# Patient Record
Sex: FEMALE | Race: WHITE | NOT HISPANIC OR LATINO | Employment: OTHER | URBAN - METROPOLITAN AREA
[De-identification: names, ages, dates, MRNs, and addresses within clinical notes are randomized per-mention and may not be internally consistent; named-entity substitution may affect disease eponyms.]

---

## 2021-05-26 ENCOUNTER — RECORDS - HEALTHEAST (OUTPATIENT)
Dept: ADMINISTRATIVE | Facility: CLINIC | Age: 86
End: 2021-05-26

## 2021-05-27 ENCOUNTER — RECORDS - HEALTHEAST (OUTPATIENT)
Dept: ADMINISTRATIVE | Facility: CLINIC | Age: 86
End: 2021-05-27

## 2021-05-28 ENCOUNTER — RECORDS - HEALTHEAST (OUTPATIENT)
Dept: ADMINISTRATIVE | Facility: CLINIC | Age: 86
End: 2021-05-28

## 2021-05-30 ENCOUNTER — RECORDS - HEALTHEAST (OUTPATIENT)
Dept: ADMINISTRATIVE | Facility: CLINIC | Age: 86
End: 2021-05-30

## 2021-05-31 ENCOUNTER — RECORDS - HEALTHEAST (OUTPATIENT)
Dept: ADMINISTRATIVE | Facility: CLINIC | Age: 86
End: 2021-05-31

## 2021-06-09 ENCOUNTER — RECORDS - HEALTHEAST (OUTPATIENT)
Dept: ADMINISTRATIVE | Facility: CLINIC | Age: 86
End: 2021-06-09

## 2021-06-15 ENCOUNTER — OFFICE VISIT - HEALTHEAST (OUTPATIENT)
Dept: FAMILY MEDICINE | Facility: CLINIC | Age: 86
End: 2021-06-15

## 2021-06-15 ENCOUNTER — COMMUNICATION - HEALTHEAST (OUTPATIENT)
Dept: FAMILY MEDICINE | Facility: CLINIC | Age: 86
End: 2021-06-15

## 2021-06-15 DIAGNOSIS — G20.A1 PARKINSON'S DISEASE (H): ICD-10-CM

## 2021-06-15 DIAGNOSIS — E11.9 TYPE 2 DIABETES MELLITUS WITHOUT COMPLICATION, WITHOUT LONG-TERM CURRENT USE OF INSULIN (H): ICD-10-CM

## 2021-06-15 DIAGNOSIS — M54.9 CHRONIC BILATERAL BACK PAIN, UNSPECIFIED BACK LOCATION: ICD-10-CM

## 2021-06-15 DIAGNOSIS — I25.810 CORONARY ARTERY DISEASE INVOLVING CORONARY BYPASS GRAFT OF NATIVE HEART WITHOUT ANGINA PECTORIS: ICD-10-CM

## 2021-06-15 DIAGNOSIS — G50.0 TRIGEMINAL NEURALGIA: ICD-10-CM

## 2021-06-15 DIAGNOSIS — I35.1 AORTIC VALVE INSUFFICIENCY, ETIOLOGY OF CARDIAC VALVE DISEASE UNSPECIFIED: ICD-10-CM

## 2021-06-15 DIAGNOSIS — F32.A DEPRESSION, UNSPECIFIED DEPRESSION TYPE: ICD-10-CM

## 2021-06-15 DIAGNOSIS — G89.29 CHRONIC BILATERAL BACK PAIN, UNSPECIFIED BACK LOCATION: ICD-10-CM

## 2021-06-15 DIAGNOSIS — I44.7 LEFT BUNDLE BRANCH BLOCK: ICD-10-CM

## 2021-06-15 DIAGNOSIS — I65.23 BILATERAL CAROTID ARTERY STENOSIS: ICD-10-CM

## 2021-06-15 DIAGNOSIS — N25.81 SECONDARY RENAL HYPERPARATHYROIDISM (H): ICD-10-CM

## 2021-06-15 DIAGNOSIS — I10 ESSENTIAL HYPERTENSION: ICD-10-CM

## 2021-06-15 DIAGNOSIS — E03.8 SUBCLINICAL HYPOTHYROIDISM: ICD-10-CM

## 2021-06-15 DIAGNOSIS — I34.0 MITRAL VALVE INSUFFICIENCY, UNSPECIFIED ETIOLOGY: ICD-10-CM

## 2021-06-15 RX ORDER — NYSTATIN 100000 U/G
CREAM TOPICAL
Status: SHIPPED | COMMUNITY
Start: 2020-01-14 | End: 2022-03-22

## 2021-06-15 RX ORDER — POLYETHYLENE GLYCOL 3350 17 G/17G
17 POWDER, FOR SOLUTION ORAL DAILY PRN
Status: SHIPPED | COMMUNITY
Start: 2021-06-15

## 2021-06-15 RX ORDER — HYDROCORTISONE 2.5 %
CREAM (GRAM) TOPICAL 2 TIMES DAILY
Status: SHIPPED | COMMUNITY
Start: 2021-06-15 | End: 2022-08-15

## 2021-06-15 RX ORDER — KETOCONAZOLE 20 MG/G
CREAM TOPICAL DAILY
Status: SHIPPED | COMMUNITY
Start: 2021-06-15 | End: 2022-08-15

## 2021-06-15 ASSESSMENT — MIFFLIN-ST. JEOR: SCORE: 1261.43

## 2021-06-15 NOTE — ASSESSMENT & PLAN NOTE
BP Readings from Last 3 Encounters:   06/15/21 134/78   06/13/18 159/66      controlled carvediolol 12.5mg po bid

## 2021-06-18 ENCOUNTER — RECORDS - HEALTHEAST (OUTPATIENT)
Dept: ADMINISTRATIVE | Facility: CLINIC | Age: 86
End: 2021-06-18

## 2021-06-26 NOTE — PROGRESS NOTES
ASSESSMENT AND PLAN:     Problem List Items Addressed This Visit        Unprioritized    Essential Hypertension     BP Readings from Last 3 Encounters:   06/15/21 134/78   06/13/18 159/66      controlled carvediolol 12.5mg po bid         Relevant Orders    Ambulatory referral to Cardiology    Coronary artery disease involving coronary bypass graft of native heart without angina pectoris     New to [place] from [place]. Needs cardiologist, so referral placed.          Relevant Orders    Ambulatory referral to Cardiology    Parkinson's disease (H)     Dx in 2015  Needs to establish care with neurologist.   She takes sinemet 25-100mg po three times a day.         Relevant Orders    Ambulatory referral to Neurology    Trigeminal neuralgia     Controlled with gabapentin 300mg po four times a day.         Relevant Orders    Ambulatory referral to Neurology    Chronic bilateral back pain     She says she has seen a pain specialist and had back surgery.  She needs to establish care with a pain specialist since she is new to [place] from [place].  Referral placed.          Relevant Orders    Ambulatory referral to Pain Clinic - [facility] pain center    Aortic valve insufficiency     Referred to cardiology to establish care as [place]         Mitral valve insufficiency     Referred to establish care with cardiologist.            Bilateral carotid artery stenosis     Referred to cardiology to establish care as [place]           Depression     Controlled on fluoxitine 20 mg po bid         Left bundle branch block     Referred to establish care with cardiologist.          Acquired hypothyroidism     Endocrine referral made.          Type 2 diabetes mellitus without complication (H)     Diet controlled diabetes. Outside records reveal a1c 6.7 4/12/2021  Creatinine normal 4/2021  microalbumin normal 4/12/2021    Follow up q6 months         Secondary renal hyperparathyroidism (H)     She has  hyperparathyroidism          Relevant Orders    Ambulatory referral to Endocrinology    DXA Bone Density Scan           Chief Complaint   Patient presents with     Establish Care        HPI  Janes Montero is a 87 y.o. female comes in new to me and new to [place] from [place] today.     Social History     Tobacco Use   Smoking Status Never Smoker   Smokeless Tobacco Never Used      Current Outpatient Medications on File Prior to Visit   Medication Sig Dispense Refill     ascorbic acid, vitamin C, (ASCORBIC ACID WITH ELLIOTT HIPS) 500 MG tablet Take 500 mg by mouth daily.       aspirin 81 mg chewable tablet Chew 81 mg at bedtime.       atorvastatin (LIPITOR) 20 MG tablet Take 20 mg by mouth at bedtime.       carbidopa-levodopa (SINEMET)  mg per tablet Take 1 tablet by mouth 3 (three) times a day.       carvedilol (COREG) 12.5 MG tablet Take 12.5 mg by mouth 2 (two) times a day with meals.       cholecalciferol, vitamin D3, 1,000 unit tablet Take 2,000 Units by mouth daily.       coenzyme Q10 (CO Q-10) 100 mg capsule Take 100 mg by mouth 2 (two) times a day.       FLUoxetine (PROZAC) 20 MG capsule Take 20 mg by mouth 2 (two) times a day.       gabapentin (NEURONTIN) 300 MG capsule Take 300 mg by mouth 4 (four) times a day.       hydrocortisone 2.5 % cream Apply topically 2 (two) times a day.       ketoconazole (NIZORAL) 2 % cream Apply topically daily.       levothyroxine (SYNTHROID, LEVOTHROID) 25 MCG tablet Take 25 mcg by mouth Daily at 6:00 am.       loratadine (CLARITIN) 10 mg tablet Take 10 mg by mouth daily.       magnesium oxide 250 mg Tab Take 250 mg by mouth daily.       melatonin 1 mg Tab tablet Take 3 mg by mouth at bedtime.        multivitamin therapeutic tablet Take 1 tablet by mouth daily.       nitroglycerin (NITROSTAT) 0.4 MG SL tablet Place 0.4 mg under the tongue every 5 (five) minutes as needed for chest pain.       nystatin (MYCOSTATIN) cream MIX EQUALLY WITH TRIAMCINOLONE AND APPLY TO VULVA 3 TO 4  "TIMES A DAY       omega 3-dha-epa-fish oil (FISH OIL) 60- mg cap capsule Take 2,000 mg by mouth 2 (two) times a day.       omeprazole (PRILOSEC) 20 MG capsule Take 20 mg by mouth daily before breakfast.       polyethylene glycol (MIRALAX) 17 gram packet Take 17 g by mouth daily.       [DISCONTINUED] furosemide (LASIX) 40 MG tablet Take 1 tablet (40 mg total) by mouth daily. 90 tablet 0     [DISCONTINUED] glipiZIDE (GLUCOTROL) 5 MG tablet Take 5 mg by mouth daily with breakfast.       [DISCONTINUED] hydrOXYzine HCl (ATARAX) 25 MG tablet Take 25 mg by mouth 2 (two) times a day.       [DISCONTINUED] hydrOXYzine HCl (ATARAX) 25 MG tablet Take 25 mg by mouth daily as needed for itching.       [DISCONTINUED] lisinopril (PRINIVIL,ZESTRIL) 5 MG tablet Take 5 mg by mouth at bedtime.       [DISCONTINUED] meloxicam (MOBIC) 15 MG tablet Take 15 mg by mouth daily.       No current facility-administered medications on file prior to visit.       Allergies   Allergen Reactions     Alendronate      pain     Codeine Hives     Hydrocodone-Acetaminophen Other (See Comments)     Highly sensitive, \"knocks her out and can't wake up\"     Risedronate      Bone pain     Rosuvastatin Myalgia     Simvastatin Myalgia       OBJECTIVE: /78 (Patient Site: Left Arm, Patient Position: Sitting, Cuff Size: Adult Large)   Pulse 66   Ht 5' 5\" (1.651 m)   Wt 182 lb (82.6 kg)   SpO2 98%   BMI 30.29 kg/m     Physical Exam  Constitutional:       General: She is not in acute distress.     Appearance: She is well-developed.   HENT:      Head: Normocephalic and atraumatic.   Eyes:      Conjunctiva/sclera: Conjunctivae normal.   Neck:      Musculoskeletal: Neck supple.   Cardiovascular:      Rate and Rhythm: Normal rate and regular rhythm.   Pulmonary:      Effort: Pulmonary effort is normal.   Musculoskeletal: Normal range of motion.   Skin:     General: Skin is warm and dry.   Neurological:      Mental Status: She is alert and oriented to " person, place, and time.          This note was created using Dragon dictation.  Please excuse any grammatical errors.

## 2021-07-01 ENCOUNTER — RECORDS - HEALTHEAST (OUTPATIENT)
Dept: BONE DENSITY | Facility: CLINIC | Age: 86
End: 2021-07-01

## 2021-07-01 DIAGNOSIS — N25.81 SECONDARY HYPERPARATHYROIDISM OF RENAL ORIGIN (H): ICD-10-CM

## 2021-07-01 DIAGNOSIS — Z78.0 ASYMPTOMATIC MENOPAUSAL STATE: ICD-10-CM

## 2021-07-05 ENCOUNTER — RECORDS - HEALTHEAST (OUTPATIENT)
Dept: ADMINISTRATIVE | Facility: OTHER | Age: 86
End: 2021-07-05

## 2021-07-05 PROBLEM — F32.A DEPRESSION: Status: ACTIVE | Noted: 2021-06-15

## 2021-07-05 PROBLEM — G50.0 TRIGEMINAL NEURALGIA: Status: ACTIVE | Noted: 2021-06-15

## 2021-07-05 PROBLEM — G20.A1 PARKINSON'S DISEASE (H): Status: ACTIVE | Noted: 2021-06-15

## 2021-07-05 PROBLEM — E11.9 TYPE 2 DIABETES MELLITUS WITHOUT COMPLICATION (H): Status: ACTIVE | Noted: 2017-03-20

## 2021-07-05 PROBLEM — M15.0 PRIMARY OSTEOARTHRITIS INVOLVING MULTIPLE JOINTS: Status: ACTIVE | Noted: 2018-04-06

## 2021-07-05 PROBLEM — I51.89 DIASTOLIC DYSFUNCTION: Status: RESOLVED | Noted: 2018-07-10 | Resolved: 2021-06-15

## 2021-07-06 VITALS
OXYGEN SATURATION: 98 % | SYSTOLIC BLOOD PRESSURE: 134 MMHG | DIASTOLIC BLOOD PRESSURE: 78 MMHG | HEIGHT: 65 IN | WEIGHT: 182 LBS | HEART RATE: 66 BPM | BODY MASS INDEX: 30.32 KG/M2

## 2021-07-13 ENCOUNTER — RECORDS - HEALTHEAST (OUTPATIENT)
Dept: ADMINISTRATIVE | Facility: CLINIC | Age: 86
End: 2021-07-13

## 2021-07-13 ENCOUNTER — OFFICE VISIT (OUTPATIENT)
Dept: FAMILY MEDICINE | Facility: CLINIC | Age: 86
End: 2021-07-13
Payer: MEDICARE

## 2021-07-13 VITALS
SYSTOLIC BLOOD PRESSURE: 138 MMHG | DIASTOLIC BLOOD PRESSURE: 74 MMHG | BODY MASS INDEX: 30.62 KG/M2 | OXYGEN SATURATION: 93 % | HEART RATE: 70 BPM | WEIGHT: 184 LBS

## 2021-07-13 DIAGNOSIS — G89.29 CHRONIC BILATERAL BACK PAIN, UNSPECIFIED BACK LOCATION: ICD-10-CM

## 2021-07-13 DIAGNOSIS — I35.1 NONRHEUMATIC AORTIC VALVE INSUFFICIENCY: ICD-10-CM

## 2021-07-13 DIAGNOSIS — I36.1 NONRHEUMATIC TRICUSPID VALVE REGURGITATION: ICD-10-CM

## 2021-07-13 DIAGNOSIS — I44.7 LEFT BUNDLE BRANCH BLOCK: ICD-10-CM

## 2021-07-13 DIAGNOSIS — M54.9 CHRONIC BILATERAL BACK PAIN, UNSPECIFIED BACK LOCATION: ICD-10-CM

## 2021-07-13 DIAGNOSIS — I65.23 BILATERAL CAROTID ARTERY STENOSIS: ICD-10-CM

## 2021-07-13 DIAGNOSIS — E03.9 ACQUIRED HYPOTHYROIDISM: Primary | ICD-10-CM

## 2021-07-13 DIAGNOSIS — G50.0 TRIGEMINAL NEURALGIA: ICD-10-CM

## 2021-07-13 DIAGNOSIS — I25.810 CORONARY ARTERY DISEASE INVOLVING CORONARY BYPASS GRAFT OF NATIVE HEART WITHOUT ANGINA PECTORIS: ICD-10-CM

## 2021-07-13 DIAGNOSIS — N25.81 SECONDARY RENAL HYPERPARATHYROIDISM (H): ICD-10-CM

## 2021-07-13 DIAGNOSIS — H61.23 CERUMINOSIS, BILATERAL: ICD-10-CM

## 2021-07-13 DIAGNOSIS — G20.A1 PARKINSON'S DISEASE (H): ICD-10-CM

## 2021-07-13 DIAGNOSIS — I34.0 NONRHEUMATIC MITRAL VALVE REGURGITATION: ICD-10-CM

## 2021-07-13 DIAGNOSIS — M81.0 OSTEOPOROSIS WITHOUT CURRENT PATHOLOGICAL FRACTURE, UNSPECIFIED OSTEOPOROSIS TYPE: ICD-10-CM

## 2021-07-13 PROCEDURE — 99214 OFFICE O/P EST MOD 30 MIN: CPT | Mod: 25 | Performed by: FAMILY MEDICINE

## 2021-07-13 PROCEDURE — 36415 COLL VENOUS BLD VENIPUNCTURE: CPT | Performed by: FAMILY MEDICINE

## 2021-07-13 PROCEDURE — 82306 VITAMIN D 25 HYDROXY: CPT | Performed by: FAMILY MEDICINE

## 2021-07-13 PROCEDURE — 69210 REMOVE IMPACTED EAR WAX UNI: CPT | Performed by: FAMILY MEDICINE

## 2021-07-13 NOTE — PROGRESS NOTES
Assessment & Plan   Problem List Items Addressed This Visit        Nervous and Auditory    Parkinson's disease (H)     Physical therpay recommended and ordered.   Will see neurologist 2 weeks from today.         Relevant Orders    Physical Therapy Referral    Trigeminal neuralgia     Will see neurologist 2 weeks from today.  Pain clinic referral has been given but she has declined it.         Chronic bilateral back pain     Needs to see pain specialist, but she declined that for now saying not needed.             Endocrine    Acquired hypothyroidism - Primary     Endocrine referral - setting hyperparathyroidism, and new osteoporosis.          Relevant Orders    Adult Endocrinology Referral    Otolaryngology Referral    Secondary renal hyperparathyroidism (H)     Endocrine referral and ent referral as she has newly diagnosed osteoporosis in the setting of hyperparathyroidism and hypothyroidism.             Circulatory    Coronary artery disease involving coronary bypass graft of native heart without angina pectoris     Has appt with cardiology         Aortic valve insufficiency     Has appt with cardiology         Tricuspid insufficiency     Has appt with cardiology         Mitral valve insufficiency     Has appt with cardiology         Bilateral carotid artery stenosis     Has appt with cardiology         Left bundle branch block     Has appt with cardiology            Musculoskeletal and Integumentary    Osteoporosis without current pathological fracture, unspecified osteoporosis type     Dietary Calcium discussed.   Vitamin D intake. She does take vitamin d supplement.and we will check the level today.   Avoid smoking  Avoid excess alcohol  Avoid caffeine   Regular weight bearing exercise  Repeat bone density scan 1 year    Hx hyperparathyroidism.   Needs referral to ent surgery due to worsening bone density.   Referral also to endocrine to discuss osteoporosis to consider bisphosphonate.  Physical therapy to  "learn balance and weight bearing exercise routine ordered.          Relevant Orders    Adult Endocrinology Referral    Otolaryngology Referral    Physical Therapy Referral    Vitamin D Deficiency      Other Visit Diagnoses     Ceruminosis, bilateral        Bilateral ear lavage.     Relevant Orders    REMOVE IMPACTED CERUMEN (Completed)              BMI:   Estimated body mass index is 30.62 kg/m  as calculated from the following:    Height as of 6/15/21: 1.651 m (5' 5\").    Weight as of this encounter: 83.5 kg (184 lb).         Return in about 9 weeks (around 9/14/2021) for already set to follow up 9/14/2021.    Maggie Arriaga MD  [facility]      Chief Complaint   Patient presents with     ear wax removal        Subjective   Janes is a 87 year old who presents for the following health issues  accompanied by her daughter:    HPI comes in at my request to discuss her osteoporosis.          Objective    /74 (BP Location: Left arm, Patient Position: Left side, Cuff Size: Adult Large)   Pulse 70   Wt 83.5 kg (184 lb)   SpO2 93%   BMI 30.62 kg/m    Body mass index is 30.62 kg/m .  Physical Exam  Constitutional:       Appearance: Normal appearance.   HENT:      Head: Normocephalic and atraumatic.   Cardiovascular:      Rate and Rhythm: Normal rate and regular rhythm.   Pulmonary:      Effort: Pulmonary effort is normal.   Musculoskeletal:         General: Normal range of motion.      Cervical back: Normal range of motion and neck supple.   Neurological:      General: No focal deficit present.      Mental Status: She is alert and oriented to person, place, and time.           "

## 2021-07-13 NOTE — ASSESSMENT & PLAN NOTE
Endocrine referral and ent referral as she has newly diagnosed osteoporosis in the setting of hyperparathyroidism and hypothyroidism.

## 2021-07-14 PROBLEM — I50.9 ACUTE ON CHRONIC CONGESTIVE HEART FAILURE (H): Status: RESOLVED | Noted: 2018-06-11 | Resolved: 2021-06-15

## 2021-07-14 LAB — DEPRECATED CALCIDIOL+CALCIFEROL SERPL-MC: 30 UG/L (ref 30–80)

## 2021-07-14 NOTE — ASSESSMENT & PLAN NOTE
>>ASSESSMENT AND PLAN FOR BILATERAL CAROTID ARTERY STENOSIS WRITTEN ON 7/13/2021  7:07 PM BY ASHLY MORRISON MD    Has appt with cardiology    >>ASSESSMENT AND PLAN FOR CORONARY ARTERY STENOSIS WRITTEN ON 7/13/2021  7:07 PM BY ASHLY MORRISON MD    Has appt with cardiology

## 2021-07-14 NOTE — ASSESSMENT & PLAN NOTE
Will see neurologist 2 weeks from today.  Pain clinic referral has been given but she has declined it.

## 2021-07-14 NOTE — ASSESSMENT & PLAN NOTE
Dietary Calcium discussed.   Vitamin D intake. She does take vitamin d supplement.and we will check the level today.   Avoid smoking  Avoid excess alcohol  Avoid caffeine   Regular weight bearing exercise  Repeat bone density scan 1 year    Hx hyperparathyroidism.   Needs referral to ent surgery due to worsening bone density.   Referral also to endocrine to discuss osteoporosis to consider bisphosphonate.  Physical therapy to learn balance and weight bearing exercise routine ordered.

## 2021-07-21 ENCOUNTER — RECORDS - HEALTHEAST (OUTPATIENT)
Dept: ADMINISTRATIVE | Facility: CLINIC | Age: 86
End: 2021-07-21

## 2021-07-21 ENCOUNTER — MYC MEDICAL ADVICE (OUTPATIENT)
Dept: FAMILY MEDICINE | Facility: CLINIC | Age: 86
End: 2021-07-21

## 2021-07-27 ENCOUNTER — LAB (OUTPATIENT)
Dept: LAB | Facility: CLINIC | Age: 86
End: 2021-07-27
Payer: MEDICARE

## 2021-07-27 ENCOUNTER — OFFICE VISIT (OUTPATIENT)
Dept: NEUROLOGY | Facility: CLINIC | Age: 86
End: 2021-07-27
Payer: MEDICARE

## 2021-07-27 VITALS
WEIGHT: 184.8 LBS | HEART RATE: 79 BPM | DIASTOLIC BLOOD PRESSURE: 68 MMHG | BODY MASS INDEX: 30.79 KG/M2 | SYSTOLIC BLOOD PRESSURE: 138 MMHG | HEIGHT: 65 IN

## 2021-07-27 DIAGNOSIS — G20.A1 PARKINSON'S DISEASE (H): ICD-10-CM

## 2021-07-27 DIAGNOSIS — E11.42 TYPE 2 DIABETES MELLITUS WITH DIABETIC POLYNEUROPATHY, WITHOUT LONG-TERM CURRENT USE OF INSULIN (H): ICD-10-CM

## 2021-07-27 DIAGNOSIS — G20.A1 PARKINSON'S DISEASE (H): Primary | ICD-10-CM

## 2021-07-27 DIAGNOSIS — G62.9 NEUROPATHY: ICD-10-CM

## 2021-07-27 DIAGNOSIS — G50.0 TRIGEMINAL NEURALGIA: ICD-10-CM

## 2021-07-27 LAB — VIT B12 SERPL-MCNC: 383 PG/ML (ref 213–816)

## 2021-07-27 PROCEDURE — 84165 PROTEIN E-PHORESIS SERUM: CPT | Mod: TC

## 2021-07-27 PROCEDURE — 86618 LYME DISEASE ANTIBODY: CPT

## 2021-07-27 PROCEDURE — 83921 ORGANIC ACID SINGLE QUANT: CPT

## 2021-07-27 PROCEDURE — 84155 ASSAY OF PROTEIN SERUM: CPT

## 2021-07-27 PROCEDURE — 86334 IMMUNOFIX E-PHORESIS SERUM: CPT | Mod: TC

## 2021-07-27 PROCEDURE — 36415 COLL VENOUS BLD VENIPUNCTURE: CPT

## 2021-07-27 PROCEDURE — 84425 ASSAY OF VITAMIN B-1: CPT

## 2021-07-27 PROCEDURE — 99205 OFFICE O/P NEW HI 60 MIN: CPT | Performed by: PSYCHIATRY & NEUROLOGY

## 2021-07-27 PROCEDURE — 82607 VITAMIN B-12: CPT

## 2021-07-27 PROCEDURE — 86038 ANTINUCLEAR ANTIBODIES: CPT

## 2021-07-27 RX ORDER — CARBIDOPA AND LEVODOPA 25; 100 MG/1; MG/1
1 TABLET ORAL 3 TIMES DAILY
Qty: 270 TABLET | Refills: 1 | Status: SHIPPED | OUTPATIENT
Start: 2021-07-27 | End: 2021-10-19

## 2021-07-27 RX ORDER — GABAPENTIN 300 MG/1
300 CAPSULE ORAL 4 TIMES DAILY
Qty: 360 CAPSULE | Refills: 1 | Status: SHIPPED | OUTPATIENT
Start: 2021-07-27 | End: 2021-10-19

## 2021-07-27 ASSESSMENT — MIFFLIN-ST. JEOR: SCORE: 1274.13

## 2021-07-27 NOTE — PROGRESS NOTES
NEUROLOGY OUTPATIENT CONSULT NOTE   Jul 27, 2021     CHIEF COMPLAINT/REASON FOR VISIT/REASON FOR CONSULT  Patient presents with:  New Patient: Establishing care for Parkinson's and Trigeminal neuralgia     REASON FOR CONSULTATION-Parkinson's/trigeminal neuralgia    REFERRAL SOURCE  Dr. Maggie Arriaga  CC Dr. Maggie Arriaga    HISTORY OF PRESENT ILLNESS  Janes Montero is a 87 year old female seen today for multiple issues  Patient is moving from [place] to [place].  She is a resident of [place].  And has moved to [place] is a 20 years and is coming back.    1.  Parkinson's disease:-Symptom onset was in 2015.  At that time she was having shaking of her arms and legs.  This was at rest and with activity.  She was losing her balance as well.  She was put on Sinemet 1 tablet 3 times a day.  Feels that the medication is helping.  Does have some wearing off in the evening time and is taking the medication earlier than bedtime which is working.  Does fine in the morning.  Takes the medication on empty stomach.  Denies any other progression or any new symptoms.  Occasionally will use a walker.  Is not very active but does do some walking.  2.  Trigeminal neuralgia:-This started in 2013.  Pain is on the left side of the face.  The whole V1 V2 V3 distribution is involved that the pain is more towards the year.  Pain is sharp and intermittent.  Heating pad helps.  Reports no real provoking factors for her.  She is on gabapentin which has been helpful.  Has not tried any other medications.  Denies any other associated symptoms.  No numbness.  MRI was done and no clear cause for the trigeminal neuralgia was identified.  3.  Patient complains of some numbness in her legs.  Does report some balance issues as well.  She does have a diagnosis of diabetes.  Last A1c was 6.7.    Previous history is reviewed and this is unchanged.    PAST MEDICAL/SURGICAL HISTORY  Past Medical History:   Diagnosis Date     Acute diastolic  congestive heart failure (H)      Acute on chronic congestive heart failure (H) 6/11/2018     Coronary artery disease      Diabetes mellitus, type II (H)      Diastolic dysfunction 7/10/2018     Frozen shoulder     bilateral     Hypertension      Hypertensive emergency      Parkinson disease (H)      Recurrent UTI     hx septic shock     Patient Active Problem List   Diagnosis     Essential Hypertension     Coronary artery disease involving coronary bypass graft of native heart without angina pectoris     S/P CABG (coronary artery bypass graft)     Parkinson's disease (H)     Trigeminal neuralgia     Chronic bilateral back pain     Aortic valve insufficiency     Tricuspid insufficiency     Mitral valve insufficiency     Bilateral carotid artery stenosis     CKD (chronic kidney disease) stage 3, GFR 30-59 ml/min     Depression     GERD (gastroesophageal reflux disease)     Left bundle branch block     Primary osteoarthritis involving multiple joints     Sensorineural hearing loss (SNHL) of both ears     Acquired hypothyroidism     Type 2 diabetes mellitus without complication (H)     Secondary renal hyperparathyroidism (H)     Osteoporosis without current pathological fracture, unspecified osteoporosis type   Significant for CABG/bypass/coronary artery disease, high cholesterol, high blood pressure, diabetes, migraines, Parkinson's disease, arthritis, depression, osteoporosis, hyperparathyroidism    FAMILY HISTORY  Family History   Problem Relation Age of Onset     Heart Disease Mother      Heart Disease Father    Family history reviewed and noncontributory no family history of neuropathy.    SOCIAL HISTORY  Social History     Tobacco Use     Smoking status: Never Smoker     Smokeless tobacco: Never Used   Substance Use Topics     Alcohol use: No     Drug use: No       SYSTEMS REVIEW  Twelve-system ROS was done and other than the HPI this was negative except for neck pain, back pain, arm and leg pain, joint pain,  numbness and tingling, weakness paralysis, difficulty walking, falling, balance coordination problems, hearing loss, sleepy during the day, sleeping problems, headaches, anxiety, depression, cardiac/heart problems.    MEDICATIONS  ascorbic acid, vitamin C, (ASCORBIC ACID WITH ELLIOTT HIPS) 500 MG tablet, [ASCORBIC ACID, VITAMIN C, (ASCORBIC ACID WITH ELLIOTT HIPS) 500 MG TABLET] Take 500 mg by mouth daily.  aspirin 81 mg chewable tablet, [ASPIRIN 81 MG CHEWABLE TABLET] Chew 81 mg at bedtime.  atorvastatin (LIPITOR) 20 MG tablet, [ATORVASTATIN (LIPITOR) 20 MG TABLET] Take 20 mg by mouth at bedtime.  carbidopa-levodopa (SINEMET)  mg per tablet, [CARBIDOPA-LEVODOPA (SINEMET)  MG PER TABLET] Take 1 tablet by mouth 3 (three) times a day.  carvedilol (COREG) 12.5 MG tablet, [CARVEDILOL (COREG) 12.5 MG TABLET] Take 12.5 mg by mouth 2 (two) times a day with meals.  cholecalciferol, vitamin D3, 1,000 unit tablet, [CHOLECALCIFEROL, VITAMIN D3, 1,000 UNIT TABLET] Take 2,000 Units by mouth daily.  coenzyme Q10 (CO Q-10) 100 mg capsule, [COENZYME Q10 (CO Q-10) 100 MG CAPSULE] Take 100 mg by mouth 2 (two) times a day.  FLUoxetine (PROZAC) 20 MG capsule, [FLUOXETINE (PROZAC) 20 MG CAPSULE] Take 20 mg by mouth 2 (two) times a day.  gabapentin (NEURONTIN) 300 MG capsule, [GABAPENTIN (NEURONTIN) 300 MG CAPSULE] Take 300 mg by mouth 4 (four) times a day.  hydrocortisone 2.5 % cream, [HYDROCORTISONE 2.5 % CREAM] Apply topically 2 (two) times a day.  ketoconazole (NIZORAL) 2 % cream, [KETOCONAZOLE (NIZORAL) 2 % CREAM] Apply topically daily.  levothyroxine (SYNTHROID, LEVOTHROID) 25 MCG tablet, [LEVOTHYROXINE (SYNTHROID, LEVOTHROID) 25 MCG TABLET] Take 25 mcg by mouth Daily at 6:00 am.  loratadine (CLARITIN) 10 mg tablet, [LORATADINE (CLARITIN) 10 MG TABLET] Take 10 mg by mouth daily.  magnesium oxide 250 mg Tab, [MAGNESIUM OXIDE 250 MG TAB] Take 250 mg by mouth daily.  melatonin 1 mg Tab tablet, [MELATONIN 1 MG TAB TABLET] Take 3  "mg by mouth at bedtime.   multivitamin therapeutic tablet, [MULTIVITAMIN THERAPEUTIC TABLET] Take 1 tablet by mouth daily.  nitroglycerin (NITROSTAT) 0.4 MG SL tablet, [NITROGLYCERIN (NITROSTAT) 0.4 MG SL TABLET] Place 0.4 mg under the tongue every 5 (five) minutes as needed for chest pain.  nystatin (MYCOSTATIN) cream, [NYSTATIN (MYCOSTATIN) CREAM] MIX EQUALLY WITH TRIAMCINOLONE AND APPLY TO VULVA 3 TO 4 TIMES A DAY  omega 3-dha-epa-fish oil (FISH OIL) 60- mg cap capsule, [OMEGA 3-DHA-EPA-FISH OIL (FISH OIL) 60- MG CAP CAPSULE] Take 2,000 mg by mouth 2 (two) times a day.  omeprazole (PRILOSEC) 20 MG capsule, [OMEPRAZOLE (PRILOSEC) 20 MG CAPSULE] Take 20 mg by mouth daily before breakfast.  polyethylene glycol (MIRALAX) 17 gram packet, [POLYETHYLENE GLYCOL (MIRALAX) 17 GRAM PACKET] Take 17 g by mouth daily.    No current facility-administered medications on file prior to visit.       PHYSICAL EXAMINATION  VITALS: /68 (BP Location: Right arm, Patient Position: Sitting)   Pulse 79   Ht 1.651 m (5' 5\")   Wt 83.8 kg (184 lb 12.8 oz)   BMI 30.75 kg/m    GENERAL: Healthy appearing, alert, no acute distress, normal habitus.  CARDIOVASCULAR: Extremities warm and well perfused. Pulses present.   EYES: Funduscopic exam limited.  NEUROLOGICAL:  Patient is awake and oriented to self, place and time.  Attention span is normal.  Memory is grossly intact.  Language is fluent and follows commands appropriately.  Appropriate fund of knowledge. Cranial nerves 2-12 are intact. There is no pronator drift.  Motor exam shows 5/5 strength in all extremities.  Tone is symmetric bilaterally in upper and lower extremities.  No cogwheeling or tremor noted.  Reflexes are symmetric and 2+ in upper extremities and absent in lower extremities. Sensory exam is grossly intact to light touch, pin prick and vibration with exception of decreased pinprick below the knees.  Finger to nose and heel to shin is without dysmetria.  " Romberg is negative/unsteady.  Gait is slightly wide-based and the patient is able to do tandem walk and walk on toes and heels with difficulty.  Trying concentric circles did not show any tremor.    DIAGNOSTICS  RELEVANT LABS  TSH 2.01   A1c 6.7    Carotid US 2020  1: Right: 1-39% carotid artery stenosis with mild velocities and antegrade   vertebral flow.   2: Left: 1-39% carotid artery stenosis with mild velocities and antegrade   vertebral flow.   3: Essentially unchanged.   4: Recommend a two year follow-up if patient remains asymptomatic.       CT head 2020  IMPRESSION   1. No acute intracranial findings.   2. Senescent changes.     MRI 2013  IMPRESSION:   Scattered small vessel ischemic disease including pontine involvement.      OUTSIDE RECORDS  Outside referral notes and chart notes were reviewed and pertinent information has been summarized (in addition to the HPI):-Patient has a diagnosis of Parkinson's disease.  Diagnosed in 2015.  Is looking to establish with a neurologist.  Is on Sinemet  times a day.  Also has trigeminal neuralgia controlled with gabapentin 300 mg p.o. 4 times a day.  Also has bilateral carotid artery stenosis.  Is moving here from [place].    IMPRESSION/REPORT/PLAN  Parkinson's disease (H)  Trigeminal neuralgia  Neuropathy  Type 2 diabetes mellitus with diabetic polyneuropathy, without long-term current use of insulin (H)    This is a 87 year old female with  1.  Trigeminal neuralgia:-MRI brain in 2013 did not show any structural lesions.  Currently pain is under good control with gabapentin and I will continue that.  2.  Parkinson's disease:-Examination does not show a lot of evidence of Parkinson's disease.  Possibly this is being masked by use of Sinemet.  Encouraged her to make an appointment during the next visit right before she takes her afternoon pills.  We will see if there is any evidence of Parkinson's disease that can be seen.  I do suspect she has Parkinson's  disease since she is noticing some benefit from the Sinemet.  3.  On examination she has a wide-based gait with decreased pinprick sensation in her legs.  Examination suggestive of neuropathy.  She does have diabetes which is most likely the cause for the neuropathy here.  We will check blood work to look for other causes.  We also check an EMG.    I can see her back in 1 month.    -     EMG; Future  -     Vitamin B12; Future  -     Vitamin B1 whole blood; Future  -     Methylmalonic Acid; Future  -     Protein Immunofixation Serum; Future  -     Protein electrophoresis; Future  -     Lyme Disease Seema with reflex to WB Serum; Future  -     Antinuclear antibody (VINICIO) cascade; Future  -     carbidopa-levodopa (SINEMET)  MG tablet; Take 1 tablet by mouth 3 times daily Take at least 30 min before meals or 2 hours after meals. Do not mix with food.  -     gabapentin (NEURONTIN) 300 MG capsule; Take 1 capsule (300 mg) by mouth 4 times daily    Return in about 1 month (around 8/27/2021) for In-Clinic Visit, After testing.    Over 70 minutes were spent coordinating the care for the patient on the day of the encounter.  This includes previsit, during visit and post visit activities as documented above.  (Activities include but not inclusive of reviewing chart, reviewing outside records, reviewing labs and imaging study results as well as the images, patient visit time including getting history and exam,  use if applicable, review of test results with the patient and coming up with a plan in a shared model, counseling patient and family, education and answering patient questions, EMR , EMR diagnosis entry and problem list management, medication reconciliation and prescription management if applicable, paperwork if applicable, printing documents and documentation of the visit activities.)  Billing:- 4 data points, 4 problem points, 4 risk points      Brennon Moya MD  Neurologist  [facility]  Neurology [facility]  Tel:- [phone]    This note was dictated using voice recognition software.  Any grammatical or context distortions are unintentional and inherent to the software.

## 2021-07-27 NOTE — LETTER
7/27/2021         RE: Janes Montero  [address]  [facility] [address]        Dear Colleague,    Thank you for referring your patient, Janes Montero, to the [facility] [place]. Please see a copy of my visit note below.    NEUROLOGY OUTPATIENT CONSULT NOTE   Jul 27, 2021     CHIEF COMPLAINT/REASON FOR VISIT/REASON FOR CONSULT  Patient presents with:  New Patient: Establishing care for Parkinson's and Trigeminal neuralgia     REASON FOR CONSULTATION-Parkinson's/trigeminal neuralgia    REFERRAL SOURCE  Dr. Maggie Arriaga  CC Dr. Maggie Arriaga    HISTORY OF PRESENT ILLNESS  Janes Montero is a 87 year old female seen today for multiple issues  Patient is moving from [place] to [place].  She is a resident of [place].  And has moved to [place] is a 20 years and is coming back.    1.  Parkinson's disease:-Symptom onset was in 2015.  At that time she was having shaking of her arms and legs.  This was at rest and with activity.  She was losing her balance as well.  She was put on Sinemet 1 tablet 3 times a day.  Feels that the medication is helping.  Does have some wearing off in the evening time and is taking the medication earlier than bedtime which is working.  Does fine in the morning.  Takes the medication on empty stomach.  Denies any other progression or any new symptoms.  Occasionally will use a walker.  Is not very active but does do some walking.  2.  Trigeminal neuralgia:-This started in 2013.  Pain is on the left side of the face.  The whole V1 V2 V3 distribution is involved that the pain is more towards the year.  Pain is sharp and intermittent.  Heating pad helps.  Reports no real provoking factors for her.  She is on gabapentin which has been helpful.  Has not tried any other medications.  Denies any other associated symptoms.  No numbness.  MRI was done and no clear cause for the trigeminal neuralgia was identified.  3.  Patient complains of some  numbness in her legs.  Does report some balance issues as well.  She does have a diagnosis of diabetes.  Last A1c was 6.7.    Previous history is reviewed and this is unchanged.    PAST MEDICAL/SURGICAL HISTORY  Past Medical History:   Diagnosis Date     Acute diastolic congestive heart failure (H)      Acute on chronic congestive heart failure (H) 6/11/2018     Coronary artery disease      Diabetes mellitus, type II (H)      Diastolic dysfunction 7/10/2018     Frozen shoulder     bilateral     Hypertension      Hypertensive emergency      Parkinson disease (H)      Recurrent UTI     hx septic shock     Patient Active Problem List   Diagnosis     Essential Hypertension     Coronary artery disease involving coronary bypass graft of native heart without angina pectoris     S/P CABG (coronary artery bypass graft)     Parkinson's disease (H)     Trigeminal neuralgia     Chronic bilateral back pain     Aortic valve insufficiency     Tricuspid insufficiency     Mitral valve insufficiency     Bilateral carotid artery stenosis     CKD (chronic kidney disease) stage 3, GFR 30-59 ml/min     Depression     GERD (gastroesophageal reflux disease)     Left bundle branch block     Primary osteoarthritis involving multiple joints     Sensorineural hearing loss (SNHL) of both ears     Acquired hypothyroidism     Type 2 diabetes mellitus without complication (H)     Secondary renal hyperparathyroidism (H)     Osteoporosis without current pathological fracture, unspecified osteoporosis type   Significant for CABG/bypass/coronary artery disease, high cholesterol, high blood pressure, diabetes, migraines, Parkinson's disease, arthritis, depression, osteoporosis, hyperparathyroidism    FAMILY HISTORY  Family History   Problem Relation Age of Onset     Heart Disease Mother      Heart Disease Father    Family history reviewed and noncontributory no family history of neuropathy.    SOCIAL HISTORY  Social History     Tobacco Use     Smoking  status: Never Smoker     Smokeless tobacco: Never Used   Substance Use Topics     Alcohol use: No     Drug use: No       SYSTEMS REVIEW  Twelve-system ROS was done and other than the HPI this was negative except for neck pain, back pain, arm and leg pain, joint pain, numbness and tingling, weakness paralysis, difficulty walking, falling, balance coordination problems, hearing loss, sleepy during the day, sleeping problems, headaches, anxiety, depression, cardiac/heart problems.    MEDICATIONS  ascorbic acid, vitamin C, (ASCORBIC ACID WITH ELLIOTT HIPS) 500 MG tablet, [ASCORBIC ACID, VITAMIN C, (ASCORBIC ACID WITH ELLIOTT HIPS) 500 MG TABLET] Take 500 mg by mouth daily.  aspirin 81 mg chewable tablet, [ASPIRIN 81 MG CHEWABLE TABLET] Chew 81 mg at bedtime.  atorvastatin (LIPITOR) 20 MG tablet, [ATORVASTATIN (LIPITOR) 20 MG TABLET] Take 20 mg by mouth at bedtime.  carbidopa-levodopa (SINEMET)  mg per tablet, [CARBIDOPA-LEVODOPA (SINEMET)  MG PER TABLET] Take 1 tablet by mouth 3 (three) times a day.  carvedilol (COREG) 12.5 MG tablet, [CARVEDILOL (COREG) 12.5 MG TABLET] Take 12.5 mg by mouth 2 (two) times a day with meals.  cholecalciferol, vitamin D3, 1,000 unit tablet, [CHOLECALCIFEROL, VITAMIN D3, 1,000 UNIT TABLET] Take 2,000 Units by mouth daily.  coenzyme Q10 (CO Q-10) 100 mg capsule, [COENZYME Q10 (CO Q-10) 100 MG CAPSULE] Take 100 mg by mouth 2 (two) times a day.  FLUoxetine (PROZAC) 20 MG capsule, [FLUOXETINE (PROZAC) 20 MG CAPSULE] Take 20 mg by mouth 2 (two) times a day.  gabapentin (NEURONTIN) 300 MG capsule, [GABAPENTIN (NEURONTIN) 300 MG CAPSULE] Take 300 mg by mouth 4 (four) times a day.  hydrocortisone 2.5 % cream, [HYDROCORTISONE 2.5 % CREAM] Apply topically 2 (two) times a day.  ketoconazole (NIZORAL) 2 % cream, [KETOCONAZOLE (NIZORAL) 2 % CREAM] Apply topically daily.  levothyroxine (SYNTHROID, LEVOTHROID) 25 MCG tablet, [LEVOTHYROXINE (SYNTHROID, LEVOTHROID) 25 MCG TABLET] Take 25 mcg by mouth  "Daily at 6:00 am.  loratadine (CLARITIN) 10 mg tablet, [LORATADINE (CLARITIN) 10 MG TABLET] Take 10 mg by mouth daily.  magnesium oxide 250 mg Tab, [MAGNESIUM OXIDE 250 MG TAB] Take 250 mg by mouth daily.  melatonin 1 mg Tab tablet, [MELATONIN 1 MG TAB TABLET] Take 3 mg by mouth at bedtime.   multivitamin therapeutic tablet, [MULTIVITAMIN THERAPEUTIC TABLET] Take 1 tablet by mouth daily.  nitroglycerin (NITROSTAT) 0.4 MG SL tablet, [NITROGLYCERIN (NITROSTAT) 0.4 MG SL TABLET] Place 0.4 mg under the tongue every 5 (five) minutes as needed for chest pain.  nystatin (MYCOSTATIN) cream, [NYSTATIN (MYCOSTATIN) CREAM] MIX EQUALLY WITH TRIAMCINOLONE AND APPLY TO VULVA 3 TO 4 TIMES A DAY  omega 3-dha-epa-fish oil (FISH OIL) 60- mg cap capsule, [OMEGA 3-DHA-EPA-FISH OIL (FISH OIL) 60- MG CAP CAPSULE] Take 2,000 mg by mouth 2 (two) times a day.  omeprazole (PRILOSEC) 20 MG capsule, [OMEPRAZOLE (PRILOSEC) 20 MG CAPSULE] Take 20 mg by mouth daily before breakfast.  polyethylene glycol (MIRALAX) 17 gram packet, [POLYETHYLENE GLYCOL (MIRALAX) 17 GRAM PACKET] Take 17 g by mouth daily.    No current facility-administered medications on file prior to visit.       PHYSICAL EXAMINATION  VITALS: /68 (BP Location: Right arm, Patient Position: Sitting)   Pulse 79   Ht 1.651 m (5' 5\")   Wt 83.8 kg (184 lb 12.8 oz)   BMI 30.75 kg/m    GENERAL: Healthy appearing, alert, no acute distress, normal habitus.  CARDIOVASCULAR: Extremities warm and well perfused. Pulses present.   EYES: Funduscopic exam limited.  NEUROLOGICAL:  Patient is awake and oriented to self, place and time.  Attention span is normal.  Memory is grossly intact.  Language is fluent and follows commands appropriately.  Appropriate fund of knowledge. Cranial nerves 2-12 are intact. There is no pronator drift.  Motor exam shows 5/5 strength in all extremities.  Tone is symmetric bilaterally in upper and lower extremities.  No cogwheeling or tremor noted.  " Reflexes are symmetric and 2+ in upper extremities and absent in lower extremities. Sensory exam is grossly intact to light touch, pin prick and vibration with exception of decreased pinprick below the knees.  Finger to nose and heel to shin is without dysmetria.  Romberg is negative/unsteady.  Gait is slightly wide-based and the patient is able to do tandem walk and walk on toes and heels with difficulty.  Trying concentric circles did not show any tremor.    DIAGNOSTICS  RELEVANT LABS  TSH 2.01   A1c 6.7    Carotid US 2020  1: Right: 1-39% carotid artery stenosis with mild velocities and antegrade   vertebral flow.   2: Left: 1-39% carotid artery stenosis with mild velocities and antegrade   vertebral flow.   3: Essentially unchanged.   4: Recommend a two year follow-up if patient remains asymptomatic.       CT head 2020  IMPRESSION   1. No acute intracranial findings.   2. Senescent changes.     MRI 2013  IMPRESSION:   Scattered small vessel ischemic disease including pontine involvement.      OUTSIDE RECORDS  Outside referral notes and chart notes were reviewed and pertinent information has been summarized (in addition to the HPI):-Patient has a diagnosis of Parkinson's disease.  Diagnosed in 2015.  Is looking to establish with a neurologist.  Is on Sinemet  times a day.  Also has trigeminal neuralgia controlled with gabapentin 300 mg p.o. 4 times a day.  Also has bilateral carotid artery stenosis.  Is moving here from [place].    IMPRESSION/REPORT/PLAN  Parkinson's disease (H)  Trigeminal neuralgia  Neuropathy  Type 2 diabetes mellitus with diabetic polyneuropathy, without long-term current use of insulin (H)    This is a 87 year old female with  1.  Trigeminal neuralgia:-MRI brain in 2013 did not show any structural lesions.  Currently pain is under good control with gabapentin and I will continue that.  2.  Parkinson's disease:-Examination does not show a lot of evidence of Parkinson's disease.   Possibly this is being masked by use of Sinemet.  Encouraged her to make an appointment during the next visit right before she takes her afternoon pills.  We will see if there is any evidence of Parkinson's disease that can be seen.  I do suspect she has Parkinson's disease since she is noticing some benefit from the Sinemet.  3.  On examination she has a wide-based gait with decreased pinprick sensation in her legs.  Examination suggestive of neuropathy.  She does have diabetes which is most likely the cause for the neuropathy here.  We will check blood work to look for other causes.  We also check an EMG.    I can see her back in 1 month.    -     EMG; Future  -     Vitamin B12; Future  -     Vitamin B1 whole blood; Future  -     Methylmalonic Acid; Future  -     Protein Immunofixation Serum; Future  -     Protein electrophoresis; Future  -     Lyme Disease Seema with reflex to WB Serum; Future  -     Antinuclear antibody (VINICIO) cascade; Future  -     carbidopa-levodopa (SINEMET)  MG tablet; Take 1 tablet by mouth 3 times daily Take at least 30 min before meals or 2 hours after meals. Do not mix with food.  -     gabapentin (NEURONTIN) 300 MG capsule; Take 1 capsule (300 mg) by mouth 4 times daily    Return in about 1 month (around 8/27/2021) for In-Clinic Visit, After testing.    Over 70 minutes were spent coordinating the care for the patient on the day of the encounter.  This includes previsit, during visit and post visit activities as documented above.  (Activities include but not inclusive of reviewing chart, reviewing outside records, reviewing labs and imaging study results as well as the images, patient visit time including getting history and exam,  use if applicable, review of test results with the patient and coming up with a plan in a shared model, counseling patient and family, education and answering patient questions, EMR , EMR diagnosis entry and problem list management,  medication reconciliation and prescription management if applicable, paperwork if applicable, printing documents and documentation of the visit activities.)  Billing:- 4 data points, 4 problem points, 4 risk points      Brennon Moya MD  Neurologist  [facility]  Tel:- [phone]    This note was dictated using voice recognition software.  Any grammatical or context distortions are unintentional and inherent to the software.        Again, thank you for allowing me to participate in the care of your patient.        Sincerely,        Brennon Moya MD

## 2021-07-27 NOTE — NURSING NOTE
Chief Complaint   Patient presents with     New Patient     Establishing care for Parkinson's and Trigeminal neuralgia      Megan Odell MA on 7/27/2021 at 8:28 AM

## 2021-07-28 LAB
ALBUMIN PERCENT: 66.6 % (ref 51–67)
ALBUMIN SERPL ELPH-MCNC: 4.5 G/DL (ref 3.2–4.7)
ALPHA 1 PERCENT: 1.9 % (ref 2–4)
ALPHA 2 PERCENT: 9.8 % (ref 5–13)
ALPHA1 GLOB SERPL ELPH-MCNC: 0.1 G/DL (ref 0.1–0.3)
ALPHA2 GLOB SERPL ELPH-MCNC: 0.7 G/DL (ref 0.4–0.9)
B BURGDOR IGG+IGM SER QL: 0.04
B-GLOBULIN SERPL ELPH-MCNC: 0.7 G/DL (ref 0.7–1.2)
BETA PERCENT: 10.5 % (ref 10–17)
GAMMA GLOB SERPL ELPH-MCNC: 0.8 G/DL (ref 0.6–1.4)
GAMMA GLOBULIN PERCENT: 11.2 % (ref 9–20)
PATH ICD:: ABNORMAL
PROT PATTERN SERPL ELPH-IMP: ABNORMAL
REVIEWING PATHOLOGIST: ABNORMAL
TOTAL PROTEIN SERUM FOR ELP: 6.8 G/DL (ref 6–8)
TOTAL PROTEIN SERUM FOR ELP: 6.9 G/DL (ref 6.8–8.8)

## 2021-07-28 PROCEDURE — 84165 PROTEIN E-PHORESIS SERUM: CPT | Mod: 26 | Performed by: PATHOLOGY

## 2021-07-29 LAB
ANA PAT SER IF-IMP: ABNORMAL
ANA SER QL IF: ABNORMAL
ANA TITR SER IF: ABNORMAL {TITER}

## 2021-07-30 LAB
PATH ICD:: NORMAL
PROT PATTERN SERPL IFE-IMP: NORMAL
REVIEWING PATHOLOGIST: NORMAL

## 2021-07-30 PROCEDURE — 86334 IMMUNOFIX E-PHORESIS SERUM: CPT | Mod: 26 | Performed by: PATHOLOGY

## 2021-08-02 LAB — VIT B1 PYROPHOSHATE BLD-SCNC: 184 NMOL/L

## 2021-08-05 LAB — METHYLMALONATE SERPL-SCNC: 1.02 UMOL/L (ref 0–0.4)

## 2021-08-06 ENCOUNTER — OFFICE VISIT (OUTPATIENT)
Dept: OTOLARYNGOLOGY | Facility: CLINIC | Age: 86
End: 2021-08-06
Attending: FAMILY MEDICINE
Payer: MEDICARE

## 2021-08-06 DIAGNOSIS — E21.3 HYPERPARATHYROIDISM (H): Primary | ICD-10-CM

## 2021-08-06 DIAGNOSIS — E03.9 ACQUIRED HYPOTHYROIDISM: ICD-10-CM

## 2021-08-06 DIAGNOSIS — M81.0 OSTEOPOROSIS WITHOUT CURRENT PATHOLOGICAL FRACTURE, UNSPECIFIED OSTEOPOROSIS TYPE: ICD-10-CM

## 2021-08-06 PROCEDURE — 99204 OFFICE O/P NEW MOD 45 MIN: CPT | Performed by: OTOLARYNGOLOGY

## 2021-08-06 NOTE — LETTER
8/6/2021         RE: Janes Montero  [address]  [facility] [address]        Dear Colleague,    Thank you for referring your patient, Janes Montero, to the [facility]. Please see a copy of my visit note below.    HPI: This patient is an 86yo F who presents to clinic for evaluation of hyperparathyroidism at the request of Dr. Arriaga. She has many chronic health problems, including renal disease, and was noted to have osteoporosis and a high PTH. She underwent some workup while living in [place], but does not sound like she had a Sestimibi. Denies fevers, unintentional weight loss, odynophagia, dysphagia, hemoptysis, voice changes, and shortness of breath.     Past medical history, surgical history, social history, family history, medications, and allergies have been reviewed with the patient and are documented above.    Review of Systems: a 10-system review was performed. Pertinent positives are noted in the HPI and on a separate scanned document in the chart.    PHYSICAL EXAMINATION:  GEN: no acute distress, normocephalic  EYES: extraocular movements are intact, pupils are equal and round. Sclera clear.   EARS: auricles are normally formed. The external auditory canals are clear with minimal to no cerumen. Tympanic membranes are intact bilaterally with no signs of infection, effusion, retractions, or perforations.  NOSE: anterior nares are patent. There are no masses or lesions. The septum is non-obstructing.  OC/OP: clear, dentition is in good repair. The tongue and palate are fully mobile and symmetric. The floor of mouth, base of tongue, and tonsils are soft and symmetric.  HP/L (scope): nasopharynx, base of tongue, vallecula, epiglottis, and pyriform sinuses are clear. The bilateral vocal folds are mobile and without lesion  NECK: soft and supple. No lymphadenopathy or masses. Airway is midline.  NEURO: CN VII and XII symmetric. alert and oriented. No spontaneous  nystagmus. Gait is normal.  PULM: breathing comfortably on room air, normal chest expansion with respiration  CARDS: no cyanosis or clubbing, normal carotid pulses    PTH 1/21: 99.7    SESTIMIBI: none    U/S NECK: none    MEDICAL DECISION-MAKING: This patient is an 86yo F with a multiple medical problems making her a poor candidate for surgical management of hyperparathyroidism, and that is assuming that she has an adenoma. It is possible that this is secondary hyperparathyroidism, which is not typically an ENT surgical disease. She meets with Endocrinology next week and needs a Sestimibi. Scan ordered and scheduled. Will contact her with the results.        Again, thank you for allowing me to participate in the care of your patient.        Sincerely,        Itzel Bashir MD

## 2021-08-06 NOTE — PROGRESS NOTES
HPI: This patient is an 88yo F who presents to clinic for evaluation of hyperparathyroidism at the request of Dr. Arriaga. She has many chronic health problems, including renal disease, and was noted to have osteoporosis and a high PTH. She underwent some workup while living in [place], but does not sound like she had a Sestimibi. Denies fevers, unintentional weight loss, odynophagia, dysphagia, hemoptysis, voice changes, and shortness of breath.     Past medical history, surgical history, social history, family history, medications, and allergies have been reviewed with the patient and are documented above.    Review of Systems: a 10-system review was performed. Pertinent positives are noted in the HPI and on a separate scanned document in the chart.    PHYSICAL EXAMINATION:  GEN: no acute distress, normocephalic  EYES: extraocular movements are intact, pupils are equal and round. Sclera clear.   EARS: auricles are normally formed. The external auditory canals are clear with minimal to no cerumen. Tympanic membranes are intact bilaterally with no signs of infection, effusion, retractions, or perforations.  NOSE: anterior nares are patent. There are no masses or lesions. The septum is non-obstructing.  OC/OP: clear, dentition is in good repair. The tongue and palate are fully mobile and symmetric. The floor of mouth, base of tongue, and tonsils are soft and symmetric.  HP/L (scope): nasopharynx, base of tongue, vallecula, epiglottis, and pyriform sinuses are clear. The bilateral vocal folds are mobile and without lesion  NECK: soft and supple. No lymphadenopathy or masses. Airway is midline.  NEURO: CN VII and XII symmetric. alert and oriented. No spontaneous nystagmus. Gait is normal.  PULM: breathing comfortably on room air, normal chest expansion with respiration  CARDS: no cyanosis or clubbing, normal carotid pulses    PTH 1/21: 99.7    SESTIMIBI: none    U/S NECK: none    MEDICAL DECISION-MAKING: This patient  is an 88yo F with a multiple medical problems making her a poor candidate for surgical management of hyperparathyroidism, and that is assuming that she has an adenoma. It is possible that this is secondary hyperparathyroidism, which is not typically an ENT surgical disease. She meets with Endocrinology next week and needs a Sestimibi. Scan ordered and scheduled. Will contact her with the results.

## 2021-08-10 NOTE — PROGRESS NOTES
Janes is a 87 year old who is being evaluated via a billable video visit.      How would you like to obtain your AVS? [employer]  If the video visit is dropped, the invitation should be resent by: Send to e-mail at: [email or web address]  Will anyone else be joining your video visit? No       Video Start Time: 4:02 PM  CC: [facility]     HPI:  Patient presents for management of hyperparathyroidism.   Originally found to have hypercalcemia in 2018 when living in [place].    Vaguely recalls being on fosamax in the past.   Not sure why it was stopped.   Fractured right humerus in 2017 after trip and fall.   Right wrist fractured in 2015, trip and fall.   Right ankle fracture in 2019, trip and fall.     She does not take any supplemental calcium.     She has never had a kidney stone.     Always has water by her for dry mouth.   Only up to urinate once at night.     Regular bowel movements with the aid of miralax.     ROS: 10 point ROS neg other than the symptoms noted above in the HPI.    PMH:   Patient Active Problem List   Diagnosis     Essential Hypertension     Coronary artery disease involving coronary bypass graft of native heart without angina pectoris     S/P CABG (coronary artery bypass graft)     Parkinson's disease (H)     Trigeminal neuralgia     Chronic bilateral back pain     Aortic valve insufficiency     Tricuspid insufficiency     Mitral valve insufficiency     Bilateral carotid artery stenosis     CKD (chronic kidney disease) stage 3, GFR 30-59 ml/min     Depression     GERD (gastroesophageal reflux disease)     Left bundle branch block     Primary osteoarthritis involving multiple joints     Sensorineural hearing loss (SNHL) of both ears     Acquired hypothyroidism     Type 2 diabetes mellitus without complication (H)     Secondary renal hyperparathyroidism (H)     Osteoporosis without current pathological fracture, unspecified osteoporosis type      Meds:  Current Outpatient Medications   Medication      ascorbic acid, vitamin C, (ASCORBIC ACID WITH ELLIOTT HIPS) 500 MG tablet     aspirin 81 mg chewable tablet     atorvastatin (LIPITOR) 20 MG tablet     carbidopa-levodopa (SINEMET)  mg per tablet     carbidopa-levodopa (SINEMET)  MG tablet     carvedilol (COREG) 12.5 MG tablet     cholecalciferol, vitamin D3, 1,000 unit tablet     coenzyme Q10 (CO Q-10) 100 mg capsule     FLUoxetine (PROZAC) 20 MG capsule     gabapentin (NEURONTIN) 300 MG capsule     gabapentin (NEURONTIN) 300 MG capsule     hydrocortisone 2.5 % cream     ketoconazole (NIZORAL) 2 % cream     levothyroxine (SYNTHROID, LEVOTHROID) 25 MCG tablet     loratadine (CLARITIN) 10 mg tablet     magnesium oxide 250 mg Tab     melatonin 1 mg Tab tablet     multivitamin therapeutic tablet     nitroglycerin (NITROSTAT) 0.4 MG SL tablet     nystatin (MYCOSTATIN) cream     omega 3-dha-epa-fish oil (FISH OIL) 60- mg cap capsule     omeprazole (PRILOSEC) 20 MG capsule     polyethylene glycol (MIRALAX) 17 gram packet     No current facility-administered medications for this visit.      FHX:   No known osteoporosis or kidney stones.     SHX:  Moved to [place] in 5/2021.     Exam:   GENERAL: Healthy, alert and no distress  EYES: Eyes grossly normal to inspection.  No discharge or erythema, or obvious scleral/conjunctival abnormalities.  HENT: Normal cephalic/atraumatic.  External ears, nose and mouth without ulcers or lesions.  No nasal drainage visible.  RESP: No audible wheeze, cough, or visible cyanosis.  No visible retractions or increased work of breathing.    MS: No gross musculoskeletal defects noted.  Normal range of motion.  No visible edema.  SKIN: Visible skin clear. No significant rash, abnormal pigmentation or lesions.  NEURO: Cranial nerves grossly intact.  Mentation and speech appropriate for age.  PSYCH: Mentation appears normal, affect normal/bright, judgement and insight intact, normal speech and appearance well-groomed.     A/P:   HPTH - In  the setting of low normal vitamin D (30). Chronic mild hypercalcemia.  Given her osteoporosis, she has an indication for surgery. However, given her age and co-morbidities we discussed bisphosphonates and sensipar if needed. She an her daughter are not ready to decide on a course of treatment today. Will collect data as below and then conference with ENT.   -Complete parathyroid scan as scheduled.   -Schedule labs and  jug for 24 hour urine.   -I will speak ENT after above complete.     Abner Curtis M.D    Video-Visit Details    Type of service:  Video Visit    Video End Time:4:26 PM    Originating Location (pt. Location): Home    Distant Location (provider location):  [facility]     Platform used for Video Visit: Kerry      CC:  Itzel Bashir MD

## 2021-08-11 ENCOUNTER — VIRTUAL VISIT (OUTPATIENT)
Dept: ENDOCRINOLOGY | Facility: CLINIC | Age: 86
End: 2021-08-11
Attending: FAMILY MEDICINE
Payer: MEDICARE

## 2021-08-11 DIAGNOSIS — E03.9 ACQUIRED HYPOTHYROIDISM: ICD-10-CM

## 2021-08-11 DIAGNOSIS — M81.0 OSTEOPOROSIS WITHOUT CURRENT PATHOLOGICAL FRACTURE, UNSPECIFIED OSTEOPOROSIS TYPE: ICD-10-CM

## 2021-08-11 PROCEDURE — 99204 OFFICE O/P NEW MOD 45 MIN: CPT | Mod: 95 | Performed by: INTERNAL MEDICINE

## 2021-08-11 NOTE — LETTER
8/11/2021         RE: Janes Montero  [address]  [facility] [address]        Dear Colleague,    Thank you for referring your patient, Janes Montero, to the [facility]. Please see a copy of my visit note below.    Janes is a 87 year old who is being evaluated via a billable video visit.      How would you like to obtain your AVS? MyChart  If the video visit is dropped, the invitation should be resent by: Send to e-mail at: [email or web address]  Will anyone else be joining your video visit? No       Video Start Time: 4:02 PM  CC: [facility]     HPI:  Patient presents for management of hyperparathyroidism.   Originally found to have hypercalcemia in 2018 when living in [place].    Vaguely recalls being on fosamax in the past.   Not sure why it was stopped.   Fractured right humerus in 2017 after trip and fall.   Right wrist fractured in 2015, trip and fall.   Right ankle fracture in 2019, trip and fall.     She does not take any supplemental calcium.     She has never had a kidney stone.     Always has water by her for dry mouth.   Only up to urinate once at night.     Regular bowel movements with the aid of miralax.     ROS: 10 point ROS neg other than the symptoms noted above in the HPI.    PMH:   Patient Active Problem List   Diagnosis     Essential Hypertension     Coronary artery disease involving coronary bypass graft of native heart without angina pectoris     S/P CABG (coronary artery bypass graft)     Parkinson's disease (H)     Trigeminal neuralgia     Chronic bilateral back pain     Aortic valve insufficiency     Tricuspid insufficiency     Mitral valve insufficiency     Bilateral carotid artery stenosis     CKD (chronic kidney disease) stage 3, GFR 30-59 ml/min     Depression     GERD (gastroesophageal reflux disease)     Left bundle branch block     Primary osteoarthritis involving multiple joints     Sensorineural hearing loss (SNHL) of both ears     Acquired  hypothyroidism     Type 2 diabetes mellitus without complication (H)     Secondary renal hyperparathyroidism (H)     Osteoporosis without current pathological fracture, unspecified osteoporosis type      Meds:  Current Outpatient Medications   Medication     ascorbic acid, vitamin C, (ASCORBIC ACID WITH ELLIOTT HIPS) 500 MG tablet     aspirin 81 mg chewable tablet     atorvastatin (LIPITOR) 20 MG tablet     carbidopa-levodopa (SINEMET)  mg per tablet     carbidopa-levodopa (SINEMET)  MG tablet     carvedilol (COREG) 12.5 MG tablet     cholecalciferol, vitamin D3, 1,000 unit tablet     coenzyme Q10 (CO Q-10) 100 mg capsule     FLUoxetine (PROZAC) 20 MG capsule     gabapentin (NEURONTIN) 300 MG capsule     gabapentin (NEURONTIN) 300 MG capsule     hydrocortisone 2.5 % cream     ketoconazole (NIZORAL) 2 % cream     levothyroxine (SYNTHROID, LEVOTHROID) 25 MCG tablet     loratadine (CLARITIN) 10 mg tablet     magnesium oxide 250 mg Tab     melatonin 1 mg Tab tablet     multivitamin therapeutic tablet     nitroglycerin (NITROSTAT) 0.4 MG SL tablet     nystatin (MYCOSTATIN) cream     omega 3-dha-epa-fish oil (FISH OIL) 60- mg cap capsule     omeprazole (PRILOSEC) 20 MG capsule     polyethylene glycol (MIRALAX) 17 gram packet     No current facility-administered medications for this visit.      FHX:   No known osteoporosis or kidney stones.     SHX:  Moved to [place] in 5/2021.     Exam:   GENERAL: Healthy, alert and no distress  EYES: Eyes grossly normal to inspection.  No discharge or erythema, or obvious scleral/conjunctival abnormalities.  HENT: Normal cephalic/atraumatic.  External ears, nose and mouth without ulcers or lesions.  No nasal drainage visible.  RESP: No audible wheeze, cough, or visible cyanosis.  No visible retractions or increased work of breathing.    MS: No gross musculoskeletal defects noted.  Normal range of motion.  No visible edema.  SKIN: Visible skin clear. No significant rash,  abnormal pigmentation or lesions.  NEURO: Cranial nerves grossly intact.  Mentation and speech appropriate for age.  PSYCH: Mentation appears normal, affect normal/bright, judgement and insight intact, normal speech and appearance well-groomed.     A/P:   HPTH - In the setting of low normal vitamin D (30). Chronic mild hypercalcemia.  Given her osteoporosis, she has an indication for surgery. However, given her age and co-morbidities we discussed bisphosphonates and sensipar if needed. She an her daughter are not ready to decide on a course of treatment today. Will collect data as below and then conference with ENT.   -Complete parathyroid scan as scheduled.   -Schedule labs and  jug for 24 hour urine.   -I will speak ENT after above complete.     Abner Curtis M.D    Video-Visit Details    Type of service:  Video Visit    Video End Time:4:26 PM    Originating Location (pt. Location): Home    Distant Location (provider location):  [facility]     Platform used for Video Visit: Kerry      CC:  Itzel Bashir MD      Again, thank you for allowing me to participate in the care of your patient.        Sincerely,        Abner Curtis MD

## 2021-08-13 ENCOUNTER — LAB (OUTPATIENT)
Dept: LAB | Facility: CLINIC | Age: 86
End: 2021-08-13
Payer: MEDICARE

## 2021-08-13 DIAGNOSIS — M81.0 OSTEOPOROSIS WITHOUT CURRENT PATHOLOGICAL FRACTURE, UNSPECIFIED OSTEOPOROSIS TYPE: ICD-10-CM

## 2021-08-13 LAB
ALBUMIN SERPL-MCNC: 3.8 G/DL (ref 3.5–5)
CALCIUM SERPL-MCNC: 10.5 MG/DL (ref 8.5–10.5)
MAGNESIUM SERPL-MCNC: 1.9 MG/DL (ref 1.8–2.6)
PHOSPHATE SERPL-MCNC: 3.4 MG/DL (ref 2.5–4.5)
PTH-INTACT SERPL-MCNC: 99 PG/ML (ref 10–86)

## 2021-08-13 PROCEDURE — 82040 ASSAY OF SERUM ALBUMIN: CPT

## 2021-08-13 PROCEDURE — 84100 ASSAY OF PHOSPHORUS: CPT

## 2021-08-13 PROCEDURE — 83735 ASSAY OF MAGNESIUM: CPT

## 2021-08-13 PROCEDURE — 36415 COLL VENOUS BLD VENIPUNCTURE: CPT

## 2021-08-13 PROCEDURE — 83970 ASSAY OF PARATHORMONE: CPT

## 2021-08-13 PROCEDURE — 82310 ASSAY OF CALCIUM: CPT

## 2021-08-13 NOTE — LETTER
August 24, 2021      Janes GABE Winston  [address]   [facility] [address]        Dear ,    We are writing to inform you of your test results.    PTH elevated.   Small elevation in calcium once account for albumin.   Complete parathyroid scan as discussed.       Resulted Orders   Calcium   Result Value Ref Range    Calcium 10.5 8.5 - 10.5 mg/dL   Albumin level   Result Value Ref Range    Albumin 3.8 3.5 - 5.0 g/dL   Phosphorus   Result Value Ref Range    Phosphorus 3.4 2.5 - 4.5 mg/dL   Parathyroid Hormone Intact   Result Value Ref Range    Parathyroid Hormone Intact 99 (H) 10 - 86 pg/mL   Magnesium   Result Value Ref Range    Magnesium 1.9 1.8 - 2.6 mg/dL       If you have any questions or concerns, please call the clinic at the number listed above.       Sincerely,      Abner Curtis MD

## 2021-08-18 ENCOUNTER — LAB (OUTPATIENT)
Dept: LAB | Facility: CLINIC | Age: 86
End: 2021-08-18
Payer: MEDICARE

## 2021-08-18 DIAGNOSIS — M81.0 OSTEOPOROSIS WITHOUT CURRENT PATHOLOGICAL FRACTURE, UNSPECIFIED OSTEOPOROSIS TYPE: ICD-10-CM

## 2021-08-18 LAB
CALCIUM 24H UR-MRATE: 0.16 G/SPEC
CALCIUM UR-MCNC: 8 MG/DL
CALCIUM/CREAT UR: 0.22 G/G CR
COLLECT DURATION TIME UR: 24 H
COLLECT DURATION TIME UR: 24 H
CREAT 24H UR-MRATE: 0.64 G/SPEC (ref 0.8–1.8)
CREAT 24H UR-MRATE: 0.7 G/SPEC (ref 0.8–1.8)
CREAT UR-MCNC: 33 MG/DL
CREAT UR-MCNC: 36 MG/DL
SPECIMEN VOL UR: 1950 ML
SPECIMEN VOL UR: 1950 ML

## 2021-08-18 PROCEDURE — 82340 ASSAY OF CALCIUM IN URINE: CPT

## 2021-08-18 PROCEDURE — 81050 URINALYSIS VOLUME MEASURE: CPT

## 2021-08-18 PROCEDURE — 82570 ASSAY OF URINE CREATININE: CPT

## 2021-08-18 PROCEDURE — 81050 URINALYSIS VOLUME MEASURE: CPT | Mod: 59

## 2021-08-21 ENCOUNTER — HEALTH MAINTENANCE LETTER (OUTPATIENT)
Age: 86
End: 2021-08-21

## 2021-08-24 ENCOUNTER — HOSPITAL ENCOUNTER (OUTPATIENT)
Dept: NUCLEAR MEDICINE | Facility: HOSPITAL | Age: 86
End: 2021-08-24
Attending: OTOLARYNGOLOGY
Payer: MEDICARE

## 2021-08-24 DIAGNOSIS — E21.3 HYPERPARATHYROIDISM (H): ICD-10-CM

## 2021-08-24 PROCEDURE — 343N000001 HC RX 343: Performed by: OTOLARYNGOLOGY

## 2021-08-24 PROCEDURE — 78071 PARATHYRD PLANAR W/WO SUBTRJ: CPT | Mod: MG

## 2021-08-24 PROCEDURE — A9500 TC99M SESTAMIBI: HCPCS | Performed by: OTOLARYNGOLOGY

## 2021-08-24 RX ADMIN — Medication 27.5 MCI.: at 11:06

## 2021-08-25 ENCOUNTER — TELEPHONE (OUTPATIENT)
Dept: OTOLARYNGOLOGY | Facility: CLINIC | Age: 86
End: 2021-08-25

## 2021-08-25 NOTE — TELEPHONE ENCOUNTER
Spoke with Janes's daughter today regarding the Sestimibi scan results.    PARATHYROID SCAN: No abnormal sestamibi accumulation in the neck or chest to localize the suspected parathyroid adenoma.    There is no adenoma to go after surgically, so it is my opinion that medical management of her secondary hyperparathyroidism would be the better option. Will flag the scan results to the Endocrinology team.       Itzel Bashir MD  [facility]  [phone] (office)

## 2021-08-25 NOTE — TELEPHONE ENCOUNTER
Thank you.     To my team, please have patient schedule visit to discuss treatment options for her osteoporosis and monitoring of her hyperparathyroidism.     Abner Curtis MD on 8/25/2021 at 11:31 AM

## 2021-08-26 ENCOUNTER — OFFICE VISIT (OUTPATIENT)
Dept: CARDIOLOGY | Facility: CLINIC | Age: 86
End: 2021-08-26
Payer: MEDICARE

## 2021-08-26 VITALS — RESPIRATION RATE: 12 BRPM | HEART RATE: 68 BPM | SYSTOLIC BLOOD PRESSURE: 118 MMHG | DIASTOLIC BLOOD PRESSURE: 66 MMHG

## 2021-08-26 DIAGNOSIS — I10 HYPERTENSION, UNSPECIFIED TYPE: Primary | ICD-10-CM

## 2021-08-26 DIAGNOSIS — E78.5 DYSLIPIDEMIA: ICD-10-CM

## 2021-08-26 DIAGNOSIS — I34.0 MITRAL VALVE INSUFFICIENCY, UNSPECIFIED ETIOLOGY: ICD-10-CM

## 2021-08-26 DIAGNOSIS — I35.1 NONRHEUMATIC AORTIC VALVE INSUFFICIENCY: ICD-10-CM

## 2021-08-26 DIAGNOSIS — I25.10 CORONARY ARTERY DISEASE INVOLVING NATIVE CORONARY ARTERY WITHOUT ANGINA PECTORIS, UNSPECIFIED WHETHER NATIVE OR TRANSPLANTED HEART: ICD-10-CM

## 2021-08-26 PROCEDURE — 99204 OFFICE O/P NEW MOD 45 MIN: CPT | Performed by: INTERNAL MEDICINE

## 2021-08-26 RX ORDER — ATORVASTATIN CALCIUM 20 MG/1
20 TABLET, FILM COATED ORAL AT BEDTIME
Qty: 90 TABLET | Refills: 3 | Status: SHIPPED | OUTPATIENT
Start: 2021-08-26 | End: 2022-09-21

## 2021-08-26 RX ORDER — CARVEDILOL 12.5 MG/1
12.5 TABLET ORAL 2 TIMES DAILY WITH MEALS
Qty: 180 TABLET | Refills: 3 | Status: SHIPPED | OUTPATIENT
Start: 2021-08-26 | End: 2022-09-21

## 2021-08-26 RX ORDER — FLUTICASONE PROPIONATE 50 MCG
1 SPRAY, SUSPENSION (ML) NASAL DAILY
COMMUNITY
End: 2022-03-22

## 2021-08-26 NOTE — LETTER
8/26/2021    Maggie Arriaga MD  [address]  [facility] [address]    RE: Janes BERNAL Winston       Dear Colleague,    I had the pleasure of seeing Janes Montero in the [facility].           [facility]   [address], [place] [address]   [email or web address]   OFFICE: [phone]          Thank you Maggie De La Cruz for asking the [facility] team to participate in the care of your patient, Janes Montero.     Impression and Plan     1.  Coronary artery disease. Janes has known coronary artery disease having had prior coronary artery bypass graft surgery.    This is stable.  Patient denies any chest pain or other concerning symptoms in this regard.    2.  Mitral insufficiency.  This was felt moderate in degree on echocardiogram 5 March 2020.    3.  Aortic insufficiency.  This was felt mild to moderate in degree on echocardiogram 5 March 2020.    4.  Hypertension.  Blood pressure is very reasonable in the office today at 118/66 mmHg.    5.  Dyslipidemia.  Lipid profile 25 June 2020 revealed LDL 64 mg/dL and HDL 49 mg/dL.    Continue atorvastatin.    Plan on follow-up in approximately 1 year and will consider echocardiogram to reassess mitral insufficiency and aortic insufficiency at that time.    35 minutes spent reviewing prior records (including documentation, laboratory studies, cardiac testing/imaging), interview with patient along with physical exam, planning, and subsequent documentation/crafting of note.           History of Present Illness    Once again I would like to thank you again for asking me to participate in the care of your patient, Janes Montero.  As you know, but to reiterate for my own records, Janes Montero is a 87 year old female with known coronary artery disease having had prior coronary artery bypass graft surgery.    On interview, patient states she presents today  primarily to establish care.  She is joined by her daughter.  She denies chest pain or shortness of breath.  No palpitations or lightheadedness.  Does denies any significant cardiac symptoms at this time.  Reports no fevers, chills, or other constitutional symptoms.    Further review of systems is otherwise negative/noncontributory (medical record and 13 point review of systems reviewed as well and pertinent positives noted).         Cardiac Diagnostics      Echocardiogram 5 March 2020:  1. Left ventricle: The cavity size is normal. Wall thickness is mildly increased. Systolic function is normal. The estimated ejection fraction is 55-60%.   2. Left atrium: The atrium is mildly to moderately dilated.   3. Mitral valve: Moderately calcified annulus. Leaflet separation is normal. There is no evidence for stenosis. Moderate regurgitation.   4. Aortic valve: Probably trileaflet; mildly calcified leaflets. Thickening, consistent with sclerosis. Cusp separation is normal. There is no stenosis. Mild to moderate regurgitation.   5. Tricuspid valve: Structurally normal valve. Leaflet separation is normal. There is no evidence for stenosis. Moderate regurgitation.     Coronary angiogram 23 May 2011:  1. Normal left main.   2. Severe eccentric 95% proximal left anterior descending stenosis just after the first diagonal and just after the first septal  branches originate. Second eccentric stenosis of 60-70% is present proximally involving the origin of the second diagonal branch. The second diagonal branch ostium has a 50% stenosis.   3. Mild irregularity of the circumflex, circumflex marginal branches.   4. Mild irregularity of the right coronary artery. No significant stenosis. PDA arises distally, appears normal.   5. Overall left ventricular ejection fraction normal.     Twelve-lead ECG (personally reviewed) 11 June 2018: Sinus rhythm with occasional PVCs.  Left bundle branch block.           Physical Examination        /66   Pulse 68   Resp 12         Wt Readings from Last 3 Encounters:   07/27/21 83.8 kg (184 lb 12.8 oz)   07/13/21 83.5 kg (184 lb)   06/15/21 82.6 kg (182 lb)       The patient is alert and oriented times three. Sclerae are anicteric. Mucosal membranes are moist. Jugular venous pressure is normal. No significant adenopathy/thyromegally appreciated. Lungs are clear with good expansion. On cardiovascular exam, the patient has a regular S1 and S2. Abdomen is soft and non-tender. Extremities reveal no clubbing, cyanosis, or edema.         Imaging       Carotid ultrasound 28 September 2020:  1. Right: 1-39% carotid artery stenosis with mild velocities and antegrade vertebral flow.   2. Left: 1-39% carotid artery stenosis with mild velocities and antegrade vertebral flow.   3. Essentially unchanged.   4. Recommend a two year follow-up if patient remains asymptomatic.             Medications  Allergies   Current Outpatient Medications   Medication Sig Dispense Refill     ascorbic acid, vitamin C, (ASCORBIC ACID WITH ELLIOTT HIPS) 500 MG tablet [ASCORBIC ACID, VITAMIN C, (ASCORBIC ACID WITH ELLIOTT HIPS) 500 MG TABLET] Take 500 mg by mouth daily.       aspirin 81 mg chewable tablet [ASPIRIN 81 MG CHEWABLE TABLET] Chew 81 mg at bedtime.       atorvastatin (LIPITOR) 20 MG tablet Take 1 tablet (20 mg) by mouth At Bedtime 90 tablet 3     carbidopa-levodopa (SINEMET)  MG tablet Take 1 tablet by mouth 3 times daily Take at least 30 min before meals or 2 hours after meals. Do not mix with food. 270 tablet 1     carvedilol (COREG) 12.5 MG tablet Take 1 tablet (12.5 mg) by mouth 2 times daily (with meals) 180 tablet 3     cholecalciferol, vitamin D3, 1,000 unit tablet [CHOLECALCIFEROL, VITAMIN D3, 1,000 UNIT TABLET] Take 2,000 Units by mouth daily.       coenzyme Q10 (CO Q-10) 100 mg capsule [COENZYME Q10 (CO Q-10) 100 MG CAPSULE] Take 100 mg by mouth 2 (two) times a day.       FLUoxetine (PROZAC) 20 MG capsule  "[FLUOXETINE (PROZAC) 20 MG CAPSULE] Take 20 mg by mouth 2 (two) times a day.       fluticasone (FLONASE) 50 MCG/ACT nasal spray Spray 1 spray into both nostrils daily       gabapentin (NEURONTIN) 300 MG capsule Take 1 capsule (300 mg) by mouth 4 times daily 360 capsule 1     hydrocortisone 2.5 % cream [HYDROCORTISONE 2.5 % CREAM] Apply topically 2 (two) times a day.       ketoconazole (NIZORAL) 2 % cream [KETOCONAZOLE (NIZORAL) 2 % CREAM] Apply topically daily.       levothyroxine (SYNTHROID, LEVOTHROID) 25 MCG tablet [LEVOTHYROXINE (SYNTHROID, LEVOTHROID) 25 MCG TABLET] Take 25 mcg by mouth Daily at 6:00 am.       loratadine (CLARITIN) 10 mg tablet [LORATADINE (CLARITIN) 10 MG TABLET] Take 10 mg by mouth daily.       magnesium oxide 250 mg Tab [MAGNESIUM OXIDE 250 MG TAB] Take 250 mg by mouth daily.       melatonin 1 mg Tab tablet [MELATONIN 1 MG TAB TABLET] Take 3 mg by mouth at bedtime.        multivitamin therapeutic tablet [MULTIVITAMIN THERAPEUTIC TABLET] Take 1 tablet by mouth daily.       nitroglycerin (NITROSTAT) 0.4 MG SL tablet [NITROGLYCERIN (NITROSTAT) 0.4 MG SL TABLET] Place 0.4 mg under the tongue every 5 (five) minutes as needed for chest pain.       nystatin (MYCOSTATIN) cream [NYSTATIN (MYCOSTATIN) CREAM] MIX EQUALLY WITH TRIAMCINOLONE AND APPLY TO VULVA 3 TO 4 TIMES A DAY       omega 3-dha-epa-fish oil (FISH OIL) 60- mg cap capsule [OMEGA 3-DHA-EPA-FISH OIL (FISH OIL) 60- MG CAP CAPSULE] Take 2,000 mg by mouth 2 (two) times a day.       omeprazole (PRILOSEC) 20 MG capsule [OMEPRAZOLE (PRILOSEC) 20 MG CAPSULE] Take 20 mg by mouth daily before breakfast.       polyethylene glycol (MIRALAX) 17 gram packet [POLYETHYLENE GLYCOL (MIRALAX) 17 GRAM PACKET] Take 17 g by mouth daily.         Allergies   Allergen Reactions     Alendronate [Alendronic Acid] Unknown     pain     Codeine Hives     Hydrocodone-Acetaminophen Other (See Comments)     Highly sensitive, \"knocks her out and can't wake up\" "     Risedronate Unknown     Bone pain     Rosuvastatin Muscle Pain (Myalgia)     Simvastatin Muscle Pain (Myalgia)          Lab Results    Chemistry/lipid CBC Cardiac Enzymes/BNP/TSH/INR   No results for input(s): CHOL, HDL, LDL, TRIG, CHOLHDLRATIO in the last 01253 hours.  No results for input(s): LDL in the last 54303 hours.  Recent Labs   Lab Test 21  0922 18  1209 18  0454   NA  --   --  140   POTASSIUM  --   --  4.1   CHLORIDE  --   --  100   CO2  --   --  27   GLC  --  180 162*   BUN  --   --  37*   CR  --   --  1.25*   GFRESTIMATED  --   --  41*   LIZZY 10.5  --  10.1     Recent Labs   Lab Test 18  0454 18  0502 18  1259   CR 1.25* 1.25* 1.16*     Recent Labs   Lab Test 18  0502   A1C 7.1*          Recent Labs   Lab Test 18  0454 18  1259   WBC  --  7.3   HGB  --  11.6*   HCT  --  35.1   MCV  --  88    189     Recent Labs   Lab Test 18  1259   HGB 11.6*    Recent Labs   Lab Test 18  1259   TROPONINI <0.01     Recent Labs   Lab Test 18  1259   *     No results for input(s): TSH in the last 19357 hours.  No results for input(s): INR in the last 51746 hours.     Medical History  Surgical History Family History Social History   Past Medical History:   Diagnosis Date     Acute diastolic congestive heart failure (H)      Acute on chronic congestive heart failure (H) 2018     Coronary artery disease      Diabetes mellitus, type II (H)      Diastolic dysfunction 7/10/2018     Frozen shoulder     bilateral     Hypertension      Hypertensive emergency      Parkinson disease (H)      Recurrent UTI     hx septic shock     Past Surgical History:   Procedure Laterality Date     APPENDECTOMY       APPENDECTOMY       BACK SURGERY      L4-S1 laminectomy     BYPASS GRAFT ARTERY CORONARY      3 vessel      SECTION        SECTION       HERNIORRHAPHY VENTRAL Left      HYSTERECTOMY       HYSTERECTOMY       JOINT REPLACEMENT  Left     Total left knee     OTHER SURGICAL HISTORY      right ankle surgery     OTHER SURGICAL HISTORY      right forearm fracture     REPLACEMENT TOTAL KNEE Right      SHOULDER SURGERY Right     reversed shoulder surgery.     Family History   Problem Relation Age of Onset     Heart Disease Mother      Heart Disease Father         Social History     Socioeconomic History     Marital status:      Spouse name: Not on file     Number of children: Not on file     Years of education: Not on file     Highest education level: Not on file   Occupational History     Not on file   Tobacco Use     Smoking status: Never Smoker     Smokeless tobacco: Never Used   Substance and Sexual Activity     Alcohol use: No     Drug use: No     Sexual activity: Not on file   Other Topics Concern     Not on file   Social History Narrative    6/15/2021 new to [place] from [place]. She has 6 kids.     Social Determinants of Health     Financial Resource Strain:      Difficulty of Paying Living Expenses:    Food Insecurity:      Worried About Running Out of Food in the Last Year:      Ran Out of Food in the Last Year:    Transportation Needs:      Lack of Transportation (Medical):      Lack of Transportation (Non-Medical):    Physical Activity:      Days of Exercise per Week:      Minutes of Exercise per Session:    Stress:      Feeling of Stress :    Social Connections:      Frequency of Communication with Friends and Family:      Frequency of Social Gatherings with Friends and Family:      Attends [employer] Services:      Active Member of Clubs or Organizations:      Attends Club or Organization Meetings:      Marital Status:    Intimate Partner Violence:      Fear of Current or Ex-Partner:      Emotionally Abused:      Physically Abused:      Sexually Abused:                       Thank you for allowing me to participate in the care of your patient.      Sincerely,     Mathew Ricardo MD     [facility]  [facility]  cc:   No referring provider defined for this encounter.

## 2021-08-26 NOTE — PROGRESS NOTES
[facility]   [address], [place] [address]   [email or web address]   OFFICE: [phone]          Thank you Maggie De La Cruz for asking the [facility] team to participate in the care of your patient, Janes Montero.     Impression and Plan     1.  Coronary artery disease. Janes has known coronary artery disease having had prior coronary artery bypass graft surgery.    This is stable.  Patient denies any chest pain or other concerning symptoms in this regard.    2.  Mitral insufficiency.  This was felt moderate in degree on echocardiogram 5 March 2020.    3.  Aortic insufficiency.  This was felt mild to moderate in degree on echocardiogram 5 March 2020.    4.  Hypertension.  Blood pressure is very reasonable in the office today at 118/66 mmHg.    5.  Dyslipidemia.  Lipid profile 25 June 2020 revealed LDL 64 mg/dL and HDL 49 mg/dL.    Continue atorvastatin.    Plan on follow-up in approximately 1 year and will consider echocardiogram to reassess mitral insufficiency and aortic insufficiency at that time.    35 minutes spent reviewing prior records (including documentation, laboratory studies, cardiac testing/imaging), interview with patient along with physical exam, planning, and subsequent documentation/crafting of note.           History of Present Illness    Once again I would like to thank you again for asking me to participate in the care of your patient, Janes Montero.  As you know, but to reiterate for my own records, Janes Montero is a 87 year old female with known coronary artery disease having had prior coronary artery bypass graft surgery.    On interview, patient states she presents today primarily to establish care.  She is joined by her daughter.  She denies chest pain or shortness of breath.  No palpitations or lightheadedness.  Does denies any significant cardiac symptoms at this time.  Reports no fevers, chills, or other  constitutional symptoms.    Further review of systems is otherwise negative/noncontributory (medical record and 13 point review of systems reviewed as well and pertinent positives noted).         Cardiac Diagnostics      Echocardiogram 5 March 2020:  1. Left ventricle: The cavity size is normal. Wall thickness is mildly increased. Systolic function is normal. The estimated ejection fraction is 55-60%.   2. Left atrium: The atrium is mildly to moderately dilated.   3. Mitral valve: Moderately calcified annulus. Leaflet separation is normal. There is no evidence for stenosis. Moderate regurgitation.   4. Aortic valve: Probably trileaflet; mildly calcified leaflets. Thickening, consistent with sclerosis. Cusp separation is normal. There is no stenosis. Mild to moderate regurgitation.   5. Tricuspid valve: Structurally normal valve. Leaflet separation is normal. There is no evidence for stenosis. Moderate regurgitation.     Coronary angiogram 23 May 2011:  1. Normal left main.   2. Severe eccentric 95% proximal left anterior descending stenosis just after the first diagonal and just after the first septal  branches originate. Second eccentric stenosis of 60-70% is present proximally involving the origin of the second diagonal branch. The second diagonal branch ostium has a 50% stenosis.   3. Mild irregularity of the circumflex, circumflex marginal branches.   4. Mild irregularity of the right coronary artery. No significant stenosis. PDA arises distally, appears normal.   5. Overall left ventricular ejection fraction normal.     Twelve-lead ECG (personally reviewed) 11 June 2018: Sinus rhythm with occasional PVCs.  Left bundle branch block.           Physical Examination       /66   Pulse 68   Resp 12         Wt Readings from Last 3 Encounters:   07/27/21 83.8 kg (184 lb 12.8 oz)   07/13/21 83.5 kg (184 lb)   06/15/21 82.6 kg (182 lb)       The patient is alert and oriented times three. Sclerae are  anicteric. Mucosal membranes are moist. Jugular venous pressure is normal. No significant adenopathy/thyromegally appreciated. Lungs are clear with good expansion. On cardiovascular exam, the patient has a regular S1 and S2. Abdomen is soft and non-tender. Extremities reveal no clubbing, cyanosis, or edema.         Imaging       Carotid ultrasound 28 September 2020:  1. Right: 1-39% carotid artery stenosis with mild velocities and antegrade vertebral flow.   2. Left: 1-39% carotid artery stenosis with mild velocities and antegrade vertebral flow.   3. Essentially unchanged.   4. Recommend a two year follow-up if patient remains asymptomatic.             Medications  Allergies   Current Outpatient Medications   Medication Sig Dispense Refill     ascorbic acid, vitamin C, (ASCORBIC ACID WITH ELLIOTT HIPS) 500 MG tablet [ASCORBIC ACID, VITAMIN C, (ASCORBIC ACID WITH ELLIOTT HIPS) 500 MG TABLET] Take 500 mg by mouth daily.       aspirin 81 mg chewable tablet [ASPIRIN 81 MG CHEWABLE TABLET] Chew 81 mg at bedtime.       atorvastatin (LIPITOR) 20 MG tablet Take 1 tablet (20 mg) by mouth At Bedtime 90 tablet 3     carbidopa-levodopa (SINEMET)  MG tablet Take 1 tablet by mouth 3 times daily Take at least 30 min before meals or 2 hours after meals. Do not mix with food. 270 tablet 1     carvedilol (COREG) 12.5 MG tablet Take 1 tablet (12.5 mg) by mouth 2 times daily (with meals) 180 tablet 3     cholecalciferol, vitamin D3, 1,000 unit tablet [CHOLECALCIFEROL, VITAMIN D3, 1,000 UNIT TABLET] Take 2,000 Units by mouth daily.       coenzyme Q10 (CO Q-10) 100 mg capsule [COENZYME Q10 (CO Q-10) 100 MG CAPSULE] Take 100 mg by mouth 2 (two) times a day.       FLUoxetine (PROZAC) 20 MG capsule [FLUOXETINE (PROZAC) 20 MG CAPSULE] Take 20 mg by mouth 2 (two) times a day.       fluticasone (FLONASE) 50 MCG/ACT nasal spray Spray 1 spray into both nostrils daily       gabapentin (NEURONTIN) 300 MG capsule Take 1 capsule (300 mg) by mouth 4  "times daily 360 capsule 1     hydrocortisone 2.5 % cream [HYDROCORTISONE 2.5 % CREAM] Apply topically 2 (two) times a day.       ketoconazole (NIZORAL) 2 % cream [KETOCONAZOLE (NIZORAL) 2 % CREAM] Apply topically daily.       levothyroxine (SYNTHROID, LEVOTHROID) 25 MCG tablet [LEVOTHYROXINE (SYNTHROID, LEVOTHROID) 25 MCG TABLET] Take 25 mcg by mouth Daily at 6:00 am.       loratadine (CLARITIN) 10 mg tablet [LORATADINE (CLARITIN) 10 MG TABLET] Take 10 mg by mouth daily.       magnesium oxide 250 mg Tab [MAGNESIUM OXIDE 250 MG TAB] Take 250 mg by mouth daily.       melatonin 1 mg Tab tablet [MELATONIN 1 MG TAB TABLET] Take 3 mg by mouth at bedtime.        multivitamin therapeutic tablet [MULTIVITAMIN THERAPEUTIC TABLET] Take 1 tablet by mouth daily.       nitroglycerin (NITROSTAT) 0.4 MG SL tablet [NITROGLYCERIN (NITROSTAT) 0.4 MG SL TABLET] Place 0.4 mg under the tongue every 5 (five) minutes as needed for chest pain.       nystatin (MYCOSTATIN) cream [NYSTATIN (MYCOSTATIN) CREAM] MIX EQUALLY WITH TRIAMCINOLONE AND APPLY TO VULVA 3 TO 4 TIMES A DAY       omega 3-dha-epa-fish oil (FISH OIL) 60- mg cap capsule [OMEGA 3-DHA-EPA-FISH OIL (FISH OIL) 60- MG CAP CAPSULE] Take 2,000 mg by mouth 2 (two) times a day.       omeprazole (PRILOSEC) 20 MG capsule [OMEPRAZOLE (PRILOSEC) 20 MG CAPSULE] Take 20 mg by mouth daily before breakfast.       polyethylene glycol (MIRALAX) 17 gram packet [POLYETHYLENE GLYCOL (MIRALAX) 17 GRAM PACKET] Take 17 g by mouth daily.         Allergies   Allergen Reactions     Alendronate [Alendronic Acid] Unknown     pain     Codeine Hives     Hydrocodone-Acetaminophen Other (See Comments)     Highly sensitive, \"knocks her out and can't wake up\"     Risedronate Unknown     Bone pain     Rosuvastatin Muscle Pain (Myalgia)     Simvastatin Muscle Pain (Myalgia)          Lab Results    Chemistry/lipid CBC Cardiac Enzymes/BNP/TSH/INR   No results for input(s): CHOL, HDL, LDL, TRIG, " CHOLHDLRATIO in the last 59413 hours.  No results for input(s): LDL in the last 80654 hours.  Recent Labs   Lab Test 21  0922 18  1209 18  0454   NA  --   --  140   POTASSIUM  --   --  4.1   CHLORIDE  --   --  100   CO2  --   --  27   GLC  --  180 162*   BUN  --   --  37*   CR  --   --  1.25*   GFRESTIMATED  --   --  41*   LIZZY 10.5  --  10.1     Recent Labs   Lab Test 18  0454 18  0502 18  1259   CR 1.25* 1.25* 1.16*     Recent Labs   Lab Test 18  0502   A1C 7.1*          Recent Labs   Lab Test 18  0454 18  1259   WBC  --  7.3   HGB  --  11.6*   HCT  --  35.1   MCV  --  88    189     Recent Labs   Lab Test 18  1259   HGB 11.6*    Recent Labs   Lab Test 18  1259   TROPONINI <0.01     Recent Labs   Lab Test 18  1259   *     No results for input(s): TSH in the last 84529 hours.  No results for input(s): INR in the last 88166 hours.     Medical History  Surgical History Family History Social History   Past Medical History:   Diagnosis Date     Acute diastolic congestive heart failure (H)      Acute on chronic congestive heart failure (H) 2018     Coronary artery disease      Diabetes mellitus, type II (H)      Diastolic dysfunction 7/10/2018     Frozen shoulder     bilateral     Hypertension      Hypertensive emergency      Parkinson disease (H)      Recurrent UTI     hx septic shock     Past Surgical History:   Procedure Laterality Date     APPENDECTOMY       APPENDECTOMY       BACK SURGERY      L4-S1 laminectomy     BYPASS GRAFT ARTERY CORONARY      3 vessel      SECTION        SECTION       HERNIORRHAPHY VENTRAL Left      HYSTERECTOMY       HYSTERECTOMY       JOINT REPLACEMENT Left     Total left knee     OTHER SURGICAL HISTORY      right ankle surgery     OTHER SURGICAL HISTORY      right forearm fracture     REPLACEMENT TOTAL KNEE Right      SHOULDER SURGERY Right     reversed shoulder surgery.     Family  History   Problem Relation Age of Onset     Heart Disease Mother      Heart Disease Father         Social History     Socioeconomic History     Marital status:      Spouse name: Not on file     Number of children: Not on file     Years of education: Not on file     Highest education level: Not on file   Occupational History     Not on file   Tobacco Use     Smoking status: Never Smoker     Smokeless tobacco: Never Used   Substance and Sexual Activity     Alcohol use: No     Drug use: No     Sexual activity: Not on file   Other Topics Concern     Not on file   Social History Narrative    6/15/2021 new to [place] from [place]. She has 6 kids.     Social Determinants of Health     Financial Resource Strain:      Difficulty of Paying Living Expenses:    Food Insecurity:      Worried About Running Out of Food in the Last Year:      Ran Out of Food in the Last Year:    Transportation Needs:      Lack of Transportation (Medical):      Lack of Transportation (Non-Medical):    Physical Activity:      Days of Exercise per Week:      Minutes of Exercise per Session:    Stress:      Feeling of Stress :    Social Connections:      Frequency of Communication with Friends and Family:      Frequency of Social Gatherings with Friends and Family:      Attends [employer] Services:      Active Member of Clubs or Organizations:      Attends Club or Organization Meetings:      Marital Status:    Intimate Partner Violence:      Fear of Current or Ex-Partner:      Emotionally Abused:      Physically Abused:      Sexually Abused:

## 2021-09-14 ENCOUNTER — OFFICE VISIT (OUTPATIENT)
Dept: FAMILY MEDICINE | Facility: CLINIC | Age: 86
End: 2021-09-14
Payer: MEDICARE

## 2021-09-14 VITALS
HEART RATE: 72 BPM | BODY MASS INDEX: 31.53 KG/M2 | SYSTOLIC BLOOD PRESSURE: 138 MMHG | WEIGHT: 189.5 LBS | OXYGEN SATURATION: 94 % | DIASTOLIC BLOOD PRESSURE: 78 MMHG

## 2021-09-14 DIAGNOSIS — E11.9 TYPE 2 DIABETES MELLITUS WITHOUT COMPLICATION, WITHOUT LONG-TERM CURRENT USE OF INSULIN (H): Primary | ICD-10-CM

## 2021-09-14 DIAGNOSIS — E53.8 VITAMIN B12 DEFICIENCY (NON ANEMIC): ICD-10-CM

## 2021-09-14 DIAGNOSIS — N18.32 STAGE 3B CHRONIC KIDNEY DISEASE (H): ICD-10-CM

## 2021-09-14 PROBLEM — M17.12 PRIMARY OSTEOARTHRITIS OF LEFT KNEE: Status: ACTIVE | Noted: 2018-08-02

## 2021-09-14 PROBLEM — N39.46 MIXED INCONTINENCE URGE AND STRESS (MALE)(FEMALE): Status: ACTIVE | Noted: 2019-03-12

## 2021-09-14 LAB — HBA1C MFR BLD: 7.1 % (ref 0–5.6)

## 2021-09-14 PROCEDURE — 83036 HEMOGLOBIN GLYCOSYLATED A1C: CPT | Performed by: FAMILY MEDICINE

## 2021-09-14 PROCEDURE — 99214 OFFICE O/P EST MOD 30 MIN: CPT | Mod: 25 | Performed by: FAMILY MEDICINE

## 2021-09-14 PROCEDURE — 36415 COLL VENOUS BLD VENIPUNCTURE: CPT | Performed by: FAMILY MEDICINE

## 2021-09-14 PROCEDURE — 96372 THER/PROPH/DIAG INJ SC/IM: CPT | Performed by: FAMILY MEDICINE

## 2021-09-14 RX ORDER — CYANOCOBALAMIN 1000 UG/ML
1000 INJECTION, SOLUTION INTRAMUSCULAR; SUBCUTANEOUS
Status: DISCONTINUED | OUTPATIENT
Start: 2021-09-14 | End: 2022-05-27

## 2021-09-14 RX ADMIN — CYANOCOBALAMIN 1000 MCG: 1000 INJECTION, SOLUTION INTRAMUSCULAR; SUBCUTANEOUS at 17:03

## 2021-09-14 ASSESSMENT — ANXIETY QUESTIONNAIRES
5. BEING SO RESTLESS THAT IT IS HARD TO SIT STILL: NOT AT ALL
7. FEELING AFRAID AS IF SOMETHING AWFUL MIGHT HAPPEN: NOT AT ALL
2. NOT BEING ABLE TO STOP OR CONTROL WORRYING: SEVERAL DAYS
4. TROUBLE RELAXING: NOT AT ALL
GAD7 TOTAL SCORE: 3
GAD7 TOTAL SCORE: 3
1. FEELING NERVOUS, ANXIOUS, OR ON EDGE: SEVERAL DAYS
GAD7 TOTAL SCORE: 3
6. BECOMING EASILY ANNOYED OR IRRITABLE: NOT AT ALL
3. WORRYING TOO MUCH ABOUT DIFFERENT THINGS: SEVERAL DAYS
8. IF YOU CHECKED OFF ANY PROBLEMS, HOW DIFFICULT HAVE THESE MADE IT FOR YOU TO DO YOUR WORK, TAKE CARE OF THINGS AT HOME, OR GET ALONG WITH OTHER PEOPLE?: NOT DIFFICULT AT ALL
7. FEELING AFRAID AS IF SOMETHING AWFUL MIGHT HAPPEN: NOT AT ALL

## 2021-09-14 ASSESSMENT — PATIENT HEALTH QUESTIONNAIRE - PHQ9
10. IF YOU CHECKED OFF ANY PROBLEMS, HOW DIFFICULT HAVE THESE PROBLEMS MADE IT FOR YOU TO DO YOUR WORK, TAKE CARE OF THINGS AT HOME, OR GET ALONG WITH OTHER PEOPLE: NOT DIFFICULT AT ALL
SUM OF ALL RESPONSES TO PHQ QUESTIONS 1-9: 2
SUM OF ALL RESPONSES TO PHQ QUESTIONS 1-9: 2

## 2021-09-14 NOTE — ASSESSMENT & PLAN NOTE
Diminished pinprick sensation was noted by neurologist and so B12 deficiency work-up was started.  Now, new B12 deficiency noted as evidenced by low normal B12 level and elevated MMA level.  B12 in injections monthly initiated today.  She has a follow-up with neurology 10/19.

## 2021-09-14 NOTE — PROGRESS NOTES
"    Assessment & Plan   Problem List Items Addressed This Visit        Digestive    Vitamin B12 deficiency (non anemic)     Diminished pinprick sensation was noted by neurologist and so B12 deficiency work-up was started.  Now, new B12 deficiency noted as evidenced by low normal B12 level and elevated MMA level.  B12 in injections monthly initiated today.  She has a follow-up with neurology 10/19.         Relevant Medications    cyanocobalamin injection 1,000 mcg       Endocrine    Type 2 diabetes mellitus without complication (H) - Primary     Diabetes is stable to worsening.  She says she has been eating more treats lately and so we discussed cutting back on that.  Her A1c today was 7.1.  This is up from 6.73 months ago.         Relevant Orders    Hemoglobin A1c (Completed)       Urinary    CKD (chronic kidney disease) stage 3, GFR 30-59 ml/min                BMI:   Estimated body mass index is 31.53 kg/m  as calculated from the following:    Height as of 7/27/21: 1.651 m (5' 5\").    Weight as of this encounter: 86 kg (189 lb 8 oz).           No follow-ups on file.    Maggie Arriaga MD  [facility]    Ally Marrero is a 87 year old who presents with her daughter for the following health issues is here to follow-up after seeing neurology.  She has another appointment with the neurologist 10/19/2021 to do an EMG and also to see the neurologist right after that.  Several studies were done and they are not sure what exactly all of these mean.  They would like me to take a look and see if anything needs to be done right now while they are waiting to see the neurologist.          Objective    /78 (BP Location: Left arm, Patient Position: Left side, Cuff Size: Adult Large)   Pulse 72   Wt 86 kg (189 lb 8 oz)   SpO2 94%   BMI 31.53 kg/m    Body mass index is 31.53 kg/m .  Physical Exam  Constitutional:       Appearance: Normal appearance.   HENT:      Head: Normocephalic and " atraumatic.   Cardiovascular:      Rate and Rhythm: Normal rate and regular rhythm.   Pulmonary:      Effort: Pulmonary effort is normal.   Musculoskeletal:         General: Normal range of motion.      Cervical back: Normal range of motion and neck supple.   Neurological:      General: No focal deficit present.      Mental Status: She is alert and oriented to person, place, and time.                        Answers for HPI/ROS submitted by the patient on 9/14/2021  If you checked off any problems, how difficult have these problems made it for you to do your work, take care of things at home, or get along with other people?: Not difficult at all  PHQ9 TOTAL SCORE: 2  ALLI 7 TOTAL SCORE: 3

## 2021-09-15 ASSESSMENT — PATIENT HEALTH QUESTIONNAIRE - PHQ9: SUM OF ALL RESPONSES TO PHQ QUESTIONS 1-9: 2

## 2021-09-15 ASSESSMENT — ANXIETY QUESTIONNAIRES: GAD7 TOTAL SCORE: 3

## 2021-09-30 ENCOUNTER — HOSPITAL ENCOUNTER (OUTPATIENT)
Dept: PHYSICAL THERAPY | Facility: REHABILITATION | Age: 86
End: 2021-09-30
Attending: FAMILY MEDICINE
Payer: MEDICARE

## 2021-09-30 DIAGNOSIS — G89.29 CHRONIC BILATERAL LOW BACK PAIN WITHOUT SCIATICA: ICD-10-CM

## 2021-09-30 DIAGNOSIS — G20.A1 PARKINSON'S DISEASE (H): ICD-10-CM

## 2021-09-30 DIAGNOSIS — R53.81 PHYSICAL DECONDITIONING: ICD-10-CM

## 2021-09-30 DIAGNOSIS — M81.0 OSTEOPOROSIS WITHOUT CURRENT PATHOLOGICAL FRACTURE, UNSPECIFIED OSTEOPOROSIS TYPE: Primary | ICD-10-CM

## 2021-09-30 DIAGNOSIS — M54.50 CHRONIC BILATERAL LOW BACK PAIN WITHOUT SCIATICA: ICD-10-CM

## 2021-09-30 DIAGNOSIS — M62.81 MUSCLE WEAKNESS (GENERALIZED): ICD-10-CM

## 2021-09-30 PROCEDURE — 97112 NEUROMUSCULAR REEDUCATION: CPT | Mod: GP | Performed by: PHYSICAL THERAPIST

## 2021-09-30 PROCEDURE — 97161 PT EVAL LOW COMPLEX 20 MIN: CPT | Mod: GP | Performed by: PHYSICAL THERAPIST

## 2021-09-30 PROCEDURE — 97110 THERAPEUTIC EXERCISES: CPT | Mod: GP | Performed by: PHYSICAL THERAPIST

## 2021-09-30 NOTE — PROGRESS NOTES
TWO- MINUTE WALK TEST (2mwt) MEANS     MEAN DISTANCE (FEET) METERS       + cm   18-54 600.4 183 m 0.192  cm   55-59 578.7 176 m 38.776 cm   60-64 545.9 166 m 32.936 cm   65-69 509.2 155 m 20.416 cm   70-74 478.7 145 m 95.9576 cm   75-79 462.3 140 m 90.904 cm   80-85 440.6 136 m 29.488 cm     R.W Cosme et al (2014)  Conversion Nina Lott PT, [place]

## 2021-09-30 NOTE — DISCHARGE INSTRUCTIONS
Use walker or maybe sewing chair and try to do 5-10 reps 1-2x/day      Seated marching x5-10 reps each side 1-2x/day      Standing marching x5-10 reps 1-2x/day

## 2021-10-04 NOTE — PROGRESS NOTES
TWO- MINUTE WALK TEST (2mwt) MEANS     MEAN DISTANCE (FEET) METERS       + cm   18-54 600.4 183 m 0.192  cm   55-59 578.7 176 m 38.776 cm   60-64 545.9 166 m 32.936 cm   65-69 509.2 155 m 20.416 cm   70-74 478.7 145 m 95.9576 cm   75-79 462.3 140 m 90.904 cm   80-85 440.6 136 m 29.488 cm         MEN   MEAN DISTANCE (FEET) METERS     + cm   18-54 659.1 200 m 89.368 cm   55-59 626.6 190 m 98.768 cm   60-64 587.6 179 m 10.048 cm   65-69 604.3 184 m 19.064 cm   70-74 565.6 172 m 39.488 cm   75-79 517.1 157 m 61.208 cm   80-85 472.7 144 m 7.896 cm             R.W Cosme et al (2014)  [facility] Nina Lott PT, CLT-GERM[place]N

## 2021-10-04 NOTE — ADDENDUM NOTE
Encounter addended by: France Almeida, PT on: 10/4/2021 2:13 PM   Actions taken: Clinical Note Signed, Flowsheet accepted, Document created, Document edited

## 2021-10-11 ENCOUNTER — HEALTH MAINTENANCE LETTER (OUTPATIENT)
Age: 86
End: 2021-10-11

## 2021-10-19 ENCOUNTER — OFFICE VISIT (OUTPATIENT)
Dept: NEUROLOGY | Facility: CLINIC | Age: 86
End: 2021-10-19
Attending: PSYCHIATRY & NEUROLOGY
Payer: MEDICARE

## 2021-10-19 ENCOUNTER — ALLIED HEALTH/NURSE VISIT (OUTPATIENT)
Dept: FAMILY MEDICINE | Facility: CLINIC | Age: 86
End: 2021-10-19
Payer: MEDICARE

## 2021-10-19 VITALS
WEIGHT: 189 LBS | HEART RATE: 74 BPM | HEIGHT: 65 IN | BODY MASS INDEX: 31.49 KG/M2 | DIASTOLIC BLOOD PRESSURE: 74 MMHG | SYSTOLIC BLOOD PRESSURE: 146 MMHG

## 2021-10-19 DIAGNOSIS — E11.42 TYPE 2 DIABETES MELLITUS WITH DIABETIC POLYNEUROPATHY, WITHOUT LONG-TERM CURRENT USE OF INSULIN (H): ICD-10-CM

## 2021-10-19 DIAGNOSIS — G50.0 TRIGEMINAL NEURALGIA: ICD-10-CM

## 2021-10-19 DIAGNOSIS — G20.A1 PARKINSON'S DISEASE (H): Primary | ICD-10-CM

## 2021-10-19 DIAGNOSIS — E53.8 LOW SERUM VITAMIN B12: ICD-10-CM

## 2021-10-19 DIAGNOSIS — G20.A1 PARKINSON'S DISEASE (H): ICD-10-CM

## 2021-10-19 DIAGNOSIS — G62.9 NEUROPATHY: ICD-10-CM

## 2021-10-19 DIAGNOSIS — R76.8 POSITIVE ANA (ANTINUCLEAR ANTIBODY): ICD-10-CM

## 2021-10-19 DIAGNOSIS — E53.8 VITAMIN B12 DEFICIENCY (NON ANEMIC): Primary | ICD-10-CM

## 2021-10-19 PROCEDURE — 95910 NRV CNDJ TEST 7-8 STUDIES: CPT | Performed by: PSYCHIATRY & NEUROLOGY

## 2021-10-19 PROCEDURE — 99207 PR NO CHARGE NURSE ONLY: CPT

## 2021-10-19 PROCEDURE — 95886 MUSC TEST DONE W/N TEST COMP: CPT | Mod: RT | Performed by: PSYCHIATRY & NEUROLOGY

## 2021-10-19 PROCEDURE — 96372 THER/PROPH/DIAG INJ SC/IM: CPT | Performed by: FAMILY MEDICINE

## 2021-10-19 PROCEDURE — 99215 OFFICE O/P EST HI 40 MIN: CPT | Performed by: PSYCHIATRY & NEUROLOGY

## 2021-10-19 RX ORDER — GABAPENTIN 300 MG/1
300 CAPSULE ORAL 4 TIMES DAILY
Qty: 360 CAPSULE | Refills: 1 | Status: SHIPPED | OUTPATIENT
Start: 2021-10-19 | End: 2021-11-12

## 2021-10-19 RX ORDER — LANOLIN ALCOHOL/MO/W.PET/CERES
1000 CREAM (GRAM) TOPICAL DAILY
Qty: 90 TABLET | Refills: 3 | Status: SHIPPED | OUTPATIENT
Start: 2021-10-19 | End: 2022-11-28

## 2021-10-19 RX ORDER — CARBIDOPA AND LEVODOPA 25; 100 MG/1; MG/1
1 TABLET ORAL 3 TIMES DAILY
Qty: 270 TABLET | Refills: 1 | Status: SHIPPED | OUTPATIENT
Start: 2021-10-19 | End: 2022-02-03

## 2021-10-19 RX ADMIN — CYANOCOBALAMIN 1000 MCG: 1000 INJECTION, SOLUTION INTRAMUSCULAR; SUBCUTANEOUS at 10:41

## 2021-10-19 ASSESSMENT — MIFFLIN-ST. JEOR: SCORE: 1293.18

## 2021-10-19 NOTE — LETTER
10/19/2021         RE: Janes Montero  [address]  [facility] [address]        Dear Colleague,    Thank you for referring your patient, Janes Montero, to the [facility] [place]. Please see a copy of my visit note below.    NEUROLOGY OUTPATIENT PROGRESS NOTE   Oct 19, 2021     CHIEF COMPLAINT/REASON FOR VISIT/REASON FOR CONSULT  Patient presents with:  Results: Discuss labs and EEG results     REASON FOR CONSULTATION-Parkinson's/trigeminal neuralgia    REFERRAL SOURCE  Dr. Maggie Arriaga  CC Dr. Maggie Arriaga    HISTORY OF PRESENT ILLNESS  Janes Montero is a 87 year old female seen today for multiple issues  Patient is moving from [place] to [place].  She is a resident of [place].  And has moved to [place] is a 20 years and is coming back.    1.  Parkinson's disease:-Symptom onset was in 2015.  At that time she was having shaking of her arms and legs.  This was at rest and with activity.  She was losing her balance as well.  She was put on Sinemet 1 tablet 3 times a day.  Feels that the medication is helping.  Does have some wearing off in the evening time and is taking the medication earlier than bedtime which is working.  Does fine in the morning.  Takes the medication on empty stomach.  Denies any other progression or any new symptoms.  Occasionally will use a walker.  Is not very active but does do some walking.  2.  Trigeminal neuralgia:-This started in 2013.  Pain is on the left side of the face.  The whole V1 V2 V3 distribution is involved that the pain is more towards the year.  Pain is sharp and intermittent.  Heating pad helps.  Reports no real provoking factors for her.  She is on gabapentin which has been helpful.  Has not tried any other medications.  Denies any other associated symptoms.  No numbness.  MRI was done and no clear cause for the trigeminal neuralgia was identified.  3.  Patient complains of some numbness in her legs.  Does report  some balance issues as well.  She does have a diagnosis of diabetes.  Last A1c was 6.7.    10/19/21  Patient returns today  1.  Parkinson's disease is stable and under good control.  Reports no wearing off of the medication.  Is taking the Sinemet regularly.  Has seen physical therapy and trying to do some exercise.  She did take her medication this morning.  2.  Trigeminal neuralgia pain is unchanged.  Gabapentin is helping her.  3.  Previously she was complaining of some numbness in her legs and was found to have a wide-based gait.  This does seem to have improved.  She was identified to have B12 deficiency and was put on injections but not oral replacement.  4.  Diabetes-A1c was 7.1.  Encouraged her to exercise and.  Manage her diet.    Previous history is reviewed and this is unchanged.    PAST MEDICAL/SURGICAL HISTORY  Past Medical History:   Diagnosis Date     Acute diastolic congestive heart failure (H)      Acute on chronic congestive heart failure (H) 6/11/2018     Coronary artery disease      Diabetes mellitus, type II (H)      Diastolic dysfunction 7/10/2018     Frozen shoulder     bilateral     Hypertension      Hypertensive emergency      Parkinson disease (H)      Recurrent UTI     hx septic shock     Patient Active Problem List   Diagnosis     Essential hypertension, benign     CAD (coronary artery disease), native coronary artery     S/P CABG (coronary artery bypass graft)     Parkinson's disease (H)     Trigeminal neuralgia     Chronic bilateral back pain     Aortic valve insufficiency     Tricuspid insufficiency     Mitral valve insufficiency     Bilateral carotid artery stenosis     CKD (chronic kidney disease) stage 3, GFR 30-59 ml/min (H)     Depression     Diastolic dysfunction     GERD (gastroesophageal reflux disease)     Left bundle branch block     Primary osteoarthritis involving multiple joints     Sensorineural hearing loss (SNHL) of both ears     Subclinical hypothyroidism     Type 2  diabetes mellitus without complication (H)     Secondary renal hyperparathyroidism (H)     Osteoporosis without current pathological fracture, unspecified osteoporosis type     Mixed hypercholesterolemia and hypertriglyceridemia     Mixed incontinence urge and stress (male)(female)     Primary osteoarthritis of left knee     Vitamin B12 deficiency (non anemic)   Significant for CABG/bypass/coronary artery disease, high cholesterol, high blood pressure, diabetes, migraines, Parkinson's disease, arthritis, depression, osteoporosis, hyperparathyroidism    FAMILY HISTORY  Family History   Problem Relation Age of Onset     Heart Disease Mother      Heart Disease Father    Family history reviewed and noncontributory no family history of neuropathy.    SOCIAL HISTORY  Social History     Tobacco Use     Smoking status: Never Smoker     Smokeless tobacco: Never Used   Substance Use Topics     Alcohol use: No     Drug use: No       SYSTEMS REVIEW  Twelve-system ROS was done and other than the HPI this was negative except for neck pain, back pain, arm and leg pain, joint pain, numbness and tingling, weakness paralysis, difficulty walking, falling, balance coordination problems, hearing loss, sleepy during the day, sleeping problems, headaches, anxiety, depression, cardiac/heart problems.  No other new issues today.    MEDICATIONS  ascorbic acid, vitamin C, (ASCORBIC ACID WITH ELLIOTT HIPS) 500 MG tablet, [ASCORBIC ACID, VITAMIN C, (ASCORBIC ACID WITH ELLIOTT HIPS) 500 MG TABLET] Take 500 mg by mouth daily.  aspirin 81 mg chewable tablet, [ASPIRIN 81 MG CHEWABLE TABLET] Chew 81 mg at bedtime.  atorvastatin (LIPITOR) 20 MG tablet, Take 1 tablet (20 mg) by mouth At Bedtime  carvedilol (COREG) 12.5 MG tablet, Take 1 tablet (12.5 mg) by mouth 2 times daily (with meals)  cholecalciferol, vitamin D3, 1,000 unit tablet, [CHOLECALCIFEROL, VITAMIN D3, 1,000 UNIT TABLET] Take 2,000 Units by mouth daily.  coenzyme Q10 (CO Q-10) 100 mg capsule,  "[COENZYME Q10 (CO Q-10) 100 MG CAPSULE] Take 100 mg by mouth 2 (two) times a day.  FLUoxetine (PROZAC) 20 MG capsule, [FLUOXETINE (PROZAC) 20 MG CAPSULE] Take 20 mg by mouth 2 (two) times a day.  fluticasone (FLONASE) 50 MCG/ACT nasal spray, Spray 1 spray into both nostrils daily  hydrocortisone 2.5 % cream, [HYDROCORTISONE 2.5 % CREAM] Apply topically 2 (two) times a day.  ketoconazole (NIZORAL) 2 % cream, [KETOCONAZOLE (NIZORAL) 2 % CREAM] Apply topically daily.  levothyroxine (SYNTHROID, LEVOTHROID) 25 MCG tablet, [LEVOTHYROXINE (SYNTHROID, LEVOTHROID) 25 MCG TABLET] Take 25 mcg by mouth Daily at 6:00 am.  loratadine (CLARITIN) 10 mg tablet, [LORATADINE (CLARITIN) 10 MG TABLET] Take 10 mg by mouth daily.  magnesium oxide 250 mg Tab, [MAGNESIUM OXIDE 250 MG TAB] Take 250 mg by mouth daily.  melatonin 1 mg Tab tablet, [MELATONIN 1 MG TAB TABLET] Take 3 mg by mouth at bedtime.   multivitamin therapeutic tablet, [MULTIVITAMIN THERAPEUTIC TABLET] Take 1 tablet by mouth daily.  nitroglycerin (NITROSTAT) 0.4 MG SL tablet, [NITROGLYCERIN (NITROSTAT) 0.4 MG SL TABLET] Place 0.4 mg under the tongue every 5 (five) minutes as needed for chest pain.  nystatin (MYCOSTATIN) cream, [NYSTATIN (MYCOSTATIN) CREAM] MIX EQUALLY WITH TRIAMCINOLONE AND APPLY TO VULVA 3 TO 4 TIMES A DAY  omega 3-dha-epa-fish oil (FISH OIL) 60- mg cap capsule, [OMEGA 3-DHA-EPA-FISH OIL (FISH OIL) 60- MG CAP CAPSULE] Take 2,000 mg by mouth 2 (two) times a day.  omeprazole (PRILOSEC) 20 MG capsule, [OMEPRAZOLE (PRILOSEC) 20 MG CAPSULE] Take 20 mg by mouth daily before breakfast.  polyethylene glycol (MIRALAX) 17 gram packet, [POLYETHYLENE GLYCOL (MIRALAX) 17 GRAM PACKET] Take 17 g by mouth daily.    cyanocobalamin injection 1,000 mcg         PHYSICAL EXAMINATION  VITALS: BP (!) 146/74 (BP Location: Left arm, Patient Position: Sitting)   Pulse 74   Ht 1.651 m (5' 5\")   Wt 85.7 kg (189 lb)   BMI 31.45 kg/m    GENERAL: Healthy appearing, " alert, no acute distress, normal habitus.  CARDIOVASCULAR: Extremities warm and well perfused. Pulses present.   NEUROLOGICAL:  Patient is awake and oriented to self, place and time.  Attention span is normal.  Memory is grossly intact.  Language is fluent and follows commands appropriately.  Appropriate fund of knowledge. Cranial nerves 2-12 are intact. There is no pronator drift.  Motor exam shows 5/5 strength in all extremities.  Tone is symmetric bilaterally in upper and lower extremities.  No cogwheeling or tremor noted.  (Previously reflexes are symmetric and 2+ in upper extremities and absent in lower extremities. Sensory exam is grossly intact to light touch, pin prick and vibration with exception of decreased pinprick below the knees.)  Finger to nose and heel to shin is without dysmetria.  Romberg is negative.  Gait is almost normal with decreased arm swing.  Mild difficulty with tandem.  Previously drawing concentric circles did not show any tremor.    DIAGNOSTICS  RELEVANT LABS  TSH 2.01   A1c 6.7    Carotid US 2020  1: Right: 1-39% carotid artery stenosis with mild velocities and antegrade   vertebral flow.   2: Left: 1-39% carotid artery stenosis with mild velocities and antegrade   vertebral flow.   3: Essentially unchanged.   4: Recommend a two year follow-up if patient remains asymptomatic.       CT head 2020  IMPRESSION   1. No acute intracranial findings.   2. Senescent changes.     MRI 2013  IMPRESSION:   Scattered small vessel ischemic disease including pontine involvement.      OUTSIDE RECORDS  Outside referral notes and chart notes were reviewed and pertinent information has been summarized (in addition to the HPI):-Patient has a diagnosis of Parkinson's disease.  Diagnosed in 2015.  Is looking to establish with a neurologist.  Is on Sinemet  times a day.  Also has trigeminal neuralgia controlled with gabapentin 300 mg p.o. 4 times a day.  Also has bilateral carotid artery stenosis.  Is  moving here from [place].    LABS    Component      Latest Ref Rng & Units 7/27/2021           3:50 PM   Total Protein Serum for ELP      6.0 - 8.0 g/dL 6.8   Albumin %      51.0 - 67.0 % 66.6   Albumin Fraction      3.2 - 4.7 g/dL 4.5   Alpha 1 %      2.0 - 4.0 % 1.9 (L)   Alpha 1 Fraction      0.1 - 0.3 g/dL 0.1   Alpha 2 %      5.0 - 13.0 % 9.8   Alpha 2 Fraction      0.4 - 0.9 g/dL 0.7   Beta %      10.0 - 17.0 % 10.5   Beta Fraction      0.7 - 1.2 g/dL 0.7   Gamma Globulin %      9.0 - 20.0 % 11.2   Gamma Fraction      0.6 - 1.4 g/dL 0.8   ELP Interpretation:       Unremarkable protein electrophoresis.   Path ICD       G62.9   Interpreted By       Lisa Cantu MD   Immunofixation ELP          VINICIO interpretation      Negative Borderline Positive (A)   VINICIO pattern 1       Homogeneous   VINICIO titer 1       1:80   Vitamin B12      213 - 816 pg/mL 383   Vitamin B1 Whole Blood Level      70 - 180 nmol/L 184 (H)   Methylmalonic Acid      0.00 - 0.40 umol/L 1.02 (H)   Lyme Disease Antibodies Serum      <0.90 0.04     Component      Latest Ref Rng & Units 7/27/2021           3:50 PM   Total Protein Serum for ELP      6.0 - 8.0 g/dL 6.9   Albumin %      51.0 - 67.0 %    Albumin Fraction      3.2 - 4.7 g/dL    Alpha 1 %      2.0 - 4.0 %    Alpha 1 Fraction      0.1 - 0.3 g/dL    Alpha 2 %      5.0 - 13.0 %    Alpha 2 Fraction      0.4 - 0.9 g/dL    Beta %      10.0 - 17.0 %    Beta Fraction      0.7 - 1.2 g/dL    Gamma Globulin %      9.0 - 20.0 %    Gamma Fraction      0.6 - 1.4 g/dL    ELP Interpretation:          Path ICD       G62.9   Interpreted By       Lisa Cantu MD   Immunofixation ELP       Unremarkable immunofixation electrophoresis.   VINICIO interpretation      Negative    VINICIO pattern 1          VINICIO titer 1          Vitamin B12      213 - 816 pg/mL    Vitamin B1 Whole Blood Level      70 - 180 nmol/L    Methylmalonic Acid      0.00 - 0.40 umol/L    Lyme Disease Antibodies Serum      <0.90       EMG  CLINICAL INTERPRETATION:  This is a mildly abnormal nerve conduction and EMG study.  The abnormalities seen above could suggest early/mild sensorimotor polyneuropathy though could be artifactual also.  Further clinical correlation is needed.     IMPRESSION/REPORT/PLAN  Parkinson's disease (H)  Trigeminal neuralgia  Neuropathy  Type 2 diabetes mellitus with diabetic polyneuropathy, without long-term current use of insulin (H)   Low B12  Positive VINICIO    This is a 87 year old female with  1.  Trigeminal neuralgia:-MRI brain in 2013 did not show any structural lesions.  Currently pain is under good control with gabapentin and I will continue that.  2.  Parkinson's disease:-Examination does not show a lot of evidence of Parkinson's disease.  Possibly this is being masked by use of Sinemet.  She does have decreased arm swing today.  Encouraged her to make an appointment during the next visit right before she takes her afternoon pills.  We will see if there is any evidence of Parkinson's disease that can be seen.  I do suspect she has Parkinson's disease since she is noticing some benefit from the Sinemet.  Encourage exercise.  Continue physical therapy.  3.  On examination she has a wide-based gait with decreased pinprick sensation in her legs.  Examination suggestive of neuropathy.  This has improved today.  EMG shows questionable neuropathy versus artifact.   She does have diabetes which could be a cause of her neuropathy.  Blood work further shows low B12.  She has had 1 shot of B12 and that might have significantly helped her improvement on exam.  Her A1c is 7 and have encouraged her to continue to exercise and improve her diet to bring it down even further.  4.  She does have a positive VINICIO.  I think this is a false positive.  We will recheck it in 6 months.  Could recheck B12 at that point as well.    Can see her back in 3 to 4 months.    -     carbidopa-levodopa (SINEMET)  MG tablet; Take 1 tablet by  mouth 3 times daily Take at least 30 min before meals or 2 hours after meals. Do not mix with food.  -     gabapentin (NEURONTIN) 300 MG capsule; Take 1 capsule (300 mg) by mouth 4 times daily  -     cyanocobalamin (VITAMIN B-12) 1000 MCG tablet; Take 1 tablet (1,000 mcg) by mouth daily    Return in about 3 months (around 1/19/2022) for In-Clinic Visit.    Over 42 minutes were spent coordinating the care for the patient on the day of the encounter.  This includes previsit, during visit and post visit activities as documented above.  Multiple tests reviewed with multiple problems reviewed with prescription management.  (Activities include but not inclusive of reviewing chart, reviewing outside records, reviewing labs and imaging study results as well as the images, patient visit time including getting history and exam,  use if applicable, review of test results with the patient and coming up with a plan in a shared model, counseling patient and family, education and answering patient questions, EMR , EMR diagnosis entry and problem list management, medication reconciliation and prescription management if applicable, paperwork if applicable, printing documents and documentation of the visit activities.)    Brennon Moya MD  Neurologist  [facility]  Tel:- [phone]    This note was dictated using voice recognition software.  Any grammatical or context distortions are unintentional and inherent to the software.        Again, thank you for allowing me to participate in the care of your patient.        Sincerely,        Brennon Moya MD

## 2021-10-19 NOTE — PROCEDURES
[facility] [place] [facility]     Formerly [facility]  [address], [address]  [place] [address]  Tel: [phone]  Fax: [phone]          Full Name: Janes Montero Gender: Female  Patient ID: [id number] YOB: 1933      Visit Date: 10/19/2021 07:55  Age: 87 Years 11 Months Old  Interpreted By: Brennon Moya MD   Ref Dr.: Maggie Arriaga MD  Tech: ST   Height: 5 feet 5 inch  Reason for referral: Evaluate bilateral lowers. c/o tingling, numbness in both feet > 3 years. Diabetic > 5 years. h/o lumbar surgery. right knee replacement.       Motor NCS      Nerve / Sites Lat Amp Dist Edward    ms mV cm m/s   R Peroneal - EDB      Ankle 6.41 2.8 8       Fib head 13.80 2.9 27 37      Pop fossa 16.56 2.8 10 36   L Peroneal - EDB      Ankle 4.27 3.7 8       Fib head 11.88 3.4 30 39      Pop fossa 14.58 3.5 10 37   R Tibial - AH      Ankle 5.16 4.8 8       Pop fossa 14.27 4.5 36 39   L Tibial - AH      Ankle 5.31 5.8 8       Pop fossa 14.95 5.7 38 39       F  Wave      Nerve Fmin    ms   R Tibial - AH 58.39   L Tibial - AH 56.35       Sensory NCS      Nerve / Sites Onset Lat Pk Lat Amp.2-3 Dist Edward    ms ms  V cm m/s   R Sural - Ankle (Calf)      Calf 3.75 4.74 8.9 14 37   L Sural - Ankle (Calf)      Calf 3.59 3.96 6.7 14 39   R Superficial peroneal - Ankle      Lat leg 3.18 3.85 6.3 12 38   L Superficial peroneal - Ankle      Lat leg 3.13 4.01 7.2 12 38       EMG Summary Table     Spontaneous MUAP Rcmt Note   Muscle Fib PSW Fasc IA # Amp Dur PPP Rate Type   R. Gluteus medius None None None N N N N N N N   R. Gluteus adrian None None None N N N N N N N   R. Biceps femoris (short head) None None None N N N N N N N   R. Adductor nkechi None None None N N N N N N N   R. Quadriceps None None None N N N N N N N   R. Gastrocnemius (Medial head) None None None N N N N N N N   R. Tibialis anterior None None None N N N N N N N   R. Tibialis posterior None None None N N  N N N N N   L. Adductor nkechi None None None N N N N N N N   L. Quadriceps None None None N N N N N N N   L. Gastrocnemius (Medial head) None None None N N N N N N N   L. Tibialis anterior None None None N N N N N N N   L. Tibialis posterior None None None N N N N N N N     SUMMARY  Nerve conduction and EMG study of bilateral lower extremities shows:    Normal right peroneal distal motor latency, low normal amplitude and borderline decreased conduction velocity.  Normal left peroneal distal motor latency, low normal amplitude and borderline decreased conduction velocity.  Normal right tibial distal motor latency, low normal amplitude and borderline decreased conduction velocity.  Normal left tibial distal motor latency, low normal amplitude and borderline decreased conduction velocity.  Normal bilateral sural and superficial peroneal sensory SNAP that is slightly on the lower end.  Monopolar needle exam is normal.      CLINICAL INTERPRETATION:  This is a mildly abnormal nerve conduction and EMG study.  The abnormalities seen above could suggest early/mild sensorimotor polyneuropathy though could be artifactual also.  Further clinical correlation is needed.     Brennon Moya MD  Neurologist  [facility]  Tel:- [phone]

## 2021-10-19 NOTE — PROGRESS NOTES
NEUROLOGY OUTPATIENT PROGRESS NOTE   Oct 19, 2021     CHIEF COMPLAINT/REASON FOR VISIT/REASON FOR CONSULT  Patient presents with:  Results: Discuss labs and EEG results     REASON FOR CONSULTATION-Parkinson's/trigeminal neuralgia    REFERRAL SOURCE  Dr. Maggie Arriaga  CC Dr. Maggie Arriaga    HISTORY OF PRESENT ILLNESS  Janes Montero is a 87 year old female seen today for multiple issues  Patient is moving from [place] to [place].  She is a resident of [place].  And has moved to [place] is a 20 years and is coming back.    1.  Parkinson's disease:-Symptom onset was in 2015.  At that time she was having shaking of her arms and legs.  This was at rest and with activity.  She was losing her balance as well.  She was put on Sinemet 1 tablet 3 times a day.  Feels that the medication is helping.  Does have some wearing off in the evening time and is taking the medication earlier than bedtime which is working.  Does fine in the morning.  Takes the medication on empty stomach.  Denies any other progression or any new symptoms.  Occasionally will use a walker.  Is not very active but does do some walking.  2.  Trigeminal neuralgia:-This started in 2013.  Pain is on the left side of the face.  The whole V1 V2 V3 distribution is involved that the pain is more towards the year.  Pain is sharp and intermittent.  Heating pad helps.  Reports no real provoking factors for her.  She is on gabapentin which has been helpful.  Has not tried any other medications.  Denies any other associated symptoms.  No numbness.  MRI was done and no clear cause for the trigeminal neuralgia was identified.  3.  Patient complains of some numbness in her legs.  Does report some balance issues as well.  She does have a diagnosis of diabetes.  Last A1c was 6.7.    10/19/21  Patient returns today  1.  Parkinson's disease is stable and under good control.  Reports no wearing off of the medication.  Is taking the Sinemet regularly.  Has seen  physical therapy and trying to do some exercise.  She did take her medication this morning.  2.  Trigeminal neuralgia pain is unchanged.  Gabapentin is helping her.  3.  Previously she was complaining of some numbness in her legs and was found to have a wide-based gait.  This does seem to have improved.  She was identified to have B12 deficiency and was put on injections but not oral replacement.  4.  Diabetes-A1c was 7.1.  Encouraged her to exercise and.  Manage her diet.    Previous history is reviewed and this is unchanged.    PAST MEDICAL/SURGICAL HISTORY  Past Medical History:   Diagnosis Date     Acute diastolic congestive heart failure (H)      Acute on chronic congestive heart failure (H) 6/11/2018     Coronary artery disease      Diabetes mellitus, type II (H)      Diastolic dysfunction 7/10/2018     Frozen shoulder     bilateral     Hypertension      Hypertensive emergency      Parkinson disease (H)      Recurrent UTI     hx septic shock     Patient Active Problem List   Diagnosis     Essential hypertension, benign     CAD (coronary artery disease), native coronary artery     S/P CABG (coronary artery bypass graft)     Parkinson's disease (H)     Trigeminal neuralgia     Chronic bilateral back pain     Aortic valve insufficiency     Tricuspid insufficiency     Mitral valve insufficiency     Bilateral carotid artery stenosis     CKD (chronic kidney disease) stage 3, GFR 30-59 ml/min (H)     Depression     Diastolic dysfunction     GERD (gastroesophageal reflux disease)     Left bundle branch block     Primary osteoarthritis involving multiple joints     Sensorineural hearing loss (SNHL) of both ears     Subclinical hypothyroidism     Type 2 diabetes mellitus without complication (H)     Secondary renal hyperparathyroidism (H)     Osteoporosis without current pathological fracture, unspecified osteoporosis type     Mixed hypercholesterolemia and hypertriglyceridemia     Mixed incontinence urge and stress  (male)(female)     Primary osteoarthritis of left knee     Vitamin B12 deficiency (non anemic)   Significant for CABG/bypass/coronary artery disease, high cholesterol, high blood pressure, diabetes, migraines, Parkinson's disease, arthritis, depression, osteoporosis, hyperparathyroidism    FAMILY HISTORY  Family History   Problem Relation Age of Onset     Heart Disease Mother      Heart Disease Father    Family history reviewed and noncontributory no family history of neuropathy.    SOCIAL HISTORY  Social History     Tobacco Use     Smoking status: Never Smoker     Smokeless tobacco: Never Used   Substance Use Topics     Alcohol use: No     Drug use: No       SYSTEMS REVIEW  Twelve-system ROS was done and other than the HPI this was negative except for neck pain, back pain, arm and leg pain, joint pain, numbness and tingling, weakness paralysis, difficulty walking, falling, balance coordination problems, hearing loss, sleepy during the day, sleeping problems, headaches, anxiety, depression, cardiac/heart problems.  No other new issues today.    MEDICATIONS  ascorbic acid, vitamin C, (ASCORBIC ACID WITH ELLIOTT HIPS) 500 MG tablet, [ASCORBIC ACID, VITAMIN C, (ASCORBIC ACID WITH ELLIOTT HIPS) 500 MG TABLET] Take 500 mg by mouth daily.  aspirin 81 mg chewable tablet, [ASPIRIN 81 MG CHEWABLE TABLET] Chew 81 mg at bedtime.  atorvastatin (LIPITOR) 20 MG tablet, Take 1 tablet (20 mg) by mouth At Bedtime  carvedilol (COREG) 12.5 MG tablet, Take 1 tablet (12.5 mg) by mouth 2 times daily (with meals)  cholecalciferol, vitamin D3, 1,000 unit tablet, [CHOLECALCIFEROL, VITAMIN D3, 1,000 UNIT TABLET] Take 2,000 Units by mouth daily.  coenzyme Q10 (CO Q-10) 100 mg capsule, [COENZYME Q10 (CO Q-10) 100 MG CAPSULE] Take 100 mg by mouth 2 (two) times a day.  FLUoxetine (PROZAC) 20 MG capsule, [FLUOXETINE (PROZAC) 20 MG CAPSULE] Take 20 mg by mouth 2 (two) times a day.  fluticasone (FLONASE) 50 MCG/ACT nasal spray, Spray 1 spray into both  "nostrils daily  hydrocortisone 2.5 % cream, [HYDROCORTISONE 2.5 % CREAM] Apply topically 2 (two) times a day.  ketoconazole (NIZORAL) 2 % cream, [KETOCONAZOLE (NIZORAL) 2 % CREAM] Apply topically daily.  levothyroxine (SYNTHROID, LEVOTHROID) 25 MCG tablet, [LEVOTHYROXINE (SYNTHROID, LEVOTHROID) 25 MCG TABLET] Take 25 mcg by mouth Daily at 6:00 am.  loratadine (CLARITIN) 10 mg tablet, [LORATADINE (CLARITIN) 10 MG TABLET] Take 10 mg by mouth daily.  magnesium oxide 250 mg Tab, [MAGNESIUM OXIDE 250 MG TAB] Take 250 mg by mouth daily.  melatonin 1 mg Tab tablet, [MELATONIN 1 MG TAB TABLET] Take 3 mg by mouth at bedtime.   multivitamin therapeutic tablet, [MULTIVITAMIN THERAPEUTIC TABLET] Take 1 tablet by mouth daily.  nitroglycerin (NITROSTAT) 0.4 MG SL tablet, [NITROGLYCERIN (NITROSTAT) 0.4 MG SL TABLET] Place 0.4 mg under the tongue every 5 (five) minutes as needed for chest pain.  nystatin (MYCOSTATIN) cream, [NYSTATIN (MYCOSTATIN) CREAM] MIX EQUALLY WITH TRIAMCINOLONE AND APPLY TO VULVA 3 TO 4 TIMES A DAY  omega 3-dha-epa-fish oil (FISH OIL) 60- mg cap capsule, [OMEGA 3-DHA-EPA-FISH OIL (FISH OIL) 60- MG CAP CAPSULE] Take 2,000 mg by mouth 2 (two) times a day.  omeprazole (PRILOSEC) 20 MG capsule, [OMEPRAZOLE (PRILOSEC) 20 MG CAPSULE] Take 20 mg by mouth daily before breakfast.  polyethylene glycol (MIRALAX) 17 gram packet, [POLYETHYLENE GLYCOL (MIRALAX) 17 GRAM PACKET] Take 17 g by mouth daily.    cyanocobalamin injection 1,000 mcg         PHYSICAL EXAMINATION  VITALS: BP (!) 146/74 (BP Location: Left arm, Patient Position: Sitting)   Pulse 74   Ht 1.651 m (5' 5\")   Wt 85.7 kg (189 lb)   BMI 31.45 kg/m    GENERAL: Healthy appearing, alert, no acute distress, normal habitus.  CARDIOVASCULAR: Extremities warm and well perfused. Pulses present.   NEUROLOGICAL:  Patient is awake and oriented to self, place and time.  Attention span is normal.  Memory is grossly intact.  Language is fluent and follows " commands appropriately.  Appropriate fund of knowledge. Cranial nerves 2-12 are intact. There is no pronator drift.  Motor exam shows 5/5 strength in all extremities.  Tone is symmetric bilaterally in upper and lower extremities.  No cogwheeling or tremor noted.  (Previously reflexes are symmetric and 2+ in upper extremities and absent in lower extremities. Sensory exam is grossly intact to light touch, pin prick and vibration with exception of decreased pinprick below the knees.)  Finger to nose and heel to shin is without dysmetria.  Romberg is negative.  Gait is almost normal with decreased arm swing.  Mild difficulty with tandem.  Previously drawing concentric circles did not show any tremor.    DIAGNOSTICS  RELEVANT LABS  TSH 2.01   A1c 6.7    Carotid US 2020  1: Right: 1-39% carotid artery stenosis with mild velocities and antegrade   vertebral flow.   2: Left: 1-39% carotid artery stenosis with mild velocities and antegrade   vertebral flow.   3: Essentially unchanged.   4: Recommend a two year follow-up if patient remains asymptomatic.       CT head 2020  IMPRESSION   1. No acute intracranial findings.   2. Senescent changes.     MRI 2013  IMPRESSION:   Scattered small vessel ischemic disease including pontine involvement.      OUTSIDE RECORDS  Outside referral notes and chart notes were reviewed and pertinent information has been summarized (in addition to the HPI):-Patient has a diagnosis of Parkinson's disease.  Diagnosed in 2015.  Is looking to establish with a neurologist.  Is on Sinemet  times a day.  Also has trigeminal neuralgia controlled with gabapentin 300 mg p.o. 4 times a day.  Also has bilateral carotid artery stenosis.  Is moving here from [place].    LABS    Component      Latest Ref Rng & Units 7/27/2021           3:50 PM   Total Protein Serum for ELP      6.0 - 8.0 g/dL 6.8   Albumin %      51.0 - 67.0 % 66.6   Albumin Fraction      3.2 - 4.7 g/dL 4.5   Alpha 1 %      2.0 - 4.0 % 1.9  (L)   Alpha 1 Fraction      0.1 - 0.3 g/dL 0.1   Alpha 2 %      5.0 - 13.0 % 9.8   Alpha 2 Fraction      0.4 - 0.9 g/dL 0.7   Beta %      10.0 - 17.0 % 10.5   Beta Fraction      0.7 - 1.2 g/dL 0.7   Gamma Globulin %      9.0 - 20.0 % 11.2   Gamma Fraction      0.6 - 1.4 g/dL 0.8   ELP Interpretation:       Unremarkable protein electrophoresis.   Path ICD       G62.9   Interpreted By       Lisa Cantu MD   Immunofixation ELP          VINICIO interpretation      Negative Borderline Positive (A)   VINICIO pattern 1       Homogeneous   VINICIO titer 1       1:80   Vitamin B12      213 - 816 pg/mL 383   Vitamin B1 Whole Blood Level      70 - 180 nmol/L 184 (H)   Methylmalonic Acid      0.00 - 0.40 umol/L 1.02 (H)   Lyme Disease Antibodies Serum      <0.90 0.04     Component      Latest Ref Rng & Units 7/27/2021           3:50 PM   Total Protein Serum for ELP      6.0 - 8.0 g/dL 6.9   Albumin %      51.0 - 67.0 %    Albumin Fraction      3.2 - 4.7 g/dL    Alpha 1 %      2.0 - 4.0 %    Alpha 1 Fraction      0.1 - 0.3 g/dL    Alpha 2 %      5.0 - 13.0 %    Alpha 2 Fraction      0.4 - 0.9 g/dL    Beta %      10.0 - 17.0 %    Beta Fraction      0.7 - 1.2 g/dL    Gamma Globulin %      9.0 - 20.0 %    Gamma Fraction      0.6 - 1.4 g/dL    ELP Interpretation:          Path ICD       G62.9   Interpreted By       Lisa Cantu MD   Immunofixation ELP       Unremarkable immunofixation electrophoresis.   VINICIO interpretation      Negative    VINICIO pattern 1          VINICIO titer 1          Vitamin B12      213 - 816 pg/mL    Vitamin B1 Whole Blood Level      70 - 180 nmol/L    Methylmalonic Acid      0.00 - 0.40 umol/L    Lyme Disease Antibodies Serum      <0.90      EMG  CLINICAL INTERPRETATION:  This is a mildly abnormal nerve conduction and EMG study.  The abnormalities seen above could suggest early/mild sensorimotor polyneuropathy though could be artifactual also.  Further clinical correlation is needed.     PCP notes regarding  B12 reviewed.     IMPRESSION/REPORT/PLAN  Parkinson's disease (H)  Trigeminal neuralgia  Neuropathy  Type 2 diabetes mellitus with diabetic polyneuropathy, without long-term current use of insulin (H)   Low B12  Positive VINICIO    This is a 87 year old female with  1.  Trigeminal neuralgia:-MRI brain in 2013 did not show any structural lesions.  Currently pain is under good control with gabapentin and I will continue that.  2.  Parkinson's disease:-Examination does not show a lot of evidence of Parkinson's disease.  Possibly this is being masked by use of Sinemet.  She does have decreased arm swing today.  Encouraged her to make an appointment during the next visit right before she takes her afternoon pills.  We will see if there is any evidence of Parkinson's disease that can be seen.  I do suspect she has Parkinson's disease since she is noticing some benefit from the Sinemet.  Encourage exercise.  Continue physical therapy.  3.  On examination she has a wide-based gait with decreased pinprick sensation in her legs.  Examination suggestive of neuropathy.  This has improved today.  EMG shows questionable neuropathy versus artifact.   She does have diabetes which could be a cause of her neuropathy.  Blood work further shows low B12.  She has had 1 shot of B12 and that might have significantly helped her improvement on exam.  Her A1c is 7 and have encouraged her to continue to exercise and improve her diet to bring it down even further.  4.  She does have a positive VINICIO.  I think this is a false positive.  We will recheck it in 6 months.  Could recheck B12 at that point as well.    Can see her back in 3 to 4 months.    -     carbidopa-levodopa (SINEMET)  MG tablet; Take 1 tablet by mouth 3 times daily Take at least 30 min before meals or 2 hours after meals. Do not mix with food.  -     gabapentin (NEURONTIN) 300 MG capsule; Take 1 capsule (300 mg) by mouth 4 times daily  -     cyanocobalamin (VITAMIN B-12) 1000  MCG tablet; Take 1 tablet (1,000 mcg) by mouth daily    Return in about 3 months (around 1/19/2022) for In-Clinic Visit.    Over 42 minutes were spent coordinating the care for the patient on the day of the encounter.  This includes previsit, during visit and post visit activities as documented above.  Multiple tests reviewed with multiple problems reviewed with prescription management.  (Activities include but not inclusive of reviewing chart, reviewing outside records, reviewing labs and imaging study results as well as the images, patient visit time including getting history and exam,  use if applicable, review of test results with the patient and coming up with a plan in a shared model, counseling patient and family, education and answering patient questions, EMR , EMR diagnosis entry and problem list management, medication reconciliation and prescription management if applicable, paperwork if applicable, printing documents and documentation of the visit activities.)    Brennon Moya MD  Neurologist  [facility]  Tel:- [phone]    This note was dictated using voice recognition software.  Any grammatical or context distortions are unintentional and inherent to the software.

## 2021-10-19 NOTE — NURSING NOTE
Chief Complaint   Patient presents with     Results     Discuss labs and EEG results      Megan Odell MA on 10/19/2021 at 8:23 AM

## 2021-10-19 NOTE — LETTER
10/19/2021         RE: Janes Montero  [address]  [place] [facility] [address]        Dear Colleague,    Thank you for referring your patient, Janes Montero, to the [facility] [place]. Please see a copy of my visit note below.    See procedure note.       Again, thank you for allowing me to participate in the care of your patient.        Sincerely,        Brennon Moya MD

## 2021-11-04 DIAGNOSIS — G50.0 TRIGEMINAL NEURALGIA: ICD-10-CM

## 2021-11-05 NOTE — TELEPHONE ENCOUNTER
Please deny for refill on file sent 10/19/21:  Outpatient Medication Detail     Disp Refills Start End VL   gabapentin (NEURONTIN) 300 MG capsule 360 capsule 1 10/19/2021  No   Sig - Route: Take 1 capsule (300 mg) by mouth 4 times daily - Oral   Sent to pharmacy as: Gabapentin 300 MG Oral Capsule (NEURONTIN)   Class: E-Prescribe   Order: [id number]   E-Prescribing Status: Receipt confirmed by pharmacy (10/19/2021  8:58 AM CDT)       Printout Tracking    External Result Report     Pharmacy    [id number] - [place], [place] - [address]

## 2021-11-12 RX ORDER — GABAPENTIN 300 MG/1
CAPSULE ORAL
Qty: 360 CAPSULE | Refills: 3 | Status: SHIPPED | OUTPATIENT
Start: 2021-11-12 | End: 2022-05-19

## 2021-11-18 ENCOUNTER — OFFICE VISIT (OUTPATIENT)
Dept: ENDOCRINOLOGY | Facility: CLINIC | Age: 86
End: 2021-11-18
Payer: MEDICARE

## 2021-11-18 ENCOUNTER — ALLIED HEALTH/NURSE VISIT (OUTPATIENT)
Dept: FAMILY MEDICINE | Facility: CLINIC | Age: 86
End: 2021-11-18
Payer: MEDICARE

## 2021-11-18 VITALS
DIASTOLIC BLOOD PRESSURE: 86 MMHG | OXYGEN SATURATION: 95 % | SYSTOLIC BLOOD PRESSURE: 187 MMHG | HEIGHT: 65 IN | BODY MASS INDEX: 31.49 KG/M2 | WEIGHT: 189 LBS | HEART RATE: 74 BPM

## 2021-11-18 DIAGNOSIS — Z92.29 HISTORY OF BISPHOSPHONATE THERAPY: ICD-10-CM

## 2021-11-18 DIAGNOSIS — M81.0 OSTEOPOROSIS WITHOUT CURRENT PATHOLOGICAL FRACTURE, UNSPECIFIED OSTEOPOROSIS TYPE: Primary | ICD-10-CM

## 2021-11-18 DIAGNOSIS — E53.8 VITAMIN B12 DEFICIENCY (NON ANEMIC): Primary | ICD-10-CM

## 2021-11-18 DIAGNOSIS — T45.8X5A ADVERSE EFFECT OF ORAL BISPHOSPHONATES: ICD-10-CM

## 2021-11-18 DIAGNOSIS — M81.0 AGE-RELATED OSTEOPOROSIS WITHOUT CURRENT PATHOLOGICAL FRACTURE: ICD-10-CM

## 2021-11-18 DIAGNOSIS — Z92.29 PERSONAL HISTORY OF OTHER DRUG THERAPY: ICD-10-CM

## 2021-11-18 PROCEDURE — 96372 THER/PROPH/DIAG INJ SC/IM: CPT | Performed by: FAMILY MEDICINE

## 2021-11-18 PROCEDURE — 99214 OFFICE O/P EST MOD 30 MIN: CPT | Performed by: INTERNAL MEDICINE

## 2021-11-18 PROCEDURE — 99207 PR NO CHARGE NURSE ONLY: CPT

## 2021-11-18 RX ADMIN — CYANOCOBALAMIN 1000 MCG: 1000 INJECTION, SOLUTION INTRAMUSCULAR; SUBCUTANEOUS at 14:53

## 2021-11-18 ASSESSMENT — MIFFLIN-ST. JEOR: SCORE: 1288.18

## 2021-11-18 NOTE — PROGRESS NOTES
Clinic Administered Medication Documentation    Administrations This Visit     cyanocobalamin injection 1,000 mcg     Admin Date  11/18/2021 Action  Given Dose  1,000 mcg Route  Intramuscular Site  Left Deltoid Administered By  Luiza Bermeo    Ordering Provider: Maggie Arriaga MD    Patient Supplied?: No                  Injectable Medication Documentation    Patient was given Cyanocobalamin (B-12). Prior to medication administration, verified patients identity using patient s name and date of birth. Please see MAR and medication order for additional information. Patient instructed to remain in clinic for 15 minutes.      Was entire vial of medication used? Yes  Vial/Syringe: Single dose vial  Expiration Date: 04/2023  Was this medication supplied by the patient? No

## 2021-11-18 NOTE — LETTER
"    11/18/2021         RE: Janes Montero  [address]  [place] [address]        Dear Colleague,    Thank you for referring your patient, Janes Montero, to the [facility] ENDOCR[facility]. Please see a copy of my visit note below.    S:  Patient presents for management of hyperparathyroidism.   Originally found to have hypercalcemia in 2018 when living in [place].    Vaguely recalls being on fosamax in the past.   Not sure why it was stopped.   Fractured right humerus in 2017 after trip and fall.   Right wrist fractured in 2015, trip and fall.   Right ankle fracture in 2019, trip and fall.     She does not take any supplemental calcium.     She has never had a kidney stone.     Always has water by her for dry mouth.   Only up to urinate once at night.     Regular bowel movements with the aid of miralax.     In 11/2021, here to discuss treatment options.     ROS: 10 point ROS neg other than the symptoms noted above in the HPI.     Exam:   Vital signs:      BP: (!) 187/86 Pulse: 74     SpO2: 95 %     Height: 165.1 cm (5' 5\") Weight: 85.7 kg (189 lb)  Estimated body mass index is 31.45 kg/m  as calculated from the following:    Height as of this encounter: 1.651 m (5' 5\").    Weight as of this encounter: 85.7 kg (189 lb).    Gen: In NAD.   HEENT: no proptosis or lid lag, EOMI, MMM.     A/P:   HPTH - In the setting of low normal vitamin D (30). Chronic mild hypercalcemia.  Given her osteoporosis, she has an indication for surgery. However, given her age and co-morbidities we discussed bisphosphonates and sensipar if needed. She an her daughter are not ready to decide on a course of treatment today. Will collect data as below and then conference with ENT.   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  In 11/2021, discussed non-surgical options of fosamax and sensipar again. 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 She " was told she might have HPTH due to renal disease. If that were the case, she would not have hypercalcemia. I would also not expect this with a GFR in the 50's. She has actonel and fosamax listed as allergies. She recalls they caused aching. Therefore, recommend prolia.   -Prolia ordered. My RN will call once ready to schedule.   -Labs in 6 and 12 months.   -DEXA in 1 year.   -Clinic visit in 1 year.     HTN - elevated today. Checked on arm she has chronic pain in. Prior readings normal as are her home readings. Will follow.     Abner Curtis M.D    Answers for HPI/ROS submitted by the patient on 11/15/2021  General Symptoms: No  Skin Symptoms: No  HENT Symptoms: No  EYE SYMPTOMS: No  HEART SYMPTOMS: No  LUNG SYMPTOMS: No  INTESTINAL SYMPTOMS: No  URINARY SYMPTOMS: No  GYNECOLOGIC SYMPTOMS: No  BREAST SYMPTOMS: No  SKELETAL SYMPTOMS: No  BLOOD SYMPTOMS: No  NERVOUS SYSTEM SYMPTOMS: No  MENTAL HEALTH SYMPTOMS: No          Again, thank you for allowing me to participate in the care of your patient.        Sincerely,        Abner Curtis MD

## 2021-11-18 NOTE — PROGRESS NOTES
"S:  Patient presents for management of hyperparathyroidism.   Originally found to have hypercalcemia in 2018 when living in [place].    Vaguely recalls being on fosamax in the past.   Not sure why it was stopped.   Fractured right humerus in 2017 after trip and fall.   Right wrist fractured in 2015, trip and fall.   Right ankle fracture in 2019, trip and fall.     She does not take any supplemental calcium.     She has never had a kidney stone.     Always has water by her for dry mouth.   Only up to urinate once at night.     Regular bowel movements with the aid of miralax.     In 11/2021, here to discuss treatment options.     ROS: 10 point ROS neg other than the symptoms noted above in the HPI.     Exam:   Vital signs:      BP: (!) 187/86 Pulse: 74     SpO2: 95 %     Height: 165.1 cm (5' 5\") Weight: 85.7 kg (189 lb)  Estimated body mass index is 31.45 kg/m  as calculated from the following:    Height as of this encounter: 1.651 m (5' 5\").    Weight as of this encounter: 85.7 kg (189 lb).    Gen: In NAD.   HEENT: no proptosis or lid lag, EOMI, MMM.     A/P:   HPTH - In the setting of low normal vitamin D (30). Chronic mild hypercalcemia.  Given her osteoporosis, she has an indication for surgery. However, given her age and co-morbidities we discussed bisphosphonates and sensipar if needed. She an her daughter are not ready to decide on a course of treatment today. Will collect data as below and then conference with ENT.   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  In 11/2021, discussed non-surgical options of fosamax and sensipar again. 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 She was told she might have HPTH due to renal disease. If that were the case, she would not have hypercalcemia. I would also not expect this with a GFR in the 50's. She has actonel and fosamax listed as allergies. She recalls they caused aching. Therefore, recommend " prolia.   -Prolia ordered. My RN will call once ready to schedule.   -Labs in 6 and 12 months.   -DEXA in 1 year.   -Clinic visit in 1 year.     HTN - elevated today. Checked on arm she has chronic pain in. Prior readings normal as are her home readings. Will follow.     Abner Curtis M.D    Answers for HPI/ROS submitted by the patient on 11/15/2021  General Symptoms: No  Skin Symptoms: No  HENT Symptoms: No  EYE SYMPTOMS: No  HEART SYMPTOMS: No  LUNG SYMPTOMS: No  INTESTINAL SYMPTOMS: No  URINARY SYMPTOMS: No  GYNECOLOGIC SYMPTOMS: No  BREAST SYMPTOMS: No  SKELETAL SYMPTOMS: No  BLOOD SYMPTOMS: No  NERVOUS SYSTEM SYMPTOMS: No  MENTAL HEALTH SYMPTOMS: No

## 2021-11-18 NOTE — PATIENT INSTRUCTIONS
-Prolia ordered. My RN will call once ready to schedule.   -Labs in 6 and 12 months.   -DEXA in 1 year.   -Clinic visit in 1 year.

## 2021-12-03 ENCOUNTER — MYC MEDICAL ADVICE (OUTPATIENT)
Dept: ENDOCRINOLOGY | Facility: CLINIC | Age: 86
End: 2021-12-03
Payer: MEDICARE

## 2021-12-07 ENCOUNTER — TRANSFERRED RECORDS (OUTPATIENT)
Dept: HEALTH INFORMATION MANAGEMENT | Facility: CLINIC | Age: 86
End: 2021-12-07
Payer: MEDICARE

## 2021-12-09 NOTE — TELEPHONE ENCOUNTER
Date: 12/15/2021 Status: Scheduled     Time: 3:45 PM Length: 15     Visit Type: NURSE ONLY [145] Copay: $0.00     Provider: [facility] \[facility]\"" Department: [facility]     Encounter #: [id number] Notes: juliet pt - prolia injection - PA good until 12/31/21

## 2021-12-09 NOTE — TELEPHONE ENCOUNTER
Please call and schedule prolia injection on Endo Nurse schedule.   First available okay  Patient auth good till 12/31/2021.     Thank you.

## 2021-12-09 NOTE — TELEPHONE ENCOUNTER
Called patient's daughter Pat to schedule the Prolia shot. Pat states [place] is too far away. Daughter is wondering if patient can get the shot at the [facility]. Forwarded message to the [facility] nursing staff to assist with scheduling.    Gallo MANCUSO RN....12/9/2021 10:45 AM

## 2021-12-14 ENCOUNTER — OFFICE VISIT (OUTPATIENT)
Dept: FAMILY MEDICINE | Facility: CLINIC | Age: 86
End: 2021-12-14
Payer: MEDICARE

## 2021-12-14 VITALS
SYSTOLIC BLOOD PRESSURE: 132 MMHG | OXYGEN SATURATION: 94 % | DIASTOLIC BLOOD PRESSURE: 74 MMHG | HEART RATE: 73 BPM | BODY MASS INDEX: 31.45 KG/M2 | WEIGHT: 189 LBS

## 2021-12-14 DIAGNOSIS — I65.23 BILATERAL CAROTID ARTERY STENOSIS: ICD-10-CM

## 2021-12-14 DIAGNOSIS — Z95.1 S/P CABG (CORONARY ARTERY BYPASS GRAFT): ICD-10-CM

## 2021-12-14 DIAGNOSIS — E53.8 VITAMIN B12 DEFICIENCY (NON ANEMIC): ICD-10-CM

## 2021-12-14 DIAGNOSIS — I25.110 CORONARY ARTERY DISEASE INVOLVING NATIVE CORONARY ARTERY OF NATIVE HEART WITH UNSTABLE ANGINA PECTORIS (H): ICD-10-CM

## 2021-12-14 DIAGNOSIS — I10 ESSENTIAL HYPERTENSION, BENIGN: ICD-10-CM

## 2021-12-14 DIAGNOSIS — I34.0 NONRHEUMATIC MITRAL VALVE REGURGITATION: ICD-10-CM

## 2021-12-14 DIAGNOSIS — Z13.0 SCREENING FOR DEFICIENCY ANEMIA: ICD-10-CM

## 2021-12-14 DIAGNOSIS — G20.A1 PARKINSON'S DISEASE (H): ICD-10-CM

## 2021-12-14 DIAGNOSIS — I35.1 NONRHEUMATIC AORTIC VALVE INSUFFICIENCY: ICD-10-CM

## 2021-12-14 DIAGNOSIS — E78.2 MIXED HYPERCHOLESTEROLEMIA AND HYPERTRIGLYCERIDEMIA: ICD-10-CM

## 2021-12-14 DIAGNOSIS — E11.9 TYPE 2 DIABETES MELLITUS WITHOUT COMPLICATION, WITHOUT LONG-TERM CURRENT USE OF INSULIN (H): Primary | ICD-10-CM

## 2021-12-14 DIAGNOSIS — N25.81 SECONDARY RENAL HYPERPARATHYROIDISM (H): ICD-10-CM

## 2021-12-14 DIAGNOSIS — I36.1 NONRHEUMATIC TRICUSPID VALVE REGURGITATION: ICD-10-CM

## 2021-12-14 LAB
ALBUMIN SERPL-MCNC: 3.9 G/DL (ref 3.5–5)
ALBUMIN UR-MCNC: ABNORMAL MG/DL
ALBUMIN UR-MCNC: NEGATIVE MG/DL
ALP SERPL-CCNC: 95 U/L (ref 45–120)
ALT SERPL W P-5'-P-CCNC: 32 U/L (ref 0–45)
ANION GAP SERPL CALCULATED.3IONS-SCNC: 11 MMOL/L (ref 5–18)
APPEARANCE UR: CLEAR
APPEARANCE UR: CLEAR
AST SERPL W P-5'-P-CCNC: 32 U/L (ref 0–40)
BILIRUB SERPL-MCNC: 0.5 MG/DL (ref 0–1)
BILIRUB UR QL STRIP: NEGATIVE
BILIRUB UR QL STRIP: NEGATIVE
BUN SERPL-MCNC: 29 MG/DL (ref 8–28)
CALCIUM SERPL-MCNC: 10.3 MG/DL (ref 8.5–10.5)
CHLORIDE BLD-SCNC: 101 MMOL/L (ref 98–107)
CHOLEST SERPL-MCNC: 138 MG/DL
CO2 SERPL-SCNC: 26 MMOL/L (ref 22–31)
COLOR UR AUTO: ABNORMAL
COLOR UR AUTO: YELLOW
CREAT SERPL-MCNC: 0.92 MG/DL (ref 0.6–1.1)
ERYTHROCYTE [DISTWIDTH] IN BLOOD BY AUTOMATED COUNT: 13.5 % (ref 10–15)
FASTING STATUS PATIENT QL REPORTED: ABNORMAL
GFR SERPL CREATININE-BSD FRML MDRD: 56 ML/MIN/1.73M2
GLUCOSE BLD-MCNC: 206 MG/DL (ref 70–125)
GLUCOSE UR STRIP-MCNC: NEGATIVE MG/DL
GLUCOSE UR STRIP-MCNC: NEGATIVE MG/DL
HBA1C MFR BLD: 7.6 % (ref 0–5.6)
HCT VFR BLD AUTO: 40.7 % (ref 35–47)
HDLC SERPL-MCNC: 44 MG/DL
HGB BLD-MCNC: 13.9 G/DL (ref 11.7–15.7)
HGB UR QL STRIP: NEGATIVE
HGB UR QL STRIP: NEGATIVE
KETONES UR STRIP-MCNC: NEGATIVE MG/DL
KETONES UR STRIP-MCNC: NEGATIVE MG/DL
LDLC SERPL CALC-MCNC: 32 MG/DL
LEUKOCYTE ESTERASE UR QL STRIP: NEGATIVE
LEUKOCYTE ESTERASE UR QL STRIP: NEGATIVE
MCH RBC QN AUTO: 30.5 PG (ref 26.5–33)
MCHC RBC AUTO-ENTMCNC: 34.2 G/DL (ref 31.5–36.5)
MCV RBC AUTO: 90 FL (ref 78–100)
MUCOUS THREADS #/AREA URNS LPF: PRESENT /LPF
NITRATE UR QL: NEGATIVE
NITRATE UR QL: NEGATIVE
PH UR STRIP: 6 [PH] (ref 5–7)
PH UR STRIP: 6 [PH] (ref 5–8)
PLATELET # BLD AUTO: 178 10E3/UL (ref 150–450)
POTASSIUM BLD-SCNC: 4.5 MMOL/L (ref 3.5–5)
PROT SERPL-MCNC: 7 G/DL (ref 6–8)
RBC # BLD AUTO: 4.55 10E6/UL (ref 3.8–5.2)
RBC URINE: <1 /HPF
SODIUM SERPL-SCNC: 138 MMOL/L (ref 136–145)
SP GR UR STRIP: 1.02 (ref 1–1.03)
SP GR UR STRIP: >=1.03 (ref 1–1.03)
SQUAMOUS EPITHELIAL: 1 /HPF
TRIGL SERPL-MCNC: 309 MG/DL
UROBILINOGEN UR STRIP-ACNC: 0.2 E.U./DL
UROBILINOGEN UR STRIP-MCNC: <2 MG/DL
WBC # BLD AUTO: 6.3 10E3/UL (ref 4–11)
WBC URINE: 1 /HPF

## 2021-12-14 PROCEDURE — 81003 URINALYSIS AUTO W/O SCOPE: CPT | Performed by: FAMILY MEDICINE

## 2021-12-14 PROCEDURE — 80053 COMPREHEN METABOLIC PANEL: CPT | Performed by: FAMILY MEDICINE

## 2021-12-14 PROCEDURE — 81001 URINALYSIS AUTO W/SCOPE: CPT | Mod: 59 | Performed by: FAMILY MEDICINE

## 2021-12-14 PROCEDURE — 36415 COLL VENOUS BLD VENIPUNCTURE: CPT | Performed by: FAMILY MEDICINE

## 2021-12-14 PROCEDURE — 96372 THER/PROPH/DIAG INJ SC/IM: CPT | Performed by: FAMILY MEDICINE

## 2021-12-14 PROCEDURE — 83036 HEMOGLOBIN GLYCOSYLATED A1C: CPT | Performed by: FAMILY MEDICINE

## 2021-12-14 PROCEDURE — 85027 COMPLETE CBC AUTOMATED: CPT | Performed by: FAMILY MEDICINE

## 2021-12-14 PROCEDURE — 80061 LIPID PANEL: CPT | Performed by: FAMILY MEDICINE

## 2021-12-14 PROCEDURE — 99214 OFFICE O/P EST MOD 30 MIN: CPT | Mod: 25 | Performed by: FAMILY MEDICINE

## 2021-12-14 RX ADMIN — CYANOCOBALAMIN 1000 MCG: 1000 INJECTION, SOLUTION INTRAMUSCULAR; SUBCUTANEOUS at 16:13

## 2021-12-14 ASSESSMENT — PATIENT HEALTH QUESTIONNAIRE - PHQ9
10. IF YOU CHECKED OFF ANY PROBLEMS, HOW DIFFICULT HAVE THESE PROBLEMS MADE IT FOR YOU TO DO YOUR WORK, TAKE CARE OF THINGS AT HOME, OR GET ALONG WITH OTHER PEOPLE: NOT DIFFICULT AT ALL
SUM OF ALL RESPONSES TO PHQ QUESTIONS 1-9: 0
SUM OF ALL RESPONSES TO PHQ QUESTIONS 1-9: 0

## 2021-12-14 ASSESSMENT — ANXIETY QUESTIONNAIRES
6. BECOMING EASILY ANNOYED OR IRRITABLE: NOT AT ALL
GAD7 TOTAL SCORE: 0
4. TROUBLE RELAXING: NOT AT ALL
GAD7 TOTAL SCORE: 0
GAD7 TOTAL SCORE: 0
5. BEING SO RESTLESS THAT IT IS HARD TO SIT STILL: NOT AT ALL
2. NOT BEING ABLE TO STOP OR CONTROL WORRYING: NOT AT ALL
7. FEELING AFRAID AS IF SOMETHING AWFUL MIGHT HAPPEN: NOT AT ALL
1. FEELING NERVOUS, ANXIOUS, OR ON EDGE: NOT AT ALL
3. WORRYING TOO MUCH ABOUT DIFFERENT THINGS: NOT AT ALL
7. FEELING AFRAID AS IF SOMETHING AWFUL MIGHT HAPPEN: NOT AT ALL

## 2021-12-14 NOTE — ASSESSMENT & PLAN NOTE
>>ASSESSMENT AND PLAN FOR BILATERAL CAROTID ARTERY STENOSIS WRITTEN ON 12/14/2021  4:33 PM BY ASHLY MORRISON MD    Managed by Dr. Ricardo and he saw her last in August 2021 and will see her again in 1 year    >>ASSESSMENT AND PLAN FOR CORONARY ARTERY STENOSIS WRITTEN ON 12/14/2021  4:32 PM BY ASHLY MORRISON MD     Managed by Dr. Ricardo and he saw her last in August 2021 and will see her again in 1 year

## 2021-12-14 NOTE — ASSESSMENT & PLAN NOTE
11/18/21 endocrine dr. Curtis - saw for hypercalcemia and hyperparathyorid. surgery should be done, but pt not sure about that. at this time will do prolia and sensipar.  She says she is getting Prolia tomorrow.

## 2021-12-14 NOTE — ASSESSMENT & PLAN NOTE
BP Readings from Last 3 Encounters:   12/14/21 132/74   11/18/21 (!) 187/86   10/19/21 (!) 146/74      controlled carvediolol 12.5mg po bid

## 2021-12-14 NOTE — ASSESSMENT & PLAN NOTE
Diabetes is improving/stable/worsening: worsening.  Dietary recommendations for ADA diet. and Regular aerobic exercise.  Diabetes will be reassessed in 3 months.    A1c today is 7.6 so she is still under good control.

## 2021-12-14 NOTE — ASSESSMENT & PLAN NOTE
>>ASSESSMENT AND PLAN FOR S/P CABG (CORONARY ARTERY BYPASS GRAFT) WRITTEN ON 12/14/2021  4:36 PM BY ASHLY MORRISON MD    Managed by Dr. Ricardo and he saw her last in August 2021 and will see her again in 1 year

## 2021-12-14 NOTE — PROGRESS NOTES
Assessment & Plan   Problem List Items Addressed This Visit        Nervous and Auditory    Parkinson's disease (H)     Managed by Dr. Moya.   Chart reviewed, I see a visit 10/19/2021            Digestive    Vitamin B12 deficiency (non anemic)     B12 injection given today            Endocrine    Type 2 diabetes mellitus without complication (H) - Primary     Diabetes is improving/stable/worsening: worsening.  Dietary recommendations for ADA diet. and Regular aerobic exercise.  Diabetes will be reassessed in 3 months.    A1c today is 7.6 so she is still under good control.         Relevant Orders    Hemoglobin A1c (Completed)    Secondary renal hyperparathyroidism (H)     11/18/21 endocrine dr. Curtis - saw for hypercalcemia and hyperparathyorid. surgery should be done, but pt not sure about that. at this time will do prolia and sensipar.  She says she is getting Prolia tomorrow.         Relevant Orders    Comprehensive metabolic panel (BMP + Alb, Alk Phos, ALT, AST, Total. Bili, TP)    Mixed hypercholesterolemia and hypertriglyceridemia    Relevant Orders    Lipid Profile       Circulatory    Essential hypertension, benign     BP Readings from Last 3 Encounters:   12/14/21 132/74   11/18/21 (!) 187/86   10/19/21 (!) 146/74      controlled carvediolol 12.5mg po bid         Relevant Orders    Comprehensive metabolic panel (BMP + Alb, Alk Phos, ALT, AST, Total. Bili, TP)    UA Macro with Reflex to Micro and Culture - lab collect    CAD (coronary artery disease), native coronary artery      Managed by Dr. Ricardo and he saw her last in August 2021 and will see her again in 1 year         Aortic valve insufficiency     Managed by Dr. Ricardo and he saw her last in August 2021 and will see her again in 1 year         Tricuspid insufficiency     Managed by Dr. Ricardo and he saw her last in August 2021 and will see her again in 1 year         Mitral valve insufficiency     Managed by Dr. Ricardo and he saw her  "last in August 2021 and will see her again in 1 year         Bilateral carotid artery stenosis     Managed by Dr. Ricardo and he saw her last in August 2021 and will see her again in 1 year            Other    S/P CABG (coronary artery bypass graft)     Managed by Dr. Ricardo and he saw her last in August 2021 and will see her again in 1 year           Other Visit Diagnoses     Screening for deficiency anemia        Relevant Orders    CBC with platelets (Completed)              BMI:   Estimated body mass index is 31.45 kg/m  as calculated from the following:    Height as of 11/18/21: 1.651 m (5' 5\").    Weight as of this encounter: 85.7 kg (189 lb).     Patient Instructions   Return in about 3 months (around 3/14/2022) for Diabetes follow-up.       Return in about 3 months (around 3/14/2022) for Diabetes follow-up.    Maggie Arriaga MD  [facility] JULIO CESAR Marrero is a 88 year old who presents for the following health issues follow up diabetes.       Objective    /74 (BP Location: Left arm, Patient Position: Left side, Cuff Size: Adult Large)   Pulse 73   Wt 85.7 kg (189 lb)   SpO2 94%   BMI 31.45 kg/m    Body mass index is 31.45 kg/m .  Physical Exam  Constitutional:       Appearance: Normal appearance.   HENT:      Head: Normocephalic and atraumatic.   Cardiovascular:      Rate and Rhythm: Normal rate and regular rhythm.   Pulmonary:      Effort: Pulmonary effort is normal.   Musculoskeletal:         General: Normal range of motion.      Cervical back: Normal range of motion and neck supple.   Neurological:      General: No focal deficit present.      Mental Status: She is alert and oriented to person, place, and time.            Answers for HPI/ROS submitted by the patient on 12/14/2021  If you checked off any problems, how difficult have these problems made it for you to do your work, take care of things at home, or get along with other people?: Not difficult at " all  PHQ9 TOTAL SCORE: 0  ALLI 7 TOTAL SCORE: 0  How many servings of fruits and vegetables do you eat daily?: 0-1  On average, how many sweetened beverages do you drink each day (Examples: soda, juice, sweet tea, etc.  Do NOT count diet or artificially sweetened beverages)?: 1  How many minutes a day do you exercise enough to make your heart beat faster?: 9 or less  How many days a week do you exercise enough to make your heart beat faster?: 3 or less  How many days per week do you miss taking your medication?: 0

## 2021-12-15 ENCOUNTER — ALLIED HEALTH/NURSE VISIT (OUTPATIENT)
Dept: ENDOCRINOLOGY | Facility: CLINIC | Age: 86
End: 2021-12-15
Payer: MEDICARE

## 2021-12-15 DIAGNOSIS — M81.0 POST-MENOPAUSAL OSTEOPOROSIS: Primary | ICD-10-CM

## 2021-12-15 PROCEDURE — 99207 PR NO CHARGE NURSE ONLY: CPT

## 2021-12-15 PROCEDURE — 96372 THER/PROPH/DIAG INJ SC/IM: CPT | Performed by: INTERNAL MEDICINE

## 2021-12-15 ASSESSMENT — PATIENT HEALTH QUESTIONNAIRE - PHQ9: SUM OF ALL RESPONSES TO PHQ QUESTIONS 1-9: 0

## 2021-12-15 ASSESSMENT — ANXIETY QUESTIONNAIRES: GAD7 TOTAL SCORE: 0

## 2021-12-15 NOTE — PROGRESS NOTES
"Prolia Injection Phone Screen      Screening questions have been asked 2-3 days prior to administration visit for Prolia. If any questions are answered with \"Yes,\" this phone encounter were will routed to ordering provider for further evaluation.     1.  When was the last injection?  First injection    2.  Has insurance for this injection been verified?  Yes    3.  Did you experience any new onset achiness or rashes that lasted for over a month with your previous Prolia injection?   No    4.  Do you have a fever over 101?F or a new deep cough that is unusual for you today? No    5.  Have you started any new medications in the last 6 months that you were told could affect your immune system? These may have been prescribed by oncologist, transplant, rheumatology, or dermatology.   No    6.  In the last 6 months have you have gastric bypass or parathyroid surgery?   No    7.  Do you plan dental work requiring drilling into the bone such as implants/extractions or oral surgery in the next 2-3 months?   No    8. Do you have new insurance since the last injection?    Patient informed if symptoms discussed above present prior to their administration appointment, they are to notify clinic immediately.     Marianela Nix RN          The following steps were completed to comply with the REMS program for Prolia:  1. Ordering provider has previously reviewed information in the Medication Guide and Patient Counseling Chart, including the serious risks of Prolia  and the symptoms of each risk and have been advised to seek prompt medical attention if they have signs or symptoms of any of the serious risks.  2. Provided each patient a copy of the Medication Guide and Patient Brochure.  See MAR for administration details.   Indication: Prolia  (denosumab) is a prescription medicine used to treat osteoporosis in patients who:   Are at high risk for fracture, meaning patients who have had a fracture related to osteoporosis, or who " have multiple risk factors for fracture; Cannot use another osteoporosis medicine or other osteoporosis medicines did not work well.   The timeline for early/late injections would be 4 weeks early and any time after the 6 month leonard. If a patient receives their injection late, then the subsequent injection would be 6 months from the date that they actually received the injection    Have the screening questions been asked prior to this administration? Yes      The following medication was given:     MEDICATION: Denosumab (Prolia) 60 mg/ml SOLN  ROUTE: SQ  SITE: Arm - Left  DOSE: 60 mg  LOT #: [id number]  :  [employer]  EXPIRATION DATE:  04/24  NDC#: see mar

## 2022-01-10 NOTE — ADDENDUM NOTE
Encounter addended by: France Almeida, PT on: 1/10/2022 2:07 PM   Actions taken: Clinical Note Signed, Episode resolved

## 2022-01-10 NOTE — PROGRESS NOTES
Discharge Addendum:  Pt canceled follow up visit due to not getting into her endocrinologist yet and then did not reschedule for follow up.   Please see initial evaluation for last known status.  France Almeida PT, DPT, OCS, CLT

## 2022-01-13 ENCOUNTER — ALLIED HEALTH/NURSE VISIT (OUTPATIENT)
Dept: FAMILY MEDICINE | Facility: CLINIC | Age: 87
End: 2022-01-13
Payer: MEDICARE

## 2022-01-13 DIAGNOSIS — E53.8 VITAMIN B12 DEFICIENCY (NON ANEMIC): Primary | ICD-10-CM

## 2022-01-13 PROCEDURE — 96372 THER/PROPH/DIAG INJ SC/IM: CPT | Performed by: FAMILY MEDICINE

## 2022-01-13 PROCEDURE — 99207 PR NO CHARGE NURSE ONLY: CPT

## 2022-01-13 RX ADMIN — CYANOCOBALAMIN 1000 MCG: 1000 INJECTION, SOLUTION INTRAMUSCULAR; SUBCUTANEOUS at 15:01

## 2022-01-13 NOTE — PROGRESS NOTES
Clinic Administered Medication Documentation    Administrations This Visit     cyanocobalamin injection 1,000 mcg     Admin Date  01/13/2022 Action  Given Dose  1,000 mcg Route  Intramuscular Site   Administered By  Luiza Bermeo    Ordering Provider: Maggie Arriaga MD    Patient Supplied?: No                  Injectable Medication Documentation    Patient was given Cyanocobalamin (B-12). Prior to medication administration, verified patients identity using patient s name and date of birth. Please see MAR and medication order for additional information. Patient instructed to remain in clinic for 15 minutes.      Was entire vial of medication used? Yes  Vial/Syringe: Single dose vial  Expiration Date:  06302023  Was this medication supplied by the patient? No

## 2022-01-20 ENCOUNTER — OFFICE VISIT (OUTPATIENT)
Dept: NEUROLOGY | Facility: CLINIC | Age: 87
End: 2022-01-20
Payer: MEDICARE

## 2022-01-20 ENCOUNTER — LAB (OUTPATIENT)
Dept: LAB | Facility: HOSPITAL | Age: 87
End: 2022-01-20
Payer: MEDICARE

## 2022-01-20 VITALS
DIASTOLIC BLOOD PRESSURE: 77 MMHG | HEART RATE: 70 BPM | SYSTOLIC BLOOD PRESSURE: 176 MMHG | HEIGHT: 65 IN | WEIGHT: 189 LBS | BODY MASS INDEX: 31.49 KG/M2

## 2022-01-20 DIAGNOSIS — E53.8 LOW SERUM VITAMIN B12: ICD-10-CM

## 2022-01-20 DIAGNOSIS — G62.9 NEUROPATHY: ICD-10-CM

## 2022-01-20 DIAGNOSIS — R76.8 POSITIVE ANA (ANTINUCLEAR ANTIBODY): ICD-10-CM

## 2022-01-20 DIAGNOSIS — E11.42 TYPE 2 DIABETES MELLITUS WITH DIABETIC POLYNEUROPATHY, WITHOUT LONG-TERM CURRENT USE OF INSULIN (H): ICD-10-CM

## 2022-01-20 DIAGNOSIS — G50.0 TRIGEMINAL NEURALGIA: Primary | ICD-10-CM

## 2022-01-20 DIAGNOSIS — G20.A1 PARKINSON'S DISEASE (H): ICD-10-CM

## 2022-01-20 LAB — VIT B12 SERPL-MCNC: 1908 PG/ML (ref 213–816)

## 2022-01-20 PROCEDURE — 82607 VITAMIN B-12: CPT

## 2022-01-20 PROCEDURE — 36415 COLL VENOUS BLD VENIPUNCTURE: CPT

## 2022-01-20 PROCEDURE — 99214 OFFICE O/P EST MOD 30 MIN: CPT | Performed by: PSYCHIATRY & NEUROLOGY

## 2022-01-20 PROCEDURE — 83921 ORGANIC ACID SINGLE QUANT: CPT

## 2022-01-20 PROCEDURE — 86038 ANTINUCLEAR ANTIBODIES: CPT

## 2022-01-20 ASSESSMENT — MIFFLIN-ST. JEOR: SCORE: 1288.18

## 2022-01-20 NOTE — PROGRESS NOTES
NEUROLOGY OUTPATIENT PROGRESS NOTE   Jan 20, 2022     CHIEF COMPLAINT/REASON FOR VISIT/REASON FOR CONSULT  Patient presents with:  New Patient: f/u parkinsons    REASON FOR CONSULTATION-Parkinson's/trigeminal neuralgia    REFERRAL SOURCE  Dr. Maggie Arriaga  CC Dr. Maggie Arriaga    HISTORY OF PRESENT ILLNESS  Janes Montero is a 88 year old female seen today for multiple issues  Patient is moving from [place] to [place].  She is a resident of [place].  And has moved to [place] is a 20 years and is coming back.    1.  Parkinson's disease:-Symptom onset was in 2015.  At that time she was having shaking of her arms and legs.  This was at rest and with activity.  She was losing her balance as well.  She was put on Sinemet 1 tablet 3 times a day.  Feels that the medication is helping.  Does have some wearing off in the evening time and is taking the medication earlier than bedtime which is working.  Does fine in the morning.  Takes the medication on empty stomach.  Denies any other progression or any new symptoms.  Occasionally will use a walker.  Is not very active but does do some walking.  2.  Trigeminal neuralgia:-This started in 2013.  Pain is on the left side of the face.  The whole V1 V2 V3 distribution is involved that the pain is more towards the year.  Pain is sharp and intermittent.  Heating pad helps.  Reports no real provoking factors for her.  She is on gabapentin which has been helpful.  Has not tried any other medications.  Denies any other associated symptoms.  No numbness.  MRI was done and no clear cause for the trigeminal neuralgia was identified.  3.  Patient complains of some numbness in her legs.  Does report some balance issues as well.  She does have a diagnosis of diabetes.  Last A1c was 6.7.    10/19/21  Patient returns today  1.  Parkinson's disease is stable and under good control.  Reports no wearing off of the medication.  Is taking the Sinemet regularly.  Has seen physical therapy  and trying to do some exercise.  She did take her medication this morning.  2.  Trigeminal neuralgia pain is unchanged.  Gabapentin is helping her.  3.  Previously she was complaining of some numbness in her legs and was found to have a wide-based gait.  This does seem to have improved.  She was identified to have B12 deficiency and was put on injections but not oral replacement.  4.  Diabetes-A1c was 7.1.  Encouraged her to exercise and.  Manage her diet.    1/20/21  Patient returns today  1.  Parkinson's stable under good control.  Reports no wearing off with medication.  Is unclear if the medication is really helping or not though she feels that initially she had a lot of tremors and those got better when she started the medication.  2.  Trigeminal pain is under good control.  Gabapentin is helping.  3.  Was having some numbness in her feet.  Feels that this is getting better.  Her gait has also improved.  She feels more steady.  Is only taking the oral replacement at this point  4.  Diabetes has been under appropriate control.  A1c scheduled for March.  Previously 1 was 7.1 is working on improving her diet.  Stays active and is not sitting in the chair all day long    Previous history is reviewed and this is unchanged.    PAST MEDICAL/SURGICAL HISTORY  Past Medical History:   Diagnosis Date     Acute diastolic congestive heart failure (H)      Acute on chronic congestive heart failure (H) 6/11/2018     Coronary artery disease      Diabetes mellitus, type II (H)      Diastolic dysfunction 7/10/2018     Frozen shoulder     bilateral     Hypertension      Hypertensive emergency      Parkinson disease (H)      Recurrent UTI     hx septic shock     Thyroid disease 2021     Patient Active Problem List   Diagnosis     Essential hypertension, benign     CAD (coronary artery disease), native coronary artery     S/P CABG (coronary artery bypass graft)     Parkinson's disease (H)     Trigeminal neuralgia     Chronic  bilateral back pain     Aortic valve insufficiency     Tricuspid insufficiency     Mitral valve insufficiency     Bilateral carotid artery stenosis     CKD (chronic kidney disease) stage 3, GFR 30-59 ml/min (H)     Depression     Diastolic dysfunction     GERD (gastroesophageal reflux disease)     Left bundle branch block     Primary osteoarthritis involving multiple joints     Sensorineural hearing loss (SNHL) of both ears     Subclinical hypothyroidism     Type 2 diabetes mellitus without complication (H)     Secondary renal hyperparathyroidism (H)     Osteoporosis without current pathological fracture, unspecified osteoporosis type     Mixed hypercholesterolemia and hypertriglyceridemia     Mixed incontinence urge and stress (male)(female)     Primary osteoarthritis of left knee     Vitamin B12 deficiency (non anemic)   Significant for CABG/bypass/coronary artery disease, high cholesterol, high blood pressure, diabetes, migraines, Parkinson's disease, arthritis, depression, osteoporosis, hyperparathyroidism    FAMILY HISTORY  Family History   Problem Relation Age of Onset     Heart Disease Mother      Heart Disease Father    Family history reviewed and noncontributory no family history of neuropathy.    SOCIAL HISTORY  Social History     Tobacco Use     Smoking status: Never Smoker     Smokeless tobacco: Never Used   Substance Use Topics     Alcohol use: No     Drug use: No       SYSTEMS REVIEW  Twelve-system ROS was done and other than the HPI this was negative except for neck pain, back pain, arm and leg pain, joint pain, numbness and tingling, weakness paralysis, difficulty walking, falling, balance coordination problems, hearing loss, sleepy during the day, sleeping problems, headaches, anxiety, depression, cardiac/heart problems.  No new issues reported today.    MEDICATIONS  ascorbic acid, vitamin C, (ASCORBIC ACID WITH ELLIOTT HIPS) 500 MG tablet, [ASCORBIC ACID, VITAMIN C, (ASCORBIC ACID WITH ELLIOTT HIPS) 500  MG TABLET] Take 500 mg by mouth daily.  aspirin 81 mg chewable tablet, [ASPIRIN 81 MG CHEWABLE TABLET] Chew 81 mg at bedtime.  atorvastatin (LIPITOR) 20 MG tablet, Take 1 tablet (20 mg) by mouth At Bedtime  carbidopa-levodopa (SINEMET)  MG tablet, Take 1 tablet by mouth 3 times daily Take at least 30 min before meals or 2 hours after meals. Do not mix with food.  carvedilol (COREG) 12.5 MG tablet, Take 1 tablet (12.5 mg) by mouth 2 times daily (with meals)  cholecalciferol, vitamin D3, 1,000 unit tablet, [CHOLECALCIFEROL, VITAMIN D3, 1,000 UNIT TABLET] Take 2,000 Units by mouth daily.  coenzyme Q10 (CO Q-10) 100 mg capsule, [COENZYME Q10 (CO Q-10) 100 MG CAPSULE] Take 100 mg by mouth 2 (two) times a day.  FLUoxetine (PROZAC) 20 MG capsule, [FLUOXETINE (PROZAC) 20 MG CAPSULE] Take 20 mg by mouth 2 (two) times a day.  fluticasone (FLONASE) 50 MCG/ACT nasal spray, Spray 1 spray into both nostrils daily  gabapentin (NEURONTIN) 300 MG capsule, TAKE 1 CAPSULE BY MOUTH 4  TIMES DAILY  hydrocortisone 2.5 % cream, [HYDROCORTISONE 2.5 % CREAM] Apply topically 2 (two) times a day.  ketoconazole (NIZORAL) 2 % cream, [KETOCONAZOLE (NIZORAL) 2 % CREAM] Apply topically daily.  levothyroxine (SYNTHROID, LEVOTHROID) 25 MCG tablet, [LEVOTHYROXINE (SYNTHROID, LEVOTHROID) 25 MCG TABLET] Take 25 mcg by mouth Daily at 6:00 am.  loratadine (CLARITIN) 10 mg tablet, [LORATADINE (CLARITIN) 10 MG TABLET] Take 10 mg by mouth daily.  magnesium oxide 250 mg Tab, [MAGNESIUM OXIDE 250 MG TAB] Take 250 mg by mouth daily.  melatonin 1 mg Tab tablet, [MELATONIN 1 MG TAB TABLET] Take 3 mg by mouth at bedtime.   multivitamin therapeutic tablet, [MULTIVITAMIN THERAPEUTIC TABLET] Take 1 tablet by mouth daily.  nitroglycerin (NITROSTAT) 0.4 MG SL tablet, [NITROGLYCERIN (NITROSTAT) 0.4 MG SL TABLET] Place 0.4 mg under the tongue every 5 (five) minutes as needed for chest pain.  nystatin (MYCOSTATIN) cream, [NYSTATIN (MYCOSTATIN) CREAM] MIX EQUALLY  "WITH TRIAMCINOLONE AND APPLY TO VULVA 3 TO 4 TIMES A DAY  omega 3-dha-epa-fish oil (FISH OIL) 60- mg cap capsule, [OMEGA 3-DHA-EPA-FISH OIL (FISH OIL) 60- MG CAP CAPSULE] Take 2,000 mg by mouth 2 (two) times a day.  omeprazole (PRILOSEC) 20 MG capsule, [OMEPRAZOLE (PRILOSEC) 20 MG CAPSULE] Take 20 mg by mouth daily before breakfast.  polyethylene glycol (MIRALAX) 17 gram packet, [POLYETHYLENE GLYCOL (MIRALAX) 17 GRAM PACKET] Take 17 g by mouth daily.  cyanocobalamin (VITAMIN B-12) 1000 MCG tablet, Take 1 tablet (1,000 mcg) by mouth daily    cyanocobalamin injection 1,000 mcg  denosumab (PROLIA) injection 60 mg  denosumab (PROLIA) injection 60 mg         PHYSICAL EXAMINATION  VITALS: BP (!) 176/77 (BP Location: Left arm, Patient Position: Sitting)   Pulse 70   Ht 1.651 m (5' 5\")   Wt 85.7 kg (189 lb)   BMI 31.45 kg/m    GENERAL: Healthy appearing, alert, no acute distress, normal habitus.  CARDIOVASCULAR: Extremities warm and well perfused. Pulses present.   NEUROLOGICAL:  Patient is awake and oriented to self, place and time.  Attention span is normal.  Memory is grossly intact.  Language is fluent and follows commands appropriately.  Appropriate fund of knowledge. Cranial nerves 2-12 are intact. There is no pronator drift.  Motor exam shows 5/5 strength in all extremities.  Tone is symmetric bilaterally in upper and lower extremities.  No cogwheeling or tremor noted.  (Previously reflexes are symmetric and 2+ in upper extremities and absent in lower extremities.) Sensory exam is grossly intact to light touch, pin prick and vibration intact today.  Previously this was decreased up to the knees. Finger to nose and heel to shin is without dysmetria.  Romberg is negative.  Gait is almost normal with bilateral decreased arm swing.  Mild difficulty with tandem.  Previously drawing concentric circles did not show any tremor.  Exam does look improved today.    DIAGNOSTICS  RELEVANT LABS  TSH 2.01   A1c " 6.7    Carotid US 2020  1: Right: 1-39% carotid artery stenosis with mild velocities and antegrade   vertebral flow.   2: Left: 1-39% carotid artery stenosis with mild velocities and antegrade   vertebral flow.   3: Essentially unchanged.   4: Recommend a two year follow-up if patient remains asymptomatic.       CT head 2020  IMPRESSION   1. No acute intracranial findings.   2. Senescent changes.     MRI 2013  IMPRESSION:   Scattered small vessel ischemic disease including pontine involvement.      OUTSIDE RECORDS  Outside referral notes and chart notes were reviewed and pertinent information has been summarized (in addition to the HPI):-Patient has a diagnosis of Parkinson's disease.  Diagnosed in 2015.  Is looking to establish with a neurologist.  Is on Sinemet  times a day.  Also has trigeminal neuralgia controlled with gabapentin 300 mg p.o. 4 times a day.  Also has bilateral carotid artery stenosis.  Is moving here from [place].    LABS    Component      Latest Ref Rng & Units 7/27/2021           3:50 PM   Total Protein Serum for ELP      6.0 - 8.0 g/dL 6.8   Albumin %      51.0 - 67.0 % 66.6   Albumin Fraction      3.2 - 4.7 g/dL 4.5   Alpha 1 %      2.0 - 4.0 % 1.9 (L)   Alpha 1 Fraction      0.1 - 0.3 g/dL 0.1   Alpha 2 %      5.0 - 13.0 % 9.8   Alpha 2 Fraction      0.4 - 0.9 g/dL 0.7   Beta %      10.0 - 17.0 % 10.5   Beta Fraction      0.7 - 1.2 g/dL 0.7   Gamma Globulin %      9.0 - 20.0 % 11.2   Gamma Fraction      0.6 - 1.4 g/dL 0.8   ELP Interpretation:       Unremarkable protein electrophoresis.   Path ICD       G62.9   Interpreted By       Lisa Cantu MD   Immunofixation ELP          VINICIO interpretation      Negative Borderline Positive (A)   VINICIO pattern 1       Homogeneous   VINICIO titer 1       1:80   Vitamin B12      213 - 816 pg/mL 383   Vitamin B1 Whole Blood Level      70 - 180 nmol/L 184 (H)   Methylmalonic Acid      0.00 - 0.40 umol/L 1.02 (H)   Lyme Disease Antibodies Serum       <0.90 0.04     Component      Latest Ref Rng & Units 7/27/2021           3:50 PM   Total Protein Serum for ELP      6.0 - 8.0 g/dL 6.9   Albumin %      51.0 - 67.0 %    Albumin Fraction      3.2 - 4.7 g/dL    Alpha 1 %      2.0 - 4.0 %    Alpha 1 Fraction      0.1 - 0.3 g/dL    Alpha 2 %      5.0 - 13.0 %    Alpha 2 Fraction      0.4 - 0.9 g/dL    Beta %      10.0 - 17.0 %    Beta Fraction      0.7 - 1.2 g/dL    Gamma Globulin %      9.0 - 20.0 %    Gamma Fraction      0.6 - 1.4 g/dL    ELP Interpretation:          Path ICD       G62.9   Interpreted By       Lisa Cantu MD   Immunofixation ELP       Unremarkable immunofixation electrophoresis.   VINICIO interpretation      Negative    VINICIO pattern 1          VINICIO titer 1          Vitamin B12      213 - 816 pg/mL    Vitamin B1 Whole Blood Level      70 - 180 nmol/L    Methylmalonic Acid      0.00 - 0.40 umol/L    Lyme Disease Antibodies Serum      <0.90      EMG  CLINICAL INTERPRETATION:  This is a mildly abnormal nerve conduction and EMG study.  The abnormalities seen above could suggest early/mild sensorimotor polyneuropathy though could be artifactual also.  Further clinical correlation is needed.     PCP notes regarding B12 reviewed.     IMPRESSION/REPORT/PLAN  Parkinson's disease (H)  Trigeminal neuralgia  Neuropathy  Type 2 diabetes mellitus with diabetic polyneuropathy, without long-term current use of insulin (H)   Low B12  Positive VINICIO    This is a 88 year old female with  1.  Trigeminal neuralgia:-MRI brain in 2013 did not show any structural lesions.  Currently pain is under good control with gabapentin and I will continue that..  Stable we will continue to monitor.  2.  Parkinson's disease:-Examination does not show a lot of evidence of Parkinson's disease.  Possibly this is being masked by use of Sinemet.  She does have decreased arm swing today.  Encouraged her to make an appointment during the next visit right before she takes her afternoon pills.   We will see if there is any evidence of Parkinson's disease that can be seen.  I do suspect she has Parkinson's disease since she is noticing some benefit from the Sinemet.  Encourage exercise.  Continue physical therapy.  Stable.  We will continue to monitor.  Discussed about stopping the Sinemet to see if she needs to be on it or not but for right now she would like to continue this.  3.  On examination she has a wide-based gait with decreased pinprick sensation in her legs.  Examination suggestive of neuropathy.  Examination does look better today..  EMG shows questionable neuropathy versus artifact.   She does have diabetes which could be a cause of her neuropathy.  Blood work further shows low B12.  She has had some injections of B12 and oral B12.  We will repeat the level.  Her A1c is 7 and have encouraged her to continue to exercise and improve her diet to bring it down even further.  A1c level is scheduled to be rechecked in March through primary care.  4.  She does have a positive VINICIO.  I think this is a false positive.  We will recheck again in 2 months.  We will also check B12 at the same time.    Can see her back in 4 months.    -     carbidopa-levodopa (SINEMET)  MG tablet; Take 1 tablet by mouth 3 times daily Take at least 30 min before meals or 2 hours after meals. Do not mix with food.  -     gabapentin (NEURONTIN) 300 MG capsule; Take 1 capsule (300 mg) by mouth 4 times daily  -     cyanocobalamin (VITAMIN B-12) 1000 MCG tablet; Take 1 tablet (1,000 mcg) by mouth daily  -     Anti Nuclear Seema IgG by IFA with Reflex; Future  - B12 level  -     Methylmalonic Acid; Future    Return in about 4 months (around 5/20/2022) for In-Clinic Visit, After testing.    Over 32 minutes were spent coordinating the care for the patient on the day of the encounter.  This includes previsit, during visit and post visit activities as documented above.  Multiple problems addressed with prescription management.   Counseling patient.  Blood work ordered.  (Activities include but not inclusive of reviewing chart, reviewing outside records, reviewing labs and imaging study results as well as the images, patient visit time including getting history and exam,  use if applicable, review of test results with the patient and coming up with a plan in a shared model, counseling patient and family, education and answering patient questions, EMR , EMR diagnosis entry and problem list management, medication reconciliation and prescription management if applicable, paperwork if applicable, printing documents and documentation of the visit activities.)    Brennon Moya MD  Neurologist  [facility]  Tel:- [phone]    This note was dictated using voice recognition software.  Any grammatical or context distortions are unintentional and inherent to the software.

## 2022-01-20 NOTE — LETTER
1/20/2022         RE: Janes Montero  [address]  [facility] [address]        Dear Colleague,    Thank you for referring your patient, Janes Montero, to the [facility] [place]. Please see a copy of my visit note below.    NEUROLOGY OUTPATIENT PROGRESS NOTE   Jan 20, 2022     CHIEF COMPLAINT/REASON FOR VISIT/REASON FOR CONSULT  Patient presents with:  New Patient: f/u parkinsons    REASON FOR CONSULTATION-Parkinson's/trigeminal neuralgia    REFERRAL SOURCE  Dr. Maggie Arriaga  CC Dr. Maggie Arriaga    HISTORY OF PRESENT ILLNESS  Janes Montero is a 88 year old female seen today for multiple issues  Patient is moving from [place] to [place].  She is a resident of [place].  And has moved to [place] is a 20 years and is coming back.    1.  Parkinson's disease:-Symptom onset was in 2015.  At that time she was having shaking of her arms and legs.  This was at rest and with activity.  She was losing her balance as well.  She was put on Sinemet 1 tablet 3 times a day.  Feels that the medication is helping.  Does have some wearing off in the evening time and is taking the medication earlier than bedtime which is working.  Does fine in the morning.  Takes the medication on empty stomach.  Denies any other progression or any new symptoms.  Occasionally will use a walker.  Is not very active but does do some walking.  2.  Trigeminal neuralgia:-This started in 2013.  Pain is on the left side of the face.  The whole V1 V2 V3 distribution is involved that the pain is more towards the year.  Pain is sharp and intermittent.  Heating pad helps.  Reports no real provoking factors for her.  She is on gabapentin which has been helpful.  Has not tried any other medications.  Denies any other associated symptoms.  No numbness.  MRI was done and no clear cause for the trigeminal neuralgia was identified.  3.  Patient complains of some numbness in her legs.  Does report some balance  issues as well.  She does have a diagnosis of diabetes.  Last A1c was 6.7.    10/19/21  Patient returns today  1.  Parkinson's disease is stable and under good control.  Reports no wearing off of the medication.  Is taking the Sinemet regularly.  Has seen physical therapy and trying to do some exercise.  She did take her medication this morning.  2.  Trigeminal neuralgia pain is unchanged.  Gabapentin is helping her.  3.  Previously she was complaining of some numbness in her legs and was found to have a wide-based gait.  This does seem to have improved.  She was identified to have B12 deficiency and was put on injections but not oral replacement.  4.  Diabetes-A1c was 7.1.  Encouraged her to exercise and.  Manage her diet.    1/20/21  Patient returns today  1.  Parkinson's stable under good control.  Reports no wearing off with medication.  Is unclear if the medication is really helping or not though she feels that initially she had a lot of tremors and those got better when she started the medication.  2.  Trigeminal pain is under good control.  Gabapentin is helping.  3.  Was having some numbness in her feet.  Feels that this is getting better.  Her gait has also improved.  She feels more steady.  Is only taking the oral replacement at this point  4.  Diabetes has been under appropriate control.  A1c scheduled for March.  Previously 1 was 7.1 is working on improving her diet.  Stays active and is not sitting in the chair all day long    Previous history is reviewed and this is unchanged.    PAST MEDICAL/SURGICAL HISTORY  Past Medical History:   Diagnosis Date     Acute diastolic congestive heart failure (H)      Acute on chronic congestive heart failure (H) 6/11/2018     Coronary artery disease      Diabetes mellitus, type II (H)      Diastolic dysfunction 7/10/2018     Frozen shoulder     bilateral     Hypertension      Hypertensive emergency      Parkinson disease (H)      Recurrent UTI     hx septic shock      Thyroid disease 2021     Patient Active Problem List   Diagnosis     Essential hypertension, benign     CAD (coronary artery disease), native coronary artery     S/P CABG (coronary artery bypass graft)     Parkinson's disease (H)     Trigeminal neuralgia     Chronic bilateral back pain     Aortic valve insufficiency     Tricuspid insufficiency     Mitral valve insufficiency     Bilateral carotid artery stenosis     CKD (chronic kidney disease) stage 3, GFR 30-59 ml/min (H)     Depression     Diastolic dysfunction     GERD (gastroesophageal reflux disease)     Left bundle branch block     Primary osteoarthritis involving multiple joints     Sensorineural hearing loss (SNHL) of both ears     Subclinical hypothyroidism     Type 2 diabetes mellitus without complication (H)     Secondary renal hyperparathyroidism (H)     Osteoporosis without current pathological fracture, unspecified osteoporosis type     Mixed hypercholesterolemia and hypertriglyceridemia     Mixed incontinence urge and stress (male)(female)     Primary osteoarthritis of left knee     Vitamin B12 deficiency (non anemic)   Significant for CABG/bypass/coronary artery disease, high cholesterol, high blood pressure, diabetes, migraines, Parkinson's disease, arthritis, depression, osteoporosis, hyperparathyroidism    FAMILY HISTORY  Family History   Problem Relation Age of Onset     Heart Disease Mother      Heart Disease Father    Family history reviewed and noncontributory no family history of neuropathy.    SOCIAL HISTORY  Social History     Tobacco Use     Smoking status: Never Smoker     Smokeless tobacco: Never Used   Substance Use Topics     Alcohol use: No     Drug use: No       SYSTEMS REVIEW  Twelve-system ROS was done and other than the HPI this was negative except for neck pain, back pain, arm and leg pain, joint pain, numbness and tingling, weakness paralysis, difficulty walking, falling, balance coordination problems, hearing loss, sleepy  during the day, sleeping problems, headaches, anxiety, depression, cardiac/heart problems.  No new issues reported today.    MEDICATIONS  ascorbic acid, vitamin C, (ASCORBIC ACID WITH ELLIOTT HIPS) 500 MG tablet, [ASCORBIC ACID, VITAMIN C, (ASCORBIC ACID WITH ELLIOTT HIPS) 500 MG TABLET] Take 500 mg by mouth daily.  aspirin 81 mg chewable tablet, [ASPIRIN 81 MG CHEWABLE TABLET] Chew 81 mg at bedtime.  atorvastatin (LIPITOR) 20 MG tablet, Take 1 tablet (20 mg) by mouth At Bedtime  carbidopa-levodopa (SINEMET)  MG tablet, Take 1 tablet by mouth 3 times daily Take at least 30 min before meals or 2 hours after meals. Do not mix with food.  carvedilol (COREG) 12.5 MG tablet, Take 1 tablet (12.5 mg) by mouth 2 times daily (with meals)  cholecalciferol, vitamin D3, 1,000 unit tablet, [CHOLECALCIFEROL, VITAMIN D3, 1,000 UNIT TABLET] Take 2,000 Units by mouth daily.  coenzyme Q10 (CO Q-10) 100 mg capsule, [COENZYME Q10 (CO Q-10) 100 MG CAPSULE] Take 100 mg by mouth 2 (two) times a day.  FLUoxetine (PROZAC) 20 MG capsule, [FLUOXETINE (PROZAC) 20 MG CAPSULE] Take 20 mg by mouth 2 (two) times a day.  fluticasone (FLONASE) 50 MCG/ACT nasal spray, Spray 1 spray into both nostrils daily  gabapentin (NEURONTIN) 300 MG capsule, TAKE 1 CAPSULE BY MOUTH 4  TIMES DAILY  hydrocortisone 2.5 % cream, [HYDROCORTISONE 2.5 % CREAM] Apply topically 2 (two) times a day.  ketoconazole (NIZORAL) 2 % cream, [KETOCONAZOLE (NIZORAL) 2 % CREAM] Apply topically daily.  levothyroxine (SYNTHROID, LEVOTHROID) 25 MCG tablet, [LEVOTHYROXINE (SYNTHROID, LEVOTHROID) 25 MCG TABLET] Take 25 mcg by mouth Daily at 6:00 am.  loratadine (CLARITIN) 10 mg tablet, [LORATADINE (CLARITIN) 10 MG TABLET] Take 10 mg by mouth daily.  magnesium oxide 250 mg Tab, [MAGNESIUM OXIDE 250 MG TAB] Take 250 mg by mouth daily.  melatonin 1 mg Tab tablet, [MELATONIN 1 MG TAB TABLET] Take 3 mg by mouth at bedtime.   multivitamin therapeutic tablet, [MULTIVITAMIN THERAPEUTIC TABLET]  "Take 1 tablet by mouth daily.  nitroglycerin (NITROSTAT) 0.4 MG SL tablet, [NITROGLYCERIN (NITROSTAT) 0.4 MG SL TABLET] Place 0.4 mg under the tongue every 5 (five) minutes as needed for chest pain.  nystatin (MYCOSTATIN) cream, [NYSTATIN (MYCOSTATIN) CREAM] MIX EQUALLY WITH TRIAMCINOLONE AND APPLY TO VULVA 3 TO 4 TIMES A DAY  omega 3-dha-epa-fish oil (FISH OIL) 60- mg cap capsule, [OMEGA 3-DHA-EPA-FISH OIL (FISH OIL) 60- MG CAP CAPSULE] Take 2,000 mg by mouth 2 (two) times a day.  omeprazole (PRILOSEC) 20 MG capsule, [OMEPRAZOLE (PRILOSEC) 20 MG CAPSULE] Take 20 mg by mouth daily before breakfast.  polyethylene glycol (MIRALAX) 17 gram packet, [POLYETHYLENE GLYCOL (MIRALAX) 17 GRAM PACKET] Take 17 g by mouth daily.  cyanocobalamin (VITAMIN B-12) 1000 MCG tablet, Take 1 tablet (1,000 mcg) by mouth daily    cyanocobalamin injection 1,000 mcg  denosumab (PROLIA) injection 60 mg  denosumab (PROLIA) injection 60 mg         PHYSICAL EXAMINATION  VITALS: BP (!) 176/77 (BP Location: Left arm, Patient Position: Sitting)   Pulse 70   Ht 1.651 m (5' 5\")   Wt 85.7 kg (189 lb)   BMI 31.45 kg/m    GENERAL: Healthy appearing, alert, no acute distress, normal habitus.  CARDIOVASCULAR: Extremities warm and well perfused. Pulses present.   NEUROLOGICAL:  Patient is awake and oriented to self, place and time.  Attention span is normal.  Memory is grossly intact.  Language is fluent and follows commands appropriately.  Appropriate fund of knowledge. Cranial nerves 2-12 are intact. There is no pronator drift.  Motor exam shows 5/5 strength in all extremities.  Tone is symmetric bilaterally in upper and lower extremities.  No cogwheeling or tremor noted.  (Previously reflexes are symmetric and 2+ in upper extremities and absent in lower extremities.) Sensory exam is grossly intact to light touch, pin prick and vibration intact today.  Previously this was decreased up to the knees. Finger to nose and heel to shin is " without dysmetria.  Romberg is negative.  Gait is almost normal with bilateral decreased arm swing.  Mild difficulty with tandem.  Previously drawing concentric circles did not show any tremor.  Exam does look improved today.    DIAGNOSTICS  RELEVANT LABS  TSH 2.01   A1c 6.7    Carotid US 2020  1: Right: 1-39% carotid artery stenosis with mild velocities and antegrade   vertebral flow.   2: Left: 1-39% carotid artery stenosis with mild velocities and antegrade   vertebral flow.   3: Essentially unchanged.   4: Recommend a two year follow-up if patient remains asymptomatic.       CT head 2020  IMPRESSION   1. No acute intracranial findings.   2. Senescent changes.     MRI 2013  IMPRESSION:   Scattered small vessel ischemic disease including pontine involvement.      OUTSIDE RECORDS  Outside referral notes and chart notes were reviewed and pertinent information has been summarized (in addition to the HPI):-Patient has a diagnosis of Parkinson's disease.  Diagnosed in 2015.  Is looking to establish with a neurologist.  Is on Sinemet  times a day.  Also has trigeminal neuralgia controlled with gabapentin 300 mg p.o. 4 times a day.  Also has bilateral carotid artery stenosis.  Is moving here from [place].    LABS    Component      Latest Ref Rng & Units 7/27/2021           3:50 PM   Total Protein Serum for ELP      6.0 - 8.0 g/dL 6.8   Albumin %      51.0 - 67.0 % 66.6   Albumin Fraction      3.2 - 4.7 g/dL 4.5   Alpha 1 %      2.0 - 4.0 % 1.9 (L)   Alpha 1 Fraction      0.1 - 0.3 g/dL 0.1   Alpha 2 %      5.0 - 13.0 % 9.8   Alpha 2 Fraction      0.4 - 0.9 g/dL 0.7   Beta %      10.0 - 17.0 % 10.5   Beta Fraction      0.7 - 1.2 g/dL 0.7   Gamma Globulin %      9.0 - 20.0 % 11.2   Gamma Fraction      0.6 - 1.4 g/dL 0.8   ELP Interpretation:       Unremarkable protein electrophoresis.   Path ICD       G62.9   Interpreted By       Lisa Cantu MD   Immunofixation ELP          VINICIO interpretation      Negative  Borderline Positive (A)   VINICIO pattern 1       Homogeneous   VINICIO titer 1       1:80   Vitamin B12      213 - 816 pg/mL 383   Vitamin B1 Whole Blood Level      70 - 180 nmol/L 184 (H)   Methylmalonic Acid      0.00 - 0.40 umol/L 1.02 (H)   Lyme Disease Antibodies Serum      <0.90 0.04     Component      Latest Ref Rng & Units 7/27/2021           3:50 PM   Total Protein Serum for ELP      6.0 - 8.0 g/dL 6.9   Albumin %      51.0 - 67.0 %    Albumin Fraction      3.2 - 4.7 g/dL    Alpha 1 %      2.0 - 4.0 %    Alpha 1 Fraction      0.1 - 0.3 g/dL    Alpha 2 %      5.0 - 13.0 %    Alpha 2 Fraction      0.4 - 0.9 g/dL    Beta %      10.0 - 17.0 %    Beta Fraction      0.7 - 1.2 g/dL    Gamma Globulin %      9.0 - 20.0 %    Gamma Fraction      0.6 - 1.4 g/dL    ELP Interpretation:          Path ICD       G62.9   Interpreted By       Lisa Cantu MD   Immunofixation ELP       Unremarkable immunofixation electrophoresis.   VINICIO interpretation      Negative    VINICIO pattern 1          VINICIO titer 1          Vitamin B12      213 - 816 pg/mL    Vitamin B1 Whole Blood Level      70 - 180 nmol/L    Methylmalonic Acid      0.00 - 0.40 umol/L    Lyme Disease Antibodies Serum      <0.90      EMG  CLINICAL INTERPRETATION:  This is a mildly abnormal nerve conduction and EMG study.  The abnormalities seen above could suggest early/mild sensorimotor polyneuropathy though could be artifactual also.  Further clinical correlation is needed.     PCP notes regarding B12 reviewed.     IMPRESSION/REPORT/PLAN  Parkinson's disease (H)  Trigeminal neuralgia  Neuropathy  Type 2 diabetes mellitus with diabetic polyneuropathy, without long-term current use of insulin (H)   Low B12  Positive VINICIO    This is a 88 year old female with  1.  Trigeminal neuralgia:-MRI brain in 2013 did not show any structural lesions.  Currently pain is under good control with gabapentin and I will continue that..  Stable we will continue to monitor.  2.  Parkinson's  disease:-Examination does not show a lot of evidence of Parkinson's disease.  Possibly this is being masked by use of Sinemet.  She does have decreased arm swing today.  Encouraged her to make an appointment during the next visit right before she takes her afternoon pills.  We will see if there is any evidence of Parkinson's disease that can be seen.  I do suspect she has Parkinson's disease since she is noticing some benefit from the Sinemet.  Encourage exercise.  Continue physical therapy.  Stable.  We will continue to monitor.  Discussed about stopping the Sinemet to see if she needs to be on it or not but for right now she would like to continue this.  3.  On examination she has a wide-based gait with decreased pinprick sensation in her legs.  Examination suggestive of neuropathy.  Examination does look better today..  EMG shows questionable neuropathy versus artifact.   She does have diabetes which could be a cause of her neuropathy.  Blood work further shows low B12.  She has had some injections of B12 and oral B12.  We will repeat the level.  Her A1c is 7 and have encouraged her to continue to exercise and improve her diet to bring it down even further.  A1c level is scheduled to be rechecked in March through primary care.  4.  She does have a positive VINICIO.  I think this is a false positive.  We will recheck again in 2 months.  We will also check B12 at the same time.    Can see her back in 4 months.    -     carbidopa-levodopa (SINEMET)  MG tablet; Take 1 tablet by mouth 3 times daily Take at least 30 min before meals or 2 hours after meals. Do not mix with food.  -     gabapentin (NEURONTIN) 300 MG capsule; Take 1 capsule (300 mg) by mouth 4 times daily  -     cyanocobalamin (VITAMIN B-12) 1000 MCG tablet; Take 1 tablet (1,000 mcg) by mouth daily  -     Anti Nuclear Seema IgG by IFA with Reflex; Future  - B12 level  -     Methylmalonic Acid; Future    Return in about 4 months (around 5/20/2022) for  In-Clinic Visit, After testing.    Over 32 minutes were spent coordinating the care for the patient on the day of the encounter.  This includes previsit, during visit and post visit activities as documented above.  Multiple problems addressed with prescription management.  Counseling patient.  Blood work ordered.  (Activities include but not inclusive of reviewing chart, reviewing outside records, reviewing labs and imaging study results as well as the images, patient visit time including getting history and exam,  use if applicable, review of test results with the patient and coming up with a plan in a shared model, counseling patient and family, education and answering patient questions, EMR , EMR diagnosis entry and problem list management, medication reconciliation and prescription management if applicable, paperwork if applicable, printing documents and documentation of the visit activities.)    Brennon Moya MD  Neurologist  [facility]  Tel:- [phone]    This note was dictated using voice recognition software.  Any grammatical or context distortions are unintentional and inherent to the software.        Again, thank you for allowing me to participate in the care of your patient.        Sincerely,        Brennon Moya MD

## 2022-01-20 NOTE — NURSING NOTE
Chief Complaint   Patient presents with     New Patient     f/u parkinsons     Isabel Moyer MA on 1/20/2022 at 7:55 AM

## 2022-01-21 LAB
ANA PAT SER IF-IMP: ABNORMAL
ANA SER QL IF: ABNORMAL
ANA TITR SER IF: ABNORMAL {TITER}

## 2022-01-25 LAB — METHYLMALONATE SERPL-SCNC: 0.35 UMOL/L (ref 0–0.4)

## 2022-02-03 DIAGNOSIS — G20.A1 PARKINSON'S DISEASE (H): ICD-10-CM

## 2022-02-03 RX ORDER — CARBIDOPA AND LEVODOPA 25; 100 MG/1; MG/1
TABLET ORAL
Qty: 270 TABLET | Refills: 3 | Status: SHIPPED | OUTPATIENT
Start: 2022-02-03 | End: 2022-05-19

## 2022-02-03 NOTE — TELEPHONE ENCOUNTER
Refill request for Sinemet. Pt last seen 1/20/22 and has follow up scheduled for 5/19/22. Will send in refills to get pt through to this appt.     Tessie Suarez RN on 2/3/2022 at 8:13 AM

## 2022-02-10 ENCOUNTER — ALLIED HEALTH/NURSE VISIT (OUTPATIENT)
Dept: FAMILY MEDICINE | Facility: CLINIC | Age: 87
End: 2022-02-10
Payer: MEDICARE

## 2022-02-10 DIAGNOSIS — E53.8 VITAMIN B12 DEFICIENCY (NON ANEMIC): Primary | ICD-10-CM

## 2022-02-10 PROCEDURE — 96372 THER/PROPH/DIAG INJ SC/IM: CPT | Performed by: FAMILY MEDICINE

## 2022-02-10 PROCEDURE — 99207 PR NO CHARGE NURSE ONLY: CPT

## 2022-02-10 RX ADMIN — CYANOCOBALAMIN 1000 MCG: 1000 INJECTION, SOLUTION INTRAMUSCULAR; SUBCUTANEOUS at 15:02

## 2022-02-20 ENCOUNTER — OFFICE VISIT (OUTPATIENT)
Dept: FAMILY MEDICINE | Facility: CLINIC | Age: 87
End: 2022-02-20
Payer: MEDICARE

## 2022-02-20 VITALS
SYSTOLIC BLOOD PRESSURE: 180 MMHG | DIASTOLIC BLOOD PRESSURE: 80 MMHG | BODY MASS INDEX: 31.52 KG/M2 | WEIGHT: 189.4 LBS | RESPIRATION RATE: 16 BRPM | OXYGEN SATURATION: 94 % | HEART RATE: 72 BPM

## 2022-02-20 DIAGNOSIS — L03.032 CELLULITIS OF GREAT TOE OF LEFT FOOT: Primary | ICD-10-CM

## 2022-02-20 DIAGNOSIS — M79.672 LEFT FOOT PAIN: ICD-10-CM

## 2022-02-20 DIAGNOSIS — L84 CALLUS OF FOOT: ICD-10-CM

## 2022-02-20 PROCEDURE — 99215 OFFICE O/P EST HI 40 MIN: CPT | Performed by: FAMILY MEDICINE

## 2022-02-20 NOTE — PROGRESS NOTES
Patient presents with:  Wound Check: Open sore on L big toe, worried about infection        Subjective     Janes Montero is a 88 year old female who presents to clinic today for the following health issues:    HPI       Musculoskeletal problem/pain-sore on left great toe      Duration: 3 days ago, redness more yesterday    Description  Location: left great toe    Intensity:  moderate    Accompanying signs and symptoms: warmth, swelling, redness and discoloration of area pf callous over plantar surface of the great mtp     Patient has tried to debrided old callous tissue    History  Previous similar problem: no   Previous evaluation:  None, no history of gout     Precipitating or alleviating factors:  Trauma or overuse: no   Aggravating factors include: standing, walking and climbing stairs/at rest minimal pain /best if elevated     Therapies tried and outcome: up on foot walking a lot yesterday,     Soaking in epsom salts, no  Medications     Shaved off callous 2 weeks ago   Podiatrist in [place] a year ago-told her she had an extra bone causing the callous and would smooth/grind callous       Past Medical History:   Diagnosis Date     Acute diastolic congestive heart failure (H)      Acute on chronic congestive heart failure (H) 6/11/2018     Coronary artery disease      Diabetes mellitus, type II (H)      Diastolic dysfunction 7/10/2018     Frozen shoulder     bilateral     Hypertension      Hypertensive emergency      Parkinson disease (H)      Recurrent UTI     hx septic shock     Thyroid disease 2021     Social History     Tobacco Use     Smoking status: Never Smoker     Smokeless tobacco: Never Used   Substance Use Topics     Alcohol use: No       Current Outpatient Medications   Medication Sig Dispense Refill     amoxicillin-clavulanate (AUGMENTIN) 875-125 MG tablet Take 1 tablet by mouth 2 times daily for 10 days 20 tablet 0     ascorbic acid, vitamin C, (ASCORBIC ACID WITH ELLIOTT HIPS) 500 MG tablet  [ASCORBIC ACID, VITAMIN C, (ASCORBIC ACID WITH ELLIOTT HIPS) 500 MG TABLET] Take 500 mg by mouth daily.       aspirin 81 mg chewable tablet [ASPIRIN 81 MG CHEWABLE TABLET] Chew 81 mg at bedtime.       atorvastatin (LIPITOR) 20 MG tablet Take 1 tablet (20 mg) by mouth At Bedtime 90 tablet 3     carbidopa-levodopa (SINEMET)  MG tablet TAKE 1 TABLET BY MOUTH 3  TIMES DAILY TAKE AT LEAST  1/2 HOUR BEFORE MEALS OR 2  HOURS AFTER MEALS DO NOT  MIX WITH FOOD 270 tablet 3     carvedilol (COREG) 12.5 MG tablet Take 1 tablet (12.5 mg) by mouth 2 times daily (with meals) 180 tablet 3     cholecalciferol, vitamin D3, 1,000 unit tablet [CHOLECALCIFEROL, VITAMIN D3, 1,000 UNIT TABLET] Take 2,000 Units by mouth daily.       coenzyme Q10 (CO Q-10) 100 mg capsule [COENZYME Q10 (CO Q-10) 100 MG CAPSULE] Take 100 mg by mouth 2 (two) times a day.       cyanocobalamin (VITAMIN B-12) 1000 MCG tablet Take 1 tablet (1,000 mcg) by mouth daily 90 tablet 3     FLUoxetine (PROZAC) 20 MG capsule [FLUOXETINE (PROZAC) 20 MG CAPSULE] Take 20 mg by mouth 2 (two) times a day.       fluticasone (FLONASE) 50 MCG/ACT nasal spray Spray 1 spray into both nostrils daily       gabapentin (NEURONTIN) 300 MG capsule TAKE 1 CAPSULE BY MOUTH 4  TIMES DAILY 360 capsule 3     hydrocortisone 2.5 % cream [HYDROCORTISONE 2.5 % CREAM] Apply topically 2 (two) times a day.       ketoconazole (NIZORAL) 2 % cream [KETOCONAZOLE (NIZORAL) 2 % CREAM] Apply topically daily.       levothyroxine (SYNTHROID, LEVOTHROID) 25 MCG tablet [LEVOTHYROXINE (SYNTHROID, LEVOTHROID) 25 MCG TABLET] Take 25 mcg by mouth Daily at 6:00 am.       loratadine (CLARITIN) 10 mg tablet [LORATADINE (CLARITIN) 10 MG TABLET] Take 10 mg by mouth daily.       magnesium oxide 250 mg Tab [MAGNESIUM OXIDE 250 MG TAB] Take 250 mg by mouth daily.       melatonin 1 mg Tab tablet [MELATONIN 1 MG TAB TABLET] Take 3 mg by mouth at bedtime.        multivitamin therapeutic tablet [MULTIVITAMIN THERAPEUTIC  "TABLET] Take 1 tablet by mouth daily.       nitroglycerin (NITROSTAT) 0.4 MG SL tablet [NITROGLYCERIN (NITROSTAT) 0.4 MG SL TABLET] Place 0.4 mg under the tongue every 5 (five) minutes as needed for chest pain.       nystatin (MYCOSTATIN) cream [NYSTATIN (MYCOSTATIN) CREAM] MIX EQUALLY WITH TRIAMCINOLONE AND APPLY TO VULVA 3 TO 4 TIMES A DAY       omega 3-dha-epa-fish oil (FISH OIL) 60- mg cap capsule [OMEGA 3-DHA-EPA-FISH OIL (FISH OIL) 60- MG CAP CAPSULE] Take 2,000 mg by mouth 2 (two) times a day.       omeprazole (PRILOSEC) 20 MG capsule [OMEPRAZOLE (PRILOSEC) 20 MG CAPSULE] Take 20 mg by mouth daily before breakfast.       polyethylene glycol (MIRALAX) 17 gram packet [POLYETHYLENE GLYCOL (MIRALAX) 17 GRAM PACKET] Take 17 g by mouth daily.       Allergies   Allergen Reactions     Alendronate [Alendronic Acid] Unknown     pain     Codeine Hives     Hydrocodone-Acetaminophen Other (See Comments)     Highly sensitive, \"knocks her out and can't wake up\"     Risedronate Unknown     Bone pain     Rosuvastatin Muscle Pain (Myalgia)     Simvastatin Muscle Pain (Myalgia)             ROS are negative, except as otherwise noted HPI      Objective    BP (!) 180/80 (BP Location: Left arm, Patient Position: Sitting, Cuff Size: Adult Large)   Pulse 72   Resp 16   Wt 85.9 kg (189 lb 6.4 oz)   SpO2 94%   BMI 31.52 kg/m    Body mass index is 31.52 kg/m .  Physical Exam   GENERAL: alert and mild  distress  MS: no gross musculoskeletal defects noted,   Left foot-erythema and warmth over great toe/MTP pain with ROM, mild erythema, swelling of medial dorsum of foot, plantar surface of the great MTP, dark purple blue subcutaneous ? bruising vs old blood, site of callous that was debrided by patient    no other toe swelling or pain, no pain with ROM of toes 2-5, DP +2   NEURO: Normal strength and tone, mentation intact and speech normal, gait mild ataxic due to foot pain          Diagnostic Test Results:  Labs reviewed " in Epic  Results for orders placed or performed in visit on 02/20/22   XR Foot Left G/E 3 Views     Status: None    Narrative    EXAM DATE:         02/20/2022    EXAM: X-RAY FOOT LEFT, MINIMUM 3 VIEWS  LOCATION: [facility]  DATE/TIME: 2/20/2022 4:15 PM    INDICATION: Pain /swelling great toe, over first MTP.  COMPARISON: None.    IMPRESSION: No acute left foot fracture or dislocation. Chronic healed deformity at the base of the fifth toe proximal phalanx. Mild left first MTP joint osteoarthritis. Mild soft tissue swelling medial to the first MTP joint.                     ASSESSMENT/PLAN:      ICD-10-CM    1. Cellulitis of great toe of left foot  L03.032 Orthopedic  Referral   2. Callus of foot  L84 Orthopedic  Referral    left great toe/mtp     3. Left foot pain  M79.672 XR Foot Left G/E 3 Views     XR Foot Left G/E 3 Views     Ankle/Foot Bracing Supplies Order for DME - ONLY FOR DME     amoxicillin-clavulanate (AUGMENTIN) 875-125 MG tablet     Orthopedic  Referral             Reviewed medication instructions and side effects. Follow up if experiences side effects.     I reviewed supportive care, otc meds to use if needed, expected course, and signs of concern.  Follow up as needed or if she does not improve within  1-2 days or if worsens in any way.  Reviewed red flag symptoms and is to go to the ER if experiences any of these.     The use of Dragon/[employer] dictation services may have been used to construct the content in this note; any grammatical or spelling errors are non-intentional. Please contact the author of this note directly if you are in need of any clarification.      On the day of the encounter, time spend on chart review, patient visit, review of testing, documentation was 40 minutes          Patient Instructions     Start Augmentin 1 tablet twice a day for 10 days    Keep foot in Velcro shoe until swelling has improved and pain has been  decreased    For pain    Acetaminophen (Tylenol ) 1000 mg 3 times a day for the next 5 to 7 days until pain resolves-maximum dose of acetaminophen (tylenol)  is 3000 mg in 24-hours    Increase redness streaking up the foot fever to ER    Ortho  service should be calling you to get an appointment scheduled with podiatry  Call them at the number on the referral if you do not hear from them by tomorrow afternoon      Patient Education     Cellulitis  Cellulitis is an infection of the deep layers of skin. A break in the skin, such as a cut or scratch, can let bacteria under the skin. If the bacteria get to deep layers of the skin, it can be serious. If not treated, cellulitis can get into the bloodstream and lymph nodes. The infection can then spread throughout the body. This causes serious illness.   Cellulitis causes the affected skin to become red, swollen, warm, and sore. The reddened areas have a visible border. An open sore may leak fluid (pus). You may have a fever, chills, and pain.   Cellulitis is treated with antibiotics taken for 7 to 10 days. An open sore may be cleaned and covered with cool wet gauze. Symptoms should get better 1 to 2 days after treatment is started. Make sure to take all the antibiotics for the full number of days until they are gone. Keep taking the medicine even if your symptoms go away.   Home care  Follow these tips:    Limit the use of the part of your body with cellulitis.     If the infection is on your leg, keep your leg raised while sitting. This helps reduce swelling.    Take all of the antibiotic medicine exactly as directed until it is gone. Don't miss any doses, especially during the first 7 days. Don t stop taking the medicine when your symptoms get better.    Keep the affected area clean and dry.    Wash your hands with soap and clean, running water before and after touching your skin. Anyone else who touches your skin should also wash his or her hands. Don't share  towels.  Follow-up care  Follow up with your healthcare provider, or as advised. If your infection doesn't go away on the first antibiotic, your healthcare provider will prescribe a different one.   When to seek medical advice  Call your healthcare provider right away if any of these occur:    Red areas that spread    Swelling or pain that gets worse    Fluid leaking from the skin (pus)    Fever higher of 100.4  F (38.0  C) or higher after 2 days on antibiotics  [employer] last reviewed this educational content on 8/1/2019 2000-2021 [employer]. All rights reserved. This information is not intended as a substitute for professional medical care. Always follow your healthcare professional's instructions.

## 2022-02-20 NOTE — PATIENT INSTRUCTIONS
Start Augmentin 1 tablet twice a day for 10 days    Keep foot in Velcro shoe until swelling has improved and pain has been decreased    For pain    Acetaminophen (Tylenol ) 1000 mg 3 times a day for the next 5 to 7 days until pain resolves-maximum dose of acetaminophen (tylenol)  is 3000 mg in 24-hours    Increase redness streaking up the foot fever to ER    Ortho  service should be calling you to get an appointment scheduled with podiatry  Call them at the number on the referral if you do not hear from them by tomorrow afternoon      Patient Education     Cellulitis  Cellulitis is an infection of the deep layers of skin. A break in the skin, such as a cut or scratch, can let bacteria under the skin. If the bacteria get to deep layers of the skin, it can be serious. If not treated, cellulitis can get into the bloodstream and lymph nodes. The infection can then spread throughout the body. This causes serious illness.   Cellulitis causes the affected skin to become red, swollen, warm, and sore. The reddened areas have a visible border. An open sore may leak fluid (pus). You may have a fever, chills, and pain.   Cellulitis is treated with antibiotics taken for 7 to 10 days. An open sore may be cleaned and covered with cool wet gauze. Symptoms should get better 1 to 2 days after treatment is started. Make sure to take all the antibiotics for the full number of days until they are gone. Keep taking the medicine even if your symptoms go away.   Home care  Follow these tips:    Limit the use of the part of your body with cellulitis.     If the infection is on your leg, keep your leg raised while sitting. This helps reduce swelling.    Take all of the antibiotic medicine exactly as directed until it is gone. Don't miss any doses, especially during the first 7 days. Don t stop taking the medicine when your symptoms get better.    Keep the affected area clean and dry.    Wash your hands with soap and clean, running  water before and after touching your skin. Anyone else who touches your skin should also wash his or her hands. Don't share towels.  Follow-up care  Follow up with your healthcare provider, or as advised. If your infection doesn't go away on the first antibiotic, your healthcare provider will prescribe a different one.   When to seek medical advice  Call your healthcare provider right away if any of these occur:    Red areas that spread    Swelling or pain that gets worse    Fluid leaking from the skin (pus)    Fever higher of 100.4  F (38.0  C) or higher after 2 days on antibiotics  [employer] last reviewed this educational content on 8/1/2019 2000-2021 [employer]. All rights reserved. This information is not intended as a substitute for professional medical care. Always follow your healthcare professional's instructions.

## 2022-02-28 ENCOUNTER — OFFICE VISIT (OUTPATIENT)
Dept: PODIATRY | Facility: CLINIC | Age: 87
End: 2022-02-28
Attending: FAMILY MEDICINE
Payer: MEDICARE

## 2022-02-28 VITALS — BODY MASS INDEX: 30.82 KG/M2 | OXYGEN SATURATION: 96 % | WEIGHT: 185 LBS | HEIGHT: 65 IN | HEART RATE: 83 BPM

## 2022-02-28 DIAGNOSIS — L84 CALLUS OF FOOT: ICD-10-CM

## 2022-02-28 DIAGNOSIS — I73.9 PAD (PERIPHERAL ARTERY DISEASE) (H): ICD-10-CM

## 2022-02-28 DIAGNOSIS — I70.90 UNSPECIFIED ATHEROSCLEROSIS: ICD-10-CM

## 2022-02-28 DIAGNOSIS — B35.1 ONYCHOMYCOSIS: Primary | ICD-10-CM

## 2022-02-28 DIAGNOSIS — L03.032 CELLULITIS OF GREAT TOE OF LEFT FOOT: ICD-10-CM

## 2022-02-28 DIAGNOSIS — M20.42 HAMMERTOE, BILATERAL: ICD-10-CM

## 2022-02-28 DIAGNOSIS — M20.41 HAMMERTOE, BILATERAL: ICD-10-CM

## 2022-02-28 DIAGNOSIS — M79.672 LEFT FOOT PAIN: ICD-10-CM

## 2022-02-28 PROCEDURE — 11056 PARNG/CUTG B9 HYPRKR LES 2-4: CPT | Mod: Q8 | Performed by: PODIATRIST

## 2022-02-28 PROCEDURE — 11721 DEBRIDE NAIL 6 OR MORE: CPT | Mod: 51 | Performed by: PODIATRIST

## 2022-02-28 PROCEDURE — 99203 OFFICE O/P NEW LOW 30 MIN: CPT | Mod: 25 | Performed by: PODIATRIST

## 2022-02-28 ASSESSMENT — PAIN SCALES - GENERAL: PAINLEVEL: NO PAIN (0)

## 2022-02-28 NOTE — LETTER
2/28/2022         RE: Janes Montero  [address]  [facility] [address]        Dear Colleague,    Thank you for referring your patient, Janes Montero, to the [facility]. Please see a copy of my visit note below.          FOOT AND ANKLE SURGERY/PODIATRY CONSULT NOTE         ASSESSMENT:   Resolved Ulceration left hallux  Onychomycosis   Keratoma   DM2      TREATMENT:  -No open lesions or signs of infection noted on exam today along the left hallux.     -I discussed the importance of regular trimming of the callus tissue at this pressure point to prevent future skin breakdown. She will finish course of Augmentin.     -Debrided nails 1-10 in length and thickness. Trimmed callus tissue x4 with a #15 blade plantar left hallux.     -Patient's questions invited and answered. Patient to return to clinic in 9 week(s) for re-evaluation. She was encouraged to call my office with any further questions or concerns.     Lauri Mckeon DPM  [facility] Podiatry/Foot & Ankle Surgery      HPI: I was asked to see Janes Montero today for an infected ulceration on her left hallux. The patient states she has noticed some discomfort over the past two weeks, and redness about three days ago. She was seen at urgent care yesterday and started on Augmentin.     Past Medical History:   Diagnosis Date     Acute diastolic congestive heart failure (H)      Acute on chronic congestive heart failure (H) 6/11/2018     Coronary artery disease      Diabetes mellitus, type II (H)      Diastolic dysfunction 7/10/2018     Frozen shoulder     bilateral     Hypertension      Hypertensive emergency      Parkinson disease (H)      Primary osteoarthritis involving multiple joints      Primary osteoarthritis of left knee      Recurrent UTI     hx septic shock     Thyroid disease 2021         Social History     Socioeconomic History     Marital status:      Spouse name: Not on file     Number of  "children: Not on file     Years of education: Not on file     Highest education level: Not on file   Occupational History     Not on file   Tobacco Use     Smoking status: Never Smoker     Smokeless tobacco: Never Used   Substance and Sexual Activity     Alcohol use: No     Drug use: No     Sexual activity: Not Currently     Birth control/protection: None   Other Topics Concern     Parent/sibling w/ CABG, MI or angioplasty before 65F 55M? No   Social History Narrative    6/15/2021 new to [place] from [place]. She has 6 kids.     Social Determinants of Health     Financial Resource Strain: Not on file   Food Insecurity: Not on file   Transportation Needs: Not on file   Physical Activity: Not on file   Stress: Not on file   Social Connections: Not on file   Intimate Partner Violence: Not on file   Housing Stability: Not on file            Allergies   Allergen Reactions     Alendronate [Alendronic Acid] Unknown     pain     Codeine Hives     Hydrocodone-Acetaminophen Other (See Comments)     Highly sensitive, \"knocks her out and can't wake up\"     Risedronate Unknown     Bone pain     Rosuvastatin Muscle Pain (Myalgia)     Simvastatin Muscle Pain (Myalgia)         MEDICATIONS:   Current Outpatient Medications   Medication     amoxicillin-clavulanate (AUGMENTIN) 875-125 MG tablet     ascorbic acid, vitamin C, (ASCORBIC ACID WITH ELLIOTT HIPS) 500 MG tablet     aspirin 81 mg chewable tablet     atorvastatin (LIPITOR) 20 MG tablet     carbidopa-levodopa (SINEMET)  MG tablet     carvedilol (COREG) 12.5 MG tablet     cholecalciferol, vitamin D3, 1,000 unit tablet     coenzyme Q10 (CO Q-10) 100 mg capsule     cyanocobalamin (VITAMIN B-12) 1000 MCG tablet     FLUoxetine (PROZAC) 20 MG capsule     fluticasone (FLONASE) 50 MCG/ACT nasal spray     gabapentin (NEURONTIN) 300 MG capsule     hydrocortisone 2.5 % cream     ketoconazole (NIZORAL) 2 % cream     levothyroxine (SYNTHROID, LEVOTHROID) 25 MCG tablet     loratadine " "(CLARITIN) 10 mg tablet     magnesium oxide 250 mg Tab     melatonin 1 mg Tab tablet     multivitamin therapeutic tablet     nitroglycerin (NITROSTAT) 0.4 MG SL tablet     nystatin (MYCOSTATIN) cream     omega 3-dha-epa-fish oil (FISH OIL) 60- mg cap capsule     omeprazole (PRILOSEC) 20 MG capsule     polyethylene glycol (MIRALAX) 17 gram packet     Current Facility-Administered Medications   Medication     cyanocobalamin injection 1,000 mcg     denosumab (PROLIA) injection 60 mg     denosumab (PROLIA) injection 60 mg        Family History   Problem Relation Age of Onset     Heart Disease Mother      Heart Disease Father           Review of Systems - 10 point Review of Systems is negative except for left foot infection which is noted in HPI.    OBJECTIVE:  Appearance: alert, well appearing, and in no distress.    VITAL SIGNS: Pulse 83   Ht 1.651 m (5' 5\")   Wt 83.9 kg (185 lb)   SpO2 96%   BMI 30.79 kg/m        General appearance: Patient is alert and fully cooperative with history & exam.  No sign of distress is noted during the visit.     Psychiatric: Affect is pleasant & appropriate.  Patient appears motivated to improve health.     Respiratory: Breathing is regular & unlabored while sitting.     HEENT: Hearing is intact to spoken word.  Speech is clear.  No gross evidence of visual impairment that would impact ambulation.      Vascular: PT non-palpable.There is no appreciable edema noted.  Dermatologic: Hyperkeratotic tissue plantar left hallux x4, no open lesions or erythema noted. Nails 1-10 elongated, thick, yellow with subungal debris. Trophic changes noted including diminished hair growth and shiny skin.  Neurologic: Diminished to light touch bilateral.   Musculoskeletal: Contracted digits bilateral.          Again, thank you for allowing me to participate in the care of your patient.        Sincerely,        Lauri Mckeon DPM    "

## 2022-02-28 NOTE — PROGRESS NOTES
FOOT AND ANKLE SURGERY/PODIATRY CONSULT NOTE         ASSESSMENT:   Resolved Ulceration left hallux  Onychomycosis   Keratoma   DM2      TREATMENT:  -No open lesions or signs of infection noted on exam today along the left hallux.     -I discussed the importance of regular trimming of the callus tissue at this pressure point to prevent future skin breakdown. She will finish course of Augmentin.     -Debrided nails 1-10 in length and thickness. Trimmed callus tissue x4 with a #15 blade plantar left hallux.     -Patient's questions invited and answered. Patient to return to clinic in 9 week(s) for re-evaluation. She was encouraged to call my office with any further questions or concerns.     Lauri Mckeon DPM  [facility] Podiatry/Foot & Ankle Surgery      HPI: I was asked to see Janes Montero today for an infected ulceration on her left hallux. The patient states she has noticed some discomfort over the past two weeks, and redness about three days ago. She was seen at urgent care yesterday and started on Augmentin.     Past Medical History:   Diagnosis Date     Acute diastolic congestive heart failure (H)      Acute on chronic congestive heart failure (H) 6/11/2018     Coronary artery disease      Diabetes mellitus, type II (H)      Diastolic dysfunction 7/10/2018     Frozen shoulder     bilateral     Hypertension      Hypertensive emergency      Parkinson disease (H)      Primary osteoarthritis involving multiple joints      Primary osteoarthritis of left knee      Recurrent UTI     hx septic shock     Thyroid disease 2021         Social History     Socioeconomic History     Marital status:      Spouse name: Not on file     Number of children: Not on file     Years of education: Not on file     Highest education level: Not on file   Occupational History     Not on file   Tobacco Use     Smoking status: Never Smoker     Smokeless tobacco: Never Used   Substance and Sexual Activity     Alcohol  "use: No     Drug use: No     Sexual activity: Not Currently     Birth control/protection: None   Other Topics Concern     Parent/sibling w/ CABG, MI or angioplasty before 65F 55M? No   Social History Narrative    6/15/2021 new to [place] from [place]. She has 6 kids.     Social Determinants of Health     Financial Resource Strain: Not on file   Food Insecurity: Not on file   Transportation Needs: Not on file   Physical Activity: Not on file   Stress: Not on file   Social Connections: Not on file   Intimate Partner Violence: Not on file   Housing Stability: Not on file            Allergies   Allergen Reactions     Alendronate [Alendronic Acid] Unknown     pain     Codeine Hives     Hydrocodone-Acetaminophen Other (See Comments)     Highly sensitive, \"knocks her out and can't wake up\"     Risedronate Unknown     Bone pain     Rosuvastatin Muscle Pain (Myalgia)     Simvastatin Muscle Pain (Myalgia)         MEDICATIONS:   Current Outpatient Medications   Medication     amoxicillin-clavulanate (AUGMENTIN) 875-125 MG tablet     ascorbic acid, vitamin C, (ASCORBIC ACID WITH ELLIOTT HIPS) 500 MG tablet     aspirin 81 mg chewable tablet     atorvastatin (LIPITOR) 20 MG tablet     carbidopa-levodopa (SINEMET)  MG tablet     carvedilol (COREG) 12.5 MG tablet     cholecalciferol, vitamin D3, 1,000 unit tablet     coenzyme Q10 (CO Q-10) 100 mg capsule     cyanocobalamin (VITAMIN B-12) 1000 MCG tablet     FLUoxetine (PROZAC) 20 MG capsule     fluticasone (FLONASE) 50 MCG/ACT nasal spray     gabapentin (NEURONTIN) 300 MG capsule     hydrocortisone 2.5 % cream     ketoconazole (NIZORAL) 2 % cream     levothyroxine (SYNTHROID, LEVOTHROID) 25 MCG tablet     loratadine (CLARITIN) 10 mg tablet     magnesium oxide 250 mg Tab     melatonin 1 mg Tab tablet     multivitamin therapeutic tablet     nitroglycerin (NITROSTAT) 0.4 MG SL tablet     nystatin (MYCOSTATIN) cream     omega 3-dha-epa-fish oil (FISH OIL) 60- mg cap capsule     " "omeprazole (PRILOSEC) 20 MG capsule     polyethylene glycol (MIRALAX) 17 gram packet     Current Facility-Administered Medications   Medication     cyanocobalamin injection 1,000 mcg     denosumab (PROLIA) injection 60 mg     denosumab (PROLIA) injection 60 mg        Family History   Problem Relation Age of Onset     Heart Disease Mother      Heart Disease Father           Review of Systems - 10 point Review of Systems is negative except for left foot infection which is noted in HPI.    OBJECTIVE:  Appearance: alert, well appearing, and in no distress.    VITAL SIGNS: Pulse 83   Ht 1.651 m (5' 5\")   Wt 83.9 kg (185 lb)   SpO2 96%   BMI 30.79 kg/m        General appearance: Patient is alert and fully cooperative with history & exam.  No sign of distress is noted during the visit.     Psychiatric: Affect is pleasant & appropriate.  Patient appears motivated to improve health.     Respiratory: Breathing is regular & unlabored while sitting.     HEENT: Hearing is intact to spoken word.  Speech is clear.  No gross evidence of visual impairment that would impact ambulation.      Vascular: PT non-palpable.There is no appreciable edema noted.  Dermatologic: Hyperkeratotic tissue plantar left hallux x4, no open lesions or erythema noted. Nails 1-10 elongated, thick, yellow with subungal debris. Trophic changes noted including diminished hair growth and shiny skin.  Neurologic: Diminished to light touch bilateral.   Musculoskeletal: Contracted digits bilateral.      "

## 2022-03-22 ENCOUNTER — OFFICE VISIT (OUTPATIENT)
Dept: FAMILY MEDICINE | Facility: CLINIC | Age: 87
End: 2022-03-22
Payer: MEDICARE

## 2022-03-22 VITALS
HEART RATE: 70 BPM | DIASTOLIC BLOOD PRESSURE: 84 MMHG | OXYGEN SATURATION: 97 % | SYSTOLIC BLOOD PRESSURE: 162 MMHG | WEIGHT: 186 LBS | BODY MASS INDEX: 30.95 KG/M2

## 2022-03-22 DIAGNOSIS — G20.A1 PARKINSON'S DISEASE (H): ICD-10-CM

## 2022-03-22 DIAGNOSIS — M81.0 OSTEOPOROSIS WITHOUT CURRENT PATHOLOGICAL FRACTURE, UNSPECIFIED OSTEOPOROSIS TYPE: ICD-10-CM

## 2022-03-22 DIAGNOSIS — E53.8 VITAMIN B12 DEFICIENCY (NON ANEMIC): ICD-10-CM

## 2022-03-22 DIAGNOSIS — E11.9 TYPE 2 DIABETES MELLITUS WITHOUT COMPLICATION, WITHOUT LONG-TERM CURRENT USE OF INSULIN (H): Primary | ICD-10-CM

## 2022-03-22 DIAGNOSIS — E03.8 SUBCLINICAL HYPOTHYROIDISM: ICD-10-CM

## 2022-03-22 DIAGNOSIS — B37.2 CANDIDIASIS OF SKIN: ICD-10-CM

## 2022-03-22 DIAGNOSIS — I10 ESSENTIAL HYPERTENSION, BENIGN: ICD-10-CM

## 2022-03-22 DIAGNOSIS — J30.1 NON-SEASONAL ALLERGIC RHINITIS DUE TO POLLEN: ICD-10-CM

## 2022-03-22 DIAGNOSIS — F33.42 RECURRENT MAJOR DEPRESSIVE DISORDER, IN FULL REMISSION (H): ICD-10-CM

## 2022-03-22 LAB
CREAT UR-MCNC: 55 MG/DL
HBA1C MFR BLD: 7.6 % (ref 0–5.6)
HOLD SPECIMEN: NORMAL
MICROALBUMIN UR-MCNC: 7.21 MG/DL (ref 0–1.99)
MICROALBUMIN/CREAT UR: 131.1 MG/G CR
TSH SERPL DL<=0.005 MIU/L-ACNC: 2.22 UIU/ML (ref 0.3–5)

## 2022-03-22 PROCEDURE — 82043 UR ALBUMIN QUANTITATIVE: CPT | Performed by: FAMILY MEDICINE

## 2022-03-22 PROCEDURE — 96372 THER/PROPH/DIAG INJ SC/IM: CPT | Performed by: FAMILY MEDICINE

## 2022-03-22 PROCEDURE — 84443 ASSAY THYROID STIM HORMONE: CPT | Performed by: FAMILY MEDICINE

## 2022-03-22 PROCEDURE — 36415 COLL VENOUS BLD VENIPUNCTURE: CPT | Performed by: FAMILY MEDICINE

## 2022-03-22 PROCEDURE — 83036 HEMOGLOBIN GLYCOSYLATED A1C: CPT | Performed by: FAMILY MEDICINE

## 2022-03-22 PROCEDURE — 99215 OFFICE O/P EST HI 40 MIN: CPT | Mod: 25 | Performed by: FAMILY MEDICINE

## 2022-03-22 RX ORDER — LEVOTHYROXINE SODIUM 25 UG/1
25 TABLET ORAL DAILY
Qty: 90 TABLET | Refills: 3 | Status: SHIPPED | OUTPATIENT
Start: 2022-03-22 | End: 2023-03-10

## 2022-03-22 RX ORDER — LOSARTAN POTASSIUM 25 MG/1
12.5 TABLET ORAL DAILY
Qty: 30 TABLET | Refills: 0 | Status: SHIPPED | OUTPATIENT
Start: 2022-03-22 | End: 2022-04-11 | Stop reason: SINTOL

## 2022-03-22 RX ORDER — NYSTATIN 100000 U/G
CREAM TOPICAL
Qty: 30 G | Refills: 1 | Status: SHIPPED | OUTPATIENT
Start: 2022-03-22 | End: 2022-08-15

## 2022-03-22 RX ORDER — FLUTICASONE PROPIONATE 50 MCG
1 SPRAY, SUSPENSION (ML) NASAL DAILY
Qty: 16 G | Refills: 3 | Status: SHIPPED | OUTPATIENT
Start: 2022-03-22 | End: 2023-05-06

## 2022-03-22 RX ADMIN — CYANOCOBALAMIN 1000 MCG: 1000 INJECTION, SOLUTION INTRAMUSCULAR; SUBCUTANEOUS at 16:21

## 2022-03-22 NOTE — PROGRESS NOTES
52 minutes of total clinic time was spent with this patient, and review of the medical record and with documentation.  This time was spent on the day of service.     Problem List Items Addressed This Visit        Respiratory    Non-seasonal allergic rhinitis due to pollen     Continue fluticasone as it controls her runny nose.         Relevant Medications    fluticasone (FLONASE) 50 MCG/ACT nasal spray       Digestive    Vitamin B12 deficiency (non anemic)     b12 level quite high now. They tell me that Dr Moya (neurology) has her on both oral and injections of b12 and her tingling in her feet is gone with this.            Endocrine    Subclinical hypothyroidism     Recheck tsh, will titrate thyroid medication prn         Relevant Medications    levothyroxine (SYNTHROID/LEVOTHROID) 25 MCG tablet    Other Relevant Orders    TSH (Completed)    Type 2 diabetes mellitus without complication (H) - Primary    Relevant Medications    levothyroxine (SYNTHROID/LEVOTHROID) 25 MCG tablet    Other Relevant Orders    Hemoglobin A1c (Completed)    Albumin Random Urine Quantitative with Creat Ratio (Completed)    OPTOMETRY REFERRAL    AMB Adult Diabetes Educator Referral    Adult Endocrinology  Referral       Circulatory    Essential hypertension, benign     bp elevated.   Continue carvediolol 12.5mg po bid  Discussed adding cozaar 12.5mg po q day for renal protection.         Relevant Medications    losartan (COZAAR) 25 MG tablet       Musculoskeletal and Integumentary    Osteoporosis without current pathological fracture, unspecified osteoporosis type    Relevant Orders    Adult Endocrinology  Referral       Behavioral    Depression     Controlled on fluoxitine 20 mg po bid, refilled.          Relevant Medications    FLUoxetine (PROZAC) 20 MG capsule      Other Visit Diagnoses     Candidiasis of skin        patient reports intermittent rash under breasts and requests refill nystatin, refill given.     Relevant Medications    nystatin (MYCOSTATIN) 461956 UNIT/GM external cream    fluticasone (FLONASE) 50 MCG/ACT nasal spray           Chief Complaint   Patient presents with     Diabetes        Subjective   Janes is a 88 year old who presents for the following health issues - diabetes    Accompanied by her daughter.    History of Present Illness       Reason for visit:  3 month check    She eats 0-1 servings of fruits and vegetables daily.She consumes 0 sweetened beverage(s) daily.She exercises with enough effort to increase her heart rate 9 or less minutes per day.  She exercises with enough effort to increase her heart rate 3 or less days per week.   She is taking medications regularly.               Objective    BP (!) 162/84 (BP Location: Left arm, Patient Position: Left side, Cuff Size: Adult Large)   Pulse 70   Wt 84.4 kg (186 lb)   SpO2 97%   BMI 30.95 kg/m    Body mass index is 30.95 kg/m .  Physical Exam  Constitutional:       Appearance: Normal appearance.   HENT:      Head: Normocephalic and atraumatic.   Cardiovascular:      Rate and Rhythm: Normal rate and regular rhythm.      Heart sounds: Normal heart sounds.   Pulmonary:      Effort: Pulmonary effort is normal.      Breath sounds: Normal breath sounds.   Musculoskeletal:         General: Normal range of motion.      Cervical back: Normal range of motion and neck supple.   Neurological:      General: No focal deficit present.      Mental Status: She is alert and oriented to person, place, and time.          Diabetic Foot Screen:  Any complaints of increased pain or numbness ? No  Is there a foot ulcer now or a history of foot ulcer? No  Does the foot have an abnormal shape? No  Are the nails thick, too long or ingrown? No  Are there any redness or open areas? No         Sensation Testing done at all points on the diagram with monofilament     Right Foot: Sensation Normal at all points  Left Foot: Sensation Normal at all points     Risk Category: 0-  No loss of protective sensation  Performed by Maggie Arriaga MD

## 2022-03-22 NOTE — ASSESSMENT & PLAN NOTE
b12 level quite high now. They tell me that Dr Moya (neurology) has her on both oral and injections of b12 and her tingling in her feet is gone with this.

## 2022-03-22 NOTE — ASSESSMENT & PLAN NOTE
bp elevated.   Continue carvediolol 12.5mg po bid  Discussed adding cozaar 12.5mg po q day for renal protection.

## 2022-03-24 PROBLEM — M81.0 AGE-RELATED OSTEOPOROSIS WITHOUT CURRENT PATHOLOGICAL FRACTURE: Status: ACTIVE | Noted: 2021-07-13

## 2022-03-24 NOTE — ASSESSMENT & PLAN NOTE
Needs new endocrinologist who does prolia shots. Her endocrinologist left his practice (that was Dr. Curtis)

## 2022-03-24 NOTE — ASSESSMENT & PLAN NOTE
parkinsons - discussed importance of exercise suggested Physical therapy - 'big and loud' with Miriam Almeida PT. They want to wait and see Dr. Moya in May 2022 and follow up then.

## 2022-03-24 NOTE — PATIENT INSTRUCTIONS
Return in about 3 months (around 6/22/2022) for diabetes/ chart review of multiple medical problems.

## 2022-03-24 NOTE — ASSESSMENT & PLAN NOTE
Stable a1c at 7.6  Recommend 4 visits per year just focus on diabetes - prevent of Retinopathy, nephropathy, neuropathy and cad.   Goal a1c 7.0 but given her age 7.6 is very good.   bp today is elevated at 162/84 - states did not take bp meds today, recommend resume meds - they report measuring normal bp at home  ldl is at goal - 32, 12/14/2021  weight reviewed (diet/ exercise) discussed.   She is not a smoker   She limits alcohol  dm ed yearly, dietician yearly recommended.  And order placed.   foot exam done 3/2022,   eye exam overdue as of Feb 2022 so daughter will schedule asap   She takes asa, arb - cozaar started today, she takes statin,   microalbumin ordered 3/22/2022   Follow up in 3 months for chart review and diabetes follow up. Consider metformin if increasing.

## 2022-04-11 ENCOUNTER — MYC MEDICAL ADVICE (OUTPATIENT)
Dept: FAMILY MEDICINE | Facility: CLINIC | Age: 87
End: 2022-04-11
Payer: MEDICARE

## 2022-04-11 DIAGNOSIS — I10 ESSENTIAL HYPERTENSION, BENIGN: Primary | ICD-10-CM

## 2022-04-11 RX ORDER — VALSARTAN 40 MG/1
40 TABLET ORAL DAILY
Qty: 30 TABLET | Refills: 0 | Status: SHIPPED | OUTPATIENT
Start: 2022-04-11 | End: 2022-05-06

## 2022-04-22 ENCOUNTER — ALLIED HEALTH/NURSE VISIT (OUTPATIENT)
Dept: FAMILY MEDICINE | Facility: CLINIC | Age: 87
End: 2022-04-22
Payer: MEDICARE

## 2022-04-22 DIAGNOSIS — E53.8 VITAMIN B12 DEFICIENCY (NON ANEMIC): Primary | ICD-10-CM

## 2022-04-22 PROCEDURE — 99207 PR NO CHARGE NURSE ONLY: CPT

## 2022-04-22 PROCEDURE — 96372 THER/PROPH/DIAG INJ SC/IM: CPT | Performed by: FAMILY MEDICINE

## 2022-04-22 RX ADMIN — CYANOCOBALAMIN 1000 MCG: 1000 INJECTION, SOLUTION INTRAMUSCULAR; SUBCUTANEOUS at 10:09

## 2022-04-22 NOTE — PROGRESS NOTES
Clinic Administered Medication Documentation    Administrations This Visit     cyanocobalamin injection 1,000 mcg     Admin Date  04/22/2022 Action  Given Dose  1,000 mcg Route  Intramuscular Site  Left Deltoid Administered By  Luiza Bermeo    Ordering Provider: Maggie Arriaga MD    Patient Supplied?: No                  Injectable Medication Documentation    Patient was given Cyanocobalamin (B-12). Prior to medication administration, verified patients identity using patient s name and date of birth. Please see MAR and medication order for additional information. Patient instructed to remain in clinic for 15 minutes.      Was entire vial of medication used? Yes  Vial/Syringe: Single dose vial  Expiration Date:  12/2022  Was this medication supplied by the patient? No

## 2022-05-02 ENCOUNTER — OFFICE VISIT (OUTPATIENT)
Dept: PODIATRY | Facility: CLINIC | Age: 87
End: 2022-05-02
Payer: MEDICARE

## 2022-05-02 VITALS — HEIGHT: 65 IN | HEART RATE: 72 BPM | WEIGHT: 188 LBS | BODY MASS INDEX: 31.32 KG/M2 | OXYGEN SATURATION: 95 %

## 2022-05-02 DIAGNOSIS — B35.1 ONYCHOMYCOSIS: Primary | ICD-10-CM

## 2022-05-02 DIAGNOSIS — I73.9 PAD (PERIPHERAL ARTERY DISEASE) (H): ICD-10-CM

## 2022-05-02 PROCEDURE — 11721 DEBRIDE NAIL 6 OR MORE: CPT | Mod: Q8 | Performed by: PODIATRIST

## 2022-05-02 ASSESSMENT — PAIN SCALES - GENERAL: PAINLEVEL: NO PAIN (0)

## 2022-05-02 NOTE — PROGRESS NOTES
FOOT AND ANKLE SURGERY/PODIATRY CONSULT NOTE         ASSESSMENT:   Resolved Ulceration left hallux  Onychomycosis   DM2      TREATMENT:  -No open lesions or signs of infection noted on exam today along the left hallux. She will continue to monitor for callus build-up or skin breakdown.     -Debrided nails 1-10 in length and thickness.     -Patient's questions invited and answered. Patient to return to clinic in 10 week(s) for re-evaluation. She was encouraged to call my office with any further questions or concerns.     DAWSON Campo [facility] Podiatry/Foot & Ankle Surgery      HPI: I was asked to see Janes Montero today for a diabetic foot exam. She has not noticed any open lesions and requests a nail trim today.     Past Medical History:   Diagnosis Date     Acute diastolic congestive heart failure (H)      Acute on chronic congestive heart failure (H) 6/11/2018     Coronary artery disease      Diabetes mellitus, type II (H)      Diastolic dysfunction 7/10/2018     Frozen shoulder     bilateral     Hypertension      Hypertensive emergency      Parkinson disease (H)      Primary osteoarthritis involving multiple joints      Primary osteoarthritis of left knee      Recurrent UTI     hx septic shock     Thyroid disease 2021         Social History     Socioeconomic History     Marital status:      Spouse name: Not on file     Number of children: Not on file     Years of education: Not on file     Highest education level: Not on file   Occupational History     Not on file   Tobacco Use     Smoking status: Never Smoker     Smokeless tobacco: Never Used   Substance and Sexual Activity     Alcohol use: No     Drug use: No     Sexual activity: Not Currently     Birth control/protection: None   Other Topics Concern     Parent/sibling w/ CABG, MI or angioplasty before 65F 55M? No   Social History Narrative    6/15/2021 new to [place] from [place]. She has 6 kids.     Social Determinants of Health  "    Financial Resource Strain: Not on file   Food Insecurity: Not on file   Transportation Needs: Not on file   Physical Activity: Not on file   Stress: Not on file   Social Connections: Not on file   Intimate Partner Violence: Not on file   Housing Stability: Not on file            Allergies   Allergen Reactions     Alendronate [Alendronic Acid] Unknown     pain     Codeine Hives     Hydrocodone-Acetaminophen Other (See Comments)     Highly sensitive, \"knocks her out and can't wake up\"     Risedronate Unknown     Bone pain     Rosuvastatin Muscle Pain (Myalgia)     Simvastatin Muscle Pain (Myalgia)         MEDICATIONS:   Current Outpatient Medications   Medication     ascorbic acid, vitamin C, (ASCORBIC ACID WITH ELLIOTT HIPS) 500 MG tablet     aspirin 81 mg chewable tablet     atorvastatin (LIPITOR) 20 MG tablet     carbidopa-levodopa (SINEMET)  MG tablet     carvedilol (COREG) 12.5 MG tablet     cholecalciferol, vitamin D3, 1,000 unit tablet     coenzyme Q10 (CO Q-10) 100 mg capsule     cyanocobalamin (VITAMIN B-12) 1000 MCG tablet     FLUoxetine (PROZAC) 20 MG capsule     fluticasone (FLONASE) 50 MCG/ACT nasal spray     gabapentin (NEURONTIN) 300 MG capsule     hydrocortisone 2.5 % cream     ketoconazole (NIZORAL) 2 % cream     levothyroxine (SYNTHROID/LEVOTHROID) 25 MCG tablet     loratadine (CLARITIN) 10 mg tablet     magnesium oxide 250 mg Tab     melatonin 1 mg Tab tablet     multivitamin therapeutic tablet     nitroglycerin (NITROSTAT) 0.4 MG SL tablet     nystatin (MYCOSTATIN) 640815 UNIT/GM external cream     omega 3-dha-epa-fish oil (FISH OIL) 60- mg cap capsule     omeprazole (PRILOSEC) 20 MG capsule     polyethylene glycol (MIRALAX) 17 gram packet     valsartan (DIOVAN) 40 MG tablet     Current Facility-Administered Medications   Medication     cyanocobalamin injection 1,000 mcg     denosumab (PROLIA) injection 60 mg     denosumab (PROLIA) injection 60 mg        Family History   Problem " "Relation Age of Onset     Heart Disease Mother      Heart Disease Father           Review of Systems - 10 point Review of Systems is negative except for left foot infection which is noted in HPI.    OBJECTIVE:  Appearance: alert, well appearing, and in no distress.    VITAL SIGNS: Pulse 72   Ht 1.651 m (5' 5\")   Wt 85.3 kg (188 lb)   SpO2 95%   BMI 31.28 kg/m        General appearance: Patient is alert and fully cooperative with history & exam.  No sign of distress is noted during the visit.     Psychiatric: Affect is pleasant & appropriate.  Patient appears motivated to improve health.     Respiratory: Breathing is regular & unlabored while sitting.     HEENT: Hearing is intact to spoken word.  Speech is clear.  No gross evidence of visual impairment that would impact ambulation.      Vascular: PT non-palpable.There is no appreciable edema noted.  Dermatologic: No open lesions or erythema noted left hallux. Nails 1-10 elongated, thick, yellow with subungal debris. Trophic changes noted including diminished hair growth and shiny skin.  Neurologic: Diminished to light touch bilateral.   Musculoskeletal: Contracted digits bilateral.      "

## 2022-05-02 NOTE — LETTER
5/2/2022         RE: Janes Montero  [address]  [place] [address]        Dear Colleague,    Thank you for referring your patient, Janes Montero, to the [facility]. Please see a copy of my visit note below.          FOOT AND ANKLE SURGERY/PODIATRY CONSULT NOTE         ASSESSMENT:   Resolved Ulceration left hallux  Onychomycosis   DM2      TREATMENT:  -No open lesions or signs of infection noted on exam today along the left hallux. She will continue to monitor for callus build-up or skin breakdown.     -Debrided nails 1-10 in length and thickness.     -Patient's questions invited and answered. Patient to return to clinic in 10 week(s) for re-evaluation. She was encouraged to call my office with any further questions or concerns.     Lauri Mckeon DPM  [facility] Podiatry/Foot & Ankle Surgery      HPI: I was asked to see Janes Montero today for a diabetic foot exam. She has not noticed any open lesions and requests a nail trim today.     Past Medical History:   Diagnosis Date     Acute diastolic congestive heart failure (H)      Acute on chronic congestive heart failure (H) 6/11/2018     Coronary artery disease      Diabetes mellitus, type II (H)      Diastolic dysfunction 7/10/2018     Frozen shoulder     bilateral     Hypertension      Hypertensive emergency      Parkinson disease (H)      Primary osteoarthritis involving multiple joints      Primary osteoarthritis of left knee      Recurrent UTI     hx septic shock     Thyroid disease 2021         Social History     Socioeconomic History     Marital status:      Spouse name: Not on file     Number of children: Not on file     Years of education: Not on file     Highest education level: Not on file   Occupational History     Not on file   Tobacco Use     Smoking status: Never Smoker     Smokeless tobacco: Never Used   Substance and Sexual Activity     Alcohol use: No     Drug use: No     Sexual  "activity: Not Currently     Birth control/protection: None   Other Topics Concern     Parent/sibling w/ CABG, MI or angioplasty before 65F 55M? No   Social History Narrative    6/15/2021 new to [place] from [place]. She has 6 kids.     Social Determinants of Health     Financial Resource Strain: Not on file   Food Insecurity: Not on file   Transportation Needs: Not on file   Physical Activity: Not on file   Stress: Not on file   Social Connections: Not on file   Intimate Partner Violence: Not on file   Housing Stability: Not on file            Allergies   Allergen Reactions     Alendronate [Alendronic Acid] Unknown     pain     Codeine Hives     Hydrocodone-Acetaminophen Other (See Comments)     Highly sensitive, \"knocks her out and can't wake up\"     Risedronate Unknown     Bone pain     Rosuvastatin Muscle Pain (Myalgia)     Simvastatin Muscle Pain (Myalgia)         MEDICATIONS:   Current Outpatient Medications   Medication     ascorbic acid, vitamin C, (ASCORBIC ACID WITH ELLIOTT HIPS) 500 MG tablet     aspirin 81 mg chewable tablet     atorvastatin (LIPITOR) 20 MG tablet     carbidopa-levodopa (SINEMET)  MG tablet     carvedilol (COREG) 12.5 MG tablet     cholecalciferol, vitamin D3, 1,000 unit tablet     coenzyme Q10 (CO Q-10) 100 mg capsule     cyanocobalamin (VITAMIN B-12) 1000 MCG tablet     FLUoxetine (PROZAC) 20 MG capsule     fluticasone (FLONASE) 50 MCG/ACT nasal spray     gabapentin (NEURONTIN) 300 MG capsule     hydrocortisone 2.5 % cream     ketoconazole (NIZORAL) 2 % cream     levothyroxine (SYNTHROID/LEVOTHROID) 25 MCG tablet     loratadine (CLARITIN) 10 mg tablet     magnesium oxide 250 mg Tab     melatonin 1 mg Tab tablet     multivitamin therapeutic tablet     nitroglycerin (NITROSTAT) 0.4 MG SL tablet     nystatin (MYCOSTATIN) 737803 UNIT/GM external cream     omega 3-dha-epa-fish oil (FISH OIL) 60- mg cap capsule     omeprazole (PRILOSEC) 20 MG capsule     polyethylene glycol (MIRALAX) 17 " "gram packet     valsartan (DIOVAN) 40 MG tablet     Current Facility-Administered Medications   Medication     cyanocobalamin injection 1,000 mcg     denosumab (PROLIA) injection 60 mg     denosumab (PROLIA) injection 60 mg        Family History   Problem Relation Age of Onset     Heart Disease Mother      Heart Disease Father           Review of Systems - 10 point Review of Systems is negative except for left foot infection which is noted in HPI.    OBJECTIVE:  Appearance: alert, well appearing, and in no distress.    VITAL SIGNS: Pulse 72   Ht 1.651 m (5' 5\")   Wt 85.3 kg (188 lb)   SpO2 95%   BMI 31.28 kg/m        General appearance: Patient is alert and fully cooperative with history & exam.  No sign of distress is noted during the visit.     Psychiatric: Affect is pleasant & appropriate.  Patient appears motivated to improve health.     Respiratory: Breathing is regular & unlabored while sitting.     HEENT: Hearing is intact to spoken word.  Speech is clear.  No gross evidence of visual impairment that would impact ambulation.      Vascular: PT non-palpable.There is no appreciable edema noted.  Dermatologic: No open lesions or erythema noted left hallux. Nails 1-10 elongated, thick, yellow with subungal debris. Trophic changes noted including diminished hair growth and shiny skin.  Neurologic: Diminished to light touch bilateral.   Musculoskeletal: Contracted digits bilateral.          Again, thank you for allowing me to participate in the care of your patient.        Sincerely,        Lauri Mckeon DPM    "

## 2022-05-04 ENCOUNTER — MYC MEDICAL ADVICE (OUTPATIENT)
Dept: FAMILY MEDICINE | Facility: CLINIC | Age: 87
End: 2022-05-04
Payer: MEDICARE

## 2022-05-04 DIAGNOSIS — I10 ESSENTIAL HYPERTENSION, BENIGN: ICD-10-CM

## 2022-05-11 DIAGNOSIS — I10 ESSENTIAL HYPERTENSION, BENIGN: ICD-10-CM

## 2022-05-12 RX ORDER — VALSARTAN 40 MG/1
TABLET ORAL
Qty: 90 TABLET | Refills: 1 | Status: CANCELLED | OUTPATIENT
Start: 2022-05-12

## 2022-05-13 RX ORDER — VALSARTAN 40 MG/1
TABLET ORAL
Qty: 30 TABLET | Refills: 0 | OUTPATIENT
Start: 2022-05-13

## 2022-05-19 ENCOUNTER — OFFICE VISIT (OUTPATIENT)
Dept: NEUROLOGY | Facility: CLINIC | Age: 87
End: 2022-05-19
Payer: MEDICARE

## 2022-05-19 ENCOUNTER — TRANSFERRED RECORDS (OUTPATIENT)
Dept: HEALTH INFORMATION MANAGEMENT | Facility: CLINIC | Age: 87
End: 2022-05-19

## 2022-05-19 VITALS
BODY MASS INDEX: 31.32 KG/M2 | SYSTOLIC BLOOD PRESSURE: 136 MMHG | WEIGHT: 188 LBS | HEART RATE: 68 BPM | HEIGHT: 65 IN | DIASTOLIC BLOOD PRESSURE: 72 MMHG

## 2022-05-19 DIAGNOSIS — E11.42 TYPE 2 DIABETES MELLITUS WITH DIABETIC POLYNEUROPATHY, WITHOUT LONG-TERM CURRENT USE OF INSULIN (H): Primary | ICD-10-CM

## 2022-05-19 DIAGNOSIS — G20.A1 PARKINSON'S DISEASE (H): ICD-10-CM

## 2022-05-19 DIAGNOSIS — R76.8 POSITIVE ANA (ANTINUCLEAR ANTIBODY): ICD-10-CM

## 2022-05-19 DIAGNOSIS — G50.0 TRIGEMINAL NEURALGIA: ICD-10-CM

## 2022-05-19 DIAGNOSIS — E53.8 LOW SERUM VITAMIN B12: ICD-10-CM

## 2022-05-19 DIAGNOSIS — G62.9 NEUROPATHY: ICD-10-CM

## 2022-05-19 LAB — RETINOPATHY: NEGATIVE

## 2022-05-19 PROCEDURE — 99214 OFFICE O/P EST MOD 30 MIN: CPT | Performed by: PSYCHIATRY & NEUROLOGY

## 2022-05-19 RX ORDER — GABAPENTIN 300 MG/1
CAPSULE ORAL
Qty: 360 CAPSULE | Refills: 3 | Status: SHIPPED | OUTPATIENT
Start: 2022-05-19 | End: 2022-05-23

## 2022-05-19 RX ORDER — CARBIDOPA AND LEVODOPA 25; 100 MG/1; MG/1
TABLET ORAL
Qty: 270 TABLET | Refills: 3 | Status: SHIPPED | OUTPATIENT
Start: 2022-05-19 | End: 2022-05-23

## 2022-05-19 NOTE — NURSING NOTE
Chief Complaint   Patient presents with     RECHECK     Parkinsons        Isabel Moyer MA on 5/19/2022 at 8:09 AM

## 2022-05-19 NOTE — LETTER
5/19/2022         RE: Janes Montero  [address]  [facility] [address]        Dear Colleague,    Thank you for referring your patient, Janes Montero, to the [facility] [place]. Please see a copy of my visit note below.    NEUROLOGY OUTPATIENT PROGRESS NOTE   May 19, 2022     CHIEF COMPLAINT/REASON FOR VISIT/REASON FOR CONSULT  Patient presents with:  RECHECK: Parkinsons       REASON FOR CONSULTATION-Parkinson's/trigeminal neuralgia    REFERRAL SOURCE  Dr. Maggie Arriaga  CC Dr. Maggie Arriaga    HISTORY OF PRESENT ILLNESS  Janes Montero is a 88 year old female seen today for multiple issues  Patient is moving from [place] to [place].  She is a resident of [place].  And has moved to [place] is a 20 years and is coming back.    1.  Parkinson's disease:-Symptom onset was in 2015.  At that time she was having shaking of her arms and legs.  This was at rest and with activity.  She was losing her balance as well.  She was put on Sinemet 1 tablet 3 times a day.  Feels that the medication is helping.  Does have some wearing off in the evening time and is taking the medication earlier than bedtime which is working.  Does fine in the morning.  Takes the medication on empty stomach.  Denies any other progression or any new symptoms.  Occasionally will use a walker.  Is not very active but does do some walking.  2.  Trigeminal neuralgia:-This started in 2013.  Pain is on the left side of the face.  The whole V1 V2 V3 distribution is involved that the pain is more towards the year.  Pain is sharp and intermittent.  Heating pad helps.  Reports no real provoking factors for her.  She is on gabapentin which has been helpful.  Has not tried any other medications.  Denies any other associated symptoms.  No numbness.  MRI was done and no clear cause for the trigeminal neuralgia was identified.  3.  Patient complains of some numbness in her legs.  Does report some balance issues  as well.  She does have a diagnosis of diabetes.  Last A1c was 6.7.    10/19/21  Patient returns today  1.  Parkinson's disease is stable and under good control.  Reports no wearing off of the medication.  Is taking the Sinemet regularly.  Has seen physical therapy and trying to do some exercise.  She did take her medication this morning.  2.  Trigeminal neuralgia pain is unchanged.  Gabapentin is helping her.  3.  Previously she was complaining of some numbness in her legs and was found to have a wide-based gait.  This does seem to have improved.  She was identified to have B12 deficiency and was put on injections but not oral replacement.  4.  Diabetes-A1c was 7.1.  Encouraged her to exercise and.  Manage her diet.    1/20/21  Patient returns today  1.  Parkinson's stable under good control.  Reports no wearing off with medication.  Is unclear if the medication is really helping or not though she feels that initially she had a lot of tremors and those got better when she started the medication.  2.  Trigeminal pain is under good control.  Gabapentin is helping.  3.  Was having some numbness in her feet.  Feels that this is getting better.  Her gait has also improved.  She feels more steady.  Is only taking the oral replacement at this point  4.  Diabetes has been under appropriate control.  A1c scheduled for March.  Previously 1 was 7.1 is working on improving her diet.  Stays active and is not sitting in the chair all day long    5/19/22  Patient returns today  1.  Patient's Parkinson's is under good control.  There is no wearing off of the medication.  No side effects with the medication.  Exam today does show slightly decreased arm swing though she wants to stay on the medication at her age.  2.  Trigeminal neuralgia pain is under good control.  Gabapentin is helping  3.  B12 has come up and discussed that she could stop the injections at this point.  4.  Diabetes is not under good control.  A1c is worsened.   She is eating too many sweets.  Encourage exercise and cutting down the sweets  5.  She reports that the numbness in her feet is gone away after she got her Prolia injection.  Denies any balance issues.  6.  VINICIO still positive.  Denies any joint pain except secondary to use of the valsartan.    Previous history is reviewed and this is unchanged.    PAST MEDICAL/SURGICAL HISTORY  Past Medical History:   Diagnosis Date     Acute diastolic congestive heart failure (H)      Acute on chronic congestive heart failure (H) 6/11/2018     Coronary artery disease      Diabetes mellitus, type II (H)      Diastolic dysfunction 7/10/2018     Frozen shoulder     bilateral     Hypertension      Hypertensive emergency      Parkinson disease (H)      Primary osteoarthritis involving multiple joints      Primary osteoarthritis of left knee      Recurrent UTI     hx septic shock     Thyroid disease 2021     Patient Active Problem List   Diagnosis     Essential hypertension, benign     CAD (coronary artery disease), native coronary artery     S/P CABG (coronary artery bypass graft)     Parkinson's disease (H)     Trigeminal neuralgia     Chronic bilateral back pain     Aortic valve insufficiency     Tricuspid insufficiency     Mitral valve insufficiency     Bilateral carotid artery stenosis     CKD (chronic kidney disease) stage 3, GFR 30-59 ml/min (H)     Depression     Diastolic dysfunction     GERD (gastroesophageal reflux disease)     Left bundle branch block     Primary osteoarthritis involving multiple joints     Sensorineural hearing loss (SNHL) of both ears     Subclinical hypothyroidism     Type 2 diabetes mellitus without complication (H)     Secondary renal hyperparathyroidism (H)     Age-related osteoporosis without current pathological fracture     Mixed hypercholesterolemia and hypertriglyceridemia     Mixed incontinence urge and stress (male)(female)     Vitamin B12 deficiency (non anemic)     Non-seasonal allergic  rhinitis due to pollen   Significant for CABG/bypass/coronary artery disease, high cholesterol, high blood pressure, diabetes, migraines, Parkinson's disease, arthritis, depression, osteoporosis, hyperparathyroidism    FAMILY HISTORY  Family History   Problem Relation Age of Onset     Heart Disease Mother      Heart Disease Father    Family history reviewed and noncontributory no family history of neuropathy.    SOCIAL HISTORY  Social History     Tobacco Use     Smoking status: Never Smoker     Smokeless tobacco: Never Used   Substance Use Topics     Alcohol use: No     Drug use: No       SYSTEMS REVIEW  Twelve-system ROS was done and other than the HPI this was negative except for neck pain, back pain, arm and leg pain, joint pain, numbness and tingling, weakness paralysis, difficulty walking, falling, balance coordination problems, hearing loss, sleepy during the day, sleeping problems, headaches, anxiety, depression, cardiac/heart problems.  No new concerns/issues today.    MEDICATIONS  ascorbic acid, vitamin C, (ASCORBIC ACID WITH ELLIOTT HIPS) 500 MG tablet, [ASCORBIC ACID, VITAMIN C, (ASCORBIC ACID WITH ELLIOTT HIPS) 500 MG TABLET] Take 500 mg by mouth daily.  aspirin 81 mg chewable tablet, [ASPIRIN 81 MG CHEWABLE TABLET] Chew 81 mg at bedtime.  atorvastatin (LIPITOR) 20 MG tablet, Take 1 tablet (20 mg) by mouth At Bedtime  carvedilol (COREG) 12.5 MG tablet, Take 1 tablet (12.5 mg) by mouth 2 times daily (with meals)  cholecalciferol, vitamin D3, 1,000 unit tablet, [CHOLECALCIFEROL, VITAMIN D3, 1,000 UNIT TABLET] Take 2,000 Units by mouth daily.  coenzyme Q10 (CO Q-10) 100 mg capsule, [COENZYME Q10 (CO Q-10) 100 MG CAPSULE] Take 100 mg by mouth 2 (two) times a day.  cyanocobalamin (VITAMIN B-12) 1000 MCG tablet, Take 1 tablet (1,000 mcg) by mouth daily  FLUoxetine (PROZAC) 20 MG capsule, Take 1 capsule (20 mg) by mouth 2 times daily  fluticasone (FLONASE) 50 MCG/ACT nasal spray, Spray 1 spray into both nostrils  "daily  hydrocortisone 2.5 % cream, [HYDROCORTISONE 2.5 % CREAM] Apply topically 2 (two) times a day.  ketoconazole (NIZORAL) 2 % cream, [KETOCONAZOLE (NIZORAL) 2 % CREAM] Apply topically daily.  levothyroxine (SYNTHROID/LEVOTHROID) 25 MCG tablet, Take 1 tablet (25 mcg) by mouth daily  loratadine (CLARITIN) 10 mg tablet, [LORATADINE (CLARITIN) 10 MG TABLET] Take 10 mg by mouth daily.  magnesium oxide 250 mg Tab, [MAGNESIUM OXIDE 250 MG TAB] Take 250 mg by mouth daily.  melatonin 1 mg Tab tablet, [MELATONIN 1 MG TAB TABLET] Take 3 mg by mouth at bedtime.   multivitamin therapeutic tablet, [MULTIVITAMIN THERAPEUTIC TABLET] Take 1 tablet by mouth daily.  nitroglycerin (NITROSTAT) 0.4 MG SL tablet, [NITROGLYCERIN (NITROSTAT) 0.4 MG SL TABLET] Place 0.4 mg under the tongue every 5 (five) minutes as needed for chest pain.  nystatin (MYCOSTATIN) 940585 UNIT/GM external cream, [NYSTATIN (MYCOSTATIN) CREAM] apply under breasts to rash as needed.  omega 3-dha-epa-fish oil (FISH OIL) 60- mg cap capsule, [OMEGA 3-DHA-EPA-FISH OIL (FISH OIL) 60- MG CAP CAPSULE] Take 2,000 mg by mouth 2 (two) times a day.  omeprazole (PRILOSEC) 20 MG capsule, [OMEPRAZOLE (PRILOSEC) 20 MG CAPSULE] Take 20 mg by mouth daily before breakfast.  polyethylene glycol (MIRALAX) 17 gram packet, [POLYETHYLENE GLYCOL (MIRALAX) 17 GRAM PACKET] Take 17 g by mouth daily.  valsartan (DIOVAN) 40 MG tablet, TAKE 1 TABLET BY MOUTH IN THE MORNING    cyanocobalamin injection 1,000 mcg  denosumab (PROLIA) injection 60 mg  denosumab (PROLIA) injection 60 mg         PHYSICAL EXAMINATION  VITALS: /72 (BP Location: Right arm, Patient Position: Sitting)   Pulse 68   Ht 1.651 m (5' 5\")   Wt 85.3 kg (188 lb)   BMI 31.28 kg/m    GENERAL: Healthy appearing, alert, no acute distress, normal habitus.  CARDIOVASCULAR: Extremities warm and well perfused. Pulses present.   NEUROLOGICAL:  Patient is awake and oriented to self, place and time.  Attention span is " normal.  Memory is grossly intact.  Language is fluent and follows commands appropriately.  Appropriate fund of knowledge. Cranial nerves 2-12 are intact. There is no pronator drift.  Motor exam shows 5/5 strength in all extremities.  Tone is symmetric bilaterally in upper and lower extremities.  No cogwheeling or tremor noted.  (Previously reflexes are symmetric and 2+ in upper extremities and absent in lower extremities.) Sensory exam is grossly intact to light touch, pin prick and vibration intact today.  Previously this was decreased up to the knees. Finger to nose and heel to shin is without dysmetria.  Romberg is negative.  Gait is almost normal with bilateral decreased arm swing.  Mild difficulty with tandem.  Previously drawing concentric circles did not show any tremor.  Exam unchanged compared to before though the balance is steady.    DIAGNOSTICS  RELEVANT LABS  TSH 2.01   A1c 6.7    Carotid US 2020  1: Right: 1-39% carotid artery stenosis with mild velocities and antegrade   vertebral flow.   2: Left: 1-39% carotid artery stenosis with mild velocities and antegrade   vertebral flow.   3: Essentially unchanged.   4: Recommend a two year follow-up if patient remains asymptomatic.       CT head 2020  IMPRESSION   1. No acute intracranial findings.   2. Senescent changes.     MRI 2013  IMPRESSION:   Scattered small vessel ischemic disease including pontine involvement.      OUTSIDE RECORDS  Outside referral notes and chart notes were reviewed and pertinent information has been summarized (in addition to the HPI):-Patient has a diagnosis of Parkinson's disease.  Diagnosed in 2015.  Is looking to establish with a neurologist.  Is on Sinemet  times a day.  Also has trigeminal neuralgia controlled with gabapentin 300 mg p.o. 4 times a day.  Also has bilateral carotid artery stenosis.  Is moving here from [place].    LABS    Component      Latest Ref Rng & Units 7/27/2021           3:50 PM   Total Protein  Serum for ELP      6.0 - 8.0 g/dL 6.8   Albumin %      51.0 - 67.0 % 66.6   Albumin Fraction      3.2 - 4.7 g/dL 4.5   Alpha 1 %      2.0 - 4.0 % 1.9 (L)   Alpha 1 Fraction      0.1 - 0.3 g/dL 0.1   Alpha 2 %      5.0 - 13.0 % 9.8   Alpha 2 Fraction      0.4 - 0.9 g/dL 0.7   Beta %      10.0 - 17.0 % 10.5   Beta Fraction      0.7 - 1.2 g/dL 0.7   Gamma Globulin %      9.0 - 20.0 % 11.2   Gamma Fraction      0.6 - 1.4 g/dL 0.8   ELP Interpretation:       Unremarkable protein electrophoresis.   Path ICD       G62.9   Interpreted By       Lisa Cantu MD   Immunofixation ELP          VINICIO interpretation      Negative Borderline Positive (A)   VINICIO pattern 1       Homogeneous   VINICIO titer 1       1:80   Vitamin B12      213 - 816 pg/mL 383   Vitamin B1 Whole Blood Level      70 - 180 nmol/L 184 (H)   Methylmalonic Acid      0.00 - 0.40 umol/L 1.02 (H)   Lyme Disease Antibodies Serum      <0.90 0.04     Component      Latest Ref Rng & Units 7/27/2021           3:50 PM   Total Protein Serum for ELP      6.0 - 8.0 g/dL 6.9   Albumin %      51.0 - 67.0 %    Albumin Fraction      3.2 - 4.7 g/dL    Alpha 1 %      2.0 - 4.0 %    Alpha 1 Fraction      0.1 - 0.3 g/dL    Alpha 2 %      5.0 - 13.0 %    Alpha 2 Fraction      0.4 - 0.9 g/dL    Beta %      10.0 - 17.0 %    Beta Fraction      0.7 - 1.2 g/dL    Gamma Globulin %      9.0 - 20.0 %    Gamma Fraction      0.6 - 1.4 g/dL    ELP Interpretation:          Path ICD       G62.9   Interpreted By       Lisa Cantu MD   Immunofixation ELP       Unremarkable immunofixation electrophoresis.   VINICIO interpretation      Negative    VINICIO pattern 1          VINICIO titer 1          Vitamin B12      213 - 816 pg/mL    Vitamin B1 Whole Blood Level      70 - 180 nmol/L    Methylmalonic Acid      0.00 - 0.40 umol/L    Lyme Disease Antibodies Serum      <0.90      EMG  CLINICAL INTERPRETATION:  This is a mildly abnormal nerve conduction and EMG study.  The abnormalities seen above  could suggest early/mild sensorimotor polyneuropathy though could be artifactual also.  Further clinical correlation is needed.     PCP notes regarding B12 reviewed.     Component      Latest Ref Rng & Units 1/20/2022 3/22/2022   VINICIO interpretation      Negative Borderline Positive (A)    VINICIO pattern 1       Homogeneous    VINICIO titer 1       1:80    Vitamin B12      213 - 816 pg/mL 1,908 (H)    Methylmalonic Acid      0.00 - 0.40 umol/L 0.35    Hemoglobin A1C      0.0 - 5.6 %  7.6 (H)       IMPRESSION/REPORT/PLAN  Parkinson's disease (H)  Trigeminal neuralgia  Questionable neuropathy-questionable related to diabetes versus B12  Type 2 diabetes mellitus with diabetic polyneuropathy, without long-term current use of insulin (H)   Low B12  Positive VINICIO    This is a 88 year old female with  1.  Trigeminal neuralgia:-MRI brain in 2013 did not show any structural lesions.  Currently pain is under good control with gabapentin and will continue gabapentin.  2.  Parkinson's disease:-Examination does not show a lot of evidence of Parkinson's disease.  Possibly this is being masked by use of Sinemet (was started previously by other provider).  She does have decreased arm swing today.  Exam today of the Sinemet shows no evidence of parkinsonism.  Discussed about stopping the medication though she would prefer to continue the medication for right now.  Physical therapy made things worse and I would encourage her to exercise on her own.  We will continue current dose of Sinemet.  3.  On examination she has a wide-based gait with decreased pinprick sensation in her legs.  Examination suggestive of neuropathy.  Examination does look better today..  EMG shows questionable neuropathy versus artifact.   She does have diabetes which could be a cause of her neuropathy.  A1c is elevated at 7.6 and encouraged her to exercise and cut down on the sweets.  Previously B12 was low and injection/tablets were used.  B12 looks normal at this  point.  She can continue oral tablets only.    4.  She does have a positive VINICIO.  I think this is a false positive.  Repeat VINICIO was positive as well.  Reports some joint pain related to the valsartan.  Can discuss further with primary care.  Could be related to age.    I can see her back in 6 months    -     gabapentin (NEURONTIN) 300 MG capsule; TAKE 1 CAPSULE BY MOUTH 4  TIMES DAILY  -     carbidopa-levodopa (SINEMET)  MG tablet; TAKE 1 TABLET BY MOUTH 3  TIMES DAILY TAKE AT LEAST  1/2 HOUR BEFORE MEALS OR 2  HOURS AFTER MEALS DO NOT  MIX WITH FOOD  -     cyanocobalamin (VITAMIN B-12) 1000 MCG tablet; Take 1 tablet (1,000 mcg) by mouth daily    Return in about 6 months (around 11/19/2022) for In-Clinic Visit (must), After testing.    Over 36 minutes were spent coordinating the care for the patient on the day of the encounter.  This includes previsit, during visit and post visit activities as documented above.  Multiple problems reviewed/addressed with prescription management.  Counseling patient.  Blood work reviewed.  (Activities include but not inclusive of reviewing chart, reviewing outside records, reviewing labs and imaging study results as well as the images, patient visit time including getting history and exam,  use if applicable, review of test results with the patient and coming up with a plan in a shared model, counseling patient and family, education and answering patient questions, EMR , EMR diagnosis entry and problem list management, medication reconciliation and prescription management if applicable, paperwork if applicable, printing documents and documentation of the visit activities.)    Brennon Moya MD  Neurologist  [facility]  Tel:- [phone]    This note was dictated using voice recognition software.  Any grammatical or context distortions are unintentional and inherent to the software.        Again, thank you for allowing me  to participate in the care of your patient.        Sincerely,        Brennon Moya MD

## 2022-05-19 NOTE — PROGRESS NOTES
NEUROLOGY OUTPATIENT PROGRESS NOTE   May 19, 2022     CHIEF COMPLAINT/REASON FOR VISIT/REASON FOR CONSULT  Patient presents with:  RECHECK: Parkinsons       REASON FOR CONSULTATION-Parkinson's/trigeminal neuralgia    REFERRAL SOURCE  Dr. Maggie Arriaga  CC Dr. Maggie Arriaga    HISTORY OF PRESENT ILLNESS  Janes Montero is a 88 year old female seen today for multiple issues  Patient is moving from [place] to [place].  She is a resident of [place].  And has moved to [place] is a 20 years and is coming back.    1.  Parkinson's disease:-Symptom onset was in 2015.  At that time she was having shaking of her arms and legs.  This was at rest and with activity.  She was losing her balance as well.  She was put on Sinemet 1 tablet 3 times a day.  Feels that the medication is helping.  Does have some wearing off in the evening time and is taking the medication earlier than bedtime which is working.  Does fine in the morning.  Takes the medication on empty stomach.  Denies any other progression or any new symptoms.  Occasionally will use a walker.  Is not very active but does do some walking.  2.  Trigeminal neuralgia:-This started in 2013.  Pain is on the left side of the face.  The whole V1 V2 V3 distribution is involved that the pain is more towards the year.  Pain is sharp and intermittent.  Heating pad helps.  Reports no real provoking factors for her.  She is on gabapentin which has been helpful.  Has not tried any other medications.  Denies any other associated symptoms.  No numbness.  MRI was done and no clear cause for the trigeminal neuralgia was identified.  3.  Patient complains of some numbness in her legs.  Does report some balance issues as well.  She does have a diagnosis of diabetes.  Last A1c was 6.7.    10/19/21  Patient returns today  1.  Parkinson's disease is stable and under good control.  Reports no wearing off of the medication.  Is taking the Sinemet regularly.  Has seen physical therapy and  trying to do some exercise.  She did take her medication this morning.  2.  Trigeminal neuralgia pain is unchanged.  Gabapentin is helping her.  3.  Previously she was complaining of some numbness in her legs and was found to have a wide-based gait.  This does seem to have improved.  She was identified to have B12 deficiency and was put on injections but not oral replacement.  4.  Diabetes-A1c was 7.1.  Encouraged her to exercise and.  Manage her diet.    1/20/21  Patient returns today  1.  Parkinson's stable under good control.  Reports no wearing off with medication.  Is unclear if the medication is really helping or not though she feels that initially she had a lot of tremors and those got better when she started the medication.  2.  Trigeminal pain is under good control.  Gabapentin is helping.  3.  Was having some numbness in her feet.  Feels that this is getting better.  Her gait has also improved.  She feels more steady.  Is only taking the oral replacement at this point  4.  Diabetes has been under appropriate control.  A1c scheduled for March.  Previously 1 was 7.1 is working on improving her diet.  Stays active and is not sitting in the chair all day long    5/19/22  Patient returns today  1.  Patient's Parkinson's is under good control.  There is no wearing off of the medication.  No side effects with the medication.  Exam today does show slightly decreased arm swing though she wants to stay on the medication at her age.  2.  Trigeminal neuralgia pain is under good control.  Gabapentin is helping  3.  B12 has come up and discussed that she could stop the injections at this point.  4.  Diabetes is not under good control.  A1c is worsened.  She is eating too many sweets.  Encourage exercise and cutting down the sweets  5.  She reports that the numbness in her feet is gone away after she got her Prolia injection.  Denies any balance issues.  6.  VINICIO still positive.  Denies any joint pain except secondary to  use of the valsartan.    Previous history is reviewed and this is unchanged.    PAST MEDICAL/SURGICAL HISTORY  Past Medical History:   Diagnosis Date     Acute diastolic congestive heart failure (H)      Acute on chronic congestive heart failure (H) 6/11/2018     Coronary artery disease      Diabetes mellitus, type II (H)      Diastolic dysfunction 7/10/2018     Frozen shoulder     bilateral     Hypertension      Hypertensive emergency      Parkinson disease (H)      Primary osteoarthritis involving multiple joints      Primary osteoarthritis of left knee      Recurrent UTI     hx septic shock     Thyroid disease 2021     Patient Active Problem List   Diagnosis     Essential hypertension, benign     CAD (coronary artery disease), native coronary artery     S/P CABG (coronary artery bypass graft)     Parkinson's disease (H)     Trigeminal neuralgia     Chronic bilateral back pain     Aortic valve insufficiency     Tricuspid insufficiency     Mitral valve insufficiency     Bilateral carotid artery stenosis     CKD (chronic kidney disease) stage 3, GFR 30-59 ml/min (H)     Depression     Diastolic dysfunction     GERD (gastroesophageal reflux disease)     Left bundle branch block     Primary osteoarthritis involving multiple joints     Sensorineural hearing loss (SNHL) of both ears     Subclinical hypothyroidism     Type 2 diabetes mellitus without complication (H)     Secondary renal hyperparathyroidism (H)     Age-related osteoporosis without current pathological fracture     Mixed hypercholesterolemia and hypertriglyceridemia     Mixed incontinence urge and stress (male)(female)     Vitamin B12 deficiency (non anemic)     Non-seasonal allergic rhinitis due to pollen   Significant for CABG/bypass/coronary artery disease, high cholesterol, high blood pressure, diabetes, migraines, Parkinson's disease, arthritis, depression, osteoporosis, hyperparathyroidism    FAMILY HISTORY  Family History   Problem Relation Age of  Onset     Heart Disease Mother      Heart Disease Father    Family history reviewed and noncontributory no family history of neuropathy.    SOCIAL HISTORY  Social History     Tobacco Use     Smoking status: Never Smoker     Smokeless tobacco: Never Used   Substance Use Topics     Alcohol use: No     Drug use: No       SYSTEMS REVIEW  Twelve-system ROS was done and other than the HPI this was negative except for neck pain, back pain, arm and leg pain, joint pain, numbness and tingling, weakness paralysis, difficulty walking, falling, balance coordination problems, hearing loss, sleepy during the day, sleeping problems, headaches, anxiety, depression, cardiac/heart problems.  No new concerns/issues today.    MEDICATIONS  ascorbic acid, vitamin C, (ASCORBIC ACID WITH ELLIOTT HIPS) 500 MG tablet, [ASCORBIC ACID, VITAMIN C, (ASCORBIC ACID WITH ELLIOTT HIPS) 500 MG TABLET] Take 500 mg by mouth daily.  aspirin 81 mg chewable tablet, [ASPIRIN 81 MG CHEWABLE TABLET] Chew 81 mg at bedtime.  atorvastatin (LIPITOR) 20 MG tablet, Take 1 tablet (20 mg) by mouth At Bedtime  carvedilol (COREG) 12.5 MG tablet, Take 1 tablet (12.5 mg) by mouth 2 times daily (with meals)  cholecalciferol, vitamin D3, 1,000 unit tablet, [CHOLECALCIFEROL, VITAMIN D3, 1,000 UNIT TABLET] Take 2,000 Units by mouth daily.  coenzyme Q10 (CO Q-10) 100 mg capsule, [COENZYME Q10 (CO Q-10) 100 MG CAPSULE] Take 100 mg by mouth 2 (two) times a day.  cyanocobalamin (VITAMIN B-12) 1000 MCG tablet, Take 1 tablet (1,000 mcg) by mouth daily  FLUoxetine (PROZAC) 20 MG capsule, Take 1 capsule (20 mg) by mouth 2 times daily  fluticasone (FLONASE) 50 MCG/ACT nasal spray, Spray 1 spray into both nostrils daily  hydrocortisone 2.5 % cream, [HYDROCORTISONE 2.5 % CREAM] Apply topically 2 (two) times a day.  ketoconazole (NIZORAL) 2 % cream, [KETOCONAZOLE (NIZORAL) 2 % CREAM] Apply topically daily.  levothyroxine (SYNTHROID/LEVOTHROID) 25 MCG tablet, Take 1 tablet (25 mcg) by mouth  "daily  loratadine (CLARITIN) 10 mg tablet, [LORATADINE (CLARITIN) 10 MG TABLET] Take 10 mg by mouth daily.  magnesium oxide 250 mg Tab, [MAGNESIUM OXIDE 250 MG TAB] Take 250 mg by mouth daily.  melatonin 1 mg Tab tablet, [MELATONIN 1 MG TAB TABLET] Take 3 mg by mouth at bedtime.   multivitamin therapeutic tablet, [MULTIVITAMIN THERAPEUTIC TABLET] Take 1 tablet by mouth daily.  nitroglycerin (NITROSTAT) 0.4 MG SL tablet, [NITROGLYCERIN (NITROSTAT) 0.4 MG SL TABLET] Place 0.4 mg under the tongue every 5 (five) minutes as needed for chest pain.  nystatin (MYCOSTATIN) 009595 UNIT/GM external cream, [NYSTATIN (MYCOSTATIN) CREAM] apply under breasts to rash as needed.  omega 3-dha-epa-fish oil (FISH OIL) 60- mg cap capsule, [OMEGA 3-DHA-EPA-FISH OIL (FISH OIL) 60- MG CAP CAPSULE] Take 2,000 mg by mouth 2 (two) times a day.  omeprazole (PRILOSEC) 20 MG capsule, [OMEPRAZOLE (PRILOSEC) 20 MG CAPSULE] Take 20 mg by mouth daily before breakfast.  polyethylene glycol (MIRALAX) 17 gram packet, [POLYETHYLENE GLYCOL (MIRALAX) 17 GRAM PACKET] Take 17 g by mouth daily.  valsartan (DIOVAN) 40 MG tablet, TAKE 1 TABLET BY MOUTH IN THE MORNING    cyanocobalamin injection 1,000 mcg  denosumab (PROLIA) injection 60 mg  denosumab (PROLIA) injection 60 mg         PHYSICAL EXAMINATION  VITALS: /72 (BP Location: Right arm, Patient Position: Sitting)   Pulse 68   Ht 1.651 m (5' 5\")   Wt 85.3 kg (188 lb)   BMI 31.28 kg/m    GENERAL: Healthy appearing, alert, no acute distress, normal habitus.  CARDIOVASCULAR: Extremities warm and well perfused. Pulses present.   NEUROLOGICAL:  Patient is awake and oriented to self, place and time.  Attention span is normal.  Memory is grossly intact.  Language is fluent and follows commands appropriately.  Appropriate fund of knowledge. Cranial nerves 2-12 are intact. There is no pronator drift.  Motor exam shows 5/5 strength in all extremities.  Tone is symmetric bilaterally in upper and " lower extremities.  No cogwheeling or tremor noted.  (Previously reflexes are symmetric and 2+ in upper extremities and absent in lower extremities.) Sensory exam is grossly intact to light touch, pin prick and vibration intact today.  Previously this was decreased up to the knees. Finger to nose and heel to shin is without dysmetria.  Romberg is negative.  Gait is almost normal with bilateral decreased arm swing.  Mild difficulty with tandem.  Previously drawing concentric circles did not show any tremor.  Exam unchanged compared to before though the balance is steady.    DIAGNOSTICS  RELEVANT LABS  TSH 2.01   A1c 6.7    Carotid US 2020  1: Right: 1-39% carotid artery stenosis with mild velocities and antegrade   vertebral flow.   2: Left: 1-39% carotid artery stenosis with mild velocities and antegrade   vertebral flow.   3: Essentially unchanged.   4: Recommend a two year follow-up if patient remains asymptomatic.       CT head 2020  IMPRESSION   1. No acute intracranial findings.   2. Senescent changes.     MRI 2013  IMPRESSION:   Scattered small vessel ischemic disease including pontine involvement.      OUTSIDE RECORDS  Outside referral notes and chart notes were reviewed and pertinent information has been summarized (in addition to the HPI):-Patient has a diagnosis of Parkinson's disease.  Diagnosed in 2015.  Is looking to establish with a neurologist.  Is on Sinemet  times a day.  Also has trigeminal neuralgia controlled with gabapentin 300 mg p.o. 4 times a day.  Also has bilateral carotid artery stenosis.  Is moving here from [place].    LABS    Component      Latest Ref Rng & Units 7/27/2021           3:50 PM   Total Protein Serum for ELP      6.0 - 8.0 g/dL 6.8   Albumin %      51.0 - 67.0 % 66.6   Albumin Fraction      3.2 - 4.7 g/dL 4.5   Alpha 1 %      2.0 - 4.0 % 1.9 (L)   Alpha 1 Fraction      0.1 - 0.3 g/dL 0.1   Alpha 2 %      5.0 - 13.0 % 9.8   Alpha 2 Fraction      0.4 - 0.9 g/dL 0.7    Beta %      10.0 - 17.0 % 10.5   Beta Fraction      0.7 - 1.2 g/dL 0.7   Gamma Globulin %      9.0 - 20.0 % 11.2   Gamma Fraction      0.6 - 1.4 g/dL 0.8   ELP Interpretation:       Unremarkable protein electrophoresis.   Path ICD       G62.9   Interpreted By       Lisa Cantu MD   Immunofixation ELP          VINICIO interpretation      Negative Borderline Positive (A)   VINICIO pattern 1       Homogeneous   VINICIO titer 1       1:80   Vitamin B12      213 - 816 pg/mL 383   Vitamin B1 Whole Blood Level      70 - 180 nmol/L 184 (H)   Methylmalonic Acid      0.00 - 0.40 umol/L 1.02 (H)   Lyme Disease Antibodies Serum      <0.90 0.04     Component      Latest Ref Rng & Units 7/27/2021           3:50 PM   Total Protein Serum for ELP      6.0 - 8.0 g/dL 6.9   Albumin %      51.0 - 67.0 %    Albumin Fraction      3.2 - 4.7 g/dL    Alpha 1 %      2.0 - 4.0 %    Alpha 1 Fraction      0.1 - 0.3 g/dL    Alpha 2 %      5.0 - 13.0 %    Alpha 2 Fraction      0.4 - 0.9 g/dL    Beta %      10.0 - 17.0 %    Beta Fraction      0.7 - 1.2 g/dL    Gamma Globulin %      9.0 - 20.0 %    Gamma Fraction      0.6 - 1.4 g/dL    ELP Interpretation:          Path ICD       G62.9   Interpreted By       Lisa Cantu MD   Immunofixation ELP       Unremarkable immunofixation electrophoresis.   VINICIO interpretation      Negative    VINICIO pattern 1          VINICIO titer 1          Vitamin B12      213 - 816 pg/mL    Vitamin B1 Whole Blood Level      70 - 180 nmol/L    Methylmalonic Acid      0.00 - 0.40 umol/L    Lyme Disease Antibodies Serum      <0.90      EMG  CLINICAL INTERPRETATION:  This is a mildly abnormal nerve conduction and EMG study.  The abnormalities seen above could suggest early/mild sensorimotor polyneuropathy though could be artifactual also.  Further clinical correlation is needed.     PCP notes regarding B12 reviewed.     Component      Latest Ref Rng & Units 1/20/2022 3/22/2022   VINICIO interpretation      Negative Borderline  Positive (A)    VINICIO pattern 1       Homogeneous    VINICIO titer 1       1:80    Vitamin B12      213 - 816 pg/mL 1,908 (H)    Methylmalonic Acid      0.00 - 0.40 umol/L 0.35    Hemoglobin A1C      0.0 - 5.6 %  7.6 (H)       IMPRESSION/REPORT/PLAN  Parkinson's disease (H)  Trigeminal neuralgia  Questionable neuropathy-questionable related to diabetes versus B12  Type 2 diabetes mellitus with diabetic polyneuropathy, without long-term current use of insulin (H)   Low B12  Positive VINICIO    This is a 88 year old female with  1.  Trigeminal neuralgia:-MRI brain in 2013 did not show any structural lesions.  Currently pain is under good control with gabapentin and will continue gabapentin.  2.  Parkinson's disease:-Examination does not show a lot of evidence of Parkinson's disease.  Possibly this is being masked by use of Sinemet (was started previously by other provider).  She does have decreased arm swing today.  Exam today of the Sinemet shows no evidence of parkinsonism.  Discussed about stopping the medication though she would prefer to continue the medication for right now.  Physical therapy made things worse and I would encourage her to exercise on her own.  We will continue current dose of Sinemet.  3.  On examination she has a wide-based gait with decreased pinprick sensation in her legs.  Examination suggestive of neuropathy.  Examination does look better today..  EMG shows questionable neuropathy versus artifact.   She does have diabetes which could be a cause of her neuropathy.  A1c is elevated at 7.6 and encouraged her to exercise and cut down on the sweets.  Previously B12 was low and injection/tablets were used.  B12 looks normal at this point.  She can continue oral tablets only.    4.  She does have a positive VINICIO.  I think this is a false positive.  Repeat VINICIO was positive as well.  Reports some joint pain related to the valsartan.  Can discuss further with primary care.  Could be related to age.    I can see  her back in 6 months    -     gabapentin (NEURONTIN) 300 MG capsule; TAKE 1 CAPSULE BY MOUTH 4  TIMES DAILY  -     carbidopa-levodopa (SINEMET)  MG tablet; TAKE 1 TABLET BY MOUTH 3  TIMES DAILY TAKE AT LEAST  1/2 HOUR BEFORE MEALS OR 2  HOURS AFTER MEALS DO NOT  MIX WITH FOOD  -     cyanocobalamin (VITAMIN B-12) 1000 MCG tablet; Take 1 tablet (1,000 mcg) by mouth daily    Return in about 6 months (around 11/19/2022) for In-Clinic Visit (must), After testing.    Over 36 minutes were spent coordinating the care for the patient on the day of the encounter.  This includes previsit, during visit and post visit activities as documented above.  Multiple problems reviewed/addressed with prescription management.  Counseling patient.  Blood work reviewed.  (Activities include but not inclusive of reviewing chart, reviewing outside records, reviewing labs and imaging study results as well as the images, patient visit time including getting history and exam,  use if applicable, review of test results with the patient and coming up with a plan in a shared model, counseling patient and family, education and answering patient questions, EMR , EMR diagnosis entry and problem list management, medication reconciliation and prescription management if applicable, paperwork if applicable, printing documents and documentation of the visit activities.)    Brennon Moya MD  Neurologist  [facility]  Tel:- [phone]    This note was dictated using voice recognition software.  Any grammatical or context distortions are unintentional and inherent to the software.

## 2022-05-20 ENCOUNTER — TELEPHONE (OUTPATIENT)
Dept: ENDOCRINOLOGY | Facility: CLINIC | Age: 87
End: 2022-05-20
Payer: MEDICARE

## 2022-05-20 DIAGNOSIS — M81.0 OSTEOPOROSIS WITHOUT CURRENT PATHOLOGICAL FRACTURE, UNSPECIFIED OSTEOPOROSIS TYPE: Primary | ICD-10-CM

## 2022-05-20 DIAGNOSIS — Z92.29 PERSONAL HISTORY OF OTHER DRUG THERAPY: ICD-10-CM

## 2022-05-20 NOTE — TELEPHONE ENCOUNTER
Left message for patient to call Endocrine Scheduling line to establish care with another Endocrine provider.

## 2022-05-23 ENCOUNTER — TELEPHONE (OUTPATIENT)
Dept: NEUROLOGY | Facility: CLINIC | Age: 87
End: 2022-05-23
Payer: MEDICARE

## 2022-05-23 DIAGNOSIS — G20.A1 PARKINSON'S DISEASE (H): ICD-10-CM

## 2022-05-23 DIAGNOSIS — G50.0 TRIGEMINAL NEURALGIA: ICD-10-CM

## 2022-05-23 RX ORDER — GABAPENTIN 300 MG/1
CAPSULE ORAL
Qty: 360 CAPSULE | Refills: 3 | Status: SHIPPED | OUTPATIENT
Start: 2022-05-23 | End: 2022-11-28

## 2022-05-23 RX ORDER — CARBIDOPA AND LEVODOPA 25; 100 MG/1; MG/1
TABLET ORAL
Qty: 270 TABLET | Refills: 3 | Status: SHIPPED | OUTPATIENT
Start: 2022-05-23 | End: 2022-11-28

## 2022-05-23 NOTE — TELEPHONE ENCOUNTER
[facility] Call Center    Phone Message    May a detailed message be left on voicemail: yes     Reason for Call: Medication Question or concern regarding medication   Prescription Clarification  Name of Medication: Gabapentin, and Carbidopa-Levodopa   Prescribing Provider: BARTOLOME Moya   Pharmacy: [facility] pharmacy is [facility]       What on the order needs clarification?     Medication sent to wrong pharmacy.          Action Taken: Message routed to:  [facility]    Travel Screening: Not Applicable                                                                           We are committed to providing our patients with their test results in a timely manner. If you have access to [employer], you will receive your results within 5 to 7 days. If your results are normal, a member of our office may call you or you may receive a letter in the mail within 10 to 15 days of your testing. If your results have something additional to discuss, a member of our office will contact you by phone. If at any time you have questions related your results, please feel free to call our office at [phone]

## 2022-05-23 NOTE — TELEPHONE ENCOUNTER
Pt established with  scheduled for 12/2/22    Per Protocol One prolia to be ordered by Kayla Alvarez NP

## 2022-05-27 ENCOUNTER — OFFICE VISIT (OUTPATIENT)
Dept: FAMILY MEDICINE | Facility: CLINIC | Age: 87
End: 2022-05-27
Payer: MEDICARE

## 2022-05-27 VITALS
HEART RATE: 71 BPM | BODY MASS INDEX: 31.12 KG/M2 | WEIGHT: 186.8 LBS | DIASTOLIC BLOOD PRESSURE: 80 MMHG | RESPIRATION RATE: 16 BRPM | SYSTOLIC BLOOD PRESSURE: 134 MMHG | HEIGHT: 65 IN | OXYGEN SATURATION: 95 %

## 2022-05-27 DIAGNOSIS — I10 ESSENTIAL HYPERTENSION, BENIGN: Primary | ICD-10-CM

## 2022-05-27 DIAGNOSIS — Z00.00 PREVENTATIVE HEALTH CARE: ICD-10-CM

## 2022-05-27 PROCEDURE — 99213 OFFICE O/P EST LOW 20 MIN: CPT | Performed by: FAMILY MEDICINE

## 2022-05-27 RX ORDER — LISINOPRIL 10 MG/1
10 TABLET ORAL DAILY
Qty: 30 TABLET | Refills: 0 | Status: SHIPPED | OUTPATIENT
Start: 2022-05-27 | End: 2022-06-28

## 2022-05-27 NOTE — PROGRESS NOTES
"  Assessment & Plan   Problem List Items Addressed This Visit        Circulatory    Essential hypertension, benign - Primary     bp controlled on carvedilol 12.5 mg po q day and losartan, but the patient thinks the losartan is affecting her vision so losartan is discontinued in favor of lisinopril which she took in the past and tolerated well.   Discontinue losartan  Continue carvedilol 12.5mg po q day  Start lisinopril 10 mg po q day.   Follow up in 1 month to get bmp and bp to ensure working without side effects.            Relevant Medications    lisinopril (ZESTRIL) 10 MG tablet       Other    Preventative health care     Prevent   Due for medicare annual wellness visit.                    BMI:   Estimated body mass index is 31.09 kg/m  as calculated from the following:    Height as of this encounter: 1.651 m (5' 5\").    Weight as of this encounter: 84.7 kg (186 lb 12.8 oz).   Weight management plan: Discussed healthy diet and exercise guidelines    Next visit with me is 6/28/22.     Maggie Arriaga MD  [facility]      Chief Complaint   Patient presents with     Recheck Medication     Valsartan      Imm/Inj     Discontinued Vitamin B12 injection by Neurologist         Subjective   Janes is a 88 year old who presents for the following health issues     History of Present Illness       Diabetes:   She presents for follow up of diabetes.  She is not checking blood glucose. She has no concerns regarding her diabetes at this time.  She is not experiencing numbness or burning in feet, excessive thirst, blurry vision, weight changes or redness, sores or blisters on feet. The patient has had a diabetic eye exam in the last 12 months. Eye exam performed on May 19,2022. Location of last eye exam [facility].        Hypertension: She presents for follow up of hypertension.  She does check blood pressure  regularly outside of the clinic. Outpatient blood pressures have not been over 140/90. " "She does not follow a low salt diet.             Objective    /80 (BP Location: Left arm, Patient Position: Sitting, Cuff Size: Adult Large)   Pulse 71   Resp 16   Ht 1.651 m (5' 5\")   Wt 84.7 kg (186 lb 12.8 oz)   SpO2 95%   BMI 31.09 kg/m    Body mass index is 31.09 kg/m .  Physical Exam  Constitutional:       Appearance: Normal appearance.   HENT:      Head: Normocephalic and atraumatic.   Cardiovascular:      Rate and Rhythm: Normal rate and regular rhythm.   Pulmonary:      Effort: Pulmonary effort is normal.   Musculoskeletal:         General: Normal range of motion.      Cervical back: Normal range of motion and neck supple.   Neurological:      General: No focal deficit present.      Mental Status: She is alert and oriented to person, place, and time.                      "

## 2022-06-01 PROBLEM — Z00.00 PREVENTATIVE HEALTH CARE: Status: ACTIVE | Noted: 2022-06-01

## 2022-06-01 NOTE — ASSESSMENT & PLAN NOTE
bp controlled on carvedilol 12.5 mg po q day and losartan, but the patient thinks the losartan is affecting her vision so losartan is discontinued in favor of lisinopril which she took in the past and tolerated well.   Discontinue losartan  Continue carvedilol 12.5mg po q day  Start lisinopril 10 mg po q day.   Follow up in 1 month to get bmp and bp to ensure working without side effects.

## 2022-06-21 ENCOUNTER — ALLIED HEALTH/NURSE VISIT (OUTPATIENT)
Dept: ENDOCRINOLOGY | Facility: CLINIC | Age: 87
End: 2022-06-21
Payer: MEDICARE

## 2022-06-21 DIAGNOSIS — M81.0 OSTEOPOROSIS WITHOUT CURRENT PATHOLOGICAL FRACTURE, UNSPECIFIED OSTEOPOROSIS TYPE: Primary | ICD-10-CM

## 2022-06-21 PROCEDURE — 99207 PR NO CHARGE NURSE ONLY: CPT

## 2022-06-21 PROCEDURE — 96372 THER/PROPH/DIAG INJ SC/IM: CPT | Performed by: NURSE PRACTITIONER

## 2022-06-21 NOTE — PROGRESS NOTES
"Prolia Injection Phone Screen      Screening questions have been asked 2-3 days prior to administration visit for Prolia. If any questions are answered with \"Yes,\" this phone encounter were will routed to ordering provider for further evaluation.     1.  When was the last injection?  12/15/21    2.  Has insurance for this injection been verified?  Yes    3.  Did you experience any new onset achiness or rashes that lasted for over a month with your previous Prolia injection?   No    4.  Do you have a fever over 101?F or a new deep cough that is unusual for you today? No    5.  Have you started any new medications in the last 6 months that you were told could affect your immune system? These may have been prescribed by oncologist, transplant, rheumatology, or dermatology.   No    6.  In the last 6 months have you have gastric bypass or parathyroid surgery?   No    7.  Do you plan dental work requiring drilling into the bone such as implants/extractions or oral surgery in the next 2-3 months?   No    8. Do you have new insurance since the last injection?    Patient informed if symptoms discussed above present prior to their administration appointment, they are to notify clinic immediately.     Marianela Nix RN          The following steps were completed to comply with the REMS program for Prolia:  1. Ordering provider has previously reviewed information in the Medication Guide and Patient Counseling Chart, including the serious risks of Prolia  and the symptoms of each risk and have been advised to seek prompt medical attention if they have signs or symptoms of any of the serious risks.  2. Provided each patient a copy of the Medication Guide and Patient Brochure.  See MAR for administration details.   Indication: Prolia  (denosumab) is a prescription medicine used to treat osteoporosis in patients who:   Are at high risk for fracture, meaning patients who have had a fracture related to osteoporosis, or who have " multiple risk factors for fracture; Cannot use another osteoporosis medicine or other osteoporosis medicines did not work well.   The timeline for early/late injections would be 4 weeks early and any time after the 6 month leonard. If a patient receives their injection late, then the subsequent injection would be 6 months from the date that they actually received the injection    Have the screening questions been asked prior to this administration? Yes      The following medication was given:     MEDICATION: Denosumab (Prolia) 60 mg/ml SOLN  ROUTE: SQ  SITE: Arm - Right  DOSE: 60 mg  LOT #: [id number]  :  [employer]  EXPIRATION DATE:  07/2024  NDC#: see mar

## 2022-06-22 ASSESSMENT — ENCOUNTER SYMPTOMS
DIARRHEA: 0
HEARTBURN: 0
DIZZINESS: 0
MYALGIAS: 0
HEMATOCHEZIA: 0
SORE THROAT: 0
BREAST MASS: 0
NERVOUS/ANXIOUS: 0
CONSTIPATION: 0
HEMATURIA: 0
DYSURIA: 0
FEVER: 0
EYE PAIN: 0
SHORTNESS OF BREATH: 0
NAUSEA: 0
PARESTHESIAS: 0
WEAKNESS: 0
PALPITATIONS: 0
HEADACHES: 0
ARTHRALGIAS: 0
FREQUENCY: 0
COUGH: 0
CHILLS: 0
ABDOMINAL PAIN: 0
JOINT SWELLING: 0

## 2022-06-22 ASSESSMENT — ACTIVITIES OF DAILY LIVING (ADL): CURRENT_FUNCTION: NO ASSISTANCE NEEDED

## 2022-06-28 ENCOUNTER — OFFICE VISIT (OUTPATIENT)
Dept: FAMILY MEDICINE | Facility: CLINIC | Age: 87
End: 2022-06-28
Attending: FAMILY MEDICINE
Payer: MEDICARE

## 2022-06-28 VITALS
SYSTOLIC BLOOD PRESSURE: 128 MMHG | BODY MASS INDEX: 31.32 KG/M2 | DIASTOLIC BLOOD PRESSURE: 78 MMHG | WEIGHT: 188 LBS | HEIGHT: 65 IN | HEART RATE: 88 BPM | OXYGEN SATURATION: 91 %

## 2022-06-28 DIAGNOSIS — G20.A1 PARKINSON'S DISEASE (H): ICD-10-CM

## 2022-06-28 DIAGNOSIS — Z51.81 ENCOUNTER FOR THERAPEUTIC DRUG MONITORING: ICD-10-CM

## 2022-06-28 DIAGNOSIS — E03.8 SUBCLINICAL HYPOTHYROIDISM: ICD-10-CM

## 2022-06-28 DIAGNOSIS — G50.0 TRIGEMINAL NEURALGIA: ICD-10-CM

## 2022-06-28 DIAGNOSIS — N18.31 STAGE 3A CHRONIC KIDNEY DISEASE (H): ICD-10-CM

## 2022-06-28 DIAGNOSIS — I44.7 LEFT BUNDLE BRANCH BLOCK: ICD-10-CM

## 2022-06-28 DIAGNOSIS — Z00.00 PREVENTATIVE HEALTH CARE: ICD-10-CM

## 2022-06-28 DIAGNOSIS — J01.10 ACUTE NON-RECURRENT FRONTAL SINUSITIS: ICD-10-CM

## 2022-06-28 DIAGNOSIS — Z95.1 S/P CABG (CORONARY ARTERY BYPASS GRAFT): ICD-10-CM

## 2022-06-28 DIAGNOSIS — I10 ESSENTIAL HYPERTENSION, BENIGN: ICD-10-CM

## 2022-06-28 DIAGNOSIS — E78.2 MIXED HYPERCHOLESTEROLEMIA AND HYPERTRIGLYCERIDEMIA: ICD-10-CM

## 2022-06-28 DIAGNOSIS — N25.81 SECONDARY RENAL HYPERPARATHYROIDISM (H): ICD-10-CM

## 2022-06-28 DIAGNOSIS — E11.9 TYPE 2 DIABETES MELLITUS WITHOUT COMPLICATION, WITHOUT LONG-TERM CURRENT USE OF INSULIN (H): Primary | ICD-10-CM

## 2022-06-28 DIAGNOSIS — K21.9 GASTROESOPHAGEAL REFLUX DISEASE WITHOUT ESOPHAGITIS: ICD-10-CM

## 2022-06-28 DIAGNOSIS — M81.0 AGE-RELATED OSTEOPOROSIS WITHOUT CURRENT PATHOLOGICAL FRACTURE: ICD-10-CM

## 2022-06-28 DIAGNOSIS — I36.1 NONRHEUMATIC TRICUSPID VALVE REGURGITATION: ICD-10-CM

## 2022-06-28 DIAGNOSIS — E53.8 VITAMIN B12 DEFICIENCY (NON ANEMIC): ICD-10-CM

## 2022-06-28 DIAGNOSIS — Z00.00 ENCOUNTER FOR MEDICARE ANNUAL WELLNESS EXAM: ICD-10-CM

## 2022-06-28 DIAGNOSIS — I34.0 NONRHEUMATIC MITRAL VALVE REGURGITATION: ICD-10-CM

## 2022-06-28 LAB
ANION GAP SERPL CALCULATED.3IONS-SCNC: 10 MMOL/L (ref 5–18)
BUN SERPL-MCNC: 24 MG/DL (ref 8–28)
CALCIUM SERPL-MCNC: 10.3 MG/DL (ref 8.5–10.5)
CHLORIDE BLD-SCNC: 103 MMOL/L (ref 98–107)
CO2 SERPL-SCNC: 27 MMOL/L (ref 22–31)
CREAT SERPL-MCNC: 0.97 MG/DL (ref 0.6–1.1)
GFR SERPL CREATININE-BSD FRML MDRD: 56 ML/MIN/1.73M2
GLUCOSE BLD-MCNC: 204 MG/DL (ref 70–125)
HBA1C MFR BLD: 8 % (ref 0–5.6)
HOLD SPECIMEN: NORMAL
HOLD SPECIMEN: NORMAL
POTASSIUM BLD-SCNC: 4.4 MMOL/L (ref 3.5–5)
SODIUM SERPL-SCNC: 140 MMOL/L (ref 136–145)

## 2022-06-28 PROCEDURE — 83036 HEMOGLOBIN GLYCOSYLATED A1C: CPT | Performed by: FAMILY MEDICINE

## 2022-06-28 PROCEDURE — G0439 PPPS, SUBSEQ VISIT: HCPCS | Performed by: FAMILY MEDICINE

## 2022-06-28 PROCEDURE — 99213 OFFICE O/P EST LOW 20 MIN: CPT | Mod: 25 | Performed by: FAMILY MEDICINE

## 2022-06-28 PROCEDURE — 80048 BASIC METABOLIC PNL TOTAL CA: CPT | Performed by: FAMILY MEDICINE

## 2022-06-28 PROCEDURE — 36415 COLL VENOUS BLD VENIPUNCTURE: CPT | Performed by: FAMILY MEDICINE

## 2022-06-28 RX ORDER — LISINOPRIL 10 MG/1
10 TABLET ORAL DAILY
Qty: 90 TABLET | Refills: 3 | Status: SHIPPED | OUTPATIENT
Start: 2022-06-28 | End: 2022-06-28

## 2022-06-28 RX ORDER — LISINOPRIL 10 MG/1
10 TABLET ORAL DAILY
Qty: 30 TABLET | Refills: 0 | Status: SHIPPED | OUTPATIENT
Start: 2022-06-28 | End: 2022-07-18

## 2022-06-28 RX ORDER — AZITHROMYCIN 250 MG/1
TABLET, FILM COATED ORAL
Qty: 6 TABLET | Refills: 0 | Status: SHIPPED | OUTPATIENT
Start: 2022-06-28 | End: 2022-07-03

## 2022-06-28 ASSESSMENT — ENCOUNTER SYMPTOMS
ABDOMINAL PAIN: 0
JOINT SWELLING: 0
HEARTBURN: 0
WEAKNESS: 0
CHILLS: 0
MYALGIAS: 0
NERVOUS/ANXIOUS: 0
COUGH: 0
PARESTHESIAS: 0
FREQUENCY: 0
NAUSEA: 0
HEMATURIA: 0
FEVER: 0
SHORTNESS OF BREATH: 0
PALPITATIONS: 0
ARTHRALGIAS: 0
DIARRHEA: 0
CONSTIPATION: 0
DIZZINESS: 0
EYE PAIN: 0
BREAST MASS: 0
SORE THROAT: 0
DYSURIA: 0
HEADACHES: 0
HEMATOCHEZIA: 0

## 2022-06-28 ASSESSMENT — ACTIVITIES OF DAILY LIVING (ADL): CURRENT_FUNCTION: NO ASSISTANCE NEEDED

## 2022-06-28 NOTE — ASSESSMENT & PLAN NOTE
>>ASSESSMENT AND PLAN FOR S/P CABG (CORONARY ARTERY BYPASS GRAFT) WRITTEN ON 6/28/2022  4:24 PM BY ASHLY MORRISON MD    Managed by cardiologist Dr. Ricardo.

## 2022-06-28 NOTE — ASSESSMENT & PLAN NOTE
Normal TSH noted in March.  We will continue levothyroxine 25 mcg orally per day.  Also followed by endocrinology.

## 2022-06-28 NOTE — ASSESSMENT & PLAN NOTE
Controlled today.   Continue carvedilol 12.5mg po q day  continue lisinopril 10 mg po q day.   Due to recent med change bmp today.

## 2022-06-28 NOTE — PATIENT INSTRUCTIONS
Patient Education   Personalized Prevention Plan  You are due for the preventive services outlined below.  Your care team is available to assist you in scheduling these services.  If you have already completed any of these items, please share that information with your care team to update in your medical record.  Health Maintenance Due   Topic Date Due     Diabetic Foot Exam  Never done     Discuss Advance Care Planning  Never done     Depression Action Plan  Never done     Annual Wellness Visit  Never done     A1C Lab  06/22/2022     Zoster (Shingles) Vaccine (3 of 3) 05/03/2022

## 2022-06-28 NOTE — LETTER
My Depression Action Plan  Name: Janes Montero   Date of Birth 10/24/1933  Date: 6/28/2022    My doctor: Maggie Arriaga   [facility] [address] [facility] [id number] [phone]          GREEN    ZONE   Good Control    What it looks like:     Things are going generally well. You have normal ups and downs. You may even feel depressed from time to time, but bad moods usually last less than a day.   What you need to do:  1. Continue to care for yourself (see self care plan)  2. Check your depression survival kit and update it as needed  3. Follow your physician s recommendations including any medication.  4. Do not stop taking medication unless you consult with your physician first.           YELLOW         ZONE Getting Worse    What it looks like:     Depression is starting to interfere with your life.     It may be hard to get out of bed; you may be starting to isolate yourself from others.    Symptoms of depression are starting to last most all day and this has happened for several days.     You may have suicidal thoughts but they are not constant.   What you need to do:     1. Call your care team. Your response to treatment will improve if you keep your care team informed of your progress. Yellow periods are signs an adjustment may need to be made.     2. Continue your self-care.  Just get dressed and ready for the day.  Don't give yourself time to talk yourself out of it.    3. Talk to someone in your support network.    4. Open up your Depression Self-Care Plan/Wellness Kit.           RED    ZONE Medical Alert - Get Help    What it looks like:     Depression is seriously interfering with your life.     You may experience these or other symptoms: You can t get out of bed most days, can t work or engage in other necessary activities, you have trouble taking care of basic hygiene, or basic responsibilities, thoughts of suicide or death that  will not go away, self-injurious behavior.     What you need to do:  1. Call your care team and request a same-day appointment. If they are not available (weekends or after hours) call your local crisis line, emergency room or 911.          Depression Self-Care Plan / Wellness Kit    Many people find that medication and therapy are helpful treatments for managing depression. In addition, making small changes to your everyday life can help to boost your mood and improve your wellbeing. Below are some tips for you to consider. Be sure to talk with your medical provider and/or behavioral health consultant if your symptoms are worsening or not improving.     Sleep   Sleep hygiene  means all of the habits that support good, restful sleep. It includes maintaining a consistent bedtime and wake time, using your bedroom only for sleeping or sex, and keeping the bedroom dark and free of distractions like a computer, smartphone, or television.     Develop a Healthy Routine  Maintain good hygiene. Get out of bed in the morning, make your bed, brush your teeth, take a shower, and get dressed. Don t spend too much time viewing media that makes you feel stressed. Find time to relax each day.    Exercise  Get some form of exercise every day. This will help reduce pain and release endorphins, the  feel good  chemicals in your brain. It can be as simple as just going for a walk or doing some gardening, anything that will get you moving.      Diet  Strive to eat healthy foods, including fruits and vegetables. Drink plenty of water. Avoid excessive sugar, caffeine, alcohol, and other mood-altering substances.     Stay Connected with Others  Stay in touch with friends and family members.    Manage Your Mood  Try deep breathing, massage therapy, biofeedback, or meditation. Take part in fun activities when you can. Try to find something to smile about each day.     Psychotherapy  Be open to working with a therapist if your provider  recommends it.     Medication  Be sure to take your medication as prescribed. Most anti-depressants need to be taken every day. It usually takes several weeks for medications to work. Not all medicines work for all people. It is important to follow-up with your provider to make sure you have a treatment plan that is working for you. Do not stop your medication abruptly without first discussing it with your provider.    Crisis Resources   These hotlines are for both adults and children. They and are open 24 hours a day, 7 days a week unless noted otherwise.      National Suicide Prevention Lifeline   1-750-538-OVUF (9168)      Crisis Text Line    www.crisistextline.org  Text HOME to 517432 from anywhere in the [place], anytime, about any type of crisis. A live, trained crisis counselor will receive the text and respond quickly.      [facility] Lifeline for LGBTQ Youth  A national crisis intervention and suicide lifeline for LGBTQ youth under 25. Provides a safe place to talk without judgement. Call 1-630.735.3285; text START to 796684 or visit www.thetrevorproject.org to talk to a trained counselor.      For [facility] crisis numbers, visit the [facility] website at:  https://[place].gov/dhs/people-we-serve/adults/health-care/mental-health/resources/crisis-contacts.jsp

## 2022-06-28 NOTE — PROGRESS NOTES
"SUBJECTIVE:   Janes Montero is a 88 year old female who presents for Preventive Visit.    Patient has been advised of split billing requirements and indicates understanding: No  Are you in the first 12 months of your Medicare coverage?  No    Healthy Habits:     In general, how would you rate your overall health?  Good    Frequency of exercise:  2-3 days/week    Duration of exercise:  Less than 15 minutes    Do you usually eat at least 4 servings of fruit and vegetables a day, include whole grains    & fiber and avoid regularly eating high fat or \"junk\" foods?  No    Taking medications regularly:  Yes    Medication side effects:  Muscle aches    Ability to successfully perform activities of daily living:  No assistance needed    Home Safety:  No safety concerns identified    Hearing Impairment:  No hearing concerns    In the past 6 months, have you been bothered by leaking of urine?  No    In general, how would you rate your overall mental or emotional health?  Good      PHQ-2 Total Score: 1    Additional concerns today:  No    Do you feel safe in your environment? Yes    Have you ever done Advance Care Planning? (For example, a Health Directive, POLST, or a discussion with a medical provider or your loved ones about your wishes): Yes, patient states has an Advance Care Planning document and will bring a copy to the clinic.       Fall risk  Fallen 2 or more times in the past year?: No  Any fall with injury in the past year?: No  click delete button to remove this line now  Cognitive Screening   1) Repeat 3 items (Leader, Season, Table)    2) Clock draw: NORMAL  3) 3 item recall: Recalls 2 objects   Results: NORMAL clock, 1-2 items recalled: COGNITIVE IMPAIRMENT LESS LIKELY    Mini-CogTM Copyright REYNALDO Cottrell. Licensed by the author for use in [facility]; reprinted with permission (evaristo@.[facility]). All rights reserved.      Reviewed and updated as needed this visit by clinical staff   Tobacco  Allergies  " Meds  Problems  Med Hx  Surg Hx             Reviewed and updated as needed this visit by Provider    Allergies  Meds  Problems  Med Hx  Surg Hx            Social History     Tobacco Use     Smoking status: Never Smoker     Smokeless tobacco: Never Used   Substance Use Topics     Alcohol use: No       Alcohol Use 6/22/2022   Prescreen: >3 drinks/day or >7 drinks/week? No     Current providers sharing in care for this patient include:   Patient Care Team:  Maggie Arriaga MD as PCP - General (Family Medicine)  Maggie Arriaga MD as Assigned PCP  Brennon Moya MD as MD (Neurology)  Mathew Ricardo MD as MD (Cardiovascular Disease)  Abner Curtis MD as MD (Endocrinology, Diabetes, and Metabolism)  Brennon Moya MD as Assigned Neuroscience Provider  Itzel Bashir MD as Assigned Surgical Provider  Abner Curtis MD as Assigned Endocrinology Provider  Mathew Ricardo MD as Assigned Heart and Vascular Provider  Lauri Mckeon DPM as Assigned Musculoskeletal Provider  Eric Christensen MD as MD (Endocrinology, Diabetes, and Metabolism)    The following health maintenance items are reviewed in Epic and correct as of today:  Health Maintenance Due   Topic Date Due     DIABETIC FOOT EXAM  Never done     ZOSTER IMMUNIZATION (3 of 3) 05/03/2022       Mammogram Screening - Mammography discussed and declined  Pertinent mammograms are reviewed under the imaging tab.    Review of Systems   Constitutional: Negative for chills and fever.   HENT: Negative for congestion, ear pain, hearing loss and sore throat.    Eyes: Negative for pain and visual disturbance.   Respiratory: Negative for cough and shortness of breath.    Cardiovascular: Negative for chest pain, palpitations and peripheral edema.   Gastrointestinal: Negative for abdominal pain, constipation, diarrhea, heartburn, hematochezia and nausea.   Breasts:  Negative for tenderness, breast mass  "and discharge.   Genitourinary: Negative for dysuria, frequency, genital sores, hematuria, pelvic pain, urgency, vaginal bleeding and vaginal discharge.   Musculoskeletal: Negative for arthralgias, joint swelling and myalgias.   Skin: Negative for rash.   Neurological: Negative for dizziness, weakness, headaches and paresthesias.   Psychiatric/Behavioral: Negative for mood changes. The patient is not nervous/anxious.          OBJECTIVE:   /78 (BP Location: Left arm, Patient Position: Left side, Cuff Size: Adult Large)   Pulse 88   Ht 1.651 m (5' 5\")   Wt 85.3 kg (188 lb)   SpO2 91%   BMI 31.28 kg/m   Estimated body mass index is 31.28 kg/m  as calculated from the following:    Height as of this encounter: 1.651 m (5' 5\").    Weight as of this encounter: 85.3 kg (188 lb).  Physical Exam  Constitutional:       Appearance: Normal appearance.   HENT:      Head: Normocephalic and atraumatic.      Comments: She complains of pressure in her forehead.     Right Ear: Tympanic membrane, ear canal and external ear normal.      Left Ear: Tympanic membrane, ear canal and external ear normal.      Nose: Congestion present. No rhinorrhea.      Mouth/Throat:      Mouth: Mucous membranes are moist.      Pharynx: Oropharynx is clear.   Eyes:      Conjunctiva/sclera: Conjunctivae normal.      Pupils: Pupils are equal, round, and reactive to light.   Cardiovascular:      Rate and Rhythm: Normal rate and regular rhythm.   Pulmonary:      Effort: Pulmonary effort is normal.   Musculoskeletal:         General: Normal range of motion.      Cervical back: Normal range of motion and neck supple.   Neurological:      General: No focal deficit present.      Mental Status: She is alert and oriented to person, place, and time.         Diabetic Foot Screen:  Any complaints of increased pain or numbness ? No  Is there a foot ulcer now or a history of foot ulcer? No  Does the foot have an abnormal shape? No  Are the nails thick, too long " or ingrown? No  Are there any redness or open areas? No         Sensation Testing done at all points on the diagram with monofilament     Right Foot: Sensation diminished at all points.  Discussed the importance of wearing hard soled shoes in the house and protecting the feet and looking at the feet every day.  She does these things.  Left Foot: Sensationdiminished at all points.  Discussed the importance of wearing hard soled shoes in the house and protecting the feet and looking at the feet every day.  She does these things.    Risk Category: 1- Loss of protective sensation with normal appearing foot (no weakness, deformity, callous, pre-ulcer or h/o ulceration)  Performed by Maggie Arriaga MD                 ASSESSMENT / PLAN:     Problem List Items Addressed This Visit        Nervous and Auditory    Parkinson's disease (H)     Managed by Dr. Moya and neurology           Trigeminal neuralgia     Managed by Dr. Moya and neurology              Digestive    GERD (gastroesophageal reflux disease)     Controlled with prilosec 20 mg po q day am.           Vitamin B12 deficiency (non anemic)     Managed by Dr. Moya in neurology              Endocrine    Subclinical hypothyroidism     Normal TSH noted in March.  We will continue levothyroxine 25 mcg orally per day.  Also followed by endocrinology.           Type 2 diabetes mellitus without complication (H) - Primary     Worsening, A1c has risen from 7.6 months ago to 8.0 today.  About a month ago she started walking around the building in which she lives and started eating all her vegetables before she eats the rest of her meal.    I am concerned that her A1c is inaccurate today and does not reflect her recent healthy lifestyle changes over the past 1 month.    Recommend follow-up in 3 months and recheck A1c at that point.  If still elevated would recommend adjustment of medications.    BMP today, will check creatinine and if it is adequate we can start  metformin at follow-up if A1c still needs it.           Relevant Orders    Hemoglobin A1c (Completed)    Secondary renal hyperparathyroidism (H)     She says they are no longer planning surgery. For now just watching and waiting.            Mixed hypercholesterolemia and hypertriglyceridemia     Sees Dr. Ricardo in cardiology.  Continue Lipitor 20 mg orally per day              Circulatory    Essential hypertension, benign     Controlled today.   Continue carvedilol 12.5mg po q day  continue lisinopril 10 mg po q day.   Due to recent med change bmp today.            Relevant Medications    lisinopril (ZESTRIL) 10 MG tablet    Tricuspid insufficiency     Managed by cardiology Dr. Ricardo           Mitral valve insufficiency     Managed by cardiology Dr. Ricardo           Left bundle branch block     Managed by cardiology Dr. Ricardo              Musculoskeletal and Integumentary    Age-related osteoporosis without current pathological fracture     Encounter for Prolia 6/2022 through endocrinology - switching to Dr Christensen               Urinary    CKD (chronic kidney disease) stage 3, GFR 30-59 ml/min (H)     BMP today.              Other    S/P CABG (coronary artery bypass graft)     Managed by cardiologist Dr. Ricardo.           Preventative health care     Zoster vaccine  Recommend sunscreen, exercise, & healthy diet.  Offered cbc, cmp, lipids and asked what other testing she  desires today  I have had an Advance Directives discussion with the patient. Has this at home, and will bring/ send in.  Body mass index is 31.28 kg/m .   mychart active.             Other Visit Diagnoses     Encounter for Medicare annual wellness exam        Encounter for therapeutic drug monitoring        Relevant Orders    Basic metabolic panel  (Ca, Cl, CO2, Creat, Gluc, K, Na, BUN)    Acute non-recurrent frontal sinusitis        Z-Ty given.  COVID test recommended but declined.    Relevant Medications    azithromycin (ZITHROMAX)  "250 MG tablet          Patient has been advised of split billing requirements and indicates understanding: Yes    COUNSELING:  Reviewed preventive health counseling, as reflected in patient instructions       Regular exercise       Healthy diet/nutrition       Osteoporosis prevention/bone health    Estimated body mass index is 31.28 kg/m  as calculated from the following:    Height as of this encounter: 1.651 m (5' 5\").    Weight as of this encounter: 85.3 kg (188 lb).    Weight management plan: Discussed healthy diet and exercise guidelines    She reports that she has never smoked. She has never used smokeless tobacco.      Appropriate preventive services were discussed with this patient, including applicable screening as appropriate for cardiovascular disease, diabetes, osteopenia/osteoporosis, and glaucoma.  As appropriate for age/gender, discussed screening for colorectal cancer, prostate cancer, breast cancer, and cervical cancer. Checklist reviewing preventive services available has been given to the patient.    Reviewed patients plan of care and provided an AVS. The Basic Care Plan (routine screening as documented in Health Maintenance) for Janes meets the Care Plan requirement. This Care Plan has been established and reviewed with the Patient.      Maggie Arriaga MD  [facility]    Identified Health Risks:  Answers for HPI/ROS submitted by the patient on 6/22/2022  If you checked off any problems, how difficult have these problems made it for you to do your work, take care of things at home, or get along with other people?: Not difficult at all  PHQ9 TOTAL SCORE: 2      "

## 2022-06-28 NOTE — ASSESSMENT & PLAN NOTE
Worsening, A1c has risen from 7.6 months ago to 8.0 today.  About a month ago she started walking around the building in which she lives and started eating all her vegetables before she eats the rest of her meal.    I am concerned that her A1c is inaccurate today and does not reflect her recent healthy lifestyle changes over the past 1 month.    Recommend follow-up in 3 months and recheck A1c at that point.  If still elevated would recommend adjustment of medications.    BMP today, will check creatinine and if it is adequate we can start metformin at follow-up if A1c still needs it.

## 2022-06-28 NOTE — ASSESSMENT & PLAN NOTE
Zoster vaccine  Recommend sunscreen, exercise, & healthy diet.  Offered cbc, cmp, lipids and asked what other testing she  desires today  I have had an Advance Directives discussion with the patient. Has this at home, and will bring/ send in.  Body mass index is 31.28 kg/m .   mychart active.

## 2022-07-14 ENCOUNTER — OFFICE VISIT (OUTPATIENT)
Dept: FAMILY MEDICINE | Facility: CLINIC | Age: 87
End: 2022-07-14
Payer: MEDICARE

## 2022-07-14 ENCOUNTER — HOSPITAL ENCOUNTER (OUTPATIENT)
Dept: GENERAL RADIOLOGY | Facility: HOSPITAL | Age: 87
Discharge: HOME | End: 2022-07-14
Attending: FAMILY MEDICINE
Payer: MEDICARE

## 2022-07-14 VITALS
DIASTOLIC BLOOD PRESSURE: 83 MMHG | TEMPERATURE: 98.5 F | SYSTOLIC BLOOD PRESSURE: 165 MMHG | OXYGEN SATURATION: 93 % | HEART RATE: 69 BPM

## 2022-07-14 DIAGNOSIS — M79.671 RIGHT FOOT PAIN: ICD-10-CM

## 2022-07-14 DIAGNOSIS — S92.501A CLOSED FRACTURE OF FOURTH TOE OF RIGHT FOOT, INITIAL ENCOUNTER: ICD-10-CM

## 2022-07-14 DIAGNOSIS — M54.50 BILATERAL LOW BACK PAIN WITHOUT SCIATICA, UNSPECIFIED CHRONICITY: ICD-10-CM

## 2022-07-14 DIAGNOSIS — R07.81 RIB PAIN ON RIGHT SIDE: ICD-10-CM
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
## 2022-07-14 PROCEDURE — 99214 OFFICE O/P EST MOD 30 MIN: CPT | Performed by: FAMILY MEDICINE

## 2022-07-14 PROCEDURE — 73630 X-RAY EXAM OF FOOT: CPT | Mod: RT,FY

## 2022-07-14 PROCEDURE — 72100 X-RAY EXAM L-S SPINE 2/3 VWS: CPT | Mod: FY

## 2022-07-14 PROCEDURE — 71101 X-RAY EXAM UNILAT RIBS/CHEST: CPT | Mod: RT,FY

## 2022-07-14 NOTE — PROGRESS NOTES
Clinical Decision Making:    At the end of the encounter, I discussed results, diagnosis, medications. Discussed red flags for immediate return to clinic/ER, as well as indications for follow up if no improvement. Patient understood and agreed to plan. Patient was stable for discharge.      ICD-10-CM    1. Fall on same level from slipping, tripping or stumbling, initial encounter  W01.0XXA    2. Right foot pain  M79.671 XR Foot Right G/E 3 Views   3. Rib pain on right side  R07.81 XR Ribs & Chest Right G/E 3 Views   4. Bilateral low back pain without sciatica, unspecified chronicity  M54.50 XR Lumbar Spine 2/3 Views   5. Closed fracture of fourth toe of right foot, initial encounter  S92.501A      Buddy tape the toe  Tylenol as needed  Doctor wearing a stiff soled shoe but the daughters do not think that she has a stiff soled shoe and that possibly stiff soled shoes make her back feel worse.  So we just left it with buddy taping and Tylenol.  Heat as needed to the back.  Okay to continue her TENS unit.  Asked the patient to take a deep breath a few times a day and hold it for 5 seconds to help open up her airways      There are no Patient Instructions on file for this visit.   No follow-ups on file.      chief complaint    HPI:  Janes Montero is a 88 year old female who presents today complaining of some pain after falling.  [place] cabin [place] and patient was changing from shorts into pants in the bathroom and she fell.  She may have lost her balance or stepped funny on the rug.  She had no dizziness or chest pain.  She hit her head but did not have any concussion symptoms.  She has had pain at her ribs on her backside on the right.  She has pain at her right fourth toe.  It seemed crooked after the fall and they buddy taped it and it seems straighter now.  She also has low back pain which has been exacerbated.  She also has bruises on her right arm and right hip but no pain there.  She is accompanied  today by her 2 daughters who were at the cabin with her.    History obtained from child and the patient.    Problem List:  2022-06: Preventative health care  2022-03: Non-seasonal allergic rhinitis due to pollen  2021-09: Vitamin B12 deficiency (non anemic)  2021-07: Age-related osteoporosis without current pathological   fracture  2021-06: Chronic bilateral back pain  2021-06: Depression  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)  2019-03: Mixed incontinence urge and stress (male)(female)  2018-07: Diastolic dysfunction  2018-06: Acute on chronic congestive heart failure (H)  2018-06: Acute on chronic congestive heart failure (H)  2018-04: Primary osteoarthritis involving multiple joints  2017-03: Type 2 diabetes mellitus without complication (H)  2016-11: Subclinical hypothyroidism  2016-06: Sensorineural hearing loss (SNHL) of both ears  2016-05: Tricuspid insufficiency  2016-05: Left bundle branch block  2015-06: Parkinson's disease (H)  2013-06: Trigeminal neuralgia  2012-11: CKD (chronic kidney disease) stage 3, GFR 30-59 ml/min (H)  2012-05: GERD (gastroesophageal reflux disease)  2011-05: CAD (coronary artery disease), native coronary artery  2011-05: Aortic valve insufficiency  2011-05: Mitral valve insufficiency  2011-05: Bilateral carotid artery stenosis  2011-05: Essential hypertension, benign  2011-05: Mixed hypercholesterolemia and hypertriglyceridemia  S/P CABG (coronary artery bypass graft)  Acute diastolic congestive heart failure (H)  Hypertensive emergency  Acute diastolic congestive heart failure (H)  Hypertensive emergency      Past Medical History:   Diagnosis Date     Acute diastolic congestive heart failure (H)      Acute on chronic congestive heart failure (H) 6/11/2018     Coronary artery disease      Diabetes mellitus, type II (H)      Diastolic dysfunction 7/10/2018     Frozen shoulder     bilateral     Hypertension      Hypertensive emergency      Parkinson disease (H)       Primary osteoarthritis involving multiple joints      Primary osteoarthritis of left knee      Recurrent UTI     hx septic shock     Thyroid disease 2021       Social History     Tobacco Use     Smoking status: Never Smoker     Smokeless tobacco: Never Used   Substance Use Topics     Alcohol use: No       Review of systems  negative except listed in HPI    Vitals:    07/14/22 1701   BP: (!) 165/83   BP Location: Left arm   Patient Position: Chair   Cuff Size: Adult Large   Pulse: 69   Temp: 98.5  F (36.9  C)   TempSrc: Tympanic   SpO2: 93%       Physical Exam  Vitals noted and within normal limits except elevated systolic blood pressure  In general she is alert, oriented, and in no acute distress  Heart has a regular rate and rhythm with no murmurs  Lungs are clear to auscultation bilaterally with good air entry  No tenderness to palpation of the lumbar spine  Positive tenderness to the lower lumbar paraspinal muscles on the left and positive tenderness to the SI joint on the right  No tenderness to palpation of the cervical or thoracic spine  There is tenderness posterior right ribs around ribs 8-10  No tenderness at other aspects of the rib cage  Right foot with normal dorsalis pedis pulse  There is tenderness to palpation of the MTP joints of the third and fourth toes.  X-rays: See below  Right fourth toe does have a fracture at the distal aspect of the proximal phalanx.  Results for orders placed or performed during the hospital encounter of 07/14/22   XR Lumbar Spine 2/3 Views     Status: None    Narrative    EXAM: XR LUMBAR SPINE 2-3 VIEWS  LOCATION: [facility]  DATE/TIME: 7/14/2022 6:47 PM    INDICATION:  Bilateral low back pain without sciatica, unspecified chronicity.  COMPARISON: Lumbar spine MRI 7/8/2002.  TECHNIQUE: CR Lumbar Spine.      Impression    IMPRESSION: L1 superior endplate compression fracture with approximately 20-30% loss of vertebral body height, new since 7/8/2002.  There is 5 mm degenerative retrolisthesis of L2 on L3, 9 mm degenerative grade 1 anterolisthesis of L4 on L5 and 12 mm   degenerative grade 2 anterolisthesis of L5 on S1, progressed since 7/8/2002. Exaggeration of the normal lumbar lordotic curvature centered at L4. Mild degenerative convex left scoliotic curvature with apex at L3. Moderate to severe multilevel lumbar   spondylosis with loss of disc height, marginal osteophytic spurring about the disc spaces and lower lumbar predominant facet arthrosis. Degenerative opposing endplate sclerosis and subchondral cystic change about the disc space at L2-L3. Asymmetric left   degenerative changes of the sacroiliac joints. Nonobstructive bowel gas pattern.   Results for orders placed or performed during the hospital encounter of 07/14/22   XR Ribs & Chest Right G/E 3 Views     Status: None    Narrative    EXAM: XR RIBS and CHEST RT 3VW  LOCATION: [facility]  DATE/TIME: 7/14/2022 6:42 PM    INDICATION:  Rib pain on right side  COMPARISON: Chest x-ray 6/11/2018      Impression    IMPRESSION: Low lung volumes. Heart size borderline enlarged. Pulmonary vascularity normal. Elevated right hemidiaphragm. Subsegmental atelectasis in the bases. No new infiltrates or effusions. Median sternotomy wires. Right shoulder arthroplasty. No   evidence for right rib fracture.   Results for orders placed or performed during the hospital encounter of 07/14/22   XR Foot Right G/E 3 Views     Status: None    Narrative    EXAM: XR FOOT RIGHT G/E 3 VIEWS  LOCATION: [facility]  DATE/TIME: 7/14/2022 6:48 PM    INDICATION:  Right foot pain  COMPARISON: None.      Impression    IMPRESSION: There is a linear lucency overlying the base of the fifth metatarsal and the oblique projection, which is not visible on the frontal or lateral views. No cortical break/step-off is visualized, but differential considerations include summation   shadow and a  nondisplaced fracture. Clinical correlation helpful with the presence or absence of pain or tenderness at this location. Right foot negative for fracture otherwise. Normal joint alignment. Joint spacing is maintained, without significant   arthritic changes. Bones are demineralized.

## 2022-07-18 DIAGNOSIS — I10 ESSENTIAL HYPERTENSION, BENIGN: ICD-10-CM

## 2022-07-18 RX ORDER — LISINOPRIL 10 MG/1
10 TABLET ORAL DAILY
Qty: 90 TABLET | Refills: 3 | Status: ON HOLD | OUTPATIENT
Start: 2022-07-18 | End: 2023-05-12

## 2022-07-18 NOTE — TELEPHONE ENCOUNTER
Patient has Follow-up appt. 10/11/22 and has been checking BP at home and they have been good. Please refill RX to [facility] per daughter.

## 2022-07-18 NOTE — TELEPHONE ENCOUNTER
"Routing refill request to provider for review/approval because:  BP >140/90 in past 12 months    Last Written Prescription Date: 6/28/22  Last Fill Quantity: 30,  # refills: 0  Last office visit provider: 6/28/22    Requested Prescriptions   Pending Prescriptions Disp Refills     lisinopril (ZESTRIL) 10 MG tablet 90 tablet 0     Sig: Take 1 tablet (10 mg) by mouth daily       ACE Inhibitors (Including Combos) Protocol Failed - 7/18/2022 10:06 AM        Failed - Blood pressure under 140/90 in past 12 months     BP Readings from Last 3 Encounters:   07/14/22 (!) 165/83   06/28/22 128/78   05/27/22 134/80                 Passed - Recent (12 mo) or future (30 days) visit within the authorizing provider's specialty     Patient has had an office visit with the authorizing provider or a provider within the authorizing providers department within the previous 12 mos or has a future within next 30 days. See \"Patient Info\" tab in inbasket, or \"Choose Columns\" in Meds & Orders section of the refill encounter.              Passed - Medication is active on med list        Passed - Patient is age 18 or older        Passed - No active pregnancy on record        Passed - Normal serum creatinine on file in past 12 months     Recent Labs   Lab Test 06/28/22  1617   CR 0.97       Ok to refill medication if creatinine is low          Passed - Normal serum potassium on file in past 12 months     Recent Labs   Lab Test 06/28/22  1617   POTASSIUM 4.4             Passed - No positive pregnancy test within past 12 months           Tyler Campbell RN  [facility]     " CMP/CBC/PT/PTT/EKG/CXR

## 2022-07-18 NOTE — TELEPHONE ENCOUNTER
Medication Request  Medication name: lisinopril (ZESTRIL) 10 MG tablet  Requested Pharmacy: [facility]  When was patient last seen for this?:  6/28/22  Patient offered appointment:  No  Okay to leave a detailed message: yes    Med pended and sent to refill pool

## 2022-08-15 ENCOUNTER — APPOINTMENT (OUTPATIENT)
Dept: RADIOLOGY | Facility: CLINIC | Age: 87
End: 2022-08-15
Attending: EMERGENCY MEDICINE
Payer: MEDICARE

## 2022-08-15 ENCOUNTER — HOSPITAL ENCOUNTER (OUTPATIENT)
Facility: CLINIC | Age: 87
Setting detail: OBSERVATION
Discharge: OTHER INSTITUTION | End: 2022-08-17
Attending: EMERGENCY MEDICINE | Admitting: FAMILY MEDICINE
Payer: MEDICARE

## 2022-08-15 DIAGNOSIS — I25.10 CORONARY ARTERY DISEASE INVOLVING NATIVE CORONARY ARTERY WITHOUT ANGINA PECTORIS, UNSPECIFIED WHETHER NATIVE OR TRANSPLANTED HEART: Primary | ICD-10-CM

## 2022-08-15 DIAGNOSIS — R07.9 CHEST PAIN, UNSPECIFIED TYPE: ICD-10-CM

## 2022-08-15 DIAGNOSIS — Z95.1 S/P CABG (CORONARY ARTERY BYPASS GRAFT): ICD-10-CM

## 2022-08-15 DIAGNOSIS — I10 BENIGN ESSENTIAL HYPERTENSION: ICD-10-CM

## 2022-08-15 LAB
ALBUMIN SERPL-MCNC: 4 G/DL (ref 3.5–5)
ALP SERPL-CCNC: 95 U/L (ref 45–120)
ALT SERPL W P-5'-P-CCNC: <9 U/L (ref 0–45)
ANION GAP SERPL CALCULATED.3IONS-SCNC: 10 MMOL/L (ref 5–18)
AST SERPL W P-5'-P-CCNC: 28 U/L (ref 0–40)
BASOPHILS # BLD AUTO: 0.1 10E3/UL (ref 0–0.2)
BASOPHILS NFR BLD AUTO: 1 %
BILIRUB SERPL-MCNC: 0.6 MG/DL (ref 0–1)
BNP SERPL-MCNC: 129 PG/ML (ref 0–167)
BUN SERPL-MCNC: 30 MG/DL (ref 8–28)
CALCIUM SERPL-MCNC: 10.5 MG/DL (ref 8.5–10.5)
CHLORIDE BLD-SCNC: 100 MMOL/L (ref 98–107)
CO2 SERPL-SCNC: 26 MMOL/L (ref 22–31)
CREAT SERPL-MCNC: 1.04 MG/DL (ref 0.6–1.1)
EOSINOPHIL # BLD AUTO: 0.2 10E3/UL (ref 0–0.7)
EOSINOPHIL NFR BLD AUTO: 2 %
ERYTHROCYTE [DISTWIDTH] IN BLOOD BY AUTOMATED COUNT: 14.6 % (ref 10–15)
GFR SERPL CREATININE-BSD FRML MDRD: 51 ML/MIN/1.73M2
GLUCOSE BLD-MCNC: 236 MG/DL (ref 70–125)
GLUCOSE BLDC GLUCOMTR-MCNC: 230 MG/DL (ref 70–99)
HCT VFR BLD AUTO: 40.9 % (ref 35–47)
HGB BLD-MCNC: 13.7 G/DL (ref 11.7–15.7)
IMM GRANULOCYTES # BLD: 0.1 10E3/UL
IMM GRANULOCYTES NFR BLD: 1 %
LYMPHOCYTES # BLD AUTO: 1.4 10E3/UL (ref 0.8–5.3)
LYMPHOCYTES NFR BLD AUTO: 19 %
MAGNESIUM SERPL-MCNC: 1.7 MG/DL (ref 1.8–2.6)
MCH RBC QN AUTO: 29.4 PG (ref 26.5–33)
MCHC RBC AUTO-ENTMCNC: 33.5 G/DL (ref 31.5–36.5)
MCV RBC AUTO: 88 FL (ref 78–100)
MONOCYTES # BLD AUTO: 0.8 10E3/UL (ref 0–1.3)
MONOCYTES NFR BLD AUTO: 11 %
NEUTROPHILS # BLD AUTO: 4.8 10E3/UL (ref 1.6–8.3)
NEUTROPHILS NFR BLD AUTO: 66 %
NRBC # BLD AUTO: 0 10E3/UL
NRBC BLD AUTO-RTO: 0 /100
PLATELET # BLD AUTO: 225 10E3/UL (ref 150–450)
POTASSIUM BLD-SCNC: 4.6 MMOL/L (ref 3.5–5)
PROT SERPL-MCNC: 7.7 G/DL (ref 6–8)
RBC # BLD AUTO: 4.66 10E6/UL (ref 3.8–5.2)
SARS-COV-2 RNA RESP QL NAA+PROBE: NEGATIVE
SODIUM SERPL-SCNC: 136 MMOL/L (ref 136–145)
TROPONIN I SERPL-MCNC: 0.04 NG/ML (ref 0–0.29)
TROPONIN I SERPL-MCNC: 0.18 NG/ML (ref 0–0.29)
WBC # BLD AUTO: 7.4 10E3/UL (ref 4–11)

## 2022-08-15 PROCEDURE — 96375 TX/PRO/DX INJ NEW DRUG ADDON: CPT

## 2022-08-15 PROCEDURE — 250N000011 HC RX IP 250 OP 636: Performed by: EMERGENCY MEDICINE

## 2022-08-15 PROCEDURE — 96376 TX/PRO/DX INJ SAME DRUG ADON: CPT

## 2022-08-15 PROCEDURE — C9803 HOPD COVID-19 SPEC COLLECT: HCPCS

## 2022-08-15 PROCEDURE — 36415 COLL VENOUS BLD VENIPUNCTURE: CPT | Performed by: HOSPITALIST

## 2022-08-15 PROCEDURE — 250N000013 HC RX MED GY IP 250 OP 250 PS 637: Performed by: EMERGENCY MEDICINE

## 2022-08-15 PROCEDURE — 96372 THER/PROPH/DIAG INJ SC/IM: CPT | Performed by: HOSPITALIST

## 2022-08-15 PROCEDURE — 83880 ASSAY OF NATRIURETIC PEPTIDE: CPT | Performed by: EMERGENCY MEDICINE

## 2022-08-15 PROCEDURE — 99285 EMERGENCY DEPT VISIT HI MDM: CPT | Mod: CS,25

## 2022-08-15 PROCEDURE — 83735 ASSAY OF MAGNESIUM: CPT | Performed by: EMERGENCY MEDICINE

## 2022-08-15 PROCEDURE — 85025 COMPLETE CBC W/AUTO DIFF WBC: CPT | Performed by: EMERGENCY MEDICINE

## 2022-08-15 PROCEDURE — 82374 ASSAY BLOOD CARBON DIOXIDE: CPT | Performed by: EMERGENCY MEDICINE

## 2022-08-15 PROCEDURE — 250N000013 HC RX MED GY IP 250 OP 250 PS 637: Performed by: HOSPITALIST

## 2022-08-15 PROCEDURE — G0378 HOSPITAL OBSERVATION PER HR: HCPCS

## 2022-08-15 PROCEDURE — 96374 THER/PROPH/DIAG INJ IV PUSH: CPT

## 2022-08-15 PROCEDURE — 71045 X-RAY EXAM CHEST 1 VIEW: CPT

## 2022-08-15 PROCEDURE — 84484 ASSAY OF TROPONIN QUANT: CPT | Mod: 91 | Performed by: HOSPITALIST

## 2022-08-15 PROCEDURE — 99220 PR INITIAL OBSERVATION CARE,LEVEL III: CPT | Performed by: HOSPITALIST

## 2022-08-15 PROCEDURE — 96365 THER/PROPH/DIAG IV INF INIT: CPT

## 2022-08-15 PROCEDURE — 250N000009 HC RX 250: Performed by: EMERGENCY MEDICINE

## 2022-08-15 PROCEDURE — U0003 INFECTIOUS AGENT DETECTION BY NUCLEIC ACID (DNA OR RNA); SEVERE ACUTE RESPIRATORY SYNDROME CORONAVIRUS 2 (SARS-COV-2) (CORONAVIRUS DISEASE [COVID-19]), AMPLIFIED PROBE TECHNIQUE, MAKING USE OF HIGH THROUGHPUT TECHNOLOGIES AS DESCRIBED BY CMS-2020-01-R: HCPCS | Performed by: EMERGENCY MEDICINE

## 2022-08-15 PROCEDURE — 93005 ELECTROCARDIOGRAM TRACING: CPT | Performed by: EMERGENCY MEDICINE

## 2022-08-15 PROCEDURE — 82962 GLUCOSE BLOOD TEST: CPT

## 2022-08-15 PROCEDURE — 36415 COLL VENOUS BLD VENIPUNCTURE: CPT | Performed by: EMERGENCY MEDICINE

## 2022-08-15 PROCEDURE — 84484 ASSAY OF TROPONIN QUANT: CPT | Performed by: EMERGENCY MEDICINE

## 2022-08-15 RX ORDER — MAGNESIUM HYDROXIDE/ALUMINUM HYDROXICE/SIMETHICONE 120; 1200; 1200 MG/30ML; MG/30ML; MG/30ML
30 SUSPENSION ORAL EVERY 4 HOURS PRN
Status: DISCONTINUED | OUTPATIENT
Start: 2022-08-15 | End: 2022-08-17 | Stop reason: HOSPADM

## 2022-08-15 RX ORDER — NICOTINE POLACRILEX 4 MG
15-30 LOZENGE BUCCAL
Status: DISCONTINUED | OUTPATIENT
Start: 2022-08-15 | End: 2022-08-17 | Stop reason: HOSPADM

## 2022-08-15 RX ORDER — ASPIRIN 81 MG/1
81 TABLET, CHEWABLE ORAL AT BEDTIME
Status: DISCONTINUED | OUTPATIENT
Start: 2022-08-16 | End: 2022-08-17 | Stop reason: HOSPADM

## 2022-08-15 RX ORDER — LEVOTHYROXINE SODIUM 25 UG/1
25 TABLET ORAL DAILY
Status: DISCONTINUED | OUTPATIENT
Start: 2022-08-16 | End: 2022-08-17 | Stop reason: HOSPADM

## 2022-08-15 RX ORDER — GABAPENTIN 300 MG/1
300 CAPSULE ORAL ONCE
Status: COMPLETED | OUTPATIENT
Start: 2022-08-15 | End: 2022-08-15

## 2022-08-15 RX ORDER — METOPROLOL TARTRATE 1 MG/ML
5 INJECTION, SOLUTION INTRAVENOUS ONCE
Status: COMPLETED | OUTPATIENT
Start: 2022-08-15 | End: 2022-08-15

## 2022-08-15 RX ORDER — LORATADINE 10 MG/1
10 TABLET ORAL DAILY
Status: DISCONTINUED | OUTPATIENT
Start: 2022-08-16 | End: 2022-08-17 | Stop reason: HOSPADM

## 2022-08-15 RX ORDER — ATORVASTATIN CALCIUM 10 MG/1
20 TABLET, FILM COATED ORAL AT BEDTIME
Status: DISCONTINUED | OUTPATIENT
Start: 2022-08-15 | End: 2022-08-17 | Stop reason: HOSPADM

## 2022-08-15 RX ORDER — CARBIDOPA AND LEVODOPA 25; 100 MG/1; MG/1
1 TABLET ORAL 3 TIMES DAILY
Status: DISCONTINUED | OUTPATIENT
Start: 2022-08-15 | End: 2022-08-17 | Stop reason: HOSPADM

## 2022-08-15 RX ORDER — GABAPENTIN 300 MG/1
300 CAPSULE ORAL 4 TIMES DAILY
Status: DISCONTINUED | OUTPATIENT
Start: 2022-08-15 | End: 2022-08-17 | Stop reason: HOSPADM

## 2022-08-15 RX ORDER — MORPHINE SULFATE 4 MG/ML
4 INJECTION, SOLUTION INTRAMUSCULAR; INTRAVENOUS ONCE
Status: COMPLETED | OUTPATIENT
Start: 2022-08-15 | End: 2022-08-15

## 2022-08-15 RX ORDER — NITROGLYCERIN 0.4 MG/1
0.4 TABLET SUBLINGUAL EVERY 5 MIN PRN
Status: DISCONTINUED | OUTPATIENT
Start: 2022-08-15 | End: 2022-08-17 | Stop reason: HOSPADM

## 2022-08-15 RX ORDER — MAGNESIUM SULFATE HEPTAHYDRATE 40 MG/ML
2 INJECTION, SOLUTION INTRAVENOUS ONCE
Status: COMPLETED | OUTPATIENT
Start: 2022-08-15 | End: 2022-08-15

## 2022-08-15 RX ORDER — LISINOPRIL 10 MG/1
10 TABLET ORAL DAILY
Status: DISCONTINUED | OUTPATIENT
Start: 2022-08-16 | End: 2022-08-17 | Stop reason: HOSPADM

## 2022-08-15 RX ORDER — CARVEDILOL 6.25 MG/1
12.5 TABLET ORAL 2 TIMES DAILY WITH MEALS
Status: DISCONTINUED | OUTPATIENT
Start: 2022-08-15 | End: 2022-08-17 | Stop reason: HOSPADM

## 2022-08-15 RX ORDER — POLYETHYLENE GLYCOL 3350 17 G/17G
17 POWDER, FOR SOLUTION ORAL DAILY
Status: DISCONTINUED | OUTPATIENT
Start: 2022-08-16 | End: 2022-08-16

## 2022-08-15 RX ORDER — DEXTROSE MONOHYDRATE 25 G/50ML
25-50 INJECTION, SOLUTION INTRAVENOUS
Status: DISCONTINUED | OUTPATIENT
Start: 2022-08-15 | End: 2022-08-17 | Stop reason: HOSPADM

## 2022-08-15 RX ORDER — PANTOPRAZOLE SODIUM 20 MG/1
40 TABLET, DELAYED RELEASE ORAL
Status: DISCONTINUED | OUTPATIENT
Start: 2022-08-16 | End: 2022-08-17 | Stop reason: HOSPADM

## 2022-08-15 RX ADMIN — NITROGLYCERIN 15 MG: 20 OINTMENT TOPICAL at 18:59

## 2022-08-15 RX ADMIN — MORPHINE SULFATE 4 MG: 4 INJECTION, SOLUTION INTRAMUSCULAR; INTRAVENOUS at 19:33

## 2022-08-15 RX ADMIN — FAMOTIDINE 20 MG: 10 INJECTION, SOLUTION INTRAVENOUS at 19:05

## 2022-08-15 RX ADMIN — GABAPENTIN 300 MG: 300 CAPSULE ORAL at 21:14

## 2022-08-15 RX ADMIN — ATORVASTATIN CALCIUM 20 MG: 10 TABLET, FILM COATED ORAL at 23:16

## 2022-08-15 RX ADMIN — CARVEDILOL 12.5 MG: 6.25 TABLET, FILM COATED ORAL at 23:19

## 2022-08-15 RX ADMIN — METOPROLOL TARTRATE 5 MG: 5 INJECTION INTRAVENOUS at 18:59

## 2022-08-15 RX ADMIN — GABAPENTIN 300 MG: 300 CAPSULE ORAL at 23:18

## 2022-08-15 RX ADMIN — NITROGLYCERIN 0.4 MG: 0.4 TABLET SUBLINGUAL at 19:33

## 2022-08-15 RX ADMIN — METOPROLOL TARTRATE 5 MG: 5 INJECTION INTRAVENOUS at 21:06

## 2022-08-15 RX ADMIN — MAGNESIUM SULFATE HEPTAHYDRATE 2 G: 40 INJECTION, SOLUTION INTRAVENOUS at 21:15

## 2022-08-15 RX ADMIN — METOPROLOL TARTRATE 5 MG: 5 INJECTION INTRAVENOUS at 20:31

## 2022-08-15 RX ADMIN — CARBIDOPA AND LEVODOPA 1 TABLET: 25; 100 TABLET ORAL at 23:19

## 2022-08-15 RX ADMIN — FLUOXETINE 20 MG: 20 CAPSULE ORAL at 23:18

## 2022-08-15 ASSESSMENT — ACTIVITIES OF DAILY LIVING (ADL)
ADLS_ACUITY_SCORE: 35

## 2022-08-15 NOTE — ED TRIAGE NOTES
Patient reports a chest pain/ heaviness that started around 1630 today. Shortness of breath since symptoms started. HX of heart attack with triple bypass

## 2022-08-16 ENCOUNTER — APPOINTMENT (OUTPATIENT)
Dept: CARDIOLOGY | Facility: CLINIC | Age: 87
End: 2022-08-16
Attending: INTERNAL MEDICINE
Payer: MEDICARE

## 2022-08-16 ENCOUNTER — APPOINTMENT (OUTPATIENT)
Dept: NUCLEAR MEDICINE | Facility: CLINIC | Age: 87
End: 2022-08-16
Attending: INTERNAL MEDICINE
Payer: MEDICARE

## 2022-08-16 ENCOUNTER — APPOINTMENT (OUTPATIENT)
Dept: CARDIOLOGY | Facility: CLINIC | Age: 87
End: 2022-08-16
Attending: HOSPITALIST
Payer: MEDICARE

## 2022-08-16 LAB
ABO/RH(D): ABNORMAL
ANTIBODY ID: NORMAL
ANTIBODY SCREEN: POSITIVE
ATRIAL RATE - MUSE: 92 BPM
BLD PROD TYP BPU: NORMAL
BLD PROD TYP BPU: NORMAL
BLOOD COMPONENT TYPE: NORMAL
BLOOD COMPONENT TYPE: NORMAL
C AG RBC QL: NEGATIVE
CODING SYSTEM: NORMAL
CODING SYSTEM: NORMAL
CROSSMATCH: NORMAL
CROSSMATCH: NORMAL
CV STRESS CURRENT BP HE: NORMAL
CV STRESS CURRENT HR HE: 73
CV STRESS CURRENT HR HE: 75
CV STRESS CURRENT HR HE: 76
CV STRESS CURRENT HR HE: 77
CV STRESS CURRENT HR HE: 78
CV STRESS CURRENT HR HE: 78
CV STRESS CURRENT HR HE: 79
CV STRESS CURRENT HR HE: 80
CV STRESS CURRENT HR HE: 81
CV STRESS CURRENT HR HE: 82
CV STRESS CURRENT HR HE: 83
CV STRESS DEVIATION TIME HE: NORMAL
CV STRESS ECHO PERCENT HR HE: NORMAL
CV STRESS EXERCISE STAGE HE: NORMAL
CV STRESS FINAL RESTING BP HE: NORMAL
CV STRESS FINAL RESTING HR HE: 77
CV STRESS MAX HR HE: 83
CV STRESS MAX TREADMILL GRADE HE: 0
CV STRESS MAX TREADMILL SPEED HE: 0
CV STRESS PEAK DIA BP HE: NORMAL
CV STRESS PEAK SYS BP HE: NORMAL
CV STRESS PHASE HE: NORMAL
CV STRESS PROTOCOL HE: NORMAL
CV STRESS RESTING PT POSITION HE: NORMAL
CV STRESS RESTING PT POSITION HE: NORMAL
CV STRESS ST DEVIATION AMOUNT HE: NORMAL
CV STRESS ST DEVIATION ELEVATION HE: NORMAL
CV STRESS ST EVELATION AMOUNT HE: NORMAL
CV STRESS TEST TYPE HE: NORMAL
CV STRESS TOTAL STAGE TIME MIN 1 HE: NORMAL
DIASTOLIC BLOOD PRESSURE - MUSE: 110 MMHG
GLUCOSE BLDC GLUCOMTR-MCNC: 149 MG/DL (ref 70–99)
GLUCOSE BLDC GLUCOMTR-MCNC: 153 MG/DL (ref 70–99)
GLUCOSE BLDC GLUCOMTR-MCNC: 165 MG/DL (ref 70–99)
GLUCOSE BLDC GLUCOMTR-MCNC: 165 MG/DL (ref 70–99)
GLUCOSE BLDC GLUCOMTR-MCNC: 173 MG/DL (ref 70–99)
INTERPRETATION ECG - MUSE: NORMAL
LVEF ECHO: NORMAL
NUC STRESS EJECTION FRACTION: 67 %
P AXIS - MUSE: 83 DEGREES
PR INTERVAL - MUSE: 140 MS
QRS DURATION - MUSE: 164 MS
QT - MUSE: 424 MS
QTC - MUSE: 521 MS
R AXIS - MUSE: -49 DEGREES
RATE PRESSURE PRODUCT: NORMAL
SPECIMEN EXPIRATION DATE: ABNORMAL
SPECIMEN EXPIRATION DATE: NORMAL
SPECIMEN EXPIRATION DATE: NORMAL
STRESS ECHO BASELINE DIASTOLIC HE: 66
STRESS ECHO BASELINE HR: 74
STRESS ECHO BASELINE SYSTOLIC BP: 196
STRESS ECHO CALCULATED PERCENT HR: 63 %
STRESS ECHO LAST STRESS DIASTOLIC BP: 70
STRESS ECHO LAST STRESS HR: 81
STRESS ECHO LAST STRESS SYSTOLIC BP: 149
STRESS ECHO TARGET HR: 132
SYSTOLIC BLOOD PRESSURE - MUSE: 212 MMHG
T AXIS - MUSE: 91 DEGREES
TROPONIN I SERPL-MCNC: 0.28 NG/ML (ref 0–0.29)
UNIT ABO/RH: NORMAL
UNIT ABO/RH: NORMAL
UNIT NUMBER: NORMAL
UNIT NUMBER: NORMAL
UNIT STATUS: NORMAL
UNIT STATUS: NORMAL
UNIT TYPE ISBT: 9500
UNIT TYPE ISBT: 9500
VENTRICULAR RATE- MUSE: 91 BPM

## 2022-08-16 PROCEDURE — 99215 OFFICE O/P EST HI 40 MIN: CPT | Mod: 25 | Performed by: INTERNAL MEDICINE

## 2022-08-16 PROCEDURE — 250N000012 HC RX MED GY IP 250 OP 636 PS 637: Performed by: HOSPITALIST

## 2022-08-16 PROCEDURE — 250N000013 HC RX MED GY IP 250 OP 250 PS 637: Performed by: FAMILY MEDICINE

## 2022-08-16 PROCEDURE — 96372 THER/PROPH/DIAG INJ SC/IM: CPT | Performed by: HOSPITALIST

## 2022-08-16 PROCEDURE — 96375 TX/PRO/DX INJ NEW DRUG ADDON: CPT

## 2022-08-16 PROCEDURE — 93017 CV STRESS TEST TRACING ONLY: CPT

## 2022-08-16 PROCEDURE — 96372 THER/PROPH/DIAG INJ SC/IM: CPT

## 2022-08-16 PROCEDURE — 78452 HT MUSCLE IMAGE SPECT MULT: CPT | Mod: 26 | Performed by: INTERNAL MEDICINE

## 2022-08-16 PROCEDURE — 86905 BLOOD TYPING RBC ANTIGENS: CPT | Performed by: INTERNAL MEDICINE

## 2022-08-16 PROCEDURE — G1010 CDSM STANSON: HCPCS

## 2022-08-16 PROCEDURE — 86901 BLOOD TYPING SEROLOGIC RH(D): CPT | Performed by: INTERNAL MEDICINE

## 2022-08-16 PROCEDURE — 93018 CV STRESS TEST I&R ONLY: CPT | Performed by: INTERNAL MEDICINE

## 2022-08-16 PROCEDURE — 86850 RBC ANTIBODY SCREEN: CPT | Performed by: INTERNAL MEDICINE

## 2022-08-16 PROCEDURE — G0378 HOSPITAL OBSERVATION PER HR: HCPCS

## 2022-08-16 PROCEDURE — 250N000013 HC RX MED GY IP 250 OP 250 PS 637: Performed by: INTERNAL MEDICINE

## 2022-08-16 PROCEDURE — G1010 CDSM STANSON: HCPCS | Performed by: INTERNAL MEDICINE

## 2022-08-16 PROCEDURE — 86870 RBC ANTIBODY IDENTIFICATION: CPT | Performed by: INTERNAL MEDICINE

## 2022-08-16 PROCEDURE — 36415 COLL VENOUS BLD VENIPUNCTURE: CPT | Performed by: HOSPITALIST

## 2022-08-16 PROCEDURE — 93016 CV STRESS TEST SUPVJ ONLY: CPT | Performed by: INTERNAL MEDICINE

## 2022-08-16 PROCEDURE — A9500 TC99M SESTAMIBI: HCPCS | Performed by: FAMILY MEDICINE

## 2022-08-16 PROCEDURE — 99226 PR SUBSEQUENT OBSERVATION CARE,LEVEL III: CPT | Performed by: FAMILY MEDICINE

## 2022-08-16 PROCEDURE — 999N000208 ECHOCARDIOGRAM COMPLETE

## 2022-08-16 PROCEDURE — 250N000011 HC RX IP 250 OP 636: Performed by: FAMILY MEDICINE

## 2022-08-16 PROCEDURE — 86902 BLOOD TYPE ANTIGEN DONOR EA: CPT | Performed by: INTERNAL MEDICINE

## 2022-08-16 PROCEDURE — 82962 GLUCOSE BLOOD TEST: CPT

## 2022-08-16 PROCEDURE — 36415 COLL VENOUS BLD VENIPUNCTURE: CPT | Performed by: INTERNAL MEDICINE

## 2022-08-16 PROCEDURE — 84484 ASSAY OF TROPONIN QUANT: CPT | Performed by: HOSPITALIST

## 2022-08-16 PROCEDURE — 255N000002 HC RX 255 OP 636: Performed by: FAMILY MEDICINE

## 2022-08-16 PROCEDURE — 86922 COMPATIBILITY TEST ANTIGLOB: CPT | Performed by: INTERNAL MEDICINE

## 2022-08-16 PROCEDURE — 250N000013 HC RX MED GY IP 250 OP 250 PS 637: Performed by: HOSPITALIST

## 2022-08-16 PROCEDURE — 343N000001 HC RX 343: Performed by: FAMILY MEDICINE

## 2022-08-16 PROCEDURE — 93306 TTE W/DOPPLER COMPLETE: CPT | Mod: 26 | Performed by: INTERNAL MEDICINE

## 2022-08-16 RX ORDER — DIAZEPAM 5 MG
5 TABLET ORAL
Status: CANCELLED | OUTPATIENT
Start: 2022-08-16

## 2022-08-16 RX ORDER — AMINOPHYLLINE 25 MG/ML
50 INJECTION, SOLUTION INTRAVENOUS
Status: ACTIVE | OUTPATIENT
Start: 2022-08-16 | End: 2022-08-16

## 2022-08-16 RX ORDER — ASPIRIN 81 MG/1
243 TABLET, CHEWABLE ORAL ONCE
Status: CANCELLED | OUTPATIENT
Start: 2022-08-16

## 2022-08-16 RX ORDER — LIDOCAINE 40 MG/G
CREAM TOPICAL
Status: CANCELLED | OUTPATIENT
Start: 2022-08-16

## 2022-08-16 RX ORDER — ASPIRIN 325 MG
325 TABLET ORAL ONCE
Status: CANCELLED | OUTPATIENT
Start: 2022-08-16 | End: 2022-08-16

## 2022-08-16 RX ORDER — POLYETHYLENE GLYCOL 3350 17 G/17G
17 POWDER, FOR SOLUTION ORAL EVERY EVENING
Status: DISCONTINUED | OUTPATIENT
Start: 2022-08-16 | End: 2022-08-17 | Stop reason: HOSPADM

## 2022-08-16 RX ORDER — REGADENOSON 0.08 MG/ML
0.4 INJECTION, SOLUTION INTRAVENOUS ONCE
Status: COMPLETED | OUTPATIENT
Start: 2022-08-16 | End: 2022-08-16

## 2022-08-16 RX ORDER — GABAPENTIN 300 MG/1
300 CAPSULE ORAL 2 TIMES DAILY
Status: COMPLETED | OUTPATIENT
Start: 2022-08-16 | End: 2022-08-16

## 2022-08-16 RX ORDER — SODIUM CHLORIDE 9 MG/ML
INJECTION, SOLUTION INTRAVENOUS CONTINUOUS
Status: DISCONTINUED | OUTPATIENT
Start: 2022-08-17 | End: 2022-08-17 | Stop reason: HOSPADM

## 2022-08-16 RX ADMIN — POLYETHYLENE GLYCOL 3350 17 G: 17 POWDER, FOR SOLUTION ORAL at 07:51

## 2022-08-16 RX ADMIN — PANTOPRAZOLE SODIUM 40 MG: 20 TABLET, DELAYED RELEASE ORAL at 07:51

## 2022-08-16 RX ADMIN — Medication 29 MILLICURIE: at 12:03

## 2022-08-16 RX ADMIN — AMINOPHYLLINE 50 MG: 25 INJECTION, SOLUTION INTRAVENOUS at 11:55

## 2022-08-16 RX ADMIN — LISINOPRIL 10 MG: 10 TABLET ORAL at 07:52

## 2022-08-16 RX ADMIN — INSULIN ASPART 1 UNITS: 100 INJECTION, SOLUTION INTRAVENOUS; SUBCUTANEOUS at 17:53

## 2022-08-16 RX ADMIN — LORATADINE 10 MG: 10 TABLET ORAL at 07:52

## 2022-08-16 RX ADMIN — GABAPENTIN 300 MG: 300 CAPSULE ORAL at 14:46

## 2022-08-16 RX ADMIN — CARVEDILOL 12.5 MG: 6.25 TABLET, FILM COATED ORAL at 07:51

## 2022-08-16 RX ADMIN — ASPIRIN 81 MG CHEWABLE TABLET 81 MG: 81 TABLET CHEWABLE at 21:35

## 2022-08-16 RX ADMIN — INSULIN ASPART 1 UNITS: 100 INJECTION, SOLUTION INTRAVENOUS; SUBCUTANEOUS at 07:49

## 2022-08-16 RX ADMIN — PERFLUTREN 1.5 ML: 6.52 INJECTION, SUSPENSION INTRAVENOUS at 09:59

## 2022-08-16 RX ADMIN — CARBIDOPA AND LEVODOPA 1 TABLET: 25; 100 TABLET ORAL at 14:46

## 2022-08-16 RX ADMIN — GABAPENTIN 300 MG: 300 CAPSULE ORAL at 07:52

## 2022-08-16 RX ADMIN — FLUOXETINE 20 MG: 20 CAPSULE ORAL at 19:48

## 2022-08-16 RX ADMIN — CARVEDILOL 12.5 MG: 6.25 TABLET, FILM COATED ORAL at 17:49

## 2022-08-16 RX ADMIN — GABAPENTIN 300 MG: 300 CAPSULE ORAL at 18:17

## 2022-08-16 RX ADMIN — FLUOXETINE 20 MG: 20 CAPSULE ORAL at 07:52

## 2022-08-16 RX ADMIN — INSULIN ASPART 1 UNITS: 100 INJECTION, SOLUTION INTRAVENOUS; SUBCUTANEOUS at 14:47

## 2022-08-16 RX ADMIN — POLYETHYLENE GLYCOL 3350 17 G: 17 POWDER, FOR SOLUTION ORAL at 21:35

## 2022-08-16 RX ADMIN — LEVOTHYROXINE SODIUM 25 MCG: 0.03 TABLET ORAL at 07:52

## 2022-08-16 RX ADMIN — REGADENOSON 0.4 MG: 0.08 INJECTION, SOLUTION INTRAVENOUS at 11:48

## 2022-08-16 RX ADMIN — CARBIDOPA AND LEVODOPA 1 TABLET: 25; 100 TABLET ORAL at 07:52

## 2022-08-16 RX ADMIN — GABAPENTIN 300 MG: 300 CAPSULE ORAL at 21:42

## 2022-08-16 RX ADMIN — CARBIDOPA AND LEVODOPA 1 TABLET: 25; 100 TABLET ORAL at 19:48

## 2022-08-16 RX ADMIN — ATORVASTATIN CALCIUM 20 MG: 10 TABLET, FILM COATED ORAL at 21:35

## 2022-08-16 RX ADMIN — Medication 200 MG: at 07:52

## 2022-08-16 RX ADMIN — Medication 1 MG: at 22:04

## 2022-08-16 RX ADMIN — Medication 8.7 MCI.: at 10:37

## 2022-08-16 ASSESSMENT — ACTIVITIES OF DAILY LIVING (ADL)
ADLS_ACUITY_SCORE: 33
ADLS_ACUITY_SCORE: 37
ADLS_ACUITY_SCORE: 37
ADLS_ACUITY_SCORE: 31
ADLS_ACUITY_SCORE: 32
DEPENDENT_IADLS:: CLEANING;SHOPPING;TRANSPORTATION
ADLS_ACUITY_SCORE: 38
ADLS_ACUITY_SCORE: 31
ADLS_ACUITY_SCORE: 32
ADLS_ACUITY_SCORE: 32
ADLS_ACUITY_SCORE: 37
ADLS_ACUITY_SCORE: 31
ADLS_ACUITY_SCORE: 37

## 2022-08-16 NOTE — PHARMACY-ADMISSION MEDICATION HISTORY
Pharmacy Note - Admission Medication History    Pertinent Provider Information:    ______________________________________________________________________    Prior To Admission (PTA) med list completed and updated in EMR.       Current Facility-Administered Medications for the 8/15/22 encounter (Hospital Encounter)   Medication     denosumab (PROLIA) injection 60 mg     denosumab (PROLIA) injection 60 mg     PTA Med List   Medication Sig Last Dose     ascorbic acid, vitamin C, (ASCORBIC ACID WITH ELLIOTT HIPS) 500 MG tablet [ASCORBIC ACID, VITAMIN C, (ASCORBIC ACID WITH ELLIOTT HIPS) 500 MG TABLET] Take 500 mg by mouth daily. 8/15/2022 at Unknown time     aspirin 81 mg chewable tablet [ASPIRIN 81 MG CHEWABLE TABLET] Chew 81 mg at bedtime. 8/14/2022 at Unknown time     atorvastatin (LIPITOR) 20 MG tablet Take 1 tablet (20 mg) by mouth At Bedtime 8/14/2022 at Unknown time     carbidopa-levodopa (SINEMET)  MG tablet TAKE 1 TABLET BY MOUTH 3  TIMES DAILY TAKE AT LEAST  1/2 HOUR BEFORE MEALS OR 2  HOURS AFTER MEALS DO NOT  MIX WITH FOOD 8/15/2022 at x 2     carvedilol (COREG) 12.5 MG tablet Take 1 tablet (12.5 mg) by mouth 2 times daily (with meals) 8/15/2022 at am     cholecalciferol, vitamin D3, 1,000 unit tablet [CHOLECALCIFEROL, VITAMIN D3, 1,000 UNIT TABLET] Take 2,000 Units by mouth daily. 8/15/2022 at Unknown time     coenzyme Q10 (CO Q-10) 100 mg capsule [COENZYME Q10 (CO Q-10) 100 MG CAPSULE] Take 100 mg by mouth 2 (two) times a day. 8/15/2022 at am     cyanocobalamin (VITAMIN B-12) 1000 MCG tablet Take 1 tablet (1,000 mcg) by mouth daily 8/15/2022 at Unknown time     FLUoxetine (PROZAC) 20 MG capsule TAKE 1 CAPSULE BY MOUTH  TWICE DAILY 8/15/2022 at am     fluticasone (FLONASE) 50 MCG/ACT nasal spray Spray 1 spray into both nostrils daily (Patient taking differently: Spray 1 spray into both nostrils daily as needed) Past Week at not with     gabapentin (NEURONTIN) 300 MG capsule TAKE 1 CAPSULE BY MOUTH 4  TIMES  DAILY 8/15/2022 at x 2     levothyroxine (SYNTHROID/LEVOTHROID) 25 MCG tablet Take 1 tablet (25 mcg) by mouth daily 8/15/2022 at Unknown time     lisinopril (ZESTRIL) 10 MG tablet Take 1 tablet (10 mg) by mouth daily 8/14/2022 at pm     loratadine (CLARITIN) 10 mg tablet [LORATADINE (CLARITIN) 10 MG TABLET] Take 10 mg by mouth daily. 8/15/2022 at Unknown time     magnesium oxide 250 mg Tab [MAGNESIUM OXIDE 250 MG TAB] Take 250 mg by mouth daily. 8/15/2022 at Unknown time     melatonin 1 mg Tab tablet [MELATONIN 1 MG TAB TABLET] Take 3 mg by mouth at bedtime.  8/14/2022 at Unknown time     multivitamin therapeutic tablet [MULTIVITAMIN THERAPEUTIC TABLET] Take 1 tablet by mouth daily. 8/15/2022 at Unknown time     nitroglycerin (NITROSTAT) 0.4 MG SL tablet [NITROGLYCERIN (NITROSTAT) 0.4 MG SL TABLET] Place 0.4 mg under the tongue every 5 (five) minutes as needed for chest pain. 8/15/2022 at Unknown time     omega 3-dha-epa-fish oil (FISH OIL) 60- mg cap capsule [OMEGA 3-DHA-EPA-FISH OIL (FISH OIL) 60- MG CAP CAPSULE] Take 2,000 mg by mouth 2 (two) times a day. 8/15/2022 at am     omeprazole (PRILOSEC) 20 MG capsule [OMEPRAZOLE (PRILOSEC) 20 MG CAPSULE] Take 20 mg by mouth daily before breakfast. 8/15/2022 at Unknown time     polyethylene glycol (MIRALAX) 17 gram packet [POLYETHYLENE GLYCOL (MIRALAX) 17 GRAM PACKET] Take 17 g by mouth daily. 8/15/2022 at Unknown time       Information source(s): Patient, Family member, Facility (TCU/NH/) medication list/MAR and CareEverywhere/SureScripts    Method of interview communication: in-person    Patient was asked about OTC/herbal products specifically.  PTA med list reflects this.    Based on the pharmacist's assessment, the PTA med list information appears reliable    Allergies were reviewed, assessed, and updated with the patient.      Patient did not bring any medications to the hospital and can't retrieve from home. No multi-dose medications are available for  use during hospital stay.      Thank you for the opportunity to participate in the care of this patient.      Sherman Chavez [facility]     8/15/2022     7:38 PM

## 2022-08-16 NOTE — PROGRESS NOTES
Nuclear Pharmacological Stress Test:  Supine Protocol. Lexiscan 0.4mg IV administered over 15sec. Peak VS: HR= 82 BP= 149/70. No symptoms of CP produced. Patient felt typical vasodilator side effects from Lexiscan including headache so Aminophylline 50mg IV administered to patient which resolved symptoms. No further dietary restrictions according to stress test. Results pending cardiology interpretation.  Miriam Hartmann RN  [facility]

## 2022-08-16 NOTE — PROVIDER NOTIFICATION
Paged MD regarding: ED Room 12-I.C. Troponin's on shift slightly increased. 0.18 to 0.28. No c/o chest pain overnight. VSS. Just wanted you to know! Any new orders? Thanks! [id number]

## 2022-08-16 NOTE — H&P
[facility] MEDICINE ADMISSION HISTORY AND PHYSICAL     Assessment & Plan       Janes Montero is a 88 year old female who presented with complaints of chest heaviness.    Medical history is notable for coronary disease with history of myocardial infarction, CABG, congestive heart failure, diabetes, hypertension, Parkinson's.    Her outpatient medications include aspirin, Lipitor, Sinemet, Coreg, fluoxetine, gabapentin, Synthroid, lisinopril, Claritin, Prilosec.    Initial evaluation revealed hypertension with blood pressures around 200 systolic, normal heart rate and temperature, normal troponin, normal creatinine and CBC, chest x-ray negative.  EKG with sinus rhythm, left bundle branch block that has been noted on previous EKGs.    Initial treatment included Pepcid, metoprolol IV, morphine, nitroglycerin.  She was given 4 baby aspirin in route to the emergency department.      Assessment and plan:  Chest pain radiating to left side of neck, left arm  History of mitral and aortic insufficiency  Essential hypertension  History of congestive heart failure with preserved ejection fraction  Concerning for coronary etiology given her history  Monitor troponins, continue nitro paste  Check echocardiogram  Consult cardiology for tomorrow  Continue home dose of Coreg, lisinopril    Parkinson's disease  Takes Sinemet 3 times daily, continue this    Non-insulin-dependent diabetes  Diet controlled  Hemoglobin A1c of 8.0 treat hyperglycemia as needed    Gastroesophageal reflux disease  Takes omeprazole at home    Clinically Significant Risk Factors Present on Admission          # Hypercalcemia: Ca = 10.5 mg/dL (Ref range: 8.5 - 10.5 mg/dL) and/or iCa = N/A on admission, will monitor as appropriate        # Hypertension: home medication list includes antihypertensive(s)    # DMII: A1C = N/A within past 3 months  # Obesity: Estimated body mass index is 31.45 kg/m  as calculated from the following:     "Height as of this encounter: 1.651 m (5' 5\").    Weight as of this encounter: 85.7 kg (189 lb).          DVTP: Low Risk Patient   Code Status: No CPR- Do NOT Intubate  Disposition: Observation   Expected LOS: 1 day  Goals for the hospitalization: Further evaluation of chest pain, cardiology evaluation  Diet: Cardiac, no caffeine  Fluids: None    Disposition Plan      Expected Discharge Date: 2022               Chief Complaint  chest heaviness     HISTORY   Janes Montero is a 88 year old female who presented with complaints of chest heaviness.  Says that she has had this feeling before, felt like she needed to belch.  She says that she has belched a few times but it did not really help.  She says that something has helped as she is now more comfortable.  She has been given multiple doses of nitro.  The symptoms were chest pressure that radiated up to her left neck and jaw, left arm and was associated with some diaphoresis.  She did not vomit.  She has otherwise been feeling well lately.  No recent cough, fever, abdominal pain, diarrhea, dysuria, lower extremity edema.      Past Medical History     Past Medical History:  No date: Acute diastolic congestive heart failure (H)  2018: Acute on chronic congestive heart failure (H)  No date: Coronary artery disease  No date: Diabetes mellitus, type II (H)  7/10/2018: Diastolic dysfunction  No date: Frozen shoulder      Comment:  bilateral  No date: Hypertension  No date: Hypertensive emergency  No date: Parkinson disease (H)  No date: Primary osteoarthritis involving multiple joints  No date: Primary osteoarthritis of left knee  No date: Recurrent UTI      Comment:  hx septic shock  : Thyroid disease     Surgical History     Past Surgical History:   Procedure Laterality Date     APPENDECTOMY       APPENDECTOMY       BACK SURGERY      L4-S1 laminectomy     BYPASS GRAFT ARTERY CORONARY      3 vessel      SECTION        SECTION       " "HERNIORRHAPHY VENTRAL Left      HYSTERECTOMY       HYSTERECTOMY       JOINT REPLACEMENT Left     Total left knee     OTHER SURGICAL HISTORY      right ankle surgery     OTHER SURGICAL HISTORY      right forearm fracture     REPLACEMENT TOTAL KNEE Right      SHOULDER SURGERY Right     reversed shoulder surgery.     Family History      Family History   Problem Relation Age of Onset     Heart Disease Mother      Heart Disease Father       Social History      Social History     Tobacco Use     Smoking status: Never Smoker     Smokeless tobacco: Never Used   Vaping Use     Vaping Use: Never used   Substance Use Topics     Alcohol use: No     Drug use: No      Allergies     Allergies   Allergen Reactions     Alendronate [Alendronic Acid] Unknown     pain     Codeine Hives     Hydrocodone-Acetaminophen Other (See Comments)     Highly sensitive, \"knocks her out and can't wake up\"     Risedronate Unknown     Bone pain     Rosuvastatin Muscle Pain (Myalgia)     Simvastatin Muscle Pain (Myalgia)     Prior to Admission Medications      Prior to Admission Medications   Prescriptions Last Dose Informant Patient Reported? Taking?   FLUoxetine (PROZAC) 20 MG capsule 8/15/2022 at am  No Yes   Sig: TAKE 1 CAPSULE BY MOUTH  TWICE DAILY   ascorbic acid, vitamin C, (ASCORBIC ACID WITH ELLIOTT HIPS) 500 MG tablet 8/15/2022 at Unknown time  Yes Yes   Sig: [ASCORBIC ACID, VITAMIN C, (ASCORBIC ACID WITH ELLIOTT HIPS) 500 MG TABLET] Take 500 mg by mouth daily.   aspirin 81 mg chewable tablet 8/14/2022 at Unknown time  Yes Yes   Sig: [ASPIRIN 81 MG CHEWABLE TABLET] Chew 81 mg at bedtime.   atorvastatin (LIPITOR) 20 MG tablet 8/14/2022 at Unknown time  No Yes   Sig: Take 1 tablet (20 mg) by mouth At Bedtime   carbidopa-levodopa (SINEMET)  MG tablet 8/15/2022 at x 2  No Yes   Sig: TAKE 1 TABLET BY MOUTH 3  TIMES DAILY TAKE AT LEAST  1/2 HOUR BEFORE MEALS OR 2  HOURS AFTER MEALS DO NOT  MIX WITH FOOD   carvedilol (COREG) 12.5 MG tablet 8/15/2022 " at am  No Yes   Sig: Take 1 tablet (12.5 mg) by mouth 2 times daily (with meals)   cholecalciferol, vitamin D3, 1,000 unit tablet 8/15/2022 at Unknown time  Yes Yes   Sig: [CHOLECALCIFEROL, VITAMIN D3, 1,000 UNIT TABLET] Take 2,000 Units by mouth daily.   coenzyme Q10 (CO Q-10) 100 mg capsule 8/15/2022 at am  Yes Yes   Sig: [COENZYME Q10 (CO Q-10) 100 MG CAPSULE] Take 100 mg by mouth 2 (two) times a day.   cyanocobalamin (VITAMIN B-12) 1000 MCG tablet 8/15/2022 at Unknown time  No Yes   Sig: Take 1 tablet (1,000 mcg) by mouth daily   fluticasone (FLONASE) 50 MCG/ACT nasal spray Past Week at not with  No Yes   Sig: Spray 1 spray into both nostrils daily   Patient taking differently: Spray 1 spray into both nostrils daily as needed   gabapentin (NEURONTIN) 300 MG capsule 8/15/2022 at x 2  No Yes   Sig: TAKE 1 CAPSULE BY MOUTH 4  TIMES DAILY   levothyroxine (SYNTHROID/LEVOTHROID) 25 MCG tablet 8/15/2022 at Unknown time  No Yes   Sig: Take 1 tablet (25 mcg) by mouth daily   lisinopril (ZESTRIL) 10 MG tablet 8/14/2022 at pm  No Yes   Sig: Take 1 tablet (10 mg) by mouth daily   loratadine (CLARITIN) 10 mg tablet 8/15/2022 at Unknown time  Yes Yes   Sig: [LORATADINE (CLARITIN) 10 MG TABLET] Take 10 mg by mouth daily.   magnesium oxide 250 mg Tab 8/15/2022 at Unknown time  Yes Yes   Sig: [MAGNESIUM OXIDE 250 MG TAB] Take 250 mg by mouth daily.   melatonin 1 mg Tab tablet 8/14/2022 at Unknown time  Yes Yes   Sig: [MELATONIN 1 MG TAB TABLET] Take 3 mg by mouth at bedtime.    multivitamin therapeutic tablet 8/15/2022 at Unknown time  Yes Yes   Sig: [MULTIVITAMIN THERAPEUTIC TABLET] Take 1 tablet by mouth daily.   nitroglycerin (NITROSTAT) 0.4 MG SL tablet 8/15/2022 at Unknown time  Yes Yes   Sig: [NITROGLYCERIN (NITROSTAT) 0.4 MG SL TABLET] Place 0.4 mg under the tongue every 5 (five) minutes as needed for chest pain.   omega 3-dha-epa-fish oil (FISH OIL) 60- mg cap capsule 8/15/2022 at am  Yes Yes   Sig: [OMEGA  3-DHA-EPA-FISH OIL (FISH OIL) 60- MG CAP CAPSULE] Take 2,000 mg by mouth 2 (two) times a day.   omeprazole (PRILOSEC) 20 MG capsule 8/15/2022 at Unknown time  Yes Yes   Sig: [OMEPRAZOLE (PRILOSEC) 20 MG CAPSULE] Take 20 mg by mouth daily before breakfast.   polyethylene glycol (MIRALAX) 17 gram packet 8/15/2022 at Unknown time  Yes Yes   Sig: [POLYETHYLENE GLYCOL (MIRALAX) 17 GRAM PACKET] Take 17 g by mouth daily.      Facility-Administered Medications Last Administration Doses Remaining   denosumab (PROLIA) injection 60 mg None recorded 2   denosumab (PROLIA) injection 60 mg 6/21/2022 10:54 AM          Review of Systems     A 12 point comprehensive review of systems was negative except as noted above in HPI.    PHYSICAL EXAMINATION     Vitals      Temp:  [98  F (36.7  C)] 98  F (36.7  C)  Pulse:  [74-90] 74  Resp:  [13-23] 13  BP: (159-212)/() 159/85  SpO2:  [92 %-97 %] 92 %    Examination   Physical Exam:    Gen: no acute distress, comfortable, alert and pleasant  ENT: no scleral icterus  Pulm: lungs are clear without wheezing, crackles, breathing comfortably at rest  CV: regular rate and rhythm, no significant lower extremity pitting edema  GI: abdomen is soft, non-tender, non-distended with active bowel sounds.  MSK: no obvious deformities of the extremities  Derm: Not pale, no jaundice  Psych: appropriate affect      Pertinent Radiology     Radiology Results:   Recent Results (from the past 24 hour(s))   XR Chest Port 1 View    Narrative    EXAM: XR CHEST PORT 1 VIEW  LOCATION: [facility]  DATE/TIME: 8/15/2022 7:05 PM    INDICATION: chest pain  COMPARISON: 07/14/2022, 06/11/2018.      Impression    IMPRESSION: Shallow inspiration. Large body habitus. Poststernotomy changes. No pneumothorax or fracture. Lungs are clear. Heart and pulmonary vascularity are normal. Reverse right shoulder arthroplasty.     EKG Results: personally reviewed.     Advance Care Planning        Prakash  MATHEUS [facility] Medicine  [facility]   Phone: #[phone]

## 2022-08-16 NOTE — ED NOTES
RN spoke with Asif BUTT. Pt to stay in ED until possible discharge after cardiology reads NM Lexiscan results.

## 2022-08-16 NOTE — PROGRESS NOTES
[facility]    Progress Note - Hospitalist Service       Date of Admission:  8/15/2022    Assessment & Plan            Janes Montero is a 88 year old female, with hx of CAD s/p CABG in 2011, CHF, diabetes, HTN, Parkinson's, admitted on 8/15/2022 with chest pain. She was initially hypertensive to the 200's, with normal troponin which has subsequently drifted up slightly to 0.28. CXR negative. EKG with known LBBB.     Addendum at 1540: Nuclear perfusion scan was abnormal with a large area of transmural infarction involving the apex and distal inferior lateral wall with a medium size area of stella-infarct ischemia.  Present when cardiology discussed this with patient and her son Abdelrahman.  Patient is agreeable to proceeding with angiography with consideration of revascularization if feasible.  Plan is for patient to be transferred to [facility] early tomorrow morning.  Patient currently pain-free and stable.    Chest pain radiating to left side of neck, left arm  History of mitral and aortic insufficiency  Essential hypertension  Congestive Heart Failure, Reduced ejection fraction  Concerning for coronary etiology given her history. Prior 3 vessel CABG in 2011. ECHO this admission now shows mildly reduced EF 45-50% compared to previous preserved EF. Troponin in ED drifting up mildly to 0.28. EKG showed known LBBB. BP improved, but still elevated today.   - Cardiology consulted, appreciate recs  - Cardiac telemetry  - Planning for nuclear perfusion scan later today  - Continue Coreg, Lisinopril  - Nitroglycerin PRN  - ASA 81 mg daily    Diabetes, Type 2  Last HgbA1c 8.0. Per PCP prior note, was considering starting Metformin pending next HbA1c, patient was reporting lifestyle changes at that time and was planning to re-check.   - sliding scale insulin   - Recommend follow up with PCP at discharge and consider starting Metformin.    GERD  Takes Omeprazole at home.   - Pantoprazole 40 mg daily  -  Consider changing omeprazole to famotidine at discharge    Parkinson's Disease  - Continue Sinemet 3 times daily    Depression: Continue PTA Prozac  HLD: Continue PTA Lipitor   Hypothyroidism: Continue PTA Levothyroxine   Neuropathy: Continue PTA Gabapentin       Diet: Combination Diet Low Saturated Fat Na <2400mg Diet, No Caffeine Diet    DVT Prophylaxis: Pneumatic Compression Devices  Holguin Catheter: Not present  Fluids: PO  Central Lines: None  Cardiac Monitoring: ACTIVE order. Indication: Chest pain/ ACS rule out (24 hours)  Code Status: No CPR- Do NOT Intubate      Disposition Plan      Expected Discharge Date: 08/16/2022      Destination: home          The patient's care was discussed with the Attending Physician, Dr. Min Gold and Patient.    Patient was seen and discussed with Attending Physician, Dr. Gold.     Mandie De Jesus MD PGY-2  Hospitalist [facility]     Patient seen and examined with resident  Agree with assessment and plan as noted above    Min Gold MD  ______________________________________________________________________    Interval History   Currently reports that her chest pain has improved, not experiencing any at this time. She slept okay overnight. She is hoping to go home after her scan and wondering if her pain is not heart related if she can do anything in the future to avoid coming in to the hospital.     Data reviewed today: I reviewed all medications, new labs and imaging results over the last 24 hours. I personally reviewed no images or EKG's today.    Physical Exam   Vital Signs: Temp: 97.8  F (36.6  C) Temp src: Oral BP: (!) 170/77 Pulse: 69   Resp: 20 SpO2: (P) 98 % O2 Device: (P) None (Room air)    Weight: 189 lbs 0 oz  General Appearance: Alert, lying in bed, appears comfortable  Respiratory: Regular rate, effort. CTA bilaterally.   Cardiovascular: Regular rate, rhythm. S1, S2 present. No murmur.   GI: Soft, non tender,  normal bowel sounds  Skin: Warm, dry  Neuro: Grossly intact. Face symmetric. Moves all extremities equally.      Data   Recent Labs   Lab 22  0744 22  0448 08/15/22  2322 08/15/22  1920   WBC  --   --   --  7.4   HGB  --   --   --  13.7   MCV  --   --   --  88   PLT  --   --   --  225   NA  --   --   --  136   POTASSIUM  --   --   --  4.6   CHLORIDE  --   --   --  100   CO2  --   --   --  26   BUN  --   --   --  30*   CR  --   --   --  1.04   ANIONGAP  --   --   --  10   LIZZY  --   --   --  10.5   * 153* 230* 236*   ALBUMIN  --   --   --  4.0   PROTTOTAL  --   --   --  7.7   BILITOTAL  --   --   --  0.6   ALKPHOS  --   --   --  95   ALT  --   --   --  <9   AST  --   --   --  28     Recent Results (from the past 24 hour(s))   XR Chest Port 1 View    Narrative    EXAM: XR CHEST PORT 1 VIEW  LOCATION: [facility]  DATE/TIME: 8/15/2022 7:05 PM    INDICATION: chest pain  COMPARISON: 2022, 2018.      Impression    IMPRESSION: Shallow inspiration. Large body habitus. Poststernotomy changes. No pneumothorax or fracture. Lungs are clear. Heart and pulmonary vascularity are normal. Reverse right shoulder arthroplasty.   Echocardiogram Complete   Result Value    LVEF  45-50% (mildly reduced)    Narrative    [id number]  [id number]  [id number]  [id number]^ELOISE^JERRY^[place] [address]     Name: EMMANUELLE DÍAZ  MRN: [id number]  : 10/24/1933  Study Date: 2022 08:13 AM  Age: 88 yrs  Gender: Female  Patient Location: [facility]  Reason For Study: Chest Pain, Chest Pressure, Chest Tightness, Aortic Valve  Disord  Ordering Physician: JERRY NAVAS  Performed By: [facility]     BSA: 1.9 m2  Height: 65 in  Weight: 189 lb  HR: 66  BP: 133/64 mmHg  ______________________________________________________________________________  Procedure  Complete Portable Echo Adult. Definity (NDC #50045-564) given intravenously.  1.5ml  lot #  [id number].  ______________________________________________________________________________  Interpretation Summary     The left ventricle is normal in size.  Left ventricular function is decreased. The ejection fraction is 45-50%  (mildly reduced).  There is hypokinesis of the apex and mid to apical septal segments.  The left atrium is mildly dilated.  There is mild to moderate (1-2+) mitral regurgitation.  Mild valvular aortic stenosis.  There is mild (1+) aortic regurgitation.  ______________________________________________________________________________  Left Ventricle  The left ventricle is normal in size. Left ventricular function is decreased.  The ejection fraction is 45-50% (mildly reduced). There is mild concentric  left ventricular hypertrophy. Diastolic Doppler findings (E/E' ratio and/or  other parameters) suggest left ventricular filling pressures are increased.  There is hypokinesis of the apex and mid to apical septal segments.     Right Ventricle  The right ventricle is not well visualized.     Atria  The left atrium is mildly dilated. Right atrial size is normal. There is no  color Doppler evidence of an atrial shunt.     Mitral Valve  The mitral valve leaflets appear thickened, but open well. There is moderate  mitral annular calcification. There is mild to moderate (1-2+) mitral  regurgitation.     Tricuspid Valve  The tricuspid valve is not well visualized, but is grossly normal. There is  mild (1+) tricuspid regurgitation. Right ventricular systolic pressure is  elevated, consistent with mild pulmonary hypertension.     Aortic Valve  There is mild (1+) aortic regurgitation. Mild valvular aortic stenosis.     Pulmonic Valve  The pulmonic valve is not well visualized. This degree of valvular  regurgitation is within normal limits.     Vessels  The aorta root is normal. Normal size ascending aorta. IVC diameter <2.1 cm  collapsing >50% with sniff suggests a normal RA pressure of 3 mmHg.      Pericardium  There is no pericardial effusion.     ______________________________________________________________________________  MMode/2D Measurements & Calculations  IVSd: 1.2 cm  LVIDd: 4.2 cm  LVIDs: 3.1 cm  LVPWd: 1.3 cm  FS: 27.9 %     LV mass(C)d: 198.0 grams  LV mass(C)dI: 102.5 grams/m2  Ao root diam: 3.3 cm  LA dimension: 3.5 cm  asc Aorta Diam: 3.5 cm  LA/Ao: 1.0  LVOT diam: 2.0 cm  LVOT area: 3.1 cm2  LA Volume (BP): 61.4 ml  LA Volume Index (BP): 31.8 ml/m2     LA Volume Indexed (AL/bp): 33.4 ml/m2  RWT: 0.61     Doppler Measurements & Calculations  MV E max edward: 114.0 cm/sec  MV A max edward: 101.6 cm/sec  MV E/A: 1.1  MV dec slope: 439.3 cm/sec2  MV dec time: 0.26 sec  Ao V2 max: 180.3 cm/sec  Ao max P.0 mmHg  Ao V2 mean: 124.7 cm/sec  Ao mean P.0 mmHg  Ao V2 VTI: 37.3 cm  CHINMAY(I,D): 2.3 cm2  CHINMAY(V,D): 2.3 cm2  LV V1 max P.1 mmHg  LV V1 max: 132.8 cm/sec  LV V1 VTI: 27.8 cm  SV(LVOT): 86.3 ml  SI(LVOT): 44.7 ml/m2  PA V2 max: 85.3 cm/sec  PA max P.9 mmHg  PA acc time: 0.12 sec  TR max edward: 314.9 cm/sec  TR max P.7 mmHg  AV Edward Ratio (DI): 0.74  CHINMAY Index (cm2/m2): 1.2     E/E' av.3  Lateral E/e': 15.2  Medial E/e': 37.4     ______________________________________________________________________________  Report approved by: Shazia Mercado 2022 10:23 AM         NM Lexiscan stress test   Result Value    [employer]

## 2022-08-16 NOTE — PLAN OF CARE
Problem: Chest Pain  Goal: Resolution of Chest Pain Symptoms  Outcome: Ongoing, Progressing    Patient boarding down in ED. A&Ox4, pleasant. VSS, on RA. No c/o CP overnight. Denies any CP. Nitroglycerin paste in place; will remove around 0700. Troponin's WNL on shift; 0.18 and 0.28. BG checks on shift. Tele: SR with BBB and prolonged QT. No c/o nausea. No SOB. Up A1 and walker. Ambulating to bathroom in hallway. Discharge pending. Cards to be consulted.

## 2022-08-16 NOTE — PROGRESS NOTES
" [facility]   [address], [place] [address]   [email or web address]   OFFICE: [phone]        Cardiology Addendum  (Please see full consultative note from earlier today as well)     Nuclear perfusion imaging study today revealed a large area of transmural infarction involving the apex and distal inferolateral wall extending into the inferior wall.  In addition, however, there is a medium size area of stella-infarct ischemia involving the mid to basal inferior and inferolateral wall. The left ventricular ejection fraction at stress is 67% with severe hypokinesis of the distal inferior and apical walls.  Given higher ejection fraction there may be a component of \"stunning\" involving the fixed defect region (s).  Perfusion imaging study would be considered intermediate in terms of risk.    I discussed the various options with Janes and her family including her son and daughter.  I feel be prudent and reasonable to pursue direct angiography with consideration of possible percutaneous revascularization if feasible.  I discussed the risks and indications with Janes and family and it was jointly decided to proceed.  Specifically, I discussed this with Janes the benefits and risks including potential risk of stroke, myocardial infarction, or death.  We also discussed the possibility of vascular injury, bleeding requiring blood transfusion, or reaction to iodinated contrast. *  She is willing to proceed.  Plan:    Coronary angiography   PCI depending upon the findings.  Will plan for tomorrow, 17 August 2022 given Cath Lab schedule is fairly full today.    Case Request and precoronary angiogram submitted through Epic.      Plan and findings were discussed with hospitalist service, Dr. Min Gold.           "

## 2022-08-16 NOTE — CONSULTS
Care Management Initial Consult    General Information  Assessment completed with: Patient,    Type of CM/SW Visit: Initial Assessment    Primary Care Provider verified and updated as needed: Yes   Readmission within the last 30 days: no previous admission in last 30 days      Reason for Consult: discharge planning  Advance Care Planning: Advance Care Planning Reviewed: questions answered, other (see comments) (Declined Honoring Choices information)     General Information Comments: Lives alone Indepenednt Livng    Communication Assessment  Patient's communication style: spoken language (English or Bilingual)    Hearing Difficulty or Deaf: yes   Wear Glasses or Blind: yes    Cognitive  Cognitive/Neuro/Behavioral: .WDL except, orientation  Level of Consciousness: alert  Arousal Level: opens eyes spontaneously  Orientation: disoriented to, time  Mood/Behavior: calm, cooperative          Living Environment:   People in home: alone     Current living Arrangements: independent living facility      Able to return to prior arrangements: yes  Living Arrangement Comments: Lives at [facility]    Family/Social Support:  Care provided by: self  Provides care for: no one  Marital Status:   Children          Description of Support System: Supportive, Involved    Support Assessment: Adequate family and caregiver support, Patient communicates needs well met    Current Resources:   Patient receiving home care services: No     Community Resources: Housekeeping/Chore Agency  Equipment currently used at home: walker, rolling, other (see comments) (Hearing aids)  Supplies currently used at home: Hearing Aid Batteries    Employment/Financial:  Employment Status: retired        Financial Concerns: No concerns identified           Lifestyle & Psychosocial Needs:  Social Determinants of Health     Tobacco Use: Low Risk      Smoking Tobacco Use: Never Smoker     Smokeless Tobacco Use: Never Used   Alcohol Use:  Not on file   Financial Resource Strain: Not on file   Food Insecurity: Not on file   Transportation Needs: Not on file   Physical Activity: Not on file   Stress: Not on file   Social Connections: Not on file   Intimate Partner Violence: Not on file   Depression: Not at risk     PHQ-2 Score: 1   Housing Stability: Not on file       Functional Status:  Prior to admission patient needed assistance:   Dependent ADLs:: Ambulation-walker  Dependent IADLs:: Cleaning, Shopping, Transportation  Assesssment of Functional Status: At functional baseline    Mental Health Status:          Chemical Dependency Status:                Values/Beliefs:  Spiritual, Cultural Beliefs, Druze Practices, Values that affect care:                 Additional Information:  Alert oriented patient lives alone at [facility].  Presents to  ED for evaluation of chest pain. States she is independent with I/ADLs except for transportation. Uses a roller walker; no home care services; doesn t drive. Declined Honoring Choices information.  Explained LEUNG/Observation Status and patient verbalized understanding. Plans to return home with son Abdelrahman or daughter Pat transporting patient on discharge. Other needs pending clinical progress.    BON HI RN/CM

## 2022-08-16 NOTE — ED PROVIDER NOTES
"EMERGENCY DEPARTMENT ENCOUNTER      NAME: Janes Montero  AGE: 88 year old female  YOB: 1933  MRN: [id number]  EVALUATION DATE & TIME: 8/15/2022  6:36 PM    PCP: Maggie Arriaga    ED PROVIDER: Jose Alfredo Mera M.D.      Chief Complaint   Patient presents with     Chest Pain         FINAL IMPRESSION:  Chest pain  Hypertension  Hypomagnesemia    ED COURSE & MEDICAL DECISION MAKING:    Pertinent Labs & Imaging studies reviewed. (See chart for details)  88 year old female presents to the Emergency Department for evaluation of chest pain.  Patient reports a pressure sensation as if \"I got lots of gas\".  Symptoms started shortly prior to arrival.  Patient with a history of distant bypass grafting and typically well controlled blood pressure.  In route blood pressure markedly elevated.  Given sublingual nitroglycerin in route and 1 taken at home.  Symptoms improved.  Also given 4 baby aspirin in route.  On arrival pain is returning.  Initial blood pressure markedly elevated 212 systolic.  Patient with soft systolic ejection murmur remainder exam unremarkable.  EKG with bundle branch block morphology which is pre-existing.  Slight ST segment depressions however in  lateral leads.  Baseline blood work being obtained including troponin.  We will proceed with blood pressure management with sublingual nitroglycerin and nitroglycerin paste.  Intravenous metoprolol x5 mg also to be given.  Patient appears non toxic with stable vitals signs. Overall exam is benign.        6:43 PM I met with the patient for the initial interview and physical examination. Discussed plan for treatment and workup in the ED.    7:07 PM.  Patient reports improvement in symptoms with blood pressure still elevated 199 systolic.  Intravenous morphine x4 mg ordered.  7:44 PM.  CBC unremarkable.  Glucose mildly elevated 236.  Remainder of comprehensive metabolic panel normal.  Magnesium minimally reduced at 1.7.  Awaiting troponin and BNP.  " Blood pressure moderating but still elevated near 180 systolic  8:16 PM.  Troponin normal at 0.04.  BNP normal.  Patient informed of need for hospitalization.  Declines transfer.  Blood pressure remains elevated at 170 systolic.  Additional dose of intravenous metoprolol x5 mg given.  Call placed to the admitting physician.  8:49 PM.  Patient discussed with Dr. Gold.  He is agreeable plan for hospitalization.  At the conclusion of the encounter I discussed the results of all of the tests and the disposition. The questions were answered and return precautions provided. The patient or family acknowledged understanding and was agreeable with the care plan.       PPE: Provider wore gloves, eye protection, surgical cap, and paper mask.     MEDICATIONS GIVEN IN THE EMERGENCY:  Medications   nitroGLYcerin (NITROSTAT) sublingual tablet 0.4 mg (has no administration in time range)   nitroGLYcerin (NITRO-BID) 2 % ointment 15 mg (15 mg Transdermal Patch/Med Applied 8/15/22 1859)   morphine (PF) injection 4 mg (has no administration in time range)   metoprolol (LOPRESSOR) injection 5 mg (5 mg Intravenous Given 8/15/22 1859)   famotidine (PEPCID) injection 20 mg (20 mg Intravenous Given 8/15/22 1905)       NEW PRESCRIPTIONS STARTED AT TODAY'S ER VISIT  New Prescriptions    No medications on file          =================================================================    HPI    Patient information was obtained from: patient    Use of Intrepreter: N/A         Janes Montero is a 88 year old female with a pertient medical history of CAD, thyroid disease, acute diastolic congestive heart failure, diastolic dysfunction, parkinson disease, hypertension, CKD, left bundle branch block, and bypass graft coronary who presents to the ED for evaluation of chest pain.    Patient states at 4:30 pm today, she was sitting in her chair when she experienced chest pain. She describes the pain as heaviness and pressure. She also reports  shortness of breath since the symptoms have started. EMS was called and she was given 4 baby aspirin and 1 dose of nitroglycerin which gave her some relief. EMS reports her blood pressure was in the 170-180 range. Her normal baseline range is 120-130. She denies nausea.    She has a history of myocardial infarction with triple bypass, bypass grafting (in ), and hypertension.    REVIEW OF SYSTEMS   Constitutional:  Denies fever, chills  Respiratory:  Denies productive cough. Positive for shortness of breath.  Cardiovascular:  Denies palpitations. Positive for chest pain.  GI:  Denies abdominal pain, nausea, vomiting, or change in bowel or bladder habits   Musculoskeletal:  Denies any new muscle/joint swelling  Skin:  Denies rash   Neurologic:  Denies focal weakness  All systems negative except as marked.     PAST MEDICAL HISTORY:  Past Medical History:   Diagnosis Date     Acute diastolic congestive heart failure (H)      Acute on chronic congestive heart failure (H) 2018     Coronary artery disease      Diabetes mellitus, type II (H)      Diastolic dysfunction 7/10/2018     Frozen shoulder     bilateral     Hypertension      Hypertensive emergency      Parkinson disease (H)      Primary osteoarthritis involving multiple joints      Primary osteoarthritis of left knee      Recurrent UTI     hx septic shock     Thyroid disease        PAST SURGICAL HISTORY:  Past Surgical History:   Procedure Laterality Date     APPENDECTOMY       APPENDECTOMY       BACK SURGERY      L4-S1 laminectomy     BYPASS GRAFT ARTERY CORONARY      3 vessel      SECTION        SECTION       HERNIORRHAPHY VENTRAL Left      HYSTERECTOMY       HYSTERECTOMY       JOINT REPLACEMENT Left     Total left knee     OTHER SURGICAL HISTORY      right ankle surgery     OTHER SURGICAL HISTORY      right forearm fracture     REPLACEMENT TOTAL KNEE Right      SHOULDER SURGERY Right     reversed shoulder surgery.         CURRENT  MEDICATIONS:      Current Facility-Administered Medications:      denosumab (PROLIA) injection 60 mg, 60 mg, Subcutaneous, Q6 Months, Kayla Alvarez NP, 60 mg at 06/21/22 1054     denosumab (PROLIA) injection 60 mg, 60 mg, Subcutaneous, Q6 Months, Abner Curtis MD     morphine (PF) injection 4 mg, 4 mg, Intravenous, Once, Jose Alfredo Mera MD     nitroGLYcerin (NITRO-BID) 2 % ointment 15 mg, 1 inch, Transdermal, Once, Jose Alfredo Mera MD, 15 mg at 08/15/22 1859     nitroGLYcerin (NITROSTAT) sublingual tablet 0.4 mg, 0.4 mg, Sublingual, Q5 Min PRN, Jose Alfredo Mera MD    Current Outpatient Medications:      ascorbic acid, vitamin C, (ASCORBIC ACID WITH ELLIOTT HIPS) 500 MG tablet, [ASCORBIC ACID, VITAMIN C, (ASCORBIC ACID WITH ELLIOTT HIPS) 500 MG TABLET] Take 500 mg by mouth daily., Disp: , Rfl:      aspirin 81 mg chewable tablet, [ASPIRIN 81 MG CHEWABLE TABLET] Chew 81 mg at bedtime., Disp: , Rfl:      atorvastatin (LIPITOR) 20 MG tablet, Take 1 tablet (20 mg) by mouth At Bedtime, Disp: 90 tablet, Rfl: 3     carbidopa-levodopa (SINEMET)  MG tablet, TAKE 1 TABLET BY MOUTH 3  TIMES DAILY TAKE AT LEAST  1/2 HOUR BEFORE MEALS OR 2  HOURS AFTER MEALS DO NOT  MIX WITH FOOD, Disp: 270 tablet, Rfl: 3     carvedilol (COREG) 12.5 MG tablet, Take 1 tablet (12.5 mg) by mouth 2 times daily (with meals), Disp: 180 tablet, Rfl: 3     cholecalciferol, vitamin D3, 1,000 unit tablet, [CHOLECALCIFEROL, VITAMIN D3, 1,000 UNIT TABLET] Take 2,000 Units by mouth daily., Disp: , Rfl:      coenzyme Q10 (CO Q-10) 100 mg capsule, [COENZYME Q10 (CO Q-10) 100 MG CAPSULE] Take 100 mg by mouth 2 (two) times a day., Disp: , Rfl:      cyanocobalamin (VITAMIN B-12) 1000 MCG tablet, Take 1 tablet (1,000 mcg) by mouth daily, Disp: 90 tablet, Rfl: 3     FLUoxetine (PROZAC) 20 MG capsule, TAKE 1 CAPSULE BY MOUTH  TWICE DAILY, Disp: 180 capsule, Rfl: 1     fluticasone (FLONASE) 50 MCG/ACT nasal spray, Spray 1 spray into both nostrils daily,  Disp: 16 g, Rfl: 3     gabapentin (NEURONTIN) 300 MG capsule, TAKE 1 CAPSULE BY MOUTH 4  TIMES DAILY, Disp: 360 capsule, Rfl: 3     hydrocortisone 2.5 % cream, [HYDROCORTISONE 2.5 % CREAM] Apply topically 2 (two) times a day., Disp: , Rfl:      ketoconazole (NIZORAL) 2 % cream, [KETOCONAZOLE (NIZORAL) 2 % CREAM] Apply topically daily., Disp: , Rfl:      levothyroxine (SYNTHROID/LEVOTHROID) 25 MCG tablet, Take 1 tablet (25 mcg) by mouth daily, Disp: 90 tablet, Rfl: 3     lisinopril (ZESTRIL) 10 MG tablet, Take 1 tablet (10 mg) by mouth daily, Disp: 90 tablet, Rfl: 3     loratadine (CLARITIN) 10 mg tablet, [LORATADINE (CLARITIN) 10 MG TABLET] Take 10 mg by mouth daily., Disp: , Rfl:      magnesium oxide 250 mg Tab, [MAGNESIUM OXIDE 250 MG TAB] Take 250 mg by mouth daily., Disp: , Rfl:      melatonin 1 mg Tab tablet, [MELATONIN 1 MG TAB TABLET] Take 3 mg by mouth at bedtime. , Disp: , Rfl:      multivitamin therapeutic tablet, [MULTIVITAMIN THERAPEUTIC TABLET] Take 1 tablet by mouth daily., Disp: , Rfl:      nitroglycerin (NITROSTAT) 0.4 MG SL tablet, [NITROGLYCERIN (NITROSTAT) 0.4 MG SL TABLET] Place 0.4 mg under the tongue every 5 (five) minutes as needed for chest pain., Disp: , Rfl:      nystatin (MYCOSTATIN) 226856 UNIT/GM external cream, [NYSTATIN (MYCOSTATIN) CREAM] apply under breasts to rash as needed., Disp: 30 g, Rfl: 1     omega 3-dha-epa-fish oil (FISH OIL) 60- mg cap capsule, [OMEGA 3-DHA-EPA-FISH OIL (FISH OIL) 60- MG CAP CAPSULE] Take 2,000 mg by mouth 2 (two) times a day., Disp: , Rfl:      omeprazole (PRILOSEC) 20 MG capsule, [OMEPRAZOLE (PRILOSEC) 20 MG CAPSULE] Take 20 mg by mouth daily before breakfast., Disp: , Rfl:      polyethylene glycol (MIRALAX) 17 gram packet, [POLYETHYLENE GLYCOL (MIRALAX) 17 GRAM PACKET] Take 17 g by mouth daily., Disp: , Rfl:     ALLERGIES:  Allergies   Allergen Reactions     Alendronate [Alendronic Acid] Unknown     pain     Codeine Hives      "Hydrocodone-Acetaminophen Other (See Comments)     Highly sensitive, \"knocks her out and can't wake up\"     Risedronate Unknown     Bone pain     Rosuvastatin Muscle Pain (Myalgia)     Simvastatin Muscle Pain (Myalgia)       FAMILY HISTORY:  Family History   Problem Relation Age of Onset     Heart Disease Mother      Heart Disease Father        SOCIAL HISTORY:   Social History     Socioeconomic History     Marital status:      Spouse name: None     Number of children: None     Years of education: None     Highest education level: None   Tobacco Use     Smoking status: Never Smoker     Smokeless tobacco: Never Used   Vaping Use     Vaping Use: Never used   Substance and Sexual Activity     Alcohol use: No     Drug use: No     Sexual activity: Not Currently     Birth control/protection: None   Other Topics Concern     Parent/sibling w/ CABG, MI or angioplasty before 65F 55M? No   Social History Narrative    6/15/2021 new to [place] from [place]. She has 6 kids.       VITALS:  Patient Vitals for the past 24 hrs:   BP Temp Temp src Pulse Resp SpO2 Height Weight   08/15/22 1846 (!) 212/110 98  F (36.7  C) Oral 90 18 97 % 1.651 m (5' 5\") 85.7 kg (189 lb)        PHYSICAL EXAM    Constitutional:  Awake, alert, mildly distressed.  HENT:  Normocephalic, Atraumatic. Bilateral external ears normal. Oropharynx moist. Nose normal. Neck- Normal range of motion with no guarding, No midline cervical tenderness, Supple, No stridor.   Eyes:  PERRL, EOMI with no signs of entrapment, Conjunctiva normal, No discharge.   Respiratory:  Normal breath sounds, No respiratory distress, No wheezing.    Cardiovascular:  Normal heart rate, No appreciable rubs or gallops. Soft systolic ejection murmur.  GI:  Soft, No tenderness, No distension, No palpable masses  Musculoskeletal: No edema. Good range of motion in all major joints.   Integument:  Warm, Dry, No erythema, No rash.   Neurologic:  Alert & oriented, Normal motor function, Normal " sensory function, No focal deficits noted.   Psychiatric:  Affect normal, Judgment normal, Mood normal.     LAB:  All pertinent labs reviewed and interpreted.  Results for orders placed or performed during the hospital encounter of 08/15/22   XR Chest Port 1 View     Status: None    Narrative    EXAM: XR CHEST PORT 1 VIEW  LOCATION: [facility]  DATE/TIME: 8/15/2022 7:05 PM    INDICATION: chest pain  COMPARISON: 07/14/2022, 06/11/2018.      Impression    IMPRESSION: Shallow inspiration. Large body habitus. Poststernotomy changes. No pneumothorax or fracture. Lungs are clear. Heart and pulmonary vascularity are normal. Reverse right shoulder arthroplasty.   Troponin I     Status: Normal   Result Value Ref Range    Troponin I 0.04 0.00 - 0.29 ng/mL   Magnesium     Status: Abnormal   Result Value Ref Range    Magnesium 1.7 (L) 1.8 - 2.6 mg/dL   Comprehensive metabolic panel     Status: Abnormal   Result Value Ref Range    Sodium 136 136 - 145 mmol/L    Potassium 4.6 3.5 - 5.0 mmol/L    Chloride 100 98 - 107 mmol/L    Carbon Dioxide (CO2) 26 22 - 31 mmol/L    Anion Gap 10 5 - 18 mmol/L    Urea Nitrogen 30 (H) 8 - 28 mg/dL    Creatinine 1.04 0.60 - 1.10 mg/dL    Calcium 10.5 8.5 - 10.5 mg/dL    Glucose 236 (H) 70 - 125 mg/dL    Alkaline Phosphatase 95 45 - 120 U/L    AST 28 0 - 40 U/L    ALT <9 0 - 45 U/L    Protein Total 7.7 6.0 - 8.0 g/dL    Albumin 4.0 3.5 - 5.0 g/dL    Bilirubin Total 0.6 0.0 - 1.0 mg/dL    GFR Estimate 51 (L) >60 mL/min/1.73m2   B-Type Natriuretic Peptide ([facility] Only)     Status: Normal   Result Value Ref Range     0 - 167 pg/mL   CBC with platelets and differential     Status: None   Result Value Ref Range    WBC Count 7.4 4.0 - 11.0 10e3/uL    RBC Count 4.66 3.80 - 5.20 10e6/uL    Hemoglobin 13.7 11.7 - 15.7 g/dL    Hematocrit 40.9 35.0 - 47.0 %    MCV 88 78 - 100 fL    MCH 29.4 26.5 - 33.0 pg    MCHC 33.5 31.5 - 36.5 g/dL    RDW 14.6 10.0 - 15.0 %    Platelet  Count 225 150 - 450 10e3/uL    % Neutrophils 66 %    % Lymphocytes 19 %    % Monocytes 11 %    % Eosinophils 2 %    % Basophils 1 %    % Immature Granulocytes 1 %    NRBCs per 100 WBC 0 <1 /100    Absolute Neutrophils 4.8 1.6 - 8.3 10e3/uL    Absolute Lymphocytes 1.4 0.8 - 5.3 10e3/uL    Absolute Monocytes 0.8 0.0 - 1.3 10e3/uL    Absolute Eosinophils 0.2 0.0 - 0.7 10e3/uL    Absolute Basophils 0.1 0.0 - 0.2 10e3/uL    Absolute Immature Granulocytes 0.1 <=0.4 10e3/uL    Absolute NRBCs 0.0 10e3/uL   CBC with Platelets & Differential     Status: None    Narrative    The following orders were created for panel order CBC with Platelets & Differential.  Procedure                               Abnormality         Status                     ---------                               -----------         ------                     CBC with platelets and d...[[id number]]                      Final result                 Please view results for these tests on the individual orders.     RADIOLOGY:  Reviewed all pertinent imaging. Please see official radiology report.  XR Chest Port 1 View    (Results Pending)       EKG:    Normal sinus rhythm.  Left bundle branch block.  No acute ST segment abnormalities.  When compared to June 11, 2018 essentially unchanged.      I have independently reviewed and interpreted the EKG(s) documented above.    I, Zeenat Jimenez, am serving as a scribe to document services personally performed by Jose Alfredo Mera MD, based on my observation and the provider's statements to me. I, Jose Alfredo Mera MD attest that Zeenat Jimenez is acting in a scribe capacity, has observed my performance of the services and has documented them in accordance with my direction.    Jose Alfredo Mera M.D.  Emergency Medicine  [facility] EMERGENCY ROOM     Jose Alfredo Mera MD  08/15/22 2050       Jose Alfredo Mera MD  08/15/22 2052       Jose Alfredo Mera MD  08/15/22 9050

## 2022-08-16 NOTE — CONSULTS
[facility] [address], [place] [address]   [email or web address]   OFFICE: [phone]        Impression and Plan     1.  Coronary artery disease. Janes has known coronary artery disease having had prior three-vessel coronary artery bypass graft surgery 25 May 2011.  Revascularization details not entirely clear though it documentation indicates that JEOVANY graft was utilized.  Troponins this admission x3 within normal limits though with slight upward trend from 0.04 ng/mL to 0.28 ng/mL.  Twelve-lead ECG reveals evidence of left bundle branch block (previously documented and not new).  On my interview, Janes indicated that her discomfort did seem to improve with eructation.  I discussed the various options with Janes and it was jointly decided to pursue noninvasive evaluation/risk stratification with nuclear perfusion imaging.  Plan:    Regadenoson nuclear perfusion study.      Continue ?-blocker therapy, carvedilol.    Continue aspirin.     2.  Mitral insufficiency.  This was felt moderate in degree on echocardiogram 5 March 2020.    Echocardiogram (already ordered).     3.  Aortic insufficiency.  This was felt mild to moderate in degree on echocardiogram 5 March 2020.     4.  Hypertension.    Blood pressure most recently this morning 133/64 mmHg.     5.  Dyslipidemia.    Lipid profile 14 December 2021 revealed LDL 32 mg/dL HDL 44 mg/dL    Continue atorvastatin.    Primary Cardiologist: Janes historically had followed with Dr. Dusty Cross though did see author of this note 26 August 2021.    History of Present Illness    Janes Montero is a 88 year old female with known coronary artery disease having had prior coronary artery bypass graft surgery.  She now presents with symptoms of chest discomfort.  On my interview, Janes reported that she had the sensation of needing to eructate and that eructation did seem to ameliorate her discomfort.  She did report  some radiation to the left side of the neck and perhaps the jaw.  She states her current symptom profile was somewhat dissimilar from when she had presented in 2011.  At that time she states she had more of a central chest pressure somewhat to similar character to present symptomatology.  She denies any fevers, chills, or other constitutional symptoms.  Currently comfortable.    Further review of systems is otherwise negative/noncontributory (medical record and 13 point review of systems reviewed as well and pertinent positives noted).    Cardiac Diagnostics   Telemetry (personally reviewed): Sinus rhythm.     Echocardiogram 5 March 2020:  1. Left ventricle: The cavity size is normal. Wall thickness is mildly increased. Systolic function is normal. The estimated ejection fraction is 55-60%.   2. Left atrium: The atrium is mildly to moderately dilated.   3. Mitral valve: Moderately calcified annulus. Leaflet separation is normal. There is no evidence for stenosis. Moderate regurgitation.   4. Aortic valve: Probably trileaflet; mildly calcified leaflets. Thickening, consistent with sclerosis. Cusp separation is normal. There is no stenosis. Mild to moderate regurgitation.   5. Tricuspid valve: Structurally normal valve. Leaflet separation is normal. There is no evidence for stenosis. Moderate regurgitation.      Coronary angiogram 23 May 2011 (precoronary artery bypass graft surgery):  1. Normal left main.   2. Severe eccentric 95% proximal left anterior descending stenosis just after the first diagonal and just after the first septal  branches originate. Second eccentric stenosis of 60-70% is present proximally involving the origin of the second diagonal branch. The second diagonal branch ostium has a 50% stenosis.   3. Mild irregularity of the circumflex, circumflex marginal branches.   4. Mild irregularity of the right coronary artery. No significant stenosis. PDA arises distally, appears normal.  "  5. Overall left ventricular ejection fraction normal.      Twelve-lead ECG (personally reviewed) 2018: Sinus rhythm with occasional PVCs.  Left bundle branch block.    Twelve-lead ECG (personally reviewed) 15 August 2022: Sinus rhythm with left bundle branch block.    Medical History  Surgical History   Past Medical History:   Diagnosis Date     Acute diastolic congestive heart failure (H)      Acute on chronic congestive heart failure (H) 2018     Coronary artery disease      Diabetes mellitus, type II (H)      Diastolic dysfunction 7/10/2018     Frozen shoulder     bilateral     Hypertension      Hypertensive emergency      Parkinson disease (H)      Primary osteoarthritis involving multiple joints      Primary osteoarthritis of left knee      Recurrent UTI     hx septic shock     Thyroid disease       Past Surgical History:   Procedure Laterality Date     APPENDECTOMY       APPENDECTOMY       BACK SURGERY      L4-S1 laminectomy     BYPASS GRAFT ARTERY CORONARY      3 vessel      SECTION        SECTION       HERNIORRHAPHY VENTRAL Left      HYSTERECTOMY       HYSTERECTOMY       JOINT REPLACEMENT Left     Total left knee     OTHER SURGICAL HISTORY      right ankle surgery     OTHER SURGICAL HISTORY      right forearm fracture     REPLACEMENT TOTAL KNEE Right      SHOULDER SURGERY Right     reversed shoulder surgery.          Family History/Social History/Risk Factors   Patient does not smoke.  Family history reviewed, and   Family History   Problem Relation Age of Onset     Heart Disease Mother      Heart Disease Father         Physical Examination   /64 (BP Location: Left arm)   Pulse 60   Temp 97.8  F (36.6  C) (Oral)   Resp 17   Ht 1.651 m (5' 5\")   Wt 85.7 kg (189 lb)   SpO2 92%   BMI 31.45 kg/m    Wt Readings from Last 5 Encounters:   08/15/22 85.7 kg (189 lb)   22 85.3 kg (188 lb)   22 84.7 kg (186 lb 12.8 oz)   22 85.3 kg (188 lb)   22 " 85.3 kg (188 lb)     Wt Readings from Last 3 Encounters:   08/15/22 85.7 kg (189 lb)   06/28/22 85.3 kg (188 lb)   05/27/22 84.7 kg (186 lb 12.8 oz)       Intake/Output Summary (Last 24 hours) at 8/16/2022 0630  Last data filed at 8/15/2022 2224  Gross per 24 hour   Intake 50 ml   Output --   Net 50 ml     The patient is alert and oriented times three. Sclerae are anicteric. Mucosal membranes are moist. Jugular venous pressure is normal. No significant adenopathy/thyromegally appreciated. Lungs are fairly clear with reasonable expansion. On cardiovascular exam, the patient has a regular S1 and S2.  Heart sounds are somewhat distant.  Abdomen is soft and non-tender. Extremities reveal no clubbing, cyanosis, or edema.         Imaging      Chest radiograph 15 August 2022:  1. Shallow inspiration.   2. Large body habitus.   3. Poststernotomy changes.   4. No pneumothorax or fracture.   5. Lungs are clear.   6. Heart and pulmonary vascularity are normal.   7. Reverse right shoulder arthroplasty.    Lab Results     Recent Labs   Lab 08/16/22  0448 08/15/22  2322 08/15/22  1920   WBC  --   --  7.4   HGB  --   --  13.7   MCV  --   --  88   PLT  --   --  225   NA  --   --  136   POTASSIUM  --   --  4.6   CHLORIDE  --   --  100   CO2  --   --  26   BUN  --   --  30*   CR  --   --  1.04   ANIONGAP  --   --  10   LIZZY  --   --  10.5   * 230* 236*   ALBUMIN  --   --  4.0   PROTTOTAL  --   --  7.7   BILITOTAL  --   --  0.6   ALKPHOS  --   --  95   ALT  --   --  <9   AST  --   --  28     Recent Labs   Lab Test 08/15/22  1920 06/11/18  1259    383*     No lab results found in last 7 days.    Invalid input(s): LDLCALC  Lab Results   Component Value Date     08/15/2022    CO2 26 08/15/2022    BUN 30 08/15/2022     Lab Results   Component Value Date    WBC 7.4 08/15/2022    HGB 13.7 08/15/2022    HCT 40.9 08/15/2022    MCV 88 08/15/2022     08/15/2022     Lab Results   Component Value Date    CHOL 138  12/14/2021    TRIG 309 12/14/2021    HDL 44 12/14/2021     Recent Labs   Lab Test 12/14/21  1638   CHOL 138   HDL 44*   LDL 32   TRIG 309*     Lab Results   Component Value Date    TROPONINI 0.28 08/16/2022    TROPONINI 0.18 08/15/2022    TROPONINI 0.04 08/15/2022     Lab Results   Component Value Date    TSH 2.22 03/22/2022         Current Inpatient Scheduled Medications   Scheduled Meds:    aspirin  81 mg Oral At Bedtime     atorvastatin  20 mg Oral At Bedtime     carbidopa-levodopa  1 tablet Oral TID     carvedilol  12.5 mg Oral BID w/meals     denosumab  60 mg Subcutaneous Q6 Months     denosumab  60 mg Subcutaneous Q6 Months     FLUoxetine  20 mg Oral BID     gabapentin  300 mg Oral 4x Daily     insulin aspart  1-7 Units Subcutaneous TID AC     insulin aspart  1-5 Units Subcutaneous At Bedtime     levothyroxine  25 mcg Oral Daily     lisinopril  10 mg Oral Daily     loratadine  10 mg Oral Daily     magnesium oxide  200 mg Oral Daily     nitroGLYcerin  1 inch Transdermal Once     pantoprazole  40 mg Oral QAM AC     polyethylene glycol  17 g Oral Daily     Continuous Infusions:       Medications Prior to Admission   Prior to Admission medications    Medication Sig Start Date End Date Taking? Authorizing Provider   ascorbic acid, vitamin C, (ASCORBIC ACID WITH ELLIOTT HIPS) 500 MG tablet [ASCORBIC ACID, VITAMIN C, (ASCORBIC ACID WITH ELLIOTT HIPS) 500 MG TABLET] Take 500 mg by mouth daily. 6/11/18  Yes Provider, Historical   aspirin 81 mg chewable tablet [ASPIRIN 81 MG CHEWABLE TABLET] Chew 81 mg at bedtime. 6/11/18  Yes Provider, Historical   atorvastatin (LIPITOR) 20 MG tablet Take 1 tablet (20 mg) by mouth At Bedtime 8/26/21  Yes Mathew Ricardo MD   carbidopa-levodopa (SINEMET)  MG tablet TAKE 1 TABLET BY MOUTH 3  TIMES DAILY TAKE AT LEAST  1/2 HOUR BEFORE MEALS OR 2  HOURS AFTER MEALS DO NOT  MIX WITH FOOD 5/23/22  Yes Brennon Moya MD   carvedilol (COREG) 12.5 MG tablet Take 1 tablet (12.5 mg) by  mouth 2 times daily (with meals) 8/26/21  Yes Mathew Ricardo MD   cholecalciferol, vitamin D3, 1,000 unit tablet [CHOLECALCIFEROL, VITAMIN D3, 1,000 UNIT TABLET] Take 2,000 Units by mouth daily. 6/11/18  Yes Provider, Historical   coenzyme Q10 (CO Q-10) 100 mg capsule [COENZYME Q10 (CO Q-10) 100 MG CAPSULE] Take 100 mg by mouth 2 (two) times a day. 6/11/18  Yes Provider, Historical   cyanocobalamin (VITAMIN B-12) 1000 MCG tablet Take 1 tablet (1,000 mcg) by mouth daily 10/19/21  Yes Brennon Moya MD   FLUoxetine (PROZAC) 20 MG capsule TAKE 1 CAPSULE BY MOUTH  TWICE DAILY 7/29/22  Yes Maggie Arriaga MD   fluticasone (FLONASE) 50 MCG/ACT nasal spray Spray 1 spray into both nostrils daily  Patient taking differently: Spray 1 spray into both nostrils daily as needed 3/22/22  Yes Maggie Arriaga MD   gabapentin (NEURONTIN) 300 MG capsule TAKE 1 CAPSULE BY MOUTH 4  TIMES DAILY 5/23/22  Yes Brennon Moya MD   levothyroxine (SYNTHROID/LEVOTHROID) 25 MCG tablet Take 1 tablet (25 mcg) by mouth daily 3/22/22  Yes Maggie Arriaga MD   lisinopril (ZESTRIL) 10 MG tablet Take 1 tablet (10 mg) by mouth daily 7/18/22  Yes Maggie Arriaga MD   loratadine (CLARITIN) 10 mg tablet [LORATADINE (CLARITIN) 10 MG TABLET] Take 10 mg by mouth daily. 6/11/18  Yes Provider, Historical   magnesium oxide 250 mg Tab [MAGNESIUM OXIDE 250 MG TAB] Take 250 mg by mouth daily. 6/11/18  Yes Provider, Historical   melatonin 1 mg Tab tablet [MELATONIN 1 MG TAB TABLET] Take 3 mg by mouth at bedtime.  6/11/18  Yes Provider, Historical   multivitamin therapeutic tablet [MULTIVITAMIN THERAPEUTIC TABLET] Take 1 tablet by mouth daily. 6/11/18  Yes Provider, Historical   nitroglycerin (NITROSTAT) 0.4 MG SL tablet [NITROGLYCERIN (NITROSTAT) 0.4 MG SL TABLET] Place 0.4 mg under the tongue every 5 (five) minutes as needed for chest pain. 6/11/18  Yes Provider, Historical   omega 3-dha-epa-fish oil (FISH OIL) 60- mg cap capsule [OMEGA  3-DHA-EPA-FISH OIL (FISH OIL) 60- MG CAP CAPSULE] Take 2,000 mg by mouth 2 (two) times a day. 6/11/18  Yes Provider, Historical   omeprazole (PRILOSEC) 20 MG capsule [OMEPRAZOLE (PRILOSEC) 20 MG CAPSULE] Take 20 mg by mouth daily before breakfast. 6/11/18  Yes Provider, Historical   polyethylene glycol (MIRALAX) 17 gram packet [POLYETHYLENE GLYCOL (MIRALAX) 17 GRAM PACKET] Take 17 g by mouth daily. 6/15/21  Yes Provider, Historical

## 2022-08-17 ENCOUNTER — HOSPITAL ENCOUNTER (INPATIENT)
Facility: HOSPITAL | Age: 87
LOS: 2 days | Discharge: HOME | DRG: 287 | End: 2022-08-19
Attending: INTERNAL MEDICINE | Admitting: INTERNAL MEDICINE
Payer: MEDICARE

## 2022-08-17 VITALS
TEMPERATURE: 97.8 F | HEART RATE: 60 BPM | OXYGEN SATURATION: 96 % | BODY MASS INDEX: 30.59 KG/M2 | WEIGHT: 183.6 LBS | RESPIRATION RATE: 20 BRPM | HEIGHT: 65 IN | SYSTOLIC BLOOD PRESSURE: 148 MMHG | DIASTOLIC BLOOD PRESSURE: 67 MMHG

## 2022-08-17 DIAGNOSIS — Z95.1 S/P CABG (CORONARY ARTERY BYPASS GRAFT): ICD-10-CM

## 2022-08-17 DIAGNOSIS — I25.10 CORONARY ARTERY DISEASE INVOLVING NATIVE CORONARY ARTERY WITHOUT ANGINA PECTORIS, UNSPECIFIED WHETHER NATIVE OR TRANSPLANTED HEART: ICD-10-CM

## 2022-08-17 DIAGNOSIS — I25.110 CORONARY ARTERY DISEASE INVOLVING NATIVE CORONARY ARTERY OF NATIVE HEART WITH UNSTABLE ANGINA PECTORIS (H): ICD-10-CM

## 2022-08-17 DIAGNOSIS — N18.9 ACUTE KIDNEY INJURY SUPERIMPOSED ON CKD (H): Primary | ICD-10-CM

## 2022-08-17 DIAGNOSIS — N17.9 ACUTE KIDNEY INJURY SUPERIMPOSED ON CKD (H): Primary | ICD-10-CM

## 2022-08-17 DIAGNOSIS — R07.9 CHEST PAIN, UNSPECIFIED TYPE: ICD-10-CM

## 2022-08-17 LAB
ANION GAP SERPL CALCULATED.3IONS-SCNC: 6 MMOL/L (ref 5–18)
BUN SERPL-MCNC: 35 MG/DL (ref 8–28)
CALCIUM SERPL-MCNC: 9.5 MG/DL (ref 8.5–10.5)
CHLORIDE BLD-SCNC: 101 MMOL/L (ref 98–107)
CO2 SERPL-SCNC: 26 MMOL/L (ref 22–31)
CREAT SERPL-MCNC: 1.2 MG/DL (ref 0.6–1.1)
GFR SERPL CREATININE-BSD FRML MDRD: 43 ML/MIN/1.73M2
GLUCOSE BLD-MCNC: 165 MG/DL (ref 70–125)
GLUCOSE BLDC GLUCOMTR-MCNC: 122 MG/DL (ref 70–99)
GLUCOSE BLDC GLUCOMTR-MCNC: 166 MG/DL (ref 70–99)
MAGNESIUM SERPL-MCNC: 2.1 MG/DL (ref 1.8–2.6)
POTASSIUM BLD-SCNC: 4.2 MMOL/L (ref 3.5–5)
SODIUM SERPL-SCNC: 133 MMOL/L (ref 136–145)

## 2022-08-17 PROCEDURE — B211YZZ FLUOROSCOPY OF MULTIPLE CORONARY ARTERIES USING OTHER CONTRAST: ICD-10-PCS | Performed by: INTERNAL MEDICINE

## 2022-08-17 PROCEDURE — B212YZZ FLUOROSCOPY OF SINGLE CORONARY ARTERY BYPASS GRAFT USING OTHER CONTRAST: ICD-10-PCS | Performed by: INTERNAL MEDICINE

## 2022-08-17 PROCEDURE — 83735 ASSAY OF MAGNESIUM: CPT

## 2022-08-17 PROCEDURE — 272N000001 HC OR GENERAL SUPPLY STERILE: Performed by: INTERNAL MEDICINE

## 2022-08-17 PROCEDURE — 250N000013 HC RX MED GY IP 250 OP 250 PS 637: Performed by: INTERNAL MEDICINE

## 2022-08-17 PROCEDURE — 99207 PR NO CHARGE LOS: CPT | Performed by: INTERNAL MEDICINE

## 2022-08-17 PROCEDURE — 99152 MOD SED SAME PHYS/QHP 5/>YRS: CPT | Performed by: INTERNAL MEDICINE

## 2022-08-17 PROCEDURE — 250N000013 HC RX MED GY IP 250 OP 250 PS 637

## 2022-08-17 PROCEDURE — 80048 BASIC METABOLIC PNL TOTAL CA: CPT

## 2022-08-17 PROCEDURE — 255N000002 HC RX 255 OP 636: Performed by: INTERNAL MEDICINE

## 2022-08-17 PROCEDURE — 210N000001 HC R&B IMCU HEART CARE

## 2022-08-17 PROCEDURE — 99217 PR OBSERVATION CARE DISCHARGE: CPT | Performed by: FAMILY MEDICINE

## 2022-08-17 PROCEDURE — 250N000013 HC RX MED GY IP 250 OP 250 PS 637: Performed by: HOSPITALIST

## 2022-08-17 PROCEDURE — 250N000009 HC RX 250: Performed by: INTERNAL MEDICINE

## 2022-08-17 PROCEDURE — 82962 GLUCOSE BLOOD TEST: CPT

## 2022-08-17 PROCEDURE — 93455 CORONARY ART/GRFT ANGIO S&I: CPT | Mod: 26 | Performed by: INTERNAL MEDICINE

## 2022-08-17 PROCEDURE — 250N000011 HC RX IP 250 OP 636: Performed by: INTERNAL MEDICINE

## 2022-08-17 PROCEDURE — G0378 HOSPITAL OBSERVATION PER HR: HCPCS

## 2022-08-17 PROCEDURE — 93455 CORONARY ART/GRFT ANGIO S&I: CPT | Performed by: INTERNAL MEDICINE

## 2022-08-17 PROCEDURE — 258N000003 HC RX IP 258 OP 636: Performed by: INTERNAL MEDICINE

## 2022-08-17 PROCEDURE — C1760 CLOSURE DEV, VASC: HCPCS | Performed by: INTERNAL MEDICINE

## 2022-08-17 PROCEDURE — 36415 COLL VENOUS BLD VENIPUNCTURE: CPT

## 2022-08-17 DEVICE — CLOSURE ANGIOSEAL 6FR 610130: Type: IMPLANTABLE DEVICE | Site: CORONARY | Status: FUNCTIONAL

## 2022-08-17 RX ORDER — GABAPENTIN 300 MG/1
300 CAPSULE ORAL 4 TIMES DAILY
Status: CANCELLED | OUTPATIENT
Start: 2022-08-17

## 2022-08-17 RX ORDER — LIDOCAINE 40 MG/G
CREAM TOPICAL
Status: DISCONTINUED | OUTPATIENT
Start: 2022-08-17 | End: 2022-08-17 | Stop reason: HOSPADM

## 2022-08-17 RX ORDER — NALOXONE HYDROCHLORIDE 0.4 MG/ML
0.2 INJECTION, SOLUTION INTRAMUSCULAR; INTRAVENOUS; SUBCUTANEOUS
Status: ACTIVE | OUTPATIENT
Start: 2022-08-17 | End: 2022-08-17

## 2022-08-17 RX ORDER — ATORVASTATIN CALCIUM 10 MG/1
20 TABLET, FILM COATED ORAL AT BEDTIME
Status: CANCELLED | OUTPATIENT
Start: 2022-08-17

## 2022-08-17 RX ORDER — ASPIRIN 325 MG
325 TABLET ORAL ONCE
Status: COMPLETED | OUTPATIENT
Start: 2022-08-17 | End: 2022-08-17

## 2022-08-17 RX ORDER — ASPIRIN 81 MG/1
81 TABLET, CHEWABLE ORAL AT BEDTIME
Status: CANCELLED | OUTPATIENT
Start: 2022-08-17

## 2022-08-17 RX ORDER — CARBIDOPA AND LEVODOPA 25; 100 MG/1; MG/1
1 TABLET ORAL 3 TIMES DAILY
Status: DISCONTINUED | OUTPATIENT
Start: 2022-08-17 | End: 2022-08-19 | Stop reason: HOSPADM

## 2022-08-17 RX ORDER — LORATADINE 10 MG/1
10 TABLET ORAL DAILY
Status: DISCONTINUED | OUTPATIENT
Start: 2022-08-18 | End: 2022-08-19 | Stop reason: HOSPADM

## 2022-08-17 RX ORDER — PANTOPRAZOLE SODIUM 40 MG/1
40 TABLET, DELAYED RELEASE ORAL
Status: DISCONTINUED | OUTPATIENT
Start: 2022-08-18 | End: 2022-08-19 | Stop reason: HOSPADM

## 2022-08-17 RX ORDER — POLYETHYLENE GLYCOL 3350 17 G/17G
17 POWDER, FOR SOLUTION ORAL EVERY EVENING
Status: CANCELLED | OUTPATIENT
Start: 2022-08-17

## 2022-08-17 RX ORDER — CARVEDILOL 6.25 MG/1
12.5 TABLET ORAL 2 TIMES DAILY WITH MEALS
Status: CANCELLED | OUTPATIENT
Start: 2022-08-17

## 2022-08-17 RX ORDER — NICOTINE POLACRILEX 4 MG
15-30 LOZENGE BUCCAL
Status: CANCELLED | OUTPATIENT
Start: 2022-08-17

## 2022-08-17 RX ORDER — ASPIRIN 81 MG/1
243 TABLET, CHEWABLE ORAL ONCE
Status: COMPLETED | OUTPATIENT
Start: 2022-08-17 | End: 2022-08-17

## 2022-08-17 RX ORDER — NALOXONE HYDROCHLORIDE 0.4 MG/ML
0.4 INJECTION, SOLUTION INTRAMUSCULAR; INTRAVENOUS; SUBCUTANEOUS
Status: ACTIVE | OUTPATIENT
Start: 2022-08-17 | End: 2022-08-17

## 2022-08-17 RX ORDER — MAGNESIUM HYDROXIDE/ALUMINUM HYDROXICE/SIMETHICONE 120; 1200; 1200 MG/30ML; MG/30ML; MG/30ML
30 SUSPENSION ORAL EVERY 4 HOURS PRN
Status: DISCONTINUED | OUTPATIENT
Start: 2022-08-17 | End: 2022-08-19 | Stop reason: HOSPADM

## 2022-08-17 RX ORDER — LEVOTHYROXINE SODIUM 25 UG/1
25 TABLET ORAL
Status: DISCONTINUED | OUTPATIENT
Start: 2022-08-18 | End: 2022-08-19 | Stop reason: HOSPADM

## 2022-08-17 RX ORDER — NITROGLYCERIN 0.4 MG/1
0.4 TABLET SUBLINGUAL EVERY 5 MIN PRN
Status: CANCELLED | OUTPATIENT
Start: 2022-08-17

## 2022-08-17 RX ORDER — NICOTINE POLACRILEX 4 MG
15-30 LOZENGE BUCCAL
Status: DISCONTINUED | OUTPATIENT
Start: 2022-08-17 | End: 2022-08-19 | Stop reason: HOSPADM

## 2022-08-17 RX ORDER — ASPIRIN 81 MG/1
81 TABLET, CHEWABLE ORAL AT BEDTIME
Status: DISCONTINUED | OUTPATIENT
Start: 2022-08-18 | End: 2022-08-19 | Stop reason: HOSPADM

## 2022-08-17 RX ORDER — NITROGLYCERIN 0.4 MG/1
0.4 TABLET SUBLINGUAL EVERY 5 MIN PRN
Status: DISCONTINUED | OUTPATIENT
Start: 2022-08-17 | End: 2022-08-17

## 2022-08-17 RX ORDER — PANTOPRAZOLE SODIUM 20 MG/1
40 TABLET, DELAYED RELEASE ORAL
Status: CANCELLED | OUTPATIENT
Start: 2022-08-18

## 2022-08-17 RX ORDER — LORATADINE 10 MG/1
10 TABLET ORAL DAILY
Status: CANCELLED | OUTPATIENT
Start: 2022-08-17

## 2022-08-17 RX ORDER — FLUMAZENIL 0.1 MG/ML
0.2 INJECTION, SOLUTION INTRAVENOUS
Status: ACTIVE | OUTPATIENT
Start: 2022-08-17 | End: 2022-08-17

## 2022-08-17 RX ORDER — HYDRALAZINE HYDROCHLORIDE 20 MG/ML
10 INJECTION INTRAMUSCULAR; INTRAVENOUS
Status: DISCONTINUED | OUTPATIENT
Start: 2022-08-17 | End: 2022-08-19 | Stop reason: HOSPADM

## 2022-08-17 RX ORDER — GABAPENTIN 300 MG/1
300 CAPSULE ORAL 4 TIMES DAILY
Status: DISCONTINUED | OUTPATIENT
Start: 2022-08-17 | End: 2022-08-19 | Stop reason: HOSPADM

## 2022-08-17 RX ORDER — LISINOPRIL 10 MG/1
10 TABLET ORAL DAILY
Status: CANCELLED | OUTPATIENT
Start: 2022-08-17

## 2022-08-17 RX ORDER — CARVEDILOL 12.5 MG/1
12.5 TABLET ORAL 2 TIMES DAILY WITH MEALS
Status: DISCONTINUED | OUTPATIENT
Start: 2022-08-17 | End: 2022-08-19 | Stop reason: HOSPADM

## 2022-08-17 RX ORDER — IODIXANOL 320 MG/ML
INJECTION, SOLUTION INTRAVASCULAR
Status: DISCONTINUED | OUTPATIENT
Start: 2022-08-17 | End: 2022-08-17 | Stop reason: HOSPADM

## 2022-08-17 RX ORDER — POLYETHYLENE GLYCOL 3350 17 G/17G
17 POWDER, FOR SOLUTION ORAL EVERY EVENING
Status: DISCONTINUED | OUTPATIENT
Start: 2022-08-17 | End: 2022-08-19 | Stop reason: HOSPADM

## 2022-08-17 RX ORDER — ATORVASTATIN CALCIUM 10 MG/1
20 TABLET, FILM COATED ORAL AT BEDTIME
Status: DISCONTINUED | OUTPATIENT
Start: 2022-08-17 | End: 2022-08-19 | Stop reason: HOSPADM

## 2022-08-17 RX ORDER — FENTANYL CITRATE 50 UG/ML
INJECTION, SOLUTION INTRAMUSCULAR; INTRAVENOUS
Status: DISCONTINUED | OUTPATIENT
Start: 2022-08-17 | End: 2022-08-17 | Stop reason: HOSPADM

## 2022-08-17 RX ORDER — SODIUM CHLORIDE 9 MG/ML
75 INJECTION, SOLUTION INTRAVENOUS CONTINUOUS
Status: ACTIVE | OUTPATIENT
Start: 2022-08-17 | End: 2022-08-17

## 2022-08-17 RX ORDER — SODIUM CHLORIDE 9 MG/ML
INJECTION, SOLUTION INTRAVENOUS CONTINUOUS
Status: CANCELLED | OUTPATIENT
Start: 2022-08-17

## 2022-08-17 RX ORDER — DEXTROSE MONOHYDRATE 25 G/50ML
25-50 INJECTION, SOLUTION INTRAVENOUS
Status: CANCELLED | OUTPATIENT
Start: 2022-08-17

## 2022-08-17 RX ORDER — ATROPINE SULFATE 0.1 MG/ML
0.5 INJECTION INTRAVENOUS
Status: ACTIVE | OUTPATIENT
Start: 2022-08-17 | End: 2022-08-17

## 2022-08-17 RX ORDER — DEXTROSE MONOHYDRATE 25 G/50ML
25-50 INJECTION, SOLUTION INTRAVENOUS
Status: DISCONTINUED | OUTPATIENT
Start: 2022-08-17 | End: 2022-08-19 | Stop reason: HOSPADM

## 2022-08-17 RX ORDER — DIAZEPAM 5 MG
5 TABLET ORAL
Status: DISCONTINUED | OUTPATIENT
Start: 2022-08-17 | End: 2022-08-17

## 2022-08-17 RX ORDER — SODIUM CHLORIDE 9 MG/ML
INJECTION, SOLUTION INTRAVENOUS CONTINUOUS
Status: DISCONTINUED | OUTPATIENT
Start: 2022-08-17 | End: 2022-08-17

## 2022-08-17 RX ORDER — MAGNESIUM HYDROXIDE/ALUMINUM HYDROXICE/SIMETHICONE 120; 1200; 1200 MG/30ML; MG/30ML; MG/30ML
30 SUSPENSION ORAL EVERY 4 HOURS PRN
Status: CANCELLED | OUTPATIENT
Start: 2022-08-17

## 2022-08-17 RX ORDER — NITROGLYCERIN 0.4 MG/1
0.4 TABLET SUBLINGUAL EVERY 5 MIN PRN
Status: DISCONTINUED | OUTPATIENT
Start: 2022-08-17 | End: 2022-08-19 | Stop reason: HOSPADM

## 2022-08-17 RX ORDER — CARBIDOPA AND LEVODOPA 25; 100 MG/1; MG/1
1 TABLET ORAL 3 TIMES DAILY
Status: CANCELLED | OUTPATIENT
Start: 2022-08-17

## 2022-08-17 RX ORDER — LISINOPRIL 5 MG/1
10 TABLET ORAL DAILY
Status: DISCONTINUED | OUTPATIENT
Start: 2022-08-18 | End: 2022-08-19 | Stop reason: HOSPADM

## 2022-08-17 RX ORDER — LEVOTHYROXINE SODIUM 25 UG/1
25 TABLET ORAL DAILY
Status: CANCELLED | OUTPATIENT
Start: 2022-08-17

## 2022-08-17 RX ADMIN — CARVEDILOL 12.5 MG: 6.25 TABLET, FILM COATED ORAL at 08:25

## 2022-08-17 RX ADMIN — SODIUM CHLORIDE: 9 INJECTION, SOLUTION INTRAVENOUS at 06:08

## 2022-08-17 RX ADMIN — ATORVASTATIN CALCIUM 20 MG: 10 TABLET, FILM COATED ORAL at 21:56

## 2022-08-17 RX ADMIN — FLUOXETINE 20 MG: 20 CAPSULE ORAL at 08:25

## 2022-08-17 RX ADMIN — CARBIDOPA AND LEVODOPA 1 TABLET: 25; 100 TABLET ORAL at 21:56

## 2022-08-17 RX ADMIN — CARVEDILOL 12.5 MG: 12.5 TABLET, FILM COATED ORAL at 17:16

## 2022-08-17 RX ADMIN — POLYETHYLENE GLYCOL 3350 17 G: 17 POWDER, FOR SOLUTION ORAL at 20:29

## 2022-08-17 RX ADMIN — Medication 200 MG: at 17:15

## 2022-08-17 RX ADMIN — PANTOPRAZOLE SODIUM 40 MG: 20 TABLET, DELAYED RELEASE ORAL at 06:50

## 2022-08-17 RX ADMIN — Medication 1 MG: at 21:56

## 2022-08-17 RX ADMIN — CARBIDOPA AND LEVODOPA 1 TABLET: 25; 100 TABLET ORAL at 08:25

## 2022-08-17 RX ADMIN — LISINOPRIL 10 MG: 10 TABLET ORAL at 08:25

## 2022-08-17 RX ADMIN — CARBIDOPA AND LEVODOPA 1 TABLET: 25; 100 TABLET ORAL at 17:16

## 2022-08-17 RX ADMIN — FLUOXETINE 20 MG: 20 CAPSULE ORAL at 20:34

## 2022-08-17 RX ADMIN — GABAPENTIN 300 MG: 300 CAPSULE ORAL at 20:34

## 2022-08-17 RX ADMIN — GABAPENTIN 300 MG: 300 CAPSULE ORAL at 17:15

## 2022-08-17 RX ADMIN — LORATADINE 10 MG: 10 TABLET ORAL at 08:25

## 2022-08-17 RX ADMIN — GABAPENTIN 300 MG: 300 CAPSULE ORAL at 08:25

## 2022-08-17 RX ADMIN — ASPIRIN 325 MG: 325 TABLET ORAL at 11:38

## 2022-08-17 ASSESSMENT — ACTIVITIES OF DAILY LIVING (ADL)
ADLS_ACUITY_SCORE: 37
ADLS_ACUITY_SCORE: 32
ADLS_ACUITY_SCORE: 32
ADLS_ACUITY_SCORE: 37
ADLS_ACUITY_SCORE: 32
ADLS_ACUITY_SCORE: 35

## 2022-08-17 NOTE — PLAN OF CARE
PRIMARY DIAGNOSIS: CHEST PAIN  OUTPATIENT/OBSERVATION GOALS TO BE MET BEFORE DISCHARGE:  1. Ruled out acute coronary syndrome (negative or stable Troponin):  No - angio tomorrow  2. Pain Status: Pain free.  3. Appropriate provocative testing performed: yes  - Stress Test Procedure: NM stress test & echo  - Interpretation of cardiac rhythm per telemetry tech:SR w/ BBB, PACs, PVCs, and prolonged QTc.    4. Cleared by Consultants (if applicable):No  5. Return to near baseline physical activity: Yes  Discharge Planner Nurse   Safe discharge environment identified: No  Barriers to discharge: No - discharge to [facility] @ 0900       Entered by: Gillian Holland RN 08/16/2022 11:20 PM     Please review provider order for any additional goals.   Nurse to notify provider when observation goals have been met and patient is ready for discharge.

## 2022-08-17 NOTE — PLAN OF CARE
Problem: Chest Pain  Goal: Resolution of Chest Pain Symptoms  Outcome: Ongoing, Progressing   Goal Outcome Evaluation:    A/Ox4. Denies CP, SOB since arrival to unit. SR w/ BBB, PACs, PVCs, and prolonged QTc. Pt up with 1A and walker/gait belt. NPO. Plan for transport at 0900 to [facility] tomorrow.

## 2022-08-17 NOTE — PRE-PROCEDURE
GENERAL PRE-PROCEDURE:   Procedure:  Coronary angiogram with possible PCI  Date/Time:  8/17/2022 11:00 AM    Written consent obtained?: Yes    Risks and benefits: Risks, benefits and alternatives were discussed    Consent given by:  Patient  Patient states understanding of procedure being performed: Yes    Patient's understanding of procedure matches consent: Yes    Procedure consent matches procedure scheduled: Yes    Expected level of sedation:  Moderate  Appropriately NPO:  Yes  ASA Class:  3 (Abnormal stress test, CAD; s/p CABG, HTN, dyslipidemia, mitral and aortic insuffiency, Class I Obesity; BMI 30.69kg/m2)  Mallampati  :  Grade 2- soft palate, base of uvula, tonsillar pillars, and portion of posterior pharyngeal wall visible  Lungs:  Lungs clear with good breath sounds bilaterally  Heart:  Normal heart sounds and rate  History & Physical reviewed:  History and physical reviewed and no updates needed  Statement of review:  I have reviewed the lab findings, diagnostic data, medications, and the plan for sedation    Patient is a DNR/DNI and understands this will be placed on hold for today's procedure

## 2022-08-17 NOTE — PHARMACY-ADMISSION MEDICATION HISTORY
Pharmacy Note - Admission Medication History    PTA medication list was updated 8/15/22 upon initial admission to [facility]. See note from that encounter.    Marie Rowe [facility], 8/17/2022, 1:36 PM

## 2022-08-17 NOTE — H&P
[facility] History and Physical       A/P    CAD, s/p CABG x3V:  Coronary angiogram per below. No PCI performed. Medical management and optimization of cardiovascular risk factors advised.   -asa, statin, coreg, lisinopril, NTG SL prn  -cardiology following    DM 2: A1C 8% (6/2022):  -sliding scale insulin and accuchecks  -on no meds as outpt  -PCP follow-up advised    HFrEF:  TTE showed LVEF 45-50%.  Compensated at present.  -Coreg, Lisinopril  -not on diuretic  -strict I/O and daily weights    Parkinson's disease:  -Sinemet    ISAIAH on CKD stage III:  Cr today slightly elevated from baseline at 1.2  -IVF post cath as ordered  -strict I/O and daily weights  -BMP a.m.    GERD  -PPI     Parkinson's Disease  - Continue Sinemet 3 times daily     Depression:   -Prozac    HLD:   -Lipitor   -Lipid panel in a.m.    Hypothyroidism:   -Levothyroxine     Neuropathy:   -Gabapentin    DNR/I    Probable discharge 8/18 once cleared by cardiology          Chief Complaint:     Chest pain     HPI:    88 y.o. female with a PMHx of CAD s/p CABG x3V, HFpEF, HTN, DM2, GERD, Parkinson's who was admitted to [facility] on 8/15/22 for chest pain.  Transferred to [facility] today to undergo coronary angiography.  [facility] consulted post-procedure for continuation of medical management of chronic medical conditions.  Refer to Dr. Gold's discharge summary for additional details.      Coronary angiogram today showed:    1st Mrg lesion is 50% stenosed.    Prox LAD lesion is 100% stenosed.    Prox RCA to Mid RCA lesion is 25% stenosed.    Atretic nonfunctional JEOVANY graft of uncertain destination    Widely patent graft to mid LAD from left aota    No PCI intervention performed.  Currently, no chest pain, sob, n/v/abd pain.           Past Medical History:     Past Medical History:   Diagnosis Date     Acute diastolic congestive heart failure (H)      Acute on chronic congestive heart failure (H) 6/11/2018     Coronary artery  "disease      Diabetes mellitus, type II (H)      Diastolic dysfunction 7/10/2018     Frozen shoulder     bilateral     Hypertension      Hypertensive emergency      Parkinson disease (H)      Primary osteoarthritis involving multiple joints      Primary osteoarthritis of left knee      Recurrent UTI     hx septic shock     Thyroid disease              Past Surgical History:      Past Surgical History:   Procedure Laterality Date     APPENDECTOMY       APPENDECTOMY       BACK SURGERY      L4-S1 laminectomy     BYPASS GRAFT ARTERY CORONARY      3 vessel      SECTION        SECTION       HERNIORRHAPHY VENTRAL Left      HYSTERECTOMY       HYSTERECTOMY       JOINT REPLACEMENT Left     Total left knee     OTHER SURGICAL HISTORY      right ankle surgery     OTHER SURGICAL HISTORY      right forearm fracture     REPLACEMENT TOTAL KNEE Right      SHOULDER SURGERY Right     reversed shoulder surgery.             Social History:     Social History     Tobacco Use     Smoking status: Never Smoker     Smokeless tobacco: Never Used   Substance Use Topics     Alcohol use: No             Family History:     Family History   Problem Relation Age of Onset     Heart Disease Mother      Heart Disease Father      Family history reviewed and updated in EPIC            Allergies:     Allergies   Allergen Reactions     Alendronate [Alendronic Acid] Unknown     pain     Codeine Hives     Hydrocodone-Acetaminophen Other (See Comments)     Highly sensitive, \"knocks her out and can't wake up\"     Risedronate Unknown     Bone pain     Rosuvastatin Muscle Pain (Myalgia)     Simvastatin Muscle Pain (Myalgia)             Medications:   reviewed         Review of Systems:     The Review of Systems is negative other than noted in the HPI      BP (!) 166/69 (BP Location: Left arm)   Pulse 60   Temp 98.1  F (36.7  C) (Oral)   Resp 16   Ht 1.651 m (5' 5\")   Wt 83.6 kg (184 lb 6.4 oz)   SpO2 97%   BMI 30.69 kg/m     Physical " Examination:   General appearance - alert, and in no distress  Eyes - pupils equal and reactive, extraocular eye movements intact, sclera anicteric  Mouth - mucous membranes moist, pharynx normal without lesions  Lungs - clear to auscultation, no wheezes, rales or rhonchi, symmetric air entry  Heart - normal rate, regular rhythm, normal S1, S2, no murmurs, rubs, clicks or gallops. No peripheral edema.  DP/PT pulses bilateral lower extremities 2+.  Abdomen - soft, nontender, nondistended, no masses or organomegaly, BS+  Neurological - alert, oriented x3  Skin - no c/c/p, left groin C/d/i                Data:   Lab/imaging studies reviewed           Kurtis Tapia DO, DO

## 2022-08-17 NOTE — PROGRESS NOTES
Care Management Discharge Note    Discharge Date: 08/17/2022       Discharge Disposition: Home    Discharge Services: None    Discharge Transportation: family or friend will provide    Education Provided on the Discharge Plan:  AVS per bedside RN.    Persons Notified of Discharge Plans: patient    Patient/Family in Agreement with the Plan: yes        Additional Information:  Chart reviewed. Patient discharge per bedside RN.       CCC per protocol.     Shabana Harrison RN

## 2022-08-17 NOTE — PLAN OF CARE
Goal Outcome Evaluation:    Plan of Care Reviewed With: patient     Overall Patient Progress: improving    Outcome Evaluation: Pt denies any further chest pain.  Tele with 1st degree AVB, BBB, wide QRS, and prolonged QTc with inverted T waves.  Scheduled for cath lab at [facility], leaving at 0900.  NPO since 2300.  Sleeping well through the noc.      Problem: Plan of Care - These are the overarching goals to be used throughout the patient stay.    Goal: Plan of Care Review/Shift Note  Description: The Plan of Care Review/Shift note should be completed every shift.  The Outcome Evaluation is a brief statement about your assessment that the patient is improving, declining, or no change.  This information will be displayed automatically on your shift note.  Outcome: Ongoing, Progressing  Flowsheets (Taken 8/17/2022 0213)  Plan of Care Reviewed With: patient  Outcome Evaluation: Pt denies any further chest pain.  Tele with 1st degree AVB, BBB, wide QRS, and prolonged QTc with inverted T waves.  Scheduled for cath lab at [facility], leaving at 0900.  NPO since 2300.  Sleeping well through the noc.  Overall Patient Progress: improving     Problem: Plan of Care - These are the overarching goals to be used throughout the patient stay.    Goal: Optimal Comfort and Wellbeing  Intervention: Provide Person-Centered Care  Recent Flowsheet Documentation  Taken 8/17/2022 0000 by Suni Jesus RN  Trust Relationship/Rapport:   care explained   questions answered     Problem: Chest Pain  Goal: Resolution of Chest Pain Symptoms  Outcome: Ongoing, Progressing

## 2022-08-17 NOTE — PLAN OF CARE
Coronary angiogram with possible stent due to abnormal stress test.  Family present.  No questions.

## 2022-08-17 NOTE — DISCHARGE SUMMARY
[facility]  Discharge Summary - Medicine & Pediatrics       Date of Admission:  8/15/2022  Date of Discharge:  8/17/2022  Discharging Provider: Min Gold MD, Mandie De Jesus MD PGY-2  Discharge Service: Hospitalist Service    Discharge Diagnoses   Probably Unstable Angina  Chronic congestive heart failure, reduced ejection fraction  Diabetes, type 2     Follow-ups Needed After Discharge   Transfer to [facility] for further management.  Follow up outpatient regarding elevated blood glucose/Adjustment in management of diabetes    Unresulted Labs Ordered in the Past 30 Days of this Admission     Date and Time Order Name Status Description    8/16/2022  6:13 PM Prepare red blood cells (unit) Preliminary     8/16/2022  6:13 PM Prepare red blood cells (unit) Preliminary       These results will be followed up by Hospitalist.     Discharge Disposition   Transferred to [facility]  Condition at discharge: Stable    Hospital Course   Janes Montero was admitted on 8/15/2022 for chest pain. She has history of 3-vessel CABG 2011, diastolic heart failure, HTN, DM2, GERD, Parkinsons, who presented with chest pain, concerning for cardiac etiology.   The following problems were addressed during her hospitalization:    On admission, she was hypertensive to 200's systolic. Troponin initially was 0.04 but increased to 0.28 with subsequent checks. Chest x-ray negative.  EKG with sinus rhythm, left bundle branch block that has been noted on previous EKGs. Cardiology was consulted. ECHO showed normal left ventricle size, EF 45-50%. Hypokinesis of the apex and mid to apical septal segments, and mild aortic and mitral regurgitation. 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 The left  ventricular ejection fraction at stress is 67% with severe hypokinesis of the distal inferior and apical walls.  After discussion with cardiology, the patient elected to be transferred to [facility] for Angiogram with possible PCI. She denies further chest pain or shortness of breath. She was hemodynamically stable throughout admission.       Consultations This Hospital Stay   CARDIOLOGY IP CONSULT  CARE MANAGEMENT / SOCIAL WORK IP CONSULT    Code Status   No CPR- Do NOT Intubate       The patient was discussed with Dr. Min Gold.    Mandie De Jesus MD PGY-2  Hospitalist Service  [facility]  [address] [place] [id number]  Phone: [phone]  Fax: [phone]    Patient seen and examined   Patient discussed with resident   Agree with assessment and plan as noted above     Min Gold MD  [facility] Medicine Service  ______________________________________________________________________    Physical Exam   Vital Signs: Temp: 97.8  F (36.6  C) Temp src: Oral BP: (!) 148/67 (Reported to RN) Pulse: 60   Resp: 20 SpO2: 96 % O2 Device: None (Room air)    Weight: 183 lbs 9.6 oz  General Appearance:  Alert, lying in bed, appears comfortable  Respiratory: Regular rate, effort. CTA bilaterally.   Cardiovascular: Regular rate, rhythm. S1, S2 present. No murmur. No edema.   GI: Soft, non tender, normal bowel sounds  Skin: Warm, dry  Neuro: Grossly intact. Face symmetric. Moves all extremities equally.        Primary Care Physician   Maggie Arriaga    Discharge Orders   No discharge procedures on file.    Significant Results and Procedures   Most Recent 3 CBC's:  Recent Labs   Lab Test 08/15/22  1920 12/14/21  1557 06/13/18  0454 06/11/18  1259   WBC 7.4 6.3  --  7.3   HGB 13.7 13.9  --  11.6*   MCV 88 90  --  88    178 198 189     Most Recent 3 BMP's:  Recent Labs   Lab Test 08/17/22  0804 08/17/22  0449 08/16/22  2105 08/15/22  2322 08/15/22  1920  22  1617   NA  --  133*  --   --  136 140   POTASSIUM  --  4.2  --   --  4.6 4.4   CHLORIDE  --  101  --   --  100 103   CO2  --  26  --   --  26 27   BUN  --  35*  --   --  30* 24   CR  --  1.20*  --   --  1.04 0.97   ANIONGAP  --  6  --   --  10 10   LIZZY  --  9.5  --   --  10.5 10.3   * 165* 173*   < > 236* 204*    < > = values in this interval not displayed.     Most Recent 3 Troponin's: 0.28  Most Recent Hemoglobin A1c:  Recent Labs   Lab Test 22  1616   A1C 8.0*   ,   Results for orders placed or performed during the hospital encounter of 08/15/22   XR Chest Port 1 View    Narrative    EXAM: XR CHEST PORT 1 VIEW  LOCATION: [facility]  DATE/TIME: 8/15/2022 7:05 PM    INDICATION: chest pain  COMPARISON: 2022, 2018.      Impression    IMPRESSION: Shallow inspiration. Large body habitus. Poststernotomy changes. No pneumothorax or fracture. Lungs are clear. Heart and pulmonary vascularity are normal. Reverse right shoulder arthroplasty.   Echocardiogram Complete     Value    LVEF  45-50% (mildly reduced)    Narrative    [id number]  [id number]  [id number]  [id number]^ELOISE^JERRY^[place] [address]     Name: EMMANUELLE DÍAZ  MRN: [id number]  : 10/24/1933  Study Date: 2022 08:13 AM  Age: 88 yrs  Gender: Female  Patient Location: [facility]  Reason For Study: Chest Pain, Chest Pressure, Chest Tightness, Aortic Valve  Disord  Ordering Physician: JERRY NAVAS  Performed By: GABE     BSA: 1.9 m2  Height: 65 in  Weight: 189 lb  HR: 66  BP: 133/64 mmHg  ______________________________________________________________________________  Procedure  Complete Portable Echo Adult. Definity (NDC #74249-228) given intravenously.  1.5ml  lot # [id number].  ______________________________________________________________________________  Interpretation Summary     The left ventricle is normal in size.  Left ventricular function is decreased. The  ejection fraction is 45-50%  (mildly reduced).  There is hypokinesis of the apex and mid to apical septal segments.  The left atrium is mildly dilated.  There is mild to moderate (1-2+) mitral regurgitation.  Mild valvular aortic stenosis.  There is mild (1+) aortic regurgitation.  ______________________________________________________________________________  Left Ventricle  The left ventricle is normal in size. Left ventricular function is decreased.  The ejection fraction is 45-50% (mildly reduced). There is mild concentric  left ventricular hypertrophy. Diastolic Doppler findings (E/E' ratio and/or  other parameters) suggest left ventricular filling pressures are increased.  There is hypokinesis of the apex and mid to apical septal segments.     Right Ventricle  The right ventricle is not well visualized.     Atria  The left atrium is mildly dilated. Right atrial size is normal. There is no  color Doppler evidence of an atrial shunt.     Mitral Valve  The mitral valve leaflets appear thickened, but open well. There is moderate  mitral annular calcification. There is mild to moderate (1-2+) mitral  regurgitation.     Tricuspid Valve  The tricuspid valve is not well visualized, but is grossly normal. There is  mild (1+) tricuspid regurgitation. Right ventricular systolic pressure is  elevated, consistent with mild pulmonary hypertension.     Aortic Valve  There is mild (1+) aortic regurgitation. Mild valvular aortic stenosis.     Pulmonic Valve  The pulmonic valve is not well visualized. This degree of valvular  regurgitation is within normal limits.     Vessels  The aorta root is normal. Normal size ascending aorta. IVC diameter <2.1 cm  collapsing >50% with sniff suggests a normal RA pressure of 3 mmHg.     Pericardium  There is no pericardial effusion.     ______________________________________________________________________________  MMode/2D Measurements & Calculations  IVSd: 1.2 cm  LVIDd: 4.2 cm  LVIDs: 3.1  cm  LVPWd: 1.3 cm  FS: 27.9 %     LV mass(C)d: 198.0 grams  LV mass(C)dI: 102.5 grams/m2  Ao root diam: 3.3 cm  LA dimension: 3.5 cm  asc Aorta Diam: 3.5 cm  LA/Ao: 1.0  LVOT diam: 2.0 cm  LVOT area: 3.1 cm2  LA Volume (BP): 61.4 ml  LA Volume Index (BP): 31.8 ml/m2     LA Volume Indexed (AL/bp): 33.4 ml/m2  RWT: 0.61     Doppler Measurements & Calculations  MV E max edward: 114.0 cm/sec  MV A max edward: 101.6 cm/sec  MV E/A: 1.1  MV dec slope: 439.3 cm/sec2  MV dec time: 0.26 sec  Ao V2 max: 180.3 cm/sec  Ao max P.0 mmHg  Ao V2 mean: 124.7 cm/sec  Ao mean P.0 mmHg  Ao V2 VTI: 37.3 cm  CHINMAY(I,D): 2.3 cm2  CHINMAY(V,D): 2.3 cm2  LV V1 max P.1 mmHg  LV V1 max: 132.8 cm/sec  LV V1 VTI: 27.8 cm  SV(LVOT): 86.3 ml  SI(LVOT): 44.7 ml/m2  PA V2 max: 85.3 cm/sec  PA max P.9 mmHg  PA acc time: 0.12 sec  TR max edward: 314.9 cm/sec  TR max P.7 mmHg  AV Edward Ratio (DI): 0.74  CHINMAY Index (cm2/m2): 1.2     E/E' av.3  Lateral E/e': 15.2  Medial E/e': 37.4     ______________________________________________________________________________  Report approved by: Shazia Mercado 2022 10:23 AM         NM Lexiscan stress test     Value    Pharmacologic Protocol Lexiscan    Test Type Pharmacological    Baseline HR 74    Baseline Systolic     Baseline Diastolic BP 66    Last Stress HR 81    Last Stress Systolic     Last Stress Diastolic BP 70    Target     PERCENT HR 85%    ST Deviation Elevation III 0.5mm    Deviation Time I -1.1mm    ST Elevation Amount V2 9.9mm    ST Deviation Amount he V6 -5.3mm    Final Resting /63    Final Resting HR 77    Max Treadmill Speed 0.0    Max Treadmill Grade 0.0    Peak Systolic /70    Peak Diastolic /74    Max HR  83    Stress Phase Resting    Stress Resting Pt Position SUPINE    Current HR 75    Current /66    Stress Phase Resting    Stress Resting Pt Position MANUAL EVENT    Current HR 77    Current /63    Stress Phase Stress     Stage Minute EXE 00:00    Exercise Stage STAGE 2    Current HR 76    Current /66    Stress Phase Stress    Stage Minute EXE 01:00    Exercise Stage STAGE 3    Current HR 73    Current /66    Stress Phase Stress    Stage Minute EXE 02:00    Exercise Stage STAGE 4    Current HR 82    Current /66    Stress Phase Stress    Stage Minute EXE 02:52    Exercise Stage STAGE 4    Current HR 82    Current /70    Stress Phase Stress    Stage Minute EXE 03:00    Exercise Stage STAGE 5    Current HR 80    Current /70    Stress Phase Stress    Stage Minute EXE 03:38    Exercise Stage STAGE 5    Current HR 82    Current /70    Stress Phase Stress    Stage Minute EXE 04:00    Exercise Stage STAGE 6    Current HR 80    Current /70    Stress Phase Stress    Stage Minute EXE 04:00    Exercise Stage STAGE 6    Current HR 81    Current /70    Stress Phase Recovery    Stage Minute REC 00:40    Exercise Stage Recovery    Current HR 83    Current /74    Stress Phase Recovery    Stage Minute REC 00:59    Exercise Stage Recovery    Current HR 80    Current /74    Stress Phase Recovery    Stage Minute REC 01:59    Exercise Stage Recovery    Current HR 78    Current /74    Stress Phase Recovery    Stage Minute REC 02:46    Exercise Stage Recovery    Current HR 77    Current /63    Stress Phase Recovery    Stage Minute REC 02:59    Exercise Stage Recovery    Current HR 79    Current /63    Stress Phase Recovery    Stage Minute REC 03:21    Exercise Stage Recovery    Current HR 77    Current /63    Stress Phase Recovery    Stage Minute REC 03:59    Exercise Stage Recovery    Current HR 78    Current /63    Stress Phase Recovery    Stage Minute REC 04:01    Exercise Stage Recovery    Current HR 77    Current /63    Max Predicted HR  63    Rate Pressure Product 12,367.0    Left Ventricular EF 67    Narrative       Lexiscan stress ECG nondiagnostic for  ischemia due to left bundle   branch block     Nuclear stress test is abnormal.  There is a large area of transmural   infarction involving the apex and distal inferolateral wall extending into   the inferior wall.  There is a medium size area of stella-infarct ischemia   involving the mid to basal inferior and inferolateral wall.     The left ventricular ejection fraction at stress is 67% with severe   hypokinesis of the distal inferior and apical walls.     A prior study was conducted on 9/18/2003.  Prior images were   unavailable for comparison review.     The patient is at an intermediate risk of future cardiac ischemic   events.       A prior study was conducted on 9/18/2003.  Prior images were   unavailable for comparison review.       Discharge Medications   Current Discharge Medication List      CONTINUE these medications which have NOT CHANGED    Details   ascorbic acid, vitamin C, (ASCORBIC ACID WITH ELLIOTT HIPS) 500 MG tablet [ASCORBIC ACID, VITAMIN C, (ASCORBIC ACID WITH ELLIOTT HIPS) 500 MG TABLET] Take 500 mg by mouth daily.      aspirin 81 mg chewable tablet [ASPIRIN 81 MG CHEWABLE TABLET] Chew 81 mg at bedtime.      atorvastatin (LIPITOR) 20 MG tablet Take 1 tablet (20 mg) by mouth At Bedtime  Qty: 90 tablet, Refills: 3    Associated Diagnoses: Dyslipidemia      carbidopa-levodopa (SINEMET)  MG tablet TAKE 1 TABLET BY MOUTH 3  TIMES DAILY TAKE AT LEAST  1/2 HOUR BEFORE MEALS OR 2  HOURS AFTER MEALS DO NOT  MIX WITH FOOD  Qty: 270 tablet, Refills: 3    Associated Diagnoses: Parkinson's disease (H)      carvedilol (COREG) 12.5 MG tablet Take 1 tablet (12.5 mg) by mouth 2 times daily (with meals)  Qty: 180 tablet, Refills: 3    Associated Diagnoses: Hypertension, unspecified type      cholecalciferol, vitamin D3, 1,000 unit tablet [CHOLECALCIFEROL, VITAMIN D3, 1,000 UNIT TABLET] Take 2,000 Units by mouth daily.      coenzyme Q10 (CO Q-10) 100 mg capsule [COENZYME Q10 (CO Q-10) 100 MG CAPSULE] Take 100 mg  by mouth 2 (two) times a day.      cyanocobalamin (VITAMIN B-12) 1000 MCG tablet Take 1 tablet (1,000 mcg) by mouth daily  Qty: 90 tablet, Refills: 3    Associated Diagnoses: Parkinson's disease (H); Low serum vitamin B12      FLUoxetine (PROZAC) 20 MG capsule TAKE 1 CAPSULE BY MOUTH  TWICE DAILY  Qty: 180 capsule, Refills: 1    Comments: Requesting 1 year supply  Associated Diagnoses: Recurrent major depressive disorder, in full remission (H)      fluticasone (FLONASE) 50 MCG/ACT nasal spray Spray 1 spray into both nostrils daily  Qty: 16 g, Refills: 3    Associated Diagnoses: Non-seasonal allergic rhinitis due to pollen      gabapentin (NEURONTIN) 300 MG capsule TAKE 1 CAPSULE BY MOUTH 4  TIMES DAILY  Qty: 360 capsule, Refills: 3    Associated Diagnoses: Trigeminal neuralgia      levothyroxine (SYNTHROID/LEVOTHROID) 25 MCG tablet Take 1 tablet (25 mcg) by mouth daily  Qty: 90 tablet, Refills: 3    Associated Diagnoses: Subclinical hypothyroidism      lisinopril (ZESTRIL) 10 MG tablet Take 1 tablet (10 mg) by mouth daily  Qty: 90 tablet, Refills: 3    Associated Diagnoses: Essential hypertension, benign      loratadine (CLARITIN) 10 mg tablet [LORATADINE (CLARITIN) 10 MG TABLET] Take 10 mg by mouth daily.      magnesium oxide 250 mg Tab [MAGNESIUM OXIDE 250 MG TAB] Take 250 mg by mouth daily.      melatonin 1 mg Tab tablet [MELATONIN 1 MG TAB TABLET] Take 3 mg by mouth at bedtime.       multivitamin therapeutic tablet [MULTIVITAMIN THERAPEUTIC TABLET] Take 1 tablet by mouth daily.      nitroglycerin (NITROSTAT) 0.4 MG SL tablet [NITROGLYCERIN (NITROSTAT) 0.4 MG SL TABLET] Place 0.4 mg under the tongue every 5 (five) minutes as needed for chest pain.      omega 3-dha-epa-fish oil (FISH OIL) 60- mg cap capsule [OMEGA 3-DHA-EPA-FISH OIL (FISH OIL) 60- MG CAP CAPSULE] Take 2,000 mg by mouth 2 (two) times a day.      omeprazole (PRILOSEC) 20 MG capsule [OMEPRAZOLE (PRILOSEC) 20 MG CAPSULE] Take 20 mg by  "mouth daily before breakfast.      polyethylene glycol (MIRALAX) 17 gram packet [POLYETHYLENE GLYCOL (MIRALAX) 17 GRAM PACKET] Take 17 g by mouth daily.           Allergies   Allergies   Allergen Reactions     Alendronate [Alendronic Acid] Unknown     pain     Codeine Hives     Hydrocodone-Acetaminophen Other (See Comments)     Highly sensitive, \"knocks her out and can't wake up\"     Risedronate Unknown     Bone pain     Rosuvastatin Muscle Pain (Myalgia)     Simvastatin Muscle Pain (Myalgia)     "

## 2022-08-17 NOTE — PROGRESS NOTES
Care Management Discharge Note    Discharge Date: 08/17/2022       Discharge Disposition: Home    Discharge Services:   Per care team    Discharge DME: none     Discharge Transportation: family or friend will provide    Private pay costs discussed: Not applicable    Patient/family educated on Medicare website which has current facility and service quality ratings:      Education Provided on the Discharge Plan:  Per team  Persons Notified of Discharge Plans: patient,nurse  Patient/Family in Agreement with the Plan: yes    Handoff Referral Completed: Yes    Additional Information:  Patient transferred in from  for a cardiac angiography, with possible stent placement due to chest pain. Patient stable, and will discharge later today. Lives in own home, indpendent at baseline, with family assist as needed. CM did a chart assessment and no additional need identified at this time. Family will provide ride for patient.        Kelley Wilson RN

## 2022-08-18 ENCOUNTER — APPOINTMENT (OUTPATIENT)
Dept: PHYSICAL THERAPY | Facility: HOSPITAL | Age: 87
DRG: 287 | End: 2022-08-18
Attending: INTERNAL MEDICINE
Payer: MEDICARE

## 2022-08-18 ENCOUNTER — APPOINTMENT (OUTPATIENT)
Dept: OCCUPATIONAL THERAPY | Facility: HOSPITAL | Age: 87
DRG: 287 | End: 2022-08-18
Attending: INTERNAL MEDICINE
Payer: MEDICARE

## 2022-08-18 PROBLEM — N18.9 ACUTE KIDNEY INJURY SUPERIMPOSED ON CKD (H): Status: ACTIVE | Noted: 2022-08-18

## 2022-08-18 PROBLEM — N17.9 ACUTE KIDNEY INJURY SUPERIMPOSED ON CKD (H): Status: ACTIVE | Noted: 2022-08-18

## 2022-08-18 LAB
ANION GAP SERPL CALCULATED.3IONS-SCNC: 3 MMOL/L (ref 5–18)
BUN SERPL-MCNC: 30 MG/DL (ref 8–28)
CALCIUM SERPL-MCNC: 8.8 MG/DL (ref 8.5–10.5)
CHLORIDE BLD-SCNC: 103 MMOL/L (ref 98–107)
CHOLEST SERPL-MCNC: 121 MG/DL
CO2 SERPL-SCNC: 29 MMOL/L (ref 22–31)
CREAT SERPL-MCNC: 0.94 MG/DL (ref 0.6–1.1)
ERYTHROCYTE [DISTWIDTH] IN BLOOD BY AUTOMATED COUNT: 14.7 % (ref 10–15)
GFR SERPL CREATININE-BSD FRML MDRD: 58 ML/MIN/1.73M2
GLUCOSE BLD-MCNC: 132 MG/DL (ref 70–125)
GLUCOSE BLDC GLUCOMTR-MCNC: 140 MG/DL (ref 70–99)
GLUCOSE BLDC GLUCOMTR-MCNC: 151 MG/DL (ref 70–99)
GLUCOSE BLDC GLUCOMTR-MCNC: 152 MG/DL (ref 70–99)
GLUCOSE BLDC GLUCOMTR-MCNC: 192 MG/DL (ref 70–99)
GLUCOSE BLDC GLUCOMTR-MCNC: 204 MG/DL (ref 70–99)
HCT VFR BLD AUTO: 36.3 % (ref 35–47)
HDLC SERPL-MCNC: 29 MG/DL
HGB BLD-MCNC: 11.5 G/DL (ref 11.7–15.7)
LDLC SERPL CALC-MCNC: 58 MG/DL
MCH RBC QN AUTO: 28.6 PG (ref 26.5–33)
MCHC RBC AUTO-ENTMCNC: 31.7 G/DL (ref 31.5–36.5)
MCV RBC AUTO: 90 FL (ref 78–100)
PLATELET # BLD AUTO: 172 10E3/UL (ref 150–450)
POTASSIUM BLD-SCNC: 4.3 MMOL/L (ref 3.5–5)
RBC # BLD AUTO: 4.02 10E6/UL (ref 3.8–5.2)
SODIUM SERPL-SCNC: 135 MMOL/L (ref 136–145)
TRIGL SERPL-MCNC: 172 MG/DL
WBC # BLD AUTO: 6.1 10E3/UL (ref 4–11)

## 2022-08-18 PROCEDURE — 250N000013 HC RX MED GY IP 250 OP 250 PS 637: Performed by: INTERNAL MEDICINE

## 2022-08-18 PROCEDURE — 250N000013 HC RX MED GY IP 250 OP 250 PS 637

## 2022-08-18 PROCEDURE — 36415 COLL VENOUS BLD VENIPUNCTURE: CPT | Performed by: INTERNAL MEDICINE

## 2022-08-18 PROCEDURE — 97162 PT EVAL MOD COMPLEX 30 MIN: CPT | Mod: GP

## 2022-08-18 PROCEDURE — 210N000001 HC R&B IMCU HEART CARE

## 2022-08-18 PROCEDURE — 99232 SBSQ HOSP IP/OBS MODERATE 35: CPT | Performed by: GENERAL ACUTE CARE HOSPITAL

## 2022-08-18 PROCEDURE — 97116 GAIT TRAINING THERAPY: CPT | Mod: GP

## 2022-08-18 PROCEDURE — 97110 THERAPEUTIC EXERCISES: CPT | Mod: GO

## 2022-08-18 PROCEDURE — 85027 COMPLETE CBC AUTOMATED: CPT | Performed by: INTERNAL MEDICINE

## 2022-08-18 PROCEDURE — 80048 BASIC METABOLIC PNL TOTAL CA: CPT | Performed by: INTERNAL MEDICINE

## 2022-08-18 PROCEDURE — 80061 LIPID PANEL: CPT | Performed by: INTERNAL MEDICINE

## 2022-08-18 PROCEDURE — 250N000012 HC RX MED GY IP 250 OP 636 PS 637

## 2022-08-18 PROCEDURE — 97535 SELF CARE MNGMENT TRAINING: CPT | Mod: GO

## 2022-08-18 PROCEDURE — 99232 SBSQ HOSP IP/OBS MODERATE 35: CPT | Performed by: INTERNAL MEDICINE

## 2022-08-18 PROCEDURE — 97165 OT EVAL LOW COMPLEX 30 MIN: CPT | Mod: GO

## 2022-08-18 RX ADMIN — LEVOTHYROXINE SODIUM 25 MCG: 0.03 TABLET ORAL at 06:48

## 2022-08-18 RX ADMIN — INSULIN ASPART 1 UNITS: 100 INJECTION, SOLUTION INTRAVENOUS; SUBCUTANEOUS at 16:41

## 2022-08-18 RX ADMIN — Medication 200 MG: at 07:50

## 2022-08-18 RX ADMIN — ALUMINUM HYDROXIDE, MAGNESIUM HYDROXIDE, AND SIMETHICONE 30 ML: 200; 200; 20 SUSPENSION ORAL at 20:53

## 2022-08-18 RX ADMIN — INSULIN ASPART 2 UNITS: 100 INJECTION, SOLUTION INTRAVENOUS; SUBCUTANEOUS at 11:35

## 2022-08-18 RX ADMIN — CARVEDILOL 12.5 MG: 12.5 TABLET, FILM COATED ORAL at 17:07

## 2022-08-18 RX ADMIN — LORATADINE 10 MG: 10 TABLET ORAL at 07:51

## 2022-08-18 RX ADMIN — Medication 1 MG: at 21:17

## 2022-08-18 RX ADMIN — ATORVASTATIN CALCIUM 20 MG: 10 TABLET, FILM COATED ORAL at 21:10

## 2022-08-18 RX ADMIN — GABAPENTIN 300 MG: 300 CAPSULE ORAL at 07:50

## 2022-08-18 RX ADMIN — PANTOPRAZOLE SODIUM 40 MG: 40 TABLET, DELAYED RELEASE ORAL at 06:48

## 2022-08-18 RX ADMIN — GABAPENTIN 300 MG: 300 CAPSULE ORAL at 11:35

## 2022-08-18 RX ADMIN — FLUOXETINE 20 MG: 20 CAPSULE ORAL at 07:51

## 2022-08-18 RX ADMIN — CARVEDILOL 12.5 MG: 12.5 TABLET, FILM COATED ORAL at 07:51

## 2022-08-18 RX ADMIN — FLUOXETINE 20 MG: 20 CAPSULE ORAL at 21:10

## 2022-08-18 RX ADMIN — INSULIN ASPART 1 UNITS: 100 INJECTION, SOLUTION INTRAVENOUS; SUBCUTANEOUS at 09:26

## 2022-08-18 RX ADMIN — CARBIDOPA AND LEVODOPA 1 TABLET: 25; 100 TABLET ORAL at 21:10

## 2022-08-18 RX ADMIN — GABAPENTIN 300 MG: 300 CAPSULE ORAL at 21:10

## 2022-08-18 RX ADMIN — CARBIDOPA AND LEVODOPA 1 TABLET: 25; 100 TABLET ORAL at 15:58

## 2022-08-18 RX ADMIN — CARBIDOPA AND LEVODOPA 1 TABLET: 25; 100 TABLET ORAL at 07:51

## 2022-08-18 RX ADMIN — GABAPENTIN 300 MG: 300 CAPSULE ORAL at 15:58

## 2022-08-18 RX ADMIN — ASPIRIN 81 MG: 81 TABLET, CHEWABLE ORAL at 21:10

## 2022-08-18 RX ADMIN — LISINOPRIL 10 MG: 5 TABLET ORAL at 07:51

## 2022-08-18 ASSESSMENT — ACTIVITIES OF DAILY LIVING (ADL)
ADLS_ACUITY_SCORE: 43
ADLS_ACUITY_SCORE: 37
ADLS_ACUITY_SCORE: 43
PREVIOUS_RESPONSIBILITIES: MEAL PREP;MEDICATION MANAGEMENT;FINANCES
ADLS_ACUITY_SCORE: 43
ADLS_ACUITY_SCORE: 37
ADLS_ACUITY_SCORE: 43
ADLS_ACUITY_SCORE: 43
ADLS_ACUITY_SCORE: 37
ADLS_ACUITY_SCORE: 37
ADLS_ACUITY_SCORE: 43

## 2022-08-18 NOTE — PLAN OF CARE
Goal Outcome Evaluation:    Plan of Care Reviewed With: patient     Overall Patient Progress: improving    Outcome Evaluation: Patient is A&Ox4. Was planning to discharge today, however after cardio rehab eval, patient recommended to stay another day d/t near fall. Patient is very pleasant and cooperative. Angio on 8/17/22, left fem access. Denies pain. Uses call light appropriately.

## 2022-08-18 NOTE — PLAN OF CARE
Problem: Plan of Care - These are the overarching goals to be used throughout the patient stay.    Goal: Absence of Hospital-Acquired Illness or Injury  Intervention: Prevent Infection  Recent Flowsheet Documentation  Taken 8/18/2022 0019 by Chase Campbell RN  Infection Prevention: rest/sleep promoted     Problem: Plan of Care - These are the overarching goals to be used throughout the patient stay.    Goal: Absence of Hospital-Acquired Illness or Injury  Intervention: Prevent and Manage VTE (Venous Thromboembolism) Risk  Recent Flowsheet Documentation  Taken 8/18/2022 0019 by Chase Campbell RN  Activity Management: activity adjusted per tolerance     Problem: Plan of Care - These are the overarching goals to be used throughout the patient stay.    Goal: Absence of Hospital-Acquired Illness or Injury  Intervention: Prevent Skin Injury  Recent Flowsheet Documentation  Taken 8/18/2022 0019 by Chase Campbell RN  Body Position: position changed independently     Goal Outcome Evaluation:  Patient is aox4. Denies pain. Has sinus joao to SR with BBB, PVCs, and inverted t-wave. HR in the 50s-60s. Angiogram site/left groin has no hematoma or swelling. CMS intact. Patient up to the bathroom with assist x1 and a gaitbelt. Patient had a BM this morning. Output 300 ml. Cr improved to 0.94 today.

## 2022-08-18 NOTE — PROGRESS NOTES
08/18/22 1345   Quick Adds   Type of Visit Initial PT Evaluation   Living Environment   People in Home alone   Current Living Arrangements independent living facility   Home Accessibility no concerns   Transportation Anticipated family or friend will provide   Living Environment Comments Patient gets one meal a day at the facility, reports that her daughter assists her with grocery shopping, facility staff assist with cleaning, she is independent with cooking other meals in the day.   Self-Care   Usual Activity Tolerance good   Current Activity Tolerance good   Regular Exercise Yes   Activity/Exercise Type walking   Exercise Amount/Frequency 30 mins;daily   Equipment Currently Used at Home walker, rolling  (Both 4WW and FWW, states that she uses the 4WW for ambulation outside of her room but does not use an assistive device within her room.)   Fall history within last six months yes   Number of times patient has fallen within last six months 1   General Information   Onset of Illness/Injury or Date of Surgery 08/17/22   Referring Physician Kurtis Tapia, DO   Patient/Family Therapy Goals Statement (PT) To go home   Pertinent History of Current Problem (include personal factors and/or comorbidities that impact the POC) Janes Montero is a 88 year old female who presented with complaints of chest heaviness.   Existing Precautions/Restrictions fall   Cognition   Affect/Mental Status (Cognition) WNL   Orientation Status (Cognition) oriented x 4   Follows Commands (Cognition) WNL   Range of Motion (ROM)   Range of Motion ROM is WFL   Strength (Manual Muscle Testing)   Strength (Manual Muscle Testing) strength is WFL   Bed Mobility   Bed Mobility sit-supine   Sit-Supine [place] (Bed Mobility) modified independence   Assistive Device (Bed Mobility) bed rails   Transfers   Transfers sit-stand transfer   Sit-Stand Transfer   Sit-Stand [place] (Transfers) supervision   Assistive Device (Sit-Stand Transfers)  walker, 4-wheeled   Gait/Stairs (Locomotion)   [place] Level (Gait) supervision   Assistive Device (Gait) walker, 4-wheeled   Distance in Feet (Required for LE Total Joints) 10; 30   Pattern (Gait) step-through   Deviations/Abnormal Patterns (Gait) gait speed decreased;festinating/shuffling;stride length decreased   Comment, (Gait/Stairs) 10' of ambulation in hallway with 4WW and SBA x 1, no gait deviations noted, no LOB. 30' within room without assistive device, multiple minor losses of balance to right and left, festinating/shuffling gait noted, decreased step length noted.   Sensory Examination   Sensory Perception WNL   Clinical Impression   Criteria for Skilled Therapeutic Intervention Yes, treatment indicated   PT Diagnosis (PT) Impaired functional mobility   Influenced by the following impairments Impaired balance   Functional limitations due to impairments Gait   Clinical Presentation (PT Evaluation Complexity) Stable/Uncomplicated   Clinical Presentation Rationale Patient presents as medically diagnosed   Clinical Decision Making (Complexity) low complexity   Planned Therapy Interventions (PT) gait training;patient/family education   Anticipated Equipment Needs at Discharge (PT) walker, rolling  (4WW and FWW)   Risk & Benefits of therapy have been explained evaluation/treatment results reviewed;care plan/treatment goals reviewed;participants voiced agreement with care plan;participants included;patient;son   PT Discharge Planning   PT Discharge Recommendation (DC Rec) home with assist   PT Rationale for DC Rec Patient currently is stand by assist for transfers and gait when using an assisted device. Without an assistive device she requires close contact guard assist of  1 to prevent a fall. She lives in an independent living facility and recieves assistance with cleaning and some meals. She does have access to assistance from facility staff in needed. She has access to a 4WW at home and uses it  frequently; however she does not use an assistive device within her room putting her an increased risk of falls. Patient would benefit from use of a four wheeled walker for longer walks and use of a front wheeled walker for navigation of tighter spaces. Anticipate she will be safe to discharge back to independent living facility with both a four wheeled and front wheeled walker in order to improve safety with gait in her home.   Total Evaluation Time   Total Evaluation Time (Minutes) 20   Physical Therapy Goals   PT Frequency Daily   PT Predicted Duration/Target Date for Goal Attainment 08/25/22   PT Goals PT Goal 1   PT: Goal 1 Patient will ambulated in tight spaces with a front wheeled walker with stand by assist in order to improve safety with mobility in home environment.     DANYEL TaverasT

## 2022-08-18 NOTE — PLAN OF CARE
Goal Outcome Evaluation:    Plan of Care Reviewed With: patient     Overall Patient Progress: improving      Problem: Plan of Care - These are the overarching goals to be used throughout the patient stay.    Goal: Plan of Care Review/Shift Note  Description: The Plan of Care Review/Shift note should be completed every shift.  The Outcome Evaluation is a brief statement about your assessment that the patient is improving, declining, or no change.  This information will be displayed automatically on your shift note.  Outcome: Adequate for Care Transition  Flowsheets (Taken 8/18/2022 1008)  Plan of Care Reviewed With: patient  Overall Patient Progress: improving     Patient plan to discharge today.

## 2022-08-18 NOTE — PROGRESS NOTES
08/18/22 1140   Quick Adds   Type of Visit Initial Occupational Therapy Evaluation   Living Environment   People in Home alone   Current Living Arrangements assisted living   Home Accessibility no concerns   Transportation Anticipated family or friend will provide   Living Environment Comments laundry provided, gets one meal per day provided but can increase services.  cleaning provided   Self-Care   Usual Activity Tolerance good   Current Activity Tolerance good   Regular Exercise Yes   Activity/Exercise Type walking   Exercise Amount/Frequency 30 mins;daily   Equipment Currently Used at Home walker, rolling;cane, straight   Fall history within last six months yes   Number of times patient has fallen within last six months 1   Activity/Exercise/Self-Care Comment pt reports fall was at her brothers house when getting dressed.  uses walker and cane only when leaving apartment   Instrumental Activities of Daily Living (IADL)   Previous Responsibilities meal prep;medication management;finances   IADL Comments daughter assists with transportation and shopping   General Information   Onset of Illness/Injury or Date of Surgery 08/17/22   Referring Physician Dr. Tapia   Patient/Family Therapy Goal Statement (OT) return home   Existing Precautions/Restrictions fall;cardiac   Limitations/Impairments   (parkinsons)   Strength Comprehensive (MMT)   Comment, General Manual Muscle Testing (MMT) Assessment WFL   Coordination   Upper Extremity Coordination No deficits were identified   Gross Motor Coordination WFL   Fine Motor Coordination WFL   Bed Mobility   Bed Mobility supine-sit;sit-supine   Supine-Sit [place] (Bed Mobility) minimum assist (75% patient effort)  (holding breath, straining,)   Sit-Supine [place] (Bed Mobility) independent   Comment (Bed Mobility) bed flat no rails   Transfers   Transfers bed-chair transfer;sit-stand transfer;toilet transfer   Transfer Skill: Bed to Chair/Chair to Bed   Bed-Chair  [place] (Transfers) supervision;contact guard   Assistive Device (Bed-Chair Transfers) rolling walker   Transfer Comments see treatment section re: loss of balance after walk   Sit-Stand Transfer   Sit-Stand [place] (Transfers) supervision;modified independence   Toilet Transfer   Type (Toilet Transfer) sit-stand;stand-sit   [place] Level (Toilet Transfer) supervision   Toilet Transfer Comments loss of balance walking in bathroom to sink.  Pt states her  on her socks tripped her up.  Pt was able to catch self   Balance   Balance Assessment identified impairments contributing to balance disturbance  (parkinsons balance)   Activities of Daily Living   BADL Assessment/Intervention lower body dressing;toileting   Lower Body Dressing Assessment/Training   Comment, (Lower Body Dressing) shoes and socks.   [place] Level (Lower Body Dressing) independent   Toileting   Comment, (Toileting) independent with pericare and underware   [place] Level (Toileting) independent   Clinical Impression   Criteria for Skilled Therapeutic Interventions Met (OT) Yes, treatment indicated   OT Diagnosis reduced functional mobility and indepenence   Influenced by the following impairments 1. Coronary artery disease with history of non-ST elevation myocardial infarction status post two-vessel coronary artery bypass grafting (reversed saphenous venous grafts to the distal left anterior descending artery and the 1st diagonal artery) on 5/25/2011. Her coronary angiogram on 8/16/2022 showed a patent saphenous venous graft to the distal left anterior descending artery and no severe stenoses in the distribution of the right coronary or left circumflex arteries. Unclear if her symptoms are anginal, possibly due to microvascular disease, or noncardiac. She currently denies symptoms.  2. Chronic congestive heart failure with reduced left ventricular ejection fraction of 45-50% likely due to ischemic cardiomyopathy. She does  not seem volume-overloaded, brain natriuretic peptide on admission was not elevated, and she has not been requiring loop diuretics.  3. Chronic left bundle branch block with QRS duration 164 ms.  4. Essential hypertension. Controlled now but was elevated on admission, which could also contribute to symptoms.  5. Hyperlipidemia. Last LDL 58 mg/dL.  6. Non insulin-dependent diabetes mellitus type 2. Last HbA1c 8.0%.  7. Obesity.   OT Problem List-Impairments impacting ADL problems related to;activity tolerance impaired;mobility;balance   Assessment of Occupational Performance 1-3 Performance Deficits   Identified Performance Deficits functional moblity   Planned Therapy Interventions (OT) ADL retraining;transfer training;progressive activity/exercise;home program guidelines   Clinical Decision Making Complexity (OT) low complexity   Risk & Benefits of therapy have been explained evaluation/treatment results reviewed;care plan/treatment goals reviewed;risks/benefits reviewed;current/potential barriers reviewed;participants voiced agreement with care plan;participants included;patient   OT Discharge Planning   OT Discharge Recommendation (DC Rec) home with assist   OT Rationale for DC Rec daughter provies assistance with transportation.  resume current assistance provided from assisted living. (meals, cleaning, laundry)   Total Evaluation Time (Minutes)   Total Evaluation Time (Minutes) 12   OT Goals   Therapy Frequency (OT) One time eval and treatment   OT Predicted Duration/Target Date for Goal Attainment 08/19/22   OT Goals Cardiac Phase 1   OT: Understanding of cardiac education to maximize quality of life, condition management, and health outcomes Patient;Verbalize;Demonstrate   OT: Perform aerobic activity with stable cardiovascular response intermittent;10 minutes;ambulation   OT: Functional/aerobic ambulation tolerance with stable cardiovascular response in order to return to home and community environment  Modified independent;100 feet;Rolling walker

## 2022-08-18 NOTE — PROGRESS NOTES
Impression and Plan     Impression:   1. Coronary artery disease with history of non-ST elevation myocardial infarction status post two-vessel coronary artery bypass grafting (reversed saphenous venous grafts to the distal left anterior descending artery and the 1st diagonal artery) on 5/25/2011. Her coronary angiogram on 8/16/2022 showed a patent saphenous venous graft to the distal left anterior descending artery and no severe stenoses in the distribution of the right coronary or left circumflex arteries. Unclear if her symptoms are anginal, possibly due to microvascular disease, or noncardiac. She currently denies symptoms.  2. Chronic congestive heart failure with reduced left ventricular ejection fraction of 45-50% likely due to ischemic cardiomyopathy. She does not seem volume-overloaded, brain natriuretic peptide on admission was not elevated, and she has not been requiring loop diuretics.  3. Chronic left bundle branch block with QRS duration 164 ms.  4. Essential hypertension. Controlled now but was elevated on admission, which could also contribute to symptoms.  5. Hyperlipidemia. Last LDL 58 mg/dL.  6. Non insulin-dependent diabetes mellitus type 2. Last HbA1c 8.0%.  7. Obesity.    Plan:    Okay to discharge home today    If she has recurrent chest discomfort, isosorbide mononitrate 30 mg daily could be added    Continue carvedilol 12.5 mg twice daily and lisinopril 10 mg daily    Atorvastatin 20 mg daily    Aspirin 81 mg daily    She has follow-up scheduled with her primary cardiologist, Dr. Ricardo, on 9/15/2022    Primary Cardiologist: Dr. Adolfo Ricardo    Subjective     - cath yesterday with no culprit lesion identified or PCI performed    Cardiac Diagnostics   Telemetry (personally reviewed): SR, no arrhythmias    ECG 8/15/2022 (personally reviewed and interpreted): SR, LAD, LBBB with QRS duration 164 ms    Echocardiogram 8/16/2022 (results reviewed):   The left ventricle is normal in  "size.  Left ventricular function is decreased. The ejection fraction is 45-50%  (mildly reduced).  There is hypokinesis of the apex and mid to apical septal segments.  The left atrium is mildly dilated.  There is mild to moderate (1-2+) mitral regurgitation.  Mild valvular aortic stenosis.  There is mild (1+) aortic regurgitation.    Cardiac Cath 8/17/2022 (results reviewed):    1st Mrg lesion is 50% stenosed.    Prox LAD lesion is 100% stenosed.    Prox RCA to Mid RCA lesion is 25% stenosed.    Atretic nonfunctional JEOVANY graft of uncertain destination    Widely patent graft to mid LAD from left aota    Cardiac Stress Testing 8/16/2022 (results reviewed):      Lexiscan stress ECG nondiagnostic for ischemia due to left bundle branch block     Nuclear stress test is abnormal.  There is a large area of transmural infarction involving the apex and distal inferolateral wall extending into the inferior wall.  There is a medium size area of stella-infarct ischemia involving the mid to basal inferior and inferolateral wall.     The left ventricular ejection fraction at stress is 67% with severe hypokinesis of the distal inferior and apical walls.     A prior study was conducted on 9/18/2003.  Prior images were unavailable for comparison review.     The patient is at an intermediate risk of future cardiac ischemic events.    Physical Examination       /62 (BP Location: Left arm)   Pulse 65   Temp 98.1  F (36.7  C) (Oral)   Resp 16   Ht 1.651 m (5' 5\")   Wt 83 kg (182 lb 14.4 oz)   SpO2 92%   BMI 30.44 kg/m          Wt Readings from Last 3 Encounters:   08/18/22 83 kg (182 lb 14.4 oz)   08/17/22 83.3 kg (183 lb 9.6 oz)   06/28/22 85.3 kg (188 lb)     Intake/Output Summary (Last 24 hours) at 8/18/2022 0813  Last data filed at 8/18/2022 0655  Gross per 24 hour   Intake 576 ml   Output 800 ml   Net -224 ml       General: pleasant female. No acute distress.   HENT: external ears normal. Nares patent. Mucous membranes " moist.  Eyes: perrla, extraocular muscles intact. No scleral icterus.   Neck: No JVD  Lungs: clear to auscultation  COR: regular rate and rhythm, No murmurs, rubs, or gallops  Abd: nondistended, BS present  Extrem: No edema. Left femoral artery cath access site without swelling, tenderness, or ecchymosis.         Imaging      /2022 (report reviewed):  EXAM: XR CHEST PORT 1 VIEW  LOCATION: [facility]  DATE/TIME: 8/15/2022 7:05 PM     INDICATION: chest pain  COMPARISON: 07/14/2022, 06/11/2018.                                                                      IMPRESSION: Shallow inspiration. Large body habitus. Poststernotomy changes. No pneumothorax or fracture. Lungs are clear. Heart and pulmonary vascularity are normal. Reverse right shoulder arthroplasty.    Lab Results   Lab Results   Component Value Date     08/18/2022    CO2 29 08/18/2022    BUN 30 08/18/2022     Lab Results   Component Value Date    WBC 6.1 08/18/2022    HGB 11.5 08/18/2022    HCT 36.3 08/18/2022    MCV 90 08/18/2022     08/18/2022     Lab Results   Component Value Date    CHOL 121 08/18/2022    TRIG 172 08/18/2022    HDL 29 08/18/2022     Lab Results   Component Value Date    LDL 58 08/18/2022     Lab Results   Component Value Date    A1C 8.0 (H) 06/28/2022     Lab Results   Component Value Date     08/15/2022     Lab Results   Component Value Date    TROPONINI 0.28 08/16/2022    TROPONINI 0.18 08/15/2022    TROPONINI 0.04 08/15/2022     Lab Results   Component Value Date    TSH 2.22 03/22/2022           Current Inpatient Scheduled Medications   Scheduled Meds:    aspirin  81 mg Oral At Bedtime     atorvastatin  20 mg Oral At Bedtime     carbidopa-levodopa  1 tablet Oral TID     carvedilol  12.5 mg Oral BID w/meals     FLUoxetine  20 mg Oral BID     gabapentin  300 mg Oral 4x Daily     insulin aspart  1-7 Units Subcutaneous TID AC     levothyroxine  25 mcg Oral QAM AC     lisinopril  10  mg Oral Daily     loratadine  10 mg Oral Daily     magnesium oxide  200 mg Oral Daily     pantoprazole  40 mg Oral QAM AC     polyethylene glycol  17 g Oral QPM     Continuous Infusions:         Medications Prior to Admission   Prior to Admission medications    Medication Sig Start Date End Date Taking? Authorizing Provider   ascorbic acid, vitamin C, (ASCORBIC ACID WITH ELLIOTT HIPS) 500 MG tablet [ASCORBIC ACID, VITAMIN C, (ASCORBIC ACID WITH ELLIOTT HIPS) 500 MG TABLET] Take 500 mg by mouth daily. 6/11/18   Provider, Historical   aspirin 81 mg chewable tablet [ASPIRIN 81 MG CHEWABLE TABLET] Chew 81 mg at bedtime. 6/11/18   Provider, Historical   atorvastatin (LIPITOR) 20 MG tablet Take 1 tablet (20 mg) by mouth At Bedtime 8/26/21   Mathew Ricardo MD   carbidopa-levodopa (SINEMET)  MG tablet TAKE 1 TABLET BY MOUTH 3  TIMES DAILY TAKE AT LEAST  1/2 HOUR BEFORE MEALS OR 2  HOURS AFTER MEALS DO NOT  MIX WITH FOOD 5/23/22   Brennon Moya MD   carvedilol (COREG) 12.5 MG tablet Take 1 tablet (12.5 mg) by mouth 2 times daily (with meals) 8/26/21   Mathew Ricardo MD   cholecalciferol, vitamin D3, 1,000 unit tablet [CHOLECALCIFEROL, VITAMIN D3, 1,000 UNIT TABLET] Take 2,000 Units by mouth daily. 6/11/18   Provider, Historical   coenzyme Q10 (CO Q-10) 100 mg capsule [COENZYME Q10 (CO Q-10) 100 MG CAPSULE] Take 100 mg by mouth 2 (two) times a day. 6/11/18   Provider, Historical   cyanocobalamin (VITAMIN B-12) 1000 MCG tablet Take 1 tablet (1,000 mcg) by mouth daily 10/19/21   Brennon Moya MD   FLUoxetine (PROZAC) 20 MG capsule TAKE 1 CAPSULE BY MOUTH  TWICE DAILY 7/29/22   Maggie Arriaga MD   fluticasone (FLONASE) 50 MCG/ACT nasal spray Spray 1 spray into both nostrils daily  Patient taking differently: Spray 1 spray into both nostrils daily as needed 3/22/22   Maggie Arriaga MD   gabapentin (NEURONTIN) 300 MG capsule TAKE 1 CAPSULE BY MOUTH 4  TIMES DAILY 5/23/22   Brennon Moya MD    levothyroxine (SYNTHROID/LEVOTHROID) 25 MCG tablet Take 1 tablet (25 mcg) by mouth daily 3/22/22   Maggie Arriaga MD   lisinopril (ZESTRIL) 10 MG tablet Take 1 tablet (10 mg) by mouth daily 7/18/22   Maggie Arriaga MD   loratadine (CLARITIN) 10 mg tablet [LORATADINE (CLARITIN) 10 MG TABLET] Take 10 mg by mouth daily. 6/11/18   Provider, Historical   magnesium oxide 250 mg Tab [MAGNESIUM OXIDE 250 MG TAB] Take 250 mg by mouth daily. 6/11/18   Provider, Historical   melatonin 1 mg Tab tablet [MELATONIN 1 MG TAB TABLET] Take 3 mg by mouth at bedtime.  6/11/18   Provider, Historical   multivitamin therapeutic tablet [MULTIVITAMIN THERAPEUTIC TABLET] Take 1 tablet by mouth daily. 6/11/18   Provider, Historical   nitroglycerin (NITROSTAT) 0.4 MG SL tablet [NITROGLYCERIN (NITROSTAT) 0.4 MG SL TABLET] Place 0.4 mg under the tongue every 5 (five) minutes as needed for chest pain. 6/11/18   Provider, Historical   omega 3-dha-epa-fish oil (FISH OIL) 60- mg cap capsule [OMEGA 3-DHA-EPA-FISH OIL (FISH OIL) 60- MG CAP CAPSULE] Take 2,000 mg by mouth 2 (two) times a day. 6/11/18   Provider, Historical   omeprazole (PRILOSEC) 20 MG capsule [OMEPRAZOLE (PRILOSEC) 20 MG CAPSULE] Take 20 mg by mouth daily before breakfast. 6/11/18   Provider, Historical   polyethylene glycol (MIRALAX) 17 gram packet [POLYETHYLENE GLYCOL (MIRALAX) 17 GRAM PACKET] Take 17 g by mouth daily. 6/15/21   Provider, Historical          Matt Chavez MD [facility]  Non-Invasive Cardiologist  [facility]  Pager [phone]

## 2022-08-18 NOTE — PROGRESS NOTES
[facility]    Medicine Progress Note - Hospitalist Service    Date of Admission:  8/17/2022    Assessment & Plan          88 y.o. female with a PMHx of CAD s/p CABG x3V, HFpEF, HTN, DM2, GERD, Parkinson's presented to [facility] for chest pain. Transferred to [facility] for coronary angiogram.    CAD, s/p CABG x3V:  Coronary angiogram reveals patent saphenous venous graft to the distal left anterior descending artery and no severe stenoses in the distribution of the right coronary or left circumflex arteries. No PCI performed.   - Per cardiology: Medical management and optimization of cardiovascular risk factors advised.   - Continue asa, statin, coreg, lisinopril, NTG SL prn     DM 2: A1C 8% (6/2022). On no medication as outpt  - Start diabetes diet  - Novolog sliding scale insulin and accuchecks  - PCP follow-up advised     HFrEF:  TTE showed LVEF 45-50%.  Compensated at present.  - Continue Coreg, Lisinopril. Not on diuretic  - Strict I/O and daily weights     Parkinson's disease: Sinemet     ISAIAH on CKD stage III:  Creatinine previously slightly elevated to 1.2 from baseline of 0.9-1.0. Now returned to normal.      GERD: PPI      Depression: Prozac     HLD: Lipitor      Hypothyroidism: Levothyroxine      Neuropathy: Gabapentin    Generalized weakness: Continue PT/OT today. Plan to discharge home tomorrow.       Diet: Regular Diet Adult  Diet    DVT Prophylaxis: Pneumatic Compression Devices  Holguin Catheter: Not present  Central Lines: None  Cardiac Monitoring: ACTIVE order. Indication: Post- PCI/Angiogram (24 hours)  Code Status: No CPR- Do NOT Intubate      Disposition Plan      Expected Discharge Date: 08/19/2022      Destination: home  Discharge Comments: Dizziness when ambulating        The patient's care was discussed with the Bedside Nurse, Care Coordinator/ and Patient.    Kadi Swanson MD  Hospitalist Service  [facility]  Hospital  Securely message with the [employer] Web Console (learn more here)  Text page via [facility] Paging/Directory         ______________________________________________________________________    Interval History   Patient reports no chest pain and SOB. She is cleared by cardiology to be discharged home today. However, cardiac rehab found her not steady enough to go home. She lives alone.    Data reviewed today: I reviewed all medications, new labs and imaging results over the last 24 hours.     Physical Exam   Vital Signs: Temp: 97.8  F (36.6  C) Temp src: Oral BP: 129/62 Pulse: 70   Resp: 18 SpO2: 94 % O2 Device: None (Room air)    Weight: 182 lbs 14.4 oz    General appearance: not in acute distress  HEENT: PERRL, EOMI  Lungs: Clear breath sounds in bilateral lung fields  Cardiovascular: Regular rate and rhythm, normal S1-S2  Abdomen: Soft, non tender, no distension  Musculoskeletal: No joint swelling  Skin: No rash and no edema  Neurology: AAO ×3.  Cranial nerves II - XII normal.  Normal muscle strength in all four extremities.    Data   Recent Labs   Lab 08/18/22  1126 08/18/22  0738 08/18/22  0506 08/17/22  0804 08/17/22  0449 08/15/22  2322 08/15/22  1920   WBC  --   --  6.1  --   --   --  7.4   HGB  --   --  11.5*  --   --   --  13.7   MCV  --   --  90  --   --   --  88   PLT  --   --  172  --   --   --  225   NA  --   --  135*  --  133*  --  136   POTASSIUM  --   --  4.3  --  4.2  --  4.6   CHLORIDE  --   --  103  --  101  --  100   CO2  --   --  29  --  26  --  26   BUN  --   --  30*  --  35*  --  30*   CR  --   --  0.94  --  1.20*  --  1.04   ANIONGAP  --   --  3*  --  6  --  10   LIZZY  --   --  8.8  --  9.5  --  10.5   * 152* 132*   < > 165*   < > 236*   ALBUMIN  --   --   --   --   --   --  4.0   PROTTOTAL  --   --   --   --   --   --  7.7   BILITOTAL  --   --   --   --   --   --  0.6   ALKPHOS  --   --   --   --   --   --  95   ALT  --   --   --   --   --   --  <9   AST  --   --   --   --   --   --   28    < > = values in this interval not displayed.

## 2022-08-19 ENCOUNTER — APPOINTMENT (OUTPATIENT)
Dept: PHYSICAL THERAPY | Facility: HOSPITAL | Age: 87
DRG: 287 | End: 2022-08-19
Attending: INTERNAL MEDICINE
Payer: MEDICARE

## 2022-08-19 ENCOUNTER — APPOINTMENT (OUTPATIENT)
Dept: OCCUPATIONAL THERAPY | Facility: HOSPITAL | Age: 87
DRG: 287 | End: 2022-08-19
Attending: INTERNAL MEDICINE
Payer: MEDICARE

## 2022-08-19 VITALS
TEMPERATURE: 97.5 F | HEIGHT: 65 IN | HEART RATE: 71 BPM | DIASTOLIC BLOOD PRESSURE: 63 MMHG | SYSTOLIC BLOOD PRESSURE: 135 MMHG | BODY MASS INDEX: 30.49 KG/M2 | OXYGEN SATURATION: 91 % | WEIGHT: 183 LBS | RESPIRATION RATE: 16 BRPM

## 2022-08-19 LAB — GLUCOSE BLDC GLUCOMTR-MCNC: 160 MG/DL (ref 70–99)

## 2022-08-19 PROCEDURE — 97116 GAIT TRAINING THERAPY: CPT | Mod: GP

## 2022-08-19 PROCEDURE — 97110 THERAPEUTIC EXERCISES: CPT | Mod: GO

## 2022-08-19 PROCEDURE — 250N000013 HC RX MED GY IP 250 OP 250 PS 637: Performed by: INTERNAL MEDICINE

## 2022-08-19 PROCEDURE — 97535 SELF CARE MNGMENT TRAINING: CPT | Mod: GO

## 2022-08-19 PROCEDURE — 250N000013 HC RX MED GY IP 250 OP 250 PS 637

## 2022-08-19 PROCEDURE — 99239 HOSP IP/OBS DSCHRG MGMT >30: CPT | Performed by: INTERNAL MEDICINE

## 2022-08-19 RX ADMIN — LISINOPRIL 10 MG: 5 TABLET ORAL at 07:48

## 2022-08-19 RX ADMIN — INSULIN ASPART 1 UNITS: 100 INJECTION, SOLUTION INTRAVENOUS; SUBCUTANEOUS at 08:03

## 2022-08-19 RX ADMIN — ALUMINUM HYDROXIDE, MAGNESIUM HYDROXIDE, AND SIMETHICONE 30 ML: 200; 200; 20 SUSPENSION ORAL at 13:13

## 2022-08-19 RX ADMIN — GABAPENTIN 300 MG: 300 CAPSULE ORAL at 12:33

## 2022-08-19 RX ADMIN — PANTOPRAZOLE SODIUM 40 MG: 40 TABLET, DELAYED RELEASE ORAL at 07:47

## 2022-08-19 RX ADMIN — LEVOTHYROXINE SODIUM 25 MCG: 0.03 TABLET ORAL at 07:48

## 2022-08-19 RX ADMIN — Medication 200 MG: at 08:03

## 2022-08-19 RX ADMIN — FLUOXETINE 20 MG: 20 CAPSULE ORAL at 07:48

## 2022-08-19 RX ADMIN — GABAPENTIN 300 MG: 300 CAPSULE ORAL at 07:48

## 2022-08-19 RX ADMIN — CARVEDILOL 12.5 MG: 12.5 TABLET, FILM COATED ORAL at 07:47

## 2022-08-19 RX ADMIN — CARBIDOPA AND LEVODOPA 1 TABLET: 25; 100 TABLET ORAL at 07:48

## 2022-08-19 RX ADMIN — LORATADINE 10 MG: 10 TABLET ORAL at 07:47

## 2022-08-19 ASSESSMENT — ACTIVITIES OF DAILY LIVING (ADL)
ADLS_ACUITY_SCORE: 43

## 2022-08-19 NOTE — PLAN OF CARE
Heart Failure Care Map  GOALS TO BE MET BEFORE DISCHARGE:    1. Decrease congestion and/or edema with diuretic therapy to achieve near optimal volume status.     Dyspnea improved: Yes, satisfactory for discharge.   Edema improved: yes      Net I/O and Weights since admission:   07/19 2300 - 08/18 2259  In: 1456 [P.O.:1120; I.V.:336]  Out: 1400 [Urine:1400]  Net: 56     Vitals:    08/17/22 1018 08/18/22 0656   Weight: 83.6 kg (184 lb 6.4 oz) 83 kg (182 lb 14.4 oz)       2.  O2 sats > 90% on room air, or at prior home O2 therapy level.      Able to wean O2 this shift to keep sats above 90%?: Yes, satisfactory for discharge.   Does patient use Home O2? No          Current oxygenation status:   SpO2: 93 %     O2 Device: None (Room air),      3.  Tolerates ambulation and mobility near baseline.     Ambulation: Yes, satisfactory for discharge.   Times patient ambulated with staff this shift: 1- walked down and back x2 hallways.    No complaints of pain. She did c/o indigestion after dinner- she said it was the spices on the chicken  breast she had for dinner. Maalox given and effective per pt. Tele has been SR w/ BBB and occasional PVC's.  Up to bathroom with A1 and gait belt.     Please review the Heart Failure Care Map for additional HF goal outcomes.    Cat Brooks RN  8/18/2022

## 2022-08-19 NOTE — PLAN OF CARE
Problem: Plan of Care - These are the overarching goals to be used throughout the patient stay.    Goal: Readiness for Transition of Care  Outcome: Ongoing, Progressing     Problem: Plan of Care - These are the overarching goals to be used throughout the patient stay.    Goal: Absence of Hospital-Acquired Illness or Injury  Intervention: Prevent Skin Injury  Recent Flowsheet Documentation  Taken 8/19/2022 0743 by Maryse Yanez RN  Body Position: position changed independently   Goal Outcome Evaluation:    Plan of Care Reviewed With: patient, son     Overall Patient Progress: improving    Outcome Evaluation: Discharge plan reviewed. No med changes during this visit. Upcoming appointments reviewed with patient and Son. Patient agrees with plan.

## 2022-08-19 NOTE — PLAN OF CARE
Problem: Plan of Care - These are the overarching goals to be used throughout the patient stay.    Goal: Absence of Hospital-Acquired Illness or Injury  Intervention: Prevent and Manage VTE (Venous Thromboembolism) Risk  Recent Flowsheet Documentation  Taken 8/19/2022 0005 by Chase Campbell RN  Activity Management: activity adjusted per tolerance     Problem: Plan of Care - These are the overarching goals to be used throughout the patient stay.    Goal: Absence of Hospital-Acquired Illness or Injury  Intervention: Identify and Manage Fall Risk  Recent Flowsheet Documentation  Taken 8/19/2022 0005 by Chase Campbell RN  Safety Promotion/Fall Prevention:    activity supervised    bed alarm on      Goal Outcome Evaluation:  Patient is aox4. Has SR with BBB, PVCs and prolonged Qtc. Denies chest pain. Vs stable. Sating adequatly on room air. Patient uses assist x1 and a walker in the room.

## 2022-08-19 NOTE — DISCHARGE SUMMARY
[facility] MEDICINE  DISCHARGE SUMMARY      Primary Care Physician: Maggie Arriaga              Admission Date: 8/17/2022    Discharge Provider: Ko Caceres DO Discharge Date: 8/19/2022    Diet: see discharge orders below Code Status: No CPR- Do NOT Intubate    Activity: as tolerated       Condition at Discharge: Stable      REASON FOR ADMISSION (See Admission Note for Details)      Coronary angiogram    PRINCIPAL DISCHARGE DIAGNOSIS    Active Problems:    Benign essential hypertension    S/P CABG (coronary artery bypass graft)    Parkinson's disease (H)    Type 2 diabetes mellitus without complication (H)    Chest pain, unspecified type    Coronary artery disease involving native coronary artery without angina pectoris, unspecified whether native or transplanted heart    Acute kidney injury superimposed on CKD (H)        SIGNIFICANT FINDINGS (Imaging, labs):      See below    PENDING LABS      None    PROCEDURES ( this hospitalization only)       Coronary angiogram    RECOMMENDATION FOR F/U VISIT      See below    DISPOSITION ( home, home care, TCU...)      Home    SUMMARY OF HOSPITAL COURSE:       88 y.o. female with a PMHx of CAD s/p CABG x3V, HFpEF, HTN, DM2, GERD, Parkinson's presented to [facility] for chest pain. Transferred to [facility] for coronary angiogram.     CAD, s/p CABG x3V:   Coronary angiogram reveals patent saphenous venous graft to the distal left anterior descending artery and no severe stenoses in the distribution of the right coronary or left circumflex arteries. No PCI performed.   - Per cardiology: Medical management and optimization of cardiovascular risk factors advised.   - Continue asa, statin, coreg, lisinopril, NTG SL prn     DM 2: A1C 8% (6/2022). On no medication as outpt  - Start diabetes diet  - PCP follow-up       HFrEF:  TTE showed LVEF 45-50%.  Compensated at present.  - Continue Coreg, Lisinopril. Not on  diuretic  - Strict I/O and daily weights     Parkinson's disease: Sinemet     ISAIAH on CKD3:  Creatinine previously slightly elevated to 1.2 from baseline of 0.9-1.0. Now returned to normal.      GERD: PPI      Depression: Prozac     HLD: Lipitor      Hypothyroidism: Levothyroxine      Neuropathy: Gabapentin    Discharge Medications with Med changes:         Review of your medicines      CONTINUE these medicines which may have CHANGED, or have new prescriptions. If we are uncertain of the size of tablets/capsules you have at home, strength may be listed as something that might have changed.      Dose / Directions   fluticasone 50 MCG/ACT nasal spray  Commonly known as: FLONASE  This may have changed:     when to take this    reasons to take this  Used for: Non-seasonal allergic rhinitis due to pollen      Dose: 1 spray  Spray 1 spray into both nostrils daily  Quantity: 16 g  Refills: 3        CONTINUE these medicines which have NOT CHANGED      Dose / Directions   aspirin 81 MG chewable tablet  Commonly known as: ASA      Dose: 81 mg  [ASPIRIN 81 MG CHEWABLE TABLET] Chew 81 mg at bedtime.  Refills: 0     atorvastatin 20 MG tablet  Commonly known as: LIPITOR  Used for: Dyslipidemia      Dose: 20 mg  Take 1 tablet (20 mg) by mouth At Bedtime  Quantity: 90 tablet  Refills: 3     carbidopa-levodopa  MG tablet  Commonly known as: SINEMET  Used for: Parkinson's disease (H)      TAKE 1 TABLET BY MOUTH 3  TIMES DAILY TAKE AT LEAST  1/2 HOUR BEFORE MEALS OR 2  HOURS AFTER MEALS DO NOT  MIX WITH FOOD  Quantity: 270 tablet  Refills: 3     carvedilol 12.5 MG tablet  Commonly known as: COREG  Used for: Hypertension, unspecified type      Dose: 12.5 mg  Take 1 tablet (12.5 mg) by mouth 2 times daily (with meals)  Quantity: 180 tablet  Refills: 3     cholecalciferol 25 MCG (1000 UT) Tabs      Dose: 2,000 Units  [CHOLECALCIFEROL, VITAMIN D3, 1,000 UNIT TABLET] Take 2,000 Units by mouth daily.  Refills: 0     CLOVGRAN Tabs       Dose: 1 tablet  [MULTIVITAMIN THERAPEUTIC TABLET] Take 1 tablet by mouth daily.  Refills: 0     co-enzyme Q-10 100 MG Caps capsule      Dose: 100 mg  [COENZYME Q10 (CO Q-10) 100 MG CAPSULE] Take 100 mg by mouth 2 (two) times a day.  Refills: 0     cyanocobalamin 1000 MCG tablet  Commonly known as: VITAMIN B-12  Used for: Parkinson's disease (H), Low serum vitamin B12      Dose: 1,000 mcg  Take 1 tablet (1,000 mcg) by mouth daily  Quantity: 90 tablet  Refills: 3     FLUoxetine 20 MG capsule  Commonly known as: PROzac  Used for: Recurrent major depressive disorder, in full remission (H)      TAKE 1 CAPSULE BY MOUTH  TWICE DAILY  Quantity: 180 capsule  Refills: 1     gabapentin 300 MG capsule  Commonly known as: NEURONTIN  Used for: Trigeminal neuralgia      TAKE 1 CAPSULE BY MOUTH 4  TIMES DAILY  Quantity: 360 capsule  Refills: 3     levothyroxine 25 MCG tablet  Commonly known as: SYNTHROID/LEVOTHROID  Used for: Subclinical hypothyroidism      Dose: 25 mcg  Take 1 tablet (25 mcg) by mouth daily  Quantity: 90 tablet  Refills: 3     lisinopril 10 MG tablet  Commonly known as: ZESTRIL  Used for: Essential hypertension, benign      Dose: 10 mg  Take 1 tablet (10 mg) by mouth daily  Quantity: 90 tablet  Refills: 3     loratadine 10 MG tablet  Commonly known as: CLARITIN      Dose: 10 mg  [LORATADINE (CLARITIN) 10 MG TABLET] Take 10 mg by mouth daily.  Refills: 0     Magnesium Oxide 250 MG Tabs      Dose: 250 mg  [MAGNESIUM OXIDE 250 MG TAB] Take 250 mg by mouth daily.  Refills: 0     melatonin 1 MG Tabs tablet      Dose: 3 mg  [MELATONIN 1 MG TAB TABLET] Take 3 mg by mouth at bedtime.  Refills: 0     nitroGLYcerin 0.4 MG sublingual tablet  Commonly known as: NITROSTAT      Dose: 0.4 mg  [NITROGLYCERIN (NITROSTAT) 0.4 MG SL TABLET] Place 0.4 mg under the tongue every 5 (five) minutes as needed for chest pain.  Refills: 0     Omega 3 500 500 MG Caps      Dose: 2,000 mg  [OMEGA 3-DHA-EPA-FISH OIL (FISH OIL) 60- MG CAP  "CAPSULE] Take 2,000 mg by mouth 2 (two) times a day.  Refills: 0     omeprazole 20 MG DR capsule  Commonly known as: priLOSEC      Dose: 20 mg  [OMEPRAZOLE (PRILOSEC) 20 MG CAPSULE] Take 20 mg by mouth daily before breakfast.  Refills: 0     polyethylene glycol 17 g packet  Commonly known as: MIRALAX      Dose: 17 g  [POLYETHYLENE GLYCOL (MIRALAX) 17 GRAM PACKET] Take 17 g by mouth daily.  Refills: 0     vitamin C 500 MG tablet  Commonly known as: ASCORBIC ACID      Dose: 500 mg  [ASCORBIC ACID, VITAMIN C, (ASCORBIC ACID WITH ELLIOTT HIPS) 500 MG TABLET] Take 500 mg by mouth daily.  Refills: 0                    Discharge Procedure Orders   Reason for your hospital stay   Order Comments: * Chest pain     Activity   Order Comments: Your activity upon discharge: activity as tolerated     Order Specific Question Answer Comments   Is discharge order? Yes      Follow-up and recommended labs and tests    Order Comments: Follow up with your cardiologist as scheduled.  Follow up with your primary care provider to recheck blood sugar. You may need to start taking medication for diabetes.     Diet   Order Comments: Follow this diet upon discharge: Regular Diet Adult     Order Specific Question Answer Comments   Is discharge order? Yes          Subjective       NAD. Denies any nausea, vomiting, abdominal pain, chest pain, SOB, new swelling, fevers, chills, confusion or headache.     Examination      Vital Signs in last 24 hours:   Vital signs:  Temp: 97.5  F (36.4  C) Temp src: Oral BP: 135/63 Pulse: 71   Resp: 16 SpO2: 91 % O2 Device: None (Room air) Oxygen Delivery: 2 LPM Height: 165.1 cm (5' 5\") Weight: 83 kg (183 lb)  Estimated body mass index is 30.45 kg/m  as calculated from the following:    Height as of this encounter: 1.651 m (5' 5\").    Weight as of this encounter: 83 kg (183 lb).            General: NAD  HEENT: Without congestion or inflammation  RESPIRATORY: Respirations nonlabored  CARDIOVASCULAR: No le edema " samy.  NEUROLOGIC: No focal arm or leg weakness, speech is clear      Please see EMR for more detailed significant labs, imaging, consultant notes etc.  Total time spent on discharge: >30 minutes    Ko Caceres D.O.        CC:Maggie Arriaga

## 2022-08-19 NOTE — PLAN OF CARE
Occupational Therapy Discharge Summary    Reason for therapy discharge:    Discharged to home.    Progress towards therapy goal(s). See goals on Care Plan in [facility] electronic health record for goal details.  Goals met    Therapy recommendation(s):    No further therapy is recommended.

## 2022-08-19 NOTE — PLAN OF CARE
Physical Therapy Discharge Summary    Reason for therapy discharge:    Discharged to home.    Progress towards therapy goal(s). See goals on Care Plan in [facility] electronic health record for goal details.  Goals met    Therapy recommendation(s):    No further therapy is recommended.

## 2022-08-20 ENCOUNTER — PATIENT OUTREACH (OUTPATIENT)
Dept: CARE COORDINATION | Facility: CLINIC | Age: 87
End: 2022-08-20

## 2022-08-20 NOTE — PROGRESS NOTES
Clinic Care Coordination Contact  [facility]: Post-Discharge Note  SITUATION                                                      Admission:    Admission Date: 08/15/22   Reason for Admission: chest pain  Discharge:   Discharge Date: 08/17/22  Discharge Diagnosis: Probably Unstable Angina  Chronic congestive heart failure, reduced ejection fraction  Diabetes, type 2    BACKGROUND                                                      Per hospital discharge summary and inpatient provider notes:  Janes Montero was admitted on 8/15/2022 for chest pain. She has history of 3-vessel CABG 2011, diastolic heart failure, HTN, DM2, GERD, Parkinsons, who presented with chest pain, concerning for cardiac etiology.   The following problems were addressed during her hospitalization:     On admission, she was hypertensive to 200's systolic. Troponin initially was 0.04 but increased to 0.28 with subsequent checks. Chest x-ray negative.  EKG with sinus rhythm, left bundle branch block that has been noted on previous EKGs. Cardiology was consulted. ECHO showed normal left ventricle size, EF 45-50%. Hypokinesis of the apex and mid to apical septal segments, and mild aortic and mitral regurgitation. 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 The left ventricular ejection fraction at stress is 67% with severe hypokinesis of the distal inferior and apical walls.  After discussion with cardiology, the patient elected to be transferred to [facility] for Angiogram with possible PCI. She denies further chest pain or shortness of breath. She was hemodynamically stable throughout admission.        ASSESSMENT           Discharge Assessment  How are you doing now that you are home?: good per daughter  How are your symptoms? (Red  Flag symptoms escalate to triage hotline per guidelines): Improved  Do you feel your condition is stable enough to be safe at home until your provider visit?: Yes  Does the patient have their discharge instructions? : Yes  Does the patient have questions regarding their discharge instructions? : No  Were you started on any new medications or were there changes to any of your previous medications? : No  Do you have all of your needed medical supplies or equipment (DME)?  (i.e. oxygen tank, CPAP, cane, etc.):  (NA)  Discharge follow-up appointment scheduled within 14 calendar days? : No  Is patient agreeable to assistance with scheduling? : No (daughter will call to schedule on Monday)         Post-op (Clinicians Only)  Did the patient have surgery or a procedure: Yes  Incision: healing  Drainage: No  Bleeding: none  Fever: No  Chills: No  Redness: No  Swelling: No  Incision site pain: No  Closure: none  Eating & Drinking: eating and drinking without complaints/concerns  PO Intake: regular diet        PLAN                                                      Outpatient Plan:  Daughter to schedule hospital follow up appointment with PCP    Future Appointments   Date Time Provider Department Center   9/15/2022  3:50 PM [facility]   10/11/2022  5:00 PM Maggie Arriaga MD [facility]   11/28/2022  8:00 AM Brennon Moya MD NUN[facility]   12/2/2022  3:00 PM [facility]         For any urgent concerns, please contact our 24 hour nurse triage line: [phone] ([phone])         Marjorie Vail RN  [facility]

## 2022-08-23 ENCOUNTER — MYC MEDICAL ADVICE (OUTPATIENT)
Dept: FAMILY MEDICINE | Facility: CLINIC | Age: 87
End: 2022-08-23

## 2022-09-15 ENCOUNTER — OFFICE VISIT (OUTPATIENT)
Dept: CARDIOLOGY | Facility: CLINIC | Age: 87
End: 2022-09-15
Attending: INTERNAL MEDICINE
Payer: MEDICARE

## 2022-09-15 VITALS
HEIGHT: 65 IN | DIASTOLIC BLOOD PRESSURE: 76 MMHG | WEIGHT: 184.6 LBS | HEART RATE: 65 BPM | RESPIRATION RATE: 16 BRPM | SYSTOLIC BLOOD PRESSURE: 134 MMHG | OXYGEN SATURATION: 93 % | BODY MASS INDEX: 30.75 KG/M2

## 2022-09-15 DIAGNOSIS — I25.10 CORONARY ARTERY DISEASE INVOLVING NATIVE CORONARY ARTERY WITHOUT ANGINA PECTORIS, UNSPECIFIED WHETHER NATIVE OR TRANSPLANTED HEART: Primary | ICD-10-CM

## 2022-09-15 DIAGNOSIS — I34.0 MITRAL VALVE INSUFFICIENCY, UNSPECIFIED ETIOLOGY: ICD-10-CM

## 2022-09-15 DIAGNOSIS — I35.1 NONRHEUMATIC AORTIC VALVE INSUFFICIENCY: ICD-10-CM

## 2022-09-15 DIAGNOSIS — I10 HYPERTENSION, UNSPECIFIED TYPE: ICD-10-CM

## 2022-09-15 DIAGNOSIS — I35.0 NONRHEUMATIC AORTIC VALVE STENOSIS: ICD-10-CM

## 2022-09-15 DIAGNOSIS — E78.5 DYSLIPIDEMIA: ICD-10-CM

## 2022-09-15 PROCEDURE — 99214 OFFICE O/P EST MOD 30 MIN: CPT | Performed by: INTERNAL MEDICINE

## 2022-09-15 NOTE — PROGRESS NOTES
[facility] HE[facility] CARE [address], [place] [address]   [email or web address]   OFFICE: [phone]          Thank you Maggie De La Cruz for asking the [facility] Heart Care team to participate in the care of your patient, Janes Montero.     Impression and Plan     1.  Coronary artery disease. Janes has known coronary artery disease having had prior coronary artery bypass graft surgery with JEOVANY graft to the LAD.  She underwent coronary angiography 17 August 2022 that revealed complete occlusion of the proximal native LAD though the JEOVANY graft to the LAD was widely patent.  Otherwise mild-moderate disease only.   ?   This is stable.  Patient denies any chest pain or other concerning symptoms in this regard.   ?   2.  Mitral insufficiency.  This was felt mild-moderate in degree on echocardiogram 16 August 2022.      3.  Aortic stenosis.  This was felt mild in degree on echocardiogram 16 August 2022.   ?   4.  Aortic insufficiency.  This was felt mild in degree on echocardiogram 16 August 2022.   ?   5.  Hypertension.  Blood pressure is fairly reasonable in the office today at 134/76 mmHg.  She has been having very good readings at home.  ?   6.  Dyslipidemia.  Lipid profile 18 August 2022 revealed LDL 58 mg/dL and HDL 29 mg/dL.     Continue atorvastatin.   ?   Plan on follow-up in approximately 6 months.    35 minutes spent reviewing prior records (including documentation, laboratory studies, cardiac testing/imaging), interview with patient along with physical exam, planning, and subsequent documentation/crafting of note).           History of Present Illness    Once again I would like to thank you again for asking me to participate in the care of your patient, Janes Montero.  As you know, but to reiterate for my own records, Janes Montero is a 88 year old female with known coronary artery disease having had prior coronary artery bypass graft surgery with JEOVANY graft to  the LAD.  She underwent coronary angiography 17 August 2022 that revealed complete occlusion of the proximal native LAD though the JEOVANY graft to the LAD was widely patent.  Otherwise mild-moderate disease only.   ?   On interview, she states that she has been doing well from a cardiac standpoint as of late.  She denies chest pain.  Breathing is comfortable.  No palpitations.  No worsening lower extremity edema.    Further review of systems is otherwise negative/noncontributory (medical record and 13 point review of systems reviewed as well and pertinent positives noted).         Cardiac Diagnostics      Echocardiogram 16 August 2022:   1. Normal left ventricular size with mildly reduced left ventricular systolic performance.  Ejection fraction 45-50%.   2. There is hypokinesis of the apex and mid to apical septal segments.   3. Mild aortic stenosis.   4. Mild aortic insufficiency.   5. Mild-moderate mitral insufficiency.   6. Right ventricle not well visualized.   7. Mild left atrial enlargement.  Right atrium of normal dimension.       Echocardiogram 5 March 2020:   1. Left ventricle: The cavity size is normal. Wall thickness is mildly increased. Systolic function is normal. The estimated ejection fraction is 55-60%.    2. Left atrium: The atrium is mildly to moderately dilated.    3. Mitral valve: Moderately calcified annulus. Leaflet separation is normal. There is no evidence for stenosis. Moderate regurgitation.    4. Aortic valve: Probably trileaflet; mildly calcified leaflets. Thickening, consistent with sclerosis. Cusp separation is normal. There is no stenosis. Mild to moderate regurgitation.    5. Tricuspid valve: Structurally normal valve. Leaflet separation is normal. There is no evidence for stenosis. Moderate regurgitation.    ?   Nuclear perfusion imaging study 16 August 2022:   1. Nuclear stress test is abnormal.  There is a large area of transmural infarction involving the apex and distal  "inferolateral wall extending into the inferior wall.  There is a medium size area of stella-infarct ischemia involving the mid to basal inferior and inferolateral wall.   2. The left ventricular ejection fraction at stress is 67% with severe hypokinesis of the distal inferior and apical walls.      Coronary angiography 17 August 2022:   1. Left anterior descending coronary artery: Proximal 100% stenosis.   2. Circumflex coronary artery: First obtuse marginal with 50% stenosis.   3. Right coronary artery: 25% mid vessel stenosis.   4. JEOVANY graft to the LAD: Widely patent.      Coronary angiogram 23 May 2011:   1. Normal left main.    2. Severe eccentric 95% proximal left anterior descending stenosis just after the first diagonal and just after the first septal  branches originate. Second eccentric stenosis of 60-70% is present proximally involving the origin of the second diagonal branch. The second diagonal branch ostium has a 50% stenosis.    3. Mild irregularity of the circumflex, circumflex marginal branches.    4. Mild irregularity of the right coronary artery. No significant stenosis. PDA arises distally, appears normal.    5. Overall left ventricular ejection fraction normal.    ?   Twelve-lead ECG (personally reviewed) 11 June 2018: Sinus rhythm with occasional PVCs.  Left bundle branch block.          Physical Examination       /76 (BP Location: Left arm, Patient Position: Sitting, Cuff Size: Adult Regular)   Pulse 65   Resp 16   Ht 1.651 m (5' 5\")   Wt 83.7 kg (184 lb 9.6 oz)   SpO2 93%   BMI 30.72 kg/m          Wt Readings from Last 3 Encounters:   09/15/22 83.7 kg (184 lb 9.6 oz)   08/19/22 83 kg (183 lb)   08/17/22 83.3 kg (183 lb 9.6 oz)     The patient is alert and oriented times three. Sclerae are anicteric. Mucosal membranes are moist. Jugular venous pressure is normal. No significant adenopathy/thyromegally appreciated. Lungs are clear with good expansion. On cardiovascular exam, " the patient has a regular S1 and S2. Abdomen is soft and non-tender. Extremities reveal no clubbing, cyanosis, or edema.         Medications  Allergies   Current Outpatient Medications   Medication Sig Dispense Refill     ascorbic acid, vitamin C, (ASCORBIC ACID WITH ELLIOTT HIPS) 500 MG tablet [ASCORBIC ACID, VITAMIN C, (ASCORBIC ACID WITH ELLIOTT HIPS) 500 MG TABLET] Take 500 mg by mouth daily.       aspirin 81 mg chewable tablet [ASPIRIN 81 MG CHEWABLE TABLET] Chew 81 mg at bedtime.       atorvastatin (LIPITOR) 20 MG tablet Take 1 tablet (20 mg) by mouth At Bedtime 90 tablet 3     carbidopa-levodopa (SINEMET)  MG tablet TAKE 1 TABLET BY MOUTH 3  TIMES DAILY TAKE AT LEAST  1/2 HOUR BEFORE MEALS OR 2  HOURS AFTER MEALS DO NOT  MIX WITH FOOD 270 tablet 3     carvedilol (COREG) 12.5 MG tablet Take 1 tablet (12.5 mg) by mouth 2 times daily (with meals) 180 tablet 3     cholecalciferol, vitamin D3, 1,000 unit tablet [CHOLECALCIFEROL, VITAMIN D3, 1,000 UNIT TABLET] Take 2,000 Units by mouth daily.       coenzyme Q10 (CO Q-10) 100 mg capsule [COENZYME Q10 (CO Q-10) 100 MG CAPSULE] Take 100 mg by mouth 2 (two) times a day.       cyanocobalamin (VITAMIN B-12) 1000 MCG tablet Take 1 tablet (1,000 mcg) by mouth daily 90 tablet 3     FLUoxetine (PROZAC) 20 MG capsule TAKE 1 CAPSULE BY MOUTH  TWICE DAILY 180 capsule 1     fluticasone (FLONASE) 50 MCG/ACT nasal spray Spray 1 spray into both nostrils daily (Patient taking differently: Spray 1 spray into both nostrils daily as needed) 16 g 3     gabapentin (NEURONTIN) 300 MG capsule TAKE 1 CAPSULE BY MOUTH 4  TIMES DAILY 360 capsule 3     levothyroxine (SYNTHROID/LEVOTHROID) 25 MCG tablet Take 1 tablet (25 mcg) by mouth daily 90 tablet 3     lisinopril (ZESTRIL) 10 MG tablet Take 1 tablet (10 mg) by mouth daily 90 tablet 3     loratadine (CLARITIN) 10 mg tablet [LORATADINE (CLARITIN) 10 MG TABLET] Take 10 mg by mouth daily.       magnesium oxide 250 mg Tab [MAGNESIUM OXIDE 250 MG  "TAB] Take 250 mg by mouth daily.       melatonin 1 mg Tab tablet [MELATONIN 1 MG TAB TABLET] Take 3 mg by mouth at bedtime.        multivitamin therapeutic tablet [MULTIVITAMIN THERAPEUTIC TABLET] Take 1 tablet by mouth daily.       nitroglycerin (NITROSTAT) 0.4 MG SL tablet [NITROGLYCERIN (NITROSTAT) 0.4 MG SL TABLET] Place 0.4 mg under the tongue every 5 (five) minutes as needed for chest pain.       omega 3-dha-epa-fish oil (FISH OIL) 60- mg cap capsule [OMEGA 3-DHA-EPA-FISH OIL (FISH OIL) 60- MG CAP CAPSULE] Take 2,000 mg by mouth 2 (two) times a day.       omeprazole (PRILOSEC) 20 MG capsule [OMEPRAZOLE (PRILOSEC) 20 MG CAPSULE] Take 20 mg by mouth daily before breakfast.       polyethylene glycol (MIRALAX) 17 gram packet [POLYETHYLENE GLYCOL (MIRALAX) 17 GRAM PACKET] Take 17 g by mouth daily.         Allergies   Allergen Reactions     Alendronate [Alendronic Acid] Unknown     pain     Codeine Hives     Hydrocodone-Acetaminophen Other (See Comments)     Highly sensitive, \"knocks her out and can't wake up\"     Risedronate Unknown     Bone pain     Rosuvastatin Muscle Pain (Myalgia)     Simvastatin Muscle Pain (Myalgia)          Lab Results    Chemistry/lipid CBC Cardiac Enzymes/BNP/TSH/INR   Recent Labs   Lab Test 08/18/22  0506   CHOL 121   HDL 29*   LDL 58   TRIG 172*     Recent Labs   Lab Test 08/18/22  0506 12/14/21  1638   LDL 58 32     Recent Labs   Lab Test 08/19/22  0759 08/18/22  0738 08/18/22  0506   NA  --   --  135*   POTASSIUM  --   --  4.3   CHLORIDE  --   --  103   CO2  --   --  29   *   < > 132*   BUN  --   --  30*   CR  --   --  0.94   GFRESTIMATED  --   --  58*   LIZZY  --   --  8.8    < > = values in this interval not displayed.     Recent Labs   Lab Test 08/18/22  0506 08/17/22  0449 08/15/22  1920   CR 0.94 1.20* 1.04     Recent Labs   Lab Test 06/28/22  1616 03/22/22  1622 12/14/21  1557   A1C 8.0* 7.6* 7.6*          Recent Labs   Lab Test 08/18/22  0506   WBC 6.1   HGB " 11.5*   HCT 36.3   MCV 90        Recent Labs   Lab Test 22  0506 08/15/22  1920 21  1557   HGB 11.5* 13.7 13.9    Recent Labs   Lab Test 22  0416 08/15/22  2246 08/15/22  1920   TROPONINI 0.28 0.18 0.04     Recent Labs   Lab Test 08/15/22  1920 18  1259    383*     Recent Labs   Lab Test 22  1623   TSH 2.22     No results for input(s): INR in the last 36433 hours.     Medical History  Surgical History Family History Social History   Past Medical History:   Diagnosis Date     Acute diastolic congestive heart failure (H)      Acute on chronic congestive heart failure (H) 2018     Coronary artery disease      Diabetes mellitus, type II (H)      Diastolic dysfunction 7/10/2018     Frozen shoulder     bilateral     Hypertension      Hypertensive emergency      Parkinson disease (H)      Primary osteoarthritis involving multiple joints      Primary osteoarthritis of left knee      Recurrent UTI     hx septic shock     Thyroid disease      Past Surgical History:   Procedure Laterality Date     APPENDECTOMY       APPENDECTOMY       BACK SURGERY      L4-S1 laminectomy     BYPASS GRAFT ARTERY CORONARY      3 vessel      SECTION        SECTION       CV CORONARY ANGIOGRAM N/A 2022    Procedure: Coronary Angiogram;  Surgeon: Ignacio Baird MD;  Location: [facility] CV     HERNIORRHAPHY VENTRAL Left      HYSTERECTOMY       HYSTERECTOMY       JOINT REPLACEMENT Left     Total left knee     OTHER SURGICAL HISTORY      right ankle surgery     OTHER SURGICAL HISTORY      right forearm fracture     REPLACEMENT TOTAL KNEE Right      SHOULDER SURGERY Right     reversed shoulder surgery.     Family History   Problem Relation Age of Onset     Heart Disease Mother      Heart Disease Father         Social History     Socioeconomic History     Marital status:      Spouse name: Not on file     Number of children: Not on file     Years of education: Not  on file     Highest education level: Not on file   Occupational History     Not on file   Tobacco Use     Smoking status: Never Smoker     Smokeless tobacco: Never Used   Vaping Use     Vaping Use: Never used   Substance and Sexual Activity     Alcohol use: No     Drug use: No     Sexual activity: Not Currently     Birth control/protection: None   Other Topics Concern     Parent/sibling w/ CABG, MI or angioplasty before 65F 55M? No   Social History Narrative    6/15/2021 new to [place] from [place]. She has 6 kids.     Social Determinants of Health     Financial Resource Strain: Not on file   Food Insecurity: Not on file   Transportation Needs: Not on file   Physical Activity: Not on file   Stress: Not on file   Social Connections: Not on file   Intimate Partner Violence: Not on file   Housing Stability: Not on file

## 2022-09-15 NOTE — LETTER
9/15/2022    Maggie Arriaga MD  [address]  [facility] [address]    RE: Janes BERNAL Winston       Dear Colleague,     I had the pleasure of seeing Janes Montero in the [facility].         [facility] [address], [place] [address]   [email or web address]   OFFICE: [phone]          Thank you Maggie De La Cruz for asking the [facility] team to participate in the care of your patient, Janes Montero.     Impression and Plan     1.  Coronary artery disease. Janes has known coronary artery disease having had prior coronary artery bypass graft surgery with JEOVANY graft to the LAD.  She underwent coronary angiography 17 August 2022 that revealed complete occlusion of the proximal native LAD though the JEOVANY graft to the LAD was widely patent.  Otherwise mild-moderate disease only.   ?   This is stable.  Patient denies any chest pain or other concerning symptoms in this regard.   ?   2.  Mitral insufficiency.  This was felt mild-moderate in degree on echocardiogram 16 August 2022.      3.  Aortic stenosis.  This was felt mild in degree on echocardiogram 16 August 2022.   ?   4.  Aortic insufficiency.  This was felt mild in degree on echocardiogram 16 August 2022.   ?   5.  Hypertension.  Blood pressure is fairly reasonable in the office today at 134/76 mmHg.  She has been having very good readings at home.  ?   6.  Dyslipidemia.  Lipid profile 18 August 2022 revealed LDL 58 mg/dL and HDL 29 mg/dL.     Continue atorvastatin.   ?   Plan on follow-up in approximately 6 months.    35 minutes spent reviewing prior records (including documentation, laboratory studies, cardiac testing/imaging), interview with patient along with physical exam, planning, and subsequent documentation/crafting of note).           History of Present Illness    Once again I would like to thank you again for asking me to participate in the care of your patient, Janes  GABE Montero.  As you know, but to reiterate for my own records, Janes Montero is a 88 year old female with known coronary artery disease having had prior coronary artery bypass graft surgery with JEOVANY graft to the LAD.  She underwent coronary angiography 17 August 2022 that revealed complete occlusion of the proximal native LAD though the JEOVANY graft to the LAD was widely patent.  Otherwise mild-moderate disease only.   ?   On interview, she states that she has been doing well from a cardiac standpoint as of late.  She denies chest pain.  Breathing is comfortable.  No palpitations.  No worsening lower extremity edema.    Further review of systems is otherwise negative/noncontributory (medical record and 13 point review of systems reviewed as well and pertinent positives noted).         Cardiac Diagnostics      Echocardiogram 16 August 2022:   1. Normal left ventricular size with mildly reduced left ventricular systolic performance.  Ejection fraction 45-50%.   2. There is hypokinesis of the apex and mid to apical septal segments.   3. Mild aortic stenosis.   4. Mild aortic insufficiency.   5. Mild-moderate mitral insufficiency.   6. Right ventricle not well visualized.   7. Mild left atrial enlargement.  Right atrium of normal dimension.       Echocardiogram 5 March 2020:   1. Left ventricle: The cavity size is normal. Wall thickness is mildly increased. Systolic function is normal. The estimated ejection fraction is 55-60%.    2. Left atrium: The atrium is mildly to moderately dilated.    3. Mitral valve: Moderately calcified annulus. Leaflet separation is normal. There is no evidence for stenosis. Moderate regurgitation.    4. Aortic valve: Probably trileaflet; mildly calcified leaflets. Thickening, consistent with sclerosis. Cusp separation is normal. There is no stenosis. Mild to moderate regurgitation.    5. Tricuspid valve: Structurally normal valve. Leaflet separation is normal. There is no evidence for stenosis.  "Moderate regurgitation.    ?   Nuclear perfusion imaging study 16 August 2022:   1. Nuclear stress test is abnormal.  There is a large area of transmural infarction involving the apex and distal inferolateral wall extending into the inferior wall.  There is a medium size area of stella-infarct ischemia involving the mid to basal inferior and inferolateral wall.   2. The left ventricular ejection fraction at stress is 67% with severe hypokinesis of the distal inferior and apical walls.      Coronary angiography 17 August 2022:   1. Left anterior descending coronary artery: Proximal 100% stenosis.   2. Circumflex coronary artery: First obtuse marginal with 50% stenosis.   3. Right coronary artery: 25% mid vessel stenosis.   4. JEOVANY graft to the LAD: Widely patent.      Coronary angiogram 23 May 2011:   1. Normal left main.    2. Severe eccentric 95% proximal left anterior descending stenosis just after the first diagonal and just after the first septal  branches originate. Second eccentric stenosis of 60-70% is present proximally involving the origin of the second diagonal branch. The second diagonal branch ostium has a 50% stenosis.    3. Mild irregularity of the circumflex, circumflex marginal branches.    4. Mild irregularity of the right coronary artery. No significant stenosis. PDA arises distally, appears normal.    5. Overall left ventricular ejection fraction normal.    ?   Twelve-lead ECG (personally reviewed) 11 June 2018: Sinus rhythm with occasional PVCs.  Left bundle branch block.          Physical Examination       /76 (BP Location: Left arm, Patient Position: Sitting, Cuff Size: Adult Regular)   Pulse 65   Resp 16   Ht 1.651 m (5' 5\")   Wt 83.7 kg (184 lb 9.6 oz)   SpO2 93%   BMI 30.72 kg/m          Wt Readings from Last 3 Encounters:   09/15/22 83.7 kg (184 lb 9.6 oz)   08/19/22 83 kg (183 lb)   08/17/22 83.3 kg (183 lb 9.6 oz)     The patient is alert and oriented times three. " Sclerae are anicteric. Mucosal membranes are moist. Jugular venous pressure is normal. No significant adenopathy/thyromegally appreciated. Lungs are clear with good expansion. On cardiovascular exam, the patient has a regular S1 and S2. Abdomen is soft and non-tender. Extremities reveal no clubbing, cyanosis, or edema.         Medications  Allergies   Current Outpatient Medications   Medication Sig Dispense Refill     ascorbic acid, vitamin C, (ASCORBIC ACID WITH ELLIOTT HIPS) 500 MG tablet [ASCORBIC ACID, VITAMIN C, (ASCORBIC ACID WITH ELLIOTT HIPS) 500 MG TABLET] Take 500 mg by mouth daily.       aspirin 81 mg chewable tablet [ASPIRIN 81 MG CHEWABLE TABLET] Chew 81 mg at bedtime.       atorvastatin (LIPITOR) 20 MG tablet Take 1 tablet (20 mg) by mouth At Bedtime 90 tablet 3     carbidopa-levodopa (SINEMET)  MG tablet TAKE 1 TABLET BY MOUTH 3  TIMES DAILY TAKE AT LEAST  1/2 HOUR BEFORE MEALS OR 2  HOURS AFTER MEALS DO NOT  MIX WITH FOOD 270 tablet 3     carvedilol (COREG) 12.5 MG tablet Take 1 tablet (12.5 mg) by mouth 2 times daily (with meals) 180 tablet 3     cholecalciferol, vitamin D3, 1,000 unit tablet [CHOLECALCIFEROL, VITAMIN D3, 1,000 UNIT TABLET] Take 2,000 Units by mouth daily.       coenzyme Q10 (CO Q-10) 100 mg capsule [COENZYME Q10 (CO Q-10) 100 MG CAPSULE] Take 100 mg by mouth 2 (two) times a day.       cyanocobalamin (VITAMIN B-12) 1000 MCG tablet Take 1 tablet (1,000 mcg) by mouth daily 90 tablet 3     FLUoxetine (PROZAC) 20 MG capsule TAKE 1 CAPSULE BY MOUTH  TWICE DAILY 180 capsule 1     fluticasone (FLONASE) 50 MCG/ACT nasal spray Spray 1 spray into both nostrils daily (Patient taking differently: Spray 1 spray into both nostrils daily as needed) 16 g 3     gabapentin (NEURONTIN) 300 MG capsule TAKE 1 CAPSULE BY MOUTH 4  TIMES DAILY 360 capsule 3     levothyroxine (SYNTHROID/LEVOTHROID) 25 MCG tablet Take 1 tablet (25 mcg) by mouth daily 90 tablet 3     lisinopril (ZESTRIL) 10 MG tablet Take 1  "tablet (10 mg) by mouth daily 90 tablet 3     loratadine (CLARITIN) 10 mg tablet [LORATADINE (CLARITIN) 10 MG TABLET] Take 10 mg by mouth daily.       magnesium oxide 250 mg Tab [MAGNESIUM OXIDE 250 MG TAB] Take 250 mg by mouth daily.       melatonin 1 mg Tab tablet [MELATONIN 1 MG TAB TABLET] Take 3 mg by mouth at bedtime.        multivitamin therapeutic tablet [MULTIVITAMIN THERAPEUTIC TABLET] Take 1 tablet by mouth daily.       nitroglycerin (NITROSTAT) 0.4 MG SL tablet [NITROGLYCERIN (NITROSTAT) 0.4 MG SL TABLET] Place 0.4 mg under the tongue every 5 (five) minutes as needed for chest pain.       omega 3-dha-epa-fish oil (FISH OIL) 60- mg cap capsule [OMEGA 3-DHA-EPA-FISH OIL (FISH OIL) 60- MG CAP CAPSULE] Take 2,000 mg by mouth 2 (two) times a day.       omeprazole (PRILOSEC) 20 MG capsule [OMEPRAZOLE (PRILOSEC) 20 MG CAPSULE] Take 20 mg by mouth daily before breakfast.       polyethylene glycol (MIRALAX) 17 gram packet [POLYETHYLENE GLYCOL (MIRALAX) 17 GRAM PACKET] Take 17 g by mouth daily.         Allergies   Allergen Reactions     Alendronate [Alendronic Acid] Unknown     pain     Codeine Hives     Hydrocodone-Acetaminophen Other (See Comments)     Highly sensitive, \"knocks her out and can't wake up\"     Risedronate Unknown     Bone pain     Rosuvastatin Muscle Pain (Myalgia)     Simvastatin Muscle Pain (Myalgia)          Lab Results    Chemistry/lipid CBC Cardiac Enzymes/BNP/TSH/INR   Recent Labs   Lab Test 08/18/22  0506   CHOL 121   HDL 29*   LDL 58   TRIG 172*     Recent Labs   Lab Test 08/18/22  0506 12/14/21  1638   LDL 58 32     Recent Labs   Lab Test 08/19/22  0759 08/18/22  0738 08/18/22  0506   NA  --   --  135*   POTASSIUM  --   --  4.3   CHLORIDE  --   --  103   CO2  --   --  29   *   < > 132*   BUN  --   --  30*   CR  --   --  0.94   GFRESTIMATED  --   --  58*   LIZZY  --   --  8.8    < > = values in this interval not displayed.     Recent Labs   Lab Test 08/18/22  0506 " 22  0449 08/15/22  1920   CR 0.94 1.20* 1.04     Recent Labs   Lab Test 22  1616 22  1622 21  1557   A1C 8.0* 7.6* 7.6*          Recent Labs   Lab Test 22  0506   WBC 6.1   HGB 11.5*   HCT 36.3   MCV 90        Recent Labs   Lab Test 22  0506 08/15/22  1920 21  1557   HGB 11.5* 13.7 13.9    Recent Labs   Lab Test 22  0416 08/15/22  2246 08/15/22  1920   TROPONINI 0.28 0.18 0.04     Recent Labs   Lab Test 08/15/22  1920 18  1259    383*     Recent Labs   Lab Test 22  1623   TSH 2.22     No results for input(s): INR in the last 90440 hours.     Medical History  Surgical History Family History Social History   Past Medical History:   Diagnosis Date     Acute diastolic congestive heart failure (H)      Acute on chronic congestive heart failure (H) 2018     Coronary artery disease      Diabetes mellitus, type II (H)      Diastolic dysfunction 7/10/2018     Frozen shoulder     bilateral     Hypertension      Hypertensive emergency      Parkinson disease (H)      Primary osteoarthritis involving multiple joints      Primary osteoarthritis of left knee      Recurrent UTI     hx septic shock     Thyroid disease      Past Surgical History:   Procedure Laterality Date     APPENDECTOMY       APPENDECTOMY       BACK SURGERY      L4-S1 laminectomy     BYPASS GRAFT ARTERY CORONARY      3 vessel      SECTION        SECTION       CV CORONARY ANGIOGRAM N/A 2022    Procedure: Coronary Angiogram;  Surgeon: Ignacio Baird MD;  [facility]: [facility] CATH LAB CV     HERNIORRHAPHY VENTRAL Left      HYSTERECTOMY       HYSTERECTOMY       JOINT REPLACEMENT Left     Total left knee     OTHER SURGICAL HISTORY      right ankle surgery     OTHER SURGICAL HISTORY      right forearm fracture     REPLACEMENT TOTAL KNEE Right      SHOULDER SURGERY Right     reversed shoulder surgery.     Family History   Problem Relation Age of Onset     Heart  Disease Mother      Heart Disease Father         Social History     Socioeconomic History     Marital status:      Spouse name: Not on file     Number of children: Not on file     Years of education: Not on file     Highest education level: Not on file   Occupational History     Not on file   Tobacco Use     Smoking status: Never Smoker     Smokeless tobacco: Never Used   Vaping Use     Vaping Use: Never used   Substance and Sexual Activity     Alcohol use: No     Drug use: No     Sexual activity: Not Currently     Birth control/protection: None   Other Topics Concern     Parent/sibling w/ CABG, MI or angioplasty before 65F 55M? No   Social History Narrative    6/15/2021 new to [place] from [place]. She has 6 kids.     Social Determinants of Health     Financial Resource Strain: Not on file   Food Insecurity: Not on file   Transportation Needs: Not on file   Physical Activity: Not on file   Stress: Not on file   Social Connections: Not on file   Intimate Partner Violence: Not on file   Housing Stability: Not on file                      Thank you for allowing me to participate in the care of your patient.      Sincerely,     Mathew Ricardo MD     [facility]  cc:   Mathew Ricardo MD  [address]  [place] [address]

## 2022-09-19 ENCOUNTER — DOCUMENTATION ONLY (OUTPATIENT)
Dept: OTHER | Facility: CLINIC | Age: 87
End: 2022-09-19

## 2022-09-21 DIAGNOSIS — E78.5 DYSLIPIDEMIA: ICD-10-CM

## 2022-09-21 DIAGNOSIS — I10 HYPERTENSION, UNSPECIFIED TYPE: ICD-10-CM

## 2022-09-21 RX ORDER — CARVEDILOL 12.5 MG/1
TABLET ORAL
Qty: 180 TABLET | Refills: 3 | Status: SHIPPED | OUTPATIENT
Start: 2022-09-21 | End: 2023-09-08

## 2022-09-21 RX ORDER — ATORVASTATIN CALCIUM 20 MG/1
TABLET, FILM COATED ORAL
Qty: 90 TABLET | Refills: 3 | Status: SHIPPED | OUTPATIENT
Start: 2022-09-21 | End: 2023-10-26

## 2022-09-25 ENCOUNTER — HEALTH MAINTENANCE LETTER (OUTPATIENT)
Age: 87
End: 2022-09-25

## 2022-10-11 ENCOUNTER — OFFICE VISIT (OUTPATIENT)
Dept: FAMILY MEDICINE | Facility: CLINIC | Age: 87
End: 2022-10-11
Payer: MEDICARE

## 2022-10-11 VITALS
DIASTOLIC BLOOD PRESSURE: 74 MMHG | BODY MASS INDEX: 30.62 KG/M2 | HEART RATE: 69 BPM | SYSTOLIC BLOOD PRESSURE: 122 MMHG | WEIGHT: 184 LBS | OXYGEN SATURATION: 97 %

## 2022-10-11 DIAGNOSIS — E11.9 TYPE 2 DIABETES MELLITUS WITHOUT COMPLICATION, WITHOUT LONG-TERM CURRENT USE OF INSULIN (H): Primary | ICD-10-CM

## 2022-10-11 DIAGNOSIS — N89.8 VAGINAL IRRITATION: ICD-10-CM

## 2022-10-11 DIAGNOSIS — I25.110 CORONARY ARTERY DISEASE INVOLVING NATIVE CORONARY ARTERY OF NATIVE HEART WITH UNSTABLE ANGINA PECTORIS (H): ICD-10-CM

## 2022-10-11 DIAGNOSIS — Z79.899 POLYPHARMACY: ICD-10-CM

## 2022-10-11 DIAGNOSIS — G20.A1 PARKINSON'S DISEASE (H): ICD-10-CM

## 2022-10-11 DIAGNOSIS — I10 BENIGN ESSENTIAL HYPERTENSION: ICD-10-CM

## 2022-10-11 DIAGNOSIS — K21.9 GASTROESOPHAGEAL REFLUX DISEASE WITHOUT ESOPHAGITIS: ICD-10-CM

## 2022-10-11 LAB
HBA1C MFR BLD: 5.9 % (ref 0–5.6)
HOLD SPECIMEN: NORMAL

## 2022-10-11 PROCEDURE — 83036 HEMOGLOBIN GLYCOSYLATED A1C: CPT | Performed by: FAMILY MEDICINE

## 2022-10-11 PROCEDURE — 99214 OFFICE O/P EST MOD 30 MIN: CPT | Performed by: FAMILY MEDICINE

## 2022-10-11 PROCEDURE — 36415 COLL VENOUS BLD VENIPUNCTURE: CPT | Performed by: FAMILY MEDICINE

## 2022-10-11 RX ORDER — TRIAMCINOLONE ACETONIDE 0.25 MG/G
OINTMENT TOPICAL 2 TIMES DAILY
Qty: 80 G | Refills: 0 | Status: SHIPPED | OUTPATIENT
Start: 2022-10-11 | End: 2024-03-26

## 2022-10-11 RX ORDER — NYSTATIN 100000 U/G
CREAM TOPICAL 2 TIMES DAILY
Qty: 30 G | Refills: 3 | Status: SHIPPED | OUTPATIENT
Start: 2022-10-11 | End: 2023-05-06

## 2022-10-11 ASSESSMENT — PATIENT HEALTH QUESTIONNAIRE - PHQ9
SUM OF ALL RESPONSES TO PHQ QUESTIONS 1-9: 6
10. IF YOU CHECKED OFF ANY PROBLEMS, HOW DIFFICULT HAVE THESE PROBLEMS MADE IT FOR YOU TO DO YOUR WORK, TAKE CARE OF THINGS AT HOME, OR GET ALONG WITH OTHER PEOPLE: SOMEWHAT DIFFICULT
SUM OF ALL RESPONSES TO PHQ QUESTIONS 1-9: 6

## 2022-10-11 ASSESSMENT — ANXIETY QUESTIONNAIRES
4. TROUBLE RELAXING: NOT AT ALL
IF YOU CHECKED OFF ANY PROBLEMS ON THIS QUESTIONNAIRE, HOW DIFFICULT HAVE THESE PROBLEMS MADE IT FOR YOU TO DO YOUR WORK, TAKE CARE OF THINGS AT HOME, OR GET ALONG WITH OTHER PEOPLE: NOT DIFFICULT AT ALL
7. FEELING AFRAID AS IF SOMETHING AWFUL MIGHT HAPPEN: SEVERAL DAYS
6. BECOMING EASILY ANNOYED OR IRRITABLE: NOT AT ALL
GAD7 TOTAL SCORE: 4
3. WORRYING TOO MUCH ABOUT DIFFERENT THINGS: SEVERAL DAYS
8. IF YOU CHECKED OFF ANY PROBLEMS, HOW DIFFICULT HAVE THESE MADE IT FOR YOU TO DO YOUR WORK, TAKE CARE OF THINGS AT HOME, OR GET ALONG WITH OTHER PEOPLE?: NOT DIFFICULT AT ALL
GAD7 TOTAL SCORE: 4
7. FEELING AFRAID AS IF SOMETHING AWFUL MIGHT HAPPEN: SEVERAL DAYS
GAD7 TOTAL SCORE: 4
5. BEING SO RESTLESS THAT IT IS HARD TO SIT STILL: NOT AT ALL
1. FEELING NERVOUS, ANXIOUS, OR ON EDGE: SEVERAL DAYS
2. NOT BEING ABLE TO STOP OR CONTROL WORRYING: SEVERAL DAYS

## 2022-10-11 NOTE — PROGRESS NOTES
Problem List Items Addressed This Visit        Nervous and Auditory    Parkinson's disease (H)     Physical therapy offered as she feels she is getting weaker, but she declined.             Digestive    GERD (gastroesophageal reflux disease)     She complains of a lot of burping.   Wants to increase prilosec 20 mg from q day to bid.   Discussed egd, but she declined.          Relevant Medications    omeprazole (PRILOSEC) 20 MG DR capsule       Endocrine    Type 2 diabetes mellitus without complication (H) - Primary     a1c up 6/28/22 but she started walking and eating veggies a month ago so rechck today.    a1c now much improved 6.2.         Relevant Orders    Hemoglobin A1c (Completed)       Circulatory    Benign essential hypertension     Normal bp today. No change in plan.   Continue carvedilol 12.5mg po q day  continue lisinopril 10 mg po q day.          CAD (coronary artery disease), native coronary artery     Chart reviewed:  8/15/2022 er visit angina - angiogram showed no need for PCI. 9/16/22 cards note states fu recommended in 6 months              Urinary    Vaginal irritation     Patient says she uses nystatin cream mixed with triamcinalone ointment sparingly which was originally given to her by gynecologist, she wants refill. Refill given and she is cautioned to use this sparingly.         Relevant Medications    triamcinolone (KENALOG) 0.025 % external ointment    nystatin (MYCOSTATIN) 092526 UNIT/GM external cream       Other    Polypharmacy     mtm consult ordered.          Relevant Orders    Med Therapy Management Referral          Ally Marrero is a 88 year old who presents for the following health issues   Chief Complaint   Patient presents with     Diabetes      History of Present Illness       Diabetes:   She presents for follow up of diabetes.  She is not checking blood glucose. She has no concerns regarding her diabetes at this time.  She is not experiencing numbness or burning in feet,  excessive thirst, blurry vision, weight changes or redness, sores or blisters on feet.         Hypertension: She presents for follow up of hypertension.  She does check blood pressure  regularly outside of the clinic. Outpatient blood pressures have not been over 140/90. She does not follow a low salt diet.               Objective    /74 (BP Location: Left arm, Patient Position: Left side, Cuff Size: Adult Large)   Pulse 69   Wt 83.5 kg (184 lb)   SpO2 97%   BMI 30.62 kg/m    Body mass index is 30.62 kg/m .  Physical Exam  Constitutional:       Appearance: Normal appearance.   HENT:      Head: Normocephalic and atraumatic.   Cardiovascular:      Rate and Rhythm: Normal rate and regular rhythm.   Pulmonary:      Effort: Pulmonary effort is normal.   Musculoskeletal:         General: Normal range of motion.      Cervical back: Normal range of motion and neck supple.   Neurological:      General: No focal deficit present.      Mental Status: She is alert and oriented to person, place, and time.            This note has been dictated using voice recognition software. Any grammatical or context distortions are unintentional and inherent to the software

## 2022-10-11 NOTE — ASSESSMENT & PLAN NOTE
Normal bp today. No change in plan.   Continue carvedilol 12.5mg po q day  continue lisinopril 10 mg po q day.

## 2022-10-11 NOTE — ASSESSMENT & PLAN NOTE
Chart reviewed:  8/15/2022 er visit angina - angiogram showed no need for PCI. 9/16/22 cards note states fu recommended in 6 months

## 2022-10-11 NOTE — PATIENT INSTRUCTIONS
[email or web address]

## 2022-10-11 NOTE — ASSESSMENT & PLAN NOTE
a1c up 6/28/22 but she started walking and eating veggies a month ago so rechck today.    a1c now much improved 6.2.

## 2022-10-11 NOTE — ASSESSMENT & PLAN NOTE
Patient says she uses nystatin cream mixed with triamcinalone ointment sparingly which was originally given to her by gynecologist, she wants refill. Refill given and she is cautioned to use this sparingly.

## 2022-10-11 NOTE — ASSESSMENT & PLAN NOTE
She complains of a lot of burping.   Wants to increase prilosec 20 mg from q day to bid.   Discussed egd, but she declined.

## 2022-10-11 NOTE — ASSESSMENT & PLAN NOTE
>>ASSESSMENT AND PLAN FOR CORONARY ARTERY STENOSIS WRITTEN ON 10/11/2022  5:42 PM BY ASHLY MORRISON MD    Chart reviewed:  8/15/2022 er visit angina - angiogram showed no need for PCI. 9/16/22 cards note states fu recommended in 6 months

## 2022-11-28 ENCOUNTER — OFFICE VISIT (OUTPATIENT)
Dept: NEUROLOGY | Facility: CLINIC | Age: 87
End: 2022-11-28
Payer: MEDICARE

## 2022-11-28 VITALS
WEIGHT: 184 LBS | BODY MASS INDEX: 30.62 KG/M2 | SYSTOLIC BLOOD PRESSURE: 138 MMHG | DIASTOLIC BLOOD PRESSURE: 70 MMHG | HEART RATE: 70 BPM | RESPIRATION RATE: 16 BRPM

## 2022-11-28 DIAGNOSIS — E53.8 LOW SERUM VITAMIN B12: ICD-10-CM

## 2022-11-28 DIAGNOSIS — G50.0 TRIGEMINAL NEURALGIA: ICD-10-CM

## 2022-11-28 DIAGNOSIS — G62.9 NEUROPATHY: Primary | ICD-10-CM

## 2022-11-28 DIAGNOSIS — G20.A1 PARKINSON'S DISEASE (H): ICD-10-CM

## 2022-11-28 DIAGNOSIS — E11.42 TYPE 2 DIABETES MELLITUS WITH DIABETIC POLYNEUROPATHY, WITHOUT LONG-TERM CURRENT USE OF INSULIN (H): ICD-10-CM

## 2022-11-28 PROCEDURE — 99214 OFFICE O/P EST MOD 30 MIN: CPT | Performed by: PSYCHIATRY & NEUROLOGY

## 2022-11-28 RX ORDER — GABAPENTIN 300 MG/1
CAPSULE ORAL
Qty: 360 CAPSULE | Refills: 3 | Status: SHIPPED | OUTPATIENT
Start: 2022-11-28 | End: 2023-08-31

## 2022-11-28 RX ORDER — LANOLIN ALCOHOL/MO/W.PET/CERES
1000 CREAM (GRAM) TOPICAL DAILY
Qty: 90 TABLET | Refills: 3 | Status: SHIPPED | OUTPATIENT
Start: 2022-11-28 | End: 2024-03-26

## 2022-11-28 RX ORDER — CARBIDOPA AND LEVODOPA 25; 100 MG/1; MG/1
TABLET ORAL
Qty: 270 TABLET | Refills: 3 | Status: SHIPPED | OUTPATIENT
Start: 2022-11-28 | End: 2023-08-31

## 2022-11-28 NOTE — LETTER
11/28/2022         RE: Janes Montero  [address]  [facility] [address]        Dear Colleague,    Thank you for referring your patient, Janes Montero, to the [facility] [place]. Please see a copy of my visit note below.    NEUROLOGY OUTPATIENT PROGRESS NOTE   Nov 28, 2022     CHIEF COMPLAINT/REASON FOR VISIT/REASON FOR CONSULT  Patient presents with:  Follow Up    REASON FOR CONSULTATION-Parkinson's/trigeminal neuralgia    REFERRAL SOURCE  Dr. Maggie Arriaga  CC Dr. Maggie Arriaga    HISTORY OF PRESENT ILLNESS  Janes Montero is a 89 year old female seen today for multiple issues  Patient is moving from [place] to [place].  She is a resident of [place].  And has moved to [place] is a 20 years and is coming back.    1.  Parkinson's disease:-Symptom onset was in 2015.  At that time she was having shaking of her arms and legs.  This was at rest and with activity.  She was losing her balance as well.  She was put on Sinemet 1 tablet 3 times a day.  Feels that the medication is helping.  Does have some wearing off in the evening time and is taking the medication earlier than bedtime which is working.  Does fine in the morning.  Takes the medication on empty stomach.  Denies any other progression or any new symptoms.  Occasionally will use a walker.  Is not very active but does do some walking.  2.  Trigeminal neuralgia:-This started in 2013.  Pain is on the left side of the face.  The whole V1 V2 V3 distribution is involved that the pain is more towards the year.  Pain is sharp and intermittent.  Heating pad helps.  Reports no real provoking factors for her.  She is on gabapentin which has been helpful.  Has not tried any other medications.  Denies any other associated symptoms.  No numbness.  MRI was done and no clear cause for the trigeminal neuralgia was identified.  3.  Patient complains of some numbness in her legs.  Does report some balance issues as well.   She does have a diagnosis of diabetes.  Last A1c was 6.7.    10/19/21  Patient returns today  1.  Parkinson's disease is stable and under good control.  Reports no wearing off of the medication.  Is taking the Sinemet regularly.  Has seen physical therapy and trying to do some exercise.  She did take her medication this morning.  2.  Trigeminal neuralgia pain is unchanged.  Gabapentin is helping her.  3.  Previously she was complaining of some numbness in her legs and was found to have a wide-based gait.  This does seem to have improved.  She was identified to have B12 deficiency and was put on injections but not oral replacement.  4.  Diabetes-A1c was 7.1.  Encouraged her to exercise and.  Manage her diet.    1/20/21  Patient returns today  1.  Parkinson's stable under good control.  Reports no wearing off with medication.  Is unclear if the medication is really helping or not though she feels that initially she had a lot of tremors and those got better when she started the medication.  2.  Trigeminal pain is under good control.  Gabapentin is helping.  3.  Was having some numbness in her feet.  Feels that this is getting better.  Her gait has also improved.  She feels more steady.  Is only taking the oral replacement at this point  4.  Diabetes has been under appropriate control.  A1c scheduled for March.  Previously 1 was 7.1 is working on improving her diet.  Stays active and is not sitting in the chair all day long    5/19/22  Patient returns today  1.  Patient's Parkinson's is under good control.  There is no wearing off of the medication.  No side effects with the medication.  Exam today does show slightly decreased arm swing though she wants to stay on the medication at her age.  2.  Trigeminal neuralgia pain is under good control.  Gabapentin is helping  3.  B12 has come up and discussed that she could stop the injections at this point.  4.  Diabetes is not under good control.  A1c is worsened.  She is  eating too many sweets.  Encourage exercise and cutting down the sweets  5.  She reports that the numbness in her feet is gone away after she got her Prolia injection.  Denies any balance issues.  6.  VINICIO still positive.  Denies any joint pain except secondary to use of the valsartan.    11/28/22  Patient returns today  1.  Patient since she was last seen has been hospitalized for some chest pain like symptoms.  Coronary angiogram was negative for any significant occlusion.  Patient was thought to have heartburn related symptoms causing chest pain.  She has been little bit weak since then.  2.  No falls or balance issues.  Continues to use a walker.  Neuropathy has not worsened.  Diabetes under better control.  Has stopped the B12 injections though remains on the B12 supplement.  Discussed causes of weakness in elderly patients.  3.  Trigeminal pain is under good control with gabapentin  4.  Remains on Sinemet and feels that that is helping.  Has not really missed a dose.  Reports no wearing off.  Denies any side effects to the medication.  Does not mix the medication with food.    Previous history is reviewed and this is unchanged.    PAST MEDICAL/SURGICAL HISTORY  Past Medical History:   Diagnosis Date     Acute diastolic congestive heart failure (H)      Acute on chronic congestive heart failure (H) 6/11/2018     Coronary artery disease      Diabetes mellitus, type II (H)      Diastolic dysfunction 7/10/2018     Frozen shoulder     bilateral     Hypertension      Hypertensive emergency      Parkinson disease (H)      Primary osteoarthritis involving multiple joints      Primary osteoarthritis of left knee      Recurrent UTI     hx septic shock     Thyroid disease 2021     Patient Active Problem List   Diagnosis     Benign essential hypertension     CAD (coronary artery disease), native coronary artery     S/P CABG (coronary artery bypass graft)     Parkinson's disease (H)     Trigeminal neuralgia     Chronic  bilateral back pain     Aortic valve insufficiency     Tricuspid insufficiency     Mitral valve insufficiency     Bilateral carotid artery stenosis     CKD (chronic kidney disease) stage 3, GFR 30-59 ml/min (H)     Depression     Diastolic dysfunction     GERD (gastroesophageal reflux disease)     Left bundle branch block     Primary osteoarthritis involving multiple joints     Sensorineural hearing loss (SNHL) of both ears     Subclinical hypothyroidism     Type 2 diabetes mellitus without complication (H)     Secondary renal hyperparathyroidism (H)     Age-related osteoporosis without current pathological fracture     Mixed hypercholesterolemia and hypertriglyceridemia     Mixed incontinence urge and stress (male)(female)     Vitamin B12 deficiency (non anemic)     Non-seasonal allergic rhinitis due to pollen     Preventative health care     Chest pain, unspecified type     Coronary artery disease involving native coronary artery without angina pectoris, unspecified whether native or transplanted heart     Acute kidney injury superimposed on CKD (H)     Vaginal irritation     Polypharmacy   Significant for CABG/bypass/coronary artery disease, high cholesterol, high blood pressure, diabetes, migraines, Parkinson's disease, arthritis, depression, osteoporosis, hyperparathyroidism    FAMILY HISTORY  Family History   Problem Relation Age of Onset     Heart Disease Mother      Heart Disease Father    Family history reviewed and noncontributory no family history of neuropathy.    SOCIAL HISTORY  Social History     Tobacco Use     Smoking status: Never     Smokeless tobacco: Never   Vaping Use     Vaping Use: Never used   Substance Use Topics     Alcohol use: No     Drug use: No       SYSTEMS REVIEW  Twelve-system ROS was done and other than the HPI this was negative except for neck pain, back pain, arm and leg pain, joint pain, numbness and tingling, weakness paralysis, difficulty walking, falling, balance coordination  problems, hearing loss, sleepy during the day, sleeping problems, headaches, anxiety, depression, cardiac/heart problems.  No new concerns/issues reported today.    MEDICATIONS  ascorbic acid, vitamin C, (ASCORBIC ACID WITH ELLIOTT HIPS) 500 MG tablet, [ASCORBIC ACID, VITAMIN C, (ASCORBIC ACID WITH ELLIOTT HIPS) 500 MG TABLET] Take 500 mg by mouth daily.  aspirin 81 mg chewable tablet, [ASPIRIN 81 MG CHEWABLE TABLET] Chew 81 mg at bedtime.  atorvastatin (LIPITOR) 20 MG tablet, TAKE 1 TABLET BY MOUTH AT  BEDTIME  carvedilol (COREG) 12.5 MG tablet, TAKE 1 TABLET BY MOUTH  TWICE DAILY WITH MEALS  cholecalciferol, vitamin D3, 1,000 unit tablet, [CHOLECALCIFEROL, VITAMIN D3, 1,000 UNIT TABLET] Take 2,000 Units by mouth daily.  coenzyme Q10 (CO Q-10) 100 mg capsule, [COENZYME Q10 (CO Q-10) 100 MG CAPSULE] Take 100 mg by mouth 2 (two) times a day.  FLUoxetine (PROZAC) 20 MG capsule, TAKE 1 CAPSULE BY MOUTH  TWICE DAILY  fluticasone (FLONASE) 50 MCG/ACT nasal spray, Spray 1 spray into both nostrils daily (Patient taking differently: Spray 1 spray into both nostrils daily as needed)  levothyroxine (SYNTHROID/LEVOTHROID) 25 MCG tablet, Take 1 tablet (25 mcg) by mouth daily  lisinopril (ZESTRIL) 10 MG tablet, Take 1 tablet (10 mg) by mouth daily  loratadine (CLARITIN) 10 mg tablet, [LORATADINE (CLARITIN) 10 MG TABLET] Take 10 mg by mouth daily.  magnesium oxide 250 mg Tab, [MAGNESIUM OXIDE 250 MG TAB] Take 250 mg by mouth daily.  melatonin 1 mg Tab tablet, [MELATONIN 1 MG TAB TABLET] Take 3 mg by mouth at bedtime.   multivitamin therapeutic tablet, [MULTIVITAMIN THERAPEUTIC TABLET] Take 1 tablet by mouth daily.  nitroglycerin (NITROSTAT) 0.4 MG SL tablet, [NITROGLYCERIN (NITROSTAT) 0.4 MG SL TABLET] Place 0.4 mg under the tongue every 5 (five) minutes as needed for chest pain.  nystatin (MYCOSTATIN) 084265 UNIT/GM external cream, Apply topically 2 times daily  omega 3-dha-epa-fish oil (FISH OIL) 60- mg cap capsule, [OMEGA  3-DHA-EPA-FISH OIL (FISH OIL) 60- MG CAP CAPSULE] Take 2,000 mg by mouth 2 (two) times a day.  omeprazole (PRILOSEC) 20 MG DR capsule, Take 1 capsule (20 mg) by mouth 2 times daily for 90 days  polyethylene glycol (MIRALAX) 17 gram packet, [POLYETHYLENE GLYCOL (MIRALAX) 17 GRAM PACKET] Take 17 g by mouth daily.  triamcinolone (KENALOG) 0.025 % external ointment, Apply topically 2 times daily    No current facility-administered medications on file prior to visit.       PHYSICAL EXAMINATION  VITALS: /70   Pulse 70   Resp 16   Wt 83.5 kg (184 lb)   BMI 30.62 kg/m    GENERAL: Healthy appearing, alert, no acute distress, normal habitus.  CARDIOVASCULAR: Extremities warm and well perfused. Pulses present.   NEUROLOGICAL:  Patient is awake and oriented to self, place and time.  Attention span is normal.  Memory is grossly intact.  Language is fluent and follows commands appropriately.  Appropriate fund of knowledge. Cranial nerves 2-12 are intact. There is no pronator drift.  Motor exam shows 5/5 strength in all extremities.  Tone is symmetric bilaterally in upper and lower extremities.  No cogwheeling or tremor noted.  (Previously reflexes are symmetric and 2+ in upper extremities and absent in lower extremities.) Sensory exam is grossly intact to light touch, pin prick and vibration intact today.  Previously this was decreased up to the knees. Finger to nose and heel to shin is without dysmetria.  Romberg is negative.  Gait is almost normal with bilateral decreased arm swing (mild today).  Mild difficulty with tandem.  Previously drawing concentric circles did not show any tremor.  Exam similar to before.  No major tremors or cogwheeling or decreased arm swing noted.    DIAGNOSTICS  RELEVANT LABS  TSH 2.01   A1c 6.7    Carotid US 2020  1: Right: 1-39% carotid artery stenosis with mild velocities and antegrade   vertebral flow.   2: Left: 1-39% carotid artery stenosis with mild velocities and antegrade    vertebral flow.   3: Essentially unchanged.   4: Recommend a two year follow-up if patient remains asymptomatic.       CT head 2020  IMPRESSION   1. No acute intracranial findings.   2. Senescent changes.     MRI 2013  IMPRESSION:   Scattered small vessel ischemic disease including pontine involvement.      OUTSIDE RECORDS  Outside referral notes and chart notes were reviewed and pertinent information has been summarized (in addition to the HPI):-Patient has a diagnosis of Parkinson's disease.  Diagnosed in 2015.  Is looking to establish with a neurologist.  Is on Sinemet  times a day.  Also has trigeminal neuralgia controlled with gabapentin 300 mg p.o. 4 times a day.  Also has bilateral carotid artery stenosis.  Is moving here from [place].    LABS    Component      Latest Ref Rng & Units 7/27/2021           3:50 PM   Total Protein Serum for ELP      6.0 - 8.0 g/dL 6.8   Albumin %      51.0 - 67.0 % 66.6   Albumin Fraction      3.2 - 4.7 g/dL 4.5   Alpha 1 %      2.0 - 4.0 % 1.9 (L)   Alpha 1 Fraction      0.1 - 0.3 g/dL 0.1   Alpha 2 %      5.0 - 13.0 % 9.8   Alpha 2 Fraction      0.4 - 0.9 g/dL 0.7   Beta %      10.0 - 17.0 % 10.5   Beta Fraction      0.7 - 1.2 g/dL 0.7   Gamma Globulin %      9.0 - 20.0 % 11.2   Gamma Fraction      0.6 - 1.4 g/dL 0.8   ELP Interpretation:       Unremarkable protein electrophoresis.   Path ICD       G62.9   Interpreted By       Lisa Cantu MD   Immunofixation ELP          VINICIO interpretation      Negative Borderline Positive (A)   VINICIO pattern 1       Homogeneous   VINICIO titer 1       1:80   Vitamin B12      213 - 816 pg/mL 383   Vitamin B1 Whole Blood Level      70 - 180 nmol/L 184 (H)   Methylmalonic Acid      0.00 - 0.40 umol/L 1.02 (H)   Lyme Disease Antibodies Serum      <0.90 0.04     Component      Latest Ref Rng & Units 7/27/2021           3:50 PM   Total Protein Serum for ELP      6.0 - 8.0 g/dL 6.9   Albumin %      51.0 - 67.0 %    Albumin Fraction       3.2 - 4.7 g/dL    Alpha 1 %      2.0 - 4.0 %    Alpha 1 Fraction      0.1 - 0.3 g/dL    Alpha 2 %      5.0 - 13.0 %    Alpha 2 Fraction      0.4 - 0.9 g/dL    Beta %      10.0 - 17.0 %    Beta Fraction      0.7 - 1.2 g/dL    Gamma Globulin %      9.0 - 20.0 %    Gamma Fraction      0.6 - 1.4 g/dL    ELP Interpretation:          Path ICD       G62.9   Interpreted By       Lisa Cantu MD   Immunofixation ELP       Unremarkable immunofixation electrophoresis.   VINICIO interpretation      Negative    VINICIO pattern 1          VINICIO titer 1          Vitamin B12      213 - 816 pg/mL    Vitamin B1 Whole Blood Level      70 - 180 nmol/L    Methylmalonic Acid      0.00 - 0.40 umol/L    Lyme Disease Antibodies Serum      <0.90      EMG  CLINICAL INTERPRETATION:  This is a mildly abnormal nerve conduction and EMG study.  The abnormalities seen above could suggest early/mild sensorimotor polyneuropathy though could be artifactual also.  Further clinical correlation is needed.     PCP notes regarding B12 reviewed.     Component      Latest Ref Rng & Units 1/20/2022 3/22/2022   VINICIO interpretation      Negative Borderline Positive (A)    VINICIO pattern 1       Homogeneous    VINICIO titer 1       1:80    Vitamin B12      213 - 816 pg/mL 1,908 (H)    Methylmalonic Acid      0.00 - 0.40 umol/L 0.35    Hemoglobin A1C      0.0 - 5.6 %  7.6 (H)     Component      Latest Ref Rng & Units 10/11/2022   Hemoglobin A1C      0.0 - 5.6 % 5.9 (H)     IMPRESSION/REPORT/PLAN  Parkinson's disease (H)  Trigeminal neuralgia  Questionable neuropathy-questionable related to diabetes versus B12  Type 2 diabetes mellitus with diabetic polyneuropathy, without long-term current use of insulin (H)   Low B12  Positive VINICIO    This is a 89 year old female with  1.  Trigeminal neuralgia:-MRI brain in 2013 did not show any structural lesions.  Currently pain is under good control with gabapentin and will continue gabapentin.  Stable.  2.  Parkinson's  disease:-Examination does not show a lot of evidence of Parkinson's disease.  Possibly this is being masked by use of Sinemet (was started previously by other provider).  She does have decreased arm swing today.  Exam today of the Sinemet shows no evidence of parkinsonism.  Discussed about stopping the medication though she would prefer to continue the medication for right now.  Physical therapy made things worse and I would encourage her to exercise on her own.  She is doing walks every day.  We will continue current dose of Sinemet.  3.  On examination she has a wide-based gait with decreased pinprick sensation in her legs.  Examination suggestive of neuropathy.  Examination does look better today/stable.  EMG shows questionable neuropathy versus artifact.   She does have diabetes which could be a cause of her neuropathy.  A1c is elevated at 7.6 and encouraged her to exercise and cut down on the sweets.  A1c is now 5.9.  Previously B12 was low and injection/tablets were used.  B12 looks normal at this point.  She can continue oral tablets only.  Recheck B12 in 6 months.  4.  She does have a positive VINICIO.  I think this is a false positive.  Repeat VINICIO was positive as well.  Reports some joint pain related to the valsartan.  Can discuss further with primary care.  Could be related to age.  Stable.    I can see her back in 6 months    -     cyanocobalamin (VITAMIN B-12) 1000 MCG tablet; Take 1 tablet (1,000 mcg) by mouth daily  -     carbidopa-levodopa (SINEMET)  MG tablet; TAKE 1 TABLET BY MOUTH 3  TIMES DAILY TAKE AT LEAST  1/2 HOUR BEFORE MEALS OR 2  HOURS AFTER MEALS DO NOT  MIX WITH FOOD  -     Vitamin B12; Future  -     cyanocobalamin (VITAMIN B-12) 1000 MCG tablet; Take 1 tablet (1,000 mcg) by mouth daily      Return in about 6 months (around 5/28/2023) for In-Clinic Visit (must), After testing.    Over 30 minutes were spent coordinating the care for the patient on the day of the encounter.  This  includes previsit, during visit and post visit activities as documented above.  Counseling patient.  Reviewing chart.  Multiple problems reviewed/addressed.  Hospital notes for recent hospitalization reviewed.  (Activities include but not inclusive of reviewing chart, reviewing outside records, reviewing labs and imaging study results as well as the images, patient visit time including getting history and exam,  use if applicable, review of test results with the patient and coming up with a plan in a shared model, counseling patient and family, education and answering patient questions, EMR , EMR diagnosis entry and problem list management, medication reconciliation and prescription management if applicable, paperwork if applicable, printing documents and documentation of the visit activities.)    Brennon Moya MD  Neurologist  [facility]  Tel:- [phone]    This note was dictated using voice recognition software.  Any grammatical or context distortions are unintentional and inherent to the software.        Again, thank you for allowing me to participate in the care of your patient.        Sincerely,        Brennon Moya MD

## 2022-11-28 NOTE — PROGRESS NOTES
NEUROLOGY OUTPATIENT PROGRESS NOTE   Nov 28, 2022     CHIEF COMPLAINT/REASON FOR VISIT/REASON FOR CONSULT  Patient presents with:  Follow Up    REASON FOR CONSULTATION-Parkinson's/trigeminal neuralgia    REFERRAL SOURCE  Dr. Maggie Arriaga  CC Dr. Maggie Arriaga    HISTORY OF PRESENT ILLNESS  Janes Montero is a 89 year old female seen today for multiple issues  Patient is moving from [place] to [place].  She is a resident of [place].  And has moved to [place] is a 20 years and is coming back.    1.  Parkinson's disease:-Symptom onset was in 2015.  At that time she was having shaking of her arms and legs.  This was at rest and with activity.  She was losing her balance as well.  She was put on Sinemet 1 tablet 3 times a day.  Feels that the medication is helping.  Does have some wearing off in the evening time and is taking the medication earlier than bedtime which is working.  Does fine in the morning.  Takes the medication on empty stomach.  Denies any other progression or any new symptoms.  Occasionally will use a walker.  Is not very active but does do some walking.  2.  Trigeminal neuralgia:-This started in 2013.  Pain is on the left side of the face.  The whole V1 V2 V3 distribution is involved that the pain is more towards the year.  Pain is sharp and intermittent.  Heating pad helps.  Reports no real provoking factors for her.  She is on gabapentin which has been helpful.  Has not tried any other medications.  Denies any other associated symptoms.  No numbness.  MRI was done and no clear cause for the trigeminal neuralgia was identified.  3.  Patient complains of some numbness in her legs.  Does report some balance issues as well.  She does have a diagnosis of diabetes.  Last A1c was 6.7.    10/19/21  Patient returns today  1.  Parkinson's disease is stable and under good control.  Reports no wearing off of the medication.  Is taking the Sinemet regularly.  Has seen physical therapy and trying to do  some exercise.  She did take her medication this morning.  2.  Trigeminal neuralgia pain is unchanged.  Gabapentin is helping her.  3.  Previously she was complaining of some numbness in her legs and was found to have a wide-based gait.  This does seem to have improved.  She was identified to have B12 deficiency and was put on injections but not oral replacement.  4.  Diabetes-A1c was 7.1.  Encouraged her to exercise and.  Manage her diet.    1/20/21  Patient returns today  1.  Parkinson's stable under good control.  Reports no wearing off with medication.  Is unclear if the medication is really helping or not though she feels that initially she had a lot of tremors and those got better when she started the medication.  2.  Trigeminal pain is under good control.  Gabapentin is helping.  3.  Was having some numbness in her feet.  Feels that this is getting better.  Her gait has also improved.  She feels more steady.  Is only taking the oral replacement at this point  4.  Diabetes has been under appropriate control.  A1c scheduled for March.  Previously 1 was 7.1 is working on improving her diet.  Stays active and is not sitting in the chair all day long    5/19/22  Patient returns today  1.  Patient's Parkinson's is under good control.  There is no wearing off of the medication.  No side effects with the medication.  Exam today does show slightly decreased arm swing though she wants to stay on the medication at her age.  2.  Trigeminal neuralgia pain is under good control.  Gabapentin is helping  3.  B12 has come up and discussed that she could stop the injections at this point.  4.  Diabetes is not under good control.  A1c is worsened.  She is eating too many sweets.  Encourage exercise and cutting down the sweets  5.  She reports that the numbness in her feet is gone away after she got her Prolia injection.  Denies any balance issues.  6.  VINICIO still positive.  Denies any joint pain except secondary to use of the  valsartan.    11/28/22  Patient returns today  1.  Patient since she was last seen has been hospitalized for some chest pain like symptoms.  Coronary angiogram was negative for any significant occlusion.  Patient was thought to have heartburn related symptoms causing chest pain.  She has been little bit weak since then.  2.  No falls or balance issues.  Continues to use a walker.  Neuropathy has not worsened.  Diabetes under better control.  Has stopped the B12 injections though remains on the B12 supplement.  Discussed causes of weakness in elderly patients.  3.  Trigeminal pain is under good control with gabapentin  4.  Remains on Sinemet and feels that that is helping.  Has not really missed a dose.  Reports no wearing off.  Denies any side effects to the medication.  Does not mix the medication with food.    Previous history is reviewed and this is unchanged.    PAST MEDICAL/SURGICAL HISTORY  Past Medical History:   Diagnosis Date     Acute diastolic congestive heart failure (H)      Acute on chronic congestive heart failure (H) 6/11/2018     Coronary artery disease      Diabetes mellitus, type II (H)      Diastolic dysfunction 7/10/2018     Frozen shoulder     bilateral     Hypertension      Hypertensive emergency      Parkinson disease (H)      Primary osteoarthritis involving multiple joints      Primary osteoarthritis of left knee      Recurrent UTI     hx septic shock     Thyroid disease 2021     Patient Active Problem List   Diagnosis     Benign essential hypertension     CAD (coronary artery disease), native coronary artery     S/P CABG (coronary artery bypass graft)     Parkinson's disease (H)     Trigeminal neuralgia     Chronic bilateral back pain     Aortic valve insufficiency     Tricuspid insufficiency     Mitral valve insufficiency     Bilateral carotid artery stenosis     CKD (chronic kidney disease) stage 3, GFR 30-59 ml/min (H)     Depression     Diastolic dysfunction     GERD (gastroesophageal  reflux disease)     Left bundle branch block     Primary osteoarthritis involving multiple joints     Sensorineural hearing loss (SNHL) of both ears     Subclinical hypothyroidism     Type 2 diabetes mellitus without complication (H)     Secondary renal hyperparathyroidism (H)     Age-related osteoporosis without current pathological fracture     Mixed hypercholesterolemia and hypertriglyceridemia     Mixed incontinence urge and stress (male)(female)     Vitamin B12 deficiency (non anemic)     Non-seasonal allergic rhinitis due to pollen     Preventative health care     Chest pain, unspecified type     Coronary artery disease involving native coronary artery without angina pectoris, unspecified whether native or transplanted heart     Acute kidney injury superimposed on CKD (H)     Vaginal irritation     Polypharmacy   Significant for CABG/bypass/coronary artery disease, high cholesterol, high blood pressure, diabetes, migraines, Parkinson's disease, arthritis, depression, osteoporosis, hyperparathyroidism    FAMILY HISTORY  Family History   Problem Relation Age of Onset     Heart Disease Mother      Heart Disease Father    Family history reviewed and noncontributory no family history of neuropathy.    SOCIAL HISTORY  Social History     Tobacco Use     Smoking status: Never     Smokeless tobacco: Never   Vaping Use     Vaping Use: Never used   Substance Use Topics     Alcohol use: No     Drug use: No       SYSTEMS REVIEW  Twelve-system ROS was done and other than the HPI this was negative except for neck pain, back pain, arm and leg pain, joint pain, numbness and tingling, weakness paralysis, difficulty walking, falling, balance coordination problems, hearing loss, sleepy during the day, sleeping problems, headaches, anxiety, depression, cardiac/heart problems.  No new concerns/issues reported today.    MEDICATIONS  ascorbic acid, vitamin C, (ASCORBIC ACID WITH ELLIOTT HIPS) 500 MG tablet, [ASCORBIC ACID, VITAMIN C,  (ASCORBIC ACID WITH ELLIOTT HIPS) 500 MG TABLET] Take 500 mg by mouth daily.  aspirin 81 mg chewable tablet, [ASPIRIN 81 MG CHEWABLE TABLET] Chew 81 mg at bedtime.  atorvastatin (LIPITOR) 20 MG tablet, TAKE 1 TABLET BY MOUTH AT  BEDTIME  carvedilol (COREG) 12.5 MG tablet, TAKE 1 TABLET BY MOUTH  TWICE DAILY WITH MEALS  cholecalciferol, vitamin D3, 1,000 unit tablet, [CHOLECALCIFEROL, VITAMIN D3, 1,000 UNIT TABLET] Take 2,000 Units by mouth daily.  coenzyme Q10 (CO Q-10) 100 mg capsule, [COENZYME Q10 (CO Q-10) 100 MG CAPSULE] Take 100 mg by mouth 2 (two) times a day.  FLUoxetine (PROZAC) 20 MG capsule, TAKE 1 CAPSULE BY MOUTH  TWICE DAILY  fluticasone (FLONASE) 50 MCG/ACT nasal spray, Spray 1 spray into both nostrils daily (Patient taking differently: Spray 1 spray into both nostrils daily as needed)  levothyroxine (SYNTHROID/LEVOTHROID) 25 MCG tablet, Take 1 tablet (25 mcg) by mouth daily  lisinopril (ZESTRIL) 10 MG tablet, Take 1 tablet (10 mg) by mouth daily  loratadine (CLARITIN) 10 mg tablet, [LORATADINE (CLARITIN) 10 MG TABLET] Take 10 mg by mouth daily.  magnesium oxide 250 mg Tab, [MAGNESIUM OXIDE 250 MG TAB] Take 250 mg by mouth daily.  melatonin 1 mg Tab tablet, [MELATONIN 1 MG TAB TABLET] Take 3 mg by mouth at bedtime.   multivitamin therapeutic tablet, [MULTIVITAMIN THERAPEUTIC TABLET] Take 1 tablet by mouth daily.  nitroglycerin (NITROSTAT) 0.4 MG SL tablet, [NITROGLYCERIN (NITROSTAT) 0.4 MG SL TABLET] Place 0.4 mg under the tongue every 5 (five) minutes as needed for chest pain.  nystatin (MYCOSTATIN) 179721 UNIT/GM external cream, Apply topically 2 times daily  omega 3-dha-epa-fish oil (FISH OIL) 60- mg cap capsule, [OMEGA 3-DHA-EPA-FISH OIL (FISH OIL) 60- MG CAP CAPSULE] Take 2,000 mg by mouth 2 (two) times a day.  omeprazole (PRILOSEC) 20 MG DR capsule, Take 1 capsule (20 mg) by mouth 2 times daily for 90 days  polyethylene glycol (MIRALAX) 17 gram packet, [POLYETHYLENE GLYCOL (MIRALAX) 17  GRAM PACKET] Take 17 g by mouth daily.  triamcinolone (KENALOG) 0.025 % external ointment, Apply topically 2 times daily    No current facility-administered medications on file prior to visit.       PHYSICAL EXAMINATION  VITALS: /70   Pulse 70   Resp 16   Wt 83.5 kg (184 lb)   BMI 30.62 kg/m    GENERAL: Healthy appearing, alert, no acute distress, normal habitus.  CARDIOVASCULAR: Extremities warm and well perfused. Pulses present.   NEUROLOGICAL:  Patient is awake and oriented to self, place and time.  Attention span is normal.  Memory is grossly intact.  Language is fluent and follows commands appropriately.  Appropriate fund of knowledge. Cranial nerves 2-12 are intact. There is no pronator drift.  Motor exam shows 5/5 strength in all extremities.  Tone is symmetric bilaterally in upper and lower extremities.  No cogwheeling or tremor noted.  (Previously reflexes are symmetric and 2+ in upper extremities and absent in lower extremities.) Sensory exam is grossly intact to light touch, pin prick and vibration intact today.  Previously this was decreased up to the knees. Finger to nose and heel to shin is without dysmetria.  Romberg is negative.  Gait is almost normal with bilateral decreased arm swing (mild today).  Mild difficulty with tandem.  Previously drawing concentric circles did not show any tremor.  Exam similar to before.  No major tremors or cogwheeling or decreased arm swing noted.    DIAGNOSTICS  RELEVANT LABS  TSH 2.01   A1c 6.7    Carotid US 2020  1: Right: 1-39% carotid artery stenosis with mild velocities and antegrade   vertebral flow.   2: Left: 1-39% carotid artery stenosis with mild velocities and antegrade   vertebral flow.   3: Essentially unchanged.   4: Recommend a two year follow-up if patient remains asymptomatic.       CT head 2020  IMPRESSION   1. No acute intracranial findings.   2. Senescent changes.     MRI 2013  IMPRESSION:   Scattered small vessel ischemic disease  including pontine involvement.      OUTSIDE RECORDS  Outside referral notes and chart notes were reviewed and pertinent information has been summarized (in addition to the HPI):-Patient has a diagnosis of Parkinson's disease.  Diagnosed in 2015.  Is looking to establish with a neurologist.  Is on Sinemet  times a day.  Also has trigeminal neuralgia controlled with gabapentin 300 mg p.o. 4 times a day.  Also has bilateral carotid artery stenosis.  Is moving here from [place].    LABS    Component      Latest Ref Rng & Units 7/27/2021           3:50 PM   Total Protein Serum for ELP      6.0 - 8.0 g/dL 6.8   Albumin %      51.0 - 67.0 % 66.6   Albumin Fraction      3.2 - 4.7 g/dL 4.5   Alpha 1 %      2.0 - 4.0 % 1.9 (L)   Alpha 1 Fraction      0.1 - 0.3 g/dL 0.1   Alpha 2 %      5.0 - 13.0 % 9.8   Alpha 2 Fraction      0.4 - 0.9 g/dL 0.7   Beta %      10.0 - 17.0 % 10.5   Beta Fraction      0.7 - 1.2 g/dL 0.7   Gamma Globulin %      9.0 - 20.0 % 11.2   Gamma Fraction      0.6 - 1.4 g/dL 0.8   ELP Interpretation:       Unremarkable protein electrophoresis.   Path ICD       G62.9   Interpreted By       Lisa Cantu MD   Immunofixation ELP          VINICIO interpretation      Negative Borderline Positive (A)   VINICIO pattern 1       Homogeneous   VINICIO titer 1       1:80   Vitamin B12      213 - 816 pg/mL 383   Vitamin B1 Whole Blood Level      70 - 180 nmol/L 184 (H)   Methylmalonic Acid      0.00 - 0.40 umol/L 1.02 (H)   Lyme Disease Antibodies Serum      <0.90 0.04     Component      Latest Ref Rng & Units 7/27/2021           3:50 PM   Total Protein Serum for ELP      6.0 - 8.0 g/dL 6.9   Albumin %      51.0 - 67.0 %    Albumin Fraction      3.2 - 4.7 g/dL    Alpha 1 %      2.0 - 4.0 %    Alpha 1 Fraction      0.1 - 0.3 g/dL    Alpha 2 %      5.0 - 13.0 %    Alpha 2 Fraction      0.4 - 0.9 g/dL    Beta %      10.0 - 17.0 %    Beta Fraction      0.7 - 1.2 g/dL    Gamma Globulin %      9.0 - 20.0 %    Gamma  Fraction      0.6 - 1.4 g/dL    ELP Interpretation:          Path ICD       G62.9   Interpreted By       Lisa Cantu MD   Immunofixation ELP       Unremarkable immunofixation electrophoresis.   VINICIO interpretation      Negative    VINICIO pattern 1          VINICIO titer 1          Vitamin B12      213 - 816 pg/mL    Vitamin B1 Whole Blood Level      70 - 180 nmol/L    Methylmalonic Acid      0.00 - 0.40 umol/L    Lyme Disease Antibodies Serum      <0.90      EMG  CLINICAL INTERPRETATION:  This is a mildly abnormal nerve conduction and EMG study.  The abnormalities seen above could suggest early/mild sensorimotor polyneuropathy though could be artifactual also.  Further clinical correlation is needed.     PCP notes regarding B12 reviewed.     Component      Latest Ref Rng & Units 1/20/2022 3/22/2022   VINICIO interpretation      Negative Borderline Positive (A)    VINICIO pattern 1       Homogeneous    VINICIO titer 1       1:80    Vitamin B12      213 - 816 pg/mL 1,908 (H)    Methylmalonic Acid      0.00 - 0.40 umol/L 0.35    Hemoglobin A1C      0.0 - 5.6 %  7.6 (H)     Component      Latest Ref Rng & Units 10/11/2022   Hemoglobin A1C      0.0 - 5.6 % 5.9 (H)     IMPRESSION/REPORT/PLAN  Parkinson's disease (H)  Trigeminal neuralgia  Questionable neuropathy-questionable related to diabetes versus B12  Type 2 diabetes mellitus with diabetic polyneuropathy, without long-term current use of insulin (H)   Low B12  Positive VINICIO    This is a 89 year old female with  1.  Trigeminal neuralgia:-MRI brain in 2013 did not show any structural lesions.  Currently pain is under good control with gabapentin and will continue gabapentin.  Stable.  2.  Parkinson's disease:-Examination does not show a lot of evidence of Parkinson's disease.  Possibly this is being masked by use of Sinemet (was started previously by other provider).  She does have decreased arm swing today.  Exam today of the Sinemet shows no evidence of parkinsonism.  Discussed  about stopping the medication though she would prefer to continue the medication for right now.  Physical therapy made things worse and I would encourage her to exercise on her own.  She is doing walks every day.  We will continue current dose of Sinemet.  3.  On examination she has a wide-based gait with decreased pinprick sensation in her legs.  Examination suggestive of neuropathy.  Examination does look better today/stable.  EMG shows questionable neuropathy versus artifact.   She does have diabetes which could be a cause of her neuropathy.  A1c is elevated at 7.6 and encouraged her to exercise and cut down on the sweets.  A1c is now 5.9.  Previously B12 was low and injection/tablets were used.  B12 looks normal at this point.  She can continue oral tablets only.  Recheck B12 in 6 months.  4.  She does have a positive VINICIO.  I think this is a false positive.  Repeat VINICIO was positive as well.  Reports some joint pain related to the valsartan.  Can discuss further with primary care.  Could be related to age.  Stable.    I can see her back in 6 months    -     cyanocobalamin (VITAMIN B-12) 1000 MCG tablet; Take 1 tablet (1,000 mcg) by mouth daily  -     carbidopa-levodopa (SINEMET)  MG tablet; TAKE 1 TABLET BY MOUTH 3  TIMES DAILY TAKE AT LEAST  1/2 HOUR BEFORE MEALS OR 2  HOURS AFTER MEALS DO NOT  MIX WITH FOOD  -     Vitamin B12; Future  -     cyanocobalamin (VITAMIN B-12) 1000 MCG tablet; Take 1 tablet (1,000 mcg) by mouth daily      Return in about 6 months (around 5/28/2023) for In-Clinic Visit (must), After testing.    Over 30 minutes were spent coordinating the care for the patient on the day of the encounter.  This includes previsit, during visit and post visit activities as documented above.  Counseling patient.  Reviewing chart.  Multiple problems reviewed/addressed.  Hospital notes for recent hospitalization reviewed.  (Activities include but not inclusive of reviewing chart, reviewing outside  records, reviewing labs and imaging study results as well as the images, patient visit time including getting history and exam,  use if applicable, review of test results with the patient and coming up with a plan in a shared model, counseling patient and family, education and answering patient questions, EMR , EMR diagnosis entry and problem list management, medication reconciliation and prescription management if applicable, paperwork if applicable, printing documents and documentation of the visit activities.)    Brennon Moya MD  Neurologist  [facility]  Tel:- [phone]    This note was dictated using voice recognition software.  Any grammatical or context distortions are unintentional and inherent to the software.

## 2022-12-02 ENCOUNTER — OFFICE VISIT (OUTPATIENT)
Dept: ENDOCRINOLOGY | Facility: CLINIC | Age: 87
End: 2022-12-02
Payer: MEDICARE

## 2022-12-02 VITALS
SYSTOLIC BLOOD PRESSURE: 138 MMHG | DIASTOLIC BLOOD PRESSURE: 78 MMHG | HEART RATE: 70 BPM | BODY MASS INDEX: 30.29 KG/M2 | WEIGHT: 182 LBS

## 2022-12-02 DIAGNOSIS — E23.6 EMPTY SELLA (H): ICD-10-CM

## 2022-12-02 DIAGNOSIS — M80.00XS AGE-RELATED OSTEOPOROSIS WITH CURRENT PATHOLOGICAL FRACTURE, SEQUELA: ICD-10-CM

## 2022-12-02 DIAGNOSIS — E21.0 PRIMARY HYPERPARATHYROIDISM (H): ICD-10-CM

## 2022-12-02 DIAGNOSIS — S62.109S CLOSED FRACTURE OF WRIST, UNSPECIFIED LATERALITY, SEQUELA: ICD-10-CM

## 2022-12-02 DIAGNOSIS — Z79.899 ENCOUNTER FOR MONITORING DENOSUMAB THERAPY: ICD-10-CM

## 2022-12-02 DIAGNOSIS — Z51.81 ENCOUNTER FOR MONITORING DENOSUMAB THERAPY: ICD-10-CM

## 2022-12-02 DIAGNOSIS — N18.30 STAGE 3 CHRONIC KIDNEY DISEASE, UNSPECIFIED WHETHER STAGE 3A OR 3B CKD (H): ICD-10-CM

## 2022-12-02 DIAGNOSIS — E83.52 HYPERCALCEMIA: ICD-10-CM

## 2022-12-02 DIAGNOSIS — S32.010S COMPRESSION FRACTURE OF L1 VERTEBRA, SEQUELA: ICD-10-CM

## 2022-12-02 DIAGNOSIS — R94.6 THYROID FUNCTION TEST ABNORMAL: ICD-10-CM

## 2022-12-02 DIAGNOSIS — S42.209S CLOSED FRACTURE OF PROXIMAL END OF HUMERUS, UNSPECIFIED FRACTURE MORPHOLOGY, UNSPECIFIED LATERALITY, SEQUELA: ICD-10-CM

## 2022-12-02 PROBLEM — M80.00XD AGE-RELATED OSTEOPOROSIS WITH CURRENT PATHOLOGICAL FRACTURE WITH ROUTINE HEALING: Status: ACTIVE | Noted: 2022-12-02

## 2022-12-02 PROCEDURE — 99215 OFFICE O/P EST HI 40 MIN: CPT | Performed by: INTERNAL MEDICINE

## 2022-12-02 PROCEDURE — 99417 PROLNG OP E/M EACH 15 MIN: CPT | Performed by: INTERNAL MEDICINE

## 2022-12-02 RX ORDER — EPINEPHRINE 1 MG/ML
0.3 INJECTION, SOLUTION, CONCENTRATE INTRAVENOUS EVERY 5 MIN PRN
Status: CANCELLED | OUTPATIENT
Start: 2022-12-21

## 2022-12-02 RX ORDER — DIPHENHYDRAMINE HYDROCHLORIDE 50 MG/ML
50 INJECTION INTRAMUSCULAR; INTRAVENOUS
Status: CANCELLED
Start: 2022-12-21

## 2022-12-02 RX ORDER — ALBUTEROL SULFATE 90 UG/1
1-2 AEROSOL, METERED RESPIRATORY (INHALATION)
Status: CANCELLED
Start: 2022-12-21

## 2022-12-02 RX ORDER — METHYLPREDNISOLONE SODIUM SUCCINATE 125 MG/2ML
125 INJECTION, POWDER, LYOPHILIZED, FOR SOLUTION INTRAMUSCULAR; INTRAVENOUS
Status: CANCELLED
Start: 2022-12-21

## 2022-12-02 RX ORDER — ALBUTEROL SULFATE 0.83 MG/ML
2.5 SOLUTION RESPIRATORY (INHALATION)
Status: CANCELLED | OUTPATIENT
Start: 2022-12-21

## 2022-12-02 RX ORDER — MEPERIDINE HYDROCHLORIDE 25 MG/ML
25 INJECTION INTRAMUSCULAR; INTRAVENOUS; SUBCUTANEOUS EVERY 30 MIN PRN
Status: CANCELLED | OUTPATIENT
Start: 2022-12-21

## 2022-12-02 NOTE — LETTER
12/2/2022         RE: Janes Montero  [address]  [facility] [address]        Dear Colleague,    Thank you for referring your patient, Janes Montero, to the [facility]. Please see a copy of my visit note below.    Subjective:    Established patient, previously saw Dr. Curtis    Janes Montero is a 89 year old female who presents to review bone health.    # Likely mild primary hyperparathyroidism         -2018 - 2021 she has had frequent mildly elevated uncorrected serum calcium (highest uncorrected serum calcium value 10.9 mg/deciliter with upper limit of normal being 10.4) and on many of these draws serum albumin was also elevated.  She has had a few PTH draws from 8155-3112 that were mildly elevated.  On 1 of these draws PTH was mildly elevated as was uncorrected serum calcium without concomitant albumin.    Serum phosphorus has been normal.    8/2021: 24-hour urine collection (1.95 L) with urine calcium 156 mg    She has never had a kidney stone.     She is unsure if she has previously been on thiazide diuretics but she isn't on these currently. No use of lithium.     7/2021: 25 hydroxy vitamin D 30 (reference range 30-80 mcg/liter)    Fractured right humerus in 2017 after trip and fall from standing height.     Right wrist fractured in 2015, trip and fall from standing height.     DEXA 7/1/2021:  -No spine images  -Lowest T score at the hips is -1.9 at the right femoral neck, significant -9.9% decline in BMD at the total hip compared to 2008  -Distal one third radius T score -6.1    Lumbar spine x-ray 7/2022: L1 superior endplate compression fracture with approximately 20-30% loss of vertebral body height, new since 7/8/2002. No trauma that would have explained this L1 fracture.     No recent thoracic spine imaging.    Nuclear medicine parathyroid scan 8/24/2021: No evidence of parathyroid adenoma in the neck or chest    [facility] thyroid US 8/21/2019 ([facility] [place], [place]): report per OSH radiology  -Right thyroid lobe: 1.5 x 3.7 x 1.8 cm in size. Upper pole cystic nodule measures 7 x 4 x 6 mm. Complex cystic nodule in the midpole measures 6 x 4 x 6 mm. Upper pole cystic nodule measures 5 x 3 x 5 mm.   -Left thyroid lobe: 1.4 x 4.5 x 1.4 cm in size. Colloid cyst along the posterior margin of the midpole measures 5 x 5 x 6 mm. Complex appearing upper pole hypoechoic cystic lesion measures 6 x 3 x 6 mm. Upper pole cystic nodule adjacent to this measures 3 x 3 x 5 mm.    CKD-3, most recent GFR 58 8/2022    Fosamax, Actonel: remotely used - these are listed as allergies in Epic, she doesn't recall why     Denosumab 60 mg given: 6/21/2022, 12/15/2021 (I verified these 2 doses in Epic and it appears these are the only 2 injections to date); well tolerated    Outside of PO bisphosphonate and denosumab she has not received other pharmacologic therapy to reduce fracture risk.     No calcium pill. She takes vitamin D BID. She takes a MVI. She drinks 1 glass of milk daily, no other dairy intake.     Comorbidities: Parkinson's disease, GERD, DM not on pharmacologic therapy (10/2022: Hemoglobin A1c 5.9%, over the years it has been 8.0% or less), CAD s/p CABG, hypothyroidism with TSH 2.2 last checked 3/2022 and she's on LT4 25 mcg daily     No history of cancer. No radiation therapy.     No recent/upcoming tooth extraction or dental implant.     OSH MRI brain 4/24/2015 report described empty sella - no prior evaluation for this.     Given time constraints we did not review either the osteoporosis or hyperparathyroidism dictation templates today.    Objective:    BMI 30.3 kg/m2, /78    Pleasant and conversational. Exam deferred today.     Assessment/Plan:    # Severe osteoporosis  # Likely mild primary hyperparathyroidism    We will start with a comprehensive evaluation including standard bone labs, spine x-ray, KUB, 24-hour urine calcium, etc.  For now  she will continue denosumab with the most recent injection due around December 21, 2022.  We reviewed potential adverse effects including ONJ, atypical femoral fracture, etc.    We also reviewed surgical indications for primary hyperparathyroidism.  Given her age and comorbidities I agree that it is reasonable to medically manage her low bone mineral density as opposed to pursuing parathyroidectomy.  She will think about this further.  Return to see me after testing.    # Empty sella    She denies recent oral or parenteral glucocorticoid exposure although Flonase is listed on her medication list as a nasal spray.  I did order standard pituitary labs.  She denies a history of known pituitary disease.    84 minutes spent on the date of the encounter doing chart review, history and exam, documentation and further activities as noted above.       Again, thank you for allowing me to participate in the care of your patient.        Sincerely,        Eric Christensen MD

## 2022-12-02 NOTE — PROGRESS NOTES
Subjective:    Established patient, previously saw Dr. Kirsten Montero is a 89 year old female who presents to review bone health.    # Likely mild primary hyperparathyroidism         -2018 - 2021 she has had frequent mildly elevated uncorrected serum calcium (highest uncorrected serum calcium value 10.9 mg/deciliter with upper limit of normal being 10.4) and on many of these draws serum albumin was also elevated.  She has had a few PTH draws from 9220-2520 that were mildly elevated.  On 1 of these draws PTH was mildly elevated as was uncorrected serum calcium without concomitant albumin.    Serum phosphorus has been normal.    8/2021: 24-hour urine collection (1.95 L) with urine calcium 156 mg    She has never had a kidney stone.     She is unsure if she has previously been on thiazide diuretics but she isn't on these currently. No use of lithium.     7/2021: 25 hydroxy vitamin D 30 (reference range 30-80 mcg/liter)    Fractured right humerus in 2017 after trip and fall from standing height.     Right wrist fractured in 2015, trip and fall from standing height.     DEXA 7/1/2021:  -No spine images  -Lowest T score at the hips is -1.9 at the right femoral neck, significant -9.9% decline in BMD at the total hip compared to 2008  -Distal one third radius T score -6.1    Lumbar spine x-ray 7/2022: L1 superior endplate compression fracture with approximately 20-30% loss of vertebral body height, new since 7/8/2002. No trauma that would have explained this L1 fracture.     No recent thoracic spine imaging.    Nuclear medicine parathyroid scan 8/24/2021: No evidence of parathyroid adenoma in the neck or chest    OS thyroid US 8/21/2019 ([place], [place]): report per [facility] radiology  -Right thyroid lobe: 1.5 x 3.7 x 1.8 cm in size. Upper pole cystic nodule measures 7 x 4 x 6 mm. Complex cystic nodule in the midpole measures 6 x 4 x 6 mm. Upper pole cystic nodule measures 5 x 3  x 5 mm.   -Left thyroid lobe: 1.4 x 4.5 x 1.4 cm in size. Colloid cyst along the posterior margin of the midpole measures 5 x 5 x 6 mm. Complex appearing upper pole hypoechoic cystic lesion measures 6 x 3 x 6 mm. Upper pole cystic nodule adjacent to this measures 3 x 3 x 5 mm.    CKD-3, most recent GFR 58 8/2022    Fosamax, Actonel: remotely used - these are listed as allergies in Epic, she doesn't recall why     Denosumab 60 mg given: 6/21/2022, 12/15/2021 (I verified these 2 doses in Epic and it appears these are the only 2 injections to date); well tolerated    Outside of PO bisphosphonate and denosumab she has not received other pharmacologic therapy to reduce fracture risk.     No calcium pill. She takes vitamin D BID. She takes a MVI. She drinks 1 glass of milk daily, no other dairy intake.     Comorbidities: Parkinson's disease, GERD, DM not on pharmacologic therapy (10/2022: Hemoglobin A1c 5.9%, over the years it has been 8.0% or less), CAD s/p CABG, hypothyroidism with TSH 2.2 last checked 3/2022 and she's on LT4 25 mcg daily     No history of cancer. No radiation therapy.     No recent/upcoming tooth extraction or dental implant.     [facility] MRI brain 4/24/2015 report described empty sella - no prior evaluation for this.     Given time constraints we did not review either the osteoporosis or hyperparathyroidism dictation templates today.    Objective:    BMI 30.3 kg/m2, /78    Pleasant and conversational. Exam deferred today.     Assessment/Plan:    # Severe osteoporosis  # Likely mild primary hyperparathyroidism    We will start with a comprehensive evaluation including standard bone labs, spine x-ray, KUB, 24-hour urine calcium, etc.  For now she will continue denosumab with the most recent injection due around December 21, 2022.  We reviewed potential adverse effects including ONJ, atypical femoral fracture, etc.    We also reviewed surgical indications for primary hyperparathyroidism.  Given her age  and comorbidities I agree that it is reasonable to medically manage her low bone mineral density as opposed to pursuing parathyroidectomy.  She will think about this further.  Return to see me after testing.    # Empty sella    She denies recent oral or parenteral glucocorticoid exposure although Flonase is listed on her medication list as a nasal spray.  I did order standard pituitary labs.  She denies a history of known pituitary disease.    84 minutes spent on the date of the encounter doing chart review, history and exam, documentation and further activities as noted above.

## 2022-12-06 ENCOUNTER — TELEPHONE (OUTPATIENT)
Dept: ENDOCRINOLOGY | Facility: CLINIC | Age: 87
End: 2022-12-06

## 2022-12-06 DIAGNOSIS — N18.30 STAGE 3 CHRONIC KIDNEY DISEASE, UNSPECIFIED WHETHER STAGE 3A OR 3B CKD (H): ICD-10-CM

## 2022-12-06 DIAGNOSIS — M80.00XD AGE-RELATED OSTEOPOROSIS WITH CURRENT PATHOLOGICAL FRACTURE WITH ROUTINE HEALING: Primary | ICD-10-CM

## 2022-12-13 ENCOUNTER — LAB (OUTPATIENT)
Dept: LAB | Facility: CLINIC | Age: 87
End: 2022-12-13
Payer: MEDICARE

## 2022-12-13 DIAGNOSIS — M80.00XS AGE-RELATED OSTEOPOROSIS WITH CURRENT PATHOLOGICAL FRACTURE, SEQUELA: ICD-10-CM

## 2022-12-13 DIAGNOSIS — E23.6 EMPTY SELLA (H): ICD-10-CM

## 2022-12-13 DIAGNOSIS — R94.6 THYROID FUNCTION TEST ABNORMAL: ICD-10-CM

## 2022-12-13 DIAGNOSIS — E21.0 PRIMARY HYPERPARATHYROIDISM (H): ICD-10-CM

## 2022-12-13 DIAGNOSIS — E83.52 HYPERCALCEMIA: ICD-10-CM

## 2022-12-13 LAB
ALBUMIN SERPL BCG-MCNC: 4.2 G/DL (ref 3.5–5.2)
CALCIUM SERPL-MCNC: 10.6 MG/DL (ref 8.8–10.2)
CORTIS SERPL-MCNC: 19.2 UG/DL
CREAT SERPL-MCNC: 0.84 MG/DL (ref 0.51–0.95)
GFR SERPL CREATININE-BSD FRML MDRD: 66 ML/MIN/1.73M2
PHOSPHATE SERPL-MCNC: 3.2 MG/DL (ref 2.5–4.5)
PROLACTIN SERPL 3RD IS-MCNC: 15 NG/ML (ref 5–23)
T4 FREE SERPL-MCNC: 1.1 NG/DL (ref 0.9–1.7)
TSH SERPL DL<=0.005 MIU/L-ACNC: 1.9 UIU/ML (ref 0.3–4.2)

## 2022-12-13 PROCEDURE — 82310 ASSAY OF CALCIUM: CPT

## 2022-12-13 PROCEDURE — 82627 DEHYDROEPIANDROSTERONE: CPT

## 2022-12-13 PROCEDURE — 82565 ASSAY OF CREATININE: CPT

## 2022-12-13 PROCEDURE — 84080 ASSAY ALKALINE PHOSPHATASES: CPT | Mod: 90

## 2022-12-13 PROCEDURE — 82784 ASSAY IGA/IGD/IGG/IGM EACH: CPT

## 2022-12-13 PROCEDURE — 82533 TOTAL CORTISOL: CPT

## 2022-12-13 PROCEDURE — 99000 SPECIMEN HANDLING OFFICE-LAB: CPT

## 2022-12-13 PROCEDURE — 82306 VITAMIN D 25 HYDROXY: CPT

## 2022-12-13 PROCEDURE — 84439 ASSAY OF FREE THYROXINE: CPT

## 2022-12-13 PROCEDURE — 84305 ASSAY OF SOMATOMEDIN: CPT

## 2022-12-13 PROCEDURE — 36415 COLL VENOUS BLD VENIPUNCTURE: CPT

## 2022-12-13 PROCEDURE — 84100 ASSAY OF PHOSPHORUS: CPT

## 2022-12-13 PROCEDURE — 84443 ASSAY THYROID STIM HORMONE: CPT

## 2022-12-13 PROCEDURE — 83970 ASSAY OF PARATHORMONE: CPT

## 2022-12-13 PROCEDURE — 84146 ASSAY OF PROLACTIN: CPT

## 2022-12-13 PROCEDURE — 82040 ASSAY OF SERUM ALBUMIN: CPT

## 2022-12-13 PROCEDURE — 86364 TISS TRNSGLTMNASE EA IG CLAS: CPT

## 2022-12-14 LAB
ALP BONE SERPL-MCNC: 9.1 UG/L
DHEA-S SERPL-MCNC: 36 UG/DL (ref 35–430)
IGA SERPL-MCNC: 106 MG/DL (ref 84–499)
PTH-INTACT SERPL-MCNC: 65 PG/ML (ref 15–65)
TTG IGA SER-ACNC: 0.4 U/ML
TTG IGG SER-ACNC: <0.6 U/ML

## 2022-12-16 LAB — IGF-I BLD-MCNC: 74 NG/ML (ref 17–167)

## 2022-12-19 LAB
DEPRECATED CALCIDIOL+CALCIFEROL SERPL-MC: <56 UG/L (ref 20–75)
VITAMIN D2 SERPL-MCNC: <5 UG/L
VITAMIN D3 SERPL-MCNC: 51 UG/L

## 2022-12-27 ENCOUNTER — ALLIED HEALTH/NURSE VISIT (OUTPATIENT)
Dept: ENDOCRINOLOGY | Facility: CLINIC | Age: 87
End: 2022-12-27
Payer: MEDICARE

## 2022-12-27 DIAGNOSIS — T45.8X5A ADVERSE EFFECT OF ORAL BISPHOSPHONATES: ICD-10-CM

## 2022-12-27 DIAGNOSIS — M80.00XD AGE-RELATED OSTEOPOROSIS WITH CURRENT PATHOLOGICAL FRACTURE WITH ROUTINE HEALING: ICD-10-CM

## 2022-12-27 DIAGNOSIS — N18.30 STAGE 3 CHRONIC KIDNEY DISEASE, UNSPECIFIED WHETHER STAGE 3A OR 3B CKD (H): Primary | ICD-10-CM

## 2022-12-27 LAB
CALCIUM 24H UR-MRATE: 0.14 G/SPEC (ref 0.1–0.3)
CALCIUM UR-MCNC: 9.7 MG/DL
COLLECT DURATION TIME UR: 24 H
SPECIMEN VOL UR: 1400 ML

## 2022-12-27 PROCEDURE — 82340 ASSAY OF CALCIUM IN URINE: CPT

## 2022-12-27 PROCEDURE — 96372 THER/PROPH/DIAG INJ SC/IM: CPT | Performed by: INTERNAL MEDICINE

## 2022-12-27 PROCEDURE — 81050 URINALYSIS VOLUME MEASURE: CPT

## 2022-12-27 PROCEDURE — 99207 PR NO CHARGE NURSE ONLY: CPT

## 2022-12-27 NOTE — PROGRESS NOTES
"Prolia Injection Phone Screen      Screening questions have been asked 2-3 days prior to administration visit for Prolia. If any questions are answered with \"Yes,\" this phone encounter were will routed to ordering provider for further evaluation.     1.  When was the last injection?  06/2/22    2.  Has insurance for this injection been verified?  Yes    3.  Did you experience any new onset achiness or rashes that lasted for over a month with your previous Prolia injection?   No    4.  Do you have a fever over 101?F or a new deep cough that is unusual for you today? No    5.  Have you started any new medications in the last 6 months that you were told could affect your immune system? These may have been prescribed by oncologist, transplant, rheumatology, or dermatology.   No    6.  In the last 6 months have you have gastric bypass or parathyroid surgery?   No    7.  Do you plan dental work requiring drilling into the bone such as implants/extractions or oral surgery in the next 2-3 months?   No    8. Do you have new insurance since the last injection?    Patient informed if symptoms discussed above present prior to their administration appointment, they are to notify clinic immediately.     Mera MATHEWS RN          The following steps were completed to comply with the REMS program for Prolia:  1. Ordering provider has previously reviewed information in the Medication Guide and Patient Counseling Chart, including the serious risks of Prolia  and the symptoms of each risk and have been advised to seek prompt medical attention if they have signs or symptoms of any of the serious risks.  2. Provided each patient a copy of the Medication Guide and Patient Brochure.  See MAR for administration details.   Indication: Prolia  (denosumab) is a prescription medicine used to treat osteoporosis in patients who:   Are at high risk for fracture, meaning patients who have had a fracture related to osteoporosis, or who have " multiple risk factors for fracture; Cannot use another osteoporosis medicine or other osteoporosis medicines did not work well.   The timeline for early/late injections would be 4 weeks early and any time after the 6 month leonard. If a patient receives their injection late, then the subsequent injection would be 6 months from the date that they actually received the injection    Have the screening questions been asked prior to this administration? Yes    The following medication was given:     MEDICATION: Denosumab (Prolia) 60 mg/ml SOLN  ROUTE: SQ  SITE: Arm - Right  DOSE: 60mg  LOT #: [id number]  :  [employer]  EXPIRATION DATE:  03/31/2024

## 2023-01-24 ENCOUNTER — HOSPITAL ENCOUNTER (OUTPATIENT)
Dept: GENERAL RADIOLOGY | Facility: HOSPITAL | Age: 88
Discharge: HOME | End: 2023-01-24
Attending: INTERNAL MEDICINE
Payer: MEDICARE

## 2023-01-24 DIAGNOSIS — E21.0 PRIMARY HYPERPARATHYROIDISM (H): ICD-10-CM

## 2023-01-24 DIAGNOSIS — R94.6 THYROID FUNCTION TEST ABNORMAL: ICD-10-CM

## 2023-01-24 DIAGNOSIS — E23.6 EMPTY SELLA (H): ICD-10-CM

## 2023-01-24 DIAGNOSIS — E83.52 HYPERCALCEMIA: ICD-10-CM

## 2023-01-24 DIAGNOSIS — M80.00XS AGE-RELATED OSTEOPOROSIS WITH CURRENT PATHOLOGICAL FRACTURE, SEQUELA: ICD-10-CM

## 2023-01-24 PROCEDURE — 72080 X-RAY EXAM THORACOLMB 2/> VW: CPT

## 2023-01-24 PROCEDURE — 74018 RADEX ABDOMEN 1 VIEW: CPT

## 2023-02-04 ENCOUNTER — HEALTH MAINTENANCE LETTER (OUTPATIENT)
Age: 88
End: 2023-02-04

## 2023-03-07 ENCOUNTER — TRANSFERRED RECORDS (OUTPATIENT)
Dept: HEALTH INFORMATION MANAGEMENT | Facility: CLINIC | Age: 88
End: 2023-03-07

## 2023-03-10 DIAGNOSIS — F33.42 RECURRENT MAJOR DEPRESSIVE DISORDER, IN FULL REMISSION (H): ICD-10-CM

## 2023-03-10 DIAGNOSIS — E03.8 SUBCLINICAL HYPOTHYROIDISM: ICD-10-CM

## 2023-03-10 RX ORDER — LEVOTHYROXINE SODIUM 25 UG/1
TABLET ORAL
Qty: 90 TABLET | Refills: 2 | Status: SHIPPED | OUTPATIENT
Start: 2023-03-10 | End: 2023-11-10

## 2023-03-10 NOTE — TELEPHONE ENCOUNTER
"Routing refill request to provider for review/approval because:  phq 9    Last Written Prescription Date:  7/29/22  Last Fill Quantity: 180,  # refills: 1   Last office visit provider:  10/11/22     Requested Prescriptions   Pending Prescriptions Disp Refills     FLUoxetine (PROZAC) 20 MG capsule [Pharmacy Med Name: FLUoxetine HCl 20 MG Oral Capsule] 180 capsule 3     Sig: TAKE 1 CAPSULE BY MOUTH  TWICE DAILY       SSRIs Protocol Failed - 3/10/2023  3:32 AM        Failed - PHQ-9 score less than 5 in past 6 months     Please review last PHQ-9 score.           Passed - Medication is active on med list        Passed - Patient is age 18 or older        Passed - No active pregnancy on record        Passed - No positive pregnancy test in last 12 months        Passed - Recent (6 mo) or future (30 days) visit within the authorizing provider's specialty     Patient had office visit in the last 6 months or has a visit in the next 30 days with authorizing provider or within the authorizing provider's specialty.  See \"Patient Info\" tab in [facility], or \"Choose Columns\" in Meds & Orders section of the refill encounter.               levothyroxine (SYNTHROID/LEVOTHROID) 25 MCG tablet [Pharmacy Med Name: Levothyroxine Sodium 25 MCG Oral Tablet] 90 tablet 3     Sig: TAKE 1 TABLET BY MOUTH  DAILY       Thyroid Protocol Passed - 3/10/2023  3:32 AM        Passed - Patient is 12 years or older        Passed - Recent (12 mo) or future (30 days) visit within the authorizing provider's specialty     Patient has had an office visit with the authorizing provider or a provider within the authorizing providers department within the previous 12 mos or has a future within next 30 days. See \"Patient Info\" tab in inbasket, or \"Choose Columns\" in Meds & Orders section of the refill encounter.              Passed - Medication is active on med list        Passed - Normal TSH on file in past 12 months     Recent Labs   Lab Test 12/13/22  0825   TSH " 1.90              Passed - No active pregnancy on record     If patient is pregnant or has had a positive pregnancy test, please check TSH.          Passed - No positive pregnancy test in past 12 months     If patient is pregnant or has had a positive pregnancy test, please check TSH.               Elise Paulino RN 03/10/23 5:15 PM   Medical Necessity Information: It is in your best interest to select a reason for this procedure from the list below. All of these items fulfill various CMS LCD requirements except the new and changing color options. Medical Necessity Clause: This procedure was medically necessary because the lesion that was treated was: Lab: [id number] [facility] Lab Facility: [address] [place] [address] Body Location Override (Optional - Billing Will Still Be Based On Selected Body Map Location If Applicable): Right Inferior Neck Detail Level: Detailed Was A Bandage Applied: Yes Size Of Lesion In Cm (Required): 0.2 X Size Of Lesion In Cm (Optional): 0 Biopsy Method: double edge Personna blade Anesthesia Type: 1% lidocaine with epinephrine Anesthesia Volume In Cc: 3 Hemostasis: Drysol Wound Care: Bacitracin Path Notes (To The Dermatopathologist): Size: 0.2 cm\\nR/O:  BCC vs NUB Consent was obtained from the patient. The risks and benefits to therapy were discussed in detail. Specifically, the risks of infection, scarring, bleeding, prolonged wound healing, incomplete removal, allergy to anesthesia, nerve injury and recurrence were addressed. Prior to the procedure, the treatment site was clearly identified and confirmed by the patient. All components of Universal Protocol/PAUSE Rule completed. Render Post-Care Instructions In Note?: no Post-Care Instructions: I reviewed with the patient in detail post-care instructions. Patient is to keep the biopsy site dry overnight, and then apply bacitracin twice daily until healed. Patient may apply hydrogen peroxide soaks to remove any crusting. Notification Instructions: Patient will be notified of pathology results. However, patient instructed to call the office if not contacted within 2 weeks. Billing Type: [employer]

## 2023-03-16 ENCOUNTER — OFFICE VISIT (OUTPATIENT)
Dept: CARDIOLOGY | Facility: CLINIC | Age: 88
End: 2023-03-16
Attending: INTERNAL MEDICINE
Payer: MEDICARE

## 2023-03-16 DIAGNOSIS — I35.1 NONRHEUMATIC AORTIC VALVE INSUFFICIENCY: ICD-10-CM

## 2023-03-16 DIAGNOSIS — I35.0 NONRHEUMATIC AORTIC VALVE STENOSIS: ICD-10-CM

## 2023-03-16 DIAGNOSIS — I10 HYPERTENSION, UNSPECIFIED TYPE: ICD-10-CM

## 2023-03-16 DIAGNOSIS — R06.09 DOE (DYSPNEA ON EXERTION): Primary | ICD-10-CM

## 2023-03-16 DIAGNOSIS — I25.10 CORONARY ARTERY DISEASE INVOLVING NATIVE CORONARY ARTERY WITHOUT ANGINA PECTORIS, UNSPECIFIED WHETHER NATIVE OR TRANSPLANTED HEART: ICD-10-CM

## 2023-03-16 DIAGNOSIS — I34.0 MITRAL VALVE INSUFFICIENCY, UNSPECIFIED ETIOLOGY: ICD-10-CM

## 2023-03-16 PROCEDURE — 99214 OFFICE O/P EST MOD 30 MIN: CPT | Performed by: INTERNAL MEDICINE

## 2023-03-16 NOTE — LETTER
3/16/2023    Maggie Arriaga MD  [address]  [facility] [address]    RE: Janes BERNAL Winston       Dear Colleague,     I had the pleasure of seeing Janes Montero in the [facility].         [facility] [address], [place] [address]   [email or web address]   OFFICE: [phone]          Thank you Maggie De La Cruz for asking the [facility] team to participate in the care of your patient, Janes Montero.     Impression and Plan     1.? Coronary artery disease. Janes has known coronary artery disease having had prior coronary artery bypass graft surgery with JEOVANY graft to the LAD.? She underwent coronary angiography 17 August 2022 that revealed complete occlusion of the proximal native LAD though the JEOVANY graft to the LAD was widely patent.? Otherwise mild-moderate disease only.?   ??   This is stable.? Patient denies any chest pain or other concerning symptoms in this regard.?   ??   2.? Mitral insufficiency.? This was felt mild-moderate in degree on echocardiogram 16 August 2022.?  As noted below, Janes does report some progressive shortness of breath/dyspnea since my last visit with her.  Significant progression of valvular disease not strongly suspected though feel it would be reasonable and prudent to reassess with echocardiography.  Plan:    Echocardiogram to reassess for any progressive valvular heart disease or other structural heart disease that may be contributing to her subjective dyspnea.  ?   3.? Aortic stenosis.? This was felt mild in degree on echocardiogram 16 August 2022.?   ??   4.? Aortic insufficiency.? This was felt mild in degree on echocardiogram 16 August 2022.?   ??   5.? Hypertension.? Blood pressure is fairly reasonable in the office today at 134/78 mmHg.  She has been having very good readings at home.   ??   6.? Dyslipidemia.? Lipid profile 18 August 2022 revealed LDL 58 mg/dL and HDL 29 mg/dL.?      Continue atorvastatin.?     Follow-up and further recommendations pending echocardiographic findings.    35 minutes spent reviewing prior records (including documentation, laboratory studies, cardiac testing/imaging), interview with patient along with physical exam, planning, and subsequent documentation/crafting of note).           History of Present Illness    Once again I would like to thank you again for asking me to participate in the care of your patient, Janes Montero.  As you know, but to reiterate for my own records, Janes Montero is a 89 year old female with known coronary artery disease having had prior coronary artery bypass graft surgery with JEOVANY graft to the LAD.? She underwent coronary angiography 17 August 2022 that revealed complete occlusion of the proximal native LAD though the JEOVANY graft to the LAD was widely patent.? Otherwise mild-moderate disease only.?   ??   On interview, she denies any chest pain symptoms.  She does,, report dyspnea on exertion which seems to have progressed since my last visit with her.  She denies shortness of breath at night to suggest PND/orthopnea, however.    Further review of systems is otherwise negative/noncontributory (medical record and 13 point review of systems reviewed as well and pertinent positives noted).         Cardiac Diagnostics      Echocardiogram 16 August 2022:?   1. Normal left ventricular size with mildly reduced left ventricular systolic performance.? Ejection fraction 45-50%.?   2. There is hypokinesis of the apex and mid to apical septal segments.?   3. Mild aortic stenosis.?   4. Mild aortic insufficiency.?   5. Mild-moderate mitral insufficiency.?   6. Right ventricle not well visualized.?   7. Mild left atrial enlargement.? Right atrium of normal dimension.?   ??   Echocardiogram 5 March 2020:?   1. Left ventricle: The cavity size is normal. Wall thickness is mildly increased. Systolic function is normal. The estimated ejection fraction  is 55-60%.??   2. Left atrium: The atrium is mildly to moderately dilated.??   3. Mitral valve: Moderately calcified annulus. Leaflet separation is normal. There is no evidence for stenosis. Moderate regurgitation.??   4. Aortic valve: Probably trileaflet; mildly calcified leaflets. Thickening, consistent with sclerosis. Cusp separation is normal. There is no stenosis. Mild to moderate regurgitation.??   5. Tricuspid valve: Structurally normal valve. Leaflet separation is normal. There is no evidence for stenosis. Moderate regurgitation.??   ??   Nuclear perfusion imaging study 16 August 2022:?   1. Nuclear stress test is abnormal.? There is a large area of transmural infarction involving the apex and distal inferolateral wall extending into the inferior wall.? There is a medium size area of stella-infarct ischemia involving the mid to basal inferior and inferolateral wall.?   2. The left ventricular ejection fraction at stress is 67% with severe hypokinesis of the distal inferior and apical walls.?   ?   Coronary angiography 17 August 2022:?   1. Left anterior descending coronary artery: Proximal 100% stenosis.?   2. Circumflex coronary artery: First obtuse marginal with 50% stenosis.?   3. Right coronary artery: 25% mid vessel stenosis.?   4. JEOVANY graft to the LAD: Widely patent.?   ?   Coronary angiogram 23 May 2011:?   1. Normal left main.??   2. Severe eccentric 95% proximal left anterior descending stenosis just after the first diagonal and just after the first septal  branches originate. Second eccentric stenosis of 60-70% is present proximally involving the origin of the second diagonal branch. The second diagonal branch ostium has a 50% stenosis.??   3. Mild irregularity of the circumflex, circumflex marginal branches.??   4. Mild irregularity of the right coronary artery. No significant stenosis. PDA arises distally, appears normal.??   5. Overall left ventricular ejection fraction normal.??   ??  "  Twelve-lead ECG (personally reviewed) 11 June 2018: Sinus rhythm with occasional PVCs.? Left bundle branch block.?          Physical Examination       /78 (BP Location: Left arm, Patient Position: Sitting, Cuff Size: Adult Large)   Pulse 78   Resp 18   Ht 1.651 m (5' 5\")   Wt 83.6 kg (184 lb 4.8 oz)   SpO2 90%   BMI 30.67 kg/m          Wt Readings from Last 3 Encounters:   03/16/23 83.6 kg (184 lb 4.8 oz)   12/02/22 82.6 kg (182 lb)   11/28/22 83.5 kg (184 lb)       The patient is alert and oriented times three. Sclerae are anicteric. Mucosal membranes are moist. Jugular venous pressure is normal. No significant adenopathy/thyromegally appreciated. Lungs are diminished bilaterally, but clear of significant crackles. On cardiovascular exam, the patient has a regular S1 and S2.  Heart sounds are distant.  Soft systolic murmur.  Abdomen is soft and non-tender. Extremities reveal no clubbing, cyanosis, or edema.         Medications  Allergies   Current Outpatient Medications   Medication Sig Dispense Refill     ascorbic acid, vitamin C, (ASCORBIC ACID WITH ELLIOTT HIPS) 500 MG tablet [ASCORBIC ACID, VITAMIN C, (ASCORBIC ACID WITH ELLIOTT HIPS) 500 MG TABLET] Take 500 mg by mouth daily.       aspirin 81 mg chewable tablet [ASPIRIN 81 MG CHEWABLE TABLET] Chew 81 mg at bedtime.       atorvastatin (LIPITOR) 20 MG tablet TAKE 1 TABLET BY MOUTH AT  BEDTIME 90 tablet 3     carbidopa-levodopa (SINEMET)  MG tablet TAKE 1 TABLET BY MOUTH 3  TIMES DAILY TAKE AT LEAST  1/2 HOUR BEFORE MEALS OR 2  HOURS AFTER MEALS DO NOT  MIX WITH FOOD 270 tablet 3     carvedilol (COREG) 12.5 MG tablet TAKE 1 TABLET BY MOUTH  TWICE DAILY WITH MEALS 180 tablet 3     cholecalciferol, vitamin D3, 1,000 unit tablet [CHOLECALCIFEROL, VITAMIN D3, 1,000 UNIT TABLET] Take 2,000 Units by mouth daily.       coenzyme Q10 (CO Q-10) 100 mg capsule [COENZYME Q10 (CO Q-10) 100 MG CAPSULE] Take 100 mg by mouth 2 (two) times a day.       " "cyanocobalamin (VITAMIN B-12) 1000 MCG tablet Take 1 tablet (1,000 mcg) by mouth daily 90 tablet 3     FLUoxetine (PROZAC) 20 MG capsule TAKE 1 CAPSULE BY MOUTH  TWICE DAILY 180 capsule 0     fluticasone (FLONASE) 50 MCG/ACT nasal spray Spray 1 spray into both nostrils daily (Patient taking differently: Spray 1 spray into both nostrils daily as needed) 16 g 3     gabapentin (NEURONTIN) 300 MG capsule TAKE 1 CAPSULE BY MOUTH 4  TIMES DAILY 360 capsule 3     levothyroxine (SYNTHROID/LEVOTHROID) 25 MCG tablet TAKE 1 TABLET BY MOUTH  DAILY 90 tablet 2     lisinopril (ZESTRIL) 10 MG tablet Take 1 tablet (10 mg) by mouth daily 90 tablet 3     loratadine (CLARITIN) 10 mg tablet [LORATADINE (CLARITIN) 10 MG TABLET] Take 10 mg by mouth daily.       magnesium oxide 250 mg Tab [MAGNESIUM OXIDE 250 MG TAB] Take 250 mg by mouth daily.       melatonin 1 mg Tab tablet [MELATONIN 1 MG TAB TABLET] Take 3 mg by mouth at bedtime.        multivitamin therapeutic tablet [MULTIVITAMIN THERAPEUTIC TABLET] Take 1 tablet by mouth daily.       nitroglycerin (NITROSTAT) 0.4 MG SL tablet [NITROGLYCERIN (NITROSTAT) 0.4 MG SL TABLET] Place 0.4 mg under the tongue every 5 (five) minutes as needed for chest pain.       nystatin (MYCOSTATIN) 781234 UNIT/GM external cream Apply topically 2 times daily 30 g 3     omega 3-dha-epa-fish oil (FISH OIL) 60- mg cap capsule [OMEGA 3-DHA-EPA-FISH OIL (FISH OIL) 60- MG CAP CAPSULE] Take 2,000 mg by mouth 2 (two) times a day.       polyethylene glycol (MIRALAX) 17 gram packet [POLYETHYLENE GLYCOL (MIRALAX) 17 GRAM PACKET] Take 17 g by mouth daily.       triamcinolone (KENALOG) 0.025 % external ointment Apply topically 2 times daily 80 g 0       Allergies   Allergen Reactions     Alendronate [Alendronic Acid] Unknown     pain     Codeine Hives     Hydrocodone-Acetaminophen Other (See Comments)     Highly sensitive, \"knocks her out and can't wake up\"     Other Drug Allergy (See Comments)      " Risedronate Unknown     Bone pain     Rosuvastatin Muscle Pain (Myalgia)     Simvastatin Muscle Pain (Myalgia)          Lab Results    Chemistry/lipid CBC Cardiac Enzymes/BNP/TSH/INR   Recent Labs   Lab Test 08/18/22  0506   CHOL 121   HDL 29*   LDL 58   TRIG 172*     Recent Labs   Lab Test 08/18/22  0506 12/14/21  1638   LDL 58 32     Recent Labs   Lab Test 12/13/22  0825 08/19/22  0759 08/18/22  0738 08/18/22  0506   NA  --   --   --  135*   POTASSIUM  --   --   --  4.3   CHLORIDE  --   --   --  103   CO2  --   --   --  29   GLC  --  160*   < > 132*   BUN  --   --   --  30*   CR 0.84  --   --  0.94   GFRESTIMATED 66  --   --  58*   LIZZY 10.6*  --   --  8.8    < > = values in this interval not displayed.     Recent Labs   Lab Test 12/13/22  0825 08/18/22  0506 08/17/22  0449   CR 0.84 0.94 1.20*     Recent Labs   Lab Test 10/11/22  1712 06/28/22  1616 03/22/22  1622   A1C 5.9* 8.0* 7.6*          Recent Labs   Lab Test 08/18/22  0506   WBC 6.1   HGB 11.5*   HCT 36.3   MCV 90        Recent Labs   Lab Test 08/18/22  0506 08/15/22  1920 12/14/21  1557   HGB 11.5* 13.7 13.9    Recent Labs   Lab Test 08/16/22  0416 08/15/22  2246 08/15/22  1920   TROPONINI 0.28 0.18 0.04     Recent Labs   Lab Test 08/15/22  1920 06/11/18  1259    383*     Recent Labs   Lab Test 12/13/22  0825   TSH 1.90     No results for input(s): INR in the last 23933 hours.     Medical History  Surgical History Family History Social History   Past Medical History:   Diagnosis Date     Acute diastolic congestive heart failure (H)      Acute on chronic congestive heart failure (H) 6/11/2018     Coronary artery disease      Diabetes mellitus, type II (H)      Diastolic dysfunction 7/10/2018     Frozen shoulder     bilateral     Hypertension      Hypertensive emergency      Parkinson disease (H)      Primary osteoarthritis involving multiple joints      Primary osteoarthritis of left knee      Recurrent UTI     hx septic shock     Thyroid  disease      Past Surgical History:   Procedure Laterality Date     APPENDECTOMY       APPENDECTOMY       BACK SURGERY      L4-S1 laminectomy     BYPASS GRAFT ARTERY CORONARY      3 vessel      SECTION        SECTION       CV CORONARY ANGIOGRAM N/A 2022    Procedure: Coronary Angiogram;  Surgeon: Ignacio Baird MD;  Location: [facility] CATH LAB CV     HERNIORRHAPHY VENTRAL Left      HYSTERECTOMY       HYSTERECTOMY       JOINT REPLACEMENT Left     Total left knee     OTHER SURGICAL HISTORY      right ankle surgery     OTHER SURGICAL HISTORY      right forearm fracture     REPLACEMENT TOTAL KNEE Right      SHOULDER SURGERY Right     reversed shoulder surgery.     Family History   Problem Relation Age of Onset     Heart Disease Mother      Heart Disease Father         Social History     Socioeconomic History     Marital status:      Spouse name: Not on file     Number of children: Not on file     Years of education: Not on file     Highest education level: Not on file   Occupational History     Not on file   Tobacco Use     Smoking status: Never     Smokeless tobacco: Never   Vaping Use     Vaping Use: Never used   Substance and Sexual Activity     Alcohol use: No     Drug use: No     Sexual activity: Not Currently     Partners: Male     Birth control/protection: None   Other Topics Concern     Parent/sibling w/ CABG, MI or angioplasty before 65F 55M? No   Social History Narrative    6/15/2021 new to [place] from [place]. She has 6 kids.     Social Determinants of Health     Financial Resource Strain: Not on file   Food Insecurity: Not on file   Transportation Needs: Not on file   Physical Activity: Not on file   Stress: Not on file   Social Connections: Not on file   Intimate Partner Violence: Not on file   Housing Stability: Not on file                    Thank you for allowing me to participate in the care of your patient.      Sincerely,     Mathew Ricardo MD     [facility]  [facility]    cc:   Mathew Ricardo MD  [address]  [place] [address]

## 2023-03-17 VITALS
WEIGHT: 184.3 LBS | DIASTOLIC BLOOD PRESSURE: 78 MMHG | HEART RATE: 78 BPM | SYSTOLIC BLOOD PRESSURE: 134 MMHG | HEIGHT: 65 IN | BODY MASS INDEX: 30.71 KG/M2 | RESPIRATION RATE: 18 BRPM | OXYGEN SATURATION: 90 %

## 2023-04-04 ENCOUNTER — HOSPITAL ENCOUNTER (OUTPATIENT)
Dept: CARDIOLOGY | Facility: CLINIC | Age: 88
Discharge: HOME | End: 2023-04-04
Attending: INTERNAL MEDICINE | Admitting: INTERNAL MEDICINE
Payer: MEDICARE

## 2023-04-04 DIAGNOSIS — I35.0 NONRHEUMATIC AORTIC VALVE STENOSIS: ICD-10-CM

## 2023-04-04 DIAGNOSIS — I25.10 CORONARY ARTERY DISEASE INVOLVING NATIVE CORONARY ARTERY WITHOUT ANGINA PECTORIS, UNSPECIFIED WHETHER NATIVE OR TRANSPLANTED HEART: ICD-10-CM

## 2023-04-04 DIAGNOSIS — I34.0 MITRAL VALVE INSUFFICIENCY, UNSPECIFIED ETIOLOGY: ICD-10-CM

## 2023-04-04 DIAGNOSIS — I35.1 NONRHEUMATIC AORTIC VALVE INSUFFICIENCY: ICD-10-CM

## 2023-04-04 DIAGNOSIS — R06.09 DOE (DYSPNEA ON EXERTION): ICD-10-CM

## 2023-04-04 DIAGNOSIS — I10 HYPERTENSION, UNSPECIFIED TYPE: ICD-10-CM

## 2023-04-04 LAB — LVEF ECHO: NORMAL

## 2023-04-04 PROCEDURE — 93306 TTE W/DOPPLER COMPLETE: CPT | Mod: 26 | Performed by: INTERNAL MEDICINE

## 2023-04-04 PROCEDURE — 255N000002 HC RX 255 OP 636: Performed by: INTERNAL MEDICINE

## 2023-04-04 RX ADMIN — PERFLUTREN 2 ML: 6.52 INJECTION, SUSPENSION INTRAVENOUS at 11:45

## 2023-04-05 DIAGNOSIS — R06.09 DOE (DYSPNEA ON EXERTION): ICD-10-CM

## 2023-04-05 DIAGNOSIS — I34.0 MITRAL VALVE INSUFFICIENCY, UNSPECIFIED ETIOLOGY: ICD-10-CM

## 2023-04-05 DIAGNOSIS — I25.10 CORONARY ARTERY DISEASE INVOLVING NATIVE CORONARY ARTERY WITHOUT ANGINA PECTORIS, UNSPECIFIED WHETHER NATIVE OR TRANSPLANTED HEART: ICD-10-CM

## 2023-04-05 DIAGNOSIS — I35.0 NONRHEUMATIC AORTIC VALVE STENOSIS: ICD-10-CM

## 2023-04-05 DIAGNOSIS — I10 HYPERTENSION, UNSPECIFIED TYPE: Primary | ICD-10-CM

## 2023-04-11 ASSESSMENT — ANXIETY QUESTIONNAIRES
2. NOT BEING ABLE TO STOP OR CONTROL WORRYING: SEVERAL DAYS
7. FEELING AFRAID AS IF SOMETHING AWFUL MIGHT HAPPEN: NOT AT ALL
3. WORRYING TOO MUCH ABOUT DIFFERENT THINGS: SEVERAL DAYS
IF YOU CHECKED OFF ANY PROBLEMS ON THIS QUESTIONNAIRE, HOW DIFFICULT HAVE THESE PROBLEMS MADE IT FOR YOU TO DO YOUR WORK, TAKE CARE OF THINGS AT HOME, OR GET ALONG WITH OTHER PEOPLE: NOT DIFFICULT AT ALL
GAD7 TOTAL SCORE: 4
6. BECOMING EASILY ANNOYED OR IRRITABLE: NOT AT ALL
4. TROUBLE RELAXING: SEVERAL DAYS
1. FEELING NERVOUS, ANXIOUS, OR ON EDGE: SEVERAL DAYS
5. BEING SO RESTLESS THAT IT IS HARD TO SIT STILL: NOT AT ALL

## 2023-04-11 ASSESSMENT — PATIENT HEALTH QUESTIONNAIRE - PHQ9: SUM OF ALL RESPONSES TO PHQ QUESTIONS 1-9: 7

## 2023-04-21 ENCOUNTER — OFFICE VISIT (OUTPATIENT)
Dept: ENDOCRINOLOGY | Facility: CLINIC | Age: 88
End: 2023-04-21
Payer: MEDICARE

## 2023-04-21 VITALS
WEIGHT: 184 LBS | DIASTOLIC BLOOD PRESSURE: 78 MMHG | SYSTOLIC BLOOD PRESSURE: 116 MMHG | OXYGEN SATURATION: 95 % | BODY MASS INDEX: 30.62 KG/M2 | HEART RATE: 69 BPM

## 2023-04-21 DIAGNOSIS — N18.30 STAGE 3 CHRONIC KIDNEY DISEASE, UNSPECIFIED WHETHER STAGE 3A OR 3B CKD (H): ICD-10-CM

## 2023-04-21 DIAGNOSIS — T45.8X5A ADVERSE EFFECT OF ORAL BISPHOSPHONATES: ICD-10-CM

## 2023-04-21 DIAGNOSIS — S42.209S CLOSED FRACTURE OF PROXIMAL END OF HUMERUS, UNSPECIFIED FRACTURE MORPHOLOGY, UNSPECIFIED LATERALITY, SEQUELA: ICD-10-CM

## 2023-04-21 DIAGNOSIS — S32.010S COMPRESSION FRACTURE OF L1 VERTEBRA, SEQUELA: ICD-10-CM

## 2023-04-21 DIAGNOSIS — E83.52 HYPERCALCEMIA: ICD-10-CM

## 2023-04-21 DIAGNOSIS — E78.1 HYPERTRIGLYCERIDEMIA: ICD-10-CM

## 2023-04-21 DIAGNOSIS — E21.0 PRIMARY HYPERPARATHYROIDISM (H): ICD-10-CM

## 2023-04-21 DIAGNOSIS — S62.109S CLOSED FRACTURE OF WRIST, UNSPECIFIED LATERALITY, SEQUELA: ICD-10-CM

## 2023-04-21 DIAGNOSIS — Z51.81 ENCOUNTER FOR MONITORING DENOSUMAB THERAPY: ICD-10-CM

## 2023-04-21 DIAGNOSIS — M80.00XD AGE-RELATED OSTEOPOROSIS WITH CURRENT PATHOLOGICAL FRACTURE WITH ROUTINE HEALING: Primary | ICD-10-CM

## 2023-04-21 DIAGNOSIS — E23.6 EMPTY SELLA (H): ICD-10-CM

## 2023-04-21 DIAGNOSIS — R94.6 THYROID FUNCTION TEST ABNORMAL: ICD-10-CM

## 2023-04-21 DIAGNOSIS — Z79.899 ENCOUNTER FOR MONITORING DENOSUMAB THERAPY: ICD-10-CM

## 2023-04-21 PROCEDURE — 99215 OFFICE O/P EST HI 40 MIN: CPT | Performed by: INTERNAL MEDICINE

## 2023-04-21 NOTE — PROGRESS NOTES
Subjective:    Established patient, previously saw Dr. Kirsten Montero is a 89 year old female who presents to review bone health. Chart fully reviewed for bone health and partially empty sella and the following is a comprehensive summary of her bone health and partially empty sella care to date.     # Primary hyperparathyroidism   # Low bone mineral density with multiple prior fragility fractures    We reviewed the osteoporosis and hyperparathyroidism dictation templates.    12/27/2022: 24 hour urine (1.4 L) with calcium 140 mg    12/13/2022: PTH 65 (ULN 65 pg/mL), uncorrected serum calcium 10.6 mg/dL (ULN 10.2), albumin 4.2 g/dL, corrected serum calcium 10.4 mg/dL, serum phosphorous normal, 25-OH vitamin D mid normal, GFR 66, bone specific alkaline phosphatase in the mid premenopausal range         -2018 - 2021 she has had frequent mildly elevated uncorrected serum calcium (highest uncorrected serum calcium value 10.9 mg/deciliter with upper limit of normal being 10.4) and on many of these draws serum albumin was also elevated.  She has had a few PTH draws from 4176-0634 that were mildly elevated.  On 1 of these draws PTH was mildly elevated as was uncorrected serum calcium without concomitant albumin.    Serum phosphorus has been normal.    8/2021: 24-hour urine collection (1.95 L) with urine calcium 156 mg    She has never had a kidney stone. KUB 1/24/2023: no stones    4/21/2023: no overt symptoms of hypercalcemia    She is unsure if she has previously been on thiazide diuretics but she isn't on these currently. No use of lithium.     Fractured right humerus in 2017 after trip and fall from standing height.     Right wrist fractured in 2015, trip and fall from standing height.     Balance is ok, she has worked with PT in the past and will let me know if she wants to see PT again.     DEXA 7/1/2021:  -No spine images  -Lowest T score at the hips is -1.9 at the right femoral neck, significant -9.9%  decline in BMD at the total hip compared to 2008  -Distal one third radius T score -6.1    Lumbar spine x-ray 7/2022: L1 superior endplate compression fracture with approximately 20-30% loss of vertebral body height. No trauma that would have explained this L1 fracture.     Spine XR T10 - L5 1/24/2023: stable mild L1 compression fracture (about 15% height loss per radiology)     No recent thoracic spine imaging. I previously ordered this 12/2022 but it wasn't done.     As of 4/2023 Janes has completed a complete evaluation for secondary causes of increased fracture risk with the following notable findings:  -GFR fluctuates but has been generally >45  -PTH with minerals, vitamin D, urine calcium as above    Nuclear medicine parathyroid scan 8/24/2021: No evidence of parathyroid adenoma in the neck or chest    Fosamax, Actonel: remotely used - these are listed as allergies in Epic, she doesn't recall why     Denosumab 60 mg given: 12/27/2022, 6/21/2022, 12/15/2021 (I verified these 3 doses in Epic and it appears these are the only 3 injections to date); well tolerated    Outside of PO bisphosphonate and denosumab she has not received other pharmacologic therapy to reduce fracture risk.     No calcium pill. She takes vitamin D BID. She takes a MVI. She drinks 1 glass of milk daily, no other dairy intake.     No recent/upcoming tooth extraction or dental implant.     # Subcentimeter thyroid nodules    OS thyroid US 8/21/2019 ([place], [place]): report per OSH radiology  -Right thyroid lobe: 1.5 x 3.7 x 1.8 cm in size. Upper pole cystic nodule measures 7 x 4 x 6 mm. Complex cystic nodule in the midpole measures 6 x 4 x 6 mm. Upper pole cystic nodule measures 5 x 3 x 5 mm.   -Left thyroid lobe: 1.4 x 4.5 x 1.4 cm in size. Colloid cyst along the posterior margin of the midpole measures 5 x 5 x 6 mm. Complex appearing upper pole hypoechoic cystic lesion measures 6 x 3 x 6 mm. Upper pole  cystic nodule adjacent to this measures 3 x 3 x 5 mm.    She's been on LT4 for years, dose has been 25 mcg daily. No prior thyroid surgery. No H/N radiation. No prior MCCARTNEY therapy. No history of hyperthyroidism. No prior thyroid FNA. TSH 2.2 last checked 3/2022.     Other comorbidities: Parkinson's disease, GERD, DM not on pharmacologic therapy (10/2022: Hemoglobin A1c 5.9%, over the years it has been 8.0% or less), CAD s/p CABG.    No history of cancer. No radiation therapy.     # Empty sella    OS MRI brain 4/24/2015 report described empty sella - no prior evaluation for this.     12/2022: she denies recent oral or parenteral glucocorticoid exposure although Flonase is listed on her medication list as a nasal spray. She denies a history of known pituitary disease.    -12/2022 at 8:25 AM: serum cortisol 19.2 mcg/dL, DHEA-S 36 mcg/dL, TSH and free T4 normal, prolactin normal, IGF-1 normal      Objective:    Pleasant and conversational.  BMI 30.6 kg/meters squared, blood pressure 116/78.  No thyroid eye disease.  She has mild bilateral thyroid nodularity.  No cervical lymphadenopathy.  Dentition reasonable.  She is kyphotic.    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  Surgical indications include: very low bone mineral density with prior fragility fractures and GFR <60 at times.    However Ivia has a preference to avoid surgery unless absolute necessary.  I am in full agreement with this approach.  We will manage her primary hyperparathyroidism medically.    For her low bone mineral density and multiple prior fragility fractures we will continue Prolia 60 mg every 6 months. Her next dose is due June 27, 2023 and is scheduled.  We reviewed potential adverse effects including osteonecrosis of the jaw, atypical femoral fracture, etc.  No need to monitor  with DEXA since this will not .    Currently her 24-hour urine calcium and vitamin D are excellent and she will not change her vitamin D supplementation or calcium intake.    We will recheck minerals and creatinine and vitamin D prior to her next dose of Prolia.    She will monitor for symptoms of hypercalcemia and if these develop we can use Cinacalcet as needed.    Maintain hydration and avoid thiazide diuretics.    Return to see me in 1 year.    # Thyroid function    She is on levothyroxine 25 mcg daily.  It would be perfectly fine to stop this.  We will check her TSH in a few months as well.    # Medication management    I also think it would be reasonable for her to stop vitamin C, coenzyme Q 10, and her fish oil.  She has had hypertriglyceridemia in the past and so with her next labs we will check this to make sure it remains less than 500 mg/deciliter with stopping fish oil.  She will review this with her primary physician as well.    55 minutes spent on the date of the encounter doing chart review, history and exam, documentation and further activities as noted above.

## 2023-04-21 NOTE — LETTER
4/21/2023         RE: Janes Montero  [address]  [facility] [address]        Dear Colleague,    Thank you for referring your patient, Janes Montero, to [facility]. Please see a copy of my visit note below.    Subjective:    Established patient, previously saw Dr. Curtis    Janes Montero is a 89 year old female who presents to review bone health. Chart fully reviewed for bone health and partially empty sella and the following is a comprehensive summary of her bone health and partially empty sella care to date.     # Primary hyperparathyroidism   # Low bone mineral density with multiple prior fragility fractures    We reviewed the osteoporosis and hyperparathyroidism dictation templates.    12/27/2022: 24 hour urine (1.4 L) with calcium 140 mg    12/13/2022: PTH 65 (ULN 65 pg/mL), uncorrected serum calcium 10.6 mg/dL (ULN 10.2), albumin 4.2 g/dL, corrected serum calcium 10.4 mg/dL, serum phosphorous normal, 25-OH vitamin D mid normal, GFR 66, bone specific alkaline phosphatase in the mid premenopausal range         -2018 - 2021 she has had frequent mildly elevated uncorrected serum calcium (highest uncorrected serum calcium value 10.9 mg/deciliter with upper limit of normal being 10.4) and on many of these draws serum albumin was also elevated.  She has had a few PTH draws from 2422-2919 that were mildly elevated.  On 1 of these draws PTH was mildly elevated as was uncorrected serum calcium without concomitant albumin.    Serum phosphorus has been normal.    8/2021: 24-hour urine collection (1.95 L) with urine calcium 156 mg    She has never had a kidney stone. KUB 1/24/2023: no stones    4/21/2023: no overt symptoms of hypercalcemia    She is unsure if she has previously been on thiazide diuretics but she isn't on these currently. No use of lithium.     Fractured right humerus in 2017 after trip and fall from standing height.     Right wrist fractured in 2015,  trip and fall from standing height.     Balance is ok, she has worked with PT in the past and will let me know if she wants to see PT again.     DEXA 7/1/2021:  -No spine images  -Lowest T score at the hips is -1.9 at the right femoral neck, significant -9.9% decline in BMD at the total hip compared to 2008  -Distal one third radius T score -6.1    Lumbar spine x-ray 7/2022: L1 superior endplate compression fracture with approximately 20-30% loss of vertebral body height. No trauma that would have explained this L1 fracture.     Spine XR T10 - L5 1/24/2023: stable mild L1 compression fracture (about 15% height loss per radiology)     No recent thoracic spine imaging. I previously ordered this 12/2022 but it wasn't done.     As of 4/2023 Ivia has completed a complete evaluation for secondary causes of increased fracture risk with the following notable findings:  -GFR fluctuates but has been generally >45  -PTH with minerals, vitamin D, urine calcium as above    Nuclear medicine parathyroid scan 8/24/2021: No evidence of parathyroid adenoma in the neck or chest    Fosamax, Actonel: remotely used - these are listed as allergies in Epic, she doesn't recall why     Denosumab 60 mg given: 12/27/2022, 6/21/2022, 12/15/2021 (I verified these 3 doses in Epic and it appears these are the only 3 injections to date); well tolerated    Outside of PO bisphosphonate and denosumab she has not received other pharmacologic therapy to reduce fracture risk.     No calcium pill. She takes vitamin D BID. She takes a MVI. She drinks 1 glass of milk daily, no other dairy intake.     No recent/upcoming tooth extraction or dental implant.     # Subcentimeter thyroid nodules    OS thyroid US 8/21/2019 ([place], [place], [place]): report per OSH radiology  -Right thyroid lobe: 1.5 x 3.7 x 1.8 cm in size. Upper pole cystic nodule measures 7 x 4 x 6 mm. Complex cystic nodule in the midpole measures 6 x 4 x 6 mm. Upper  pole cystic nodule measures 5 x 3 x 5 mm.   -Left thyroid lobe: 1.4 x 4.5 x 1.4 cm in size. Colloid cyst along the posterior margin of the midpole measures 5 x 5 x 6 mm. Complex appearing upper pole hypoechoic cystic lesion measures 6 x 3 x 6 mm. Upper pole cystic nodule adjacent to this measures 3 x 3 x 5 mm.    She's been on LT4 for years, dose has been 25 mcg daily. No prior thyroid surgery. No H/N radiation. No prior MCCARTNEY therapy. No history of hyperthyroidism. No prior thyroid FNA. TSH 2.2 last checked 3/2022.     Other comorbidities: Parkinson's disease, GERD, DM not on pharmacologic therapy (10/2022: Hemoglobin A1c 5.9%, over the years it has been 8.0% or less), CAD s/p CABG.    No history of cancer. No radiation therapy.     # Empty sella    [facility] MRI brain 4/24/2015 report described empty sella - no prior evaluation for this.     12/2022: she denies recent oral or parenteral glucocorticoid exposure although Flonase is listed on her medication list as a nasal spray. She denies a history of known pituitary disease.    -12/2022 at 8:25 AM: serum cortisol 19.2 mcg/dL, DHEA-S 36 mcg/dL, TSH and free T4 normal, prolactin normal, IGF-1 normal      Objective:    Pleasant and conversational.  BMI 30.6 kg/meters squared, blood pressure 116/78.  No thyroid eye disease.  She has mild bilateral thyroid nodularity.  No cervical lymphadenopathy.  Dentition reasonable.  She is kyphotic.    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  Surgical indications include: very low bone mineral density with prior fragility fractures and GFR <60 at times.    However Ivia has a preference to avoid surgery unless absolute necessary.  I am in full agreement with this approach.  We will manage her primary hyperparathyroidism medically.    For her low bone mineral density and  multiple prior fragility fractures we will continue Prolia 60 mg every 6 months. Her next dose is due June 27, 2023 and is scheduled.  We reviewed potential adverse effects including osteonecrosis of the jaw, atypical femoral fracture, etc.  No need to monitor with DEXA since this will not .    Currently her 24-hour urine calcium and vitamin D are excellent and she will not change her vitamin D supplementation or calcium intake.    We will recheck minerals and creatinine and vitamin D prior to her next dose of Prolia.    She will monitor for symptoms of hypercalcemia and if these develop we can use Cinacalcet as needed.    Maintain hydration and avoid thiazide diuretics.    Return to see me in 1 year.    # Thyroid function    She is on levothyroxine 25 mcg daily.  It would be perfectly fine to stop this.  We will check her TSH in a few months as well.    # Medication management    I also think it would be reasonable for her to stop vitamin C, coenzyme Q 10, and her fish oil.  She has had hypertriglyceridemia in the past and so with her next labs we will check this to make sure it remains less than 500 mg/deciliter with stopping fish oil.  She will review this with her primary physician as well.    55 minutes spent on the date of the encounter doing chart review, history and exam, documentation and further activities as noted above.         Again, thank you for allowing me to participate in the care of your patient.        Sincerely,        Eric Christensen MD

## 2023-04-22 ASSESSMENT — ANXIETY QUESTIONNAIRES
GAD7 TOTAL SCORE: 4
5. BEING SO RESTLESS THAT IT IS HARD TO SIT STILL: NOT AT ALL
IF YOU CHECKED OFF ANY PROBLEMS ON THIS QUESTIONNAIRE, HOW DIFFICULT HAVE THESE PROBLEMS MADE IT FOR YOU TO DO YOUR WORK, TAKE CARE OF THINGS AT HOME, OR GET ALONG WITH OTHER PEOPLE: NOT DIFFICULT AT ALL
2. NOT BEING ABLE TO STOP OR CONTROL WORRYING: SEVERAL DAYS
3. WORRYING TOO MUCH ABOUT DIFFERENT THINGS: SEVERAL DAYS
GAD7 TOTAL SCORE: 4
GAD7 TOTAL SCORE: 4
4. TROUBLE RELAXING: SEVERAL DAYS
8. IF YOU CHECKED OFF ANY PROBLEMS, HOW DIFFICULT HAVE THESE MADE IT FOR YOU TO DO YOUR WORK, TAKE CARE OF THINGS AT HOME, OR GET ALONG WITH OTHER PEOPLE?: NOT DIFFICULT AT ALL
6. BECOMING EASILY ANNOYED OR IRRITABLE: NOT AT ALL
1. FEELING NERVOUS, ANXIOUS, OR ON EDGE: SEVERAL DAYS
7. FEELING AFRAID AS IF SOMETHING AWFUL MIGHT HAPPEN: NOT AT ALL
7. FEELING AFRAID AS IF SOMETHING AWFUL MIGHT HAPPEN: NOT AT ALL

## 2023-04-22 ASSESSMENT — PATIENT HEALTH QUESTIONNAIRE - PHQ9
SUM OF ALL RESPONSES TO PHQ QUESTIONS 1-9: 7
10. IF YOU CHECKED OFF ANY PROBLEMS, HOW DIFFICULT HAVE THESE PROBLEMS MADE IT FOR YOU TO DO YOUR WORK, TAKE CARE OF THINGS AT HOME, OR GET ALONG WITH OTHER PEOPLE: NOT DIFFICULT AT ALL
SUM OF ALL RESPONSES TO PHQ QUESTIONS 1-9: 7

## 2023-04-28 ENCOUNTER — OFFICE VISIT (OUTPATIENT)
Dept: FAMILY MEDICINE | Facility: CLINIC | Age: 88
End: 2023-04-28
Payer: MEDICARE

## 2023-04-28 VITALS
SYSTOLIC BLOOD PRESSURE: 150 MMHG | WEIGHT: 184.6 LBS | DIASTOLIC BLOOD PRESSURE: 75 MMHG | HEIGHT: 65 IN | BODY MASS INDEX: 30.75 KG/M2 | HEART RATE: 69 BPM | TEMPERATURE: 97.5 F | OXYGEN SATURATION: 94 % | RESPIRATION RATE: 12 BRPM

## 2023-04-28 DIAGNOSIS — Z79.899 POLYPHARMACY: ICD-10-CM

## 2023-04-28 DIAGNOSIS — F33.42 RECURRENT MAJOR DEPRESSIVE DISORDER, IN FULL REMISSION (H): Primary | ICD-10-CM

## 2023-04-28 DIAGNOSIS — E53.8 LOW SERUM VITAMIN B12: ICD-10-CM

## 2023-04-28 DIAGNOSIS — N18.9 ACUTE KIDNEY INJURY SUPERIMPOSED ON CKD (H): ICD-10-CM

## 2023-04-28 DIAGNOSIS — I10 BENIGN ESSENTIAL HYPERTENSION: ICD-10-CM

## 2023-04-28 DIAGNOSIS — G50.0 TRIGEMINAL NEURALGIA: ICD-10-CM

## 2023-04-28 DIAGNOSIS — E11.42 TYPE 2 DIABETES MELLITUS WITH DIABETIC POLYNEUROPATHY, WITHOUT LONG-TERM CURRENT USE OF INSULIN (H): ICD-10-CM

## 2023-04-28 DIAGNOSIS — I35.1 NONRHEUMATIC AORTIC VALVE INSUFFICIENCY: ICD-10-CM

## 2023-04-28 DIAGNOSIS — N17.9 ACUTE KIDNEY INJURY SUPERIMPOSED ON CKD (H): ICD-10-CM

## 2023-04-28 DIAGNOSIS — E03.8 SUBCLINICAL HYPOTHYROIDISM: ICD-10-CM

## 2023-04-28 DIAGNOSIS — I73.9 PAD (PERIPHERAL ARTERY DISEASE) (H): ICD-10-CM

## 2023-04-28 DIAGNOSIS — E11.9 TYPE 2 DIABETES MELLITUS WITHOUT COMPLICATION, UNSPECIFIED WHETHER LONG TERM INSULIN USE (H): ICD-10-CM

## 2023-04-28 DIAGNOSIS — G20.A1 PARKINSON'S DISEASE (H): ICD-10-CM

## 2023-04-28 PROBLEM — I25.10 CORONARY ARTERY DISEASE INVOLVING NATIVE CORONARY ARTERY WITHOUT ANGINA PECTORIS, UNSPECIFIED WHETHER NATIVE OR TRANSPLANTED HEART: Status: RESOLVED | Noted: 2022-08-17 | Resolved: 2023-04-28

## 2023-04-28 LAB — HBA1C MFR BLD: 6.4 % (ref 0–5.6)

## 2023-04-28 PROCEDURE — 83036 HEMOGLOBIN GLYCOSYLATED A1C: CPT | Performed by: FAMILY MEDICINE

## 2023-04-28 PROCEDURE — 82043 UR ALBUMIN QUANTITATIVE: CPT | Performed by: FAMILY MEDICINE

## 2023-04-28 PROCEDURE — 82607 VITAMIN B-12: CPT | Performed by: FAMILY MEDICINE

## 2023-04-28 PROCEDURE — 36415 COLL VENOUS BLD VENIPUNCTURE: CPT | Performed by: FAMILY MEDICINE

## 2023-04-28 PROCEDURE — 99214 OFFICE O/P EST MOD 30 MIN: CPT | Performed by: FAMILY MEDICINE

## 2023-04-28 PROCEDURE — 82570 ASSAY OF URINE CREATININE: CPT | Performed by: FAMILY MEDICINE

## 2023-04-28 ASSESSMENT — ANXIETY QUESTIONNAIRES: GAD7 TOTAL SCORE: 4

## 2023-04-28 ASSESSMENT — PATIENT HEALTH QUESTIONNAIRE - PHQ9
SUM OF ALL RESPONSES TO PHQ QUESTIONS 1-9: 7
10. IF YOU CHECKED OFF ANY PROBLEMS, HOW DIFFICULT HAVE THESE PROBLEMS MADE IT FOR YOU TO DO YOUR WORK, TAKE CARE OF THINGS AT HOME, OR GET ALONG WITH OTHER PEOPLE: NOT DIFFICULT AT ALL

## 2023-04-28 NOTE — ASSESSMENT & PLAN NOTE
Hypertension  bp today is 150/75 but no change in meds as she was low yesterday with bp of 90/70 - so no change.

## 2023-04-28 NOTE — ASSESSMENT & PLAN NOTE
Controlled on fluoxitine 20 mg po bid, PHQ-9 is 7 today and ALLI is 4.  No change in plan desired by patient.  We will continue current plan.

## 2023-04-28 NOTE — ASSESSMENT & PLAN NOTE
>>ASSESSMENT AND PLAN FOR RECURRENT MAJOR DEPRESSIVE DISORDER, IN FULL REMISSION (H24) WRITTEN ON 4/28/2023  2:47 PM BY ASHLY MORRISON MD    Controlled on fluoxitine 20 mg po bid, PHQ-9 is 7 today and ALLI is 4.  No change in plan desired by patient.  We will continue current plan.

## 2023-04-28 NOTE — PROGRESS NOTES
"  Assessment & Plan   Problem List Items Addressed This Visit        Nervous and Auditory    Parkinson's disease (H)    Trigeminal neuralgia    Type 2 diabetes mellitus with diabetic polyneuropathy, without long-term current use of insulin (H)     Diabetes type 2-well controlled-today's A1c is 6.4.  Eye exam is due and referral is placed.            Endocrine    Subclinical hypothyroidism     Subclinical hypothyroidism - saw endocrinologist - Dr. Christensen.   She is on levothyroxine 25 mcg daily.  It would be perfectly fine to stop this.  We will check her TSH in a few months as well.            Circulatory    Benign essential hypertension     Hypertension  bp today is 150/75 but no change in meds as she was low yesterday with bp of 90/70 - so no change.          Aortic valve insufficiency     Aortic valve insufficiency - plan for cardiology first week of June.          PAD (peripheral artery disease) (H)     See podiatry note 5/2/2022            Urinary    Acute kidney injury superimposed on CKD (H)    Relevant Orders    Albumin Random Urine Quantitative with Creat Ratio       Behavioral    Recurrent major depressive disorder, in full remission (H) - Primary     Controlled on fluoxitine 20 mg po bid, PHQ-9 is 7 today and ALLI is 4.  No change in plan desired by patient.  We will continue current plan.            Other    Polypharmacy    Relevant Orders    Med Therapy Management Referral   Other Visit Diagnoses     Low serum vitamin B12                BMI:   Estimated body mass index is 30.72 kg/m  as calculated from the following:    Height as of this encounter: 1.651 m (5' 5\").    Weight as of this encounter: 83.7 kg (184 lb 9.6 oz).   Weight management plan: Discussed healthy diet and exercise guidelines      Maggie Arriaga MD  [facility]    Ally Marrero is a 89 year old, presenting for the following health issues:  Follow Up (Diabetes; HTN; Depression; Anxiety)        4/28/2023    "  2:19 PM   Additional Questions   Roomed by MATHEUS Guillaume   Accompanied by Daughter         4/28/2023     2:19 PM   Patient Reported Additional Medications   Patient reports taking the following new medications Self     History of Present Illness       Mental Health Follow-up:  Patient presents to follow-up on Depression & Anxiety.Patient's depression since last visit has been:  No change  The patient is not having other symptoms associated with depression.  Patient's anxiety since last visit has been:  No change  The patient is not having other symptoms associated with anxiety.  Any significant life events: No  Patient is not feeling anxious or having panic attacks.  Patient has no concerns about alcohol or drug use.    Diabetes:   She presents for follow up of diabetes.  She is not checking blood glucose. She has no concerns regarding her diabetes at this time.  She is not experiencing numbness or burning in feet, excessive thirst, blurry vision, weight changes or redness, sores or blisters on feet. The patient has had a diabetic eye exam in the last 12 months. Eye exam performed on 2022. Location of last eye exam [place].        Hypertension: She presents for follow up of hypertension.  She does check blood pressure  regularly outside of the clinic. Outpatient blood pressures have not been over 140/90. She does not follow a low salt diet.     She eats 0-1 servings of fruits and vegetables daily.She consumes 0 sweetened beverage(s) daily.She exercises with enough effort to increase her heart rate 9 or less minutes per day.  She exercises with enough effort to increase her heart rate 3 or less days per week.   She is taking medications regularly.    Today's PHQ-9         PHQ-9 Total Score: 7    PHQ-9 Q9 Thoughts of better off dead/self-harm past 2 weeks :   Not at all    How difficult have these problems made it for you to do your work, take care of things at home, or get along with other people: Not difficult at  "all  Today's ALLI-7 Score: 4           Objective    BP (!) 150/75   Pulse 69   Temp 97.5  F (36.4  C) (Oral)   Resp 12   Ht 1.651 m (5' 5\")   Wt 83.7 kg (184 lb 9.6 oz)   LMP  (LMP Unknown)   SpO2 94%   Breastfeeding No   BMI 30.72 kg/m    Body mass index is 30.72 kg/m .  Physical Exam  Constitutional:       Appearance: Normal appearance.   HENT:      Head: Normocephalic and atraumatic.   Cardiovascular:      Rate and Rhythm: Normal rate and regular rhythm.   Pulmonary:      Effort: Pulmonary effort is normal.   Musculoskeletal:         General: Normal range of motion.      Cervical back: Normal range of motion and neck supple.   Neurological:      General: No focal deficit present.      Mental Status: She is alert and oriented to person, place, and time.            "

## 2023-04-28 NOTE — ASSESSMENT & PLAN NOTE
Subclinical hypothyroidism - saw endocrinologist - Dr. Christensen.   She is on levothyroxine 25 mcg daily.  It would be perfectly fine to stop this.  We will check her TSH in a few months as well.

## 2023-04-29 LAB
CREAT UR-MCNC: 59.5 MG/DL
MICROALBUMIN UR-MCNC: 30.7 MG/L
MICROALBUMIN/CREAT UR: 51.6 MG/G CR (ref 0–25)
VIT B12 SERPL-MCNC: 1497 PG/ML (ref 232–1245)

## 2023-05-02 ENCOUNTER — TELEPHONE (OUTPATIENT)
Dept: FAMILY MEDICINE | Facility: CLINIC | Age: 88
End: 2023-05-02
Payer: MEDICARE

## 2023-05-02 ENCOUNTER — MYC MEDICAL ADVICE (OUTPATIENT)
Dept: FAMILY MEDICINE | Facility: CLINIC | Age: 88
End: 2023-05-02
Payer: MEDICARE

## 2023-05-02 NOTE — TELEPHONE ENCOUNTER
[id number] message sent with MTM information and scheduling phone number.       Kyleigh Randle, PharmD, BCACP  Medication Management (MTM) Pharmacist  [facility]

## 2023-05-02 NOTE — TELEPHONE ENCOUNTER
MTM referral from: [facility]3] visit (referral by provider) [email or web address]    MTM referral outreach attempt #2 on May 2, 2023 at 12:16 PM      Outcome: Patient not reachable after several attempts-voice message were left, will route to MTM Pharmacist/Provider as an FYI.  [facility] scheduling number is [phone].  Thank you for the referral.    Puja Mares, [facility]     Patient has VBC coverage

## 2023-05-04 ENCOUNTER — E-VISIT (OUTPATIENT)
Dept: URGENT CARE | Facility: CLINIC | Age: 88
End: 2023-05-04
Payer: MEDICARE

## 2023-05-04 ENCOUNTER — HOSPITAL ENCOUNTER (OUTPATIENT)
Dept: GENERAL RADIOLOGY | Facility: HOSPITAL | Age: 88
Discharge: HOME | End: 2023-05-04
Attending: STUDENT IN AN ORGANIZED HEALTH CARE EDUCATION/TRAINING PROGRAM | Admitting: STUDENT IN AN ORGANIZED HEALTH CARE EDUCATION/TRAINING PROGRAM
Payer: MEDICARE

## 2023-05-04 ENCOUNTER — OFFICE VISIT (OUTPATIENT)
Dept: FAMILY MEDICINE | Facility: CLINIC | Age: 88
End: 2023-05-04
Payer: MEDICARE

## 2023-05-04 VITALS
TEMPERATURE: 98.4 F | DIASTOLIC BLOOD PRESSURE: 77 MMHG | HEART RATE: 60 BPM | RESPIRATION RATE: 12 BRPM | SYSTOLIC BLOOD PRESSURE: 136 MMHG | OXYGEN SATURATION: 92 %

## 2023-05-04 DIAGNOSIS — R05.1 ACUTE COUGH: Primary | ICD-10-CM

## 2023-05-04 DIAGNOSIS — R06.2 WHEEZING: ICD-10-CM

## 2023-05-04 DIAGNOSIS — R05.1 ACUTE COUGH: ICD-10-CM

## 2023-05-04 LAB
FLUAV AG SPEC QL IA: NEGATIVE
FLUBV AG SPEC QL IA: NEGATIVE

## 2023-05-04 PROCEDURE — U0005 INFEC AGEN DETEC AMPLI PROBE: HCPCS | Performed by: STUDENT IN AN ORGANIZED HEALTH CARE EDUCATION/TRAINING PROGRAM

## 2023-05-04 PROCEDURE — 99214 OFFICE O/P EST MOD 30 MIN: CPT | Mod: CS | Performed by: STUDENT IN AN ORGANIZED HEALTH CARE EDUCATION/TRAINING PROGRAM

## 2023-05-04 PROCEDURE — U0003 INFECTIOUS AGENT DETECTION BY NUCLEIC ACID (DNA OR RNA); SEVERE ACUTE RESPIRATORY SYNDROME CORONAVIRUS 2 (SARS-COV-2) (CORONAVIRUS DISEASE [COVID-19]), AMPLIFIED PROBE TECHNIQUE, MAKING USE OF HIGH THROUGHPUT TECHNOLOGIES AS DESCRIBED BY CMS-2020-01-R: HCPCS | Performed by: STUDENT IN AN ORGANIZED HEALTH CARE EDUCATION/TRAINING PROGRAM

## 2023-05-04 PROCEDURE — 87804 INFLUENZA ASSAY W/OPTIC: CPT | Performed by: STUDENT IN AN ORGANIZED HEALTH CARE EDUCATION/TRAINING PROGRAM

## 2023-05-04 PROCEDURE — 99207 PR NON-BILLABLE SERV PER CHARTING: CPT | Performed by: PHYSICIAN ASSISTANT

## 2023-05-04 PROCEDURE — 94640 AIRWAY INHALATION TREATMENT: CPT | Mod: 76 | Performed by: STUDENT IN AN ORGANIZED HEALTH CARE EDUCATION/TRAINING PROGRAM

## 2023-05-04 PROCEDURE — 71046 X-RAY EXAM CHEST 2 VIEWS: CPT

## 2023-05-04 RX ORDER — PREDNISONE 20 MG/1
40 TABLET ORAL DAILY
Qty: 10 TABLET | Refills: 0 | Status: ON HOLD | OUTPATIENT
Start: 2023-05-04 | End: 2023-05-12

## 2023-05-04 RX ORDER — ALBUTEROL SULFATE 90 UG/1
2 AEROSOL, METERED RESPIRATORY (INHALATION) EVERY 6 HOURS PRN
Qty: 18 G | Refills: 1 | Status: CANCELLED | OUTPATIENT
Start: 2023-05-04

## 2023-05-04 RX ORDER — ALBUTEROL SULFATE 0.83 MG/ML
2.5 SOLUTION RESPIRATORY (INHALATION) ONCE
Status: COMPLETED | OUTPATIENT
Start: 2023-05-04 | End: 2023-05-04

## 2023-05-04 RX ORDER — PREDNISONE 20 MG/1
40 TABLET ORAL DAILY
Qty: 10 TABLET | Refills: 0 | Status: CANCELLED | OUTPATIENT
Start: 2023-05-04 | End: 2023-05-09

## 2023-05-04 RX ORDER — ALBUTEROL SULFATE 0.83 MG/ML
2.5 SOLUTION RESPIRATORY (INHALATION) EVERY 6 HOURS PRN
Qty: 90 ML | Refills: 0 | Status: SHIPPED | OUTPATIENT
Start: 2023-05-04 | End: 2023-08-11

## 2023-05-04 RX ADMIN — ALBUTEROL SULFATE 2.5 MG: 0.83 SOLUTION RESPIRATORY (INHALATION) at 20:22

## 2023-05-04 RX ADMIN — ALBUTEROL SULFATE 2.5 MG: 0.83 SOLUTION RESPIRATORY (INHALATION) at 19:59

## 2023-05-04 NOTE — PATIENT INSTRUCTIONS
Dear Janes Montero,    We are sorry you are not feeling well. Based on the responses you provided, it is recommended that you be seen in-person in urgent care so we can better evaluate your symptoms. Please click here to find the nearest urgent care location to you.   You will not be charged for this Visit. Thank you for trusting us with your care.    Kayla Lee PA-C

## 2023-05-05 LAB — SARS-COV-2 RNA RESP QL NAA+PROBE: NEGATIVE

## 2023-05-05 NOTE — PROGRESS NOTES
ASSESSMENT & PLAN:   Diagnoses and all orders for this visit:  Acute cough  -     XR Chest 2 Views; Future  -     Influenza A/B antigen  -     Symptomatic COVID-19 Virus (Coronavirus) by PCR Nose  -     albuterol (PROVENTIL) neb solution 2.5 mg  -     albuterol (PROVENTIL) neb solution 2.5 mg  Wheezing  -     predniSONE (DELTASONE) 20 MG tablet; Take 2 tablets (40 mg) by mouth daily for 5 days  -     Nebulizer and Supplies Order for DME - ONLY FOR DME  -     albuterol (PROVENTIL) (2.5 MG/3ML) 0.083% neb solution; Take 1 vial (2.5 mg) by nebulization every 6 hours as needed for shortness of breath, wheezing or cough    Productive cough, shortness of breath x 4 days. No history of asthma or COPD. On exam, diffuse expiratory wheezing. Chest XR shows chronic interstitial thickening with no focal consolidations. O2 initially 91%, noted that in the past she runs around 94-95%% at baseline. Albuterol nebulizer in clinic x 2 which almost completely resolved wheezing, O2 to 92%, and improved her shortness of breath. Influenza test negative. Covid test pending. Will treat with prednisone burst x 5 days, albuterol nebulizer as needed. Return precautions given. Advised follow-up with PCP if persistent symptoms.    Return in about 1 week (around 5/11/2023) for follow-up with PCP if symptoms persist.    Patient Instructions   Take prednisone as directed.  May use albuterol inhaler as needed for cough/wheezing.  You should follow-up with your PCP in 7-10 days if having continued symptoms.   You should be reevaluated sooner if you develop worsening shortness of breath, wheezing, chest pain, fever, chills.       At the end of the encounter, I discussed results, diagnosis, medications. Discussed red flags for immediate return to clinic/ER, as well as indications for follow up if no improvement. Patient and/or caregiver understood and agreed to plan. Patient was stable for  discharge.    ------------------------------------------------------------------------  SUBJECTIVE  Patient presents with:  Cough: Since Sunday, coughing up mucus, SOB, no chest pain, headaches from coughing too much, no fever, wheezing    HPI  Janes Montero is a(n) 89 year old female presenting to urgent care for cough x 4 days. Cough is productive with mucus. No hemoptysis. Endorses shortness of breath, even at rest. Also has wheezing. No fever, chills, chest pain, congestion, rhinorrhea, sore throat. No history of asthma or COPD. No history of smoking. She lives in an assisted living home and sits at a table with someone who has been coughing recently.    Review of Systems    Current Outpatient Medications   Medication Sig Dispense Refill     albuterol (PROVENTIL) (2.5 MG/3ML) 0.083% neb solution Take 1 vial (2.5 mg) by nebulization every 6 hours as needed for shortness of breath, wheezing or cough 90 mL 0     ascorbic acid, vitamin C, (ASCORBIC ACID WITH ELLIOTT HIPS) 500 MG tablet [ASCORBIC ACID, VITAMIN C, (ASCORBIC ACID WITH ELLIOTT HIPS) 500 MG TABLET] Take 500 mg by mouth daily.       aspirin 81 mg chewable tablet [ASPIRIN 81 MG CHEWABLE TABLET] Chew 81 mg at bedtime.       atorvastatin (LIPITOR) 20 MG tablet TAKE 1 TABLET BY MOUTH AT  BEDTIME 90 tablet 3     carbidopa-levodopa (SINEMET)  MG tablet TAKE 1 TABLET BY MOUTH 3  TIMES DAILY TAKE AT LEAST  1/2 HOUR BEFORE MEALS OR 2  HOURS AFTER MEALS DO NOT  MIX WITH FOOD 270 tablet 3     carvedilol (COREG) 12.5 MG tablet TAKE 1 TABLET BY MOUTH  TWICE DAILY WITH MEALS 180 tablet 3     cholecalciferol, vitamin D3, 1,000 unit tablet [CHOLECALCIFEROL, VITAMIN D3, 1,000 UNIT TABLET] Take 2,000 Units by mouth daily.       coenzyme Q10 (CO Q-10) 100 mg capsule [COENZYME Q10 (CO Q-10) 100 MG CAPSULE] Take 100 mg by mouth 2 (two) times a day.       cyanocobalamin (VITAMIN B-12) 1000 MCG tablet Take 1 tablet (1,000 mcg) by mouth daily 90 tablet 3     FLUoxetine (PROZAC)  20 MG capsule TAKE 1 CAPSULE BY MOUTH  TWICE DAILY 180 capsule 0     fluticasone (FLONASE) 50 MCG/ACT nasal spray Spray 1 spray into both nostrils daily (Patient taking differently: Spray 1 spray into both nostrils daily as needed) 16 g 3     gabapentin (NEURONTIN) 300 MG capsule TAKE 1 CAPSULE BY MOUTH 4  TIMES DAILY 360 capsule 3     levothyroxine (SYNTHROID/LEVOTHROID) 25 MCG tablet TAKE 1 TABLET BY MOUTH  DAILY 90 tablet 2     lisinopril (ZESTRIL) 10 MG tablet Take 1 tablet (10 mg) by mouth daily 90 tablet 3     loratadine (CLARITIN) 10 mg tablet [LORATADINE (CLARITIN) 10 MG TABLET] Take 10 mg by mouth daily.       magnesium oxide 250 mg Tab [MAGNESIUM OXIDE 250 MG TAB] Take 250 mg by mouth daily.       melatonin 1 mg Tab tablet [MELATONIN 1 MG TAB TABLET] Take 3 mg by mouth at bedtime.        multivitamin therapeutic tablet [MULTIVITAMIN THERAPEUTIC TABLET] Take 1 tablet by mouth daily.       nitroglycerin (NITROSTAT) 0.4 MG SL tablet [NITROGLYCERIN (NITROSTAT) 0.4 MG SL TABLET] Place 0.4 mg under the tongue every 5 (five) minutes as needed for chest pain.       nystatin (MYCOSTATIN) 098188 UNIT/GM external cream Apply topically 2 times daily 30 g 3     omega 3-dha-epa-fish oil (FISH OIL) 60- mg cap capsule [OMEGA 3-DHA-EPA-FISH OIL (FISH OIL) 60- MG CAP CAPSULE] Take 2,000 mg by mouth 2 (two) times a day.       polyethylene glycol (MIRALAX) 17 gram packet [POLYETHYLENE GLYCOL (MIRALAX) 17 GRAM PACKET] Take 17 g by mouth daily.       predniSONE (DELTASONE) 20 MG tablet Take 2 tablets (40 mg) by mouth daily for 5 days 10 tablet 0     triamcinolone (KENALOG) 0.025 % external ointment Apply topically 2 times daily 80 g 0     Problem List:  2023-04: Recurrent major depressive disorder, in full remission (H)  2023-04: PAD (peripheral artery disease) (H)  2022-12: Age-related osteoporosis with current pathological fracture   with routine healing  2022-10: Vaginal irritation  2022-10:  "Polypharmacy  2022-08: Acute kidney injury superimposed on CKD (H)  2022-08: Coronary artery disease involving native coronary artery   without angina pectoris, unspecified whether native or transplanted   heart  2022-08: Chest pain, unspecified type  2022-06: Preventative health care  2022-03: Non-seasonal allergic rhinitis due to pollen  2021-09: Vitamin B12 deficiency (non anemic)  2021-07: Age-related osteoporosis without current pathological   fracture  2021-06: Chronic bilateral back pain  2021-06: Depression  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)  2019-03: Mixed incontinence urge and stress (male)(female)  2018-07: Diastolic dysfunction  2018-06: Acute on chronic congestive heart failure (H)  2018-06: Acute on chronic congestive heart failure (H)  2018-04: Primary osteoarthritis involving multiple joints  2016-11: Subclinical hypothyroidism  2016-06: Sensorineural hearing loss (SNHL) of both ears  2016-05: Tricuspid insufficiency  2016-05: Left bundle branch block  2015-06: Parkinson's disease (H)  2013-06: Trigeminal neuralgia  2012-11: CKD (chronic kidney disease) stage 3, GFR 30-59 ml/min (H)  2012-05: GERD (gastroesophageal reflux disease)  2011-05: Coronary artery stenosis  2011-05: Aortic valve insufficiency  2011-05: Mitral valve insufficiency  2011-05: Bilateral carotid artery stenosis  2011-05: Benign essential hypertension  2011-05: Mixed hypercholesterolemia and hypertriglyceridemia  S/P CABG (coronary artery bypass graft)  Acute diastolic congestive heart failure (H)  Hypertensive emergency  Type 2 diabetes mellitus with diabetic polyneuropathy, without long-  term current use of insulin (H)  Acute diastolic congestive heart failure (H)  Hypertensive emergency    Allergies   Allergen Reactions     Alendronate [Alendronate] Unknown     pain     Codeine Hives     Hydrocodone-Acetaminophen Other (See Comments)     Highly sensitive, \"knocks her out and can't wake up\"     Other Drug Allergy " (See Comments)      Risedronate Unknown     Bone pain     Rosuvastatin Muscle Pain (Myalgia)     Simvastatin Muscle Pain (Myalgia)         OBJECTIVE  Vitals:    05/04/23 1913 05/04/23 2039   BP: 136/77    Pulse: 65 60   Resp: 12    Temp: 98.4  F (36.9  C)    TempSrc: Oral    SpO2: 91% 92%     Physical Exam   GENERAL: healthy, alert, no acute distress.   HEAD: normocephalic, atraumatic.  EYE: PERRL. EOMs intact. No scleral injection bilaterally.   EAR: external ear normal. Bilateral ear canals normal and nonpainful. Bilateral TM intact, pearly, translucent without bulging.  NOSE: external nose atraumatic without lesions.  OROPHARYNX: moist mucous membranes. Posterior oropharynx without erythema or exudate. Uvula midline. Patent airway.  LUNGS: slight wheeze audible without stethoscope. Diffuse expiratory wheeze. No crackles, rhonchi, or rales.   CV: regular rate and rhythm. No clicks, murmurs, or rubs.    Xrays were preliminarily reviewed by me - no focal infiltrate.     Results for orders placed or performed during the hospital encounter of 05/04/23   XR Chest 2 Views     Status: None    Narrative    EXAM: XR CHEST 2 VIEWS  LOCATION: [facility]  DATE/TIME: 5/4/2023 7:51 PM CDT    INDICATION: prod cough x 5 days  COMPARISON: 08/15/2022      Impression    IMPRESSION: No focal consolidation or pleural effusion. Stable mild interstitial thickening in the mid to lower lungs likely represents chronic interstitial lung disease. Stable enlarged cardiac silhouette. Median sternotomy. Right shoulder arthroplasty.   Results for orders placed or performed in visit on 05/04/23   Influenza A/B antigen     Status: Normal    Specimen: Nose; Swab   Result Value Ref Range    Influenza A antigen Negative Negative    Influenza B antigen Negative Negative    Narrative    Test results must be correlated with clinical data. If necessary, results should be confirmed by a molecular assay or viral culture.

## 2023-05-05 NOTE — PATIENT INSTRUCTIONS
Take prednisone as directed.  May use albuterol inhaler as needed for cough/wheezing.  You should follow-up with your PCP in 7-10 days if having continued symptoms.   You should be reevaluated sooner if you develop worsening shortness of breath, wheezing, chest pain, fever, chills.

## 2023-05-06 ENCOUNTER — HOSPITAL ENCOUNTER (INPATIENT)
Facility: CLINIC | Age: 88
LOS: 6 days | Discharge: HOME | DRG: 291 | End: 2023-05-12
Attending: EMERGENCY MEDICINE | Admitting: INTERNAL MEDICINE
Payer: MEDICARE

## 2023-05-06 ENCOUNTER — NURSE TRIAGE (OUTPATIENT)
Dept: NURSING | Facility: CLINIC | Age: 88
End: 2023-05-06
Payer: MEDICARE

## 2023-05-06 ENCOUNTER — APPOINTMENT (OUTPATIENT)
Dept: RADIOLOGY | Facility: CLINIC | Age: 88
DRG: 291 | End: 2023-05-06
Attending: EMERGENCY MEDICINE
Payer: MEDICARE

## 2023-05-06 DIAGNOSIS — R09.02 HYPOXIA: ICD-10-CM

## 2023-05-06 DIAGNOSIS — J81.0 ACUTE PULMONARY EDEMA (H): ICD-10-CM

## 2023-05-06 DIAGNOSIS — R05.9 COUGH, UNSPECIFIED TYPE: ICD-10-CM

## 2023-05-06 DIAGNOSIS — I50.43 ACUTE ON CHRONIC COMBINED SYSTOLIC AND DIASTOLIC HEART FAILURE (H): Primary | ICD-10-CM

## 2023-05-06 DIAGNOSIS — I10 BENIGN ESSENTIAL HYPERTENSION: ICD-10-CM

## 2023-05-06 LAB
ALBUMIN SERPL-MCNC: 4.1 G/DL (ref 3.5–5)
ALP SERPL-CCNC: 83 U/L (ref 45–120)
ALT SERPL W P-5'-P-CCNC: 20 U/L (ref 0–45)
ANION GAP SERPL CALCULATED.3IONS-SCNC: 11 MMOL/L (ref 5–18)
ANION GAP SERPL CALCULATED.3IONS-SCNC: 9 MMOL/L (ref 5–18)
AST SERPL W P-5'-P-CCNC: 55 U/L (ref 0–40)
BASE EXCESS BLDV CALC-SCNC: 2.6 MMOL/L
BASOPHILS # BLD AUTO: 0 10E3/UL (ref 0–0.2)
BASOPHILS NFR BLD AUTO: 0 %
BILIRUB SERPL-MCNC: 0.9 MG/DL (ref 0–1)
BNP SERPL-MCNC: 628 PG/ML (ref 0–167)
BUN SERPL-MCNC: 28 MG/DL (ref 8–28)
BUN SERPL-MCNC: 29 MG/DL (ref 8–28)
C REACTIVE PROTEIN LHE: 0.8 MG/DL (ref 0–?)
CALCIUM SERPL-MCNC: 10 MG/DL (ref 8.5–10.5)
CALCIUM SERPL-MCNC: 10.7 MG/DL (ref 8.5–10.5)
CHLORIDE BLD-SCNC: 100 MMOL/L (ref 98–107)
CHLORIDE BLD-SCNC: 98 MMOL/L (ref 98–107)
CO2 SERPL-SCNC: 24 MMOL/L (ref 22–31)
CO2 SERPL-SCNC: 27 MMOL/L (ref 22–31)
CREAT SERPL-MCNC: 1 MG/DL (ref 0.6–1.1)
CREAT SERPL-MCNC: 1.01 MG/DL (ref 0.6–1.1)
D DIMER PPP FEU-MCNC: 0.36 UG/ML FEU (ref 0–0.5)
EOSINOPHIL # BLD AUTO: 0 10E3/UL (ref 0–0.7)
EOSINOPHIL NFR BLD AUTO: 0 %
ERYTHROCYTE [DISTWIDTH] IN BLOOD BY AUTOMATED COUNT: 15.3 % (ref 10–15)
FLUAV RNA SPEC QL NAA+PROBE: NEGATIVE
FLUBV RNA RESP QL NAA+PROBE: NEGATIVE
GFR SERPL CREATININE-BSD FRML MDRD: 53 ML/MIN/1.73M2
GFR SERPL CREATININE-BSD FRML MDRD: 54 ML/MIN/1.73M2
GLUCOSE BLD-MCNC: 236 MG/DL (ref 70–125)
GLUCOSE BLD-MCNC: 271 MG/DL (ref 70–125)
HCO3 BLDV-SCNC: 28 MMOL/L (ref 24–30)
HCT VFR BLD AUTO: 38.9 % (ref 35–47)
HGB BLD-MCNC: 12.8 G/DL (ref 11.7–15.7)
IMM GRANULOCYTES # BLD: 0.1 10E3/UL
IMM GRANULOCYTES NFR BLD: 1 %
LYMPHOCYTES # BLD AUTO: 0.6 10E3/UL (ref 0.8–5.3)
LYMPHOCYTES NFR BLD AUTO: 8 %
MAGNESIUM SERPL-MCNC: 1.8 MG/DL (ref 1.8–2.6)
MAGNESIUM SERPL-MCNC: 1.9 MG/DL (ref 1.8–2.6)
MCH RBC QN AUTO: 29.1 PG (ref 26.5–33)
MCHC RBC AUTO-ENTMCNC: 32.9 G/DL (ref 31.5–36.5)
MCV RBC AUTO: 88 FL (ref 78–100)
MONOCYTES # BLD AUTO: 0.1 10E3/UL (ref 0–1.3)
MONOCYTES NFR BLD AUTO: 2 %
NEUTROPHILS # BLD AUTO: 6.2 10E3/UL (ref 1.6–8.3)
NEUTROPHILS NFR BLD AUTO: 89 %
NRBC # BLD AUTO: 0 10E3/UL
NRBC BLD AUTO-RTO: 0 /100
OXYHGB MFR BLDV: 69.5 % (ref 70–75)
PCO2 BLDV: 47 MM HG (ref 35–50)
PH BLDV: 7.38 [PH] (ref 7.35–7.45)
PLATELET # BLD AUTO: 185 10E3/UL (ref 150–450)
PO2 BLDV: 38 MM HG (ref 25–47)
POTASSIUM BLD-SCNC: 4.7 MMOL/L (ref 3.5–5)
POTASSIUM BLD-SCNC: 5.2 MMOL/L (ref 3.5–5)
PROT SERPL-MCNC: 8.1 G/DL (ref 6–8)
RBC # BLD AUTO: 4.4 10E6/UL (ref 3.8–5.2)
RSV RNA SPEC NAA+PROBE: NEGATIVE
SAO2 % BLDV: 71.2 % (ref 70–75)
SARS-COV-2 RNA RESP QL NAA+PROBE: NEGATIVE
SODIUM SERPL-SCNC: 133 MMOL/L (ref 136–145)
SODIUM SERPL-SCNC: 136 MMOL/L (ref 136–145)
TROPONIN I SERPL-MCNC: 0.03 NG/ML (ref 0–0.29)
TROPONIN I SERPL-MCNC: 0.03 NG/ML (ref 0–0.29)
WBC # BLD AUTO: 7.1 10E3/UL (ref 4–11)

## 2023-05-06 PROCEDURE — 83735 ASSAY OF MAGNESIUM: CPT | Performed by: INTERNAL MEDICINE

## 2023-05-06 PROCEDURE — 84145 PROCALCITONIN (PCT): CPT | Performed by: INTERNAL MEDICINE

## 2023-05-06 PROCEDURE — 36415 COLL VENOUS BLD VENIPUNCTURE: CPT | Performed by: EMERGENCY MEDICINE

## 2023-05-06 PROCEDURE — 99223 1ST HOSP IP/OBS HIGH 75: CPT | Mod: AI | Performed by: INTERNAL MEDICINE

## 2023-05-06 PROCEDURE — C9803 HOPD COVID-19 SPEC COLLECT: HCPCS

## 2023-05-06 PROCEDURE — 999N000157 HC STATISTIC RCP TIME EA 10 MIN

## 2023-05-06 PROCEDURE — 82310 ASSAY OF CALCIUM: CPT | Performed by: EMERGENCY MEDICINE

## 2023-05-06 PROCEDURE — 200N000001 HC R&B ICU

## 2023-05-06 PROCEDURE — 85379 FIBRIN DEGRADATION QUANT: CPT | Performed by: EMERGENCY MEDICINE

## 2023-05-06 PROCEDURE — 71045 X-RAY EXAM CHEST 1 VIEW: CPT

## 2023-05-06 PROCEDURE — 96375 TX/PRO/DX INJ NEW DRUG ADDON: CPT

## 2023-05-06 PROCEDURE — 84484 ASSAY OF TROPONIN QUANT: CPT | Performed by: EMERGENCY MEDICINE

## 2023-05-06 PROCEDURE — 82805 BLOOD GASES W/O2 SATURATION: CPT | Performed by: EMERGENCY MEDICINE

## 2023-05-06 PROCEDURE — 80053 COMPREHEN METABOLIC PANEL: CPT | Performed by: EMERGENCY MEDICINE

## 2023-05-06 PROCEDURE — 87637 SARSCOV2&INF A&B&RSV AMP PRB: CPT | Performed by: EMERGENCY MEDICINE

## 2023-05-06 PROCEDURE — 83880 ASSAY OF NATRIURETIC PEPTIDE: CPT | Performed by: EMERGENCY MEDICINE

## 2023-05-06 PROCEDURE — 99285 EMERGENCY DEPT VISIT HI MDM: CPT | Mod: 25,CS

## 2023-05-06 PROCEDURE — 250N000009 HC RX 250: Performed by: EMERGENCY MEDICINE

## 2023-05-06 PROCEDURE — 86140 C-REACTIVE PROTEIN: CPT | Performed by: EMERGENCY MEDICINE

## 2023-05-06 PROCEDURE — 36415 COLL VENOUS BLD VENIPUNCTURE: CPT | Performed by: INTERNAL MEDICINE

## 2023-05-06 PROCEDURE — 93005 ELECTROCARDIOGRAM TRACING: CPT | Performed by: EMERGENCY MEDICINE

## 2023-05-06 PROCEDURE — 96374 THER/PROPH/DIAG INJ IV PUSH: CPT

## 2023-05-06 PROCEDURE — 94640 AIRWAY INHALATION TREATMENT: CPT

## 2023-05-06 PROCEDURE — 85004 AUTOMATED DIFF WBC COUNT: CPT | Performed by: EMERGENCY MEDICINE

## 2023-05-06 PROCEDURE — 84484 ASSAY OF TROPONIN QUANT: CPT | Performed by: INTERNAL MEDICINE

## 2023-05-06 PROCEDURE — 250N000011 HC RX IP 250 OP 636: Performed by: EMERGENCY MEDICINE

## 2023-05-06 RX ORDER — CARBIDOPA AND LEVODOPA 25; 100 MG/1; MG/1
1 TABLET ORAL 3 TIMES DAILY
Status: DISCONTINUED | OUTPATIENT
Start: 2023-05-07 | End: 2023-05-12 | Stop reason: HOSPADM

## 2023-05-06 RX ORDER — CHLORAL HYDRATE 500 MG
1 CAPSULE ORAL 2 TIMES DAILY
COMMUNITY
End: 2024-04-30

## 2023-05-06 RX ORDER — HYDRALAZINE HYDROCHLORIDE 20 MG/ML
10 INJECTION INTRAMUSCULAR; INTRAVENOUS EVERY 6 HOURS PRN
Status: DISCONTINUED | OUTPATIENT
Start: 2023-05-06 | End: 2023-05-08

## 2023-05-06 RX ORDER — GABAPENTIN 300 MG/1
300 CAPSULE ORAL 4 TIMES DAILY
Status: DISCONTINUED | OUTPATIENT
Start: 2023-05-07 | End: 2023-05-12 | Stop reason: HOSPADM

## 2023-05-06 RX ORDER — IPRATROPIUM BROMIDE AND ALBUTEROL SULFATE 2.5; .5 MG/3ML; MG/3ML
3 SOLUTION RESPIRATORY (INHALATION) ONCE
Status: COMPLETED | OUTPATIENT
Start: 2023-05-06 | End: 2023-05-06

## 2023-05-06 RX ORDER — FUROSEMIDE 10 MG/ML
20 INJECTION INTRAMUSCULAR; INTRAVENOUS EVERY 12 HOURS
Status: DISCONTINUED | OUTPATIENT
Start: 2023-05-06 | End: 2023-05-06

## 2023-05-06 RX ORDER — LISINOPRIL 10 MG/1
10 TABLET ORAL DAILY
Status: DISCONTINUED | OUTPATIENT
Start: 2023-05-06 | End: 2023-05-07

## 2023-05-06 RX ORDER — LEVOTHYROXINE SODIUM 25 UG/1
25 TABLET ORAL DAILY
Status: DISCONTINUED | OUTPATIENT
Start: 2023-05-07 | End: 2023-05-12 | Stop reason: HOSPADM

## 2023-05-06 RX ORDER — ATORVASTATIN CALCIUM 10 MG/1
20 TABLET, FILM COATED ORAL AT BEDTIME
Status: DISCONTINUED | OUTPATIENT
Start: 2023-05-06 | End: 2023-05-12 | Stop reason: HOSPADM

## 2023-05-06 RX ORDER — ENOXAPARIN SODIUM 100 MG/ML
40 INJECTION SUBCUTANEOUS EVERY 24 HOURS
Status: DISCONTINUED | OUTPATIENT
Start: 2023-05-06 | End: 2023-05-08

## 2023-05-06 RX ORDER — FUROSEMIDE 10 MG/ML
20 INJECTION INTRAMUSCULAR; INTRAVENOUS EVERY 12 HOURS
Status: DISCONTINUED | OUTPATIENT
Start: 2023-05-07 | End: 2023-05-09

## 2023-05-06 RX ORDER — HYDRALAZINE HYDROCHLORIDE 20 MG/ML
10 INJECTION INTRAMUSCULAR; INTRAVENOUS ONCE
Status: COMPLETED | OUTPATIENT
Start: 2023-05-06 | End: 2023-05-06

## 2023-05-06 RX ORDER — ASPIRIN 81 MG/1
81 TABLET, CHEWABLE ORAL AT BEDTIME
Status: DISCONTINUED | OUTPATIENT
Start: 2023-05-06 | End: 2023-05-12 | Stop reason: HOSPADM

## 2023-05-06 RX ORDER — LANOLIN ALCOHOL/MO/W.PET/CERES
1000 CREAM (GRAM) TOPICAL DAILY
Status: DISCONTINUED | OUTPATIENT
Start: 2023-05-06 | End: 2023-05-12 | Stop reason: HOSPADM

## 2023-05-06 RX ORDER — CARVEDILOL 6.25 MG/1
12.5 TABLET ORAL 2 TIMES DAILY WITH MEALS
Status: DISCONTINUED | OUTPATIENT
Start: 2023-05-07 | End: 2023-05-12 | Stop reason: HOSPADM

## 2023-05-06 RX ORDER — VITAMIN B COMPLEX
25 TABLET ORAL 2 TIMES DAILY
Status: DISCONTINUED | OUTPATIENT
Start: 2023-05-07 | End: 2023-05-12 | Stop reason: HOSPADM

## 2023-05-06 RX ORDER — LIDOCAINE 40 MG/G
CREAM TOPICAL
Status: DISCONTINUED | OUTPATIENT
Start: 2023-05-06 | End: 2023-05-12 | Stop reason: HOSPADM

## 2023-05-06 RX ORDER — FUROSEMIDE 10 MG/ML
40 INJECTION INTRAMUSCULAR; INTRAVENOUS ONCE
Status: COMPLETED | OUTPATIENT
Start: 2023-05-06 | End: 2023-05-06

## 2023-05-06 RX ORDER — PANTOPRAZOLE SODIUM 40 MG/1
40 TABLET, DELAYED RELEASE ORAL
Status: DISCONTINUED | OUTPATIENT
Start: 2023-05-07 | End: 2023-05-12 | Stop reason: HOSPADM

## 2023-05-06 RX ADMIN — FUROSEMIDE 40 MG: 10 INJECTION, SOLUTION INTRAMUSCULAR; INTRAVENOUS at 20:51

## 2023-05-06 RX ADMIN — IPRATROPIUM BROMIDE AND ALBUTEROL SULFATE 3 ML: .5; 3 SOLUTION RESPIRATORY (INHALATION) at 18:32

## 2023-05-06 RX ADMIN — HYDRALAZINE HYDROCHLORIDE 10 MG: 20 INJECTION, SOLUTION INTRAMUSCULAR; INTRAVENOUS at 20:51

## 2023-05-06 ASSESSMENT — ACTIVITIES OF DAILY LIVING (ADL)
ADLS_ACUITY_SCORE: 35

## 2023-05-06 NOTE — ED TRIAGE NOTES
Pt presents w/ cough and congestion for one week. Pt family member also reports slurred speech earlier in the day. Symptoms have now resolved. Pt SpO2 at 88% w/ audible wheezing in triage, 3 LPM NC applied. MD Torres notified. ABCs intact.      Triage Assessment     Row Name 05/06/23 5884       Triage Assessment (Adult)    Airway WDL WDL       Respiratory WDL    Respiratory WDL X;rhythm/pattern    Rhythm/Pattern, Respiratory shortness of breath       Skin Circulation/Temperature WDL    Skin Circulation/Temperature WDL WDL       Cardiac WDL    Cardiac WDL WDL       Peripheral/Neurovascular WDL    Peripheral Neurovascular WDL WDL       Cognitive/Neuro/Behavioral WDL    Cognitive/Neuro/Behavioral WDL WDL

## 2023-05-06 NOTE — ED PROVIDER NOTES
EMERGENCY DEPARTMENT ENCOUNTER      NAME: Janes Montero  AGE: 89 year old female  YOB: 1933  MRN: [id number]  EVALUATION DATE & TIME: No admission date for patient encounter.    PCP: Maggie Arriaga    ED PROVIDER: Justin Torres M.D.      Chief Complaint   Patient presents with     Shortness of Breath                FINAL IMPRESSION:  1. Acute pulmonary edema (H)    2. Hypoxia          ED COURSE & MEDICAL DECISION MAKIN:11 PM I met with the patient in triage, obtained history, performed an initial exam, and discussed options and plan for diagnostics and treatment here in the ED.     89 year old female presents to the Emergency Department for evaluation of breathing difficulties.  She is accompanied by her daughter.  She has a history of Parkinson's disease, coronary artery disease status post CABG, mitral insufficiency.  She has been worsening with cough and breathing difficulties for the last few days despite initiating a steroid and nebulizers from clinic.  She presented today after apparently having some breathing difficulties and seemed like she was having trouble speaking over the phone.  She is hypoxic in triage here, does not have any oropharyngeal swelling appreciable, is neurologically intact with normal speech and no other deficits here.  She does have some audible wheezing and crackles at the bilateral lung bases.  Labs and imaging were obtained as below.  This appears to point to acute pulmonary edema with an elevated BNP and some vascular congestion with possible trace effusion on her chest x-ray.  Her D-dimer is within normal limits reassuring against pulmonary embolism.  Her other labs including normal VBG, stable hemoglobin, normal white blood cell count, negative COVID and influenza testing.  EKG is nonischemic with a chronic bundle branch block and troponin is within normal limits reassuring against ACS as a primary  of this presentation.  She was given 1 DuoNeb  here without any change in her respiratory status.  She is not in any severe respiratory distress but does require oxygen to maintain saturations in the low to mid 90s.  I am going to start her on some IV diuretic and will admit her for what I believe is new onset congestive heart failure.  Discussed case with hospitalist and updated patient and family on plan of care.    At the conclusion of the encounter I discussed the results of all of the tests and the disposition. The questions were answered. The patient or family acknowledged understanding and was agreeable with the care plan.       Medical Decision Making    History:    Supplemental history from: Documented in chart, if applicable and Family Member/Significant Other    External Record(s) reviewed: Documented in chart, if applicable.    Work Up:    Chart documentation includes differential considered and any EKGs or imaging independently interpreted by provider, where specified.    In additional to work up documented, I considered the following work up: Documented in chart, if applicable.    External consultation:    Discussion of management with another provider: Documented in chart, if applicable    Complicating factors:    Care impacted by chronic illness: Chronic Kidney Disease, Diabetes and Heart Disease    Care affected by social determinants of health: N/A    Disposition considerations: Admit.            MEDICATIONS GIVEN IN THE EMERGENCY:  Medications   ipratropium - albuterol 0.5 mg/2.5 mg/3 mL (DUONEB) neb solution 3 mL (3 mLs Nebulization $Given 5/6/23 1832)   furosemide (LASIX) injection 40 mg (40 mg Intravenous $Given 5/6/23 2051)   hydrALAZINE (APRESOLINE) injection 10 mg (10 mg Intravenous $Given 5/6/23 2051)       NEW PRESCRIPTIONS STARTED AT TODAY'S ER VISIT  New Prescriptions    No medications on file          =================================================================    HPI    Patient information was obtained from: Patient and  daughter    Use of : N/A         Janes Montero is a 89 year old female with a pertinent history of DM2, CAD, Parkinson's disease, HTN, thyroid disease, who presents to this ED via wheelchair with her daughter for evaluation of shortness of breath.    Per daughter, patient was seen at Urgent Care on Thursday for a productive cough that she has had for now a week. She was given nebs and prednisone, which she had never had prior. The daughter called the patient today and it seemed like the patient had a hard time talking, she was not making sense, her speech sounded slurred, and the patient had trouble swallowing and felt like her tongue was swollen. Patient has also had weakness. She uses a walker at baseline. O2 was 88% on room air.    Patient reports she has been coughing hard today. She was concerned she was having an allergic reaction to the albuterol, and was considering stopping taking it. She had a hard time swallowing today due to lots of mucus. Patient endorses she lives in assisted living. She notes she thinks the tongue swelling has gone down. No throat pain or trouble breathing. Patient denies any other current complaints.      REVIEW OF SYSTEMS   All systems reviewed and negative except as noted in HPI.    PAST MEDICAL HISTORY:  Past Medical History:   Diagnosis Date     Acute diastolic congestive heart failure (H)      Acute on chronic congestive heart failure (H) 6/11/2018     Coronary artery disease      Coronary artery disease involving native coronary artery without angina pectoris, unspecified whether native or transplanted heart 8/17/2022     Diabetes mellitus, type II (H)      Diastolic dysfunction 7/10/2018     Frozen shoulder     bilateral     Hypertension      Hypertensive emergency      Parkinson disease (H)      Primary osteoarthritis involving multiple joints      Primary osteoarthritis of left knee      Recurrent UTI     hx septic shock     Thyroid disease 2021       PAST  SURGICAL HISTORY:  Past Surgical History:   Procedure Laterality Date     APPENDECTOMY       APPENDECTOMY       BACK SURGERY      L4-S1 laminectomy     BYPASS GRAFT ARTERY CORONARY      3 vessel      SECTION        SECTION       CV CORONARY ANGIOGRAM N/A 2022    Procedure: Coronary Angiogram;  Surgeon: Ignacio Baird MD;  Location: [facility] CV     HERNIORRHAPHY VENTRAL Left      HYSTERECTOMY       HYSTERECTOMY       JOINT REPLACEMENT Left     Total left knee     OTHER SURGICAL HISTORY      right ankle surgery     OTHER SURGICAL HISTORY      right forearm fracture     REPLACEMENT TOTAL KNEE Right      SHOULDER SURGERY Right     reversed shoulder surgery.           CURRENT MEDICATIONS:    Current Facility-Administered Medications   Medication     denosumab (PROLIA) injection 60 mg     Current Outpatient Medications   Medication     albuterol (PROVENTIL) (2.5 MG/3ML) 0.083% neb solution     aspirin 81 mg chewable tablet     atorvastatin (LIPITOR) 20 MG tablet     carbidopa-levodopa (SINEMET)  MG tablet     carvedilol (COREG) 12.5 MG tablet     cholecalciferol, vitamin D3, 1,000 unit tablet     coenzyme Q10 (CO Q-10) 100 mg capsule     cyanocobalamin (VITAMIN B-12) 1000 MCG tablet     FLUoxetine (PROZAC) 20 MG capsule     gabapentin (NEURONTIN) 300 MG capsule     levothyroxine (SYNTHROID/LEVOTHROID) 25 MCG tablet     lisinopril (ZESTRIL) 10 MG tablet     loratadine (CLARITIN) 10 mg tablet     magnesium oxide 250 mg Tab     melatonin 1 mg Tab tablet     multivitamin therapeutic tablet     nitroglycerin (NITROSTAT) 0.4 MG SL tablet     nystatin (MYCOSTATIN) 628007 UNIT/GM external cream     omega 3-dha-epa-fish oil (FISH OIL) 60- mg cap capsule     polyethylene glycol (MIRALAX) 17 gram packet     predniSONE (DELTASONE) 20 MG tablet     triamcinolone (KENALOG) 0.025 % external ointment         ALLERGIES:  Allergies   Allergen Reactions     Alendronate [Alendronate] Unknown      "pain     Codeine Hives     Hydrocodone-Acetaminophen Other (See Comments)     Highly sensitive, \"knocks her out and can't wake up\"     Other Drug Allergy (See Comments)      Risedronate Unknown     Bone pain     Rosuvastatin Muscle Pain (Myalgia)     Simvastatin Muscle Pain (Myalgia)       FAMILY HISTORY:  Family History   Problem Relation Age of Onset     Heart Disease Mother      Heart Disease Father        SOCIAL HISTORY:   Social History     Socioeconomic History     Marital status:      Spouse name: None     Number of children: None     Years of education: None     Highest education level: None   Tobacco Use     Smoking status: Never     Passive exposure: Never     Smokeless tobacco: Never   Vaping Use     Vaping status: Never Used     Passive vaping exposure: Yes   Substance and Sexual Activity     Alcohol use: No     Drug use: No     Sexual activity: Not Currently     Partners: Male     Birth control/protection: None   Other Topics Concern     Parent/sibling w/ CABG, MI or angioplasty before 65F 55M? No   Social History Narrative    6/15/2021 new to [place] from [place]. She has 6 kids.       VITALS:  BP (!) 169/70   Pulse 66   Temp 97.7  F (36.5  C) (Temporal)   Resp 21   LMP  (LMP Unknown)   SpO2 95%     PHYSICAL EXAM    Constitutional: Elderly female patient, sitting up in bed, no acute distress  HENT: Normocephalic, Atraumatic. Neck Supple. No tongue or oropharyngeal swelling.  Eyes: EOMI, Conjunctiva normal.  Respiratory: Breathing comfortably on room air. Speaks full sentences easily. Lungs clear to ascultation.  Cardiovascular: Normal heart rate, Regular rhythm. Trace peripheral edema.  Abdomen: Soft, nontender  Musculoskeletal: Good range of motion in all major joints. No major deformities noted.  Integument: Warm, Dry.  Neurologic: Fully awake, alert, oriented.  Face is symmetric.  Speech is normal.  Cranial nerves II through XII intact.  Strength is 5 out of 5 throughout bilateral upper and " lower extremities.  Sensation to light touch intact x4.  Patient is ambulatory.  Psychiatric: Cooperative. Affect appropriate.     LAB:  All pertinent labs reviewed and interpreted.  Labs Ordered and Resulted from Time of ED Arrival to Time of ED Departure   BASIC METABOLIC PANEL - Abnormal       Result Value    Sodium 133 (*)     Potassium 5.2 (*)     Chloride 100      Carbon Dioxide (CO2) 24      Anion Gap 9      Urea Nitrogen 28      Creatinine 1.01      Calcium 10.0      Glucose 271 (*)     GFR Estimate 53 (*)    BLOOD GAS VENOUS - Abnormal    pH Venous 7.38      pCO2 Venous 47      pO2 Venous 38      Bicarbonate Venous 28      Base Excess/Deficit (+/-) 2.6      Oxyhemoglobin Venous 69.5 (*)     O2 Sat, Venous 71.2     CRP INFLAMMATION - Abnormal    CRP 0.8 (*)    B-TYPE NATRIURETIC PEPTIDE ([facility] ONLY) - Abnormal     (*)    CBC WITH PLATELETS AND DIFFERENTIAL - Abnormal    WBC Count 7.1      RBC Count 4.40      Hemoglobin 12.8      Hematocrit 38.9      MCV 88      MCH 29.1      MCHC 32.9      RDW 15.3 (*)     Platelet Count 185      % Neutrophils 89      % Lymphocytes 8      % Monocytes 2      % Eosinophils 0      % Basophils 0      % Immature Granulocytes 1      NRBCs per 100 WBC 0      Absolute Neutrophils 6.2      Absolute Lymphocytes 0.6 (*)     Absolute Monocytes 0.1      Absolute Eosinophils 0.0      Absolute Basophils 0.0      Absolute Immature Granulocytes 0.1      Absolute NRBCs 0.0     TROPONIN I - Normal    Troponin I 0.03     INFLUENZA A/B, RSV, & SARS-COV2 PCR - Normal    Influenza A PCR Negative      Influenza B PCR Negative      RSV PCR Negative      SARS CoV2 PCR Negative     D DIMER QUANTITATIVE - Normal    D-Dimer Quantitative 0.36         RADIOLOGY:  Reviewed all pertinent imaging. Please see official radiology report.  XR Chest Port 1 View   Final Result   IMPRESSION: Hypoexpanded lungs. Interstitial prominence and septal thickening suggesting edema. Mild cardiac silhouette  enlargement. Cannot exclude minimal pleural fluid. Prior sternotomy.             EKG:    Performed at: 18:35    Impression: sinus rhythm. Left bundle branch block.    Rate: 83 bpm  Rhythm: sinus  Axis: 27  NH Interval: 162 ms  QRS Interval: 162 ms  QTc Interval: 507 ms  ST Changes: n/a  Comparison: No significant change found when compared to 15-AUG-2022 18:45.    I have independently reviewed and interpreted the EKG(s) documented above.        I, Erika Leonard, am serving as a scribe to document services personally performed by Dr. Justin Torres, based on my observation and the provider's statements to me. I, Justin Torres MD attest that Erika Leonard is acting in a scribe capacity, has observed my performance of the services and has documented them in accordance with my direction.    Justin Torres M.D.  Emergency Medicine  [facility] EMERGENCY ROOM  [facility] [facility] [id number] [phone]  Dept: [phone]     Justin Torres MD  05/06/23 8834

## 2023-05-06 NOTE — ED NOTES
Pt seen in the ed. Pt given duoneb and tolerated it well. Pt on 2 lpm nc 95%. BS exp wheeze with upper airway wheeze post neb.    Regina Swenson, RT

## 2023-05-06 NOTE — TELEPHONE ENCOUNTER
Pt's daughter Tg calling, consent to communicate on file. Pt is not with daughter, unable to triage. Tg is on her way to see pt.    Pt was in [facility] on Thursday, was given prednisone and albuterol neb for cough.  Today, Pt is slurring words, tongue is swollen, unable to speak. Can swallow water.  Tg is wondering if pt should stop medication, will swelling decrease. Unsure if related to medications. Unsure if pt have any other symptoms.     Advised to call back for triage when with pt, or call 911/EMS for immediate evaluation when with pt.    Martin Kent, RN, BSN  5/6/2023 at 5:15 PM  [facility]        Reason for Disposition    Health Information question, no triage required and triager able to answer question    Protocols used: INFORMATION ONLY CALL - NO TRIAGE[facility]      
minimum assist (75% patients effort)

## 2023-05-07 LAB
ALBUMIN SERPL-MCNC: 4 G/DL (ref 3.5–5)
ALBUMIN UR-MCNC: NEGATIVE MG/DL
ALP SERPL-CCNC: 77 U/L (ref 45–120)
ALT SERPL W P-5'-P-CCNC: 45 U/L (ref 0–45)
ANION GAP SERPL CALCULATED.3IONS-SCNC: 10 MMOL/L (ref 5–18)
APPEARANCE UR: CLEAR
AST SERPL W P-5'-P-CCNC: 46 U/L (ref 0–40)
ATRIAL RATE - MUSE: 83 BPM
BACTERIA #/AREA URNS HPF: ABNORMAL /HPF
BASOPHILS # BLD AUTO: 0 10E3/UL (ref 0–0.2)
BASOPHILS NFR BLD AUTO: 0 %
BILIRUB SERPL-MCNC: 1.1 MG/DL (ref 0–1)
BILIRUB UR QL STRIP: NEGATIVE
BUN SERPL-MCNC: 31 MG/DL (ref 8–28)
C REACTIVE PROTEIN LHE: 0.7 MG/DL (ref 0–?)
CALCIUM SERPL-MCNC: 10.6 MG/DL (ref 8.5–10.5)
CHLORIDE BLD-SCNC: 98 MMOL/L (ref 98–107)
CO2 SERPL-SCNC: 27 MMOL/L (ref 22–31)
COLOR UR AUTO: COLORLESS
CREAT SERPL-MCNC: 0.98 MG/DL (ref 0.6–1.1)
DIASTOLIC BLOOD PRESSURE - MUSE: 95 MMHG
EOSINOPHIL # BLD AUTO: 0 10E3/UL (ref 0–0.7)
EOSINOPHIL NFR BLD AUTO: 0 %
ERYTHROCYTE [DISTWIDTH] IN BLOOD BY AUTOMATED COUNT: 15.5 % (ref 10–15)
GFR SERPL CREATININE-BSD FRML MDRD: 55 ML/MIN/1.73M2
GLUCOSE BLD-MCNC: 148 MG/DL (ref 70–125)
GLUCOSE UR STRIP-MCNC: NEGATIVE MG/DL
HCT VFR BLD AUTO: 40.2 % (ref 35–47)
HGB BLD-MCNC: 13.2 G/DL (ref 11.7–15.7)
HGB UR QL STRIP: NEGATIVE
IMM GRANULOCYTES # BLD: 0.1 10E3/UL
IMM GRANULOCYTES NFR BLD: 1 %
INTERPRETATION ECG - MUSE: NORMAL
KETONES UR STRIP-MCNC: NEGATIVE MG/DL
LEUKOCYTE ESTERASE UR QL STRIP: NEGATIVE
LYMPHOCYTES # BLD AUTO: 1.4 10E3/UL (ref 0.8–5.3)
LYMPHOCYTES NFR BLD AUTO: 15 %
MAGNESIUM SERPL-MCNC: 1.8 MG/DL (ref 1.8–2.6)
MCH RBC QN AUTO: 29.5 PG (ref 26.5–33)
MCHC RBC AUTO-ENTMCNC: 32.8 G/DL (ref 31.5–36.5)
MCV RBC AUTO: 90 FL (ref 78–100)
MONOCYTES # BLD AUTO: 0.8 10E3/UL (ref 0–1.3)
MONOCYTES NFR BLD AUTO: 9 %
MUCOUS THREADS #/AREA URNS LPF: PRESENT /LPF
NEUTROPHILS # BLD AUTO: 7.2 10E3/UL (ref 1.6–8.3)
NEUTROPHILS NFR BLD AUTO: 75 %
NITRATE UR QL: POSITIVE
NRBC # BLD AUTO: 0 10E3/UL
NRBC BLD AUTO-RTO: 0 /100
P AXIS - MUSE: 68 DEGREES
PH UR STRIP: 7 [PH] (ref 5–7)
PLATELET # BLD AUTO: 200 10E3/UL (ref 150–450)
POTASSIUM BLD-SCNC: 4.3 MMOL/L (ref 3.5–5)
PR INTERVAL - MUSE: 162 MS
PROCALCITONIN SERPL-MCNC: 0.04 NG/ML (ref 0–0.49)
PROT SERPL-MCNC: 7.7 G/DL (ref 6–8)
QRS DURATION - MUSE: 162 MS
QT - MUSE: 432 MS
QTC - MUSE: 507 MS
R AXIS - MUSE: -27 DEGREES
RBC # BLD AUTO: 4.47 10E6/UL (ref 3.8–5.2)
RBC URINE: 0 /HPF
SODIUM SERPL-SCNC: 135 MMOL/L (ref 136–145)
SP GR UR STRIP: 1.01 (ref 1–1.03)
SQUAMOUS EPITHELIAL: 1 /HPF
SYSTOLIC BLOOD PRESSURE - MUSE: 198 MMHG
T AXIS - MUSE: 110 DEGREES
TROPONIN I SERPL-MCNC: 0.04 NG/ML (ref 0–0.29)
UROBILINOGEN UR STRIP-MCNC: <2 MG/DL
VENTRICULAR RATE- MUSE: 83 BPM
WBC # BLD AUTO: 9.6 10E3/UL (ref 4–11)
WBC URINE: <1 /HPF

## 2023-05-07 PROCEDURE — 250N000013 HC RX MED GY IP 250 OP 250 PS 637: Performed by: INTERNAL MEDICINE

## 2023-05-07 PROCEDURE — 86140 C-REACTIVE PROTEIN: CPT | Performed by: INTERNAL MEDICINE

## 2023-05-07 PROCEDURE — 36415 COLL VENOUS BLD VENIPUNCTURE: CPT | Performed by: INTERNAL MEDICINE

## 2023-05-07 PROCEDURE — 80053 COMPREHEN METABOLIC PANEL: CPT | Performed by: INTERNAL MEDICINE

## 2023-05-07 PROCEDURE — 83735 ASSAY OF MAGNESIUM: CPT | Performed by: INTERNAL MEDICINE

## 2023-05-07 PROCEDURE — 84484 ASSAY OF TROPONIN QUANT: CPT | Performed by: INTERNAL MEDICINE

## 2023-05-07 PROCEDURE — 99233 SBSQ HOSP IP/OBS HIGH 50: CPT | Performed by: INTERNAL MEDICINE

## 2023-05-07 PROCEDURE — 87086 URINE CULTURE/COLONY COUNT: CPT | Performed by: INTERNAL MEDICINE

## 2023-05-07 PROCEDURE — 250N000011 HC RX IP 250 OP 636: Performed by: INTERNAL MEDICINE

## 2023-05-07 PROCEDURE — 85025 COMPLETE CBC W/AUTO DIFF WBC: CPT | Performed by: INTERNAL MEDICINE

## 2023-05-07 PROCEDURE — 200N000001 HC R&B ICU

## 2023-05-07 PROCEDURE — 81001 URINALYSIS AUTO W/SCOPE: CPT | Performed by: INTERNAL MEDICINE

## 2023-05-07 PROCEDURE — 99222 1ST HOSP IP/OBS MODERATE 55: CPT | Performed by: INTERNAL MEDICINE

## 2023-05-07 RX ORDER — GUAIFENESIN AND DEXTROMETHORPHAN HYDROBROMIDE 600; 30 MG/1; MG/1
1 TABLET, EXTENDED RELEASE ORAL 2 TIMES DAILY PRN
Status: COMPLETED | OUTPATIENT
Start: 2023-05-07 | End: 2023-05-07

## 2023-05-07 RX ORDER — NITROGLYCERIN 0.4 MG/1
0.4 TABLET SUBLINGUAL EVERY 5 MIN PRN
Status: DISCONTINUED | OUTPATIENT
Start: 2023-05-07 | End: 2023-05-12 | Stop reason: HOSPADM

## 2023-05-07 RX ORDER — LISINOPRIL 20 MG/1
20 TABLET ORAL DAILY
Status: DISCONTINUED | OUTPATIENT
Start: 2023-05-07 | End: 2023-05-12 | Stop reason: HOSPADM

## 2023-05-07 RX ORDER — MAGNESIUM OXIDE 400 MG/1
400 TABLET ORAL EVERY 4 HOURS
Status: COMPLETED | OUTPATIENT
Start: 2023-05-07 | End: 2023-05-07

## 2023-05-07 RX ORDER — LORATADINE 10 MG/1
10 TABLET ORAL DAILY PRN
Status: DISCONTINUED | OUTPATIENT
Start: 2023-05-07 | End: 2023-05-12 | Stop reason: HOSPADM

## 2023-05-07 RX ORDER — GUAIFENESIN 600 MG/1
600 TABLET, EXTENDED RELEASE ORAL 2 TIMES DAILY
Status: DISCONTINUED | OUTPATIENT
Start: 2023-05-07 | End: 2023-05-12 | Stop reason: HOSPADM

## 2023-05-07 RX ORDER — ALBUTEROL SULFATE 0.83 MG/ML
2.5 SOLUTION RESPIRATORY (INHALATION) EVERY 6 HOURS PRN
Status: DISCONTINUED | OUTPATIENT
Start: 2023-05-07 | End: 2023-05-12 | Stop reason: HOSPADM

## 2023-05-07 RX ORDER — LORATADINE 10 MG/1
10 TABLET ORAL DAILY PRN
Status: DISCONTINUED | OUTPATIENT
Start: 2023-05-07 | End: 2023-05-07

## 2023-05-07 RX ADMIN — FLUOXETINE 20 MG: 20 CAPSULE ORAL at 10:00

## 2023-05-07 RX ADMIN — GABAPENTIN 300 MG: 300 CAPSULE ORAL at 13:34

## 2023-05-07 RX ADMIN — ASPIRIN 81 MG CHEWABLE TABLET 81 MG: 81 TABLET CHEWABLE at 00:11

## 2023-05-07 RX ADMIN — LEVOTHYROXINE SODIUM 25 MCG: 0.03 TABLET ORAL at 06:37

## 2023-05-07 RX ADMIN — Medication 1 MG: at 00:35

## 2023-05-07 RX ADMIN — ASPIRIN 81 MG CHEWABLE TABLET 81 MG: 81 TABLET CHEWABLE at 20:46

## 2023-05-07 RX ADMIN — CYANOCOBALAMIN TAB 1000 MCG 1000 MCG: 1000 TAB at 20:45

## 2023-05-07 RX ADMIN — Medication 400 MG: at 06:37

## 2023-05-07 RX ADMIN — FLUOXETINE 20 MG: 20 CAPSULE ORAL at 20:45

## 2023-05-07 RX ADMIN — GABAPENTIN 300 MG: 300 CAPSULE ORAL at 10:00

## 2023-05-07 RX ADMIN — GUAIFENESIN 600 MG: 600 TABLET ORAL at 20:46

## 2023-05-07 RX ADMIN — LISINOPRIL 20 MG: 20 TABLET ORAL at 20:46

## 2023-05-07 RX ADMIN — CYANOCOBALAMIN TAB 1000 MCG 1000 MCG: 1000 TAB at 00:10

## 2023-05-07 RX ADMIN — CARBIDOPA AND LEVODOPA 1 TABLET: 25; 100 TABLET ORAL at 06:38

## 2023-05-07 RX ADMIN — CARBIDOPA AND LEVODOPA 1 TABLET: 25; 100 TABLET ORAL at 13:34

## 2023-05-07 RX ADMIN — GUAIFENESIN 600 MG: 600 TABLET ORAL at 13:34

## 2023-05-07 RX ADMIN — LORATADINE 10 MG: 10 TABLET ORAL at 04:11

## 2023-05-07 RX ADMIN — ENOXAPARIN SODIUM 40 MG: 100 INJECTION SUBCUTANEOUS at 00:10

## 2023-05-07 RX ADMIN — FUROSEMIDE 20 MG: 10 INJECTION, SOLUTION INTRAMUSCULAR; INTRAVENOUS at 20:46

## 2023-05-07 RX ADMIN — ENOXAPARIN SODIUM 40 MG: 100 INJECTION SUBCUTANEOUS at 23:38

## 2023-05-07 RX ADMIN — GABAPENTIN 300 MG: 300 CAPSULE ORAL at 20:45

## 2023-05-07 RX ADMIN — GABAPENTIN 300 MG: 300 CAPSULE ORAL at 17:31

## 2023-05-07 RX ADMIN — FUROSEMIDE 20 MG: 10 INJECTION, SOLUTION INTRAMUSCULAR; INTRAVENOUS at 10:00

## 2023-05-07 RX ADMIN — HYDRALAZINE HYDROCHLORIDE 10 MG: 20 INJECTION, SOLUTION INTRAMUSCULAR; INTRAVENOUS at 00:35

## 2023-05-07 RX ADMIN — CARBIDOPA AND LEVODOPA 1 TABLET: 25; 100 TABLET ORAL at 20:45

## 2023-05-07 RX ADMIN — ATORVASTATIN CALCIUM 20 MG: 10 TABLET, FILM COATED ORAL at 00:11

## 2023-05-07 RX ADMIN — Medication 400 MG: at 09:59

## 2023-05-07 RX ADMIN — ATORVASTATIN CALCIUM 20 MG: 10 TABLET, FILM COATED ORAL at 20:45

## 2023-05-07 RX ADMIN — PANTOPRAZOLE SODIUM 40 MG: 40 TABLET, DELAYED RELEASE ORAL at 06:37

## 2023-05-07 RX ADMIN — Medication 25 MCG: at 10:00

## 2023-05-07 RX ADMIN — CARVEDILOL 12.5 MG: 6.25 TABLET, FILM COATED ORAL at 09:59

## 2023-05-07 RX ADMIN — GUAIFENESIN AND DEXTROMETHORPHAN HYDROBROMIDE 1 TABLET: 600; 30 TABLET, EXTENDED RELEASE ORAL at 01:12

## 2023-05-07 RX ADMIN — Medication 25 MCG: at 20:46

## 2023-05-07 ASSESSMENT — ACTIVITIES OF DAILY LIVING (ADL)
ADLS_ACUITY_SCORE: 28
ADLS_ACUITY_SCORE: 28
ADLS_ACUITY_SCORE: 32
ADLS_ACUITY_SCORE: 28
ADLS_ACUITY_SCORE: 32
ADLS_ACUITY_SCORE: 32
ADLS_ACUITY_SCORE: 28
ADLS_ACUITY_SCORE: 35

## 2023-05-07 NOTE — ED NOTES
Patient seen by this RN. Stable. Assessment as documented. No signs of distress. Medicated as documented. Patient and daughter updated by this RN and Provider on plan of care. Will continue to monitor.

## 2023-05-07 NOTE — PROGRESS NOTES
[facility]    Medicine Progress Note - Hospitalist Service    Date of Admission:  5/6/2023    Assessment & Plan   89-year-old female with history of coronary artery disease status post bypass graft surgery with LIMA graft to LAD, mitral insufficiency, aortic stenosis, aortic insufficiency, essential hypertension and dyslipidemia, presenting with acute on chronic heart failure with reduced ejection fraction, acute respiratory failure with hypoxia and echocardiogram results from 4/4/23 indicating severe pulmonary hypertension, and moderate to severe mitral insufficiency/regurgitation.  Patient admitted and responding to diuresis with furosemide.     Acute on chronic heart failure with reduced ejection fraction  Acute respiratory failure with hypoxia  BNP elevated at 628.  Procalcitonin normal at 0.04.  Serial troponins negative x2  Chest x-ray with pulmonary edema and mild cardiac enlargement.  Transthoracic echocardiogram on 4/4/2023 with LVEF of 45 to 50%.  Moderate mitral annular calcification and moderately to severe mitral regurgitation.  Continue supplemental oxygen.  I/O -1.7 L overnight.   Continue furosemide 20 mg IV every 12 hours  PTA medication: Continue carvedilol 12.5 mg twice daily,   Incresae lisinopril 20 mg daily.  Appreciate cardiology recommendations.   Pharm stress stress on Monday.     Productive cough.   Possible URI.   Order mucinex 600 mg po BID  Order nasal saline spray q1h prn congestion.     Pulmonary hypertension,   likely type 2 related to heart failure/valve disease/dysfunction.  Echocardiogram from 4/4/2023 revealed severe pulmonary hypertension with estimated pressure greater than 55 mmHg.    Coronary artery disease  Status post CABG JEOVANY graft to LAD  Coronary angiography August 17, 2022 complete occlusion proximal native LAD, JEOVANY to LAD graft widely patent.  Otherwise mild to moderate disease.  PTA medication: Aspirin 81 mg at bedtime    Mitral  insufficiency  Aortic stenosis  Aortic insufficiency  Echocardiogram indicates progression of mitral insufficiency with moderate to severe mitral regurgitation  Possible DORIS pending cardiology recommendations.    Isolated systolic accelerated essential hypertension  Hypertensive urgency on admission improving.  Blood pressure ranging between 1 55-1 98.  Currently 155/66.  Continue diuresis as above  Continue hydralazine 10 mg IV every 6 hours as needed for SBP greater than 180 mmHg    Abnormal urinalysis  Nitrite positive, few bacteria.  Otherwise unremarkable UA.  Await urine culture results  Will not initiate antibiotic at this time as patient is asymptomatic with no fever.    Hypercalcemia  Calcium 10.6  Repeat metabolic profile in a.m.    Mild elevation in liver function test  AST 46, total bilirubin 1.1.  Repeat hepatic panel in a.m.  Likely secondary to liver congestion from acute heart failure, pulmonary hypertension    Chronic conditions:  Dyslipidemia  PTA medication: Atorvastatin 20 mg at bedtime    Parkinson's disease and trigeminal neuralgia  PTA medication: Carbidopa levodopa  mg 1 tab 3 times daily  Gabapentin 300 mg 4 times daily.     Gastroesophageal reflux disease  PTA medication: Pantoprazole 40 mg daily    Diabetes mellitus type 2 not on pharmacologic therapy  Order insulin aspart medium correction scale 4 times daily ACHS    Hypothyroidism  PTA medication: Levothyroxine 25 mcg daily    Depression and anxiety  PTA medication: Fluoxetine 20 mg twice daily.     Diet: Combination Diet Low Saturated Fat Na <2400mg Diet, No Caffeine Diet    DVT Prophylaxis: Pneumatic Compression Devices  Holguin Catheter: Not present  Lines: None     Cardiac Monitoring: ACTIVE order. Indication: Acute decompensated heart failure (48 hours)  Code Status: No CPR- Do NOT Intubate      Clinically Significant Risk Factors Present on Admission           # Hypercalcemia: Highest Ca = 10.7 mg/dL in last 2 days, will  "monitor as appropriate        # Hypertension: home medication list includes antihypertensive(s)   # Acute Respiratory Failure: Documented O2 saturation < 91%.  Continue supplemental oxygen as needed     # Obesity: Estimated body mass index is 31.8 kg/m  as calculated from the following:    Height as of this encounter: 1.6 m (5' 3\").    Weight as of this encounter: 81.4 kg (179 lb 8 oz).           Disposition Plan      Expected Discharge Date: 05/08/2023                  Travon Bowers DO  Hospitalist Service  [facility]  Securely message with [employer] (more info)  Text page via [facility] Paging/Directory   ______________________________________________________________________    Interval History   Ivia had good response to furosemide IV.  I/O -1.7 L. She complains of productive cough with yellow tinged sputum. Denies chest pain, shortness of breath at rest.  Her table mate has been coughing for 2 weeks.  She lives at an independent senior apartment complex.     Physical Exam   Vital Signs: Temp: 97.9  F (36.6  C) Temp src: Oral BP: (!) 155/66 Pulse: 61   Resp: 20 SpO2: 93 % O2 Device: Nasal cannula Oxygen Delivery: 2 LPM  Weight: 179 lbs 8 oz  Head:    NC/AT   Eyes:    PERRL, conjunctiva/corneas clear.   Nose:   Nares normal, septum midline, mucosa normal, no drainage  or sinus tenderness   Throat:   Lips, mucosa, and tongue normal; teeth and gums normal   Neck:   Supple, symmetrical, trachea midline, no adenopathy;        thyroid:  No enlargement/tenderness/nodules; no carotid    bruit, JVD to jaw.    Lungs:     Decreased bases bilaterally, no wheezes.  Respirations unlabored   Chest wall:    No tenderness, midline sternotomy scar   Heart:    Regular rate and rhythm, S1 and S2 normal, no murmur, rub   or gallop   Abdomen:     Soft, non-tender, bowel sounds active all four quadrants,     no masses, no organomegaly   Extremities:   Extremities normal, atraumatic, no cyanosis or edema   Pulses: "   2+ and symmetric all extremities   Skin:   Skin color, texture, turgor normal, no rashes or lesions   Neurologic:   CNII-XII intact. Normal strength, sensation and reflexes       throughout.  Negative Babinski.      Medical Decision Making     50 MINUTES SPENT BY ME on the date of service doing chart review, history, exam, documentation & further activities per the note.      Data     I have personally reviewed the following data over the past 24 hrs:    9.6  \   13.2   / 200     135 (L) 98 31 (H) /  148 (H)   4.3 27 0.98 \       ALT: 45 AST: 46 (H) AP: 77 TBILI: 1.1 (H)   ALB: 4.0 TOT PROTEIN: 7.7 LIPASE: N/A       Trop: N/A BNP: 628 (H)       Procal: 0.04 CRP: 0.8 (H) Lactic Acid: N/A       INR:  N/A PTT:  N/A   D-dimer:  0.36 Fibrinogen:  N/A       Imaging results reviewed over the past 24 hrs:   Recent Results (from the past 24 hour(s))   XR Chest Port 1 View    Narrative    EXAM: XR CHEST PORT 1 VIEW  LOCATION: [facility]  DATE/TIME: 5/6/2023 7:52 PM CDT    INDICATION: Dyspnea, wheezing, cough, elevated BNP  COMPARISON: 05/04/2023      Impression    IMPRESSION: Hypoexpanded lungs. Interstitial prominence and septal thickening suggesting edema. Mild cardiac silhouette enlargement. Cannot exclude minimal pleural fluid. Prior sternotomy.

## 2023-05-07 NOTE — CONSULTS
CARDIOLOGY CONSULT NOTE         Assessment:   1.  Acute pulmonary edema (H)-agree heart failure, acute on chronic in the setting of midrange ejection fraction of 45 to 50%.  Agree with aggressive diuresis monitoring renal function.  We will check CRP to see if she qualifies for our Zan trial.  Uncertain reason for exacerbation, does not appear to be infarct with normal enzymes, does not appear to be dietary or medication indiscretion, could quite possibly be due to worsening mitral regurgitation although need to rule out worsening ischemia and would arrange for pharmacological stress nuclear in 48 hours.  2.  Coronary artery disease- angiography August 2022 showed normal left main, proximal total occlusion of LAD, circumflex normal with first obtuse marginal artery 50% stenosis, and right coronary artery with a proximal 25% lesion.  This was performed due to abnormal nuclear stress test showing large area of infarct involving the inferolateral wall, inferior wall extending to the apex with a moderate area of stella-infarct ischemia involving the basal inferior and inferolateral walls.  3.  Coronary artery bypass grafting- she has a LIMA to the LAD from 2010.  4.  Cardiomyopathy-ejection fraction was normal 61% in 2018, but down to 45 to 50% in August 2022 as well as April 2023.  Possibly ischemic although could also be due to systemic hypertension.  5.  Essential benign hypertension-blood pressure elevated, will take the liberty of increasing lisinopril.  6.  Mitral regurgitation- moderate to severe based on most recent echo, effective regurgitant orifice area was 0.17 cm  with a volume of 31 cc.  Will increase afterload reduction with increasing lisinopril, might need to be evaluated for mitral valve clip.     Plan:   1.  Increase lisinopril to 20 mg a day.  2.  Continue IV furosemide.  3.  Would arrange for pharmacological stress nuclear to make sure no profound new areas of ischemia.  4.  Angiography was  just done approximately 8 months ago, not sure would repeat that.  5.  Might need to consider DORIS to better visualize mitral valve and consider mitral valve clip.  6.  Can follow with Dr. Adolfo Ricardo primary cardiologist.     Current History:   [facility] has been requested by Dr. Bowers to evaluate Janes Montero  who is a 89 year old year old white female for heart failure.  Patient was seen at bedside with daughter present.  [facility]  For the last week she has had a cough productive of yellow sputum as well as increased shortness of breath.  There is been no PND, orthopnea, chest pains, palpitations, increased weight or peripheral edema.  She went to urgent care, had an uneventful evaluation and went back home.  The day of presentation the daughter called to check in on her and the patient seemed very short of breath and had slurred speech.  She was then brought to the hospital where BNP was noted to be elevated and cardiology consultation was requested.    Past Medical History:     Past Medical History:   Diagnosis Date     Acute on chronic congestive heart failure (H) 6/11/2018     Coronary artery disease with remote MI      Diabetes mellitus, type II (H)      Diastolic dysfunction 7/10/2018     Frozen shoulder     bilateral     Hypertension      Parkinson disease (H)      Primary osteoarthritis involving multiple joints      Primary osteoarthritis of left knee      Recurrent UTI     hx septic shock     Thyroid disease  Pure hypercholesterolemia 2021      Past history is negative for cancer, tuberculosis, rheumatic fever, cerebrovascular accident, chronic kidney disease, peptic ulcer disease, chronic obstructive pulmonary disease.    Past Surgical History:     Past Surgical History:   Procedure Laterality Date     APPENDECTOMY       APPENDECTOMY       BACK SURGERY      L4-S1 laminectomy     BYPASS GRAFT ARTERY CORONARY      3 vessel       SECTION        SECTION       CV CORONARY ANGIOGRAM N/A 2022    Procedure: Coronary Angiogram;  Surgeon: Ignacio Baird MD;  Location: [facility] CV     HERNIORRHAPHY VENTRAL Left      HYSTERECTOMY       HYSTERECTOMY       JOINT REPLACEMENT Left     Total left knee     OTHER SURGICAL HISTORY      right ankle surgery     OTHER SURGICAL HISTORY      right forearm fracture     REPLACEMENT TOTAL KNEE Right      SHOULDER SURGERY Right     reversed shoulder surgery.     Family History:   Reviewed, and positive for possibly aortic valve disease in her father but otherwise negative for coronary artery disease.    Social History:   Reviewed, and she  reports that she has never smoked. She has never been exposed to tobacco smoke. She has never used smokeless tobacco. She reports that she does not drink alcohol and does not use drugs.  She is , lives alone in senior apartment housing independently, and primary physician is Dr. Maggie Arriaga.    Meds:       aspirin  81 mg Oral At Bedtime     atorvastatin  20 mg Oral At Bedtime     carbidopa-levodopa  1 tablet Oral TID     carvedilol  12.5 mg Oral BID w/meals     cyanocobalamin  1,000 mcg Oral Daily     enoxaparin ANTICOAGULANT  40 mg Subcutaneous Q24H     FLUoxetine  20 mg Oral BID     furosemide  20 mg Intravenous Q12H     gabapentin  300 mg Oral 4x Daily     levothyroxine  25 mcg Oral Daily     lisinopril  10 mg Oral Daily     magnesium oxide  200 mg Oral Daily     pantoprazole  40 mg Oral QAM AC     sodium chloride (PF)  3 mL Intracatheter Q8H     Vitamin D3  25 mcg Oral BID     Allergies:   Alendronate [alendronate], Codeine, Hydrocodone-acetaminophen, Other drug allergy (see comments), Risedronate, Rosuvastatin, and Simvastatin    Review of Systems:   A 12 point comprehensive review of systems was  as follows:   General: Positive for fatigue, negative weight loss or weight gain  Constitutional: negative for anorexia, chills, fevers,  "malaise, night sweats   Eyes: negative for cataracts, color blindness, contacts/glasses, glaucoma, icterus, irritation, redness and visual disturbance  Ears, nose, mouth, throat, and face: negative for ear drainage, earaches, epistaxis, facial trauma, hearing loss, hoarseness, nasal congestion, snoring, sore mouth, sore throat, tinnitus and voice change  Respiratory: Positive for cough, dyspnea on exertion, yellow sputum, negative hemoptysis, pleurisy, stridor, wheezing, PND, orthopnea  Cardiovascular: negative for chest pain, chest pressure/discomfort, claudication, dyspnea, exertional chest pressure/discomfort, fatigue, irregular heart beat, lower extremity edema, near-syncope,  palpitations,  syncope   Gastrointestinal: negative for abdominal pain, change in bowel haibits, constipation, diarrhea, dyspepsia, dysphagia, jaundice, melena, nausea, odynophagia, reflux symptoms and vomiting  Genitourinary: negative for decreased stream  Hematologic/lymphatic: negative for bleeding  Musculoskeletal: negative for arthralgias, back pain, bone pain, muscle weakness, myalgias, neck pain and stiff joints  Neurological: negative for syncope, presyncope, coordination problems, dizziness, gait problems, headaches, memory problems, paresthesia, seizures, speech problems, tremors, vertigo and weakness    Objective:     Physical Exam:  BP (!) 172/72   Pulse 72   Temp 98.1  F (36.7  C) (Oral)   Resp 18   Ht 1.6 m (5' 3\")   Wt 81.4 kg (179 lb 8 oz)   LMP  (LMP Unknown)   SpO2 93%   BMI 31.80 kg/m       Head:    Normocephalic, without obvious abnormality, atraumatic   Eyes:    PERRL, conjunctiva/corneas clear, EOM's intact,           Nose:   Nares normal, septum midline, mucosa normal, no drainage  or sinus tenderness   Throat:   Lips, mucosa, and tongue normal; teeth and gums normal   Neck:   Supple, symmetrical, trachea midline, no adenopathy;        thyroid:  No enlargement/tenderness/nodules; no carotid    bruit, to jaw " "at 30 degrees JVD   Back:     Symmetric, no curvature, ROM normal, no CVA tenderness   Lungs:     Decreased bases bilaterally, respirations unlabored   Chest wall:    No tenderness, midline sternotomy scar   Heart:    Regular rate and rhythm, S1 and S2 normal, no murmur, rub   or gallop   Abdomen:     Soft, non-tender, bowel sounds active all four quadrants,     no masses, no organomegaly   Extremities:   Extremities normal, atraumatic, no cyanosis or edema   Pulses:   2+ and symmetric all extremities   Skin:   Skin color, texture, turgor normal, no rashes or lesions   Neurologic:   CNII-XII intact. Normal strength, sensation and reflexes       throughout     Cardiographics and Imaging  Radiology Results: Chest x-ray shows : Hypoexpanded lungs. Interstitial prominence and septal thickening suggesting edema. Mild cardiac silhouette enlargement. Cannot exclude minimal pleural fluid. Prior sternotomy.    EKG Results: personally reviewed sinus tachycardia, left bundle branch block pattern with repolarization changes, no acute changes.    Lab Review   Pertinent Labs  Lab Results: personally reviewed       Lab Results   Component Value Date    TROPONINI 0.04 05/07/2023       Lab Results   Component Value Date    BUN 31 (H) 05/07/2023     (L) 05/07/2023    CO2 27 05/07/2023       Lab Results   Component Value Date    WBC 9.6 05/07/2023    HGB 13.2 05/07/2023    HCT 40.2 05/07/2023    MCV 90 05/07/2023     05/07/2023       Lab Results   Component Value Date     (H) 05/06/2023     Clinically Significant Risk Factors Present on Admission          # Hypercalcemia: Highest Ca = 10.7 mg/dL in last 2 days, will monitor as appropriate         # Obesity: Estimated body mass index is 31.8 kg/m  as calculated from the following:    Height as of this encounter: 1.6 m (5' 3\").    Weight as of this encounter: 81.4 kg (179 lb 8 oz).          "

## 2023-05-07 NOTE — PHARMACY-ADMISSION MEDICATION HISTORY
Pharmacist Admission Medication History    Admission medication history is complete. The information provided in this note is only as accurate as the sources available at the time of the update.    Medication reconciliation/reorder completed by provider prior to medication history? No    Information Source(s): Patient, Family member and CareEverywhere/SureScripts via in-person    Pertinent Information:    - patient started 5 day course of oral prednisone 40mg daily yesterday and took 40mg this AM.     Changes made to PTA medication list:    Added: omeprzole    Deleted: nystatin crm, vitamin c, Flonase    Changed: vit D, loratadine, melatonin, Miralax, fish oil, triamcinolone oint     Medication Affordability:  Not including over the counter (OTC) medications, was there a time in the past 12 months when you did not take your medications as prescribed because of cost?: No    Allergies reviewed with patient and updates made in EHR: yes    Medication History Completed By: Yane Day RPH 5/6/2023 9:58 PM    Prior to Admission medications    Medication Sig Last Dose Taking? Auth Provider Long Term End Date   albuterol (PROVENTIL) (2.5 MG/3ML) 0.083% neb solution Take 1 vial (2.5 mg) by nebulization every 6 hours as needed for shortness of breath, wheezing or cough 5/6/2023 at AM Yes France Pierson PA-C Yes    aspirin 81 mg chewable tablet [ASPIRIN 81 MG CHEWABLE TABLET] Chew 81 mg at bedtime. 5/5/2023 at HS Yes Provider, Historical     atorvastatin (LIPITOR) 20 MG tablet TAKE 1 TABLET BY MOUTH AT  BEDTIME 5/5/2023 at HS Yes Mathew Ricardo MD Yes    carbidopa-levodopa (SINEMET)  MG tablet TAKE 1 TABLET BY MOUTH 3  TIMES DAILY TAKE AT LEAST  1/2 HOUR BEFORE MEALS OR 2  HOURS AFTER MEALS DO NOT  MIX WITH FOOD 5/6/2023 at x3 Yes Brennon Moya MD Yes    carvedilol (COREG) 12.5 MG tablet TAKE 1 TABLET BY MOUTH  TWICE DAILY WITH MEALS 5/5/2023 at x2 Yes Mathew Ricardo MD Yes    cholecalciferol,  vitamin D3, 1,000 unit tablet Take 1,000 Units by mouth 2 times daily 5/6/2023 at x2 Yes Provider, Historical     coenzyme Q10 (CO Q-10) 100 mg capsule [COENZYME Q10 (CO Q-10) 100 MG CAPSULE] Take 100 mg by mouth 2 (two) times a day. 5/6/2023 at x2 Yes Provider, Historical     cyanocobalamin (VITAMIN B-12) 1000 MCG tablet Take 1 tablet (1,000 mcg) by mouth daily 5/5/2023 at hs Yes Brennon Moya MD     fish oil-omega-3 fatty acids 1000 MG capsule Take 1 g by mouth 2 times daily 5/6/2023 at x1 AM Yes Unknown, Entered By History     FLUoxetine (PROZAC) 20 MG capsule TAKE 1 CAPSULE BY MOUTH  TWICE DAILY 5/6/2023 at x2 Yes Maggie Arriaga MD Yes    gabapentin (NEURONTIN) 300 MG capsule TAKE 1 CAPSULE BY MOUTH 4  TIMES DAILY 5/6/2023 at x3 Yes Brennon Moya MD Yes    levothyroxine (SYNTHROID/LEVOTHROID) 25 MCG tablet TAKE 1 TABLET BY MOUTH  DAILY 5/6/2023 at AM Yes Maggie Arriaga MD Yes    lisinopril (ZESTRIL) 10 MG tablet Take 1 tablet (10 mg) by mouth daily 5/5/2023 at HS Yes Maggie Arriaga MD Yes    loratadine (CLARITIN) 10 mg tablet Take 10 mg by mouth daily as needed for allergies More than a month at prn Yes Provider, Historical     magnesium oxide 250 mg Tab [MAGNESIUM OXIDE 250 MG TAB] Take 250 mg by mouth daily. 5/6/2023 at AM Yes Provider, Historical     melatonin 1 mg Tab tablet Take 1 mg by mouth At Bedtime 5/5/2023 at hs Yes Provider, Historical     multivitamin therapeutic tablet Take 1 tablet by mouth every morning 5/6/2023 at AM Yes Provider, Historical     nitroglycerin (NITROSTAT) 0.4 MG SL tablet [NITROGLYCERIN (NITROSTAT) 0.4 MG SL TABLET] Place 0.4 mg under the tongue every 5 (five) minutes as needed for chest pain. never used at never used Yes Provider, Historical Yes    omeprazole (PRILOSEC) 20 MG DR capsule Take 20 mg by mouth daily before breakfast 5/6/2023 at AM Yes Unknown, Entered By History     polyethylene glycol (MIRALAX) 17 gram packet Take 17 g by mouth daily as needed for  constipation Unknown at prn Yes Provider, Historical     predniSONE (DELTASONE) 20 MG tablet Take 2 tablets (40 mg) by mouth daily for 5 days  Patient taking differently: Take 40 mg by mouth daily Just for 5 days, started on 5/5/23 5/6/2023 at AM Yes France Pierson PA-C  5/9/23   triamcinolone (KENALOG) 0.025 % external ointment Apply topically 2 times daily  Patient taking differently: Apply topically 2 times daily as needed for irritation Unknown at prn Yes Maggie Arriaga MD

## 2023-05-07 NOTE — ED NOTES
Patient fed. On external cath. Handoff to Kelly YATES on 3rd floor. Stable at this time. No signs of distress. Taken up to unit by JACQUELYN Casas. Left with all belongings. Daughter notified.

## 2023-05-07 NOTE — PLAN OF CARE
Pt is A&Ox4. Pt denied pain during shift thus far. Pt has frequent congested cough but unable to cough up sputum. Administered mucinex DM with minimal relief. No new skin issues noted. A2 with gait belt walker for transferring. Saline locked. Voiding adequately with purewick in place. Education on medication administration, alarms, and use of call-light to reduce risk for falls and injury. No further issues noted. CMS intact. VSS other than elevated BP. Administered hydralazine and BP went from 191/75 to 164/72. Pt hearing aids in blue cup at bedside.

## 2023-05-07 NOTE — PROGRESS NOTES
Care Management Follow Up    Length of Stay (days): 1    Expected Discharge Date: 05/08/2023     Concerns to be Addressed: discharge planning       Patient plan of care discussed at interdisciplinary rounds: Yes    Anticipated Discharge Disposition: Home  Disposition Comments: home    Anticipated Discharge Services: None    Anticipated Discharge DME: None      Education Provided on the Discharge Plan:  AVS per bedside RN    Patient/Family in Agreement with the Plan: yes        Additional Information:  CM reviewed chart. CM met with patient who denied needs from CM. Patient lives in independent senior living. She get 1 meal daily provided, laundry and monthly cleaning. Patient does her own medications.  Currently on 02 but was not on 02 at baseline and hopeful to discharge without 02. Has not had home care and does not want home care at time of hospital discharge. Family will provide transportation home at time of hospital discharge. CM will follow.       Shabana Harrison RN

## 2023-05-07 NOTE — PLAN OF CARE
Problem: Plan of Care - These are the overarching goals to be used throughout the patient stay.    Goal: Readiness for Transition of Care  Outcome: Progressing     Problem: Gas Exchange Impaired  Goal: Optimal Gas Exchange  Outcome: Progressing     Problem: Hypertension Comorbidity  Goal: Blood Pressure in Desired Range  Outcome: Progressing    Patient A&O.  VSS.  Bps this evening soft.  On 2L O2 via n/c.  Congested cough.  PRN meds available.  Mucinex scheduled. Tolerating PO intake.  AO1 w/walker.  Daughter at bedside and supportive.  Calls appropriately. Continue to monitor.

## 2023-05-07 NOTE — ED NOTES
Patient received from Windy YATES. Stable at time of handoff, no signs of distress per handoff. Pending covid swab, tbd by this RN. To be seen by this RN.

## 2023-05-07 NOTE — H&P
[facility] MEDICINE ADMISSION HISTORY AND PHYSICAL       Assessment & Plan      1. Acute SOB, hypoxia and wheezing --- this could be cardiac wheezing.  D-dimer negative.    She had a recent ECHO -- concerns with EF of 45%,  moderately severe (3+) mitral regurgitation. Severe (>55mmHg) pulmonary hypertension is present. The right ventricular systolic pressure is approximated at 68mmHg plus the right atrial pressure. Compared to the prior study of 8/16/22, the mitral valve regurgitation is now worse.    Had CAD/CABG  - denies chest pain.     - Lasix, BiPAP if not enough  - Cardiology eval - might need mitral clip and eval of severe pulmo HTN   - tele and pulse ox    2. HTN and dyslipidemia  - PRN hydralazine  - PTA meds     3. Has Parkinsons and trigeminal neuralgia   - PTA meds    4. Depression/anxiety   - PTA mds    5. Prolonged QTc  - avoid QTc prolonging drugs         VTE prophylaxis: SCDs,   Diet:  Cardiac   Code Status: DNR/DNI  COVID vaccination: yes  Barriers to discharge: admitting clinical condition  Discharge Disposition and goals:  Unable to determine at this point, pending clinical progress and response to treatment. Patient may need transfer to SNF or ACR if unsafe to go home and needed treatment inappropriate at home setting OR may need home health care evaluation if care can be delivered at home settings. Consider referral to care manager/    PPE - I was wearing PPE when I met the patient including but not limited to - N95 mask, Gloves, and/or Safety glasses.      Care plan was created based on available information and patient's condition at the time of encounter. This was discussed with the patient and/or family members using layman's terms, including counseling/education and they have agreed to proceed. I recommend to revise care plan and to review history if there is change in condition and/or new clinical information that is not available during my  encounter. At the end of night shift, this case will be presented to the AM Hospitalist.         75 minutes spent by me on the date of service doing chart review, history, exam, diagnostic test results interpretation, documentation & further activities per the note.      Carl Conde MD, MPH, FACP, [facility]  Internal Medicine - Hospitalist        Chief Complaint SOB      HISTORY     - I met her in ED-7. I met her daughter too. She is here because of SOB, cough, wheezing and low sat at 88%  - Was seen at urgent care recently - given prednisone and nebs -- no hx of COPD or asthma    - She is back today because she is not better. She was awake and interactive when I met her. Not slurred. She said, she still coughing and SOB. She has O2. She can speak full sentences.    - She has no chest pain. No fevers. No belly pain. No diarrhea.     - She sees cardiology for CAD and MR -- most recent ECHO 4/2023 --- concerns with EF of 45%,  moderately severe (3+) mitral regurgitation. Severe (>55mmHg) pulmonary hypertension is present. The right ventricular  systolic pressure is approximated at 68mmHg plus the right atrial pressure. Compared to the prior study of 8/16/22, the mitral valve regurgitation is now worse.    - In the ED, chest XR suggest edema, she was given lasix. She was also given hydralazine for BP issues    - ROS --- No headache. No dizziness. No weakness.  No palpitations. No abdominal pain. No nausea or vomiting. No urinary symptoms. No bleeding symptoms. No weight loss. Rest of 12 point ROS was reviewed and negative.       Past Medical History     Past Medical History:   Diagnosis Date     Acute diastolic congestive heart failure (H)      Acute on chronic congestive heart failure (H) 6/11/2018     Coronary artery disease      Coronary artery disease involving native coronary artery without angina pectoris, unspecified whether native or transplanted heart 8/17/2022     Diabetes mellitus, type II (H)      Diastolic  dysfunction 7/10/2018     Frozen shoulder     bilateral     Hypertension      Hypertensive emergency      Parkinson disease (H)      Primary osteoarthritis involving multiple joints      Primary osteoarthritis of left knee      Recurrent UTI     hx septic shock     Thyroid disease          Surgical History     Past Surgical History:   Procedure Laterality Date     APPENDECTOMY       APPENDECTOMY       BACK SURGERY      L4-S1 laminectomy     BYPASS GRAFT ARTERY CORONARY      3 vessel      SECTION        SECTION       CV CORONARY ANGIOGRAM N/A 2022    Procedure: Coronary Angiogram;  Surgeon: Ignacio Baird MD;  Location: [facility] CATH LAB CV     HERNIORRHAPHY VENTRAL Left      HYSTERECTOMY       HYSTERECTOMY       JOINT REPLACEMENT Left     Total left knee     OTHER SURGICAL HISTORY      right ankle surgery     OTHER SURGICAL HISTORY      right forearm fracture     REPLACEMENT TOTAL KNEE Right      SHOULDER SURGERY Right     reversed shoulder surgery.        Family History      Family History   Problem Relation Age of Onset     Heart Disease Mother      Heart Disease Father          Social History      .  Social History     Socioeconomic History     Marital status:      Spouse name: Not on file     Number of children: Not on file     Years of education: Not on file     Highest education level: Not on file   Occupational History     Not on file   Tobacco Use     Smoking status: Never     Passive exposure: Never     Smokeless tobacco: Never   Vaping Use     Vaping status: Never Used     Passive vaping exposure: Yes   Substance and Sexual Activity     Alcohol use: No     Drug use: No     Sexual activity: Not Currently     Partners: Male     Birth control/protection: None   Other Topics Concern     Parent/sibling w/ CABG, MI or angioplasty before 65F 55M? No   Social History Narrative    6/15/2021 new to [place] from [place]. She has 6 kids.     Social Determinants of Health     Financial  "Resource Strain: Not on file   Food Insecurity: Not on file   Transportation Needs: Not on file   Physical Activity: Not on file   Stress: Not on file   Social Connections: Not on file   Intimate Partner Violence: Not on file   Housing Stability: Not on file          Allergies        Allergies   Allergen Reactions     Alendronate [Alendronate] Unknown     pain     Codeine Hives     Hydrocodone-Acetaminophen Other (See Comments)     Highly sensitive, \"knocks her out and can't wake up\"     Other Drug Allergy (See Comments)      Risedronate Unknown     Bone pain     Rosuvastatin Muscle Pain (Myalgia)     Simvastatin Muscle Pain (Myalgia)         Prior to Admission Medications      denosumab (PROLIA) injection 60 mg    albuterol (PROVENTIL) (2.5 MG/3ML) 0.083% neb solution, Take 1 vial (2.5 mg) by nebulization every 6 hours as needed for shortness of breath, wheezing or cough  ascorbic acid, vitamin C, (ASCORBIC ACID WITH ELLIOTT HIPS) 500 MG tablet, [ASCORBIC ACID, VITAMIN C, (ASCORBIC ACID WITH ELLIOTT HIPS) 500 MG TABLET] Take 500 mg by mouth daily.  aspirin 81 mg chewable tablet, [ASPIRIN 81 MG CHEWABLE TABLET] Chew 81 mg at bedtime.  atorvastatin (LIPITOR) 20 MG tablet, TAKE 1 TABLET BY MOUTH AT  BEDTIME  carbidopa-levodopa (SINEMET)  MG tablet, TAKE 1 TABLET BY MOUTH 3  TIMES DAILY TAKE AT LEAST  1/2 HOUR BEFORE MEALS OR 2  HOURS AFTER MEALS DO NOT  MIX WITH FOOD  carvedilol (COREG) 12.5 MG tablet, TAKE 1 TABLET BY MOUTH  TWICE DAILY WITH MEALS  cholecalciferol, vitamin D3, 1,000 unit tablet, [CHOLECALCIFEROL, VITAMIN D3, 1,000 UNIT TABLET] Take 2,000 Units by mouth daily.  coenzyme Q10 (CO Q-10) 100 mg capsule, [COENZYME Q10 (CO Q-10) 100 MG CAPSULE] Take 100 mg by mouth 2 (two) times a day.  cyanocobalamin (VITAMIN B-12) 1000 MCG tablet, Take 1 tablet (1,000 mcg) by mouth daily  FLUoxetine (PROZAC) 20 MG capsule, TAKE 1 CAPSULE BY MOUTH  TWICE DAILY  fluticasone (FLONASE) 50 MCG/ACT nasal spray, Spray 1 spray " into both nostrils daily (Patient taking differently: Spray 1 spray into both nostrils daily as needed)  gabapentin (NEURONTIN) 300 MG capsule, TAKE 1 CAPSULE BY MOUTH 4  TIMES DAILY  levothyroxine (SYNTHROID/LEVOTHROID) 25 MCG tablet, TAKE 1 TABLET BY MOUTH  DAILY  lisinopril (ZESTRIL) 10 MG tablet, Take 1 tablet (10 mg) by mouth daily  loratadine (CLARITIN) 10 mg tablet, [LORATADINE (CLARITIN) 10 MG TABLET] Take 10 mg by mouth daily.  magnesium oxide 250 mg Tab, [MAGNESIUM OXIDE 250 MG TAB] Take 250 mg by mouth daily.  melatonin 1 mg Tab tablet, [MELATONIN 1 MG TAB TABLET] Take 3 mg by mouth at bedtime.   multivitamin therapeutic tablet, [MULTIVITAMIN THERAPEUTIC TABLET] Take 1 tablet by mouth daily.  nitroglycerin (NITROSTAT) 0.4 MG SL tablet, [NITROGLYCERIN (NITROSTAT) 0.4 MG SL TABLET] Place 0.4 mg under the tongue every 5 (five) minutes as needed for chest pain.  nystatin (MYCOSTATIN) 896558 UNIT/GM external cream, Apply topically 2 times daily  omega 3-dha-epa-fish oil (FISH OIL) 60- mg cap capsule, [OMEGA 3-DHA-EPA-FISH OIL (FISH OIL) 60- MG CAP CAPSULE] Take 2,000 mg by mouth 2 (two) times a day.  polyethylene glycol (MIRALAX) 17 gram packet, [POLYETHYLENE GLYCOL (MIRALAX) 17 GRAM PACKET] Take 17 g by mouth daily.  predniSONE (DELTASONE) 20 MG tablet, Take 2 tablets (40 mg) by mouth daily for 5 days  triamcinolone (KENALOG) 0.025 % external ointment, Apply topically 2 times daily            Review of Systems     A 12 point comprehensive review of systems was negative except as noted above in HPI.    PHYSICAL EXAMINATION       Vitals      Vitals: BP (!) 185/88   Pulse 74   Temp 97.7  F (36.5  C) (Temporal)   Resp 22   LMP  (LMP Unknown)   SpO2 94%   BMI= There is no height or weight on file to calculate BMI.      Examination     General Appearance:  Alert, cooperative, no distress  Head:    Normocephalic, without obvious abnormality, atraumatic  EENT:  PERRL, conjunctiva/corneas clear, EOM's  intact.   Neck:   Supple, symmetrical, trachea midline, no adenopathy; no NVE  Back:  Symmetric, no curvature, no CVA tenderness  Chest/Lungs: decreased air entry, (+) wheezing, respirations unlabored, No tenderness or deformity. No abdominal breathing or use of accessory muscles.   Heart:    Regular rate and rhythm, S1 and S2 normal, no murmur, rub   or gallop  Abdomen: Soft, non-tender, bowel sounds active all four quadrants, not peritoneal on palpation. Not distended  Extremities:  Extremities normal, atraumatic, no swelling   Skin:  Skin color, texture, turgor normal, no rashes or lesion  Neurologic:  Awake and alert, No lateralizing or localizing signs       Pertinent Lab     Results for orders placed or performed during the hospital encounter of 05/06/23   XR Chest Port 1 View    Impression    IMPRESSION: Hypoexpanded lungs. Interstitial prominence and septal thickening suggesting edema. Mild cardiac silhouette enlargement. Cannot exclude minimal pleural fluid. Prior sternotomy.     Basic metabolic panel   Result Value Ref Range    Sodium 133 (L) 136 - 145 mmol/L    Potassium 5.2 (H) 3.5 - 5.0 mmol/L    Chloride 100 98 - 107 mmol/L    Carbon Dioxide (CO2) 24 22 - 31 mmol/L    Anion Gap 9 5 - 18 mmol/L    Urea Nitrogen 28 8 - 28 mg/dL    Creatinine 1.01 0.60 - 1.10 mg/dL    Calcium 10.0 8.5 - 10.5 mg/dL    Glucose 271 (H) 70 - 125 mg/dL    GFR Estimate 53 (L) >60 mL/min/1.73m2   Blood gas venous   Result Value Ref Range    pH Venous 7.38 7.35 - 7.45    pCO2 Venous 47 35 - 50 mm Hg    pO2 Venous 38 25 - 47 mm Hg    Bicarbonate Venous 28 24 - 30 mmol/L    Base Excess/Deficit (+/-) 2.6   mmol/L    Oxyhemoglobin Venous 69.5 (L) 70.0 - 75.0 %    O2 Sat, Venous 71.2 70.0 - 75.0 %   CRP inflammation   Result Value Ref Range    CRP 0.8 (H) 0.0 - <0.8 mg/dL   Troponin I (now)   Result Value Ref Range    Troponin I 0.03 0.00 - 0.29 ng/mL   B-Type Natriuretic Peptide ([facility] Only)   Result Value Ref Range     (H)  0 - 167 pg/mL   Symptomatic Influenza A/B, RSV, & SARS-CoV2 PCR (COVID-19) Nasopharyngeal    Specimen: Nasopharyngeal; Swab   Result Value Ref Range    Influenza A PCR Negative Negative    Influenza B PCR Negative Negative    RSV PCR Negative Negative    SARS CoV2 PCR Negative Negative   D dimer quantitative   Result Value Ref Range    D-Dimer Quantitative 0.36 0.00 - 0.50 ug/mL FEU   CBC with platelets and differential   Result Value Ref Range    WBC Count 7.1 4.0 - 11.0 10e3/uL    RBC Count 4.40 3.80 - 5.20 10e6/uL    Hemoglobin 12.8 11.7 - 15.7 g/dL    Hematocrit 38.9 35.0 - 47.0 %    MCV 88 78 - 100 fL    MCH 29.1 26.5 - 33.0 pg    MCHC 32.9 31.5 - 36.5 g/dL    RDW 15.3 (H) 10.0 - 15.0 %    Platelet Count 185 150 - 450 10e3/uL    % Neutrophils 89 %    % Lymphocytes 8 %    % Monocytes 2 %    % Eosinophils 0 %    % Basophils 0 %    % Immature Granulocytes 1 %    NRBCs per 100 WBC 0 <1 /100    Absolute Neutrophils 6.2 1.6 - 8.3 10e3/uL    Absolute Lymphocytes 0.6 (L) 0.8 - 5.3 10e3/uL    Absolute Monocytes 0.1 0.0 - 1.3 10e3/uL    Absolute Eosinophils 0.0 0.0 - 0.7 10e3/uL    Absolute Basophils 0.0 0.0 - 0.2 10e3/uL    Absolute Immature Granulocytes 0.1 <=0.4 10e3/uL    Absolute NRBCs 0.0 10e3/uL           Pertinent Radiology

## 2023-05-08 ENCOUNTER — APPOINTMENT (OUTPATIENT)
Dept: NUCLEAR MEDICINE | Facility: CLINIC | Age: 88
DRG: 291 | End: 2023-05-08
Attending: INTERNAL MEDICINE
Payer: MEDICARE

## 2023-05-08 ENCOUNTER — APPOINTMENT (OUTPATIENT)
Dept: CARDIOLOGY | Facility: CLINIC | Age: 88
DRG: 291 | End: 2023-05-08
Attending: INTERNAL MEDICINE
Payer: MEDICARE

## 2023-05-08 PROBLEM — J96.01 ACUTE RESPIRATORY FAILURE WITH HYPOXIA (H): Status: ACTIVE | Noted: 2023-05-08

## 2023-05-08 PROBLEM — I50.43 ACUTE ON CHRONIC COMBINED SYSTOLIC AND DIASTOLIC HEART FAILURE (H): Status: ACTIVE | Noted: 2023-05-08

## 2023-05-08 LAB
ANION GAP SERPL CALCULATED.3IONS-SCNC: 10 MMOL/L (ref 5–18)
BACTERIA UR CULT: ABNORMAL
BUN SERPL-MCNC: 46 MG/DL (ref 8–28)
CALCIUM SERPL-MCNC: 10.1 MG/DL (ref 8.5–10.5)
CHLORIDE BLD-SCNC: 95 MMOL/L (ref 98–107)
CO2 SERPL-SCNC: 30 MMOL/L (ref 22–31)
CREAT SERPL-MCNC: 1.33 MG/DL (ref 0.6–1.1)
CV STRESS CURRENT BP HE: NORMAL
CV STRESS CURRENT HR HE: 64
CV STRESS CURRENT HR HE: 67
CV STRESS CURRENT HR HE: 69
CV STRESS CURRENT HR HE: 81
CV STRESS CURRENT HR HE: 82
CV STRESS CURRENT HR HE: 83
CV STRESS CURRENT HR HE: 84
CV STRESS CURRENT HR HE: 84
CV STRESS CURRENT HR HE: 85
CV STRESS CURRENT HR HE: 86
CV STRESS CURRENT HR HE: 86
CV STRESS CURRENT HR HE: 87
CV STRESS CURRENT HR HE: 89
CV STRESS DEVIATION TIME HE: NORMAL
CV STRESS ECHO PERCENT HR HE: NORMAL
CV STRESS EXERCISE STAGE HE: NORMAL
CV STRESS FINAL RESTING BP HE: NORMAL
CV STRESS FINAL RESTING HR HE: 83
CV STRESS MAX HR HE: 90
CV STRESS MAX TREADMILL GRADE HE: 0
CV STRESS MAX TREADMILL SPEED HE: 0
CV STRESS PEAK DIA BP HE: NORMAL
CV STRESS PEAK SYS BP HE: NORMAL
CV STRESS PHASE HE: NORMAL
CV STRESS PROTOCOL HE: NORMAL
CV STRESS RESTING PT POSITION HE: NORMAL
CV STRESS RESTING PT POSITION HE: NORMAL
CV STRESS ST DEVIATION AMOUNT HE: NORMAL
CV STRESS ST DEVIATION ELEVATION HE: NORMAL
CV STRESS ST EVELATION AMOUNT HE: NORMAL
CV STRESS TEST TYPE HE: NORMAL
CV STRESS TOTAL STAGE TIME MIN 1 HE: NORMAL
ERYTHROCYTE [DISTWIDTH] IN BLOOD BY AUTOMATED COUNT: 15.9 % (ref 10–15)
GFR SERPL CREATININE-BSD FRML MDRD: 38 ML/MIN/1.73M2
GLUCOSE BLD-MCNC: 125 MG/DL (ref 70–125)
HCT VFR BLD AUTO: 38.1 % (ref 35–47)
HGB BLD-MCNC: 12.6 G/DL (ref 11.7–15.7)
MAGNESIUM SERPL-MCNC: 2 MG/DL (ref 1.8–2.6)
MCH RBC QN AUTO: 29 PG (ref 26.5–33)
MCHC RBC AUTO-ENTMCNC: 33.1 G/DL (ref 31.5–36.5)
MCV RBC AUTO: 88 FL (ref 78–100)
NUC STRESS EJECTION FRACTION: 65 %
PLATELET # BLD AUTO: 172 10E3/UL (ref 150–450)
POTASSIUM BLD-SCNC: 4 MMOL/L (ref 3.5–5)
RATE PRESSURE PRODUCT: NORMAL
RBC # BLD AUTO: 4.35 10E6/UL (ref 3.8–5.2)
SODIUM SERPL-SCNC: 135 MMOL/L (ref 136–145)
STRESS ECHO BASELINE DIASTOLIC HE: 79
STRESS ECHO BASELINE HR: 69
STRESS ECHO BASELINE SYSTOLIC BP: 142
STRESS ECHO CALCULATED PERCENT HR: 69 %
STRESS ECHO LAST STRESS DIASTOLIC BP: 56
STRESS ECHO LAST STRESS HR: 82
STRESS ECHO LAST STRESS SYSTOLIC BP: 116
STRESS ECHO TARGET HR: 131
TSH SERPL DL<=0.005 MIU/L-ACNC: 1.92 UIU/ML (ref 0.3–5)
WBC # BLD AUTO: 6.4 10E3/UL (ref 4–11)

## 2023-05-08 PROCEDURE — 343N000001 HC RX 343: Performed by: INTERNAL MEDICINE

## 2023-05-08 PROCEDURE — 120N000013 HC R&B IMCU

## 2023-05-08 PROCEDURE — 80048 BASIC METABOLIC PNL TOTAL CA: CPT | Performed by: INTERNAL MEDICINE

## 2023-05-08 PROCEDURE — 84443 ASSAY THYROID STIM HORMONE: CPT | Performed by: INTERNAL MEDICINE

## 2023-05-08 PROCEDURE — 250N000011 HC RX IP 250 OP 636: Performed by: INTERNAL MEDICINE

## 2023-05-08 PROCEDURE — 78452 HT MUSCLE IMAGE SPECT MULT: CPT | Mod: ME

## 2023-05-08 PROCEDURE — G1010 CDSM STANSON: HCPCS | Performed by: INTERNAL MEDICINE

## 2023-05-08 PROCEDURE — 83735 ASSAY OF MAGNESIUM: CPT | Performed by: INTERNAL MEDICINE

## 2023-05-08 PROCEDURE — 250N000013 HC RX MED GY IP 250 OP 250 PS 637: Performed by: INTERNAL MEDICINE

## 2023-05-08 PROCEDURE — 78452 HT MUSCLE IMAGE SPECT MULT: CPT | Mod: 26 | Performed by: INTERNAL MEDICINE

## 2023-05-08 PROCEDURE — 99232 SBSQ HOSP IP/OBS MODERATE 35: CPT | Performed by: INTERNAL MEDICINE

## 2023-05-08 PROCEDURE — 93017 CV STRESS TEST TRACING ONLY: CPT

## 2023-05-08 PROCEDURE — A9500 TC99M SESTAMIBI: HCPCS | Performed by: INTERNAL MEDICINE

## 2023-05-08 PROCEDURE — 99233 SBSQ HOSP IP/OBS HIGH 50: CPT | Mod: 25 | Performed by: INTERNAL MEDICINE

## 2023-05-08 PROCEDURE — 93018 CV STRESS TEST I&R ONLY: CPT | Performed by: INTERNAL MEDICINE

## 2023-05-08 PROCEDURE — 85027 COMPLETE CBC AUTOMATED: CPT | Performed by: INTERNAL MEDICINE

## 2023-05-08 PROCEDURE — 93016 CV STRESS TEST SUPVJ ONLY: CPT | Performed by: INTERNAL MEDICINE

## 2023-05-08 PROCEDURE — 36415 COLL VENOUS BLD VENIPUNCTURE: CPT | Performed by: INTERNAL MEDICINE

## 2023-05-08 RX ORDER — HYDRALAZINE HYDROCHLORIDE 20 MG/ML
10 INJECTION INTRAMUSCULAR; INTRAVENOUS EVERY 4 HOURS PRN
Status: DISCONTINUED | OUTPATIENT
Start: 2023-05-08 | End: 2023-05-12 | Stop reason: HOSPADM

## 2023-05-08 RX ORDER — ENOXAPARIN SODIUM 100 MG/ML
30 INJECTION SUBCUTANEOUS EVERY 24 HOURS
Status: DISCONTINUED | OUTPATIENT
Start: 2023-05-08 | End: 2023-05-12 | Stop reason: HOSPADM

## 2023-05-08 RX ORDER — CEFTRIAXONE 1 G/1
1 INJECTION, POWDER, FOR SOLUTION INTRAMUSCULAR; INTRAVENOUS EVERY 24 HOURS
Status: COMPLETED | OUTPATIENT
Start: 2023-05-08 | End: 2023-05-10

## 2023-05-08 RX ORDER — AMINOPHYLLINE 25 MG/ML
50 INJECTION, SOLUTION INTRAVENOUS
Status: ACTIVE | OUTPATIENT
Start: 2023-05-08 | End: 2023-05-08

## 2023-05-08 RX ORDER — MAGNESIUM OXIDE 400 MG/1
400 TABLET ORAL EVERY 4 HOURS
Status: COMPLETED | OUTPATIENT
Start: 2023-05-08 | End: 2023-05-08

## 2023-05-08 RX ORDER — REGADENOSON 0.08 MG/ML
0.4 INJECTION, SOLUTION INTRAVENOUS ONCE
Status: COMPLETED | OUTPATIENT
Start: 2023-05-08 | End: 2023-05-08

## 2023-05-08 RX ADMIN — GUAIFENESIN 600 MG: 600 TABLET ORAL at 20:22

## 2023-05-08 RX ADMIN — GABAPENTIN 300 MG: 300 CAPSULE ORAL at 16:54

## 2023-05-08 RX ADMIN — CYANOCOBALAMIN TAB 1000 MCG 1000 MCG: 1000 TAB at 22:02

## 2023-05-08 RX ADMIN — LISINOPRIL 20 MG: 20 TABLET ORAL at 20:22

## 2023-05-08 RX ADMIN — Medication 25 MCG: at 08:55

## 2023-05-08 RX ADMIN — GABAPENTIN 300 MG: 300 CAPSULE ORAL at 08:56

## 2023-05-08 RX ADMIN — Medication 1 MG: at 00:13

## 2023-05-08 RX ADMIN — REGADENOSON 0.4 MG: 0.08 INJECTION, SOLUTION INTRAVENOUS at 12:47

## 2023-05-08 RX ADMIN — CARVEDILOL 12.5 MG: 6.25 TABLET, FILM COATED ORAL at 08:57

## 2023-05-08 RX ADMIN — GUAIFENESIN 600 MG: 600 TABLET ORAL at 08:57

## 2023-05-08 RX ADMIN — FLUOXETINE 20 MG: 20 CAPSULE ORAL at 20:22

## 2023-05-08 RX ADMIN — GABAPENTIN 300 MG: 300 CAPSULE ORAL at 13:19

## 2023-05-08 RX ADMIN — Medication 29.9 MILLICURIE: at 12:50

## 2023-05-08 RX ADMIN — CARBIDOPA AND LEVODOPA 1 TABLET: 25; 100 TABLET ORAL at 05:13

## 2023-05-08 RX ADMIN — CEFTRIAXONE 1 G: 1 INJECTION, POWDER, FOR SOLUTION INTRAMUSCULAR; INTRAVENOUS at 13:18

## 2023-05-08 RX ADMIN — LEVOTHYROXINE SODIUM 25 MCG: 0.03 TABLET ORAL at 05:13

## 2023-05-08 RX ADMIN — ASPIRIN 81 MG CHEWABLE TABLET 81 MG: 81 TABLET CHEWABLE at 22:02

## 2023-05-08 RX ADMIN — Medication 25 MCG: at 20:30

## 2023-05-08 RX ADMIN — Medication 8.36 MILLICURIE: at 11:30

## 2023-05-08 RX ADMIN — Medication 1 MG: at 22:07

## 2023-05-08 RX ADMIN — CARBIDOPA AND LEVODOPA 1 TABLET: 25; 100 TABLET ORAL at 20:22

## 2023-05-08 RX ADMIN — CARVEDILOL 12.5 MG: 6.25 TABLET, FILM COATED ORAL at 16:54

## 2023-05-08 RX ADMIN — ENOXAPARIN SODIUM 30 MG: 30 INJECTION SUBCUTANEOUS at 22:03

## 2023-05-08 RX ADMIN — FUROSEMIDE 20 MG: 10 INJECTION, SOLUTION INTRAMUSCULAR; INTRAVENOUS at 20:22

## 2023-05-08 RX ADMIN — FLUOXETINE 20 MG: 20 CAPSULE ORAL at 08:56

## 2023-05-08 RX ADMIN — PANTOPRAZOLE SODIUM 40 MG: 40 TABLET, DELAYED RELEASE ORAL at 06:27

## 2023-05-08 RX ADMIN — Medication 400 MG: at 08:57

## 2023-05-08 RX ADMIN — Medication 400 MG: at 06:27

## 2023-05-08 RX ADMIN — GABAPENTIN 300 MG: 300 CAPSULE ORAL at 20:22

## 2023-05-08 RX ADMIN — CARBIDOPA AND LEVODOPA 1 TABLET: 25; 100 TABLET ORAL at 13:19

## 2023-05-08 RX ADMIN — ATORVASTATIN CALCIUM 20 MG: 10 TABLET, FILM COATED ORAL at 22:02

## 2023-05-08 RX ADMIN — FUROSEMIDE 20 MG: 10 INJECTION, SOLUTION INTRAMUSCULAR; INTRAVENOUS at 08:52

## 2023-05-08 ASSESSMENT — ACTIVITIES OF DAILY LIVING (ADL)
ADLS_ACUITY_SCORE: 34
ADLS_ACUITY_SCORE: 34
ADLS_ACUITY_SCORE: 32
ADLS_ACUITY_SCORE: 36
ADLS_ACUITY_SCORE: 32
ADLS_ACUITY_SCORE: 34
ADLS_ACUITY_SCORE: 36
ADLS_ACUITY_SCORE: 32
ADLS_ACUITY_SCORE: 32

## 2023-05-08 NOTE — PROGRESS NOTES
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      Acute on chronic heart failure with reduced ejection fraction  - Chest x-ray with pulmonary edema and mild cardiac enlargement.  - Transthoracic echocardiogram on 4/4/2023 with LVEF of 45 to 50%.  Moderate mitral annular calcification and moderately to severe mitral regurgitation.  - Continue furosemide 20 mg IV every 12 hours, would recommend holding IV lasix today with bump in renal function despite CKD3  - cardiology following and will determine when to change to oral   - trend renal function and lytes   - already was on BB > carvedilol 12.5 mg twice daily continue   - lisinopril increased to 20mg daily.   - CHF protocol daily weights and I/Os  - Lexiscan today   - per cards note LBBB consider possible CRT therapy if frequent CHF episodes, but as outpatient per cards notes   - PT/OT      Acute respiratory failure with  hypoxia/Pulmonary hypertension,  - agree likely type 2 related to heart failure/valve disease/dysfunction.  - Echocardiogram from 4/4/2023 revealed severe pulmonary hypertension with estimated pressure greater than 55 mmHg.  - O2 need decreasing but drops when she sleeps.   - likely needs Home oxygen eval once ready for discharge      Coronary artery disease Status post CABG JEOVANY graft to LAD, Coronary angiography August 17, 2022 complete occlusion proximal native LAD, JEOVANY to LAD graft widely patent.  Otherwise mild to moderate disease.  - Aspirin 81 mg at bedtime  - Coreg and lisinopril  - Lexiscan today     Mitral insufficiency/Aortic stenosis/Aortic insufficiency  - previous Echocardiogram indicates progression of mitral insufficiency with moderate to severe mitral regurgitation  - per cardiology note consider possible outpatient DORIS assess mitral regurgitation.      HTN  - Hypertensive urgency on admission improving with diuresis   - continue current meds  - PRN Hydralazine 10 mg IV every 4 hours as needed for SBP greater than 160     Abnormal urinalysis  - Ua + nitrites few bacteria likely asx UTI   - growing multiple organisms   - will treat since was a good urine sample IV ceftriaxone 1 mg x3 days, can switch to PO once discharging      Hypercalcemia  Calcium 10.7 on admission now normalized  - monitor      Mild elevation in liver function test  Agree likely hepatic congestion     Dyslipidemia  - Atorvastatin 20 mg at bedtime     Parkinson's disease and trigeminal neuralgia  - Carbidopa levodopa  mg 1 tab 3 times daily  - Gabapentin 300 mg 4 times daily.      Gastroesophageal reflux disease  -  Pantoprazole 40 mg daily     Diabetes mellitus type 2 not on pharmacologic therapy  Order insulin aspart medium correction scale 4 times daily ACHS     Hypothyroidism  - Continue Levothyroxine 25 mcg daily  - checking TSH and reflex T4     Depression and anxiety   Fluoxetine 20 mg twice daily.      Clinically  "Significant Risk Factors      # Overweight: Estimated body mass index is 28.31 kg/m  as calculated from the following:  Height as of this encounter: 1.626 m (5' 4\").  Weight as of this encounter: 74.8 kg (164 lb 14.4 oz)., PRESENT ON ADMISSION    COVID-19 PCR Results        8/15/2022    21:07 5/4/2023    19:55 5/6/2023    20:00   COVID-19 PCR Results   SARS CoV2 PCR Negative   Negative   Negative     COVID-19 Antibody Results, Testing for Immunity         No data to display               Code Status: No CPR- Do NOT Intubate  VTE prophylaxis:  Enoxaparin (Lovenox) SQ  DIET: Orders Placed This Encounter      Combination Diet Low Saturated Fat Na <2400mg Diet, No Caffeine Diet    Drains/Lines: none  Weight bearing status: WBAT     Expected Discharge Date: 05/09/2023      Destination: home  Discharge Comments: IV lasix  and on O2  Pharm stress test 5/8      Subjective:  breathing improved, denies CP    PHYSICAL EXAM  Temp:  [97.9  F (36.6  C)-98.1  F (36.7  C)] 98.1  F (36.7  C)  Pulse:  [54-66] 60  Resp:  [18] 18  BP: (104-185)/(53-78) 143/63  SpO2:  [94 %-98 %] 96 %  Wt Readings from Last 1 Encounters:   05/08/23 81.1 kg (178 lb 14.4 oz)       Intake/Output Summary (Last 24 hours) at 5/8/2023 0758  Last data filed at 5/8/2023 0630  Gross per 24 hour   Intake 1420 ml   Output 900 ml   Net 520 ml      Body mass index is 31.69 kg/m .    Physical Exam  Constitutional:       General: She is not in acute distress.     Appearance: Normal appearance.   HENT:      Head: Normocephalic and atraumatic.   Cardiovascular:      Rate and Rhythm: Normal rate and regular rhythm.      Pulses: Normal pulses.      Comments: 2-3/6 LOBO  Pulmonary:      Effort: Pulmonary effort is normal.      Comments: Bibasilar crackles, unlabored breathing,   Abdominal:      General: Bowel sounds are normal.      Palpations: Abdomen is soft.   Musculoskeletal:         General: Normal range of motion.      Right lower leg: No edema.      Left lower leg: No " edema.   Skin:     General: Skin is warm and dry.      Capillary Refill: Capillary refill takes less than 2 seconds.   Neurological:      General: No focal deficit present.      Mental Status: She is alert and oriented to person, place, and time. Mental status is at baseline.       PERTINENT LABS/IMAGING:  Results for orders placed or performed during the hospital encounter of 05/06/23   XR Chest Port 1 View    Impression    IMPRESSION: Hypoexpanded lungs. Interstitial prominence and septal thickening suggesting edema. Mild cardiac silhouette enlargement. Cannot exclude minimal pleural fluid. Prior sternotomy.         Recent Labs   Lab 05/08/23  0552 05/07/23 0434 05/06/23 2323 05/06/23  1852   WBC 6.4 9.6  --  7.1   HGB 12.6 13.2  --  12.8   MCV 88 90  --  88    200  --  185   * 135* 136 133*   POTASSIUM 4.0 4.3 4.7 5.2*   CHLORIDE 95* 98 98 100   CO2 30 27 27 24   BUN 46* 31* 29* 28   CR 1.33* 0.98 1.00 1.01   ANIONGAP 10 10 11 9   LIZZY 10.1 10.6* 10.7* 10.0    148* 236* 271*   ALBUMIN  --  4.0 4.1  --    PROTTOTAL  --  7.7 8.1*  --    BILITOTAL  --  1.1* 0.9  --    ALKPHOS  --  77 83  --    ALT  --  45 20  --    AST  --  46* 55*  --      Recent Labs   Lab Test 08/18/22  0506   CHOL 121   HDL 29*   LDL 58   TRIG 172*     Recent Labs   Lab Test 05/08/23  0552   *   POTASSIUM 4.0   CHLORIDE 95*   CO2 30      BUN 46*   CR 1.33*   GFRESTIMATED 38*   LIZZY 10.1     Recent Labs   Lab Test 04/28/23  1437 10/11/22  1712 06/28/22  1616   A1C 6.4* 5.9* 8.0*     Recent Labs   Lab Test 05/08/23  0552 05/07/23  0434 05/06/23  1852   HGB 12.6 13.2 12.8     Recent Labs   Lab Test 05/07/23 0434 05/06/23 2323 05/06/23  1852   TROPONINI 0.04 0.03 0.03     Recent Labs   Lab Test 05/06/23  1852 08/15/22  1920 06/11/18  1259   * 129 383*     Recent Labs   Lab Test 12/13/22  0825   TSH 1.90     No results for input(s): INR in the last 38158 hours.    25 MINUTES SPENT BY ME on the date of service  doing chart review, history, exam, documentation, discussion with nursing staff and specialist, & further activities per the note.  Jenna Pendleton MD  [facility] Medicine Service  [phone]

## 2023-05-08 NOTE — PROGRESS NOTES
Assessment/Plan:  1.  Acute on chronic combined systolic and diastolic congestive heart failure: The patient states that her shortness of breath is improved with IV diuresis.  She has no chest pain.  She has no leg edema.  Continue diuresis today.  Monitor symptoms, weight, creatinine.  Continue carvedilol and lisinopril.  Titrate the medication according to GDMT.    2.  Coronary artery disease s/p CABG LIMa to LAD: No chest pain, normal troponins.  Agree Dr. Schmidt's assessment, pharmacological regadenoson nuclear stress test today for ischemic evaluation.    3.  Moderate to severe mitral valve regurgitation: Consider DORIS as outpatient for evaluation of mitral valve regurgitation poor M-MARIO protocol.    4.  Chronic left bundle branch block: If the patient has no indication of revascularization, mitral valve repair, and then consider CRT therapy if she has frequent episode of congestive heart failure leading to hospital admission.    5.  Benign essential hypertension, dyslipidemia: No change in treatment today.    Subjective:  Interval History:  Has no complaints of chest pain.  Improved shortness of breath.  No leg edema.    Current Medications:  Scheduled Meds:    aspirin  81 mg Oral At Bedtime     atorvastatin  20 mg Oral At Bedtime     carbidopa-levodopa  1 tablet Oral TID     carvedilol  12.5 mg Oral BID w/meals     cyanocobalamin  1,000 mcg Oral Daily     enoxaparin ANTICOAGULANT  40 mg Subcutaneous Q24H     FLUoxetine  20 mg Oral BID     furosemide  20 mg Intravenous Q12H     gabapentin  300 mg Oral 4x Daily     guaiFENesin  600 mg Oral BID     levothyroxine  25 mcg Oral Daily     lisinopril  20 mg Oral Daily     magnesium oxide  200 mg Oral Daily     pantoprazole  40 mg Oral QAM AC     sodium chloride (PF)  3 mL Intracatheter Q8H     Vitamin D3  25 mcg Oral BID     Continuous Infusions:  PRN Meds:.albuterol, sore throat, hydrALAZINE, lidocaine 4%, lidocaine (buffered or not buffered), loratadine,  melatonin, nitroGLYcerin, prochlorperazine, sodium chloride, sodium chloride (PF)    Objective:   Vital signs in last 24 hours:  Temp:  [97.9  F (36.6  C)-98.2  F (36.8  C)] 98.1  F (36.7  C)  Pulse:  [54-72] 60  Resp:  [18] 18  BP: (104-185)/(53-78) 168/74  SpO2:  [93 %-98 %] 96 %  Weight:   [unfilled]     Physical Exam:  General Appearance:   Awake, Alert, No acute distress.   HEENT:  No scleral icterus; the mucous membranes were moist.   Neck: No cervical bruits. No jugular venous distention   Lungs:   Bibasilar crackles. No wheezing.   Cardiovascular:   RRR, normal first and second heart sounds with II/VI systolic murmurs at apex. No rubs or gallops.     Abdomen:  Soft. No tenderness. Bowels sounds are present   Extremities: No leg edema. Equal posterior tibial pulsese.   Skin: Warm, Dry. No rashes.   Neurologic: Mood and affect are appropriate. No focal deficits.         Cardiographics:   Report: personally reviewed. .      Tele monitoring -  Sinus rhythm    Echocardiogram on 4-4-2023:  1. The left ventricle is normal in size with mild concentric left ventricular  hypertrophy. Left ventricular function is decreased. The ejection fraction is  45-50% (mildly reduced).  2. The right ventricle is mildly dilated with mildly decreased right  ventricular systolic function  3. There is moderate mitral annular calcification. There is moderately severe  (3+) mitral regurgitation.  4. There is sclerosis, calcification, and restriction of the aortic valve  opening compatible with mild aortic stenosis.  5. Severe (>55mmHg) pulmonary hypertension is present. The right ventricular  systolic pressure is approximated at 68mmHg plus the right atrial pressure.  6. Compared to the prior study of 8/16/22, the mitral valve regurgitation is  now worse.    Lab Results:   personally reviewed.     Lab Results   Component Value Date     05/08/2023    CO2 30 05/08/2023    BUN 46 05/08/2023     Lab Results   Component Value Date     TROPONINI 0.04 05/07/2023     Lab Results   Component Value Date    WBC 6.4 05/08/2023    HGB 12.6 05/08/2023    HCT 38.1 05/08/2023    MCV 88 05/08/2023     05/08/2023     Lab Results   Component Value Date    CHOL 121 08/18/2022    TRIG 172 08/18/2022    HDL 29 08/18/2022    LDL 58 08/18/2022

## 2023-05-08 NOTE — PROGRESS NOTES
Bp elevated in AM, returned to baseline in evening. Pt A&Ox4. Rest of VS stable. Currently on 1 L NC. Pleasant and cooperative throughout shift. Pt needs to be questioned for toileting. Abx given per order. Underwent stress test, currently in imaging. Family at bedside. Family questions answered.

## 2023-05-08 NOTE — PROGRESS NOTES
Nuclear Lexiscan Stress Test  Lexiscan 0.4mg IV was injected over 15 seconds   Typical vasodilator side effects noted that resolved by test end.  Interpretation pending.

## 2023-05-08 NOTE — PLAN OF CARE
[id number]: A/ox4. VSS, currently on 1-2 L O2 via NC. Denied pain. Tele: SR with BBB with PACs/PVCs. Purwick in place- minimal urine from purwick noted, bladder scanned overnight for 479. Pt was able to void 400cc via bedside commode. Up with assist 1 gaitbelt/walker. Intermittently repositioning patient, pt does slight weight shifting on own while in bed. NPO since midnight for possible stress test later today. Pt makes needs known. No acute events on shift.     Problem: Plan of Care - These are the overarching goals to be used throughout the patient stay.    Goal: Optimal Comfort and Wellbeing  Outcome: Progressing     Problem: Gas Exchange Impaired  Goal: Optimal Gas Exchange  Outcome: Progressing     Problem: Hypertension Comorbidity  Goal: Blood Pressure in Desired Range  Outcome: Progressing

## 2023-05-09 ENCOUNTER — APPOINTMENT (OUTPATIENT)
Dept: OCCUPATIONAL THERAPY | Facility: CLINIC | Age: 88
DRG: 291 | End: 2023-05-09
Attending: INTERNAL MEDICINE
Payer: MEDICARE

## 2023-05-09 ENCOUNTER — APPOINTMENT (OUTPATIENT)
Dept: CARDIOLOGY | Facility: CLINIC | Age: 88
DRG: 291 | End: 2023-05-09
Attending: INTERNAL MEDICINE
Payer: MEDICARE

## 2023-05-09 ENCOUNTER — APPOINTMENT (OUTPATIENT)
Dept: PHYSICAL THERAPY | Facility: CLINIC | Age: 88
DRG: 291 | End: 2023-05-09
Attending: INTERNAL MEDICINE
Payer: MEDICARE

## 2023-05-09 LAB
ANION GAP SERPL CALCULATED.3IONS-SCNC: 9 MMOL/L (ref 5–18)
BUN SERPL-MCNC: 51 MG/DL (ref 8–28)
CALCIUM SERPL-MCNC: 9.8 MG/DL (ref 8.5–10.5)
CHLORIDE BLD-SCNC: 97 MMOL/L (ref 98–107)
CO2 SERPL-SCNC: 25 MMOL/L (ref 22–31)
CREAT SERPL-MCNC: 1.56 MG/DL (ref 0.6–1.1)
GFR SERPL CREATININE-BSD FRML MDRD: 31 ML/MIN/1.73M2
GLUCOSE BLD-MCNC: 127 MG/DL (ref 70–125)
LVEF ECHO: NORMAL
MAGNESIUM SERPL-MCNC: 2.1 MG/DL (ref 1.8–2.6)
PLATELET # BLD AUTO: 177 10E3/UL (ref 150–450)
POTASSIUM BLD-SCNC: 4.2 MMOL/L (ref 3.5–5)
SODIUM SERPL-SCNC: 131 MMOL/L (ref 136–145)

## 2023-05-09 PROCEDURE — 85049 AUTOMATED PLATELET COUNT: CPT | Performed by: INTERNAL MEDICINE

## 2023-05-09 PROCEDURE — 80048 BASIC METABOLIC PNL TOTAL CA: CPT | Performed by: INTERNAL MEDICINE

## 2023-05-09 PROCEDURE — 93306 TTE W/DOPPLER COMPLETE: CPT | Mod: 26 | Performed by: INTERNAL MEDICINE

## 2023-05-09 PROCEDURE — 250N000013 HC RX MED GY IP 250 OP 250 PS 637: Performed by: INTERNAL MEDICINE

## 2023-05-09 PROCEDURE — 120N000013 HC R&B IMCU

## 2023-05-09 PROCEDURE — 83735 ASSAY OF MAGNESIUM: CPT | Performed by: INTERNAL MEDICINE

## 2023-05-09 PROCEDURE — 99233 SBSQ HOSP IP/OBS HIGH 50: CPT | Performed by: INTERNAL MEDICINE

## 2023-05-09 PROCEDURE — 97166 OT EVAL MOD COMPLEX 45 MIN: CPT | Mod: GO

## 2023-05-09 PROCEDURE — 255N000002 HC RX 255 OP 636: Performed by: INTERNAL MEDICINE

## 2023-05-09 PROCEDURE — 97110 THERAPEUTIC EXERCISES: CPT | Mod: GP

## 2023-05-09 PROCEDURE — 250N000011 HC RX IP 250 OP 636: Performed by: INTERNAL MEDICINE

## 2023-05-09 PROCEDURE — 97161 PT EVAL LOW COMPLEX 20 MIN: CPT | Mod: GP

## 2023-05-09 PROCEDURE — 97535 SELF CARE MNGMENT TRAINING: CPT | Mod: GO

## 2023-05-09 PROCEDURE — 36415 COLL VENOUS BLD VENIPUNCTURE: CPT | Performed by: INTERNAL MEDICINE

## 2023-05-09 RX ADMIN — PERFLUTREN 3 ML: 6.52 INJECTION, SUSPENSION INTRAVENOUS at 11:00

## 2023-05-09 RX ADMIN — Medication 25 MCG: at 08:27

## 2023-05-09 RX ADMIN — CARBIDOPA AND LEVODOPA 1 TABLET: 25; 100 TABLET ORAL at 14:18

## 2023-05-09 RX ADMIN — GUAIFENESIN 600 MG: 600 TABLET ORAL at 20:03

## 2023-05-09 RX ADMIN — CARBIDOPA AND LEVODOPA 1 TABLET: 25; 100 TABLET ORAL at 06:13

## 2023-05-09 RX ADMIN — CARBIDOPA AND LEVODOPA 1 TABLET: 25; 100 TABLET ORAL at 20:03

## 2023-05-09 RX ADMIN — FLUOXETINE 20 MG: 20 CAPSULE ORAL at 20:03

## 2023-05-09 RX ADMIN — Medication 1 MG: at 22:32

## 2023-05-09 RX ADMIN — CARVEDILOL 12.5 MG: 6.25 TABLET, FILM COATED ORAL at 16:56

## 2023-05-09 RX ADMIN — GABAPENTIN 300 MG: 300 CAPSULE ORAL at 08:21

## 2023-05-09 RX ADMIN — ENOXAPARIN SODIUM 30 MG: 30 INJECTION SUBCUTANEOUS at 23:51

## 2023-05-09 RX ADMIN — GUAIFENESIN 600 MG: 600 TABLET ORAL at 08:21

## 2023-05-09 RX ADMIN — ASPIRIN 81 MG CHEWABLE TABLET 81 MG: 81 TABLET CHEWABLE at 20:03

## 2023-05-09 RX ADMIN — GABAPENTIN 300 MG: 300 CAPSULE ORAL at 16:56

## 2023-05-09 RX ADMIN — CYANOCOBALAMIN TAB 1000 MCG 1000 MCG: 1000 TAB at 20:02

## 2023-05-09 RX ADMIN — GABAPENTIN 300 MG: 300 CAPSULE ORAL at 12:25

## 2023-05-09 RX ADMIN — ATORVASTATIN CALCIUM 20 MG: 10 TABLET, FILM COATED ORAL at 20:02

## 2023-05-09 RX ADMIN — CARVEDILOL 12.5 MG: 6.25 TABLET, FILM COATED ORAL at 08:21

## 2023-05-09 RX ADMIN — PANTOPRAZOLE SODIUM 40 MG: 40 TABLET, DELAYED RELEASE ORAL at 08:21

## 2023-05-09 RX ADMIN — GABAPENTIN 300 MG: 300 CAPSULE ORAL at 20:03

## 2023-05-09 RX ADMIN — FLUOXETINE 20 MG: 20 CAPSULE ORAL at 08:21

## 2023-05-09 RX ADMIN — CEFTRIAXONE 1 G: 1 INJECTION, POWDER, FOR SOLUTION INTRAMUSCULAR; INTRAVENOUS at 12:25

## 2023-05-09 RX ADMIN — Medication 200 MG: at 08:21

## 2023-05-09 RX ADMIN — Medication 25 MCG: at 20:02

## 2023-05-09 RX ADMIN — LEVOTHYROXINE SODIUM 25 MCG: 0.03 TABLET ORAL at 06:13

## 2023-05-09 ASSESSMENT — ACTIVITIES OF DAILY LIVING (ADL)
ADLS_ACUITY_SCORE: 31
ADLS_ACUITY_SCORE: 34
ADLS_ACUITY_SCORE: 31
ADLS_ACUITY_SCORE: 34
ADLS_ACUITY_SCORE: 34
ADLS_ACUITY_SCORE: 31
ADLS_ACUITY_SCORE: 34
IADL_COMMENTS: SEE ABOVE
ADLS_ACUITY_SCORE: 34
ADLS_ACUITY_SCORE: 31

## 2023-05-09 NOTE — CONSULTS
Care Management Initial Consult    General Information  Assessment completed with: Patient,    Type of CM/SW Visit: Initial Assessment    Primary Care Provider verified and updated as needed: Yes   Readmission within the last 30 days:        Reason for Consult: discharge planning  Advance Care Planning:            Communication Assessment  Patient's communication style: spoken language (English or Bilingual)    Hearing Difficulty or Deaf: yes   Wear Glasses or Blind: yes    Cognitive  Cognitive/Neuro/Behavioral: WDL                      Living Environment:   People in home: alone     Current living Arrangements: apartment      Able to return to prior arrangements:         Family/Social Support:  Care provided by:    Provides care for:                  Description of Support System:           Current Resources:   Patient receiving home care services:       Community Resources:    Equipment currently used at home: walker, rolling (FWW and 4WW)  Supplies currently used at home:            Additional Information:  Initial assessment completed with pt.  Pt  Lives at [facility].  Pt receives assistance with laundry, once a month housecleaning and noon meal.  Pt manages own meds and does own ADL's.  Pt plans to return home at discharge.  Pt's daughter will transport.  Discussed recommendation for home care PT and OT.  Pt in agreement and has no preference regarding home care agency.   Referral made and accepted by [facility] .      KATIE Diggs

## 2023-05-09 NOTE — PROGRESS NOTES
05/09/23 0830   Appointment Info   Signing Clinician's Name / Credentials (OT) Kimi Araceli, OTR/L   Living Environment   People in Home alone  (has a supportive daughter nearby)   Current Living Arrangements independent living facility  (with assisted living optional for her)   Living Environment Comments pt has a tub shower with chair, rts with vanity   Self-Care   Usual Activity Tolerance good   Current Activity Tolerance moderate   Activity/Exercise/Self-Care Comment Pt was independent with ADLS and IADLS prior to admit. She gets one meal per day provided, and has laundry and cleaning service.  Does her own finances, meds, and lite meal prep   Instrumental Activities of Daily Living (IADL)   IADL Comments see above   General Information   Onset of Illness/Injury or Date of Surgery 05/06/23   Referring Physician Jenna Pendleton   Patient/Family Therapy Goal Statement (OT) return home -I can get assistance there if I need it.   Additional Occupational Profile Info/Pertinent History of Current Problem Pt admitted with hypoxia and pum edema, acute on chronic CHF.  H/O Parkinsons, CAD, CABG   Existing Precautions/Restrictions fall;oxygen therapy device and L/min   Cognitive Status Examination   Affect/Mental Status (Cognitive) WNL   Range of Motion Comprehensive   General Range of Motion no range of motion deficits identified   Comment, General Range of Motion except old right shoulder limitation   Strength Comprehensive (MMT)   General Manual Muscle Testing (MMT) Assessment no strength deficits identified   Bed Mobility   Comment (Bed Mobility) min   Transfers   Transfer Comments min   Balance   Balance Comments sba   Activities of Daily Living   BADL Assessment/Intervention upper body dressing;grooming;toileting   Upper Body Dressing Assessment/Training   [place] Level (Upper Body Dressing) minimum assist (75% patient effort)   Grooming Assessment/Training   [place] Level (Grooming) contact guard assist    Toileting   [place] Level (Toileting) minimum assist (75% patient effort)   Clinical Impression   Criteria for Skilled Therapeutic Interventions Met (OT) Yes, treatment indicated   OT Diagnosis Decreased independence with adls   OT Problem List-Impairments impacting ADL problems related to;activity tolerance impaired;mobility;strength   Assessment of Occupational Performance 3-5 Performance Deficits   Identified Performance Deficits dressing, transfers, bed mobility, toileting, grooming at sink   Planned Therapy Interventions (OT) ADL retraining;bed mobility training;transfer training;progressive activity/exercise   Clinical Decision Making Complexity (OT) moderate complexity   Risk & Benefits of therapy have been explained evaluation/treatment results reviewed;care plan/treatment goals reviewed;risks/benefits reviewed;current/potential barriers reviewed;participants voiced agreement with care plan;participants included;patient   OT Total Evaluation Time   OT Eval, Moderate Complexity Minutes (82626) 10   OT Goals   Therapy Frequency (OT) Daily   OT Predicted Duration/Target Date for Goal Attainment 05/13/23   OT Goals Lower Body Dressing;Hygiene/Grooming;Bed Mobility;Toilet Transfer/Toileting;Cardiac Phase 1   OT: Hygiene/Grooming modified independent;while standing   OT: Lower Body Dressing Modified independent;using adaptive equipment   OT: Bed Mobility Modified independent;supine to/from sitting;rolling;bridging   OT: Toilet Transfer/Toileting Modified independent;toilet transfer;cleaning and garment management   OT: Understanding of cardiac education to maximize quality of life, condition management, and health outcomes Patient;Verbalize;Demonstrate   OT: Perform aerobic activity with stable cardiovascular response intermittent;15 minutes   OT: Functional/aerobic ambulation tolerance with stable cardiovascular response in order to return to home and community environment Modified independent;Rolling  walker;100 feet   OT: Navigation of stairs simulating home set up with stable cardiovascular response in order to return to home and community environment   (NA)   Interventions   Interventions Quick Adds Self-Care/Home Management   Self-Care/Home Management   Self-Care/Home Mgmt/ADL, Compensatory, Meal Prep Minutes (28323) 25   Symptoms Noted During/After Treatment (Meal Preparation/Planning Training) fatigue   Treatment Detail/Skilled Intervention Tried pt on room air during session, per RN directions.  Her sats dropped to 70s with activity.  Reapplied 2 liters oxygen and her sats remained greater than 90.  Taught pt PLB and pacing herself with adls.  Pt said she always has trouble getting out of bed and dressing her l/es at home.  She does not have adaptive equipment. PT fatigued at end of session.Pt stated she feels weaker than usual.   OT Discharge Planning   OT Plan 5/13-CHF protocol within adls   OT Discharge Recommendation (DC Rec) (S)  home with home care occupational therapy   OT Rationale for DC Rec pt will benefit from OT for adls, energy conservation and PLB   OT Brief overview of current status min assist for adls and func mob.  Needs 2 liters oxygen for sats greater than 90 today.   Total Session Time   Timed Code Treatment Minutes 25   Total Session Time (sum of timed and untimed services) 35

## 2023-05-09 NOTE — PLAN OF CARE
Problem: Gas Exchange Impaired  Goal: Optimal Gas Exchange  Outcome: Progressing     Problem: Hypertension Comorbidity  Goal: Blood Pressure in Desired Range  Outcome: Progressing    Patient Aox4, pleasant, cooperative. Denies pain. Vitally stable on 1L oxygen via NC. Assist of 1 w/w. Encouraging oral fluids. IS education reviewed with patient.

## 2023-05-09 NOTE — PROGRESS NOTES
Cardiology Progress Note    Assessment:  1.  Acute pulmonary edema, acute on chronic combined systolic and diastolic heart failure.  Patient has been diuresed this admission.  Net urine output has been 2.1 L.  Weight recorded today 178 pounds compared to 179 pounds on May 7.  They noted to be higher today at 1.56.  Admit creatinine of 1.0.  BNP on admission 628.  Troponins negative.  Currently patient receiving 20 mg IV every 12 hours of Lasix.  It does not appear that patient was on furosemide at home.    2.  Coronary artery disease.  Angiogram August 2020 showed total occlusion of the LAD, mild disease in the other coronary territories.  The angiogram results need to be reviewed as the report indicates atretic nonfunctioning LIMA graft to the LAD but then also states widely patent graft to the mid LAD.  Nuclear stress test this admission reports a partially reversible change in the inferior and basilar mid inferior lateral walls indicating inducible myocardial ischemia with ejection fraction of 65%.  Surgery was previously described as a bypass with LIMA to the LAD.    3.  Mitral regurgitation.  Moderate to severe based on echocardiogram from April 2023 at which time EF was said to be 45 to 50% although reported normal on the most recent nuclear stress test.  We will plan to repeat a limited transthoracic echocardiogram to further define.    4.  Renal insufficiency.  Creatinine is higher today with  diuresis that has been commenced.  Reading 1.33 yesterday and 1.56 this morning.    5.  Chronic left bundle branch block.  Question potential benefits from CRT therapy.  Repeat echocardiogram pending to reassess LV function.      Current cardiovascular medications include aspirin  Atorvastatin 20 mg daily  Carvedilol 12.5 mg p.o. twice daily  Lasix 20 mg IV every 12 hours  Lisinopril 20 mg daily home dose was 10 mg daily.  Aspirin    Principal Problem:    Hypoxia  Active Problems:    Benign essential hypertension     "Coronary artery stenosis    S/P CABG (coronary artery bypass graft)    Parkinson's disease (H)    Mitral valve insufficiency    CKD (chronic kidney disease) stage 3, GFR 30-59 ml/min (H)    Type 2 diabetes mellitus with diabetic polyneuropathy, without long-term current use of insulin (H)    Acute pulmonary edema (H)    Acute respiratory failure with hypoxia (H)    Acute on chronic combined systolic and diastolic heart failure (H)      Plan:  1.  We will hold IV Lasix today and reassess given increased creatinine.  2.  We will hold lisinopril short-term.  3.  Repeat echocardiogram to further evaluate the mitral regurgitation.  4.  We will ask my interventional colleagues to review the angiogram from previous given the discrepancy on the report.  Call placed and awaiting return phone call.  5.  Close monitoring of renal function.    Subjective:   Janes Montero is seen in follow-up.  First visit for this examiner.  Admitted with acute on chronic mild heart failure.  Patient has been receiving IV diuresis.  She has a history of coronary artery disease.  Pharmacologic nuclear stress test reported yesterday.  History of left bundle branch block.    Patient reports that she is feeling better.  She states her breathing is more comfortable.  She does have a dry mouth.  She does believe that her mood at her place of residence can be salty.  We talked about monitoring the salt in her diet.      Objective:   Vital signs in last 24 hours:  VITALS: /63 (BP Location: Right arm)   Pulse 65   Temp 97.4  F (36.3  C) (Axillary)   Resp 16   Ht 1.6 m (5' 3\")   Wt 81 kg (178 lb 9.6 oz)   LMP  (LMP Unknown)   SpO2 95%   BMI 31.64 kg/m    BMI: Body mass index is 31.64 kg/m .  Wt Readings from Last 3 Encounters:   05/09/23 81 kg (178 lb 9.6 oz)   04/28/23 83.7 kg (184 lb 9.6 oz)   04/21/23 83.5 kg (184 lb)       Intake/Output Summary (Last 24 hours) at 5/9/2023 7377  Last data filed at 5/9/2023 0439  Gross per 24 hour "   Intake 290 ml   Output 1225 ml   Net -935 ml       Physical Exam:  General: Sitting comfortably in the chair in no acute distress.   Neck: No JVD appreciated although somewhat challenging exam.   Lungs: Bibasilar crackles.   COR: RRR, 2/6 systolic murmur at the apex   Abd: nondistended, BS present   Extrem: No edema, warm  Neuro: Alert and oriented without focal deficits.    Cardiographics:    EC2023 sinus rhythm, left bundle branch block.  Previous also noted left bundle branch block.  Echocardiogram:   Echocardiogram Complete  Order: [id number]   Status: Edited Result - FINAL      Visible to patient: Yes (seen)      Next appt: Today at 08:30 AM in Occupational Therapy (Janet Cohen, OTR)      Dx: GALVAN (dyspnea on exertion); Nonrheumat...      3 Result Notes     1 Patient Communication  Details    Reading Physician Reading Date Result Priority   Diallo Orozco MD  [phone] 2023      Narrative & Impression  [id number]  [id number]  [id number]  [id number]^DANIELE^AKBAR^[place] [address]     Name: EMMANUELLE DÍAZ  MRN: [id number]  : 10/24/1933  Study Date: 2023 10:55 AM  Age: 89 yrs  Gender: Female  Patient Location: [facility]  Reason For Study: Hypertension, unspecified type, GALVAN (dyspnea on exertion),  Coronary artery disease; Nonrheumatic aortic valve stenosis; Nonrheumatic  aortic insufficiency; Mitral valve insufficiency  Ordering Physician: AKBAR SANABRIA  Referring Physician: AKBAR SANABRIA  Performed By: SABRINA     BSA: 1.9 m2  Height: 65 in  Weight: 184 lb  HR: 75  BP: 142/83 mmHg  ______________________________________________________________________________  Procedure  Complete Echo Adult. Definity (NDC #14485-341) given intravenously.  Technically difficult study.  ______________________________________________________________________________  Interpretation Summary     1. The left ventricle is normal in size with mild  concentric left ventricular  hypertrophy. Left ventricular function is decreased. The ejection fraction is  45-50% (mildly reduced).  2. The right ventricle is mildly dilated with mildly decreased right  ventricular systolic function  3. There is moderate mitral annular calcification. There is moderately severe  (3+) mitral regurgitation.  4. There is sclerosis, calcification, and restriction of the aortic valve  opening compatible with mild aortic stenosis.  5. Severe (>55mmHg) pulmonary hypertension is present. The right ventricular  systolic pressure is approximated at 68mmHg plus the right atrial pressure.  6. Compared to the prior study of 8/16/22, the mitral valve regurgitation is  now worse.  ______________________________________________________________________________  Left Ventricle  The left ventricle is normal in size. Left ventricular function is decreased.  The ejection fraction is 45-50% (mildly reduced). There is mild concentric  left ventricular hypertrophy. Left ventricular diastolic function is abnormal.  There is mild global hypokinesia of the left ventricle.     Right Ventricle  The right ventricle is mildly dilated. Mildly decreased right ventricular  systolic function.     Atria  The left atrium is moderately dilated. The right atrium is moderately dilated.  There is no color Doppler evidence of an atrial shunt.     Mitral Valve  There is moderate mitral annular calcification. There is moderately severe  (3+) mitral regurgitation. There is no mitral valve stenosis.     Tricuspid Valve  Tricuspid valve leaflets appear normal. There is no evidence of tricuspid  stenosis or clinically significant tricuspid regurgitation. Severe (>55mmHg)  pulmonary hypertension is present. The right ventricular systolic pressure is  approximated at 68mmHg plus the right atrial pressure.     Aortic Valve  The aortic valve is trileaflet. There is trace aortic regurgitation. There is  sclerosis, calcification, and  restriction of the aortic valve opening  compatible with mild aortic stenosis.     Pulmonic Valve  The pulmonic valve is not well seen, but is grossly normal. This degree of  valvular regurgitation is within normal limits. There is trace pulmonic  valvular regurgitation.     Vessels  The aorta root is normal. Normal size ascending aorta. IVC diameter <2.1 cm  collapsing >50% with sniff suggests a normal RA pressure of 3 mmHg.     Pericardium  There is no pericardial effusion.     Rhythm  Sinus rhythm was noted.         Stress Test:NM MPI with Lexiscan  Order: [id number]   Status: Final result      Visible to patient: Yes (seen)      Next appt: Today at 08:30 AM in Occupational Therapy (Janet Cohen, OTR)      0 Result Notes  Details    Reading Physician Reading Date Result Priority   Veronica Polanco MD  [phone] 5/8/2023 Routine   Veronica Polanco MD  [phone] 5/8/2023      Result Text        A prior study was conducted on 8/16/2022.  Prior images were unavailable for comparison review.     Pharmacologic regadenoson stress ECG is nondiagnostic due to baseline ECG abnormality.     Pharmacological regadenoson nuclear stress test is abnormal.  There is partially reversible change in inferior and basal to mid inferolateral walls indicating inducible myocardial ischemia.     Normal left ventricular size, wall motion and systolic function.  Calculated left ventricular ejection fraction is 65%.     The patient is at an intermediate risk of future cardiac ischemic events.        Janes Montero  Cardiac Catheterization  Order# [id number]  Reading physician: Ignacio Baird MD Ordering physician: Mathew Ricardo MD Study date: 8/17/22     Patient Information    Name MRN Description   Jaens Montero [id number] 88 year old female     Physicians    Panel Physicians Referring Physician Case Authorizing Physician   Ignacio Baird MD (Primary) Mathew Ricardo MD Reisdorf, Franz-Josef E, MD      Procedures    Panel 1    Primary Surgeon:  Ignacio Baird MD   Procedure:  Coronary Angiogram        Indications    Chest pain, unspecified type [R07.9 (ICD-10-CM)]   Coronary artery disease involving native coronary artery without angina pectoris, unspecified whether native or transplanted heart [I25.10 (ICD-10-CM)]   S/P CABG (coronary artery bypass graft) [Z95.1 (ICD-10-CM)]     Comments/Patient Narrative    Lab 3     Pre Procedure Diagnosis    worsening angina       Conclusion      1st Mrg lesion is 50% stenosed.    Prox LAD lesion is 100% stenosed.    Prox RCA to Mid RCA lesion is 25% stenosed.    Atretic nonfunctional JEOVANY graft of uncertain destination    Widely patent graft to mid LAD from left aota         Plan      Follow bedrest per protocol    Continued medical management and lifestyle modifications for cardiovascular risk factor optimizations.    Arterial sheath removed from femoral artery with closure device.    Femoral angiogram identifies arterial sheath placement suitable for closure device.     Recommendations    General Recommendations:  - Patient given specific instructions regarding care of arteriotomy site, activity restrictions, signs and symptoms of cardiac or vascular complications and to seek immediate medical evaluation should they occur.   - Arterial sheath removed from the femoral artery with closure device.   - Femoral angiogram identifies arterial sheath placement is suitable for       Telemetry: Sinus rhythm,       Lab Results    Chemistry/lipid CBC Cardiac Enzymes/BNP/TSH/INR   Recent Labs   Lab Test 08/18/22  0506   CHOL 121   HDL 29*   LDL 58   TRIG 172*     Recent Labs   Lab Test 08/18/22  0506 12/14/21  1638   LDL 58 32     Recent Labs   Lab Test 05/09/23  0435   *   POTASSIUM 4.2   CHLORIDE 97*   CO2 25   *   BUN 51*   CR 1.56*   GFRESTIMATED 31*   LIZZY 9.8     Recent Labs   Lab Test 05/09/23  0435 05/08/23  0552 05/07/23  0434   CR 1.56* 1.33* 0.98     Recent  Labs   Lab Test 04/28/23  1437 10/11/22  1712 06/28/22  1616   A1C 6.4* 5.9* 8.0*          Recent Labs   Lab Test 05/09/23  0435 05/08/23  0552   WBC  --  6.4   HGB  --  12.6   HCT  --  38.1   MCV  --  88    172     Recent Labs   Lab Test 05/08/23  0552 05/07/23  0434 05/06/23  1852   HGB 12.6 13.2 12.8    Recent Labs   Lab Test 05/07/23  0434 05/06/23  2323 05/06/23  1852   TROPONINI 0.04 0.03 0.03     Recent Labs   Lab Test 05/06/23  1852 08/15/22  1920 06/11/18  1259   * 129 383*     Recent Labs   Lab Test 05/08/23  0552   TSH 1.92     No results for input(s): INR in the last 33042 hours.

## 2023-05-09 NOTE — PROGRESS NOTES
[facility]  Hospitalist Progress Note    Admit Date:  5/6/2023  Date of Service (when I saw the patient): 05/09/2023   Provider:  Cyn Prescott, DO    Assessment & Plan   Janes Montero is a 89 year old female who was admitted on 5/6/2023. She is a 89 year old female with h/o coronary artery disease status post bypass graft surgery with LIMA graft to LAD, mitral insufficiency, aortic stenosis, aortic insufficiency, essential hypertension and dyslipidemia, presenting with acute on chronic heart failure with reduced ejection fraction, acute respiratory failure with hypoxia and echocardiogram results from 4/4/23 indicating severe pulmonary hypertension, and moderate to severe mitral insufficiency/regurgitation.      Nuclear stress test  5/8/23 - report with a partially reversible aguayo eint he inferiour and basilar mid inferior lateral walls indicating myocardial ischemia; EF was 65%.      Problem List:    1. Acute pulmonary edema      Acute on chronic heart failure   - Chest x-ray with pulmonary edema and mild cardiac enlargement.  - Transthoracic echocardiogram on 4/4/2023 with LVEF of 45 to 50%.  Moderate mitral annular calcification and moderately to severe mitral regurgitation.  EF on NM stress test 5/8/23 65%    Was given furosemide 40mg IV x 1 on admission and 20 mg IV every 12 hours for 4 doses (last dose was evening 5/8/23)    IV lasix currently on hold d/t elevation in creat  Will need to closely monitor electrolytes, renal function and pulmonary congestion  Awaiting cardiology rounds for further recs, as well.     - already was on BB > carvedilol 12.5 mg twice daily continue   - lisinopril increased to 20mg daily.   - CHF protocol daily weights and I/Os    - per cards note LBBB consider possible CRT therapy if frequent CHF episodes, but as outpatient per cards notes     Still requiring supplemental oxygen     2. Acute respiratory failure with hypoxia/Pulmonary  hypertension,  -  likely type 2 related to heart failure/valve disease/dysfunction.  - Echocardiogram from 4/4/2023 revealed severe pulmonary hypertension with estimated pressure greater than 55 mmHg.    Repeat ECHO requested today and is pending  EF on NM stress test read as 65%    May need home oxygen eval once ready for discharge especially as renal function may limit optimal diuresis     3. Coronary artery disease       Abnormal lexiscan NM stress test 5/8/23      Status post CABG JEOVANY graft to LAD, Coronary angiography August 17, 2022 complete occlusion proximal native LAD, JEOVANY to LAD graft widely patent.  Otherwise mild to moderate disease.    - Aspirin 81 mg at bedtime  - Coreg and lisinopril - to contine    - Lexiscan 5/8/23 report reviewed with her at bedside - evidence of partially reversible    change in the inferior and basal to mid inferolateral walls indicated inducible myocardial ischemia - awaiting cardiology rounds and recommends     4. Mitral insufficiency/Aortic stenosis/Aortic insufficiency  - previous Echocardiogram indicates progression of mitral insufficiency with moderate to severe mitral regurgitation  ?repeat limited echo imaging here prior to d/c  - per cardiology note consider possible outpatient DORIS assess mitral regurgitation.      5. HTN  - Hypertensive urgency on admission improving with diuresis   - continue Coreg 12.5mg po bid    Lisinopril has been placed on hold, for now, d/t elevation in creat  - PRN Hydralazine 10 mg IV every 4 hours as needed for SBP greater than 160     6.  Acute renal insufficiency       Hyponatremia    Sodium levels worse this am (131 from 135)  Creat more elevated today 1.56 (1.33) - was 1.0 on admit  As above, IV diuretics have been placed on hold, for now    BMP in the am  Will continue close clinical monitoring of respiratory status    7. Abnormal urinalysis  - Ua + nitrites few bacteria likely asx UTI   - growing multiple organisms   - will treat since  "was a good urine sample IV ceftriaxone 1 mg x3 days, can switch to PO once discharging     Awaiting final UC and sensitivites     8. Hypercalcemia  Calcium 10.7 on admission now normalized  - monitor      9. Mild elevation in liver function test  Agree likely hepatic congestion      10. Dyslipidemia  - Atorvastatin 20 mg at bedtime     11. Parkinson's disease and trigeminal neuralgia  - Carbidopa levodopa  mg 1 tab 3 times daily  - Gabapentin 300 mg 4 times daily.      12. Gastroesophageal reflux disease  -  Pantoprazole 40 mg daily     13. Diabetes mellitus type 2 not on pharmacologic therapy  Order insulin aspart medium correction scale 4 times daily ACHS     14. Hypothyroidism  - Continue Levothyroxine 25 mcg daily  - checking TSH and reflex T4     15. Depression and anxiety   Fluoxetine 20 mg twice daily.        Clinically Significant Risk Factors       # Overweight: Estimated body mass index is 28.31 kg/m  as calculated from the following:  Height as of this encounter: 1.626 m (5' 4\").  Weight as of this encounter: 74.8 kg (164     Medical Decision Making     55 MINUTES SPENT BY ME on the date of service doing chart review, history, exam, documentation & further activities per the note.        Labs/Imaging Reviewed:  See Information above and Data section below    Diet: Combination Diet Low Saturated Fat Na <2400mg Diet, No Caffeine Diet    DVT Prophylaxis: Enoxaparin (Lovenox) SQ  Holguin Catheter: Not present  Code Status: No CPR- Do NOT Intubate      Disposition Plan      Expected Discharge Date: 05/10/2023      Destination: home  Discharge Comments: IV lasix  and on O2  Pharm stress test 5/8     Entered: Cyn Prescott DO 05/09/2023, 7:07 AM     Interval History     Feeling ok.  Still SOB \"some\" but \"is much better than when I came in\".  No signficant cough.  No anterior CP, N/V, HA.  Slept on and off last night.  No new concerns per nursing overnight.     -Data reviewed today: I reviewed all " new labs and imaging results over the last 24 hours. I personally reviewed the lexiscan  image(s) showing some areas of ischemia.    Physical Exam   Temp: 97.4  F (36.3  C) Temp src: Axillary BP: 132/63 Pulse: 65   Resp: 16 SpO2: 95 % O2 Device: Nasal cannula Oxygen Delivery: 1.5 LPM  Vitals:    05/07/23 0003 05/08/23 0002 05/09/23 0439   Weight: 81.4 kg (179 lb 8 oz) 81.1 kg (178 lb 14.4 oz) 81 kg (178 lb 9.6 oz)     Vital Signs with Ranges  Temp:  [97.4  F (36.3  C)-98.7  F (37.1  C)] 97.4  F (36.3  C)  Pulse:  [60-71] 65  Resp:  [16-18] 16  BP: (105-185)/(52-75) 132/63  SpO2:  [91 %-98 %] 95 %  I/O last 3 completed shifts:  In: 290 [P.O.:290]  Out: 1225 [Urine:1225]    GEN:  Alert, awake, elderly female who does not appear in significant acute respiratory distress currently or with significant conversational dyspnea this am  HEENT:  Normocephalic/atraumatic, no scleral icterus, no nasal discharge, mouth and membranes appear fairly moist  CV:  Regular rate and rhythm, sys murmur to ausc.  S1 + S2 noted, no S3 or S4.  LUNGS:  Bibasilar crackles noted bilaterally and slight crackles noted ant bilaterally.  No clear rhonchi or wheezing auscultated bilaterally.  No significant costal retractions bilaterally.  Symmetric chest rise on inhalation noted.  ABD:  Active bowel sounds, soft, non-tender/non-distended.  No rebound/guarding/rigidity.  No masses palpated.  No obvious HSM to exam.  EXT:  No significant pretibial edema or cyanosis bilaterally. No joint synovitis noted.  No calf-tenderness or asymmetry noted.  SKIN:  Dry to touch, no rashes or jaundice noted.  PSYCH:  Mood appropriate, Not tearful or depressed.  Maintains direct eye contact.  NEURO:  No tremors at rest, speech is clear and appropriate. Somewhat [place] but is following directions without difficulty.    Data   Labs:  Recent Labs   Lab 05/09/23  0435 05/08/23  0552 05/07/23  0434   * 135* 135*   POTASSIUM 4.2 4.0 4.3   CHLORIDE 97* 95* 98   CO2 25  30 27   ANIONGAP 9 10 10   * 125 148*   BUN 51* 46* 31*   CR 1.56* 1.33* 0.98   GFRESTIMATED 31* 38* 55*   LIZZY 9.8 10.1 10.6*     Recent Labs   Lab 05/09/23 0435 05/08/23  0552 05/07/23  0434 05/06/23  1852   WBC  --  6.4 9.6 7.1   HGB  --  12.6 13.2 12.8   HCT  --  38.1 40.2 38.9   MCV  --  88 90 88    172 200 185     Recent Labs   Lab 05/09/23 0435 05/08/23  0552 05/07/23  0434 05/06/23  2323   * 135* 135* 136   POTASSIUM 4.2 4.0 4.3 4.7   CHLORIDE 97* 95* 98 98   CO2 25 30 27 27   ANIONGAP 9 10 10 11   * 125 148* 236*   BUN 51* 46* 31* 29*   CR 1.56* 1.33* 0.98 1.00   GFRESTIMATED 31* 38* 55* 54*   LIZZY 9.8 10.1 10.6* 10.7*   MAG 2.1 2.0 1.8 1.9   PROTTOTAL  --   --  7.7 8.1*   ALBUMIN  --   --  4.0 4.1   BILITOTAL  --   --  1.1* 0.9   ALKPHOS  --   --  77 83   AST  --   --  46* 55*   ALT  --   --  45 20     No results for input(s): INR in the last 168 hours.  Recent Labs   Lab 05/08/23  0552   TSH 1.92     Recent Labs   Lab 05/07/23  0643   COLOR Colorless   APPEARANCE Clear   URINEGLC Negative   URINEBILI Negative   URINEKETONE Negative   SG 1.007   UBLD Negative   URINEPH 7.0   PROTEIN Negative   NITRITE Positive*   LEUKEST Negative   RBCU 0   WBCU <1      Recent Imaging:   Recent Results (from the past 24 hour(s))   NM MPI with Lexiscan   Result Value    Pharmacologic Protocol Lexiscan    Test Type Pharmacological    Baseline HR 69    Baseline Systolic     Baseline Diastolic BP 79    Last Stress HR 82    Last Stress Systolic     Last Stress Diastolic BP 56    Target     PERCENT HR 85%    ST Deviation Elevation V2 1.1mm    Deviation Time III -1.4mm    ST Elevation Amount V2 1.8mm    ST Deviation Amount he III -1.8mm    Final Resting /46    Final Resting HR 83    Max Treadmill Speed 0.0    Max Treadmill Grade 0.0    Peak Systolic /50    Peak Diastolic /56    Max HR  90    Stress Phase Resting    Stress Resting Pt Position SUPINE    Current HR 67     Current /79    Stress Phase Resting    Stress Resting Pt Position MANUAL EVENT    Current HR 85    Current /48    Stress Phase Stress    Stage Minute EXE 00:00    Exercise Stage STAGE 2    Current HR 64    Current /79    Stress Phase Stress    Stage Minute EXE 01:00    Exercise Stage STAGE 3    Current HR 69    Current /79    Stress Phase Stress    Stage Minute EXE 01:52    Exercise Stage STAGE 3    Current HR 87    Current /48    Stress Phase Stress    Stage Minute EXE 02:00    Exercise Stage STAGE 4    Current HR 87    Current /48    Stress Phase Stress    Stage Minute EXE 02:44    Exercise Stage STAGE 4    Current HR 85    Current /48    Stress Phase Stress    Stage Minute EXE 02:48    Exercise Stage STAGE 4    Current HR 84    Current /56    Stress Phase Stress    Stage Minute EXE 03:00    Exercise Stage STAGE 5    Current HR 89    Current /56    Stress Phase Stress    Stage Minute EXE 04:00    Exercise Stage STAGE 6    Current HR 81    Current /56    Stress Phase Stress    Stage Minute EXE 04:00    Exercise Stage STAGE 6    Current HR 82    Current /56    Stress Phase Recovery    Stage Minute REC 00:54    Exercise Stage Recovery    Current HR 86    Current /50    Stress Phase Recovery    Stage Minute REC 00:59    Exercise Stage Recovery    Current HR 85    Current /50    Stress Phase Recovery    Stage Minute REC 01:59    Exercise Stage Recovery    Current HR 85    Current /50    Stress Phase Recovery    Stage Minute REC 02:47    Exercise Stage Recovery    Current HR 86    Current /46    Stress Phase Recovery    Stage Minute REC 02:59    Exercise Stage Recovery    Current HR 87    Current /46    Stress Phase Recovery    Stage Minute REC 03:59    Exercise Stage Recovery    Current HR 84    Current /46    Stress Phase Recovery    Stage Minute REC 04:01    Exercise Stage Recovery    Current HR 83    Current BP  114/46    Max Predicted HR  69    Rate Pressure Product 10,440.0    Left Ventricular EF 65    Narrative       A prior study was conducted on 8/16/2022.  Prior images were   unavailable for comparison review.     Pharmacologic regadenoson stress ECG is nondiagnostic due to baseline   ECG abnormality.     Pharmacological regadenoson nuclear stress test is abnormal.  There is   partially reversible change in inferior and basal to mid inferolateral   walls indicating inducible myocardial ischemia.     Normal left ventricular size, wall motion and systolic function.    Calculated left ventricular ejection fraction is 65%.     The patient is at an intermediate risk of future cardiac ischemic   events.         Medications       aspirin  81 mg Oral At Bedtime     atorvastatin  20 mg Oral At Bedtime     carbidopa-levodopa  1 tablet Oral TID     carvedilol  12.5 mg Oral BID w/meals     cefTRIAXone  1 g Intravenous Q24H     cyanocobalamin  1,000 mcg Oral Daily     enoxaparin ANTICOAGULANT  30 mg Subcutaneous Q24H     FLUoxetine  20 mg Oral BID     furosemide  20 mg Intravenous Q12H     gabapentin  300 mg Oral 4x Daily     guaiFENesin  600 mg Oral BID     levothyroxine  25 mcg Oral Daily     lisinopril  20 mg Oral Daily     magnesium oxide  200 mg Oral Daily     pantoprazole  40 mg Oral QAM AC     sodium chloride (PF)  3 mL Intracatheter Q8H     Vitamin D3  25 mcg Oral BID

## 2023-05-09 NOTE — PROGRESS NOTES
05/09/23 0943   Appointment Info   Signing Clinician's Name / Credentials (PT) Mandie Willis, PT, DPT   Living Environment   People in Home alone   Current Living Arrangements independent living facility   Home Accessibility no concerns   Transportation Anticipated family or friend will provide   Self-Care   Usual Activity Tolerance good   Current Activity Tolerance moderate   Equipment Currently Used at Home walker, rolling  (FWW and 4WW)   Fall history within last six months no   Activity/Exercise/Self-Care Comment Patient reports previous independence with mobility and ADLs. Facility assists with 1 meal, laundry, and cooking.   General Information   Onset of Illness/Injury or Date of Surgery 05/06/23   Referring Physician Jnena Pendleton MD   Patient/Family Therapy Goals Statement (PT) To  go  home   Pertinent History of Current Problem (include personal factors and/or comorbidities that impact the POC) Janes Montero is a 89 year old female with h/o coronary artery disease status post bypass graft surgery with LIMA graft to LAD, mitral insufficiency, aortic stenosis, aortic insufficiency, essential hypertension and dyslipidemia, presenting with acute on chronic heart failure with reduced ejection fraction, acute respiratory failure with hypoxia and echocardiogram results from 4/4/23 indicating severe pulmonary hypertension, and moderate to severe mitral insufficiency/regurgitation.   Existing Precautions/Restrictions fall   Weight-Bearing Status - LLE full weight-bearing   Weight-Bearing Status - RLE full weight-bearing   Cognition   Affect/Mental Status (Cognition) WFL   Orientation Status (Cognition) oriented x 3   Follows Commands (Cognition) WFL   Range of Motion (ROM)   Range of Motion ROM is WFL   Strength (Manual Muscle Testing)   Strength (Manual Muscle Testing) strength is WFL   Bed Mobility   Comment, (Bed Mobility) Unable to formally assess, patient seated in bedside chair on arrival    Transfers   Transfers sit-stand transfer   Sit-Stand Transfer   Sit-Stand [place] (Transfers) contact guard;1 person to manage equipment   Assistive Device (Sit-Stand Transfers) walker, front-wheeled   Gait/Stairs (Locomotion)   [place] Level (Gait) contact guard;1 person to manage equipment   Assistive Device (Gait) walker, front-wheeled   Distance in Feet 60   Pattern (Gait) step-through   Clinical Impression   Criteria for Skilled Therapeutic Intervention Yes, treatment indicated   PT Diagnosis (PT) Impaired functional mobility   Influenced by the following impairments Weakness   Functional limitations due to impairments Transfers, gait   Clinical Presentation (PT Evaluation Complexity) Stable/Uncomplicated   Clinical Presentation Rationale Patient presents as medically diagnosed   Clinical Decision Making (Complexity) low complexity   Planned Therapy Interventions (PT) bed mobility training;home exercise program;patient/family education;stair training;strengthening;transfer training   Anticipated Equipment Needs at Discharge (PT) walker, rolling   Risk & Benefits of therapy have been explained evaluation/treatment results reviewed;care plan/treatment goals reviewed;participants voiced agreement with care plan;participants included;patient   PT Total Evaluation Time   PT Eval, Low Complexity Minutes (48279) 8   Physical Therapy Goals   PT Frequency Daily   PT Predicted Duration/Target Date for Goal Attainment 05/16/23   PT Goals Transfers;Gait   PT: Transfers Modified independent;Sit to/from stand;Assistive device   PT: Gait Modified independent;Assistive device;150 feet   Interventions   Interventions Quick Adds Therapeutic Procedure   Therapeutic Procedure/Exercise   Ther. Procedure: strength, endurance, ROM, flexibillity Minutes (19139) 8   Symptoms Noted During/After Treatment none   Treatment Detail/Skilled Intervention Patient performed seated BLE exercises x 10 with verbal cueing and visual  demonstration required for proper technique.   PT Discharge Planning   PT Plan Transfers, gait, standing LE ex; bring 4WW   PT Discharge Recommendation (DC Rec) home;home with home care physical therapy   PT Rationale for DC Rec Patient currently requires contact guard assist of 1 for transfers and gait. She lives in an independent living facility. She has access to walker at home at uses it when outside of her apartment. Anticipate patient will be safe to discharge home with use of a walker once medically cleared to do so. Pending progress with therapy, patient may benefit from home PT in order to improve strength and overall independence with mobility.   PT Brief overview of current status CGA for transfers and gait   Total Session Time   Timed Code Treatment Minutes 8   Total Session Time (sum of timed and untimed services) 16     Mandie Willis, PT, DPT

## 2023-05-09 NOTE — PLAN OF CARE
Goal Outcome Evaluation:    Patient VSS, 2L NC, A&O. Denies pain, rested with eyes closed overnight. SR w/BBB and PVCs on tele. Up to bathroom w/ Ax1 and gait belt with walker. No significant occurences overnight.    Problem: Hypertension Comorbidity  Goal: Blood Pressure in Desired Range  Outcome: Progressing  Intervention: Maintain Blood Pressure Management  Recent Flowsheet Documentation  Taken 5/9/2023 0345 by Ronald Ortega, RN  Medication Review/Management: medications reviewed  Taken 5/9/2023 0000 by Ronald Ortega, RN  Medication Review/Management: medications reviewed  Taken 5/8/2023 2015 by Ronald Ortega, RN  Medication Review/Management: medications reviewed     Problem: Risk for Delirium  Goal: Improved Sleep  Outcome: Progressing

## 2023-05-10 ENCOUNTER — APPOINTMENT (OUTPATIENT)
Dept: CT IMAGING | Facility: CLINIC | Age: 88
DRG: 291 | End: 2023-05-10
Attending: INTERNAL MEDICINE
Payer: MEDICARE

## 2023-05-10 ENCOUNTER — APPOINTMENT (OUTPATIENT)
Dept: PHYSICAL THERAPY | Facility: CLINIC | Age: 88
DRG: 291 | End: 2023-05-10
Payer: MEDICARE

## 2023-05-10 LAB
ALBUMIN UR-MCNC: NEGATIVE MG/DL
ANION GAP SERPL CALCULATED.3IONS-SCNC: 10 MMOL/L (ref 5–18)
APPEARANCE UR: CLEAR
BILIRUB UR QL STRIP: NEGATIVE
BUN SERPL-MCNC: 66 MG/DL (ref 8–28)
CALCIUM SERPL-MCNC: 9.8 MG/DL (ref 8.5–10.5)
CHLORIDE BLD-SCNC: 94 MMOL/L (ref 98–107)
CO2 SERPL-SCNC: 26 MMOL/L (ref 22–31)
COLOR UR AUTO: COLORLESS
CREAT SERPL-MCNC: 1.48 MG/DL (ref 0.6–1.1)
GFR SERPL CREATININE-BSD FRML MDRD: 33 ML/MIN/1.73M2
GLUCOSE BLD-MCNC: 132 MG/DL (ref 70–125)
GLUCOSE UR STRIP-MCNC: NEGATIVE MG/DL
HGB UR QL STRIP: NEGATIVE
KETONES UR STRIP-MCNC: NEGATIVE MG/DL
LEUKOCYTE ESTERASE UR QL STRIP: NEGATIVE
MAGNESIUM SERPL-MCNC: 2.4 MG/DL (ref 1.8–2.6)
NITRATE UR QL: NEGATIVE
PH UR STRIP: 5.5 [PH] (ref 5–7)
POTASSIUM BLD-SCNC: 4.3 MMOL/L (ref 3.5–5)
RBC URINE: <1 /HPF
SODIUM SERPL-SCNC: 130 MMOL/L (ref 136–145)
SP GR UR STRIP: 1.01 (ref 1–1.03)
SQUAMOUS EPITHELIAL: <1 /HPF
UROBILINOGEN UR STRIP-MCNC: <2 MG/DL
WBC URINE: <1 /HPF

## 2023-05-10 PROCEDURE — 83735 ASSAY OF MAGNESIUM: CPT | Performed by: INTERNAL MEDICINE

## 2023-05-10 PROCEDURE — 250N000011 HC RX IP 250 OP 636: Performed by: INTERNAL MEDICINE

## 2023-05-10 PROCEDURE — 120N000013 HC R&B IMCU

## 2023-05-10 PROCEDURE — 81001 URINALYSIS AUTO W/SCOPE: CPT | Performed by: INTERNAL MEDICINE

## 2023-05-10 PROCEDURE — 36415 COLL VENOUS BLD VENIPUNCTURE: CPT | Performed by: INTERNAL MEDICINE

## 2023-05-10 PROCEDURE — 97116 GAIT TRAINING THERAPY: CPT | Mod: GP

## 2023-05-10 PROCEDURE — 250N000013 HC RX MED GY IP 250 OP 250 PS 637: Performed by: INTERNAL MEDICINE

## 2023-05-10 PROCEDURE — 80048 BASIC METABOLIC PNL TOTAL CA: CPT | Performed by: INTERNAL MEDICINE

## 2023-05-10 PROCEDURE — 71250 CT THORAX DX C-: CPT | Mod: MG

## 2023-05-10 PROCEDURE — G1010 CDSM STANSON: HCPCS

## 2023-05-10 PROCEDURE — 99233 SBSQ HOSP IP/OBS HIGH 50: CPT | Performed by: INTERNAL MEDICINE

## 2023-05-10 RX ADMIN — CARBIDOPA AND LEVODOPA 1 TABLET: 25; 100 TABLET ORAL at 20:37

## 2023-05-10 RX ADMIN — ATORVASTATIN CALCIUM 20 MG: 10 TABLET, FILM COATED ORAL at 22:14

## 2023-05-10 RX ADMIN — CARBIDOPA AND LEVODOPA 1 TABLET: 25; 100 TABLET ORAL at 14:42

## 2023-05-10 RX ADMIN — Medication 25 MCG: at 20:38

## 2023-05-10 RX ADMIN — Medication 200 MG: at 08:20

## 2023-05-10 RX ADMIN — GABAPENTIN 300 MG: 300 CAPSULE ORAL at 08:16

## 2023-05-10 RX ADMIN — CYANOCOBALAMIN TAB 1000 MCG 1000 MCG: 1000 TAB at 22:15

## 2023-05-10 RX ADMIN — CARVEDILOL 12.5 MG: 6.25 TABLET, FILM COATED ORAL at 08:16

## 2023-05-10 RX ADMIN — Medication 25 MCG: at 08:20

## 2023-05-10 RX ADMIN — GUAIFENESIN 600 MG: 600 TABLET ORAL at 08:16

## 2023-05-10 RX ADMIN — FLUOXETINE 20 MG: 20 CAPSULE ORAL at 08:17

## 2023-05-10 RX ADMIN — LEVOTHYROXINE SODIUM 25 MCG: 0.03 TABLET ORAL at 06:17

## 2023-05-10 RX ADMIN — CARBIDOPA AND LEVODOPA 1 TABLET: 25; 100 TABLET ORAL at 06:17

## 2023-05-10 RX ADMIN — GABAPENTIN 300 MG: 300 CAPSULE ORAL at 17:04

## 2023-05-10 RX ADMIN — FLUOXETINE 20 MG: 20 CAPSULE ORAL at 20:37

## 2023-05-10 RX ADMIN — ENOXAPARIN SODIUM 30 MG: 30 INJECTION SUBCUTANEOUS at 22:15

## 2023-05-10 RX ADMIN — GABAPENTIN 300 MG: 300 CAPSULE ORAL at 20:38

## 2023-05-10 RX ADMIN — CEFTRIAXONE 1 G: 1 INJECTION, POWDER, FOR SOLUTION INTRAMUSCULAR; INTRAVENOUS at 12:36

## 2023-05-10 RX ADMIN — GABAPENTIN 300 MG: 300 CAPSULE ORAL at 12:36

## 2023-05-10 RX ADMIN — Medication 1 MG: at 22:15

## 2023-05-10 RX ADMIN — GUAIFENESIN 600 MG: 600 TABLET ORAL at 20:37

## 2023-05-10 RX ADMIN — PANTOPRAZOLE SODIUM 40 MG: 40 TABLET, DELAYED RELEASE ORAL at 06:18

## 2023-05-10 RX ADMIN — CARVEDILOL 12.5 MG: 6.25 TABLET, FILM COATED ORAL at 17:03

## 2023-05-10 RX ADMIN — ASPIRIN 81 MG CHEWABLE TABLET 81 MG: 81 TABLET CHEWABLE at 22:15

## 2023-05-10 ASSESSMENT — ACTIVITIES OF DAILY LIVING (ADL)
ADLS_ACUITY_SCORE: 31
ADLS_ACUITY_SCORE: 30
ADLS_ACUITY_SCORE: 31
ADLS_ACUITY_SCORE: 30
ADLS_ACUITY_SCORE: 31
ADLS_ACUITY_SCORE: 30
ADLS_ACUITY_SCORE: 31
ADLS_ACUITY_SCORE: 31
ADLS_ACUITY_SCORE: 30
ADLS_ACUITY_SCORE: 30

## 2023-05-10 NOTE — PLAN OF CARE
Problem: Plan of Care - These are the overarching goals to be used throughout the patient stay.    Goal: Optimal Comfort and Wellbeing  Outcome: Progressing     Problem: Gas Exchange Impaired  Goal: Optimal Gas Exchange  Outcome: Progressing  Intervention: Optimize Oxygenation and Ventilation  Recent Flowsheet Documentation  Taken 5/9/2023 2351 by Amy Mejia, RN  Head of Bed (HOB) Positioning: HOB at 20-30 degrees  Taken 5/9/2023 1959 by Amy Mejia, RN  Head of Bed (HOB) Positioning: HOB at 20-30 degrees   Goal Outcome Evaluation:               Pt with occassional congested cough.  Medicated with guifenesin per schedule.  Pt sleeping at intervals.  VSS.  Up to BR with 1 assist and walker and gait belt.

## 2023-05-10 NOTE — PROGRESS NOTES
Care Management Follow Up    Length of Stay (days): 4    Expected Discharge Date: 05/11/2023     Concerns to be Addressed: discharge planning       Patient plan of care discussed at interdisciplinary rounds: Yes    Anticipated Discharge Disposition: Home, Home Care  Disposition Comments: home with home care    Anticipated Discharge Services: None    Anticipated Discharge DME: None     Education Provided on the Discharge Plan:  AVS per bedside RN    Patient/Family in Agreement with the Plan: yes    Referrals Placed by CM/SW:  Home care      Additional Information:  CM reviewed chart. CM following for discharge needs. Per MD note: Hopeful discharge tomorrow.  We will try to wean off oxygen.  If needs home oxygen. [facility] has accepted for PT and OT. Family will provide transportation home at time of hospital discharge. CM will follow.     Shabana Harrison RN

## 2023-05-10 NOTE — PROGRESS NOTES
Cardiology Progress Note    Assessment:  1.  Acute pulmonary edema with acute on chronic combined systolic and diastolic heart failure.  Patient was receiving IV diuresis but had an increase in creatinine.  Admit creatinine of 1.0 with creatinine increased to 1.56 yesterday and today's creatinine of 1.48.  Urine output  Net of 1.3 L.  Weight today 180 pounds which is up from previous weights.  Weight yesterday of 178 pounds.  It does not appear the patient was on furosemide at home.  Right ventricular systolic pressure was estimated at 29 mmHg plus right atrial pressure on the current study.    2.  Coronary artery disease.  Coronary angiogram from August 2022 is reviewed.  She had total occlusion of the LAD with a very patent vein graft to the LAD at that time.  Mild nonobstructive disease in the other coronary territories.  Nuclear medicine stress test this admission reports a partially reversible change in the inferior basilar to mid inferior lateral segments with normal ejection fraction.  Troponins negative this admission.  Would be hesitant to consider coronary angiography with the current increased creatinine.    3.  Mitral regurgitation.  Echocardiogram completed yesterday reported an ejection fraction of 45 to 50% with moderate 2+ mitral regurgitation.  Would recommend a follow-up with her primary cardiologist and we will plan to send a note regarding the current findings.  Echocardiogram personally reviewed.  Mitral regurgitation is 2+ it does not appear to to be significant enough to contribute significantly to heart failure.  At this point would not recommend further evaluation for mitral valve clip.    4.  Chronic left bundle branch block.  Question if patient would benefit from CRT therapy.  Septal motion on echocardiogram is consistent with a left bundle branch block.    5.  Hypertensive urgency on admission.  Improved to continue with carvedilol.  Lisinopril on hold hopefully we can resume.  Blood  "pressures appear to be under better control.  Blood pressure on admission 185/88 per review of the H&P.  This certainly could have contributed to the acute fluid overload.  Principal Problem:    Hypoxia  Active Problems:    Benign essential hypertension    Coronary artery stenosis    S/P CABG (coronary artery bypass graft)    Parkinson's disease (H)    Mitral valve insufficiency    CKD (chronic kidney disease) stage 3, GFR 30-59 ml/min (H)    Type 2 diabetes mellitus with diabetic polyneuropathy, without long-term current use of insulin (H)    Acute pulmonary edema (H)    Acute respiratory failure with hypoxia (H)    Acute on chronic combined systolic and diastolic heart failure (H)  Current medications include albuterol inhaler  Aspirin  Atorvastatin  Carbidopa levodopa  Carvedilol 12.5 mg p.o. twice daily  Ceftriaxone  Lovenox  Fluoxetine  Gabapentin  Synthroid  Lisinopril on hold  Protonix        Plan:  1.  Hold Lasix today.  Hopefully can resume oral Lasix and lisinopril tomorrow.  2.  Hopeful discharge tomorrow.  We will try to wean off oxygen.  May need home oxygen.  3.  Would wonder about the possibility of chronic interstitial lung disease.  The chest x-ray from May 4, 2023 is reporting findings consistent with interstitial lung disease would wonder if some of the component of shortness of breath is secondary to this finding.  Will discuss with the hospitalist.  4.  We will recheck BMP tomorrow    Subjective:   Janes GABE Montero is seen in follow-up.  Chart record reviewed.  I did review the angiogram with my interventional colleague who indicated that the vein graft to the LAD was widely patent.  She admitted with acute on chronic mild heart failure.  IV diuresis held yesterday.  She reports feeling better.  Still is requiring some oxygen.    Objective:   Vital signs in last 24 hours:  VITALS: /60 (BP Location: Right arm)   Pulse 65   Temp 97.4  F (36.3  C) (Oral)   Resp 18   Ht 1.6 m (5' 3\")   Wt " 82 kg (180 lb 12.8 oz)   LMP  (LMP Unknown)   SpO2 95%   BMI 32.03 kg/m    BMI: Body mass index is 32.03 kg/m .  Wt Readings from Last 3 Encounters:   05/10/23 82 kg (180 lb 12.8 oz)   23 83.7 kg (184 lb 9.6 oz)   23 83.5 kg (184 lb)       Intake/Output Summary (Last 24 hours) at 5/10/2023 1155  Last data filed at 5/10/2023 0936  Gross per 24 hour   Intake 1800 ml   Output 1000 ml   Net 800 ml       Physical Exam:  General: Sitting comfortably in the chair in no acute distress   Neck: Allergy to assess.  She is sitting in the chair.   Lungs: Few bibasilar crackles   COR: RRR, 2/6 systolic murmur   Abd: nondistended, BS present   Extrem: No edema  Neuro: Alert and oriented    Cardiographics:    EC2023 sinus rhythm, left bundle branch block.  Previous also noted left bundle branch block.  Echocardiogram:   Echocardiogram Complete  Order: [id number]   Status: Edited Result - FINAL      Visible to patient: Yes (seen)      Next appt: Today at 08:30 AM in Occupational Therapy (Janet Cohen, OTR)      Dx: GALVAN (dyspnea on exertion); Nonrheumat...      3 Result Notes     1 Patient Communication  Details     Reading Physician Reading Date Result Priority   Diallo Orozco MD  [phone] 2023        Narrative & Impression  [id number]  [id number]  [id number]  [id number]^DANIELE^[place] [address]     Name: EMMANUELLE DÍAZ  MRN: [id number]  : 10/24/1933  Study Date: 2023 10:55 AM  Age: 89 yrs  Gender: Female  Patient [facility]: [facility]  Reason For Study: Hypertension, unspecified type, GALVAN (dyspnea on exertion),  Coronary artery disease; Nonrheumatic aortic valve stenosis; Nonrheumatic  aortic insufficiency; Mitral valve insufficiency  Ordering Physician: AKBAR SANABRIA  Referring Physician: AKBAR SANABRIA  Performed By: SABRINA     BSA: 1.9 m2  Height: 65 in  Weight: 184 lb  HR: 75  BP: 142/83  mmHg  ______________________________________________________________________________  Procedure  Complete Echo Adult. Definity (NDC #08239-257) given intravenously.  Technically difficult study.  ______________________________________________________________________________  Interpretation Summary     1. The left ventricle is normal in size with mild concentric left ventricular  hypertrophy. Left ventricular function is decreased. The ejection fraction is  45-50% (mildly reduced).  2. The right ventricle is mildly dilated with mildly decreased right  ventricular systolic function  3. There is moderate mitral annular calcification. There is moderately severe  (3+) mitral regurgitation.  4. There is sclerosis, calcification, and restriction of the aortic valve  opening compatible with mild aortic stenosis.  5. Severe (>55mmHg) pulmonary hypertension is present. The right ventricular  systolic pressure is approximated at 68mmHg plus the right atrial pressure.  6. Compared to the prior study of 8/16/22, the mitral valve regurgitation is  now worse.  ______________________________________________________________________________  Left Ventricle  The left ventricle is normal in size. Left ventricular function is decreased.  The ejection fraction is 45-50% (mildly reduced). There is mild concentric  left ventricular hypertrophy. Left ventricular diastolic function is abnormal.  There is mild global hypokinesia of the left ventricle.     Right Ventricle  The right ventricle is mildly dilated. Mildly decreased right ventricular  systolic function.     Atria  The left atrium is moderately dilated. The right atrium is moderately dilated.  There is no color Doppler evidence of an atrial shunt.     Mitral Valve  There is moderate mitral annular calcification. There is moderately severe  (3+) mitral regurgitation. There is no mitral valve stenosis.     Tricuspid Valve  Tricuspid valve leaflets appear normal. There is no evidence  of tricuspid  stenosis or clinically significant tricuspid regurgitation. Severe (>55mmHg)  pulmonary hypertension is present. The right ventricular systolic pressure is  approximated at 68mmHg plus the right atrial pressure.     Aortic Valve  The aortic valve is trileaflet. There is trace aortic regurgitation. There is  sclerosis, calcification, and restriction of the aortic valve opening  compatible with mild aortic stenosis.     Pulmonic Valve  The pulmonic valve is not well seen, but is grossly normal. This degree of  valvular regurgitation is within normal limits. There is trace pulmonic  valvular regurgitation.     Vessels  The aorta root is normal. Normal size ascending aorta. IVC diameter <2.1 cm  collapsing >50% with sniff suggests a normal RA pressure of 3 mmHg.     Pericardium  There is no pericardial effusion.     Rhythm  Sinus rhythm was noted.            Stress Test:NM MPI with Lexiscan  Order: [id number]   Status: Final result      Visible to patient: Yes (seen)      Next appt: Today at 08:30 AM in Occupational Therapy (Janet Cohen OTMARY JANE)      0 Result Notes  Details     Reading Physician Reading Date Result Priority   Veronica Polanco MD  [phone] 5/8/2023 Routine   Veronica Polanco MD  [phone] 5/8/2023        Result Text        A prior study was conducted on 8/16/2022.  Prior images were unavailable for comparison review.     Pharmacologic regadenoson stress ECG is nondiagnostic due to baseline ECG abnormality.     Pharmacological regadenoson nuclear stress test is abnormal.  There is partially reversible change in inferior and basal to mid inferolateral walls indicating inducible myocardial ischemia.     Normal left ventricular size, wall motion and systolic function.  Calculated left ventricular ejection fraction is 65%.     The patient is at an intermediate risk of future cardiac ischemic events.         Janes Montero  Cardiac Catheterization  Order# [id number]  Reading physician: Oli  Ignacio REESE MD Ordering physician: Mathew Ricardo MD Study date: 8/17/22      Patient Information     Name MRN Description   Janes Montero [id number] 88 year old female      Physicians     Panel Physicians Referring Physician Case Authorizing Physician   Ignacio Baird MD (Primary) Mathew Ricardo MD Reisdorf, Franz-Josef E, MD      Procedures     Panel 1     Primary Surgeon:  Ignacio Baird MD   Procedure:  Coronary Angiogram         Indications     Chest pain, unspecified type [R07.9 (ICD-10-CM)]   Coronary artery disease involving native coronary artery without angina pectoris, unspecified whether native or transplanted heart [I25.10 (ICD-10-CM)]   S/P CABG (coronary artery bypass graft) [Z95.1 (ICD-10-CM)]      Comments/Patient Narrative     Lab 3      Pre Procedure Diagnosis     worsening angina        Conclusion       1st Mrg lesion is 50% stenosed.    Prox LAD lesion is 100% stenosed.    Prox RCA to Mid RCA lesion is 25% stenosed.    Atretic nonfunctional JEOVANY graft of uncertain destination    Widely patent graft to mid LAD from left aota           Plan       Follow bedrest per protocol    Continued medical management and lifestyle modifications for cardiovascular risk factor optimizations.    Arterial sheath removed from femoral artery with closure device.    Femoral angiogram identifies arterial sheath placement suitable for closure device.      Recommendations     General Recommendations:  - Patient given specific instructions regarding care of arteriotomy site, activity restrictions, signs and symptoms of cardiac or vascular complications and to seek immediate medical evaluation should they occur.   - Arterial sheath removed from the femoral artery with closure device.   - Femoral angiogram identifies arterial sheath placement is suitable for         Telemetry: Sinus rhythm,         Lab Results    Chemistry/lipid CBC Cardiac Enzymes/BNP/TSH/INR   Recent Labs   Lab Test  08/18/22  0506   CHOL 121   HDL 29*   LDL 58   TRIG 172*     Recent Labs   Lab Test 08/18/22  0506 12/14/21  1638   LDL 58 32     Recent Labs   Lab Test 05/10/23  0442   *   POTASSIUM 4.3   CHLORIDE 94*   CO2 26   *   BUN 66*   CR 1.48*   GFRESTIMATED 33*   LIZZY 9.8     Recent Labs   Lab Test 05/10/23  0442 05/09/23  0435 05/08/23  0552   CR 1.48* 1.56* 1.33*     Recent Labs   Lab Test 04/28/23  1437 10/11/22  1712 06/28/22  1616   A1C 6.4* 5.9* 8.0*          Recent Labs   Lab Test 05/09/23  0435 05/08/23  0552   WBC  --  6.4   HGB  --  12.6   HCT  --  38.1   MCV  --  88    172     Recent Labs   Lab Test 05/08/23  0552 05/07/23  0434 05/06/23  1852   HGB 12.6 13.2 12.8    Recent Labs   Lab Test 05/07/23  0434 05/06/23  2323 05/06/23  1852   TROPONINI 0.04 0.03 0.03     Recent Labs   Lab Test 05/06/23  1852 08/15/22  1920 06/11/18  1259   * 129 383*     Recent Labs   Lab Test 05/08/23  0552   TSH 1.92     No results for input(s): INR in the last 08034 hours.

## 2023-05-10 NOTE — PROGRESS NOTES
[facility]  Hospitalist Progress Note    Admit Date:  5/6/2023  Date of Service (when I saw the patient): 05/10/2023   Provider:  Cyn Locke-Darnell, DO    Assessment & Plan      Janes Montero is a 89 year old female who was admitted on 5/6/2023. She is a 89 year old female with h/o coronary artery disease status post bypass graft surgery with LIMA graft to LAD, mitral insufficiency, aortic stenosis, aortic insufficiency, essential hypertension and dyslipidemia, presenting with acute on chronic heart failure with reduced ejection fraction, acute respiratory failure with hypoxia and echocardiogram results from 4/4/23 indicating severe pulmonary hypertension, and moderate to severe mitral insufficiency/regurgitation.      Nuclear stress test  5/8/23 - report with a partially reversible change in the inferior and basilar mid inferior lateral walls indicating myocardial ischemia; EF was 65%.      Problem List:    1. Acute pulmonary edema      Acute on chronic heart failure   - Chest x-ray with pulmonary edema and mild cardiac enlargement.  - Transthoracic echocardiogram on 4/4/2023 with LVEF of 45 to 50%.  Moderate mitral annular calcification and moderately to severe mitral regurgitation.  EF on NM stress test 5/8/23 65%    Was given furosemide 40mg IV x 1 on admission and 20 mg IV every 12 hours for 4 doses (last dose was evening 5/8/23)    IV lasix currently on hold d/t elevation in creat  Creat still elevated, sodium down a bit further today as well    D/w Dr. Saxena today - plan to continue to hold lasix yet today and continue to monitor symptoms closely  BMP in the am  May need to discharge home on supplemental oxygen  Hope to be able to discharge home on po diuretics with close outpt f/up    - already was on BB > carvedilol 12.5 mg twice daily continue   - lisinopril has been on hold d/t elevation in creat  - CHF protocol daily weights and I/Os    - per cards note LBBB consider  possible CRT therapy if frequent CHF episodes, but as outpatient per cards notes     BMP in the am    2. Acute respiratory failure with hypoxia/Pulmonary hypertension,  -  likely type 2 related to heart failure/valve disease/dysfunction.  - Echocardiogram from 4/4/2023 revealed severe pulmonary hypertension with estimated pressure greater than 55 mmHg.    Repeat ECHO requested today and is pending  EF on NM stress test read as 65%    May need home oxygen eval once ready for discharge especially as renal function may limit optimal diuresis     1650 - discussed further with Dr. Saxena (cardiology); suggested a non-contrast CT of the chest to further eval for possible component of interstitial lung disease after review of CXR and d/t her persistent hypoxia.  No contrast d/t elevated creat    3. Coronary artery disease       Abnormal lexiscan NM stress test 5/8/23      Status post CABG JEOVANY graft to LAD, Coronary angiography August 17, 2022 complete occlusion proximal native LAD, JEOVANY to LAD graft widely patent.  Otherwise mild to moderate disease.    - Aspirin 81 mg at bedtime  - Coreg and lisinopril - to contine    - Lexiscan 5/8/23 report reviewed with her at bedside - evidence of partially reversible    change in the inferior and basal to mid inferolateral walls indicated inducible myocardial ischemia - no clear indication for cardiac angiography currently especially with elevation in creat noted    4. Mitral insufficiency/Aortic stenosis/Aortic insufficiency  Echo 5/9/23 reviewed  Moderate, 2+ MR with EF 45-50%  outpt f/up with her primary cardiologist.      5. HTN  - Hypertensive urgency on admission improving with diuresis   - continue Coreg 12.5mg po bid    Lisinopril has been placed on hold, for now, d/t elevation in creat  - PRN Hydralazine 10 mg IV every 4 hours as needed for SBP greater than 160     6.  Acute renal insufficiency       Hyponatremia    Sodium levels worse this am (131 from 135)  Creat more  "elevated today 1.56 (1.33) - was 1.0 on admit  As above, IV diuretics have been placed on hold, for now    BMP in the am  Will continue close clinical monitoring of respiratory status    7. Abnormal urinalysis  - Ua + nitrites few bacteria likely asx UTI   - growing multiple organisms   - will treat since was a good urine sample IV ceftriaxone 1 mg x3 days    Final UC and sensitivites still pending - will repeat UA and UC today     8. Hypercalcemia  Calcium 10.7 on admission now normalized  - monitor      9. Mild elevation in liver function test   likely hepatic congestion      10. Dyslipidemia  - Atorvastatin 20 mg at bedtime     11. Parkinson's disease and trigeminal neuralgia  - Carbidopa levodopa  mg 1 tab 3 times daily  - Gabapentin 300 mg 4 times daily.      12. Gastroesophageal reflux disease  -  Pantoprazole 40 mg daily     13. Diabetes mellitus type 2 not on pharmacologic therapy  Order insulin aspart medium correction scale 4 times daily ACHS     14. Hypothyroidism  - Continue Levothyroxine 25 mcg daily  - checking TSH and reflex T4     15. Depression and anxiety   Fluoxetine 20 mg twice daily.      Clinically Significant Risk Factors       # Overweight: Estimated body mass index is 28.31 kg/m  as calculated from the following:  Height as of this encounter: 1.626 m (5' 4\").  Weight as of this encounter: 74.8 kg (164     Medical Decision Making     60 Minutes spent by me on the date of service doing chart review, history, exam, documentation & further activites per the note        Labs/Imaging Reviewed:  See Information above and Data section below    Diet: Combination Diet Low Saturated Fat Na <2400mg Diet, No Caffeine Diet    DVT Prophylaxis: Enoxaparin (Lovenox) SQ  Holguin Catheter: Not present  Code Status: No CPR- Do NOT Intubate      Disposition Plan   Entered: Cyn Prescott,  05/10/2023, 7:24 AM     Interval History     Feeling that her breathing is \"better\".  Hungry and up in chair " eating breakfast.  Daughter is at bedside this am and is awaiting medical updates.    Slept ok, no current CP. Cough and leg swelling improved.  Not feeling as SOB.        -Data reviewed today: I reviewed all new labs and imaging results over the last 24 hoursPhysical Exam   Temp: 97.8  F (36.6  C) Temp src: Oral BP: 106/51 Pulse: 62   Resp: 16 SpO2: 95 % O2 Device: Nasal cannula Oxygen Delivery: 1 LPM  Vitals:    05/08/23 0002 05/09/23 0439 05/10/23 0422   Weight: 81.1 kg (178 lb 14.4 oz) 81 kg (178 lb 9.6 oz) 82 kg (180 lb 12.8 oz)     Vital Signs with Ranges  Temp:  [97.6  F (36.4  C)-98.1  F (36.7  C)] 97.8  F (36.6  C)  Pulse:  [60-67] 62  Resp:  [14-20] 16  BP: (106-158)/(51-74) 106/51  SpO2:  [92 %-95 %] 95 %  I/O last 3 completed shifts:  In: 2160 [P.O.:2160]  Out: 1100 [Urine:1100]    GEN:  Alert, awake, elderly female who does not appear in significant acute respiratory distress   Sitting in recliner,  Eating breakfast  No conversational dyspnea  HEENT:  Normocephalic/atraumatic, no scleral icterus, no nasal discharge, mouth and membranes appear fairly moist  CV:  Somewhat distant but regular rate and rhythm, sys murmur to ausc.  S1 + S2 noted, no S3 or S4.  LUNGS:  CTA anteriorly this am; on post exam - still with some bibasilar crackles No clear rhonchi or wheezing auscultated bilaterally.  No significant costal retractions bilaterally.  Symmetric chest rise on inhalation noted.  ABD:  Active bowel sounds, soft, non-tender/non-distended.  .  EXT:  Trace pretibial edema noted this am, no cyanosis bilaterally. No joint synovitis noted.  No calf-tenderness or asymmetry noted.  SKIN:  Dry to touch, no rashes or jaundice noted.  PSYCH:  Mood appropriate, Not tearful or depressed.  Maintains direct eye contact. smiling    Data   Labs:  Recent Labs   Lab 05/10/23  0442 05/09/23  0435 05/08/23  0552   * 131* 135*   POTASSIUM 4.3 4.2 4.0   CHLORIDE 94* 97* 95*   CO2 26 25 30   ANIONGAP 10 9 10   *  127* 125   BUN 66* 51* 46*   CR 1.48* 1.56* 1.33*   GFRESTIMATED 33* 31* 38*   LIZZY 9.8 9.8 10.1     Recent Labs   Lab 23  1852   WBC  --  6.4 9.6 7.1   HGB  --  12.6 13.2 12.8   HCT  --  38.1 40.2 38.9   MCV  --  88 90 88    172 200 185     Recent Labs   Lab 05/10/23  0442 05/09/23  0435 05/08/23  0552 05/07/23  0434 05/06/23  2323   * 131* 135* 135* 136   POTASSIUM 4.3 4.2 4.0 4.3 4.7   CHLORIDE 94* 97* 95* 98 98   CO2 26 25 30 27 27   ANIONGAP 10 9 10 10 11   * 127* 125 148* 236*   BUN 66* 51* 46* 31* 29*   CR 1.48* 1.56* 1.33* 0.98 1.00   GFRESTIMATED 33* 31* 38* 55* 54*   LIZZY 9.8 9.8 10.1 10.6* 10.7*   MAG 2.4 2.1 2.0 1.8 1.9   PROTTOTAL  --   --   --  7.7 8.1*   ALBUMIN  --   --   --  4.0 4.1   BILITOTAL  --   --   --  1.1* 0.9   ALKPHOS  --   --   --  77 83   AST  --   --   --  46* 55*   ALT  --   --   --  45 20     No results for input(s): INR in the last 168 hours.  Recent Labs   Lab 23  0552   TSH 1.92     Recent Labs   Lab 23  0643   COLOR Colorless   APPEARANCE Clear   URINEGLC Negative   URINEBILI Negative   URINEKETONE Negative   SG 1.007   UBLD Negative   URINEPH 7.0   PROTEIN Negative   NITRITE Positive*   LEUKEST Negative   RBCU 0   WBCU <1      Recent Imaging:   Recent Results (from the past 24 hour(s))   Echocardiogram Complete   Result Value    LVEF  45-50% (mildly reduced)    [facility]    [id number]  [id number]  [id number]^MAGALY^[place] [address]     Name: EMMANUELLE DÍAZ  MRN: [id number]  : 10/24/1933  Study Date: 2023 10:24 AM  Age: 89 yrs  Gender: Female  Patient Location: [facility]  Reason For Study: Mitral Regurgitation  Ordering Physician: GUALBERTO MCKENZIE  Performed By: PAULINA     BSA: 1.8 m2  Height: 63 in  Weight: 178 lb  BP: 158/68 mmHg  ______________________________________________________________________________  Procedure  Complete Portable Echo  Adult. Definity (NDC #51437-226) given intravenously.  ______________________________________________________________________________  Interpretation Summary     Left ventricular function is decreased. The ejection fraction is 45-50%  (mildly reduced).  There is borderline-mild global hypokinesia of the left ventricle.  Normal right ventricle size and systolic function.  There is moderate (2+) mitral regurgitation.  ______________________________________________________________________________  Left Ventricle  Left ventricular function is decreased. The ejection fraction is 45-50%  (mildly reduced). There is normal left ventricular wall thickness. Left  ventricular diastolic function is abnormal. Diastolic Doppler findings (E/E'  ratio and/or other parameters) suggest left ventricular filling pressures are  increased. Septal motion is consistent with conduction abnormality. There is  borderline-mild global hypokinesia of the left ventricle.     Right Ventricle  Normal right ventricle size and systolic function. TAPSE is abnormal, which is  consistent with abnormal right ventricular systolic function.     Atria  The left atrium is mild to moderately dilated. Right atrial size is normal.  There is no color Doppler evidence of an atrial shunt.     Mitral Valve  Mitral valve leaflets appear normal. There is no evidence of mitral stenosis  or clinically significant mitral regurgitation. Calcified mitral apparatus.  There is moderate mitral annular calcification. There is moderate (2+) mitral  regurgitation.     Tricuspid Valve  The tricuspid valve is not well visualized, but is grossly normal. Right  ventricle systolic pressure estimate normal. There is mild to moderate (1-2+)  tricuspid regurgitation.     Aortic Valve  The aortic valve is trileaflet with aortic valve sclerosis. There is trace  aortic regurgitation. No hemodynamically significant valvular aortic stenosis.     Pulmonic Valve  The pulmonic valve is not  well seen, but is grossly normal.     Vessels  The aorta root is normal. Normal size ascending aorta. IVC diameter <2.1 cm  collapsing >50% with sniff suggests a normal RA pressure of 3 mmHg.     Pericardium  There is no pericardial effusion.     ______________________________________________________________________________  MMode/2D Measurements & Calculations  IVSd: 1.0 cm  LVIDd: 4.4 cm  LVIDs: 3.1 cm  LVPWd: 0.85 cm     FS: 29.7 %  LV mass(C)d: 135.6 grams  LV mass(C)dI: 73.7 grams/m2  Ao root diam: 3.3 cm  LA dimension: 4.3 cm  asc Aorta Diam: 3.3 cm  LA/Ao: 1.3  LVOT diam: 2.3 cm  LVOT area: 4.1 cm2  LA Volume (BP): 64.3 ml  LA Volume Index (BP): 34.9 ml/m2     LA Volume Indexed (AL/bp): 39.2 ml/m2  RV Base: 3.2 cm  RWT: 0.39  TAPSE: 1.4 cm     Time Measurements  MM HR: 67.0 BPM     Doppler Measurements & Calculations  MV E max edward: 74.7 cm/sec  MV A max edward: 81.4 cm/sec  MV E/A: 0.92  MV dec time: 0.32 sec  Ao V2 max: 183.3 cm/sec  Ao max P.0 mmHg  Ao V2 mean: 129.2 cm/sec  Ao mean P.6 mmHg  Ao V2 VTI: 35.0 cm  CHINMAY(I,D): 1.9 cm2  CHINMAY(V,D): 1.8 cm2     LV V1 max P.4 mmHg  LV V1 max: 77.7 cm/sec  LV V1 VTI: 16.0 cm  SV(LVOT): 66.4 ml  SI(LVOT): 36.1 ml/m2  PA acc time: 0.12 sec  TR max edward: 270.1 cm/sec  TR max P.2 mmHg  AV Edward Ratio (DI): 0.42  CHINMAY Index (cm2/m2): 1.0  E/E' av.7  Lateral E/e': 11.1  Medial E/e': 16.3  RV S Edward: 7.0 cm/sec     ______________________________________________________________________________  Report approved by: Shazia Johnson 2023 12:36 PM             Medications       aspirin  81 mg Oral At Bedtime     atorvastatin  20 mg Oral At Bedtime     carbidopa-levodopa  1 tablet Oral TID     carvedilol  12.5 mg Oral BID w/meals     cefTRIAXone  1 g Intravenous Q24H     cyanocobalamin  1,000 mcg Oral Daily     enoxaparin ANTICOAGULANT  30 mg Subcutaneous Q24H     FLUoxetine  20 mg Oral BID     gabapentin  300 mg Oral 4x Daily     guaiFENesin  600 mg  Oral BID     levothyroxine  25 mcg Oral Daily     [Held by provider] lisinopril  20 mg Oral Daily     magnesium oxide  200 mg Oral Daily     pantoprazole  40 mg Oral QAM AC     sodium chloride (PF)  3 mL Intracatheter Q8H     Vitamin D3  25 mcg Oral BID

## 2023-05-10 NOTE — DISCHARGE INSTRUCTIONS
[facility] -  Phone # : [phone]   This agency has accepted you for home care. They will call you to arrange the home care visits.     Follow-up :  2.4 cm hyperdense structure upper pole left kidney likely a benign hyperdense cyst. Ultrasound may be challenging to perform due to position of lesion in patient body habitus. Suggest a follow-up dedicated renal CT in 4-6 months for confirmation.

## 2023-05-10 NOTE — PLAN OF CARE
Problem: Gas Exchange Impaired  Goal: Optimal Gas Exchange  Outcome: Progressing     Problem: Hypertension Comorbidity  Goal: Blood Pressure in Desired Range  Outcome: Progressing    Patient Aox4, pleasant, cooperative. Denies pain. Vitally stable on 0.5L oxygen via NC. Continually attempting to wean off oxygen. Education follow up with incentive spirometer. Patient is a SBA with a walker.

## 2023-05-11 ENCOUNTER — APPOINTMENT (OUTPATIENT)
Dept: PHYSICAL THERAPY | Facility: CLINIC | Age: 88
DRG: 291 | End: 2023-05-11
Payer: MEDICARE

## 2023-05-11 ENCOUNTER — TELEPHONE (OUTPATIENT)
Dept: CARDIOLOGY | Facility: CLINIC | Age: 88
End: 2023-05-11
Payer: MEDICARE

## 2023-05-11 ENCOUNTER — APPOINTMENT (OUTPATIENT)
Dept: OCCUPATIONAL THERAPY | Facility: CLINIC | Age: 88
DRG: 291 | End: 2023-05-11
Payer: MEDICARE

## 2023-05-11 DIAGNOSIS — J81.0 ACUTE PULMONARY EDEMA (H): Primary | ICD-10-CM

## 2023-05-11 LAB
ANION GAP SERPL CALCULATED.3IONS-SCNC: 6 MMOL/L (ref 5–18)
BNP SERPL-MCNC: 82 PG/ML (ref 0–167)
BUN SERPL-MCNC: 63 MG/DL (ref 8–28)
CALCIUM SERPL-MCNC: 10 MG/DL (ref 8.5–10.5)
CHLORIDE BLD-SCNC: 95 MMOL/L (ref 98–107)
CO2 SERPL-SCNC: 28 MMOL/L (ref 22–31)
CREAT SERPL-MCNC: 1.19 MG/DL (ref 0.6–1.1)
GFR SERPL CREATININE-BSD FRML MDRD: 43 ML/MIN/1.73M2
GLUCOSE BLD-MCNC: 118 MG/DL (ref 70–125)
MAGNESIUM SERPL-MCNC: 1.9 MG/DL (ref 1.8–2.6)
POTASSIUM BLD-SCNC: 4.3 MMOL/L (ref 3.5–5)
SODIUM SERPL-SCNC: 129 MMOL/L (ref 136–145)

## 2023-05-11 PROCEDURE — 83735 ASSAY OF MAGNESIUM: CPT | Performed by: PSYCHIATRY & NEUROLOGY

## 2023-05-11 PROCEDURE — 99232 SBSQ HOSP IP/OBS MODERATE 35: CPT | Performed by: INTERNAL MEDICINE

## 2023-05-11 PROCEDURE — 99233 SBSQ HOSP IP/OBS HIGH 50: CPT | Performed by: STUDENT IN AN ORGANIZED HEALTH CARE EDUCATION/TRAINING PROGRAM

## 2023-05-11 PROCEDURE — 97110 THERAPEUTIC EXERCISES: CPT | Mod: GP

## 2023-05-11 PROCEDURE — 250N000013 HC RX MED GY IP 250 OP 250 PS 637: Performed by: INTERNAL MEDICINE

## 2023-05-11 PROCEDURE — 97116 GAIT TRAINING THERAPY: CPT | Mod: GP

## 2023-05-11 PROCEDURE — 36415 COLL VENOUS BLD VENIPUNCTURE: CPT | Performed by: INTERNAL MEDICINE

## 2023-05-11 PROCEDURE — 80048 BASIC METABOLIC PNL TOTAL CA: CPT | Performed by: INTERNAL MEDICINE

## 2023-05-11 PROCEDURE — 97535 SELF CARE MNGMENT TRAINING: CPT | Mod: GO

## 2023-05-11 PROCEDURE — 120N000013 HC R&B IMCU

## 2023-05-11 PROCEDURE — 250N000011 HC RX IP 250 OP 636: Performed by: INTERNAL MEDICINE

## 2023-05-11 PROCEDURE — 99222 1ST HOSP IP/OBS MODERATE 55: CPT | Performed by: INTERNAL MEDICINE

## 2023-05-11 PROCEDURE — 83880 ASSAY OF NATRIURETIC PEPTIDE: CPT | Performed by: INTERNAL MEDICINE

## 2023-05-11 RX ORDER — MAGNESIUM SULFATE HEPTAHYDRATE 40 MG/ML
2 INJECTION, SOLUTION INTRAVENOUS ONCE
Status: COMPLETED | OUTPATIENT
Start: 2023-05-11 | End: 2023-05-11

## 2023-05-11 RX ORDER — FUROSEMIDE 20 MG
20 TABLET ORAL DAILY
Status: DISCONTINUED | OUTPATIENT
Start: 2023-05-11 | End: 2023-05-12 | Stop reason: HOSPADM

## 2023-05-11 RX ADMIN — ENOXAPARIN SODIUM 30 MG: 30 INJECTION SUBCUTANEOUS at 21:02

## 2023-05-11 RX ADMIN — CYANOCOBALAMIN TAB 1000 MCG 1000 MCG: 1000 TAB at 21:02

## 2023-05-11 RX ADMIN — GABAPENTIN 300 MG: 300 CAPSULE ORAL at 10:15

## 2023-05-11 RX ADMIN — GUAIFENESIN 600 MG: 600 TABLET ORAL at 10:15

## 2023-05-11 RX ADMIN — LEVOTHYROXINE SODIUM 25 MCG: 0.03 TABLET ORAL at 06:32

## 2023-05-11 RX ADMIN — Medication 25 MCG: at 10:17

## 2023-05-11 RX ADMIN — LISINOPRIL 20 MG: 20 TABLET ORAL at 21:01

## 2023-05-11 RX ADMIN — GABAPENTIN 300 MG: 300 CAPSULE ORAL at 13:26

## 2023-05-11 RX ADMIN — ATORVASTATIN CALCIUM 20 MG: 10 TABLET, FILM COATED ORAL at 21:01

## 2023-05-11 RX ADMIN — CARBIDOPA AND LEVODOPA 1 TABLET: 25; 100 TABLET ORAL at 06:32

## 2023-05-11 RX ADMIN — FUROSEMIDE 20 MG: 20 TABLET ORAL at 10:15

## 2023-05-11 RX ADMIN — CARBIDOPA AND LEVODOPA 1 TABLET: 25; 100 TABLET ORAL at 13:26

## 2023-05-11 RX ADMIN — CARBIDOPA AND LEVODOPA 1 TABLET: 25; 100 TABLET ORAL at 21:06

## 2023-05-11 RX ADMIN — Medication 1 MG: at 22:13

## 2023-05-11 RX ADMIN — ASPIRIN 81 MG CHEWABLE TABLET 81 MG: 81 TABLET CHEWABLE at 21:01

## 2023-05-11 RX ADMIN — CARVEDILOL 12.5 MG: 6.25 TABLET, FILM COATED ORAL at 17:28

## 2023-05-11 RX ADMIN — FLUOXETINE 20 MG: 20 CAPSULE ORAL at 10:15

## 2023-05-11 RX ADMIN — Medication 200 MG: at 10:15

## 2023-05-11 RX ADMIN — CARVEDILOL 12.5 MG: 6.25 TABLET, FILM COATED ORAL at 10:15

## 2023-05-11 RX ADMIN — GABAPENTIN 300 MG: 300 CAPSULE ORAL at 17:28

## 2023-05-11 RX ADMIN — GABAPENTIN 300 MG: 300 CAPSULE ORAL at 21:02

## 2023-05-11 RX ADMIN — MAGNESIUM SULFATE HEPTAHYDRATE 2 G: 40 INJECTION, SOLUTION INTRAVENOUS at 06:31

## 2023-05-11 RX ADMIN — GUAIFENESIN 600 MG: 600 TABLET ORAL at 21:02

## 2023-05-11 RX ADMIN — FLUOXETINE 20 MG: 20 CAPSULE ORAL at 21:01

## 2023-05-11 RX ADMIN — Medication 25 MCG: at 21:01

## 2023-05-11 RX ADMIN — PANTOPRAZOLE SODIUM 40 MG: 40 TABLET, DELAYED RELEASE ORAL at 06:32

## 2023-05-11 ASSESSMENT — ACTIVITIES OF DAILY LIVING (ADL)
ADLS_ACUITY_SCORE: 34
ADLS_ACUITY_SCORE: 31
ADLS_ACUITY_SCORE: 34
ADLS_ACUITY_SCORE: 30
ADLS_ACUITY_SCORE: 34
ADLS_ACUITY_SCORE: 31
ADLS_ACUITY_SCORE: 34
ADLS_ACUITY_SCORE: 34
ADLS_ACUITY_SCORE: 31
ADLS_ACUITY_SCORE: 34
ADLS_ACUITY_SCORE: 31
ADLS_ACUITY_SCORE: 34

## 2023-05-11 NOTE — PLAN OF CARE
Problem: Plan of Care - These are the overarching goals to be used throughout the patient stay.    Goal: Optimal Comfort and Wellbeing  Outcome: Progressing     Problem: Hypertension Comorbidity  Goal: Blood Pressure in Desired Range  Outcome: Progressing     Pt up A1 w/ gb & walker. AO. VSS on 1/2L NC. Unable to off titrate overnight. Denies pain. Sinus joao w/ BBB, prolonged QT/QTc, and PVCs on tele. Call light within reach and purposeful rounding performed. Radha Pena RN

## 2023-05-11 NOTE — CONSULTS
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     Pulm edema causing hypoxic resp failure  - she may need home O2    Bronchomalacia  - nothing specific for treatment     Bronchitis  - had a course of antibiotics for UTI    Medical Care Time excluding procedures and family discussions greater than: 45 Minutes    Risk Factors Present on Admission:  Clinically Significant Risk Factors         # Hyponatremia: Lowest Na = 129 mmol/L in last 2 days, will monitor as appropriate                # Obesity: Estimated body mass index is 32.03 kg/m  as calculated from the following:    Height as of this encounter: 1.6 m (5' 3\").    Weight as of this encounter: 82 kg (180 lb 12.8 oz).         Code Status: No CPR- Do NOT Intubate         Jennifer Sorensen, DO  Pulmonary and Critical Care Attending  [facility] [phone]    Allergies   Allergen Reactions     Alendronate [Alendronate] Unknown     pain     Codeine Hives     Hydrocodone-Acetaminophen Other (See Comments)     Highly sensitive, \"knocks her out and can't wake up\"     Other Drug Allergy (See Comments)      Risedronate Unknown     Bone pain     " "Rosuvastatin Muscle Pain (Myalgia)     Simvastatin Muscle Pain (Myalgia)       Meds: See MAR    Physical Exam:  BP (!) 142/65 (BP Location: Right arm)   Pulse 61   Temp 97.7  F (36.5  C) (Oral)   Resp 20   Ht 1.6 m (5' 3\")   Wt 82 kg (180 lb 12.8 oz)   LMP  (LMP Unknown)   SpO2 93%   BMI 32.03 kg/m    Intake/Output this shift:  I/O this shift:  In: 120 [P.O.:120]  Out: 250 [Urine:250]  GEN: sitting up in a chair, NAD  HEENT: NC in place, MMM, Hearing aid in place, glasses  CVS: regular rhythm, no murmurs  RESP: Coughing with deep breath, congested  ABD: Soft, No abdominal pain with palpation, no guarding, no rigidity  EXT: Warm, well perfused, no edema  NEURO: moving all extremities, nonfocal  PSYCH: pleasant    Pertinent Labs: Latest lab results in EHR personally reviewed.   CMP  Recent Labs   Lab 05/11/23  0437 05/10/23  0442 05/09/23  0435 05/08/23  0552 05/07/23  0434 05/06/23  2323   * 130* 131* 135* 135* 136   POTASSIUM 4.3 4.3 4.2 4.0 4.3 4.7   CHLORIDE 95* 94* 97* 95* 98 98   CO2 28 26 25 30 27 27   ANIONGAP 6 10 9 10 10 11    132* 127* 125 148* 236*   BUN 63* 66* 51* 46* 31* 29*   CR 1.19* 1.48* 1.56* 1.33* 0.98 1.00   GFRESTIMATED 43* 33* 31* 38* 55* 54*   LIZZY 10.0 9.8 9.8 10.1 10.6* 10.7*   MAG 1.9 2.4 2.1 2.0 1.8 1.9   PROTTOTAL  --   --   --   --  7.7 8.1*   ALBUMIN  --   --   --   --  4.0 4.1   BILITOTAL  --   --   --   --  1.1* 0.9   ALKPHOS  --   --   --   --  77 83   AST  --   --   --   --  46* 55*   ALT  --   --   --   --  45 20     CBC  Recent Labs   Lab 05/09/23  0435 05/08/23  0552 05/07/23  0434 05/06/23  1852   WBC  --  6.4 9.6 7.1   RBC  --  4.35 4.47 4.40   HGB  --  12.6 13.2 12.8   HCT  --  38.1 40.2 38.9   MCV  --  88 90 88   MCH  --  29.0 29.5 29.1   MCHC  --  33.1 32.8 32.9   RDW  --  15.9* 15.5* 15.3*    172 200 185     INRNo lab results found in last 7 days.  Arterial Blood GasNo lab results found in last 7 days.    Cultures: not yet available.    Imaging: " personally reviewed.   Results for orders placed or performed during the hospital encounter of 05/06/23   XR Chest Port 1 View   Narrative   EXAM: XR CHEST PORT 1 VIEW  LOCATION: [facility]  DATE/TIME: 5/6/2023 7:52 PM CDT    INDICATION: Dyspnea, wheezing, cough, elevated BNP  COMPARISON: 05/04/2023     Impression   IMPRESSION: Hypoexpanded lungs. Interstitial prominence and septal thickening suggesting edema. Mild cardiac silhouette enlargement. Cannot exclude minimal pleural fluid. Prior sternotomy.     CT Chest w/o Contrast   Narrative   EXAM: CT CHEST W/O CONTRAST  LOCATION: [facility]  DATE/TIME: 5/10/2023 5:25 PM CDT    INDICATION: persistent hypoxia; eval further for possible interstitial lung disease suggested on CXR  COMPARISON: 05/06/2023  TECHNIQUE: CT chest without IV contrast. Multiplanar reformats were obtained. Dose reduction techniques were used.  CONTRAST: None.    FINDINGS:   LUNGS AND PLEURA: Modest motion artifact. No focal pneumonia. No pleural effusion. No significant peripheral interstitial fibrosis suggested.  Mosaic attenuation suggests small airways disease. There is moderate inflammatory bronchial wall thickening most pronounced in the perihilar regions. In addition, there is flattening of trachea and main bronchial structures consistent with bronchial   tracheobronchomalacia.    MEDIASTINUM/AXILLAE: Postoperative changes from CABG. Cardiomegaly. No lymphadenopathy.    CORONARY ARTERY CALCIFICATION: Severe.    UPPER ABDOMEN: 2.4 cm hyperdense round nodule upper pole left kidney most suggestive of benign hyperdense cyst.    MUSCULOSKELETAL: Mild compression superior endplate of L1 age indeterminate. Right shoulder arthroplasty.     Impression   IMPRESSION:   1.  No significant interstitial fibrosis suggested. Modest motion artifact does compromise this exam.  2.  There is moderate inflammatory bronchial wall thickening,  tracheobronchomalacia and mosaic attenuation of lung parenchyma consistent with small airways disease.  3.  2.4 cm hyperdense structure upper pole left kidney likely a benign hyperdense cyst. Ultrasound may be challenging to perform due to position of lesion in patient body habitus. Suggest a follow-up dedicated renal CT in 4-6 months for confirmation.     Echocardiogram Complete   Value   Narrative      NM MPI with Lexiscan      Narrative      A prior study was conducted on 8/16/2022.  Prior images were   unavailable for comparison review.     Pharmacologic regadenoson stress ECG is nondiagnostic due to baseline   ECG abnormality.     Pharmacological regadenoson nuclear stress test is abnormal.  There is   partially reversible change in inferior and basal to mid inferolateral   walls indicating inducible myocardial ischemia.     Normal left ventricular size, wall motion and systolic function.    Calculated left ventricular ejection fraction is 65%.     The patient is at an intermediate risk of future cardiac ischemic   events.         Patient Active Problem List   Diagnosis     Benign essential hypertension     Coronary artery stenosis     S/P CABG (coronary artery bypass graft)     Parkinson's disease (H)     Trigeminal neuralgia     Chronic bilateral back pain     Aortic valve insufficiency     Tricuspid insufficiency     Mitral valve insufficiency     Bilateral carotid artery stenosis     CKD (chronic kidney disease) stage 3, GFR 30-59 ml/min (H)     Depression     Diastolic dysfunction     GERD (gastroesophageal reflux disease)     Left bundle branch block     Primary osteoarthritis involving multiple joints     Sensorineural hearing loss (SNHL) of both ears     Subclinical hypothyroidism     Type 2 diabetes mellitus with diabetic polyneuropathy, without long-term current use of insulin (H)     Secondary renal hyperparathyroidism (H)     Age-related osteoporosis without current pathological fracture     Mixed  "hypercholesterolemia and hypertriglyceridemia     Mixed incontinence urge and stress (male)(female)     Vitamin B12 deficiency (non anemic)     Non-seasonal allergic rhinitis due to pollen     Preventative health care     Chest pain, unspecified type     Acute kidney injury superimposed on CKD (H)     Vaginal irritation     Polypharmacy     Age-related osteoporosis with current pathological fracture with routine healing     Recurrent major depressive disorder, in full remission (H)     PAD (peripheral artery disease) (H)     Acute pulmonary edema (H)     Hypoxia     Acute respiratory failure with hypoxia (H)     Acute on chronic combined systolic and diastolic heart failure (H)         Surgical History  She  has a past surgical history that includes joint replacement (Left); Bypass graft artery coronary; Hysterectomy; appendectomy; Replacement Total Knee (Right); shoulder surgery (Right); other surgical history; other surgical history; back surgery; Herniorrhaphy ventral (Left); appendectomy; Hysterectomy;  Section;  Section; and Coronary Angiogram (N/A, 2022).    Family History  Reviewed, and family history includes Heart Disease in her father and mother.    Social History  Reviewed, and she  reports that she has never smoked. She has never been exposed to tobacco smoke. She has never used smokeless tobacco. She reports that she does not drink alcohol and does not use drugs.    Allergies  Allergies   Allergen Reactions     Alendronate [Alendronate] Unknown     pain     Codeine Hives     Hydrocodone-Acetaminophen Other (See Comments)     Highly sensitive, \"knocks her out and can't wake up\"     Other Drug Allergy (See Comments)      Risedronate Unknown     Bone pain     Rosuvastatin Muscle Pain (Myalgia)     Simvastatin Muscle Pain (Myalgia)              Jennifer Sorensen, DO  Pulmonary and Critical Care Attending  [facility] [phone]    Securely message with the Vocera Web Console (learn more " here)

## 2023-05-11 NOTE — TELEPHONE ENCOUNTER
Lab order and follow up orders placed. Message sent to scheduling to arrange.  Follow up is scheduled 6/8 with Dr. Ricardo.    Per hospital Progress Note 5/11/2023:  Plan:  1.  Pulmonary consult given issues above.  Likely will need home O2..  2.  Resume lisinopril at 10 mg daily  3.  Furosemide will initiate at 20 mg daily monitoring renal function closely.  4.  I did discuss with my colleague Dr. Ricardo who follows her chronically.  Discussion was had about possible right heart catheterization as well as stress MRI which would need to be done as an outpatient.  He also favored a pulmonary consultation as well.  We will make arrangements for follow-up both in the heart failure clinic and with Dr. Ricardo.  5.  Patient should have follow-up liver chemistries within the next week given the variability in creatinine and resumption of lisinopril and initiation of low-dose furosemide.

## 2023-05-11 NOTE — PROGRESS NOTES
Cardiology Progress Note    Assessment:  1.  Pulmonary edema with acute on chronic combined systolic and diastolic heart failure.  Patient was receiving IV diuresis but this was placed on hold 2 days ago for increased creatinine.  Admit creatinine 1.0 with creatinine increased to 1.56 and now back down to 1.19.  She does appear to be sensitive to diuretics.  Net urine output this admission of 1.9 L.  Weight 180 pounds yesterday.  No weight completed yet today.  Her weight is actually down from when she was seen in the clinic in March.  Her E/ E' prime is only high normal PA pressure estimate is only high normal as well.  Again right heart catheterization may be useful as well.  Most likely this was triggered by an acute viral infection.  Patient was admitted with a cough productive of yellowish sputum with a neighbor who had similar symptoms.  The cough is improved.  Clinical symptoms are improved.    2.  Coronary artery disease.  Coronary angiogram results are reviewed with my interventional colleague in August 2022..  History of known total occlusion of the LAD with patent vein graft to the LAD.  She had mild nonobstructive disease in the other coronary territories.  Nuclear stress test this admission reports a partial reversible area in the inferior basilar to mid inferior lateral segments with normal ejection fraction.  Troponin negative this admission.  Would be hesitant to consider repeat coronary angiography with the renal insufficiency and lack of anginal type symptoms.  I did review with her primary cardiologist Dr. Ricardo.  I reviewed with the patient as well and she is in agreement.  The coronary angiogram was within the last 1 year.    3.  Mitral regurgitation.  Echocardiogram completed this admission with ejection fraction of 45 to 50% with moderate 2+ mitral regurgitation.  This does not appear to be significant enough to be contributing to the pulmonary edema.  Cardiogram personally reviewed.  At  this point would be hesitant to consider mitral valve intervention.    4.  Chronic left bundle branch block.  Possibly patient would benefit from CRT therapy and would benefit from EP consultation and discussion with her primary cardiologist further.    5.  Hypertensive urgency.  On admission blood pressure was 185/88.  Lisinopril was placed on hold which we will resume today.  Blood pressure has been better controlled here in the hospital.  Would wonder whether the hypertensive aspect contributed to the pulmonary edema.    6.  Abnormal CT completed yesterday.  Moderate inflammatory bronchial wall thickening, tracheobronchial malacia.  Would wonder whether input from pulmonary would be of benefit.  There was noted to be a 2.4 cm hyperdense structure in the left kidney.  Defer this to follow-up to the primary care team.  Appreciate input from pulmonary.  Principal Problem:    Hypoxia  Active Problems:    Benign essential hypertension    Coronary artery stenosis    S/P CABG (coronary artery bypass graft)    Parkinson's disease (H)    Mitral valve insufficiency    CKD (chronic kidney disease) stage 3, GFR 30-59 ml/min (H)    Type 2 diabetes mellitus with diabetic polyneuropathy, without long-term current use of insulin (H)    Acute pulmonary edema (H)    Acute respiratory failure with hypoxia (H)    Acute on chronic combined systolic and diastolic heart failure (H)      Plan:  1.  Pulmonary consult given issues above.  Likely will need home O2..  2.  Resume lisinopril at 10 mg daily  3.  Furosemide will initiate at 20 mg daily monitoring renal function closely.  4.  I did discuss with my colleague Dr. Ricardo who follows her chronically.  Discussion was had about possible right heart catheterization as well as stress MRI which would need to be done as an outpatient.  He also favored a pulmonary consultation as well.  We will make arrangements for follow-up both in the heart failure clinic and with Dr. Ricardo.  5.   "Patient should have follow-up liver chemistries within the next week given the variability in creatinine and resumption of lisinopril and initiation of low-dose furosemide.    Subjective:   Janes Montero is seen in follow-up.  Chart records reviewed.  Patient still requiring low-dose oxygen.  She reports feeling well.  She denies feeling short of breath.  She states the cough is improved.  She is not experiencing any chest discomfort or dizziness.    Objective:   Vital signs in last 24 hours:  VITALS: /63 (BP Location: Right arm)   Pulse 59   Temp 97.9  F (36.6  C) (Oral)   Resp 18   Ht 1.6 m (5' 3\")   Wt 82 kg (180 lb 12.8 oz)   LMP  (LMP Unknown)   SpO2 93%   BMI 32.03 kg/m    BMI: Body mass index is 32.03 kg/m .  Wt Readings from Last 3 Encounters:   05/10/23 82 kg (180 lb 12.8 oz)   23 83.7 kg (184 lb 9.6 oz)   23 83.5 kg (184 lb)       Intake/Output Summary (Last 24 hours) at 2023 0738  Last data filed at 2023 0645  Gross per 24 hour   Intake 480 ml   Output 1250 ml   Net -770 ml       Physical Exam:  General: Resting comfortably in the chair in no acute distress.   Neck: No JVD appreciated while seated in the upright position.   Lungs: Bibasilar crackles noted.   COR:, Distant, regular, soft systolic murmur.   Abd: nondistended, BS present, nontender   Extrem: No edema, warm  Neuro: No focal deficits.    Cardiographics:    Imaging:   EC2023 sinus rhythm, left bundle branch block.  Previous also noted left bundle branch block.  Echocardiogram:   Echocardiogram Complete  Order: [id number]   Status: Edited Result - FINAL      Visible to patient: Yes (seen)      Next appt: Today at 08:30 AM in Occupational Therapy (Janet Cohen, SAMSONR)      Dx: GALVAN (dyspnea on exertion); Nonrheumat...      3 Result Notes     1 Patient Communication  Details     Reading Physician Reading Date Result Priority   Diallo Orozco MD  [phone] 2023        Narrative & " [facility]  [id number]  [id number]  [id number]  [id number]^DANIELE^AKBAR^[place] [address]     Name: EMMANUELLE DÍAZ  MRN: [id number]  : 10/24/1933  Study Date: 2023 10:55 AM  Age: 89 yrs  Gender: Female  Patient Location: [facility]  Reason For Study: Hypertension, unspecified type, GALVAN (dyspnea on exertion),  Coronary artery disease; Nonrheumatic aortic valve stenosis; Nonrheumatic  aortic insufficiency; Mitral valve insufficiency  Ordering Physician: AKBAR SANABRIA  Referring Physician: AKBAR SANABRIA  Performed By: SABRINA     BSA: 1.9 m2  Height: 65 in  Weight: 184 lb  HR: 75  BP: 142/83 mmHg  ______________________________________________________________________________  Procedure  Complete Echo Adult. Definity (NDC #09766-620) given intravenously.  Technically difficult study.  ______________________________________________________________________________  Interpretation Summary     1. The left ventricle is normal in size with mild concentric left ventricular  hypertrophy. Left ventricular function is decreased. The ejection fraction is  45-50% (mildly reduced).  2. The right ventricle is mildly dilated with mildly decreased right  ventricular systolic function  3. There is moderate mitral annular calcification. There is moderately severe  (3+) mitral regurgitation.  4. There is sclerosis, calcification, and restriction of the aortic valve  opening compatible with mild aortic stenosis.  5. Severe (>55mmHg) pulmonary hypertension is present. The right ventricular  systolic pressure is approximated at 68mmHg plus the right atrial pressure.  6. Compared to the prior study of 22, the mitral valve regurgitation is  now worse.  ______________________________________________________________________________  Left Ventricle  The left ventricle is normal in size. Left ventricular function is decreased.  The ejection fraction is 45-50% (mildly reduced).  There is mild concentric  left ventricular hypertrophy. Left ventricular diastolic function is abnormal.  There is mild global hypokinesia of the left ventricle.     Right Ventricle  The right ventricle is mildly dilated. Mildly decreased right ventricular  systolic function.     Atria  The left atrium is moderately dilated. The right atrium is moderately dilated.  There is no color Doppler evidence of an atrial shunt.     Mitral Valve  There is moderate mitral annular calcification. There is moderately severe  (3+) mitral regurgitation. There is no mitral valve stenosis.     Tricuspid Valve  Tricuspid valve leaflets appear normal. There is no evidence of tricuspid  stenosis or clinically significant tricuspid regurgitation. Severe (>55mmHg)  pulmonary hypertension is present. The right ventricular systolic pressure is  approximated at 68mmHg plus the right atrial pressure.     Aortic Valve  The aortic valve is trileaflet. There is trace aortic regurgitation. There is  sclerosis, calcification, and restriction of the aortic valve opening  compatible with mild aortic stenosis.     Pulmonic Valve  The pulmonic valve is not well seen, but is grossly normal. This degree of  valvular regurgitation is within normal limits. There is trace pulmonic  valvular regurgitation.     Vessels  The aorta root is normal. Normal size ascending aorta. IVC diameter <2.1 cm  collapsing >50% with sniff suggests a normal RA pressure of 3 mmHg.     Pericardium  There is no pericardial effusion.     Rhythm  Sinus rhythm was noted.       NM MPI with Lexiscan  Order: [id number]   Status: Final result      Visible to patient: Yes (seen)      Next appt: Today at 08:30 AM in Occupational Therapy (Janet Cohen, JOSE)      0 Result Notes  Details     Reading Physician Reading Date Result Priority   Veronica Polanco MD  [phone] 5/8/2023 Routine   Veronica Polanco MD  [phone] 5/8/2023        Result Text        A prior study was conducted on  8/16/2022.  Prior images were unavailable for comparison review.     Pharmacologic regadenoson stress ECG is nondiagnostic due to baseline ECG abnormality.     Pharmacological regadenoson nuclear stress test is abnormal.  There is partially reversible change in inferior and basal to mid inferolateral walls indicating inducible myocardial ischemia.     Normal left ventricular size, wall motion and systolic function.  Calculated left ventricular ejection fraction is 65%.     The patient is at an intermediate risk of future cardiac ischemic events.         Janes Montero  Cardiac Catheterization  Order# [id number]  Reading physician: Ignacio Baird MD Ordering physician: Mathew Ricardo MD Study date: 8/17/22      Patient Information     Name MRN Description   Janes Montero [id number] 88 year old female      Physicians     Panel Physicians Referring Physician Case Authorizing Physician   Ignacio Baird MD (Primary) Mathew Ricardo MD Reisdorf, Franz-Josef E, MD      Procedures     Panel 1     Primary Surgeon:  Ignacio Baird MD   Procedure:  Coronary Angiogram         Indications     Chest pain, unspecified type [R07.9 (ICD-10-CM)]   Coronary artery disease involving native coronary artery without angina pectoris, unspecified whether native or transplanted heart [I25.10 (ICD-10-CM)]   S/P CABG (coronary artery bypass graft) [Z95.1 (ICD-10-CM)]      Comments/Patient Narrative     Lab 3      Pre Procedure Diagnosis     worsening angina        Conclusion       1st Mrg lesion is 50% stenosed.    Prox LAD lesion is 100% stenosed.    Prox RCA to Mid RCA lesion is 25% stenosed.    Atretic nonfunctional JEOVANY graft of uncertain destination    Widely patent graft to mid LAD from left aota           Plan       Follow bedrest per protocol    Continued medical management and lifestyle modifications for cardiovascular risk factor optimizations.    Arterial sheath removed from femoral artery with closure  device.    Femoral angiogram identifies arterial sheath placement suitable for closure device.      Recommendations     General Recommendations:  - Patient given specific instructions regarding care of arteriotomy site, activity restrictions, signs and symptoms of cardiac or vascular complications and to seek immediate medical evaluation should they occur.   - Arterial sheath removed from the femoral artery with closure device.   - Femoral angiogram identifies arterial sheath placement is suitable for         Telemetry: Sinus rhythm,                Chest X-Ray:   EXAM: CT CHEST W/O CONTRAST  LOCATION: [facility]  DATE/TIME: 5/10/2023 5:25 PM CDT     INDICATION: persistent hypoxia; eval further for possible interstitial lung disease suggested on CXR  COMPARISON: 05/06/2023  TECHNIQUE: CT chest without IV contrast. Multiplanar reformats were obtained. Dose reduction techniques were used.  CONTRAST: None.     FINDINGS:   LUNGS AND PLEURA: Modest motion artifact. No focal pneumonia. No pleural effusion. No significant peripheral interstitial fibrosis suggested.  Mosaic attenuation suggests small airways disease. There is moderate inflammatory bronchial wall thickening most pronounced in the perihilar regions. In addition, there is flattening of trachea and main bronchial structures consistent with bronchial   tracheobronchomalacia.     MEDIASTINUM/AXILLAE: Postoperative changes from CABG. Cardiomegaly. No lymphadenopathy.     CORONARY ARTERY CALCIFICATION: Severe.     UPPER ABDOMEN: 2.4 cm hyperdense round nodule upper pole left kidney most suggestive of benign hyperdense cyst.     MUSCULOSKELETAL: Mild compression superior endplate of L1 age indeterminate. Right shoulder arthroplasty.                                                                      IMPRESSION:   1.  No significant interstitial fibrosis suggested. Modest motion artifact does compromise this exam.  2.  There is moderate  inflammatory bronchial wall thickening, tracheobronchomalacia and mosaic attenuation of lung parenchyma consistent with small airways disease.  3.  2.4 cm hyperdense structure upper pole left kidney likely a benign hyperdense cyst. Ultrasound may be challenging to perform due to position of lesion in patient body habitus. Suggest a follow-up dedicated renal CT in 4-6 months for confirmation     Lab Results    Chemistry/lipid CBC Cardiac Enzymes/BNP/TSH/INR   Recent Labs   Lab Test 08/18/22  0506   CHOL 121   HDL 29*   LDL 58   TRIG 172*     Recent Labs   Lab Test 08/18/22  0506 12/14/21  1638   LDL 58 32     Recent Labs   Lab Test 05/11/23  0437   *   POTASSIUM 4.3   CHLORIDE 95*   CO2 28      BUN 63*   CR 1.19*   GFRESTIMATED 43*   LIZZY 10.0     Recent Labs   Lab Test 05/11/23  0437 05/10/23  0442 05/09/23  0435   CR 1.19* 1.48* 1.56*     Recent Labs   Lab Test 04/28/23  1437 10/11/22  1712 06/28/22  1616   A1C 6.4* 5.9* 8.0*          Recent Labs   Lab Test 05/09/23  0435 05/08/23  0552   WBC  --  6.4   HGB  --  12.6   HCT  --  38.1   MCV  --  88    172     Recent Labs   Lab Test 05/08/23  0552 05/07/23  0434 05/06/23  1852   HGB 12.6 13.2 12.8    Recent Labs   Lab Test 05/07/23  0434 05/06/23  2323 05/06/23  1852   TROPONINI 0.04 0.03 0.03     Recent Labs   Lab Test 05/11/23  0437 05/06/23  1852 08/15/22  1920   BNP 82 628* 129     Recent Labs   Lab Test 05/08/23  0552   TSH 1.92     No results for input(s): INR in the last 81253 hours.

## 2023-05-11 NOTE — PROGRESS NOTES
[facility]  Hospitalist Progress Note    Admit Date:  5/6/2023  Date of Service (when I saw the patient): 05/11/2023   Provider:  Cyn Prescott, DO    Assessment & Plan      Janes Montero is a 89 year old female who was admitted on 5/6/2023. She is a 89 year old female with h/o coronary artery disease status post bypass graft surgery with LIMA graft to LAD, mitral insufficiency, aortic stenosis, aortic insufficiency, essential hypertension and dyslipidemia, presenting with acute on chronic heart failure with reduced ejection fraction, acute respiratory failure with hypoxia and echocardiogram results from 4/4/23 indicating severe pulmonary hypertension, and moderate to severe mitral insufficiency/regurgitation. Pt underwent Nuclear stress test on 5/8/23 - report with a partially reversible change in the inferior and basilar mid inferior lateral walls indicating myocardial ischemia; EF was 65%. 5/9 echo showed EF 45-50% and moderate MR.     Patient has been undergoing cautious diuresis which has been complicated by acute renal injury; diuretics were held and are being cautiously resumed 5/11 with a trial of p.o. diuretic.  Moreover, CT was obtained which demonstrated although no significant fibrosis, there was moderate inflammatory bronchial wall thickening and tracheobronchomalacia and mosaic attenuation of the lung parenchyma; additionally incidental 2.4 cm hypodense upper left kidney cyst.  Pulmonology consulted.     Disposition: Likely tomorrow 5/12, pending stable renal function (rechecking bmp) on trial of resumed diuresis (transitioned to oral) and pulmonary input  Pt's daughter Pat updated at-length in the AM by hospitalist    - - - - -  Acute pulmonary edema  Acute on chronic heart failure   Acute respiratory failure with hypoxia/Pulmonary hypertension,  Assessment: Cardiology has been following and cautious resuming diuresis.  Complications have included ISAIAH.  Diuretics  were held 5/10.  Cardiology resuming on 5/11.  May possibly able to wean off to room air. Abnormal CT obtained, pulm consulted.  Plan:  -Cardiology following, appreciate  -Diuretics per cardiology  (Was given furosemide 40mg IV x 1 on admission and 20 mg IV every 12 hours for 4 doses (last dose was evening 5/8/23); resuming diuretic po 5/11/2023   -Consult to pulmonary, appreciate forthecoming input  - already was on BB > carvedilol 12.5 mg twice daily continue   - lisinopril has been on hold d/t elevation in creat  - CHF protocol daily weights and I/Os  - per cards note LBBB consider possible CRT therapy if frequent CHF episodes, but as outpatient per cards notes   - BMP in the am  - outpatient follow up with cardiology including regarding pulm hypertension  - home oxygen walking test/ambulation trial ordered for 5/11/2023      Coronary artery disease   Abnormal lexiscan NM stress test 5/8/23    Status post CABG JEOVANY graft to LAD, Coronary angiography August 17, 2022 complete occlusion proximal native LAD, JEOVANY to LAD graft widely patent.  Otherwise mild to moderate disease.  - Aspirin 81 mg at bedtime  - Coreg and lisinopril - to contine  - Lexiscan 5/8/23 report reviewed with her at bedside - evidence of partially reversible    change in the inferior and basal to mid inferolateral walls indicated inducible myocardial ischemia - no clear indication for cardiac angiography   - appreciate cardiology following    Mitral insufficiency/Aortic stenosis/Aortic insufficiency  Echo 5/9/23 reviewed  Moderate, 2+ MR with EF 45-50%  outpt f/up with her primary cardiologist, Dr. Ricardo     HTN  - Hypertensive urgency on admission improved with diuresis   - continue Coreg 12.5mg po bid    Lisinopril has been placed on hold, for now, d/t elevation in creat  - PRN Hydralazine 10 mg IV every 4 hours as needed for SBP greater than 160  - remains stable     Acute renal insufficiency  Hyponatremia  -Sodium levels worse 135 - 131 -->  "129  -Creat however downtrended 1.19  -Cardiology resuming diuresis 5/11/2023 cautiously, now trial on PO regimen  -Will continue close clinical monitoring of respiratory status    Abnormal urinalysis  - Ua + nitrites few bacteria likely asx UTI   - growing multiple organisms   - decision made to treat since was a good urine sample IV ceftriaxone 1 mg x3 days  Final UC and sensitivites noted; has completed short course of treatment      Hypercalcemia  Calcium 10.7 on admission now normalized  - monitor       Mild elevation in liver function test   likely hepatic congestion    repeat on follow up     Dyslipidemia  - Atorvastatin 20 mg at bedtime     Parkinson's disease and trigeminal neuralgia  - Carbidopa levodopa  mg 1 tab 3 times daily  - Gabapentin 300 mg 4 times daily.      Gastroesophageal reflux disease  -  Pantoprazole 40 mg daily     Diabetes mellitus type 2 not on pharmacologic therapy  insulin aspart medium correction scale 4 times daily ACHS     Hypothyroidism  - Continue Levothyroxine 25 mcg daily  - checking TSH and reflex T4     Depression and anxiety   Fluoxetine 20 mg twice daily.      Clinically Significant Risk Factors       # Overweight: Estimated body mass index is 28.31 kg/m  as calculated from the following:  Height as of this encounter: 1.626 m (5' 4\").  Weight as of this encounter: 74.8 kg (164     Medical Decision Making     55 Minutes spent by me on the date of service doing chart review, history, exam, documentation & further activites per the note. Also called and updated the pt's daughter Pat at-length       Labs/Imaging Reviewed:  See Information above and Data section below    Diet: Combination Diet Low Saturated Fat Na <2400mg Diet, No Caffeine Diet    DVT Prophylaxis: Enoxaparin (Lovenox) SQ  Holguin Catheter: Not present  Code Status: No CPR- Do NOT Intubate      Disposition Plan    Entered: Alvarez Garcia MD 05/11/2023, 12:30 PM     Disposition: Likely tomorrow 5/12, pending " stable renal function (rechecking bmp) on trial of resumed diuresis (transitioned to oral) and pulmonary input  Pt's daughter Pat updated at-length in the AM by hospitalist    Alvarez Garcia   Hospitalist Service  [employer]   Securely message with the Vocera Web Console (learn more here)  Text page via [employer] Paging/Directory     ------------------------------------     Interval History     Pt is still on some 02, 1/2L NC  Hoping to wean off; discussed she may need 02  Reviewed cardiology plans w/ her  Checked in with nursing team; pulmonary was consulted  Updated pt's daughter Pat at-length about care plans and possibly wean 02 further  Home 02 test ordered  Updated RN team in PM   Pt had no new symptoms or concerns but we did discuss her abnormal CT results and pending further input  Discussed creatinine with pt's daughter in context of resuming diuresis today     -Data reviewed today: I reviewed all new labs and imaging results over the last 24 hoursPhysical Exam   Temp: 98  F (36.7  C) Temp src: Oral BP: 126/60 Pulse: 67   Resp: 20 SpO2: 96 % O2 Device: Nasal cannula Oxygen Delivery: 1/2 LPM  Vitals:    05/08/23 0002 05/09/23 0439 05/10/23 0422   Weight: 81.1 kg (178 lb 14.4 oz) 81 kg (178 lb 9.6 oz) 82 kg (180 lb 12.8 oz)     Vital Signs with Ranges  Temp:  [97.5  F (36.4  C)-98  F (36.7  C)] 98  F (36.7  C)  Pulse:  [58-70] 67  Resp:  [16-20] 20  BP: (121-142)/(56-65) 126/60  SpO2:  [92 %-96 %] 96 %  I/O last 3 completed shifts:  In: 480 [P.O.:480]  Out: 1250 [Urine:1250]    GEN:  Alert, awake, elderly female appears relatively comfortable  Sitting in bed,  Eating breakfast  HEENT:  Normocephalic/atraumatic, no scleral icterus, no nasal discharge, mouth and membranes appear fairly moist  CV:  Remains distant heart sounds but but regular rate and rhythm, sys murmur to ausc.  S1 + S2 noted, no S3 or S4.  LUNGS: increased respiratory effort with speaking but no accessory muscle use;  coarse anteriorly but relatively comfortable speaking with pauses   ABD:  Active bowel sounds, soft, non-tender/non-distended.  .  EXT:  Trace pretibial edema stable, no cyanosis bilaterally. No joint synovitis noted.  No calf-tenderness or asymmetry noted.  SKIN:  Dry to touch, no rashes or jaundice noted.  PSYCH:  Mood appropriate, Not tearful or depressed.  Maintains direct eye contact. smiling    Data   Labs:  Recent Labs   Lab 05/11/23  0437 05/10/23  0442 05/09/23  0435   * 130* 131*   POTASSIUM 4.3 4.3 4.2   CHLORIDE 95* 94* 97*   CO2 28 26 25   ANIONGAP 6 10 9    132* 127*   BUN 63* 66* 51*   CR 1.19* 1.48* 1.56*   GFRESTIMATED 43* 33* 31*   LIZZY 10.0 9.8 9.8     Recent Labs   Lab 05/09/23 0435 05/08/23 0552 05/07/23 0434 05/06/23  1852   WBC  --  6.4 9.6 7.1   HGB  --  12.6 13.2 12.8   HCT  --  38.1 40.2 38.9   MCV  --  88 90 88    172 200 185     Recent Labs   Lab 05/11/23  0437 05/10/23  0442 05/09/23  0435 05/08/23  0552 05/07/23  0434 05/06/23  2323   * 130* 131*   < > 135* 136   POTASSIUM 4.3 4.3 4.2   < > 4.3 4.7   CHLORIDE 95* 94* 97*   < > 98 98   CO2 28 26 25   < > 27 27   ANIONGAP 6 10 9   < > 10 11    132* 127*   < > 148* 236*   BUN 63* 66* 51*   < > 31* 29*   CR 1.19* 1.48* 1.56*   < > 0.98 1.00   GFRESTIMATED 43* 33* 31*   < > 55* 54*   LIZZY 10.0 9.8 9.8   < > 10.6* 10.7*   MAG 1.9 2.4 2.1   < > 1.8 1.9   PROTTOTAL  --   --   --   --  7.7 8.1*   ALBUMIN  --   --   --   --  4.0 4.1   BILITOTAL  --   --   --   --  1.1* 0.9   ALKPHOS  --   --   --   --  77 83   AST  --   --   --   --  46* 55*   ALT  --   --   --   --  45 20    < > = values in this interval not displayed.     No results for input(s): INR in the last 168 hours.  Recent Labs   Lab 05/08/23  0552   TSH 1.92     Recent Labs   Lab 05/10/23  1806   COLOR Colorless   APPEARANCE Clear   URINEGLC Negative   URINEBILI Negative   URINEKETONE Negative   SG 1.011   UBLD Negative   URINEPH 5.5   PROTEIN Negative    NITRITE Negative   LEUKEST Negative   RBCU <1   WBCU <1      Recent Imaging:   Recent Results (from the past 24 hour(s))   CT Chest w/o Contrast    Narrative    EXAM: CT CHEST W/O CONTRAST  LOCATION: [facility]  DATE/TIME: 5/10/2023 5:25 PM CDT    INDICATION: persistent hypoxia; eval further for possible interstitial lung disease suggested on CXR  COMPARISON: 05/06/2023  TECHNIQUE: CT chest without IV contrast. Multiplanar reformats were obtained. Dose reduction techniques were used.  CONTRAST: None.    FINDINGS:   LUNGS AND PLEURA: Modest motion artifact. No focal pneumonia. No pleural effusion. No significant peripheral interstitial fibrosis suggested.  Mosaic attenuation suggests small airways disease. There is moderate inflammatory bronchial wall thickening most pronounced in the perihilar regions. In addition, there is flattening of trachea and main bronchial structures consistent with bronchial   tracheobronchomalacia.    MEDIASTINUM/AXILLAE: Postoperative changes from CABG. Cardiomegaly. No lymphadenopathy.    CORONARY ARTERY CALCIFICATION: Severe.    UPPER ABDOMEN: 2.4 cm hyperdense round nodule upper pole left kidney most suggestive of benign hyperdense cyst.    MUSCULOSKELETAL: Mild compression superior endplate of L1 age indeterminate. Right shoulder arthroplasty.      Impression    IMPRESSION:   1.  No significant interstitial fibrosis suggested. Modest motion artifact does compromise this exam.  2.  There is moderate inflammatory bronchial wall thickening, tracheobronchomalacia and mosaic attenuation of lung parenchyma consistent with small airways disease.  3.  2.4 cm hyperdense structure upper pole left kidney likely a benign hyperdense cyst. Ultrasound may be challenging to perform due to position of lesion in patient body habitus. Suggest a follow-up dedicated renal CT in 4-6 months for confirmation.         Medications       aspirin  81 mg Oral At Bedtime      atorvastatin  20 mg Oral At Bedtime     carbidopa-levodopa  1 tablet Oral TID     carvedilol  12.5 mg Oral BID w/meals     cyanocobalamin  1,000 mcg Oral Daily     enoxaparin ANTICOAGULANT  30 mg Subcutaneous Q24H     FLUoxetine  20 mg Oral BID     furosemide  20 mg Oral Daily     gabapentin  300 mg Oral 4x Daily     guaiFENesin  600 mg Oral BID     levothyroxine  25 mcg Oral Daily     lisinopril  20 mg Oral Daily     magnesium oxide  200 mg Oral Daily     pantoprazole  40 mg Oral QAM AC     sodium chloride (PF)  3 mL Intracatheter Q8H     Vitamin D3  25 mcg Oral BID

## 2023-05-11 NOTE — PROGRESS NOTES
Patient has been assessed for Home Oxygen needs. Oxygen readings:    *Pulse oximetry (SpO2) = 87% on room air at rest while awake.    *SpO2 improved to 93% on 0.5-1 liters/minute at rest.    *SpO2 = 84% on room air during activity/with exercise.    *SpO2 improved to 94% on 2 liters/minute during activity/with exercise.

## 2023-05-12 ENCOUNTER — APPOINTMENT (OUTPATIENT)
Dept: OCCUPATIONAL THERAPY | Facility: CLINIC | Age: 88
DRG: 291 | End: 2023-05-12
Payer: MEDICARE

## 2023-05-12 VITALS
BODY MASS INDEX: 32.07 KG/M2 | HEART RATE: 65 BPM | WEIGHT: 181 LBS | OXYGEN SATURATION: 96 % | RESPIRATION RATE: 16 BRPM | SYSTOLIC BLOOD PRESSURE: 140 MMHG | TEMPERATURE: 97.6 F | HEIGHT: 63 IN | DIASTOLIC BLOOD PRESSURE: 65 MMHG

## 2023-05-12 LAB
ANION GAP SERPL CALCULATED.3IONS-SCNC: 7 MMOL/L (ref 5–18)
BUN SERPL-MCNC: 57 MG/DL (ref 8–28)
CALCIUM SERPL-MCNC: 10 MG/DL (ref 8.5–10.5)
CHLORIDE BLD-SCNC: 98 MMOL/L (ref 98–107)
CO2 SERPL-SCNC: 25 MMOL/L (ref 22–31)
CREAT SERPL-MCNC: 1.21 MG/DL (ref 0.6–1.1)
GFR SERPL CREATININE-BSD FRML MDRD: 43 ML/MIN/1.73M2
GLUCOSE BLD-MCNC: 131 MG/DL (ref 70–125)
MAGNESIUM SERPL-MCNC: 2.2 MG/DL (ref 1.8–2.6)
PLATELET # BLD AUTO: 178 10E3/UL (ref 150–450)
POTASSIUM BLD-SCNC: 4.9 MMOL/L (ref 3.5–5)
PROCALCITONIN SERPL-MCNC: 0.04 NG/ML (ref 0–0.49)
SODIUM SERPL-SCNC: 130 MMOL/L (ref 136–145)

## 2023-05-12 PROCEDURE — 84145 PROCALCITONIN (PCT): CPT | Performed by: STUDENT IN AN ORGANIZED HEALTH CARE EDUCATION/TRAINING PROGRAM

## 2023-05-12 PROCEDURE — 250N000013 HC RX MED GY IP 250 OP 250 PS 637: Performed by: INTERNAL MEDICINE

## 2023-05-12 PROCEDURE — 80048 BASIC METABOLIC PNL TOTAL CA: CPT | Performed by: STUDENT IN AN ORGANIZED HEALTH CARE EDUCATION/TRAINING PROGRAM

## 2023-05-12 PROCEDURE — 36415 COLL VENOUS BLD VENIPUNCTURE: CPT | Performed by: INTERNAL MEDICINE

## 2023-05-12 PROCEDURE — 99239 HOSP IP/OBS DSCHRG MGMT >30: CPT | Performed by: STUDENT IN AN ORGANIZED HEALTH CARE EDUCATION/TRAINING PROGRAM

## 2023-05-12 PROCEDURE — 83735 ASSAY OF MAGNESIUM: CPT | Performed by: INTERNAL MEDICINE

## 2023-05-12 PROCEDURE — 99232 SBSQ HOSP IP/OBS MODERATE 35: CPT | Performed by: INTERNAL MEDICINE

## 2023-05-12 PROCEDURE — 85049 AUTOMATED PLATELET COUNT: CPT | Performed by: INTERNAL MEDICINE

## 2023-05-12 PROCEDURE — 97535 SELF CARE MNGMENT TRAINING: CPT | Mod: GO

## 2023-05-12 PROCEDURE — 36415 COLL VENOUS BLD VENIPUNCTURE: CPT | Performed by: STUDENT IN AN ORGANIZED HEALTH CARE EDUCATION/TRAINING PROGRAM

## 2023-05-12 RX ORDER — FUROSEMIDE 20 MG
20 TABLET ORAL DAILY
Qty: 30 TABLET | Refills: 0 | Status: SHIPPED | OUTPATIENT
Start: 2023-05-12 | End: 2023-05-25

## 2023-05-12 RX ORDER — LISINOPRIL 20 MG/1
20 TABLET ORAL DAILY
Qty: 30 TABLET | Refills: 0 | Status: SHIPPED | OUTPATIENT
Start: 2023-05-12 | End: 2023-05-25

## 2023-05-12 RX ORDER — GUAIFENESIN 600 MG/1
1200 TABLET, EXTENDED RELEASE ORAL 2 TIMES DAILY
Qty: 14 TABLET | Refills: 0 | Status: SHIPPED | OUTPATIENT
Start: 2023-05-12 | End: 2023-08-11

## 2023-05-12 RX ORDER — LISINOPRIL 20 MG/1
20 TABLET ORAL DAILY
Qty: 30 TABLET | Refills: 0 | Status: SHIPPED | OUTPATIENT
Start: 2023-05-12 | End: 2023-05-12

## 2023-05-12 RX ADMIN — Medication 25 MCG: at 08:47

## 2023-05-12 RX ADMIN — GABAPENTIN 300 MG: 300 CAPSULE ORAL at 08:47

## 2023-05-12 RX ADMIN — CARVEDILOL 12.5 MG: 6.25 TABLET, FILM COATED ORAL at 08:47

## 2023-05-12 RX ADMIN — CARBIDOPA AND LEVODOPA 1 TABLET: 25; 100 TABLET ORAL at 13:07

## 2023-05-12 RX ADMIN — FUROSEMIDE 20 MG: 20 TABLET ORAL at 08:47

## 2023-05-12 RX ADMIN — GABAPENTIN 300 MG: 300 CAPSULE ORAL at 13:07

## 2023-05-12 RX ADMIN — FLUOXETINE 20 MG: 20 CAPSULE ORAL at 08:47

## 2023-05-12 RX ADMIN — CARBIDOPA AND LEVODOPA 1 TABLET: 25; 100 TABLET ORAL at 06:11

## 2023-05-12 RX ADMIN — PANTOPRAZOLE SODIUM 40 MG: 40 TABLET, DELAYED RELEASE ORAL at 08:46

## 2023-05-12 RX ADMIN — BENZOCAINE AND MENTHOL 1 LOZENGE: 15; 3.6 LOZENGE ORAL at 13:07

## 2023-05-12 RX ADMIN — GUAIFENESIN 600 MG: 600 TABLET ORAL at 08:46

## 2023-05-12 RX ADMIN — LEVOTHYROXINE SODIUM 25 MCG: 0.03 TABLET ORAL at 06:11

## 2023-05-12 RX ADMIN — Medication 200 MG: at 08:46

## 2023-05-12 ASSESSMENT — ACTIVITIES OF DAILY LIVING (ADL)
ADLS_ACUITY_SCORE: 34
ADLS_ACUITY_SCORE: 33
ADLS_ACUITY_SCORE: 34
ADLS_ACUITY_SCORE: 33
ADLS_ACUITY_SCORE: 34
ADLS_ACUITY_SCORE: 30
ADLS_ACUITY_SCORE: 34
ADLS_ACUITY_SCORE: 33

## 2023-05-12 NOTE — PROGRESS NOTES
I certify that this patient, Janes Montero has been under my care (or a nurse practitioner or physican's assistant working with me). This is the face-to-face encounter for oxygen medical necessity.      At the time of this encounter supplemental oxygen is reasonable and necessary and is expected to improve the patient's condition in a home setting.       Patient has continued oxygen desaturation due to Chronic Heart Failure I50  bronchomalacia and bronchitis.    If portability is ordered, is the patient mobile within the home? yes      *Pulse oximetry (SpO2) = 87% on room air at rest while awake.     *SpO2 improved to 93% on 0.5-1 liters/minute at rest.     *SpO2 = 84% on room air during activity/with exercise.     *SpO2 improved to 94% on 2 liters/minute during activity/with exercise.      Unchanged on 5/12/2023 AM, still on 0.5-1LPM at rest despite resumption of maintenance diuretic.   Plan to discharge with home PT and OT and home 02.    Alvarez Garcia   [facility] Hospitalist Service  [facility]   Securely message with the Vocera Web Console (learn more here)  Text page via [employer] Paging/Directory

## 2023-05-12 NOTE — PROGRESS NOTES
Documentation of Face to Face and Certification for Home Health Services    I certify that patient: Janes Montero is under my care and that I, or a nurse practitioner or physician's assistant working with me, had a face-to-face encounter that meets the physician face-to-face encounter requirements with this patient on: 5/12/2023.    This encounter with the patient was in whole, or in part, for the following medical condition, which is the primary reason for home health care:   Patient Active Problem List   Diagnosis     Benign essential hypertension     Coronary artery stenosis     S/P CABG (coronary artery bypass graft)     Parkinson's disease (H)     Trigeminal neuralgia     Chronic bilateral back pain     Aortic valve insufficiency     Tricuspid insufficiency     Mitral valve insufficiency     Bilateral carotid artery stenosis     CKD (chronic kidney disease) stage 3, GFR 30-59 ml/min (H)     Depression     Diastolic dysfunction     GERD (gastroesophageal reflux disease)     Left bundle branch block     Primary osteoarthritis involving multiple joints     Sensorineural hearing loss (SNHL) of both ears     Subclinical hypothyroidism     Type 2 diabetes mellitus with diabetic polyneuropathy, without long-term current use of insulin (H)     Secondary renal hyperparathyroidism (H)     Age-related osteoporosis without current pathological fracture     Mixed hypercholesterolemia and hypertriglyceridemia     Mixed incontinence urge and stress (male)(female)     Vitamin B12 deficiency (non anemic)     Non-seasonal allergic rhinitis due to pollen     Preventative health care     Chest pain, unspecified type     Acute kidney injury superimposed on CKD (H)     Vaginal irritation     Polypharmacy     Age-related osteoporosis with current pathological fracture with routine healing     Recurrent major depressive disorder, in full remission (H)     PAD (peripheral artery disease) (H)     Acute pulmonary edema (H)     Hypoxia      Acute respiratory failure with hypoxia (H)     Acute on chronic combined systolic and diastolic heart failure (H)     .    I certify that, based on my findings, the following services are medically necessary home health services: Occupational Therapy and Physical Therapy.    My clinical findings support the need for the above services because: Occupational Therapy Services are needed to assess and treat cognitive ability and address ADL safety due to impairment in acute respiratory failure iin setting of acute on chronic heart failure and inflammatory changes in lungs. and Physical Therapy Services are needed to assess and treat the following functional impairments: acute respiratory failure iin setting of acute on chronic heart failure and inflammatory changes in lungs.    Further, I certify that my clinical findings support that this patient is homebound (i.e. absences from home require considerable and taxing effort and are for medical reasons or [employer] services or infrequently or of short duration when for other reasons) because: Leaving home is medically contraindicated for the following reason(s): Dyspnea on exertion that makes it so they cannot leave their home for needed services without clinical deterioration...    Based on the above findings. I certify that this patient is confined to the home and needs intermittent skilled nursing care, physical therapy and/or speech therapy.  The patient is under my care, and I have initiated the establishment of the plan of care.  This patient will be followed by a physician who will periodically review the plan of care.  Physician/Provider to provide follow up care: Maggie Arriaga    Attending [facility] physician (the Medicare certified [place] provider): Alvarez Garcia MD  Physician Signature: See electronic signature associated with these discharge orders.  Date: 5/12/2023

## 2023-05-12 NOTE — PROGRESS NOTES
Care Management Discharge Note    Discharge Date: 05/12/2023     Discharge Disposition: Home, Home Care    Discharge Services: None    Discharge DME: None    Discharge Transportation: family or friend will provide    Private pay costs discussed: Not applicable    Education Provided on the Discharge Plan:  AVS per bedside nurse     Persons Notified of Discharge Plans: yes    Patient/Family in Agreement with the Plan: yes    Additional Information:  Pt discharging to home ([facility]), with [facility] for homecares (PT and OT), and with home O2 via bedside nursing.  Daughter Tg confirms she will transport pt to home.  No further CM needs identified.    CC referral sent per protocol.     Yoli Sweeney RN

## 2023-05-12 NOTE — PROGRESS NOTES
Occupational Therapy Discharge Summary    Reason for therapy discharge:    Discharged to home with home therapy.    Progress towards therapy goal(s). See goals on Care Plan in [facility] electronic health record for goal details.  Goals met    Therapy recommendation(s):    Continued therapy is recommended.  Rationale/Recommendations:  Home OT for activity tolerance and managing oxygen tubing safely within adls and iadls.

## 2023-05-12 NOTE — DISCHARGE SUMMARY
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    Weight as of this encounter: 82.1 kg (181 lb).       Follow-ups Needed After Discharge   Follow-up Appointments     Follow-up and recommended labs and tests       Follow up with primary care provider, Maggie Arriaga, within 7 days for   hospital follow- up.  The following labs/tests are recommended: bmp.   Recommend cardiology follow up w/ Dr. Ricardo               Unresulted Labs Ordered in the Past 30 Days of this Admission     No orders found from 2023 to 2023.      These results will be followed up by [facility]    Discharge Disposition   Discharged to home with home cares including PT and OT and home 02  Condition at discharge: [facility]     Hospital Course    Janes Montero is a 89 year old female who was admitted on 2023. She is a 89 year old female with h/o coronary artery disease status post bypass graft surgery with LIMA graft to LAD, mitral insufficiency, aortic stenosis, aortic insufficiency, essential hypertension and dyslipidemia, presenting with acute on chronic heart failure with reduced " ejection fraction, acute respiratory failure with hypoxia and echocardiogram results from 4/4/23 indicating severe pulmonary hypertension, and moderate to severe mitral insufficiency/regurgitation. Pt underwent Nuclear stress test on 5/8/23 - report with a partially reversible change in the inferior and basilar mid inferior lateral walls indicating myocardial ischemia; EF was 65%. 5/9 echo showed EF 45-50% and moderate MR.       Patient has been undergoing cautious diuresis which has been complicated by acute renal injury; diuretics were held and were cautiously resumed 5/11 with a trial of p.o. diuretic.  Moreover, CT was obtained which demonstrated although no significant fibrosis, there was moderate inflammatory bronchial wall thickening and tracheobronchomalacia and mosaic attenuation of the lung parenchyma; additionally incidental 2.4 cm hypodense upper left kidney cyst.  Pulmonology consulted, no specific further treatments including for the bronchomalacia; there could be consideration of cpap but should be followed up as an outpatient.     Of note, pt did receive 3 days of IV ceftriaxone given an abnormal urinalysis but clinically not suspicious for active infection.  Additionally, given the abnormal echo findings, cardiology team did discuss with her primary cardiologist and intervention was not recommended at this time; this should be followed up longitudinally with cardiology and pcp. Her hyponatremia was stable on discharge at 130. Renal function trending towards improvement; electrolytes and creatinine should be rechecked at follow up.    Patient was able to be weaned to 1 L of oxygen, requiring 2 L with exertion with an ambulation trial.  Therapies recommended home cares which were arranged.  On 5/12/2023, the patient was discharged back home in stable medical condition and remains hemodynamically and vitally stable; home cares including PT and OT and home oxygen arranged.  She should see her primary  care provider within a week for a posthospitalization visit.  Recommend rechecking BMP.  Additionally, ensure cardiology follow-up including revisiting the mitral regurgitation as well as diuresis plan (CHF visit arranged 5/25), and also following up  On the incidental kidney cyst -recommending a dedicated renal CT in 4-6 months for confirmation.       Consultations This Hospital Stay   CARDIOLOGY IP CONSULT  OCCUPATIONAL THERAPY ADULT IP CONSULT  PHYSICAL THERAPY ADULT IP CONSULT  CARE MANAGEMENT / SOCIAL WORK IP CONSULT  PULMONARY IP CONSULT    Code Status   No CPR- Do NOT Intubate    Time Spent on this Encounter   I, Alvarez Garcia MD, personally saw the patient today and spent greater than 30 minutes discharging this patient.       Alvarez Garcia MD  [facility] ICU  [address] [facility] [address]  Phone: [phone]  Fax: [phone]  ______________________________________________________________________    Physical Exam   Vital Signs: Temp: 97.6  F (36.4  C) Temp src: Oral BP: (!) 140/65 Pulse: 65   Resp: 16 SpO2: 96 % O2 Device: None (Room air) Oxygen Delivery: 2 LPM  Weight: 181 lbs 0 oz    GEN:  Alert, awake, elderly female appears relatively comfortable  Sitting in bed,  Eating breakfast  HEENT:  Normocephalic/atraumatic, no scleral icterus, no nasal discharge, mouth and membranes appear fairly moist  CV:   distant heart sounds but but regular rate and rhythm, systplic murmur to auscultation present.  S1 + S2 noted, no S3 or S4. Trace lower extremity edema.   LUNGS: normal respiratory effort with speaking and no accessory muscle use; coarse anteriorly but relatively comfortable speaking with pauses; on 1/2-1L NC at rest.  ABD:  Active bowel sounds, soft, non-tender/non-distended.  .  EXT:  Trace pretibial edema stable, no cyanosis bilaterally. No joint synovitis noted.  No calf-tenderness or asymmetry noted.  SKIN:  Dry to touch, no rashes or jaundice  "noted.  PSYCH:  Mood appropriate, cooperative. Maintains direct eye contact. smiling and polite/pleasant        Primary Care Physician   Maggie Arriaga    Discharge Orders      Home Care Referral      Primary Care - Care Coordination Referral      Reason for your hospital stay    Breathing difficulty from \"heart failure\" and a component of \"lung disease\" - taking the water pill daily will help. Please see your primary care doctor within a week for follow up.     Follow-up and recommended labs and tests     Follow up with primary care provider, Maggie Arriaga, within 7 days for hospital follow- up.  The following labs/tests are recommended: bmp. Recommend cardiology follow up w/ Dr. Ricardo     Activity    Your activity upon discharge: activity as tolerated     Home Oxygen Order for DME - ONLY FOR DME    I, the undersigned, certify that the above prescribed supplies are medically necessary for this patient and is both reasonable and necessary in reference to accepted standards of medical and necessary in reference to accepted standards of medical practice in the treatment of this patient's condition and is not prescribed as a convenience.      Diet    Follow this diet upon discharge: Orders Placed This Encounter      Combination Diet Low Saturated Fat Na <2400mg Diet, No Caffeine Diet       Significant Results and Procedures   Results for orders placed or performed during the hospital encounter of 05/06/23   XR Chest Port 1 View    Narrative    EXAM: XR CHEST PORT 1 VIEW  LOCATION: [facility]  DATE/TIME: 5/6/2023 7:52 PM CDT    INDICATION: Dyspnea, wheezing, cough, elevated BNP  COMPARISON: 05/04/2023      Impression    IMPRESSION: Hypoexpanded lungs. Interstitial prominence and septal thickening suggesting edema. Mild cardiac silhouette enlargement. Cannot exclude minimal pleural fluid. Prior sternotomy.     CT Chest w/o Contrast    Narrative    EXAM: CT CHEST W/O CONTRAST  LOCATION: " [facility]  DATE/TIME: 5/10/2023 5:25 PM CDT    INDICATION: persistent hypoxia; eval further for possible interstitial lung disease suggested on CXR  COMPARISON: 2023  TECHNIQUE: CT chest without IV contrast. Multiplanar reformats were obtained. Dose reduction techniques were used.  CONTRAST: None.    FINDINGS:   LUNGS AND PLEURA: Modest motion artifact. No focal pneumonia. No pleural effusion. No significant peripheral interstitial fibrosis suggested.  Mosaic attenuation suggests small airways disease. There is moderate inflammatory bronchial wall thickening most pronounced in the perihilar regions. In addition, there is flattening of trachea and main bronchial structures consistent with bronchial   tracheobronchomalacia.    MEDIASTINUM/AXILLAE: Postoperative changes from CABG. Cardiomegaly. No lymphadenopathy.    CORONARY ARTERY CALCIFICATION: Severe.    UPPER ABDOMEN: 2.4 cm hyperdense round nodule upper pole left kidney most suggestive of benign hyperdense cyst.    MUSCULOSKELETAL: Mild compression superior endplate of L1 age indeterminate. Right shoulder arthroplasty.      Impression    IMPRESSION:   1.  No significant interstitial fibrosis suggested. Modest motion artifact does compromise this exam.  2.  There is moderate inflammatory bronchial wall thickening, tracheobronchomalacia and mosaic attenuation of lung parenchyma consistent with small airways disease.  3.  2.4 cm hyperdense structure upper pole left kidney likely a benign hyperdense cyst. Ultrasound may be challenging to perform due to position of lesion in patient body habitus. Suggest a follow-up dedicated renal CT in 4-6 months for confirmation.     Echocardiogram Complete     Value    LVEF  45-50% (mildly reduced)    [facility]    [id number]  [id number]  [id number]  [id number]^MAGALY[place] [address]     Name: EMMANUELLE DÍAZ  MRN: [id number]  : 10/24/1933  Study Date:  05/09/2023 10:24 AM  Age: 89 yrs  Gender: Female  Patient Location: [facility]  Reason For Study: Mitral Regurgitation  Ordering Physician: GUALBERTO MCKENZIE  Performed By: PAULINA     BSA: 1.8 m2  Height: 63 in  Weight: 178 lb  BP: 158/68 mmHg  ______________________________________________________________________________  Procedure  Complete Portable Echo Adult. Definity (NDC #86958-158) given intravenously.  ______________________________________________________________________________  Interpretation Summary     Left ventricular function is decreased. The ejection fraction is 45-50%  (mildly reduced).  There is borderline-mild global hypokinesia of the left ventricle.  Normal right ventricle size and systolic function.  There is moderate (2+) mitral regurgitation.  ______________________________________________________________________________  Left Ventricle  Left ventricular function is decreased. The ejection fraction is 45-50%  (mildly reduced). There is normal left ventricular wall thickness. Left  ventricular diastolic function is abnormal. Diastolic Doppler findings (E/E'  ratio and/or other parameters) suggest left ventricular filling pressures are  increased. Septal motion is consistent with conduction abnormality. There is  borderline-mild global hypokinesia of the left ventricle.     Right Ventricle  Normal right ventricle size and systolic function. TAPSE is abnormal, which is  consistent with abnormal right ventricular systolic function.     Atria  The left atrium is mild to moderately dilated. Right atrial size is normal.  There is no color Doppler evidence of an atrial shunt.     Mitral Valve  Mitral valve leaflets appear normal. There is no evidence of mitral stenosis  or clinically significant mitral regurgitation. Calcified mitral apparatus.  There is moderate mitral annular calcification. There is moderate (2+) mitral  regurgitation.     Tricuspid Valve  The tricuspid valve is not well visualized, but is  grossly normal. Right  ventricle systolic pressure estimate normal. There is mild to moderate (1-2+)  tricuspid regurgitation.     Aortic Valve  The aortic valve is trileaflet with aortic valve sclerosis. There is trace  aortic regurgitation. No hemodynamically significant valvular aortic stenosis.     Pulmonic Valve  The pulmonic valve is not well seen, but is grossly normal.     Vessels  The aorta root is normal. Normal size ascending aorta. IVC diameter <2.1 cm  collapsing >50% with sniff suggests a normal RA pressure of 3 mmHg.     Pericardium  There is no pericardial effusion.     ______________________________________________________________________________  MMode/2D Measurements & Calculations  IVSd: 1.0 cm  LVIDd: 4.4 cm  LVIDs: 3.1 cm  LVPWd: 0.85 cm     FS: 29.7 %  LV mass(C)d: 135.6 grams  LV mass(C)dI: 73.7 grams/m2  Ao root diam: 3.3 cm  LA dimension: 4.3 cm  asc Aorta Diam: 3.3 cm  LA/Ao: 1.3  LVOT diam: 2.3 cm  LVOT area: 4.1 cm2  LA Volume (BP): 64.3 ml  LA Volume Index (BP): 34.9 ml/m2     LA Volume Indexed (AL/bp): 39.2 ml/m2  RV Base: 3.2 cm  RWT: 0.39  TAPSE: 1.4 cm     Time Measurements  MM HR: 67.0 BPM     Doppler Measurements & Calculations  MV E max edward: 74.7 cm/sec  MV A max edward: 81.4 cm/sec  MV E/A: 0.92  MV dec time: 0.32 sec  Ao V2 max: 183.3 cm/sec  Ao max P.0 mmHg  Ao V2 mean: 129.2 cm/sec  Ao mean P.6 mmHg  Ao V2 VTI: 35.0 cm  CHINMAY(I,D): 1.9 cm2  CHINMAY(V,D): 1.8 cm2     LV V1 max P.4 mmHg  LV V1 max: 77.7 cm/sec  LV V1 VTI: 16.0 cm  SV(LVOT): 66.4 ml  SI(LVOT): 36.1 ml/m2  PA acc time: 0.12 sec  TR max edward: 270.1 cm/sec  TR max P.2 mmHg  AV Edward Ratio (DI): 0.42  CHINMAY Index (cm2/m2): 1.0  E/E' av.7  Lateral E/e': 11.1  Medial E/e': 16.3  RV S Edward: 7.0 cm/sec     ______________________________________________________________________________  Report approved by: Shazia Johnson 2023 12:36 PM         NM MPI with Lexiscan     Value    Pharmacologic Protocol  Lexiscan    Test Type Pharmacological    Baseline HR 69    Baseline Systolic     Baseline Diastolic BP 79    Last Stress HR 82    Last Stress Systolic     Last Stress Diastolic BP 56    Target     PERCENT HR 85%    ST Deviation Elevation V2 1.1mm    Deviation Time III -1.4mm    ST Elevation Amount V2 1.8mm    ST Deviation Amount he III -1.8mm    Final Resting /46    Final Resting HR 83    Max Treadmill Speed 0.0    Max Treadmill Grade 0.0    Peak Systolic /50    Peak Diastolic /56    Max HR  90    Stress Phase Resting    Stress Resting Pt Position SUPINE    Current HR 67    Current /79    Stress Phase Resting    Stress Resting Pt Position MANUAL EVENT    Current HR 85    Current /48    Stress Phase Stress    Stage Minute EXE 00:00    Exercise Stage STAGE 2    Current HR 64    Current /79    Stress Phase Stress    Stage Minute EXE 01:00    Exercise Stage STAGE 3    Current HR 69    Current /79    Stress Phase Stress    Stage Minute EXE 01:52    Exercise Stage STAGE 3    Current HR 87    Current /48    Stress Phase Stress    Stage Minute EXE 02:00    Exercise Stage STAGE 4    Current HR 87    Current /48    Stress Phase Stress    Stage Minute EXE 02:44    Exercise Stage STAGE 4    Current HR 85    Current /48    Stress Phase Stress    Stage Minute EXE 02:48    Exercise Stage STAGE 4    Current HR 84    Current /56    Stress Phase Stress    Stage Minute EXE 03:00    Exercise Stage STAGE 5    Current HR 89    Current /56    Stress Phase Stress    Stage Minute EXE 04:00    Exercise Stage STAGE 6    Current HR 81    Current /56    Stress Phase Stress    Stage Minute EXE 04:00    Exercise Stage STAGE 6    Current HR 82    Current /56    Stress Phase Recovery    Stage Minute REC 00:54    Exercise Stage Recovery    Current HR 86    Current /50    Stress Phase Recovery    Stage Minute REC 00:59    Exercise Stage  Recovery    Current HR 85    Current /50    Stress Phase Recovery    Stage Minute REC 01:59    Exercise Stage Recovery    Current HR 85    Current /50    Stress Phase Recovery    Stage Minute REC 02:47    Exercise Stage Recovery    Current HR 86    Current /46    Stress Phase Recovery    Stage Minute REC 02:59    Exercise Stage Recovery    Current HR 87    Current /46    Stress Phase Recovery    Stage Minute REC 03:59    Exercise Stage Recovery    Current HR 84    Current /46    Stress Phase Recovery    Stage Minute REC 04:01    Exercise Stage Recovery    Current HR 83    Current /46    Max Predicted HR  69    Rate Pressure Product 10,440.0    Left Ventricular EF 65    Narrative       A prior study was conducted on 8/16/2022.  Prior images were   unavailable for comparison review.     Pharmacologic regadenoson stress ECG is nondiagnostic due to baseline   ECG abnormality.     Pharmacological regadenoson nuclear stress test is abnormal.  There is   partially reversible change in inferior and basal to mid inferolateral   walls indicating inducible myocardial ischemia.     Normal left ventricular size, wall motion and systolic function.    Calculated left ventricular ejection fraction is 65%.     The patient is at an intermediate risk of future cardiac ischemic   events.           Discharge Medications   Current Discharge Medication List      START taking these medications    Details   furosemide (LASIX) 20 MG tablet Take 1 tablet (20 mg) by mouth daily  Qty: 30 tablet, Refills: 0    Associated Diagnoses: Acute on chronic combined systolic and diastolic heart failure (H)         CONTINUE these medications which have NOT CHANGED    Details   albuterol (PROVENTIL) (2.5 MG/3ML) 0.083% neb solution Take 1 vial (2.5 mg) by nebulization every 6 hours as needed for shortness of breath, wheezing or cough  Qty: 90 mL, Refills: 0    Associated Diagnoses: Wheezing      aspirin 81 mg chewable  tablet [ASPIRIN 81 MG CHEWABLE TABLET] Chew 81 mg at bedtime.      atorvastatin (LIPITOR) 20 MG tablet TAKE 1 TABLET BY MOUTH AT  BEDTIME  Qty: 90 tablet, Refills: 3    Comments: Requesting 1 year supply  Associated Diagnoses: Dyslipidemia      carbidopa-levodopa (SINEMET)  MG tablet TAKE 1 TABLET BY MOUTH 3  TIMES DAILY TAKE AT LEAST  1/2 HOUR BEFORE MEALS OR 2  HOURS AFTER MEALS DO NOT  MIX WITH FOOD  Qty: 270 tablet, Refills: 3    Associated Diagnoses: Parkinson's disease (H)      carvedilol (COREG) 12.5 MG tablet TAKE 1 TABLET BY MOUTH  TWICE DAILY WITH MEALS  Qty: 180 tablet, Refills: 3    Comments: Requesting 1 year supply  Associated Diagnoses: Hypertension, unspecified type      cholecalciferol, vitamin D3, 1,000 unit tablet Take 1,000 Units by mouth 2 times daily      coenzyme Q10 (CO Q-10) 100 mg capsule [COENZYME Q10 (CO Q-10) 100 MG CAPSULE] Take 100 mg by mouth 2 (two) times a day.      cyanocobalamin (VITAMIN B-12) 1000 MCG tablet Take 1 tablet (1,000 mcg) by mouth daily  Qty: 90 tablet, Refills: 3    Associated Diagnoses: Parkinson's disease (H); Low serum vitamin B12      fish oil-omega-3 fatty acids 1000 MG capsule Take 1 g by mouth 2 times daily      FLUoxetine (PROZAC) 20 MG capsule TAKE 1 CAPSULE BY MOUTH  TWICE DAILY  Qty: 180 capsule, Refills: 0    Comments: Requesting 1 year supply  Associated Diagnoses: Recurrent major depressive disorder, in full remission (H)      gabapentin (NEURONTIN) 300 MG capsule TAKE 1 CAPSULE BY MOUTH 4  TIMES DAILY  Qty: 360 capsule, Refills: 3    Associated Diagnoses: Trigeminal neuralgia      levothyroxine (SYNTHROID/LEVOTHROID) 25 MCG tablet TAKE 1 TABLET BY MOUTH  DAILY  Qty: 90 tablet, Refills: 2    Comments: Requesting 1 year supply  Associated Diagnoses: Subclinical hypothyroidism      loratadine (CLARITIN) 10 mg tablet Take 10 mg by mouth daily as needed for allergies      magnesium oxide 250 mg Tab [MAGNESIUM OXIDE 250 MG TAB] Take 250 mg by mouth  "daily.      melatonin 1 mg Tab tablet Take 1 mg by mouth At Bedtime      multivitamin therapeutic tablet Take 1 tablet by mouth every morning      nitroglycerin (NITROSTAT) 0.4 MG SL tablet [NITROGLYCERIN (NITROSTAT) 0.4 MG SL TABLET] Place 0.4 mg under the tongue every 5 (five) minutes as needed for chest pain.      omeprazole (PRILOSEC) 20 MG DR capsule Take 20 mg by mouth daily before breakfast      polyethylene glycol (MIRALAX) 17 gram packet Take 17 g by mouth daily as needed for constipation      triamcinolone (KENALOG) 0.025 % external ointment Apply topically 2 times daily  Qty: 80 g, Refills: 0    Associated Diagnoses: Vaginal irritation         STOP taking these medications       lisinopril (ZESTRIL) 10 MG tablet Comments:   Reason for Stopping:         predniSONE (DELTASONE) 20 MG tablet Comments:   Reason for Stopping:             Allergies   Allergies   Allergen Reactions     Alendronate [Alendronate] Unknown     pain     Codeine Hives     Hydrocodone-Acetaminophen Other (See Comments)     Highly sensitive, \"knocks her out and can't wake up\"     Other Drug Allergy (See Comments)      Risedronate Unknown     Bone pain     Rosuvastatin Muscle Pain (Myalgia)     Simvastatin Muscle Pain (Myalgia)     "

## 2023-05-12 NOTE — PLAN OF CARE
Goal Outcome Evaluation:  Discharge plan reviewed with patient. Questions answered. Copy of AVS given. PIV and Tele removed. All belongings with patient. Discharged home with daughter provide transportation.     Problem: Plan of Care - These are the overarching goals to be used throughout the patient stay.    Goal: Readiness for Transition of Care  Outcome: Adequate for Care Transition

## 2023-05-12 NOTE — PLAN OF CARE
VSS. Remains on O.5L O2 NC, unable to wean overnight. Denies pain. Medication education done. Appears to have slept well. Care plan discussed.

## 2023-05-12 NOTE — PROGRESS NOTES
Physical Therapy Discharge Summary    Reason for therapy discharge:    Discharged to home with home therapy.    Progress towards therapy goal(s). See goals on Care Plan in [facility] electronic health record for goal details.  Goals not met.  Barriers to achieving goals:   discharge on same date as initial evaluation.    Therapy recommendation(s):    Continued therapy is recommended.  Rationale/Recommendations:  Home PT to progress functional mobility and strength.

## 2023-05-12 NOTE — PROGRESS NOTES
Cardiology Progress Note    Assessment:  1.  Acute pulmonary edema with acute on chronic combined systolic and diastolic heart failure.  Underlying left bundle branch block.  Patient had received IV diuresis but with increasing creatinine from 1.0 to max of 1.56 IV diuresis was discontinued.  Yesterday she was started on 20 mg p.o. of furosemide and resumed on her lisinopril.  Lab results today are stable.  She has persistently mildly low sodium.  Net urine output of 2090.  -225 yesterday.  BNP on admission 628.  Weight today is 181 pounds which is up 1 pound from yesterday.  CT of the chest this admission demonstrates no significant interstitial fibrosis.  Moderate inflammatory bronchial wall thickening no pleural effusion was reported.  Suspect initial presentation related to an upper respiratory or viral infection with a cough productive of yellowish sputum and a resident where she lives having similar symptoms.  Echocardiogram suggests mild reduction left ventricular function in setting of left bundle branch block.  Left ventricular filling pressures are felt to be elevated on the most recent echocardiogram.  X-ray on May 6 did suggest some interstitial prominence.  BMP completed yesterday demonstrated significant interval improvement.  BNP down from 628 on admission to 82 yesterday.    2.  Coronary artery disease.  Coronary angiogram from August 2022 is reviewed.  Total occlusion of the LAD with a widely patent vein graft to the LAD.  Mild nonobstructive disease in the other coronary territories.  He has noted this was just completed August 2022.  Nuclear medicine stress test this admission reports a partially reversible change in the inferior basilar to mid inferior lateral segments with normal ejection fraction.  Negative troponins this admission.  Discussed in detail with the patient.  Given recent coronary angiogram would be hesitant to pursue repeat coronary angiogram at this time.  She would be at  higher risk related to renal insufficiency.    3.  Mitral regurgitation.  Echocardiogram reports an ejection fraction of 45 to 50% with moderate 2+ mitral regurgitation.  I have been in correspondence with her primary cardiologist.  The degree of mitral regurgitation does not appear to be significant to warrant intervention at this time.    4.  Chronic left bundle branch block.  5.  Hypertensive urgency on admission.  Blood pressure 185/88.  Lisinopril resumed yesterday.  Principal Problem:    Hypoxia  Active Problems:    Benign essential hypertension    Coronary artery stenosis    S/P CABG (coronary artery bypass graft)    Parkinson's disease (H)    Mitral valve insufficiency    CKD (chronic kidney disease) stage 3, GFR 30-59 ml/min (H)    Type 2 diabetes mellitus with diabetic polyneuropathy, without long-term current use of insulin (H)    Acute pulmonary edema (H)    Acute respiratory failure with hypoxia (H)    Acute on chronic combined systolic and diastolic heart failure (H)  Current medications include  Aspirin 81 mg daily  Atorvastatin 20 mg daily   Carbidopa levodopa  Carvedilol 12.5 mg p.o. twice daily  Vitamin B12  Lovenox  Fluoxetine  Lasix 20 mg daily  Gabapentin  Guaifenesin  Synthroid  Lisinopril 20 mg daily  Vitamin D.  Plan:  1.  Follow-up arrangements have been made.  May 25 in the CHF NP clinic and then with Dr. Ricardo  2.  Will need follow-up of her electrolytes and renal function.  She was placed back on her lisinopril as well and low-dose furosemide.  3.  Increased cough this morning.  Will discuss with primary care.        Subjective:   Carmeloia GABE Montero is seen in follow-up.  I have been in correspondence with her primary cardiologist.  Notes from pulmonary yesterday appreciated and discussed.  She continues to report that she feels significantly better than on admission.  However she has noted today that the cough is more pronounced that it has been over the past few days.  She has a  "productive cough noted this morning which was not noted on previous evaluation.    Objective:   Vital signs in last 24 hours:  VITALS: /60 (BP Location: Right arm)   Pulse 60   Temp 98  F (36.7  C) (Oral)   Resp 18   Ht 1.6 m (5' 3\")   Wt 82.1 kg (181 lb)   LMP  (LMP Unknown)   SpO2 93%   BMI 32.06 kg/m    BMI: Body mass index is 32.06 kg/m .  Wt Readings from Last 3 Encounters:   23 82.1 kg (181 lb)   23 83.7 kg (184 lb 9.6 oz)   23 83.5 kg (184 lb)       Intake/Output Summary (Last 24 hours) at 2023 0748  Last data filed at 2023 0636  Gross per 24 hour   Intake 1600 ml   Output 1825 ml   Net -225 ml       Physical Exam:  General: Sitting comfortably in the chair in no acute distress with intermittent cough.   Neck: Jugular venous pressure is somewhat challenging to assess due to body habitus and sitting upright in the chair.   Lungs: Intermittent cough, decreased breath sounds at the bases, few crackles noted.   COR: RRR, 2/6 systolic murmur   Abd: nondistended, BS present   Extrem: No edema  Neuro: Alert and oriented no focal deficits.    Cardiographics:    EC2023 sinus rhythm, left bundle branch block.  Previous also noted left bundle branch block.  Echocardiogram:   Echocardiogram Complete  Order: [id number]   Status: Edited Result - FINAL      Visible to patient: Yes (seen)      Next appt: Today at 08:30 AM in Occupational Therapy (Janet Cohen, OTR)      Dx: GALVAN (dyspnea on exertion); Nonrheumat...      3 Result Notes     1 Patient Communication  Details     Reading Physician Reading Date Result Priority   Diallo Orozco MD  [phone] 2023        Narrative & Impression  [id number]  [id number]  [id number]  [id number]^DANIELE^AKBAR^[place] [address]     Name: EMMANUELLE DÍAZ  MRN: [id number]  : 10/24/1933  Study Date: 2023 10:55 AM  Age: 89 yrs  Gender: Female  Patient Location: [facility]  Reason For " Study: Hypertension, unspecified type, GALVAN (dyspnea on exertion),  Coronary artery disease; Nonrheumatic aortic valve stenosis; Nonrheumatic  aortic insufficiency; Mitral valve insufficiency  Ordering Physician: AKBAR SANABRIA  Referring Physician: AKBAR SANABRIA  Performed By: SABRINA     BSA: 1.9 m2  Height: 65 in  Weight: 184 lb  HR: 75  BP: 142/83 mmHg  ______________________________________________________________________________  Procedure  Complete Echo Adult. Definity (NDC #91845-398) given intravenously.  Technically difficult study.  ______________________________________________________________________________  Interpretation Summary     1. The left ventricle is normal in size with mild concentric left ventricular  hypertrophy. Left ventricular function is decreased. The ejection fraction is  45-50% (mildly reduced).  2. The right ventricle is mildly dilated with mildly decreased right  ventricular systolic function  3. There is moderate mitral annular calcification. There is moderately severe  (3+) mitral regurgitation.  4. There is sclerosis, calcification, and restriction of the aortic valve  opening compatible with mild aortic stenosis.  5. Severe (>55mmHg) pulmonary hypertension is present. The right ventricular  systolic pressure is approximated at 68mmHg plus the right atrial pressure.  6. Compared to the prior study of 8/16/22, the mitral valve regurgitation is  now worse.  ______________________________________________________________________________  Left Ventricle  The left ventricle is normal in size. Left ventricular function is decreased.  The ejection fraction is 45-50% (mildly reduced). There is mild concentric  left ventricular hypertrophy. Left ventricular diastolic function is abnormal.  There is mild global hypokinesia of the left ventricle.     Right Ventricle  The right ventricle is mildly dilated. Mildly decreased right ventricular  systolic function.     Atria  The  left atrium is moderately dilated. The right atrium is moderately dilated.  There is no color Doppler evidence of an atrial shunt.     Mitral Valve  There is moderate mitral annular calcification. There is moderately severe  (3+) mitral regurgitation. There is no mitral valve stenosis.     Tricuspid Valve  Tricuspid valve leaflets appear normal. There is no evidence of tricuspid  stenosis or clinically significant tricuspid regurgitation. Severe (>55mmHg)  pulmonary hypertension is present. The right ventricular systolic pressure is  approximated at 68mmHg plus the right atrial pressure.     Aortic Valve  The aortic valve is trileaflet. There is trace aortic regurgitation. There is  sclerosis, calcification, and restriction of the aortic valve opening  compatible with mild aortic stenosis.     Pulmonic Valve  The pulmonic valve is not well seen, but is grossly normal. This degree of  valvular regurgitation is within normal limits. There is trace pulmonic  valvular regurgitation.     Vessels  The aorta root is normal. Normal size ascending aorta. IVC diameter <2.1 cm  collapsing >50% with sniff suggests a normal RA pressure of 3 mmHg.     Pericardium  There is no pericardial effusion.     Rhythm  Sinus rhythm was noted.            Stress Test:NM MPI with Lexiscan  Order: [id number]   Status: Final result      Visible to patient: Yes (seen)      Next appt: Today at 08:30 AM in Occupational Therapy (JOSE Flynn)      0 Result Notes  Details     Reading Physician Reading Date Result Priority   Veronica Polanco MD  [phone] 5/8/2023 Routine   Veronica Polanco MD  [phone] 5/8/2023        Result Text        A prior study was conducted on 8/16/2022.  Prior images were unavailable for comparison review.     Pharmacologic regadenoson stress ECG is nondiagnostic due to baseline ECG abnormality.     Pharmacological regadenoson nuclear stress test is abnormal.  There is partially reversible change in inferior and basal to  mid inferolateral walls indicating inducible myocardial ischemia.     Normal left ventricular size, wall motion and systolic function.  Calculated left ventricular ejection fraction is 65%.     The patient is at an intermediate risk of future cardiac ischemic events.         Janes Montero  Cardiac Catheterization  Order# [id number]  Reading physician: Ignacio Baird MD Ordering physician: Mathew Ricardo MD Study date: 8/17/22      Patient Information     Name MRN Description   Janes Montero [id number] 88 year old female      Physicians     Panel Physicians Referring Physician Case Authorizing Physician   Ignacio Baird MD (Primary) Mathew Ricardo MD Reisdorf, Franz-Josef E, MD      Procedures     Panel 1     Primary Surgeon:  Ignacio Baird MD   Procedure:  Coronary Angiogram         Indications     Chest pain, unspecified type [R07.9 (ICD-10-CM)]   Coronary artery disease involving native coronary artery without angina pectoris, unspecified whether native or transplanted heart [I25.10 (ICD-10-CM)]   S/P CABG (coronary artery bypass graft) [Z95.1 (ICD-10-CM)]      Comments/Patient Narrative     Lab 3      Pre Procedure Diagnosis     worsening angina        Conclusion       1st Mrg lesion is 50% stenosed.    Prox LAD lesion is 100% stenosed.    Prox RCA to Mid RCA lesion is 25% stenosed.    Atretic nonfunctional JEOVANY graft of uncertain destination    Widely patent graft to mid LAD from left aota           Plan       Follow bedrest per protocol    Continued medical management and lifestyle modifications for cardiovascular risk factor optimizations.    Arterial sheath removed from femoral artery with closure device.    Femoral angiogram identifies arterial sheath placement suitable for closure device.      Recommendations     General Recommendations:  - Patient given specific instructions regarding care of arteriotomy site, activity restrictions, signs and symptoms of cardiac or vascular  complications and to seek immediate medical evaluation should they occur.   - Arterial sheath removed from the femoral artery with closure device.   - Femoral angiogram identifies arterial sheath placement is suitable for         Telemetry: Sinus rhythm,            Lab Results    Chemistry/lipid CBC Cardiac Enzymes/BNP/TSH/INR   Recent Labs   Lab Test 08/18/22  0506   CHOL 121   HDL 29*   LDL 58   TRIG 172*     Recent Labs   Lab Test 08/18/22  0506 12/14/21  1638   LDL 58 32     Recent Labs   Lab Test 05/12/23  0504   *   POTASSIUM 4.9   CHLORIDE 98   CO2 25   *   BUN 57*   CR 1.21*   GFRESTIMATED 43*   LIZZY 10.0     Recent Labs   Lab Test 05/12/23  0504 05/11/23  0437 05/10/23  0442   CR 1.21* 1.19* 1.48*     Recent Labs   Lab Test 04/28/23  1437 10/11/22  1712 06/28/22  1616   A1C 6.4* 5.9* 8.0*          Recent Labs   Lab Test 05/12/23  0504 05/09/23  0435 05/08/23  0552   WBC  --   --  6.4   HGB  --   --  12.6   HCT  --   --  38.1   MCV  --   --  88      < > 172    < > = values in this interval not displayed.     Recent Labs   Lab Test 05/08/23  0552 05/07/23  0434 05/06/23  1852   HGB 12.6 13.2 12.8    Recent Labs   Lab Test 05/07/23  0434 05/06/23  2323 05/06/23  1852   TROPONINI 0.04 0.03 0.03     Recent Labs   Lab Test 05/11/23  0437 05/06/23  1852 08/15/22  1920   BNP 82 628* 129     Recent Labs   Lab Test 05/08/23  0552   TSH 1.92     No results for input(s): INR in the last [id number] hours.

## 2023-05-15 ENCOUNTER — TELEPHONE (OUTPATIENT)
Dept: FAMILY MEDICINE | Facility: CLINIC | Age: 88
End: 2023-05-15
Payer: MEDICARE

## 2023-05-15 ENCOUNTER — PATIENT OUTREACH (OUTPATIENT)
Dept: CARE COORDINATION | Facility: CLINIC | Age: 88
End: 2023-05-15
Payer: MEDICARE

## 2023-05-15 ENCOUNTER — TELEPHONE (OUTPATIENT)
Dept: CARE COORDINATION | Facility: CLINIC | Age: 88
End: 2023-05-15
Payer: MEDICARE

## 2023-05-15 NOTE — TELEPHONE ENCOUNTER
Clinic Care Coordination Contact    Spoke with celeste Mas  today for a Post Hospital discharge call.  See Patient Outreach dated 5/15 for additional details.  Hospital paperwork recommending PCP appointment in 7 days.  Currently there is an appointment for 5/24.  Dtr is unable to bring patient in 5/18 or 5/19 but is asking if PCP has any openings 5/22 or sooner? Or if 5/24 is the best day for patient to be seen?    Could your team please reach out to celeste Mas directly to assist with scheduling?

## 2023-05-15 NOTE — TELEPHONE ENCOUNTER
----- Message from Maggie Arriaga MD sent at 5/14/2023  9:26 AM CDT -----  Hi team, Janes was recently hospitalized and needs hospital follow up within 7 days per discharge summary. I do not see that scheduled, can you call her and get that on the books.

## 2023-05-15 NOTE — TELEPHONE ENCOUNTER
Left message to call back for: Patient's Daughter Pat  Information to relay to patient: see below and help schedule.

## 2023-05-15 NOTE — TELEPHONE ENCOUNTER
Order/Referral Request    Who is requesting: Tessie Physical Therapist- [facility]     Orders being requested:   Physical Therapy once a week for 6 weeks.  Nursing Eval  Speech Therapy Eval    Reason service is needed/diagnosis:   Endurance and getting off of home oxygen.    When are orders needed by: ASAP    Has this been discussed with Provider: Yes    Does patient have a preference on a Group/Provider/Facility? [facility]     Does patient have an appointment scheduled?: 08/09/23    Where to send orders: [facility]      Okay to leave a detailed message?: Yes at Other phone number:  [phone]  Tessie- Physical Therapist

## 2023-05-15 NOTE — TELEPHONE ENCOUNTER
Clinic Care Coordination Contact  [facility]: Post-Discharge Note  SITUATION                                                      Admission:    Admission Date: 23   Reason for Admission: Shortness of Breath  Discharge:   Discharge Date: 23  Discharge Diagnosis: Pulmonary edema with combined acute on chronic systolic and diastolic heart failure  -Respiratory failure secondary to the above and additionally with abnormal CT demonstrating bronchial wall thickening  -Bronchomalacia  -Hypertension  -Mitral regurgitation  -Coronary artery disease  -pulmonary hypertension  -acute kidney injury, improving   - hyponatremia, stable   -incidental CT findin.4 cm hyperdense structure upper pole left kidney - recommend dedicated renal CT in 4-6 months for confirmation    BACKGROUND                                                      Per hospital discharge summary and inpatient provider notes:  Janes Montero is a 89 year old female who was admitted on 2023. She is a 89 year old female with h/o coronary artery disease status post bypass graft surgery with LIMA graft to LAD, mitral insufficiency, aortic stenosis, aortic insufficiency, essential hypertension and dyslipidemia, presenting with acute on chronic heart failure with reduced ejection fraction, acute respiratory failure with hypoxia and echocardiogram results from 23 indicating severe pulmonary hypertension, and moderate to severe mitral insufficiency/regurgitation. Pt underwent Nuclear stress test on 23 - report with a partially reversible change in the inferior and basilar mid inferior lateral walls indicating myocardial ischemia; EF was 65%.  echo showed EF 45-50% and moderate MR.     ASSESSMENT           Discharge Assessment  How are you doing now that you are home?: Spoke with dtr.  She's doing pretty good.  Home care was there over the weekend and she will have more home care workers soon.  How are your symptoms? (Red Flag  symptoms escalate to triage hotline per guidelines): Improved  Do you feel your condition is stable enough to be safe at home until your provider visit?: Yes  Does the patient have their discharge instructions? : Yes  Does the patient have questions regarding their discharge instructions? : No  Were you started on any new medications or were there changes to any of your previous medications? : Yes  Does the patient have all of their medications?: Yes  Do you have questions regarding any of your medications? : No  Do you have all of your needed medical supplies or equipment (DME)?  (i.e. oxygen tank, CPAP, cane, etc.): Yes  Discharge follow-up appointment scheduled within 14 calendar days? : Yes  Discharge Follow Up Appointment Date: 05/24/23  Discharge Follow Up Appointment Scheduled with?: Primary Care Provider    Post-op ([facility] CTA Only)  If the patient had a surgery or procedure, do they have any questions for a nurse?: No    Post-op (Clinicians Only)  Did the patient have surgery or a procedure: No  Eating & Drinking: eating and drinking without complaints/concerns  PO Intake: regular diet    Spoke with dtr.  Patient currently has a follow up appointment 5/24.  Discussed with dtr about inquiring with PCP if the appointment could be sooner.  Will send a telephone note to PCP's team.      Patient is taking her BP on a daily basis due to medication changes.    PLAN                                                      Outpatient Plan:  Patient to have home care and follow up with PCP.  [facility] RN to send a telephone note to PCP to inquire if current 5/24 appointment can be sooner.    Future Appointments   Date Time Provider Department Center   5/24/2023  9:20 AM Maggie Arriaga MD SWFMOB [facility]   5/25/2023  8:45 AM HCC LAB [facility]   5/25/2023  9:10 AM Shasha Alcaraz, APRN CNP [facility]   5/25/2023  9:50 AM [facility] HCC HEART FAILURE RN [facility]   5/31/2023  8:10 AM Brennon Moya MD  NUNEU [facility]   6/8/2023  3:50 PM Mathew Ricardo MD HRSTWT [facility]   6/19/2023  3:45 PM [facility] LAB SWLABR [facility]   6/27/2023 11:00 AM [facility] CSS MDENDO [facility]   8/9/2023  4:20 PM Maggie Arriaga MD SWFMOB [facility]   4/30/2024  4:00 PM Eric Christensen MD MDENDO [facility]         For any urgent concerns, please contact our 24 hour nurse triage line: [phone] ([phone])         Tiffanie Leal RN

## 2023-05-18 ENCOUNTER — TELEPHONE (OUTPATIENT)
Dept: FAMILY MEDICINE | Facility: CLINIC | Age: 88
End: 2023-05-18
Payer: MEDICARE

## 2023-05-18 NOTE — TELEPHONE ENCOUNTER
The Home Care/Assisted Living/Nursing Facility is calling regarding an established patient.  Has the patient seen Home Care in the past or is currently residing in Assisted Living or Nursing Facility? Yes.     Alannah calling from [facility] requesting the following orders that are within the Home Care, Assisted Living or Nursing Home Eval and Treatment standing order and can be signed as standing order signature required by RN.    Preferred Call Back Number: [phone]    Home Care Visits Continuation    Any additional Orders:  Are there any orders requested, not stated above, that are outside of the standing order and must be routed to a licensed practitioner for approval?    No    Writer has verified [employer] will send fax to have orders signed.    Of note, [facility] RN Alannah mentioned to the  that took initial call that patient had tongue swelling. RN call to patient/family to triage. Daughter reports patient does not currently have tongue swelling but did following 5/4 urgent care visit which prompted a visit to ED.

## 2023-05-18 NOTE — TELEPHONE ENCOUNTER
Order/Referral Request    Who is requesting: Alannah RN- [facility]      Orders being requested: Skilled nursing 1x a week for 2 weeks then 1x every other week for 2 weeks.    Reason service is needed/diagnosis:   Observe and access respiratory status, infection prevention, and medication management.    When are orders needed by: ASAP    Has this been discussed with Provider: Yes    Does patient have a preference on a Group/Provider/Facility? [facility]    Does patient have an appointment scheduled?: 05/24/23    Where to send orders: [facility]    Could we send this information to you in [employer] or would you prefer to receive a phone call?:   Patient would prefer a phone call     Okay to leave a detailed message?: Yes at Other phone number:  [phone]  .  .  .      FYI - Status Update    Who is Calling: nurse, Alannah [facility]    Update: Alannah mentioned that the patient has mild swelling to her tongue occasionally that she thinks is from a possible med interaction, but she didn't seem to think it was a big deal and just asked that it gets added to the notes of patient's upcoming visit next Wednesday.    -I did add tongue to the visit notes, but also sent this to st[facility] clinic RN for further review and triage.

## 2023-05-19 DIAGNOSIS — Z53.9 DIAGNOSIS NOT YET DEFINED: Primary | ICD-10-CM

## 2023-05-19 PROCEDURE — G0180 MD CERTIFICATION HHA PATIENT: HCPCS | Performed by: FAMILY MEDICINE

## 2023-05-24 ENCOUNTER — OFFICE VISIT (OUTPATIENT)
Dept: FAMILY MEDICINE | Facility: CLINIC | Age: 88
End: 2023-05-24
Payer: MEDICARE

## 2023-05-24 VITALS
BODY MASS INDEX: 32.05 KG/M2 | SYSTOLIC BLOOD PRESSURE: 137 MMHG | HEART RATE: 81 BPM | OXYGEN SATURATION: 94 % | HEIGHT: 63 IN | RESPIRATION RATE: 16 BRPM | DIASTOLIC BLOOD PRESSURE: 63 MMHG | WEIGHT: 180.9 LBS

## 2023-05-24 DIAGNOSIS — N28.89 RENAL MASS: ICD-10-CM

## 2023-05-24 DIAGNOSIS — Z95.1 S/P CABG (CORONARY ARTERY BYPASS GRAFT): ICD-10-CM

## 2023-05-24 DIAGNOSIS — I35.1 NONRHEUMATIC AORTIC VALVE INSUFFICIENCY: ICD-10-CM

## 2023-05-24 DIAGNOSIS — I51.89 DIASTOLIC DYSFUNCTION: ICD-10-CM

## 2023-05-24 DIAGNOSIS — J81.0 ACUTE PULMONARY EDEMA (H): ICD-10-CM

## 2023-05-24 DIAGNOSIS — I65.23 BILATERAL CAROTID ARTERY STENOSIS: ICD-10-CM

## 2023-05-24 DIAGNOSIS — E11.42 TYPE 2 DIABETES MELLITUS WITH DIABETIC POLYNEUROPATHY, WITHOUT LONG-TERM CURRENT USE OF INSULIN (H): Primary | ICD-10-CM

## 2023-05-24 DIAGNOSIS — J98.09 BRONCHOMALACIA: ICD-10-CM

## 2023-05-24 DIAGNOSIS — I34.0 NONRHEUMATIC MITRAL VALVE REGURGITATION: ICD-10-CM

## 2023-05-24 DIAGNOSIS — I44.7 LEFT BUNDLE BRANCH BLOCK: ICD-10-CM

## 2023-05-24 DIAGNOSIS — N18.31 STAGE 3A CHRONIC KIDNEY DISEASE (H): ICD-10-CM

## 2023-05-24 PROBLEM — N17.9 ACUTE KIDNEY INJURY SUPERIMPOSED ON CKD (H): Status: RESOLVED | Noted: 2022-08-18 | Resolved: 2023-05-24

## 2023-05-24 PROBLEM — J96.01 ACUTE RESPIRATORY FAILURE WITH HYPOXIA (H): Status: RESOLVED | Noted: 2023-05-08 | Resolved: 2023-05-24

## 2023-05-24 PROBLEM — N18.9 ACUTE KIDNEY INJURY SUPERIMPOSED ON CKD (H): Status: RESOLVED | Noted: 2022-08-18 | Resolved: 2023-05-24

## 2023-05-24 LAB
ANION GAP SERPL CALCULATED.3IONS-SCNC: 12 MMOL/L (ref 7–15)
BUN SERPL-MCNC: 32.4 MG/DL (ref 8–23)
CALCIUM SERPL-MCNC: 10.6 MG/DL (ref 8.8–10.2)
CHLORIDE SERPL-SCNC: 101 MMOL/L (ref 98–107)
CREAT SERPL-MCNC: 0.93 MG/DL (ref 0.51–0.95)
DEPRECATED HCO3 PLAS-SCNC: 24 MMOL/L (ref 22–29)
GFR SERPL CREATININE-BSD FRML MDRD: 58 ML/MIN/1.73M2
GLUCOSE SERPL-MCNC: 151 MG/DL (ref 70–99)
POTASSIUM SERPL-SCNC: 4.9 MMOL/L (ref 3.4–5.3)
SODIUM SERPL-SCNC: 137 MMOL/L (ref 136–145)

## 2023-05-24 PROCEDURE — 80048 BASIC METABOLIC PNL TOTAL CA: CPT | Performed by: FAMILY MEDICINE

## 2023-05-24 PROCEDURE — 36415 COLL VENOUS BLD VENIPUNCTURE: CPT | Performed by: FAMILY MEDICINE

## 2023-05-24 PROCEDURE — 99214 OFFICE O/P EST MOD 30 MIN: CPT | Performed by: FAMILY MEDICINE

## 2023-05-24 RX ORDER — NITROGLYCERIN 0.4 MG/1
0.4 TABLET SUBLINGUAL EVERY 5 MIN PRN
Qty: 100 TABLET | Refills: 1 | Status: SHIPPED | OUTPATIENT
Start: 2023-05-24

## 2023-05-24 NOTE — ASSESSMENT & PLAN NOTE
bronchomalacia - Hypoxia during 5/2023 hospitalization - Pulmonology consulted, no specific further treatments including for the bronchomalacia; there could be consideration of cpap but should be followed up as an outpatient.

## 2023-05-24 NOTE — PROGRESS NOTES
Assessment & Plan   Problem List Items Addressed This Visit        Nervous and Auditory    Type 2 diabetes mellitus with diabetic polyneuropathy, without long-term current use of insulin (H) - Primary     Diabetes - 10/11/22 a1c down from 8-5.9.            Respiratory    Bronchomalacia     bronchomalacia - Hypoxia during 2023 hospitalization - Pulmonology consulted, no specific further treatments including for the bronchomalacia; there could be consideration of cpap but should be followed up as an outpatient.          Relevant Orders    Adult Pulmonary Medicine Referral       Circulatory    Aortic valve insufficiency     Aortic valve insufficiency-visit with Dr. Ricardo of cardiology 2023 as planned.         Mitral valve insufficiency     Mitral valve insufficiency-visit with Dr. Ricardo of cardiology 2023 as planned.         Bilateral carotid artery stenosis     Bilateral carotid artery stenosis -visit with Dr. Ricardo of cardiology 2023 as planned.         Diastolic dysfunction     Diastolic dysfunction-visit with Dr. Ricardo of cardiology 2023 as planned.         Left bundle branch block     Left bundle branch block- -0 visit with Dr. Ricardo of cardiology 2023 as planned.            Urinary    CKD (chronic kidney disease) stage 3, GFR 30-59 ml/min (H)    Relevant Orders    Basic metabolic panel  (Ca, Cl, CO2, Creat, Gluc, K, Na, BUN)    Renal mass     Left renal mass - 23 hospital stay -incidental CT findin.4 cm hyperdense structure upper pole left kidney - recommend dedicated renal CT in 4-6 months for confirmation.            Other    S/P CABG (coronary artery bypass graft)     Status post CABG-visit with Dr. Ricardo of cardiology 2023 as planned.         Relevant Medications    nitroGLYcerin (NITROSTAT) 0.4 MG sublingual tablet               MED REC REQUIRED  Post Medication Reconciliation Status:  Discharge medications reconciled, continue medications without  change        Maggie Arriaga MD  [facility] JULIO CESAR Marrero is a 89 year old, presenting for the following health issues:  ER F/U        2023     9:17 AM   Additional Questions   Roomed by Taqueria Henry MA   Accompanied by self         2023     9:17 AM   Patient Reported Additional Medications   Patient reports taking the following new medications none     HPI         5/15/2023     1:45 PM   Post Discharge Outreach   Admission Date 2023   Reason for Admission Shortness of Breath   Discharge Date 2023   Discharge Diagnosis Pulmonary edema with 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 Spoke with dtr.  She's doing pretty good.  Home care was there over the weekend and she will have more home care workers soon.   How are your symptoms? (Red Flag symptoms escalate to triage hotline per guidelines) Improved   Do you feel your condition is stable enough to be safe at home until your provider visit? Yes   Does the patient have their discharge instructions?  Yes   Does the patient have questions regarding their discharge instructions?  No   Were you started on any new medications or were there changes to any of your previous medications?  Yes   Does the patient have all of their medications? Yes   Do you have questions regarding any of your medications?  No   Do you have all of your needed medical supplies or equipment (DME)?  (i.e. oxygen tank, CPAP, cane, etc.) Yes   Discharge follow-up appointment  "scheduled within 14 calendar days?  Yes   Discharge Follow Up Appointment Date 5/24/2023   Discharge Follow Up Appointment Scheduled with? Primary Care Provider     Hospital Follow-up Visit:    Hospital/Nursing Home/IP Rehab Facility: [facility]  Date of Admission: 5/6/23  Date of Discharge: 5/12/23  Reason(s) for Admission: Acute on chronic combined systolic and diastolic heart failure    Was your hospitalization related to COVID-19? No   Problems taking medications regularly:  None  Medication changes since discharge: Lisinopril from 10mg to 20 mg, added Furosemide  Problems adhering to non-medication therapy:  None    Summary of hospitalization:  [facility] discharge summary reviewed  Diagnostic Tests/Treatments reviewed.  Follow up needed: bmp today  Other Healthcare Providers Involved in Patient s Care:         Specialist appointment -  cardiology 6/8/2023 and pulm referral given today  Update since discharge: improved.   Plan of care communicated with patient        Objective    /63 (BP Location: Left arm, Patient Position: Sitting, Cuff Size: Adult Large)   Pulse 81   Resp 16   Ht 1.6 m (5' 3\")   Wt 82.1 kg (180 lb 14.4 oz)   LMP  (LMP Unknown)   SpO2 94%   BMI 32.04 kg/m    Body mass index is 32.04 kg/m .  Physical Exam  Constitutional:       Appearance: Normal appearance.   HENT:      Head: Normocephalic and atraumatic.   Cardiovascular:      Rate and Rhythm: Normal rate and regular rhythm.      Comments: Distant heart sounds  Pulmonary:      Effort: Pulmonary effort is normal.   Musculoskeletal:         General: Normal range of motion.      Cervical back: Normal range of motion and neck supple.   Neurological:      General: No focal deficit present.      Mental Status: She is alert and oriented to person, place, and time.                        "

## 2023-05-24 NOTE — ASSESSMENT & PLAN NOTE
>>ASSESSMENT AND PLAN FOR DIASTOLIC DYSFUNCTION WRITTEN ON 5/24/2023  9:45 AM BY ASHLY MORRISON MD    Diastolic dysfunction-visit with Dr. Ricardo of cardiology 6/8/2023 as planned.

## 2023-05-24 NOTE — ASSESSMENT & PLAN NOTE
>>ASSESSMENT AND PLAN FOR S/P CABG (CORONARY ARTERY BYPASS GRAFT) WRITTEN ON 5/24/2023  9:45 AM BY ASHLY MORRISON MD    Status post CABG-visit with Dr. Ricardo of cardiology 6/8/2023 as planned.

## 2023-05-24 NOTE — ASSESSMENT & PLAN NOTE
Left renal mass - 23 hospital stay -incidental CT findin.4 cm hyperdense structure upper pole left kidney - recommend dedicated renal CT in 4-6 months for confirmation.

## 2023-05-25 ENCOUNTER — OFFICE VISIT (OUTPATIENT)
Dept: CARDIOLOGY | Facility: CLINIC | Age: 88
End: 2023-05-25
Payer: MEDICARE

## 2023-05-25 ENCOUNTER — TRANSFERRED RECORDS (OUTPATIENT)
Dept: HEALTH INFORMATION MANAGEMENT | Facility: CLINIC | Age: 88
End: 2023-05-25

## 2023-05-25 ENCOUNTER — APPOINTMENT (OUTPATIENT)
Dept: CARDIOLOGY | Facility: CLINIC | Age: 88
End: 2023-05-25
Payer: MEDICARE

## 2023-05-25 VITALS
DIASTOLIC BLOOD PRESSURE: 68 MMHG | BODY MASS INDEX: 31.89 KG/M2 | HEART RATE: 70 BPM | SYSTOLIC BLOOD PRESSURE: 148 MMHG | HEIGHT: 63 IN | RESPIRATION RATE: 16 BRPM | WEIGHT: 180 LBS

## 2023-05-25 DIAGNOSIS — I50.43 ACUTE ON CHRONIC COMBINED SYSTOLIC AND DIASTOLIC HEART FAILURE (H): ICD-10-CM

## 2023-05-25 DIAGNOSIS — I10 BENIGN ESSENTIAL HYPERTENSION: ICD-10-CM

## 2023-05-25 DIAGNOSIS — I25.10 CORONARY ARTERY STENOSIS: ICD-10-CM

## 2023-05-25 DIAGNOSIS — J98.09 BRONCHOMALACIA: Primary | ICD-10-CM

## 2023-05-25 DIAGNOSIS — I50.20 HEART FAILURE WITH REDUCED EJECTION FRACTION (H): Primary | ICD-10-CM

## 2023-05-25 LAB — RETINOPATHY: NEGATIVE

## 2023-05-25 PROCEDURE — 99214 OFFICE O/P EST MOD 30 MIN: CPT | Performed by: NURSE PRACTITIONER

## 2023-05-25 RX ORDER — FUROSEMIDE 20 MG
20 TABLET ORAL DAILY
Qty: 90 TABLET | Refills: 1 | Status: SHIPPED | OUTPATIENT
Start: 2023-05-25 | End: 2023-10-30

## 2023-05-25 RX ORDER — LISINOPRIL 20 MG/1
20 TABLET ORAL DAILY
Qty: 90 TABLET | Refills: 1 | Status: SHIPPED | OUTPATIENT
Start: 2023-05-25 | End: 2023-10-30

## 2023-05-25 NOTE — LETTER
5/25/2023    Maggie Arriaga MD  [address] [facility] [id number]    RE: Janes Montero       Dear Colleague,     I had the pleasure of seeing Janes Montero in the [facility].        Assessment/Recommendations   Assessment:    1.  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  She states her dyspnea on exertion has improved.  She denies any other acute heart failure symptoms.  We discussed monitoring symptoms, following a low-sodium diet and monitoring daily weights.  She met with the nurse clinician for further education.  She is interested in [facility] and will fill out application today.  2. Hypertension: Elevated.  Blood pressure 162/72 with a recheck of 148/68.  She states she has not taken her medications this morning.  Her lisinopril was increased to 20 mg daily at discharge.  3. CAD: Denies angina.  Status post CABG in 2010.  She had a Lexiscan during hospitalization that did show ischemia.  She continues aspirin and atorvastatin    Plan:  1.  Continue current medications  2.  Continue monitoring daily weights  3.  Enroll in [facility] and follow low-salt diet  4.  Continue regular activity    Janes Montero will follow up with Dr. Ricardo June 8 and in the heart failure clinic in September or sooner if needed.     History of Present Illness/Subjective    Ms. Janes Montero is a 89 year old female seen at [facility] heart failure clinic today for continued follow-up.  Her daughter Kaylynn accompanies her today.  She follows up for heart failure with mildly reduced ejection fraction.  She was hospitalized May 6 to May 12 with pulmonary edema and acute heart failure.  She was diuresed and symptoms improved.  She had an echocardiogram which showed ejection fraction of 45 to 50% with moderate mitral regurgitation and mild to moderate tricuspid regurgitation.  She also had a Lexiscan which showed ischemia.  She was  "started on Lasix at discharge.  She has a past medical history significant for hypertension, CAD, CABG in 2010, hyperlipidemia, diabetes mellitus type 2.    Today, she states her dyspnea on exertion has improved.  She denies lightheadedness, shortness of breath, dyspnea on exertion, orthopnea, PND, palpitations, chest pain, abdominal fullness/bloating and lower extremity edema.      She is monitoring home weights which are stable around 180 pounds.  She is following a low sodium diet.  She lives in independent living.    ECHOCARDIOGRAM: 5/9/2023  Interpretation Summary     Left ventricular function is decreased. The ejection fraction is 45-50%  (mildly reduced).  There is borderline-mild global hypokinesia of the left ventricle.  Normal right ventricle size and systolic function.  There is moderate (2+) mitral regurgitation    NM MPI Lexiscan: 5/8/2023  Result Text       A prior study was conducted on 8/16/2022.  Prior images were unavailable for comparison review.    Pharmacologic regadenoson stress ECG is nondiagnostic due to baseline ECG abnormality.    Pharmacological regadenoson nuclear stress test is abnormal.  There is partially reversible change in inferior and basal to mid inferolateral walls indicating inducible myocardial ischemia.    Normal left ventricular size, wall motion and systolic function.  Calculated left ventricular ejection fraction is 65%.    The patient is at an intermediate risk of future cardiac ischemic events.        Physical Examination Review of Systems   BP (!) 148/68   Pulse 70   Resp 16   Ht 1.6 m (5' 3\")   Wt 81.6 kg (180 lb)   LMP  (LMP Unknown)   BMI 31.89 kg/m    Body mass index is 31.89 kg/m .  Wt Readings from Last 3 Encounters:   05/25/23 81.6 kg (180 lb)   05/24/23 82.1 kg (180 lb 14.4 oz)   05/12/23 82.1 kg (181 lb)       General Appearance:   no acute distress   ENT/Mouth: No abnormalities   EYES:  no scleral icterus, normal conjunctivae   Neck: no thyromegaly " "  Chest/Lungs:   lungs are clear to auscultation, no rales or wheezing, equal chest wall expansion    Cardiovascular:   Regular. Normal first and second heart sounds with no murmurs, rubs, or gallops, no edema bilaterally    Abdomen:  bowel sounds are present   Extremities: no cyanosis or clubbing   Skin: warm   Neurologic: no tremors     Psychiatric: alert and oriented x3    Enc Vitals  BP: (!) 148/68  Pulse: 70  Resp: 16  Weight: 81.6 kg (180 lb)  Height: 160 cm (5' 3\")                                         Medical History  Surgical History Family History Social History   Past Medical History:   Diagnosis Date    Acute diastolic congestive heart failure (H)     Acute kidney injury superimposed on CKD (H) 2022    Acute on chronic congestive heart failure (H) 2018    Acute pulmonary edema (H) 2023    Acute respiratory failure with hypoxia (H) 2023    Coronary artery disease     Coronary artery disease involving native coronary artery without angina pectoris, unspecified whether native or transplanted heart 2022    Diabetes mellitus, type II (H)     Diastolic dysfunction 07/10/2018    Frozen shoulder     bilateral    Heart failure with reduced ejection fraction (H) 2023    Hyperlipidemia     Hypertension     Hypertensive emergency     Parkinson disease (H)     Primary osteoarthritis involving multiple joints     Primary osteoarthritis of left knee     Recurrent UTI     hx septic shock    Thyroid disease     Past Surgical History:   Procedure Laterality Date    APPENDECTOMY      APPENDECTOMY      BACK SURGERY      L4-S1 laminectomy    BYPASS GRAFT ARTERY CORONARY      3 vessel     SECTION       SECTION      CORONARY ARTERY BYPASS      CV CORONARY ANGIOGRAM N/A 2022    Procedure: Coronary Angiogram;  Surgeon: Ignacio Baird MD;  Location: [facility] CV    HERNIORRHAPHY VENTRAL Left     HYSTERECTOMY      HYSTERECTOMY      JOINT REPLACEMENT Left  "    Total left knee    OTHER SURGICAL HISTORY      right ankle surgery    OTHER SURGICAL HISTORY      right forearm fracture    REPLACEMENT TOTAL KNEE Right     SHOULDER SURGERY Right     reversed shoulder surgery.    Family History   Problem Relation Age of Onset    Heart Disease Mother     Heart Disease Father     Social History     Socioeconomic History    Marital status:      Spouse name: Not on file    Number of children: Not on file    Years of education: Not on file    Highest education level: Not on file   Occupational History    Not on file   Tobacco Use    Smoking status: Never     Passive exposure: Never    Smokeless tobacco: Never   Vaping Use    Vaping status: Never Used     Passive vaping exposure: Yes   Substance and Sexual Activity    Alcohol use: No    Drug use: No    Sexual activity: Not Currently     Partners: Male     Birth control/protection: None   Other Topics Concern    Parent/sibling w/ CABG, MI or angioplasty before 65F 55M? No   Social History Narrative    6/15/2021 new to [place] from [place]. She has 6 kids.     Social Determinants of Health     Financial Resource Strain: Not on file   Food Insecurity: Not on file   Transportation Needs: Not on file   Physical Activity: Not on file   Stress: Not on file   Social Connections: Not on file   Intimate Partner Violence: Not on file   Housing Stability: Not on file          Medications  Allergies   Current Outpatient Medications   Medication Sig Dispense Refill    aspirin 81 mg chewable tablet [ASPIRIN 81 MG CHEWABLE TABLET] Chew 81 mg at bedtime.      atorvastatin (LIPITOR) 20 MG tablet TAKE 1 TABLET BY MOUTH AT  BEDTIME 90 tablet 3    carbidopa-levodopa (SINEMET)  MG tablet TAKE 1 TABLET BY MOUTH 3  TIMES DAILY TAKE AT LEAST  1/2 HOUR BEFORE MEALS OR 2  HOURS AFTER MEALS DO NOT  MIX WITH FOOD 270 tablet 3    carvedilol (COREG) 12.5 MG tablet TAKE 1 TABLET BY MOUTH  TWICE DAILY WITH MEALS 180 tablet 3    cholecalciferol, vitamin D3,  1,000 unit tablet Take 1,000 Units by mouth 2 times daily      coenzyme Q10 (CO Q-10) 100 mg capsule [COENZYME Q10 (CO Q-10) 100 MG CAPSULE] Take 100 mg by mouth 2 (two) times a day.      cyanocobalamin (VITAMIN B-12) 1000 MCG tablet Take 1 tablet (1,000 mcg) by mouth daily 90 tablet 3    fish oil-omega-3 fatty acids 1000 MG capsule Take 1 g by mouth 2 times daily      FLUoxetine (PROZAC) 20 MG capsule TAKE 1 CAPSULE BY MOUTH  TWICE DAILY 180 capsule 0    furosemide (LASIX) 20 MG tablet Take 1 tablet (20 mg) by mouth daily 90 tablet 1    gabapentin (NEURONTIN) 300 MG capsule TAKE 1 CAPSULE BY MOUTH 4  TIMES DAILY 360 capsule 3    guaiFENesin (MUCINEX) 600 MG 12 hr tablet Take 2 tablets (1,200 mg) by mouth 2 times daily 14 tablet 0    levothyroxine (SYNTHROID/LEVOTHROID) 25 MCG tablet TAKE 1 TABLET BY MOUTH  DAILY 90 tablet 2    lisinopril (ZESTRIL) 20 MG tablet Take 1 tablet (20 mg) by mouth daily 90 tablet 1    loratadine (CLARITIN) 10 mg tablet Take 10 mg by mouth daily as needed for allergies      magnesium oxide 250 mg Tab [MAGNESIUM OXIDE 250 MG TAB] Take 250 mg by mouth daily.      melatonin 1 mg Tab tablet Take 1 mg by mouth At Bedtime      multivitamin therapeutic tablet Take 1 tablet by mouth every morning      nitroGLYcerin (NITROSTAT) 0.4 MG sublingual tablet Place 1 tablet (0.4 mg) under the tongue every 5 minutes as needed for chest pain 100 tablet 1    omeprazole (PRILOSEC) 20 MG DR capsule Take 20 mg by mouth daily before breakfast      polyethylene glycol (MIRALAX) 17 gram packet Take 17 g by mouth daily as needed for constipation      triamcinolone (KENALOG) 0.025 % external ointment Apply topically 2 times daily (Patient taking differently: Apply topically 2 times daily as needed for irritation) 80 g 0    albuterol (PROVENTIL) (2.5 MG/3ML) 0.083% neb solution Take 1 vial (2.5 mg) by nebulization every 6 hours as needed for shortness of breath, wheezing or cough (Patient not taking: Reported on  "2023) 90 mL 0    Allergies   Allergen Reactions    Alendronate [Alendronate] Unknown     pain    Codeine Hives    Hydrocodone-Acetaminophen Other (See Comments)     Highly sensitive, \"knocks her out and can't wake up\"    Other Drug Allergy (See Comments)     Risedronate Unknown     Bone pain    Rosuvastatin Muscle Pain (Myalgia)    Simvastatin Muscle Pain (Myalgia)         Lab Results    Chemistry/lipid CBC Cardiac Enzymes/BNP/TSH/INR   Lab Results   Component Value Date    CHOL 121 2022    HDL 29 (L) 2022    TRIG 172 (H) 2022    BUN 32.4 (H) 2023     2023    CO2 24 2023    Lab Results   Component Value Date    WBC 6.4 2023    HGB 12.6 2023    HCT 38.1 2023    MCV 88 2023     2023    Lab Results   Component Value Date    TROPONINI 0.04 2023    BNP 82 2023    TSH 1.92 2023             This note has been dictated using voice recognition software. Any grammatical, typographical, or context distortions are unintentional and inherent to the software    30 minutes spent on the date of encounter doing chart review, review of outside records, review of test results, interpretation with above tests, patient visit, documentation and discussion with family.                Patient seen in clinic for HF education s/p recent hospital discharge .  Reviewed HF Binder that includes the  HF Sx Awareness & and Weight log booklet highlighting :  __X_patient s type of heart failure _X__Na management in diet  __X_importance of daily wts  _X__Fluid Guidelines, if applicable  __X_medication review and importance of compliance     Instructed patient in signs and sx of heart failure, reiterated when to call clinic - reviewed HF hotline # [phone] and after hours call # [phone].  Majority of time was spent reviewinmins  Patient verbalized understanding of HF discussion.  Plan for f/u with continued HF education " reviewed.  No formal f/u scheduled with nurse clinician for continued education - will continue to reinforce HF management education.    Janes is accompanied by her daughter Kaylynn. She receives some meals at her living facility but they aren't low in sodium. We discussed [employer] and the application was given to the Pt. Reviewed image book of foods and sodium. We reviewed the fluid and sodium restriction. Discussed when to call with worsening wt gain and/or symptoms. Discussed HRS as well - they will think about it at this time and get to team.    Gregorio DUPREE RN  BSN          Thank you for allowing me to participate in the care of your patient.      Sincerely,     LUIS Forbes [facility]  cc:   No referring provider defined for this encounter.

## 2023-05-25 NOTE — PATIENT INSTRUCTIONS
Janes Montero,    It was a pleasure to see you today at [facility].     My recommendations after this visit include:  -Please follow-up with Dr. Ricardo June 8   -Please follow-up with Shasha Alcaraz in September  -Continue current medications    Shasha Alcaraz, CNP

## 2023-05-25 NOTE — PROGRESS NOTES
Patient seen in clinic for HF education s/p recent hospital discharge 0-00.  Reviewed HF Binder that includes the  HF Sx Awareness & and Weight log booklet highlighting :  __X_patient s type of heart failure _X__Na management in diet  __X_importance of daily wts  _X__Fluid Guidelines, if applicable  __X_medication review and importance of compliance     Instructed patient in signs and sx of heart failure, reiterated when to call clinic - reviewed HF hotline # [phone] and after hours call # [phone].  Majority of time was spent reviewinmins  Patient verbalized understanding of HF discussion.  Plan for f/u with continued HF education reviewed.  No formal f/u scheduled with nurse clinician for continued education - will continue to reinforce HF management education.    Janes is accompanied by her daughter Kaylynn. She receives some meals at her living facility but they aren't low in sodium. We discussed [employer] and the application was given to the Pt. Reviewed image book of foods and sodium. We reviewed the fluid and sodium restriction. Discussed when to call with worsening wt gain and/or symptoms. Discussed HRS as well - they will think about it at this time and get to team.    Gregorio DUPREE RN  BSN

## 2023-05-25 NOTE — PROGRESS NOTES
Assessment/Recommendations   Assessment:    1.  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  She states her dyspnea on exertion has improved.  She denies any other acute heart failure symptoms.  We discussed monitoring symptoms, following a low-sodium diet and monitoring daily weights.  She met with the nurse clinician for further education.  She is interested in [facility] and will fill out application today.  2. Hypertension: Elevated.  Blood pressure 162/72 with a recheck of 148/68.  She states she has not taken her medications this morning.  Her lisinopril was increased to 20 mg daily at discharge.  3. CAD: Denies angina.  Status post CABG in 2010.  She had a Lexiscan during hospitalization that did show ischemia.  She continues aspirin and atorvastatin    Plan:  1.  Continue current medications  2.  Continue monitoring daily weights  3.  Enroll in [facility] and follow low-salt diet  4.  Continue regular activity    Janes Montero will follow up with Dr. Ricardo June 8 and in the heart failure clinic in September or sooner if needed.     History of Present Illness/Subjective    Ms. Janes Montero is a 89 year old female seen at [facility] heart failure clinic today for continued follow-up.  Her daughter Kaylynn accompanies her today.  She follows up for heart failure with mildly reduced ejection fraction.  She was hospitalized May 6 to May 12 with pulmonary edema and acute heart failure.  She was diuresed and symptoms improved.  She had an echocardiogram which showed ejection fraction of 45 to 50% with moderate mitral regurgitation and mild to moderate tricuspid regurgitation.  She also had a Lexiscan which showed ischemia.  She was started on Lasix at discharge.  She has a past medical history significant for hypertension, CAD, CABG in 2010, hyperlipidemia, diabetes mellitus type 2.    Today, she states her dyspnea on exertion has improved.  " She denies lightheadedness, shortness of breath, dyspnea on exertion, orthopnea, PND, palpitations, chest pain, abdominal fullness/bloating and lower extremity edema.      She is monitoring home weights which are stable around 180 pounds.  She is following a low sodium diet.  She lives in independent living.    ECHOCARDIOGRAM: 5/9/2023  Interpretation Summary     Left ventricular function is decreased. The ejection fraction is 45-50%  (mildly reduced).  There is borderline-mild global hypokinesia of the left ventricle.  Normal right ventricle size and systolic function.  There is moderate (2+) mitral regurgitation    NM MPI Lexiscan: 5/8/2023  Result Text        A prior study was conducted on 8/16/2022.  Prior images were unavailable for comparison review.     Pharmacologic regadenoson stress ECG is nondiagnostic due to baseline ECG abnormality.     Pharmacological regadenoson nuclear stress test is abnormal.  There is partially reversible change in inferior and basal to mid inferolateral walls indicating inducible myocardial ischemia.     Normal left ventricular size, wall motion and systolic function.  Calculated left ventricular ejection fraction is 65%.     The patient is at an intermediate risk of future cardiac ischemic events.        Physical Examination Review of Systems   BP (!) 148/68   Pulse 70   Resp 16   Ht 1.6 m (5' 3\")   Wt 81.6 kg (180 lb)   LMP  (LMP Unknown)   BMI 31.89 kg/m    Body mass index is 31.89 kg/m .  Wt Readings from Last 3 Encounters:   05/25/23 81.6 kg (180 lb)   05/24/23 82.1 kg (180 lb 14.4 oz)   05/12/23 82.1 kg (181 lb)       General Appearance:   no acute distress   ENT/Mouth: No abnormalities   EYES:  no scleral icterus, normal conjunctivae   Neck: no thyromegaly   Chest/Lungs:   lungs are clear to auscultation, no rales or wheezing, equal chest wall expansion    Cardiovascular:   Regular. Normal first and second heart sounds with no murmurs, rubs, or gallops, no edema " "bilaterally    Abdomen:  bowel sounds are present   Extremities: no cyanosis or clubbing   Skin: warm   Neurologic: no tremors     Psychiatric: alert and oriented x3    Enc Vitals  BP: (!) 148/68  Pulse: 70  Resp: 16  Weight: 81.6 kg (180 lb)  Height: 160 cm (5' 3\")                                         Medical History  Surgical History Family History Social History   Past Medical History:   Diagnosis Date     Acute diastolic congestive heart failure (H)      Acute kidney injury superimposed on CKD (H) 2022     Acute on chronic congestive heart failure (H) 2018     Acute pulmonary edema (H) 2023     Acute respiratory failure with hypoxia (H) 2023     Coronary artery disease      Coronary artery disease involving native coronary artery without angina pectoris, unspecified whether native or transplanted heart 2022     Diabetes mellitus, type II (H)      Diastolic dysfunction 07/10/2018     Frozen shoulder     bilateral     Heart failure with reduced ejection fraction (H) 2023     Hyperlipidemia      Hypertension      Hypertensive emergency      Parkinson disease (H)      Primary osteoarthritis involving multiple joints      Primary osteoarthritis of left knee      Recurrent UTI     hx septic shock     Thyroid disease     Past Surgical History:   Procedure Laterality Date     APPENDECTOMY       APPENDECTOMY       BACK SURGERY      L4-S1 laminectomy     BYPASS GRAFT ARTERY CORONARY      3 vessel      SECTION        SECTION       CORONARY ARTERY BYPASS       CV CORONARY ANGIOGRAM N/A 2022    Procedure: Coronary Angiogram;  Surgeon: Ignacio Baird MD;  Location: [facility] CATH LAB CV     HERNIORRHAPHY VENTRAL Left      HYSTERECTOMY       HYSTERECTOMY       JOINT REPLACEMENT Left     Total left knee     OTHER SURGICAL HISTORY      right ankle surgery     OTHER SURGICAL HISTORY      right forearm fracture     REPLACEMENT TOTAL KNEE Right      SHOULDER SURGERY " Right     reversed shoulder surgery.    Family History   Problem Relation Age of Onset     Heart Disease Mother      Heart Disease Father     Social History     Socioeconomic History     Marital status:      Spouse name: Not on file     Number of children: Not on file     Years of education: Not on file     Highest education level: Not on file   Occupational History     Not on file   Tobacco Use     Smoking status: Never     Passive exposure: Never     Smokeless tobacco: Never   Vaping Use     Vaping status: Never Used     Passive vaping exposure: Yes   Substance and Sexual Activity     Alcohol use: No     Drug use: No     Sexual activity: Not Currently     Partners: Male     Birth control/protection: None   Other Topics Concern     Parent/sibling w/ CABG, MI or angioplasty before 65F 55M? No   Social History Narrative    6/15/2021 new to [place] from [place]. She has 6 kids.     Social Determinants of Health     Financial Resource Strain: Not on file   Food Insecurity: Not on file   Transportation Needs: Not on file   Physical Activity: Not on file   Stress: Not on file   Social Connections: Not on file   Intimate Partner Violence: Not on file   Housing Stability: Not on file          Medications  Allergies   Current Outpatient Medications   Medication Sig Dispense Refill     aspirin 81 mg chewable tablet [ASPIRIN 81 MG CHEWABLE TABLET] Chew 81 mg at bedtime.       atorvastatin (LIPITOR) 20 MG tablet TAKE 1 TABLET BY MOUTH AT  BEDTIME 90 tablet 3     carbidopa-levodopa (SINEMET)  MG tablet TAKE 1 TABLET BY MOUTH 3  TIMES DAILY TAKE AT LEAST  1/2 HOUR BEFORE MEALS OR 2  HOURS AFTER MEALS DO NOT  MIX WITH FOOD 270 tablet 3     carvedilol (COREG) 12.5 MG tablet TAKE 1 TABLET BY MOUTH  TWICE DAILY WITH MEALS 180 tablet 3     cholecalciferol, vitamin D3, 1,000 unit tablet Take 1,000 Units by mouth 2 times daily       coenzyme Q10 (CO Q-10) 100 mg capsule [COENZYME Q10 (CO Q-10) 100 MG CAPSULE] Take 100 mg by  mouth 2 (two) times a day.       cyanocobalamin (VITAMIN B-12) 1000 MCG tablet Take 1 tablet (1,000 mcg) by mouth daily 90 tablet 3     fish oil-omega-3 fatty acids 1000 MG capsule Take 1 g by mouth 2 times daily       FLUoxetine (PROZAC) 20 MG capsule TAKE 1 CAPSULE BY MOUTH  TWICE DAILY 180 capsule 0     furosemide (LASIX) 20 MG tablet Take 1 tablet (20 mg) by mouth daily 90 tablet 1     gabapentin (NEURONTIN) 300 MG capsule TAKE 1 CAPSULE BY MOUTH 4  TIMES DAILY 360 capsule 3     guaiFENesin (MUCINEX) 600 MG 12 hr tablet Take 2 tablets (1,200 mg) by mouth 2 times daily 14 tablet 0     levothyroxine (SYNTHROID/LEVOTHROID) 25 MCG tablet TAKE 1 TABLET BY MOUTH  DAILY 90 tablet 2     lisinopril (ZESTRIL) 20 MG tablet Take 1 tablet (20 mg) by mouth daily 90 tablet 1     loratadine (CLARITIN) 10 mg tablet Take 10 mg by mouth daily as needed for allergies       magnesium oxide 250 mg Tab [MAGNESIUM OXIDE 250 MG TAB] Take 250 mg by mouth daily.       melatonin 1 mg Tab tablet Take 1 mg by mouth At Bedtime       multivitamin therapeutic tablet Take 1 tablet by mouth every morning       nitroGLYcerin (NITROSTAT) 0.4 MG sublingual tablet Place 1 tablet (0.4 mg) under the tongue every 5 minutes as needed for chest pain 100 tablet 1     omeprazole (PRILOSEC) 20 MG DR capsule Take 20 mg by mouth daily before breakfast       polyethylene glycol (MIRALAX) 17 gram packet Take 17 g by mouth daily as needed for constipation       triamcinolone (KENALOG) 0.025 % external ointment Apply topically 2 times daily (Patient taking differently: Apply topically 2 times daily as needed for irritation) 80 g 0     albuterol (PROVENTIL) (2.5 MG/3ML) 0.083% neb solution Take 1 vial (2.5 mg) by nebulization every 6 hours as needed for shortness of breath, wheezing or cough (Patient not taking: Reported on 5/25/2023) 90 mL 0    Allergies   Allergen Reactions     Alendronate [Alendronate] Unknown     pain     Codeine Hives      "Hydrocodone-Acetaminophen Other (See Comments)     Highly sensitive, \"knocks her out and can't wake up\"     Other Drug Allergy (See Comments)      Risedronate Unknown     Bone pain     Rosuvastatin Muscle Pain (Myalgia)     Simvastatin Muscle Pain (Myalgia)         Lab Results    Chemistry/lipid CBC Cardiac Enzymes/BNP/TSH/INR   Lab Results   Component Value Date    CHOL 121 08/18/2022    HDL 29 (L) 08/18/2022    TRIG 172 (H) 08/18/2022    BUN 32.4 (H) 05/24/2023     05/24/2023    CO2 24 05/24/2023    Lab Results   Component Value Date    WBC 6.4 05/08/2023    HGB 12.6 05/08/2023    HCT 38.1 05/08/2023    MCV 88 05/08/2023     05/12/2023    Lab Results   Component Value Date    TROPONINI 0.04 05/07/2023    BNP 82 05/11/2023    TSH 1.92 05/08/2023             This note has been dictated using voice recognition software. Any grammatical, typographical, or context distortions are unintentional and inherent to the software    30 minutes spent on the date of encounter doing chart review, review of outside records, review of test results, interpretation with above tests, patient visit, documentation and discussion with family.              "

## 2023-05-30 ENCOUNTER — PATIENT OUTREACH (OUTPATIENT)
Dept: CARE COORDINATION | Facility: CLINIC | Age: 88
End: 2023-05-30
Payer: MEDICARE

## 2023-05-31 ENCOUNTER — OFFICE VISIT (OUTPATIENT)
Dept: NEUROLOGY | Facility: CLINIC | Age: 88
End: 2023-05-31
Payer: MEDICARE

## 2023-05-31 VITALS
DIASTOLIC BLOOD PRESSURE: 78 MMHG | BODY MASS INDEX: 31.89 KG/M2 | HEART RATE: 68 BPM | WEIGHT: 180 LBS | RESPIRATION RATE: 16 BRPM | SYSTOLIC BLOOD PRESSURE: 136 MMHG

## 2023-05-31 DIAGNOSIS — G50.0 TRIGEMINAL NEURALGIA: ICD-10-CM

## 2023-05-31 DIAGNOSIS — E11.42 TYPE 2 DIABETES MELLITUS WITH DIABETIC POLYNEUROPATHY, WITHOUT LONG-TERM CURRENT USE OF INSULIN (H): ICD-10-CM

## 2023-05-31 DIAGNOSIS — R47.81 SLURRED SPEECH: Primary | ICD-10-CM

## 2023-05-31 DIAGNOSIS — G20.A1 PARKINSON'S DISEASE (H): ICD-10-CM

## 2023-05-31 DIAGNOSIS — G62.9 NEUROPATHY: ICD-10-CM

## 2023-05-31 PROCEDURE — 99215 OFFICE O/P EST HI 40 MIN: CPT | Performed by: PSYCHIATRY & NEUROLOGY

## 2023-05-31 RX ORDER — DIAZEPAM 5 MG
TABLET ORAL
Qty: 2 TABLET | Refills: 0 | Status: SHIPPED | OUTPATIENT
Start: 2023-05-31 | End: 2023-08-11

## 2023-05-31 NOTE — LETTER
5/31/2023         RE: Janes Montero  [address]  [facility] [address]        Dear Colleague,    Thank you for referring your patient, Janes Montero, to the [facility] [place]. Please see a copy of my visit note below.    NEUROLOGY OUTPATIENT PROGRESS NOTE   May 31, 2023     CHIEF COMPLAINT/REASON FOR VISIT/REASON FOR CONSULT  Patient presents with:  Follow Up    REASON FOR CONSULTATION-Parkinson's/trigeminal neuralgia    REFERRAL SOURCE  Dr. Maggie Arriaga  CC Dr. Maggie Arriaga    HISTORY OF PRESENT ILLNESS  Janes Montero is a 89 year old female seen today for multiple issues  Patient is moving from [place] to [place].  She is a resident of [place].  And has moved to [place] is a 20 years and is coming back.    1.  Parkinson's disease:-Symptom onset was in 2015.  At that time she was having shaking of her arms and legs.  This was at rest and with activity.  She was losing her balance as well.  She was put on Sinemet 1 tablet 3 times a day.  Feels that the medication is helping.  Does have some wearing off in the evening time and is taking the medication earlier than bedtime which is working.  Does fine in the morning.  Takes the medication on empty stomach.  Denies any other progression or any new symptoms.  Occasionally will use a walker.  Is not very active but does do some walking.  2.  Trigeminal neuralgia:-This started in 2013.  Pain is on the left side of the face.  The whole V1 V2 V3 distribution is involved that the pain is more towards the year.  Pain is sharp and intermittent.  Heating pad helps.  Reports no real provoking factors for her.  She is on gabapentin which has been helpful.  Has not tried any other medications.  Denies any other associated symptoms.  No numbness.  MRI was done and no clear cause for the trigeminal neuralgia was identified.  3.  Patient complains of some numbness in her legs.  Does report some balance issues as well.   She does have a diagnosis of diabetes.  Last A1c was 6.7.    10/19/21  Patient returns today  1.  Parkinson's disease is stable and under good control.  Reports no wearing off of the medication.  Is taking the Sinemet regularly.  Has seen physical therapy and trying to do some exercise.  She did take her medication this morning.  2.  Trigeminal neuralgia pain is unchanged.  Gabapentin is helping her.  3.  Previously she was complaining of some numbness in her legs and was found to have a wide-based gait.  This does seem to have improved.  She was identified to have B12 deficiency and was put on injections but not oral replacement.  4.  Diabetes-A1c was 7.1.  Encouraged her to exercise and.  Manage her diet.    1/20/21  Patient returns today  1.  Parkinson's stable under good control.  Reports no wearing off with medication.  Is unclear if the medication is really helping or not though she feels that initially she had a lot of tremors and those got better when she started the medication.  2.  Trigeminal pain is under good control.  Gabapentin is helping.  3.  Was having some numbness in her feet.  Feels that this is getting better.  Her gait has also improved.  She feels more steady.  Is only taking the oral replacement at this point  4.  Diabetes has been under appropriate control.  A1c scheduled for March.  Previously 1 was 7.1 is working on improving her diet.  Stays active and is not sitting in the chair all day long    5/19/22  Patient returns today  1.  Patient's Parkinson's is under good control.  There is no wearing off of the medication.  No side effects with the medication.  Exam today does show slightly decreased arm swing though she wants to stay on the medication at her age.  2.  Trigeminal neuralgia pain is under good control.  Gabapentin is helping  3.  B12 has come up and discussed that she could stop the injections at this point.  4.  Diabetes is not under good control.  A1c is worsened.  She is  eating too many sweets.  Encourage exercise and cutting down the sweets  5.  She reports that the numbness in her feet is gone away after she got her Prolia injection.  Denies any balance issues.  6.  VINICIO still positive.  Denies any joint pain except secondary to use of the valsartan.    11/28/22  Patient returns today  1.  Patient since she was last seen has been hospitalized for some chest pain like symptoms.  Coronary angiogram was negative for any significant occlusion.  Patient was thought to have heartburn related symptoms causing chest pain.  She has been little bit weak since then.  2.  No falls or balance issues.  Continues to use a walker.  Neuropathy has not worsened.  Diabetes under better control.  Has stopped the B12 injections though remains on the B12 supplement.  Discussed causes of weakness in elderly patients.  3.  Trigeminal pain is under good control with gabapentin  4.  Remains on Sinemet and feels that that is helping.  Has not really missed a dose.  Reports no wearing off.  Denies any side effects to the medication.  Does not mix the medication with food.    5/31/23  Patient returns today  1.  Parkinson's overall has been under good control.  Was active until 3 weeks ago when she was hospitalized for heart failure.  No side effects to Sinemet  2.  Since she was hospitalized has been having slurred speech and abnormal taste at nighttime.  She does use oxygen which can possibly affect her taste.  She is also been on higher doses of lisinopril.  Further reports that her sodium is low.  Is on Lasix.  Is following up with cardiology to decide on a plan next week.  No associated symptoms with the slurred speech.  Slurred speech is worse in the evening time.  Has not noticed any benefits with taking Sinemet at the time of the slurred speech.  3.  Patient pain is under good control with the gabapentin.  No side effects  4.  Reports abnormal sensation in the feet.  Discussed that it might be related  to neuropathy.  Remains on vitamin B12.  No other new symptoms.    Previous history is reviewed and this is unchanged.    PAST MEDICAL/SURGICAL HISTORY  Past Medical History:   Diagnosis Date     Acute diastolic congestive heart failure (H)      Acute kidney injury superimposed on CKD (H) 08/18/2022     Acute on chronic congestive heart failure (H) 06/11/2018     Acute pulmonary edema (H) 05/06/2023     Acute respiratory failure with hypoxia (H) 05/08/2023     Coronary artery disease      Coronary artery disease involving native coronary artery without angina pectoris, unspecified whether native or transplanted heart 08/17/2022     Diabetes mellitus, type II (H)      Diastolic dysfunction 07/10/2018     Frozen shoulder     bilateral     Heart failure with reduced ejection fraction (H) 5/25/2023     Hyperlipidemia      Hypertension      Hypertensive emergency      Parkinson disease (H)      Primary osteoarthritis involving multiple joints      Primary osteoarthritis of left knee      Recurrent UTI     hx septic shock     Thyroid disease 2021     Patient Active Problem List   Diagnosis     Benign essential hypertension     Coronary artery stenosis     S/P CABG (coronary artery bypass graft)     Parkinson's disease (H)     Trigeminal neuralgia     Chronic bilateral back pain     Aortic valve insufficiency     Tricuspid insufficiency     Mitral valve insufficiency     Bilateral carotid artery stenosis     CKD (chronic kidney disease) stage 3, GFR 30-59 ml/min (H)     Depression     Diastolic dysfunction     GERD (gastroesophageal reflux disease)     Left bundle branch block     Primary osteoarthritis involving multiple joints     Sensorineural hearing loss (SNHL) of both ears     Subclinical hypothyroidism     Type 2 diabetes mellitus with diabetic polyneuropathy, without long-term current use of insulin (H)     Secondary renal hyperparathyroidism (H)     Age-related osteoporosis without current pathological fracture      Mixed hypercholesterolemia and hypertriglyceridemia     Mixed incontinence urge and stress (male)(female)     Vitamin B12 deficiency (non anemic)     Non-seasonal allergic rhinitis due to pollen     Preventative health care     Chest pain, unspecified type     Vaginal irritation     Polypharmacy     Age-related osteoporosis with current pathological fracture with routine healing     Recurrent major depressive disorder, in full remission (H)     PAD (peripheral artery disease) (H)     Bronchomalacia     Renal mass     Heart failure with reduced ejection fraction (H)   Significant for CABG/bypass/coronary artery disease, high cholesterol, high blood pressure, diabetes, migraines, Parkinson's disease, arthritis, depression, osteoporosis, hyperparathyroidism    FAMILY HISTORY  Family History   Problem Relation Age of Onset     Heart Disease Mother      Heart Disease Father    Family history reviewed and noncontributory no family history of neuropathy.    SOCIAL HISTORY  Social History     Tobacco Use     Smoking status: Never     Passive exposure: Never     Smokeless tobacco: Never   Vaping Use     Vaping status: Never Used     Passive vaping exposure: Yes   Substance Use Topics     Alcohol use: No     Drug use: No       SYSTEMS REVIEW  Twelve-system ROS was done and other than the HPI this was negative except for neck pain, back pain, arm and leg pain, joint pain, numbness and tingling, weakness paralysis, difficulty walking, falling, balance coordination problems, hearing loss, sleepy during the day, sleeping problems, headaches, anxiety, depression, cardiac/heart problems.  No new concerns other than the HPI.    MEDICATIONS  albuterol (PROVENTIL) (2.5 MG/3ML) 0.083% neb solution, Take 1 vial (2.5 mg) by nebulization every 6 hours as needed for shortness of breath, wheezing or cough  aspirin 81 mg chewable tablet, [ASPIRIN 81 MG CHEWABLE TABLET] Chew 81 mg at bedtime.  atorvastatin (LIPITOR) 20 MG tablet, TAKE 1  TABLET BY MOUTH AT  BEDTIME  carbidopa-levodopa (SINEMET)  MG tablet, TAKE 1 TABLET BY MOUTH 3  TIMES DAILY TAKE AT LEAST  1/2 HOUR BEFORE MEALS OR 2  HOURS AFTER MEALS DO NOT  MIX WITH FOOD  carvedilol (COREG) 12.5 MG tablet, TAKE 1 TABLET BY MOUTH  TWICE DAILY WITH MEALS  cholecalciferol, vitamin D3, 1,000 unit tablet, Take 1,000 Units by mouth 2 times daily  coenzyme Q10 (CO Q-10) 100 mg capsule, [COENZYME Q10 (CO Q-10) 100 MG CAPSULE] Take 100 mg by mouth 2 (two) times a day.  cyanocobalamin (VITAMIN B-12) 1000 MCG tablet, Take 1 tablet (1,000 mcg) by mouth daily  fish oil-omega-3 fatty acids 1000 MG capsule, Take 1 g by mouth 2 times daily  FLUoxetine (PROZAC) 20 MG capsule, TAKE 1 CAPSULE BY MOUTH  TWICE DAILY  furosemide (LASIX) 20 MG tablet, Take 1 tablet (20 mg) by mouth daily  gabapentin (NEURONTIN) 300 MG capsule, TAKE 1 CAPSULE BY MOUTH 4  TIMES DAILY  guaiFENesin (MUCINEX) 600 MG 12 hr tablet, Take 2 tablets (1,200 mg) by mouth 2 times daily  levothyroxine (SYNTHROID/LEVOTHROID) 25 MCG tablet, TAKE 1 TABLET BY MOUTH  DAILY  lisinopril (ZESTRIL) 20 MG tablet, Take 1 tablet (20 mg) by mouth daily  loratadine (CLARITIN) 10 mg tablet, Take 10 mg by mouth daily as needed for allergies  magnesium oxide 250 mg Tab, [MAGNESIUM OXIDE 250 MG TAB] Take 250 mg by mouth daily.  melatonin 1 mg Tab tablet, Take 1 mg by mouth At Bedtime  multivitamin therapeutic tablet, Take 1 tablet by mouth every morning  nitroGLYcerin (NITROSTAT) 0.4 MG sublingual tablet, Place 1 tablet (0.4 mg) under the tongue every 5 minutes as needed for chest pain  omeprazole (PRILOSEC) 20 MG DR capsule, Take 20 mg by mouth daily before breakfast  polyethylene glycol (MIRALAX) 17 gram packet, Take 17 g by mouth daily as needed for constipation  triamcinolone (KENALOG) 0.025 % external ointment, Apply topically 2 times daily (Patient taking differently: Apply topically 2 times daily as needed for irritation)    denosumab (PROLIA) injection  60 mg         PHYSICAL EXAMINATION  VITALS: /78   Pulse 68   Resp 16   Wt 81.6 kg (180 lb)   LMP  (LMP Unknown)   BMI 31.89 kg/m    GENERAL: Healthy appearing, alert, no acute distress, normal habitus.  CARDIOVASCULAR: Extremities warm and well perfused. Pulses present.   NEUROLOGICAL:  Patient is awake and oriented to self, place and time.  Attention span is normal.  Memory is grossly intact.  Language is fluent and follows commands appropriately.  Appropriate fund of knowledge. Cranial nerves 2-12 are intact. There is no pronator drift.  Motor exam shows 5/5 strength in all extremities.  Tone is symmetric bilaterally in upper and lower extremities.  No cogwheeling or tremor noted.  (Previously reflexes are symmetric and 2+ in upper extremities and absent in lower extremities.) Sensory exam is grossly intact to light touch, pin prick and vibration intact today.  Previously this was decreased up to the knees. Finger to nose and heel to shin is without dysmetria.  Romberg is negative.  Gait is almost normal to slightly wide-based with bilateral decreased arm swing (mild today).  Mild difficulty with tandem.  Previously drawing concentric circles did not show any tremor.  Exam stable compared to before.  No major tremors or cogwheeling.  Does have mild decreased arm swing.    DIAGNOSTICS  RELEVANT LABS  TSH 2.01   A1c 6.7    Carotid US 2020  1: Right: 1-39% carotid artery stenosis with mild velocities and antegrade   vertebral flow.   2: Left: 1-39% carotid artery stenosis with mild velocities and antegrade   vertebral flow.   3: Essentially unchanged.   4: Recommend a two year follow-up if patient remains asymptomatic.       CT head 2020  IMPRESSION   1. No acute intracranial findings.   2. Senescent changes.     MRI 2013  IMPRESSION:   Scattered small vessel ischemic disease including pontine involvement.      OUTSIDE RECORDS  Outside referral notes and chart notes were reviewed and pertinent information  has been summarized (in addition to the HPI):-Patient has a diagnosis of Parkinson's disease.  Diagnosed in 2015.  Is looking to establish with a neurologist.  Is on Sinemet  times a day.  Also has trigeminal neuralgia controlled with gabapentin 300 mg p.o. 4 times a day.  Also has bilateral carotid artery stenosis.  Is moving here from [place].    LABS    Component      Latest Ref Rng & Units 7/27/2021           3:50 PM   Total Protein Serum for ELP      6.0 - 8.0 g/dL 6.8   Albumin %      51.0 - 67.0 % 66.6   Albumin Fraction      3.2 - 4.7 g/dL 4.5   Alpha 1 %      2.0 - 4.0 % 1.9 (L)   Alpha 1 Fraction      0.1 - 0.3 g/dL 0.1   Alpha 2 %      5.0 - 13.0 % 9.8   Alpha 2 Fraction      0.4 - 0.9 g/dL 0.7   Beta %      10.0 - 17.0 % 10.5   Beta Fraction      0.7 - 1.2 g/dL 0.7   Gamma Globulin %      9.0 - 20.0 % 11.2   Gamma Fraction      0.6 - 1.4 g/dL 0.8   ELP Interpretation:       Unremarkable protein electrophoresis.   Path ICD       G62.9   Interpreted By       Lisa Cantu MD   Immunofixation ELP          VINICIO interpretation      Negative Borderline Positive (A)   VINICIO pattern 1       Homogeneous   VINICIO titer 1       1:80   Vitamin B12      213 - 816 pg/mL 383   Vitamin B1 Whole Blood Level      70 - 180 nmol/L 184 (H)   Methylmalonic Acid      0.00 - 0.40 umol/L 1.02 (H)   Lyme Disease Antibodies Serum      <0.90 0.04     Component      Latest Ref Rng & Units 7/27/2021           3:50 PM   Total Protein Serum for ELP      6.0 - 8.0 g/dL 6.9   Albumin %      51.0 - 67.0 %    Albumin Fraction      3.2 - 4.7 g/dL    Alpha 1 %      2.0 - 4.0 %    Alpha 1 Fraction      0.1 - 0.3 g/dL    Alpha 2 %      5.0 - 13.0 %    Alpha 2 Fraction      0.4 - 0.9 g/dL    Beta %      10.0 - 17.0 %    Beta Fraction      0.7 - 1.2 g/dL    Gamma Globulin %      9.0 - 20.0 %    Gamma Fraction      0.6 - 1.4 g/dL    ELP Interpretation:          Path ICD       G62.9   Interpreted By       Lisa Cantu MD    Immunofixation ELP       Unremarkable immunofixation electrophoresis.   VINICIO interpretation      Negative    VINICIO pattern 1          VINICIO titer 1          Vitamin B12      213 - 816 pg/mL    Vitamin B1 Whole Blood Level      70 - 180 nmol/L    Methylmalonic Acid      0.00 - 0.40 umol/L    Lyme Disease Antibodies Serum      <0.90      EMG  CLINICAL INTERPRETATION:  This is a mildly abnormal nerve conduction and EMG study.  The abnormalities seen above could suggest early/mild sensorimotor polyneuropathy though could be artifactual also.  Further clinical correlation is needed.     PCP notes regarding B12 reviewed.     Component      Latest Ref Rng & Units 1/20/2022 3/22/2022   VINICIO interpretation      Negative Borderline Positive (A)    VINICIO pattern 1       Homogeneous    VNIICIO titer 1       1:80    Vitamin B12      213 - 816 pg/mL 1,908 (H)    Methylmalonic Acid      0.00 - 0.40 umol/L 0.35    Hemoglobin A1C      0.0 - 5.6 %  7.6 (H)     Component      Latest Ref Rng & Units 10/11/2022   Hemoglobin A1C      0.0 - 5.6 % 5.9 (H)     Component      Latest Ref Rng 4/28/2023  2:37 PM   Vitamin B12      232 - 1,245 pg/mL 1,497 (H)       Legend:  (H) High    Component      Latest Ref Rng 5/12/2023  5:04 AM 5/24/2023  10:16 AM   Sodium      136 - 145 mmol/L 130 (L)  137    Potassium      3.5 - 5.0 mmol/L 4.9     Chloride      98 - 107 mmol/L 98     Carbon Dioxide      22 - 31 mmol/L 25     Anion Gap      7 - 15 mmol/L 7  12    Urea Nitrogen      8 - 28 mg/dL 57 (H)     Creatinine      0.51 - 0.95 mg/dL 1.21 (H)  0.93    Calcium      8.8 - 10.2 mg/dL 10.0  10.6 (H)    Glucose      70 - 125 mg/dL 131 (H)     GFR Estimate      >60 mL/min/1.73m2 43 (L)  58 (L)    Potassium      3.4 - 5.3 mmol/L  4.9    Chloride      98 - 107 mmol/L  101    Carbon Dioxide (CO2)      22 - 29 mmol/L  24    Urea Nitrogen      8.0 - 23.0 mg/dL  32.4 (H)    Glucose      70 - 99 mg/dL  151 (H)       Legend:  (L) Low  (H) High    Hospital notes reviewed.   Primary care notes reviewed.  IMPRESSION/REPORT/PLAN  Parkinson's disease (H)  Trigeminal neuralgia  Questionable neuropathy-questionable related to diabetes versus B12  Type 2 diabetes mellitus with diabetic polyneuropathy, without long-term current use of insulin (H)   Low B12  Positive VINICIO  Slurred speech- rule out stroke    This is a 89 year old female with  1.  Trigeminal neuralgia:-MRI brain in 2013 did not show any structural lesions.  Currently pain is under good control with gabapentin and will continue gabapentin.  Stable.  Continue gabapentin.  2.  Parkinson's disease:-Examination does not show a lot of evidence of Parkinson's disease.  Possibly this is being masked by use of Sinemet (was started previously by other provider).  She does have decreased arm swing today.  Exam today of the Sinemet shows no evidence of parkinsonism.  Discussed about stopping the medication though she would prefer to continue the medication for right now.  Physical therapy made things worse and I would encourage her to exercise on her own.  She is doing walks every day.  We will continue current dose of Sinemet.  Stable.  3.  On examination she has a wide-based gait with decreased pinprick sensation in her legs.  Examination suggestive of neuropathy.  Examination does look better today/stable.  EMG shows questionable neuropathy versus artifact.   She does have diabetes which could be a cause of her neuropathy.  A1c is elevated at 7.6 and encouraged her to exercise and cut down on the sweets.  A1c is now 5.9.  Previously B12 was low and injection/tablets were used.  B12 looks normal at this point.  She can continue oral tablets only.  Stable.  4.  She does have a positive VINICIO.  I think this is a false positive.  Repeat VINICIO was positive as well.  Reports some joint pain related to the valsartan.  Can discuss further with primary care.  Could be related to age.  No change.    5.  She does complain of some slurred speech with  change in mouth.  Differential for this would include cerebrovascular event, heart failure, use of oxygen, less likely to be from Parkinson's, myasthenia gravis, increased dose of lisinopril versus use of Lasix, hyponatremia.  To look for neurological etiologies we will check an MRI of the brain to look for stroke and check for myasthenia gravis.  She should work with other doctors to rule out other etiologies.  Could consider speech therapy referral.  Family will see if there is a correlation with dose of Sinemet.    I can see her back in 3 months.    -     cyanocobalamin (VITAMIN B-12) 1000 MCG tablet; Take 1 tablet (1,000 mcg) by mouth daily  -     carbidopa-levodopa (SINEMET)  MG tablet; TAKE 1 TABLET BY MOUTH 3  TIMES DAILY TAKE AT LEAST  1/2 HOUR BEFORE MEALS OR 2  HOURS AFTER MEALS DO NOT  MIX WITH FOOD  -     cyanocobalamin (VITAMIN B-12) 1000 MCG tablet; Take 1 tablet (1,000 mcg) by mouth daily  -     MR Brain w/o Contrast; Future  -     ACETYLCHOLINE RECEPTOR BINDING; Future  -     STRIATED MUSCLE ANTIBODY IGG; Future  -     ACETYLCHOLINE MODULATING ANTIBODY; Future  -     ACETYLCHOLINE RECEPTOR BLOCKING PAULINE; Future  -     diazepam (VALIUM) 5 MG tablet; For MRI. Take 1 tab 30 min before MRI and repeat if needed.      Return in about 3 months (around 8/31/2023) for In-Clinic Visit (must), After testing.    Over 45 minutes were spent coordinating the care for the patient on the day of the encounter.  This includes previsit, during visit and post visit activities as documented above.  Counseling patient.  New problem.  Prescription management.  Testing ordered.  High risk.  (Activities include but not inclusive of reviewing chart, reviewing outside records, reviewing labs and imaging study results as well as the images, patient visit time including getting history and exam,  use if applicable, review of test results with the patient and coming up with a plan in a shared model, counseling  patient and family, education and answering patient questions, EMR , EMR diagnosis entry and problem list management, medication reconciliation and prescription management if applicable, paperwork if applicable, printing documents and documentation of the visit activities.)    Brennon Moya MD  Neurologist  [facility]  Tel:- [phone]    This note was dictated using voice recognition software.  Any grammatical or context distortions are unintentional and inherent to the software.        Again, thank you for allowing me to participate in the care of your patient.        Sincerely,        Brennon Moya MD

## 2023-05-31 NOTE — PROGRESS NOTES
NEUROLOGY OUTPATIENT PROGRESS NOTE   May 31, 2023     CHIEF COMPLAINT/REASON FOR VISIT/REASON FOR CONSULT  Patient presents with:  Follow Up    REASON FOR CONSULTATION-Parkinson's/trigeminal neuralgia    REFERRAL SOURCE  Dr. Maggie Arriaga  CC Dr. Maggie Arriaga    HISTORY OF PRESENT ILLNESS  Janes Montero is a 89 year old female seen today for multiple issues  Patient is moving from [place] to [place].  She is a resident of [place].  And has moved to [place] is a 20 years and is coming back.    1.  Parkinson's disease:-Symptom onset was in 2015.  At that time she was having shaking of her arms and legs.  This was at rest and with activity.  She was losing her balance as well.  She was put on Sinemet 1 tablet 3 times a day.  Feels that the medication is helping.  Does have some wearing off in the evening time and is taking the medication earlier than bedtime which is working.  Does fine in the morning.  Takes the medication on empty stomach.  Denies any other progression or any new symptoms.  Occasionally will use a walker.  Is not very active but does do some walking.  2.  Trigeminal neuralgia:-This started in 2013.  Pain is on the left side of the face.  The whole V1 V2 V3 distribution is involved that the pain is more towards the year.  Pain is sharp and intermittent.  Heating pad helps.  Reports no real provoking factors for her.  She is on gabapentin which has been helpful.  Has not tried any other medications.  Denies any other associated symptoms.  No numbness.  MRI was done and no clear cause for the trigeminal neuralgia was identified.  3.  Patient complains of some numbness in her legs.  Does report some balance issues as well.  She does have a diagnosis of diabetes.  Last A1c was 6.7.    10/19/21  Patient returns today  1.  Parkinson's disease is stable and under good control.  Reports no wearing off of the medication.  Is taking the Sinemet regularly.  Has seen physical therapy and trying to do  some exercise.  She did take her medication this morning.  2.  Trigeminal neuralgia pain is unchanged.  Gabapentin is helping her.  3.  Previously she was complaining of some numbness in her legs and was found to have a wide-based gait.  This does seem to have improved.  She was identified to have B12 deficiency and was put on injections but not oral replacement.  4.  Diabetes-A1c was 7.1.  Encouraged her to exercise and.  Manage her diet.    1/20/21  Patient returns today  1.  Parkinson's stable under good control.  Reports no wearing off with medication.  Is unclear if the medication is really helping or not though she feels that initially she had a lot of tremors and those got better when she started the medication.  2.  Trigeminal pain is under good control.  Gabapentin is helping.  3.  Was having some numbness in her feet.  Feels that this is getting better.  Her gait has also improved.  She feels more steady.  Is only taking the oral replacement at this point  4.  Diabetes has been under appropriate control.  A1c scheduled for March.  Previously 1 was 7.1 is working on improving her diet.  Stays active and is not sitting in the chair all day long    5/19/22  Patient returns today  1.  Patient's Parkinson's is under good control.  There is no wearing off of the medication.  No side effects with the medication.  Exam today does show slightly decreased arm swing though she wants to stay on the medication at her age.  2.  Trigeminal neuralgia pain is under good control.  Gabapentin is helping  3.  B12 has come up and discussed that she could stop the injections at this point.  4.  Diabetes is not under good control.  A1c is worsened.  She is eating too many sweets.  Encourage exercise and cutting down the sweets  5.  She reports that the numbness in her feet is gone away after she got her Prolia injection.  Denies any balance issues.  6.  VINICIO still positive.  Denies any joint pain except secondary to use of the  valsartan.    11/28/22  Patient returns today  1.  Patient since she was last seen has been hospitalized for some chest pain like symptoms.  Coronary angiogram was negative for any significant occlusion.  Patient was thought to have heartburn related symptoms causing chest pain.  She has been little bit weak since then.  2.  No falls or balance issues.  Continues to use a walker.  Neuropathy has not worsened.  Diabetes under better control.  Has stopped the B12 injections though remains on the B12 supplement.  Discussed causes of weakness in elderly patients.  3.  Trigeminal pain is under good control with gabapentin  4.  Remains on Sinemet and feels that that is helping.  Has not really missed a dose.  Reports no wearing off.  Denies any side effects to the medication.  Does not mix the medication with food.    5/31/23  Patient returns today  1.  Parkinson's overall has been under good control.  Was active until 3 weeks ago when she was hospitalized for heart failure.  No side effects to Sinemet  2.  Since she was hospitalized has been having slurred speech and abnormal taste at nighttime.  She does use oxygen which can possibly affect her taste.  She is also been on higher doses of lisinopril.  Further reports that her sodium is low.  Is on Lasix.  Is following up with cardiology to decide on a plan next week.  No associated symptoms with the slurred speech.  Slurred speech is worse in the evening time.  Has not noticed any benefits with taking Sinemet at the time of the slurred speech.  3.  Patient pain is under good control with the gabapentin.  No side effects  4.  Reports abnormal sensation in the feet.  Discussed that it might be related to neuropathy.  Remains on vitamin B12.  No other new symptoms.    Previous history is reviewed and this is unchanged.    PAST MEDICAL/SURGICAL HISTORY  Past Medical History:   Diagnosis Date     Acute diastolic congestive heart failure (H)      Acute kidney injury  superimposed on CKD (H) 08/18/2022     Acute on chronic congestive heart failure (H) 06/11/2018     Acute pulmonary edema (H) 05/06/2023     Acute respiratory failure with hypoxia (H) 05/08/2023     Coronary artery disease      Coronary artery disease involving native coronary artery without angina pectoris, unspecified whether native or transplanted heart 08/17/2022     Diabetes mellitus, type II (H)      Diastolic dysfunction 07/10/2018     Frozen shoulder     bilateral     Heart failure with reduced ejection fraction (H) 5/25/2023     Hyperlipidemia      Hypertension      Hypertensive emergency      Parkinson disease (H)      Primary osteoarthritis involving multiple joints      Primary osteoarthritis of left knee      Recurrent UTI     hx septic shock     Thyroid disease 2021     Patient Active Problem List   Diagnosis     Benign essential hypertension     Coronary artery stenosis     S/P CABG (coronary artery bypass graft)     Parkinson's disease (H)     Trigeminal neuralgia     Chronic bilateral back pain     Aortic valve insufficiency     Tricuspid insufficiency     Mitral valve insufficiency     Bilateral carotid artery stenosis     CKD (chronic kidney disease) stage 3, GFR 30-59 ml/min (H)     Depression     Diastolic dysfunction     GERD (gastroesophageal reflux disease)     Left bundle branch block     Primary osteoarthritis involving multiple joints     Sensorineural hearing loss (SNHL) of both ears     Subclinical hypothyroidism     Type 2 diabetes mellitus with diabetic polyneuropathy, without long-term current use of insulin (H)     Secondary renal hyperparathyroidism (H)     Age-related osteoporosis without current pathological fracture     Mixed hypercholesterolemia and hypertriglyceridemia     Mixed incontinence urge and stress (male)(female)     Vitamin B12 deficiency (non anemic)     Non-seasonal allergic rhinitis due to pollen     Preventative health care     Chest pain, unspecified type      Vaginal irritation     Polypharmacy     Age-related osteoporosis with current pathological fracture with routine healing     Recurrent major depressive disorder, in full remission (H)     PAD (peripheral artery disease) (H)     Bronchomalacia     Renal mass     Heart failure with reduced ejection fraction (H)   Significant for CABG/bypass/coronary artery disease, high cholesterol, high blood pressure, diabetes, migraines, Parkinson's disease, arthritis, depression, osteoporosis, hyperparathyroidism    FAMILY HISTORY  Family History   Problem Relation Age of Onset     Heart Disease Mother      Heart Disease Father    Family history reviewed and noncontributory no family history of neuropathy.    SOCIAL HISTORY  Social History     Tobacco Use     Smoking status: Never     Passive exposure: Never     Smokeless tobacco: Never   Vaping Use     Vaping status: Never Used     Passive vaping exposure: Yes   Substance Use Topics     Alcohol use: No     Drug use: No       SYSTEMS REVIEW  Twelve-system ROS was done and other than the HPI this was negative except for neck pain, back pain, arm and leg pain, joint pain, numbness and tingling, weakness paralysis, difficulty walking, falling, balance coordination problems, hearing loss, sleepy during the day, sleeping problems, headaches, anxiety, depression, cardiac/heart problems.  No new concerns other than the HPI.    MEDICATIONS  albuterol (PROVENTIL) (2.5 MG/3ML) 0.083% neb solution, Take 1 vial (2.5 mg) by nebulization every 6 hours as needed for shortness of breath, wheezing or cough  aspirin 81 mg chewable tablet, [ASPIRIN 81 MG CHEWABLE TABLET] Chew 81 mg at bedtime.  atorvastatin (LIPITOR) 20 MG tablet, TAKE 1 TABLET BY MOUTH AT  BEDTIME  carbidopa-levodopa (SINEMET)  MG tablet, TAKE 1 TABLET BY MOUTH 3  TIMES DAILY TAKE AT LEAST  1/2 HOUR BEFORE MEALS OR 2  HOURS AFTER MEALS DO NOT  MIX WITH FOOD  carvedilol (COREG) 12.5 MG tablet, TAKE 1 TABLET BY MOUTH  TWICE  DAILY WITH MEALS  cholecalciferol, vitamin D3, 1,000 unit tablet, Take 1,000 Units by mouth 2 times daily  coenzyme Q10 (CO Q-10) 100 mg capsule, [COENZYME Q10 (CO Q-10) 100 MG CAPSULE] Take 100 mg by mouth 2 (two) times a day.  cyanocobalamin (VITAMIN B-12) 1000 MCG tablet, Take 1 tablet (1,000 mcg) by mouth daily  fish oil-omega-3 fatty acids 1000 MG capsule, Take 1 g by mouth 2 times daily  FLUoxetine (PROZAC) 20 MG capsule, TAKE 1 CAPSULE BY MOUTH  TWICE DAILY  furosemide (LASIX) 20 MG tablet, Take 1 tablet (20 mg) by mouth daily  gabapentin (NEURONTIN) 300 MG capsule, TAKE 1 CAPSULE BY MOUTH 4  TIMES DAILY  guaiFENesin (MUCINEX) 600 MG 12 hr tablet, Take 2 tablets (1,200 mg) by mouth 2 times daily  levothyroxine (SYNTHROID/LEVOTHROID) 25 MCG tablet, TAKE 1 TABLET BY MOUTH  DAILY  lisinopril (ZESTRIL) 20 MG tablet, Take 1 tablet (20 mg) by mouth daily  loratadine (CLARITIN) 10 mg tablet, Take 10 mg by mouth daily as needed for allergies  magnesium oxide 250 mg Tab, [MAGNESIUM OXIDE 250 MG TAB] Take 250 mg by mouth daily.  melatonin 1 mg Tab tablet, Take 1 mg by mouth At Bedtime  multivitamin therapeutic tablet, Take 1 tablet by mouth every morning  nitroGLYcerin (NITROSTAT) 0.4 MG sublingual tablet, Place 1 tablet (0.4 mg) under the tongue every 5 minutes as needed for chest pain  omeprazole (PRILOSEC) 20 MG DR capsule, Take 20 mg by mouth daily before breakfast  polyethylene glycol (MIRALAX) 17 gram packet, Take 17 g by mouth daily as needed for constipation  triamcinolone (KENALOG) 0.025 % external ointment, Apply topically 2 times daily (Patient taking differently: Apply topically 2 times daily as needed for irritation)    denosumab (PROLIA) injection 60 mg         PHYSICAL EXAMINATION  VITALS: /78   Pulse 68   Resp 16   Wt 81.6 kg (180 lb)   LMP  (LMP Unknown)   BMI 31.89 kg/m    GENERAL: Healthy appearing, alert, no acute distress, normal habitus.  CARDIOVASCULAR: Extremities warm and well  perfused. Pulses present.   NEUROLOGICAL:  Patient is awake and oriented to self, place and time.  Attention span is normal.  Memory is grossly intact.  Language is fluent and follows commands appropriately.  Appropriate fund of knowledge. Cranial nerves 2-12 are intact. There is no pronator drift.  Motor exam shows 5/5 strength in all extremities.  Tone is symmetric bilaterally in upper and lower extremities.  No cogwheeling or tremor noted.  (Previously reflexes are symmetric and 2+ in upper extremities and absent in lower extremities.) Sensory exam is grossly intact to light touch, pin prick and vibration intact today.  Previously this was decreased up to the knees. Finger to nose and heel to shin is without dysmetria.  Romberg is negative.  Gait is almost normal to slightly wide-based with bilateral decreased arm swing (mild today).  Mild difficulty with tandem.  Previously drawing concentric circles did not show any tremor.  Exam stable compared to before.  No major tremors or cogwheeling.  Does have mild decreased arm swing.    DIAGNOSTICS  RELEVANT LABS  TSH 2.01   A1c 6.7    Carotid US 2020  1: Right: 1-39% carotid artery stenosis with mild velocities and antegrade   vertebral flow.   2: Left: 1-39% carotid artery stenosis with mild velocities and antegrade   vertebral flow.   3: Essentially unchanged.   4: Recommend a two year follow-up if patient remains asymptomatic.       CT head 2020  IMPRESSION   1. No acute intracranial findings.   2. Senescent changes.     MRI 2013  IMPRESSION:   Scattered small vessel ischemic disease including pontine involvement.      OUTSIDE RECORDS  Outside referral notes and chart notes were reviewed and pertinent information has been summarized (in addition to the HPI):-Patient has a diagnosis of Parkinson's disease.  Diagnosed in 2015.  Is looking to establish with a neurologist.  Is on Sinemet  times a day.  Also has trigeminal neuralgia controlled with gabapentin 300  mg p.o. 4 times a day.  Also has bilateral carotid artery stenosis.  Is moving here from [place].    LABS    Component      Latest Ref Rng & Units 7/27/2021           3:50 PM   Total Protein Serum for ELP      6.0 - 8.0 g/dL 6.8   Albumin %      51.0 - 67.0 % 66.6   Albumin Fraction      3.2 - 4.7 g/dL 4.5   Alpha 1 %      2.0 - 4.0 % 1.9 (L)   Alpha 1 Fraction      0.1 - 0.3 g/dL 0.1   Alpha 2 %      5.0 - 13.0 % 9.8   Alpha 2 Fraction      0.4 - 0.9 g/dL 0.7   Beta %      10.0 - 17.0 % 10.5   Beta Fraction      0.7 - 1.2 g/dL 0.7   Gamma Globulin %      9.0 - 20.0 % 11.2   Gamma Fraction      0.6 - 1.4 g/dL 0.8   ELP Interpretation:       Unremarkable protein electrophoresis.   Path ICD       G62.9   Interpreted By       Lisa Cantu MD   Immunofixation ELP          VINICIO interpretation      Negative Borderline Positive (A)   VINICIO pattern 1       Homogeneous   VINICIO titer 1       1:80   Vitamin B12      213 - 816 pg/mL 383   Vitamin B1 Whole Blood Level      70 - 180 nmol/L 184 (H)   Methylmalonic Acid      0.00 - 0.40 umol/L 1.02 (H)   Lyme Disease Antibodies Serum      <0.90 0.04     Component      Latest Ref Rng & Units 7/27/2021           3:50 PM   Total Protein Serum for ELP      6.0 - 8.0 g/dL 6.9   Albumin %      51.0 - 67.0 %    Albumin Fraction      3.2 - 4.7 g/dL    Alpha 1 %      2.0 - 4.0 %    Alpha 1 Fraction      0.1 - 0.3 g/dL    Alpha 2 %      5.0 - 13.0 %    Alpha 2 Fraction      0.4 - 0.9 g/dL    Beta %      10.0 - 17.0 %    Beta Fraction      0.7 - 1.2 g/dL    Gamma Globulin %      9.0 - 20.0 %    Gamma Fraction      0.6 - 1.4 g/dL    ELP Interpretation:          Path ICD       G62.9   Interpreted By       Lisa Cantu MD   Immunofixation ELP       Unremarkable immunofixation electrophoresis.   VINICIO interpretation      Negative    VINICIO pattern 1          VINICIO titer 1          Vitamin B12      213 - 816 pg/mL    Vitamin B1 Whole Blood Level      70 - 180 nmol/L    Methylmalonic Acid       0.00 - 0.40 umol/L    Lyme Disease Antibodies Serum      <0.90      EMG  CLINICAL INTERPRETATION:  This is a mildly abnormal nerve conduction and EMG study.  The abnormalities seen above could suggest early/mild sensorimotor polyneuropathy though could be artifactual also.  Further clinical correlation is needed.     PCP notes regarding B12 reviewed.     Component      Latest Ref Rng & Units 1/20/2022 3/22/2022   VINICIO interpretation      Negative Borderline Positive (A)    VINICIO pattern 1       Homogeneous    VINICIO titer 1       1:80    Vitamin B12      213 - 816 pg/mL 1,908 (H)    Methylmalonic Acid      0.00 - 0.40 umol/L 0.35    Hemoglobin A1C      0.0 - 5.6 %  7.6 (H)     Component      Latest Ref Rng & Units 10/11/2022   Hemoglobin A1C      0.0 - 5.6 % 5.9 (H)     Component      Latest Ref Rng 4/28/2023  2:37 PM   Vitamin B12      232 - 1,245 pg/mL 1,497 (H)       Legend:  (H) High    Component      Latest Ref Rng 5/12/2023  5:04 AM 5/24/2023  10:16 AM   Sodium      136 - 145 mmol/L 130 (L)  137    Potassium      3.5 - 5.0 mmol/L 4.9     Chloride      98 - 107 mmol/L 98     Carbon Dioxide      22 - 31 mmol/L 25     Anion Gap      7 - 15 mmol/L 7  12    Urea Nitrogen      8 - 28 mg/dL 57 (H)     Creatinine      0.51 - 0.95 mg/dL 1.21 (H)  0.93    Calcium      8.8 - 10.2 mg/dL 10.0  10.6 (H)    Glucose      70 - 125 mg/dL 131 (H)     GFR Estimate      >60 mL/min/1.73m2 43 (L)  58 (L)    Potassium      3.4 - 5.3 mmol/L  4.9    Chloride      98 - 107 mmol/L  101    Carbon Dioxide (CO2)      22 - 29 mmol/L  24    Urea Nitrogen      8.0 - 23.0 mg/dL  32.4 (H)    Glucose      70 - 99 mg/dL  151 (H)       Legend:  (L) Low  (H) High    Hospital notes reviewed.  Primary care notes reviewed.  IMPRESSION/REPORT/PLAN  Parkinson's disease (H)  Trigeminal neuralgia  Questionable neuropathy-questionable related to diabetes versus B12  Type 2 diabetes mellitus with diabetic polyneuropathy, without long-term current use of insulin  (H)   Low B12  Positive VINICIO  Slurred speech- rule out stroke    This is a 89 year old female with  1.  Trigeminal neuralgia:-MRI brain in 2013 did not show any structural lesions.  Currently pain is under good control with gabapentin and will continue gabapentin.  Stable.  Continue gabapentin.  2.  Parkinson's disease:-Examination does not show a lot of evidence of Parkinson's disease.  Possibly this is being masked by use of Sinemet (was started previously by other provider).  She does have decreased arm swing today.  Exam today of the Sinemet shows no evidence of parkinsonism.  Discussed about stopping the medication though she would prefer to continue the medication for right now.  Physical therapy made things worse and I would encourage her to exercise on her own.  She is doing walks every day.  We will continue current dose of Sinemet.  Stable.  3.  On examination she has a wide-based gait with decreased pinprick sensation in her legs.  Examination suggestive of neuropathy.  Examination does look better today/stable.  EMG shows questionable neuropathy versus artifact.   She does have diabetes which could be a cause of her neuropathy.  A1c is elevated at 7.6 and encouraged her to exercise and cut down on the sweets.  A1c is now 5.9.  Previously B12 was low and injection/tablets were used.  B12 looks normal at this point.  She can continue oral tablets only.  Stable.  4.  She does have a positive VINICIO.  I think this is a false positive.  Repeat VINICIO was positive as well.  Reports some joint pain related to the valsartan.  Can discuss further with primary care.  Could be related to age.  No change.    5.  She does complain of some slurred speech with change in mouth.  Differential for this would include cerebrovascular event, heart failure, use of oxygen, less likely to be from Parkinson's, myasthenia gravis, increased dose of lisinopril versus use of Lasix, hyponatremia.  To look for neurological etiologies we will  check an MRI of the brain to look for stroke and check for myasthenia gravis.  She should work with other doctors to rule out other etiologies.  Could consider speech therapy referral.  Family will see if there is a correlation with dose of Sinemet.    I can see her back in 3 months.    -     cyanocobalamin (VITAMIN B-12) 1000 MCG tablet; Take 1 tablet (1,000 mcg) by mouth daily  -     carbidopa-levodopa (SINEMET)  MG tablet; TAKE 1 TABLET BY MOUTH 3  TIMES DAILY TAKE AT LEAST  1/2 HOUR BEFORE MEALS OR 2  HOURS AFTER MEALS DO NOT  MIX WITH FOOD  -     cyanocobalamin (VITAMIN B-12) 1000 MCG tablet; Take 1 tablet (1,000 mcg) by mouth daily  -     MR Brain w/o Contrast; Future  -     ACETYLCHOLINE RECEPTOR BINDING; Future  -     STRIATED MUSCLE ANTIBODY IGG; Future  -     ACETYLCHOLINE MODULATING ANTIBODY; Future  -     ACETYLCHOLINE RECEPTOR BLOCKING PAULINE; Future  -     diazepam (VALIUM) 5 MG tablet; For MRI. Take 1 tab 30 min before MRI and repeat if needed.      Return in about 3 months (around 8/31/2023) for In-Clinic Visit (must), After testing.    Over 45 minutes were spent coordinating the care for the patient on the day of the encounter.  This includes previsit, during visit and post visit activities as documented above.  Counseling patient.  New problem.  Prescription management.  Testing ordered.  High risk.  (Activities include but not inclusive of reviewing chart, reviewing outside records, reviewing labs and imaging study results as well as the images, patient visit time including getting history and exam,  use if applicable, review of test results with the patient and coming up with a plan in a shared model, counseling patient and family, education and answering patient questions, EMR , EMR diagnosis entry and problem list management, medication reconciliation and prescription management if applicable, paperwork if applicable, printing documents and documentation of the visit  activities.)    Brennon Moya MD  Neurologist  [facility]  Tel:- [phone]    This note was dictated using voice recognition software.  Any grammatical or context distortions are unintentional and inherent to the software.

## 2023-06-08 ENCOUNTER — OFFICE VISIT (OUTPATIENT)
Dept: CARDIOLOGY | Facility: CLINIC | Age: 88
End: 2023-06-08
Attending: INTERNAL MEDICINE
Payer: MEDICARE

## 2023-06-08 VITALS
OXYGEN SATURATION: 90 % | HEIGHT: 63 IN | RESPIRATION RATE: 16 BRPM | SYSTOLIC BLOOD PRESSURE: 138 MMHG | DIASTOLIC BLOOD PRESSURE: 76 MMHG | WEIGHT: 181.5 LBS | BODY MASS INDEX: 32.16 KG/M2 | HEART RATE: 87 BPM

## 2023-06-08 DIAGNOSIS — I42.9 CARDIOMYOPATHY, UNSPECIFIED TYPE (H): ICD-10-CM

## 2023-06-08 DIAGNOSIS — I35.0 NONRHEUMATIC AORTIC VALVE STENOSIS: ICD-10-CM

## 2023-06-08 DIAGNOSIS — R06.09 DOE (DYSPNEA ON EXERTION): ICD-10-CM

## 2023-06-08 DIAGNOSIS — I34.0 MITRAL VALVE INSUFFICIENCY, UNSPECIFIED ETIOLOGY: ICD-10-CM

## 2023-06-08 DIAGNOSIS — I10 HYPERTENSION, UNSPECIFIED TYPE: ICD-10-CM

## 2023-06-08 DIAGNOSIS — I25.10 CORONARY ARTERY DISEASE INVOLVING NATIVE CORONARY ARTERY WITHOUT ANGINA PECTORIS, UNSPECIFIED WHETHER NATIVE OR TRANSPLANTED HEART: ICD-10-CM

## 2023-06-08 DIAGNOSIS — I35.1 NONRHEUMATIC AORTIC VALVE INSUFFICIENCY: Primary | ICD-10-CM

## 2023-06-08 PROCEDURE — 99215 OFFICE O/P EST HI 40 MIN: CPT | Performed by: INTERNAL MEDICINE

## 2023-06-08 NOTE — LETTER
6/8/2023    Maggie Arriaga MD  [address]  [facility] [address]    RE: Janes BERNAL Winston       Dear Colleague,     I had the pleasure of seeing Janes Montero in the [facility].         [facility]   [address], [place] [address]   [email or web address]   OFFICE: [phone]          Thank you Dr. Ricardo for asking the [facility] team to participate in the care of your patient, Janes Montero.     Impression and Plan     1.? Coronary artery disease. Janes has known coronary artery disease having had prior coronary artery bypass graft surgery with JEOVANY graft to the LAD in 2010.? She underwent coronary angiography 17 August 2022 that revealed complete occlusion of the proximal native LAD though the JEOVANY graft to the LAD was widely patent.? Otherwise mild-moderate disease only.?     Janes underwent nuclear perfusion imaging 8 May 2023.  This revealed a partially reversible change in inferior and basal to mid inferolateral walls indicating inducible myocardial ischemia.  He was reviewed by my colleague, Dr. Abner Saxena, at that time who indicates that he was reticent to advise repeat coronary angiography in light of renal insufficiency.  Given lack of anginal symptoms, continued medical therapy was advised.  ??   This is stable.? Patient denies any chest pain or other concerning symptoms in this regard.?   ??   2.? Mitral insufficiency.? This was felt moderate in degree on most recent echocardiogram 9 May 2023.  ?   3.? Aortic stenosis.? This was felt mild in degree on echocardiogram 16 August 2022.?  Follow-up echocardiogram 9 May 2023 was reported as having no significant valvular stenosis.  ??   4.? Aortic insufficiency.? Recent echocardiogram suggested only trace aortic insufficiency.  ??   5.? Cardiomyopathy.  Echocardiogram 9 May 2023 revealed mild reduction in left ventricular systolic performance with ejection fraction 45  to 50%.  By nuclear imaging, however, 8 May 2023 ejection fraction was felt to be 65%.    She was seen in the Heart Failure Clinic by Shasha Walker on 25 May 2023 at which time she was felt to be volume neutral and compensated.  Weight at that time 81.6 kg (180 pound) which is within 1 pound of today's visit.     Janes appears volume neutral on clinical exam.    6.  Hypertension.? Blood pressure is fairly reasonable in the office today at 138/76 mmHg.  She has been having very good readings at home.   ??   7.? Dyslipidemia.? Lipid profile 18 August 2022 revealed LDL 58 mg/dL and HDL 29 mg/dL.?   Continue atorvastatin.?      Plan on follow-up in approximately 6 months.  She will also be followed intermittently in the Heart Failure Clinic as well.    48 minutes spent reviewing prior records (including documentation, laboratory studies, cardiac testing/imaging), interview with patient along with physical exam, planning, and subsequent documentation/crafting of note).           History of Present Illness    Once again I would like to thank you again for asking me to participate in the care of your patient, Janes Montero.  As you know, but to reiterate for my own records, Janes Montero is a 89 year old female with known coronary artery disease having had prior coronary artery bypass graft surgery with JEOVANY graft to the LAD in 2010.? She underwent coronary angiography 17 August 2022 that revealed complete occlusion of the proximal native LAD though the JEOVANY graft to the LAD was widely patent.? Otherwise mild-moderate disease only.?   ??   On interview, she denies any chest pain symptoms.  She does, report dyspnea on exertion which seems to have progressed since my last visit with her.  She denies shortness of breath at night to suggest PND/orthopnea, however.  Her weights have been stable.    Further review of systems is otherwise negative/noncontributory (medical record and 13 point review of systems reviewed as  well and pertinent positives noted).         Cardiac Diagnostics      Echocardiogram 9 May 2023:  Normal left ventricular size with mildly reduced left ventricular systolic performance.  Ejection fraction 45-50%.  Abnormal septal motion consistent with conduction abnormality.  Moderate mitral insufficiency.  Normal right ventricular size and systolic performance.  Mild-moderate left atrial enlargement.  Right atrium of normal dimension.    Echocardiogram 16 August 2022:?   Normal left ventricular size with mildly reduced left ventricular systolic performance.? Ejection fraction 45-50%.?   There is hypokinesis of the apex and mid to apical septal segments.?   Mild aortic stenosis.?   Mild aortic insufficiency.?   Mild-moderate mitral insufficiency.?   Right ventricle not well visualized.?   Mild left atrial enlargement.? Right atrium of normal dimension.?   ??   Echocardiogram 5 March 2020:?   Left ventricle: The cavity size is normal. Wall thickness is mildly increased. Systolic function is normal. The estimated ejection fraction is 55-60%.??   Left atrium: The atrium is mildly to moderately dilated.??   Mitral valve: Moderately calcified annulus. Leaflet separation is normal. There is no evidence for stenosis. Moderate regurgitation.??   Aortic valve: Probably trileaflet; mildly calcified leaflets. Thickening, consistent with sclerosis. Cusp separation is normal. There is no stenosis. Mild to moderate regurgitation.??   Tricuspid valve: Structurally normal valve. Leaflet separation is normal. There is no evidence for stenosis. Moderate regurgitation.??   ??  Nuclear perfusion imaging study 8 May 2023:  Pharmacological regadenoson nuclear stress test is abnormal.  There is partially reversible change in inferior and basal to mid inferolateral walls indicating inducible myocardial ischemia.  Normal left ventricular size, wall motion and systolic function.  Calculated left ventricular ejection fraction is 65%.  The  "patient is at an intermediate risk of future cardiac ischemic events.    Nuclear perfusion imaging study 16 August 2022:?   Nuclear stress test is abnormal.? There is a large area of transmural infarction involving the apex and distal inferolateral wall extending into the inferior wall.? There is a medium size area of stella-infarct ischemia involving the mid to basal inferior and inferolateral wall.?   The left ventricular ejection fraction at stress is 67% with severe hypokinesis of the distal inferior and apical walls.?   ?   Coronary angiography 17 August 2022:?   Left anterior descending coronary artery: Proximal 100% stenosis.?   Circumflex coronary artery: First obtuse marginal with 50% stenosis.?   Right coronary artery: 25% mid vessel stenosis.?   JEOVANY graft to the LAD: Widely patent.?   ?   Coronary angiogram 23 May 2011:?   Normal left main.??   Severe eccentric 95% proximal left anterior descending stenosis just after the first diagonal and just after the first septal  branches originate. Second eccentric stenosis of 60-70% is present proximally involving the origin of the second diagonal branch. The second diagonal branch ostium has a 50% stenosis.??   Mild irregularity of the circumflex, circumflex marginal branches.??   Mild irregularity of the right coronary artery. No significant stenosis. PDA arises distally, appears normal.??   Overall left ventricular ejection fraction normal.??   ??   Twelve-lead ECG (personally reviewed) 11 June 2018: Sinus rhythm with occasional PVCs.? Left bundle branch block.?             Physical Examination       /76 (BP Location: Left arm, Patient Position: Sitting, Cuff Size: Adult Large)   Pulse 87   Resp 16   Ht 1.6 m (5' 3\")   Wt 82.3 kg (181 lb 8 oz)   LMP  (LMP Unknown)   SpO2 90%   BMI 32.15 kg/m          Wt Readings from Last 3 Encounters:   06/08/23 82.3 kg (181 lb 8 oz)   05/31/23 81.6 kg (180 lb)   05/25/23 81.6 kg (180 lb)       The patient " is alert and oriented times three. Sclerae are anicteric. Mucosal membranes are moist. Jugular venous pressure is normal. No significant adenopathy/thyromegally appreciated. Lungs are clear with good expansion. On cardiovascular exam, the patient has a regular S1 and S2. Abdomen is soft and non-tender. Extremities reveal no clubbing, cyanosis, or edema.         Family History/Social History/Risk Factors   Patient does not smoke.  Family history reviewed, and family history includes Heart Disease in her father and mother.          Medical History  Surgical History Family History Social History   Past Medical History:   Diagnosis Date    Acute diastolic congestive heart failure (H)     Acute kidney injury superimposed on CKD (H) 2022    Acute on chronic congestive heart failure (H) 2018    Acute pulmonary edema (H) 2023    Acute respiratory failure with hypoxia (H) 2023    Coronary artery disease     Coronary artery disease involving native coronary artery without angina pectoris, unspecified whether native or transplanted heart 2022    Diabetes mellitus, type II (H)     Diastolic dysfunction 07/10/2018    Frozen shoulder     bilateral    Heart failure with reduced ejection fraction (H) 2023    Hyperlipidemia     Hypertension     Hypertensive emergency     Parkinson disease (H)     Primary osteoarthritis involving multiple joints     Primary osteoarthritis of left knee     Recurrent UTI     hx septic shock    Thyroid disease      Past Surgical History:   Procedure Laterality Date    APPENDECTOMY      APPENDECTOMY      BACK SURGERY      L4-S1 laminectomy    BYPASS GRAFT ARTERY CORONARY      3 vessel     SECTION       SECTION      CORONARY ARTERY BYPASS      CV CORONARY ANGIOGRAM N/A 2022    Procedure: Coronary Angiogram;  Surgeon: Ignacio Baird MD;  Location: [facility] CATH LAB CV    HERNIORRHAPHY VENTRAL Left     HYSTERECTOMY      HYSTERECTOMY      JOINT  REPLACEMENT Left     Total left knee    OTHER SURGICAL HISTORY      right ankle surgery    OTHER SURGICAL HISTORY      right forearm fracture    REPLACEMENT TOTAL KNEE Right     SHOULDER SURGERY Right     reversed shoulder surgery.     Family History   Problem Relation Age of Onset    Heart Disease Mother     Heart Disease Father         Social History     Socioeconomic History    Marital status:      Spouse name: Not on file    Number of children: Not on file    Years of education: Not on file    Highest education level: Not on file   Occupational History    Not on file   Tobacco Use    Smoking status: Never     Passive exposure: Never    Smokeless tobacco: Never   Vaping Use    Vaping status: Never Used     Passive vaping exposure: Yes   Substance and Sexual Activity    Alcohol use: No    Drug use: No    Sexual activity: Not Currently     Partners: Male     Birth control/protection: None   Other Topics Concern    Parent/sibling w/ CABG, MI or angioplasty before 65F 55M? No   Social History Narrative    6/15/2021 new to [place] from [place]. She has 6 kids.     Social Determinants of Health     Financial Resource Strain: Not on file   Food Insecurity: Not on file   Transportation Needs: Not on file   Physical Activity: Not on file   Stress: Not on file   Social Connections: Not on file   Intimate Partner Violence: Not on file   Housing Stability: Not on file           Medications  Allergies   Current Outpatient Medications   Medication Sig Dispense Refill    albuterol (PROVENTIL) (2.5 MG/3ML) 0.083% neb solution Take 1 vial (2.5 mg) by nebulization every 6 hours as needed for shortness of breath, wheezing or cough 90 mL 0    aspirin 81 mg chewable tablet [ASPIRIN 81 MG CHEWABLE TABLET] Chew 81 mg at bedtime.      atorvastatin (LIPITOR) 20 MG tablet TAKE 1 TABLET BY MOUTH AT  BEDTIME 90 tablet 3    carbidopa-levodopa (SINEMET)  MG tablet TAKE 1 TABLET BY MOUTH 3  TIMES DAILY TAKE AT LEAST  1/2 HOUR BEFORE  MEALS OR 2  HOURS AFTER MEALS DO NOT  MIX WITH FOOD 270 tablet 3    carvedilol (COREG) 12.5 MG tablet TAKE 1 TABLET BY MOUTH  TWICE DAILY WITH MEALS 180 tablet 3    cholecalciferol, vitamin D3, 1,000 unit tablet Take 1,000 Units by mouth 2 times daily      coenzyme Q10 (CO Q-10) 100 mg capsule [COENZYME Q10 (CO Q-10) 100 MG CAPSULE] Take 100 mg by mouth 2 (two) times a day.      cyanocobalamin (VITAMIN B-12) 1000 MCG tablet Take 1 tablet (1,000 mcg) by mouth daily 90 tablet 3    diazepam (VALIUM) 5 MG tablet For MRI. Take 1 tab 30 min before MRI and repeat if needed. 2 tablet 0    fish oil-omega-3 fatty acids 1000 MG capsule Take 1 g by mouth 2 times daily      FLUoxetine (PROZAC) 20 MG capsule TAKE 1 CAPSULE BY MOUTH  TWICE DAILY 180 capsule 0    furosemide (LASIX) 20 MG tablet Take 1 tablet (20 mg) by mouth daily 90 tablet 1    gabapentin (NEURONTIN) 300 MG capsule TAKE 1 CAPSULE BY MOUTH 4  TIMES DAILY 360 capsule 3    guaiFENesin (MUCINEX) 600 MG 12 hr tablet Take 2 tablets (1,200 mg) by mouth 2 times daily 14 tablet 0    levothyroxine (SYNTHROID/LEVOTHROID) 25 MCG tablet TAKE 1 TABLET BY MOUTH  DAILY 90 tablet 2    lisinopril (ZESTRIL) 20 MG tablet Take 1 tablet (20 mg) by mouth daily 90 tablet 1    loratadine (CLARITIN) 10 mg tablet Take 10 mg by mouth daily as needed for allergies      magnesium oxide 250 mg Tab [MAGNESIUM OXIDE 250 MG TAB] Take 250 mg by mouth daily.      melatonin 1 mg Tab tablet Take 1 mg by mouth At Bedtime      multivitamin therapeutic tablet Take 1 tablet by mouth every morning      nitroGLYcerin (NITROSTAT) 0.4 MG sublingual tablet Place 1 tablet (0.4 mg) under the tongue every 5 minutes as needed for chest pain 100 tablet 1    omeprazole (PRILOSEC) 20 MG DR capsule Take 20 mg by mouth daily before breakfast      polyethylene glycol (MIRALAX) 17 gram packet Take 17 g by mouth daily as needed for constipation      triamcinolone (KENALOG) 0.025 % external ointment Apply topically 2 times  "daily (Patient taking differently: Apply topically 2 times daily as needed for irritation) 80 g 0       Allergies   Allergen Reactions    Alendronate [Alendronate] Unknown     pain    Codeine Hives    Hydrocodone-Acetaminophen Other (See Comments)     Highly sensitive, \"knocks her out and can't wake up\"    Other Drug Allergy (See Comments)     Risedronate Unknown     Bone pain    Rosuvastatin Muscle Pain (Myalgia)    Simvastatin Muscle Pain (Myalgia)          Lab Results    Chemistry/lipid CBC Cardiac Enzymes/BNP/TSH/INR   Recent Labs   Lab Test 08/18/22  0506   CHOL 121   HDL 29*   LDL 58   TRIG 172*     Recent Labs   Lab Test 08/18/22  0506 12/14/21  1638   LDL 58 32     Recent Labs   Lab Test 05/24/23  1016      POTASSIUM 4.9   CHLORIDE 101   CO2 24   *   BUN 32.4*   CR 0.93   GFRESTIMATED 58*   LIZZY 10.6*     Recent Labs   Lab Test 05/24/23  1016 05/12/23  0504 05/11/23  0437   CR 0.93 1.21* 1.19*     Recent Labs   Lab Test 04/28/23  1437 10/11/22  1712 06/28/22  1616   A1C 6.4* 5.9* 8.0*          Recent Labs   Lab Test 05/12/23  0504 05/09/23  0435 05/08/23  0552   WBC  --   --  6.4   HGB  --   --  12.6   HCT  --   --  38.1   MCV  --   --  88      < > 172    < > = values in this interval not displayed.     Recent Labs   Lab Test 05/08/23  0552 05/07/23  0434 05/06/23  1852   HGB 12.6 13.2 12.8    Recent Labs   Lab Test 05/07/23  0434 05/06/23  2323 05/06/23  1852   TROPONINI 0.04 0.03 0.03     Recent Labs   Lab Test 05/11/23  0437 05/06/23  1852 08/15/22  1920   BNP 82 628* 129     Recent Labs   Lab Test 05/08/23  0552   TSH 1.92     No results for input(s): INR in the last 16791 hours.       Medications  Allergies   Current Outpatient Medications   Medication Sig Dispense Refill    albuterol (PROVENTIL) (2.5 MG/3ML) 0.083% neb solution Take 1 vial (2.5 mg) by nebulization every 6 hours as needed for shortness of breath, wheezing or cough 90 mL 0    aspirin 81 mg chewable tablet [ASPIRIN 81 " MG CHEWABLE TABLET] Chew 81 mg at bedtime.      atorvastatin (LIPITOR) 20 MG tablet TAKE 1 TABLET BY MOUTH AT  BEDTIME 90 tablet 3    carbidopa-levodopa (SINEMET)  MG tablet TAKE 1 TABLET BY MOUTH 3  TIMES DAILY TAKE AT LEAST  1/2 HOUR BEFORE MEALS OR 2  HOURS AFTER MEALS DO NOT  MIX WITH FOOD 270 tablet 3    carvedilol (COREG) 12.5 MG tablet TAKE 1 TABLET BY MOUTH  TWICE DAILY WITH MEALS 180 tablet 3    cholecalciferol, vitamin D3, 1,000 unit tablet Take 1,000 Units by mouth 2 times daily      coenzyme Q10 (CO Q-10) 100 mg capsule [COENZYME Q10 (CO Q-10) 100 MG CAPSULE] Take 100 mg by mouth 2 (two) times a day.      cyanocobalamin (VITAMIN B-12) 1000 MCG tablet Take 1 tablet (1,000 mcg) by mouth daily 90 tablet 3    diazepam (VALIUM) 5 MG tablet For MRI. Take 1 tab 30 min before MRI and repeat if needed. 2 tablet 0    fish oil-omega-3 fatty acids 1000 MG capsule Take 1 g by mouth 2 times daily      FLUoxetine (PROZAC) 20 MG capsule TAKE 1 CAPSULE BY MOUTH  TWICE DAILY 180 capsule 0    furosemide (LASIX) 20 MG tablet Take 1 tablet (20 mg) by mouth daily 90 tablet 1    gabapentin (NEURONTIN) 300 MG capsule TAKE 1 CAPSULE BY MOUTH 4  TIMES DAILY 360 capsule 3    guaiFENesin (MUCINEX) 600 MG 12 hr tablet Take 2 tablets (1,200 mg) by mouth 2 times daily 14 tablet 0    levothyroxine (SYNTHROID/LEVOTHROID) 25 MCG tablet TAKE 1 TABLET BY MOUTH  DAILY 90 tablet 2    lisinopril (ZESTRIL) 20 MG tablet Take 1 tablet (20 mg) by mouth daily 90 tablet 1    loratadine (CLARITIN) 10 mg tablet Take 10 mg by mouth daily as needed for allergies      magnesium oxide 250 mg Tab [MAGNESIUM OXIDE 250 MG TAB] Take 250 mg by mouth daily.      melatonin 1 mg Tab tablet Take 1 mg by mouth At Bedtime      multivitamin therapeutic tablet Take 1 tablet by mouth every morning      nitroGLYcerin (NITROSTAT) 0.4 MG sublingual tablet Place 1 tablet (0.4 mg) under the tongue every 5 minutes as needed for chest pain 100 tablet 1    omeprazole  "(PRILOSEC) 20 MG DR capsule Take 20 mg by mouth daily before breakfast      polyethylene glycol (MIRALAX) 17 gram packet Take 17 g by mouth daily as needed for constipation      triamcinolone (KENALOG) 0.025 % external ointment Apply topically 2 times daily (Patient taking differently: Apply topically 2 times daily as needed for irritation) 80 g 0      Allergies   Allergen Reactions    Alendronate [Alendronate] Unknown     pain    Codeine Hives    Hydrocodone-Acetaminophen Other (See Comments)     Highly sensitive, \"knocks her out and can't wake up\"    Other Drug Allergy (See Comments)     Risedronate Unknown     Bone pain    Rosuvastatin Muscle Pain (Myalgia)    Simvastatin Muscle Pain (Myalgia)          Lab Results   Lab Results   Component Value Date     05/24/2023    CO2 24 05/24/2023    CO2 25 05/12/2023    BUN 32.4 05/24/2023    BUN 57 05/12/2023     Lab Results   Component Value Date    WBC 6.4 05/08/2023    HGB 12.6 05/08/2023    HCT 38.1 05/08/2023    MCV 88 05/08/2023     05/12/2023     Lab Results   Component Value Date    CHOL 121 08/18/2022    TRIG 172 08/18/2022    HDL 29 08/18/2022     No results found for: INR  Lab Results   Component Value Date    BNP 82 05/11/2023     Lab Results   Component Value Date    TROPONINI 0.04 05/07/2023    TROPONINI 0.03 05/06/2023    TROPONINI 0.03 05/06/2023     Lab Results   Component Value Date    TSH 1.92 05/08/2023                    Thank you for allowing me to participate in the care of your patient.      Sincerely,     Mathew Ricardo MD     [facility]  cc:   Mathew Ricardo MD  [address] [facility] [address]  [place] [address]        "

## 2023-06-08 NOTE — PROGRESS NOTES
[facility] [address], [place] [address]   [email or web address]   OFFICE: [phone]          Thank you Dr. Ricardo for asking the [facility] Heart Care team to participate in the care of your patient, Janes Montero.     Impression and Plan     1.? Coronary artery disease. Janes has known coronary artery disease having had prior coronary artery bypass graft surgery with JEOVANY graft to the LAD in 2010.? She underwent coronary angiography 17 August 2022 that revealed complete occlusion of the proximal native LAD though the JEOVANY graft to the LAD was widely patent.? Otherwise mild-moderate disease only.?     Janes underwent nuclear perfusion imaging 8 May 2023.  This revealed a partially reversible change in inferior and basal to mid inferolateral walls indicating inducible myocardial ischemia.  He was reviewed by my colleague, Dr. Abner Saxena, at that time who indicates that he was reticent to advise repeat coronary angiography in light of renal insufficiency.  Given lack of anginal symptoms, continued medical therapy was advised.  ??   This is stable.? Patient denies any chest pain or other concerning symptoms in this regard.?   ??   2.? Mitral insufficiency.? This was felt moderate in degree on most recent echocardiogram 9 May 2023.  ?   3.? Aortic stenosis.? This was felt mild in degree on echocardiogram 16 August 2022.?  Follow-up echocardiogram 9 May 2023 was reported as having no significant valvular stenosis.  ??   4.? Aortic insufficiency.? Recent echocardiogram suggested only trace aortic insufficiency.  ??   5.? Cardiomyopathy.  Echocardiogram 9 May 2023 revealed mild reduction in left ventricular systolic performance with ejection fraction 45 to 50%.  By nuclear imaging, however, 8 May 2023 ejection fraction was felt to be 65%.    She was seen in the Heart Failure Clinic by Shasha Walker on 25 May 2023 at which time she was felt to be  volume neutral and compensated.  Weight at that time 81.6 kg (180 pound) which is within 1 pound of today's visit.     Janes appears volume neutral on clinical exam.    6.  Hypertension.? Blood pressure is fairly reasonable in the office today at 138/76 mmHg.  She has been having very good readings at home.   ??   7.? Dyslipidemia.? Lipid profile 18 August 2022 revealed LDL 58 mg/dL and HDL 29 mg/dL.?     Continue atorvastatin.?      Plan on follow-up in approximately 6 months.  She will also be followed intermittently in the Heart Failure Clinic as well.    48 minutes spent reviewing prior records (including documentation, laboratory studies, cardiac testing/imaging), interview with patient along with physical exam, planning, and subsequent documentation/crafting of note).           History of Present Illness    Once again I would like to thank you again for asking me to participate in the care of your patient, Janes Montero.  As you know, but to reiterate for my own records, Janes Montero is a 89 year old female with known coronary artery disease having had prior coronary artery bypass graft surgery with JEOVANY graft to the LAD in 2010.? She underwent coronary angiography 17 August 2022 that revealed complete occlusion of the proximal native LAD though the JEOVANY graft to the LAD was widely patent.? Otherwise mild-moderate disease only.?   ??   On interview, she denies any chest pain symptoms.  She does, report dyspnea on exertion which seems to have progressed since my last visit with her.  She denies shortness of breath at night to suggest PND/orthopnea, however.  Her weights have been stable.    Further review of systems is otherwise negative/noncontributory (medical record and 13 point review of systems reviewed as well and pertinent positives noted).         Cardiac Diagnostics      Echocardiogram 9 May 2023:  1. Normal left ventricular size with mildly reduced left ventricular systolic performance.  Ejection  fraction 45-50%.  2. Abnormal septal motion consistent with conduction abnormality.  3. Moderate mitral insufficiency.  4. Normal right ventricular size and systolic performance.  5. Mild-moderate left atrial enlargement.  Right atrium of normal dimension.    Echocardiogram 16 August 2022:?   1. Normal left ventricular size with mildly reduced left ventricular systolic performance.? Ejection fraction 45-50%.?   2. There is hypokinesis of the apex and mid to apical septal segments.?   3. Mild aortic stenosis.?   4. Mild aortic insufficiency.?   5. Mild-moderate mitral insufficiency.?   6. Right ventricle not well visualized.?   7. Mild left atrial enlargement.? Right atrium of normal dimension.?   ??   Echocardiogram 5 March 2020:?   1. Left ventricle: The cavity size is normal. Wall thickness is mildly increased. Systolic function is normal. The estimated ejection fraction is 55-60%.??   2. Left atrium: The atrium is mildly to moderately dilated.??   3. Mitral valve: Moderately calcified annulus. Leaflet separation is normal. There is no evidence for stenosis. Moderate regurgitation.??   4. Aortic valve: Probably trileaflet; mildly calcified leaflets. Thickening, consistent with sclerosis. Cusp separation is normal. There is no stenosis. Mild to moderate regurgitation.??   5. Tricuspid valve: Structurally normal valve. Leaflet separation is normal. There is no evidence for stenosis. Moderate regurgitation.??   ??  Nuclear perfusion imaging study 8 May 2023:  1. Pharmacological regadenoson nuclear stress test is abnormal.  There is partially reversible change in inferior and basal to mid inferolateral walls indicating inducible myocardial ischemia.  2. Normal left ventricular size, wall motion and systolic function.  Calculated left ventricular ejection fraction is 65%.  3. The patient is at an intermediate risk of future cardiac ischemic events.    Nuclear perfusion imaging study 16 August 2022:?   1. Nuclear stress  "test is abnormal.? There is a large area of transmural infarction involving the apex and distal inferolateral wall extending into the inferior wall.? There is a medium size area of stella-infarct ischemia involving the mid to basal inferior and inferolateral wall.?   2. The left ventricular ejection fraction at stress is 67% with severe hypokinesis of the distal inferior and apical walls.?   ?   Coronary angiography 17 August 2022:?   1. Left anterior descending coronary artery: Proximal 100% stenosis.?   2. Circumflex coronary artery: First obtuse marginal with 50% stenosis.?   3. Right coronary artery: 25% mid vessel stenosis.?   4. JEOVANY graft to the LAD: Widely patent.?   ?   Coronary angiogram 23 May 2011:?   1. Normal left main.??   2. Severe eccentric 95% proximal left anterior descending stenosis just after the first diagonal and just after the first septal  branches originate. Second eccentric stenosis of 60-70% is present proximally involving the origin of the second diagonal branch. The second diagonal branch ostium has a 50% stenosis.??   3. Mild irregularity of the circumflex, circumflex marginal branches.??   4. Mild irregularity of the right coronary artery. No significant stenosis. PDA arises distally, appears normal.??   5. Overall left ventricular ejection fraction normal.??   ??   Twelve-lead ECG (personally reviewed) 11 June 2018: Sinus rhythm with occasional PVCs.? Left bundle branch block.?             Physical Examination       /76 (BP Location: Left arm, Patient Position: Sitting, Cuff Size: Adult Large)   Pulse 87   Resp 16   Ht 1.6 m (5' 3\")   Wt 82.3 kg (181 lb 8 oz)   LMP  (LMP Unknown)   SpO2 90%   BMI 32.15 kg/m          Wt Readings from Last 3 Encounters:   06/08/23 82.3 kg (181 lb 8 oz)   05/31/23 81.6 kg (180 lb)   05/25/23 81.6 kg (180 lb)       The patient is alert and oriented times three. Sclerae are anicteric. Mucosal membranes are moist. Jugular venous " pressure is normal. No significant adenopathy/thyromegally appreciated. Lungs are clear with good expansion. On cardiovascular exam, the patient has a regular S1 and S2. Abdomen is soft and non-tender. Extremities reveal no clubbing, cyanosis, or edema.         Family History/Social History/Risk Factors   Patient does not smoke.  Family history reviewed, and family history includes Heart Disease in her father and mother.          Medical History  Surgical History Family History Social History   Past Medical History:   Diagnosis Date     Acute diastolic congestive heart failure (H)      Acute kidney injury superimposed on CKD (H) 2022     Acute on chronic congestive heart failure (H) 2018     Acute pulmonary edema (H) 2023     Acute respiratory failure with hypoxia (H) 2023     Coronary artery disease      Coronary artery disease involving native coronary artery without angina pectoris, unspecified whether native or transplanted heart 2022     Diabetes mellitus, type II (H)      Diastolic dysfunction 07/10/2018     Frozen shoulder     bilateral     Heart failure with reduced ejection fraction (H) 2023     Hyperlipidemia      Hypertension      Hypertensive emergency      Parkinson disease (H)      Primary osteoarthritis involving multiple joints      Primary osteoarthritis of left knee      Recurrent UTI     hx septic shock     Thyroid disease      Past Surgical History:   Procedure Laterality Date     APPENDECTOMY       APPENDECTOMY       BACK SURGERY      L4-S1 laminectomy     BYPASS GRAFT ARTERY CORONARY      3 vessel      SECTION        SECTION       CORONARY ARTERY BYPASS       CV CORONARY ANGIOGRAM N/A 2022    Procedure: Coronary Angiogram;  Surgeon: Ignacio Baird MD;  Location: [facility] CATH LAB CV     HERNIORRHAPHY VENTRAL Left      HYSTERECTOMY       HYSTERECTOMY       JOINT REPLACEMENT Left     Total left knee     OTHER SURGICAL HISTORY       right ankle surgery     OTHER SURGICAL HISTORY      right forearm fracture     REPLACEMENT TOTAL KNEE Right      SHOULDER SURGERY Right     reversed shoulder surgery.     Family History   Problem Relation Age of Onset     Heart Disease Mother      Heart Disease Father         Social History     Socioeconomic History     Marital status:      Spouse name: Not on file     Number of children: Not on file     Years of education: Not on file     Highest education level: Not on file   Occupational History     Not on file   Tobacco Use     Smoking status: Never     Passive exposure: Never     Smokeless tobacco: Never   Vaping Use     Vaping status: Never Used     Passive vaping exposure: Yes   Substance and Sexual Activity     Alcohol use: No     Drug use: No     Sexual activity: Not Currently     Partners: Male     Birth control/protection: None   Other Topics Concern     Parent/sibling w/ CABG, MI or angioplasty before 65F 55M? No   Social History Narrative    6/15/2021 new to [place] from [place]. She has 6 kids.     Social Determinants of Health     Financial Resource Strain: Not on file   Food Insecurity: Not on file   Transportation Needs: Not on file   Physical Activity: Not on file   Stress: Not on file   Social Connections: Not on file   Intimate Partner Violence: Not on file   Housing Stability: Not on file           Medications  Allergies   Current Outpatient Medications   Medication Sig Dispense Refill     albuterol (PROVENTIL) (2.5 MG/3ML) 0.083% neb solution Take 1 vial (2.5 mg) by nebulization every 6 hours as needed for shortness of breath, wheezing or cough 90 mL 0     aspirin 81 mg chewable tablet [ASPIRIN 81 MG CHEWABLE TABLET] Chew 81 mg at bedtime.       atorvastatin (LIPITOR) 20 MG tablet TAKE 1 TABLET BY MOUTH AT  BEDTIME 90 tablet 3     carbidopa-levodopa (SINEMET)  MG tablet TAKE 1 TABLET BY MOUTH 3  TIMES DAILY TAKE AT LEAST  1/2 HOUR BEFORE MEALS OR 2  HOURS AFTER MEALS DO NOT  MIX WITH  FOOD 270 tablet 3     carvedilol (COREG) 12.5 MG tablet TAKE 1 TABLET BY MOUTH  TWICE DAILY WITH MEALS 180 tablet 3     cholecalciferol, vitamin D3, 1,000 unit tablet Take 1,000 Units by mouth 2 times daily       coenzyme Q10 (CO Q-10) 100 mg capsule [COENZYME Q10 (CO Q-10) 100 MG CAPSULE] Take 100 mg by mouth 2 (two) times a day.       cyanocobalamin (VITAMIN B-12) 1000 MCG tablet Take 1 tablet (1,000 mcg) by mouth daily 90 tablet 3     diazepam (VALIUM) 5 MG tablet For MRI. Take 1 tab 30 min before MRI and repeat if needed. 2 tablet 0     fish oil-omega-3 fatty acids 1000 MG capsule Take 1 g by mouth 2 times daily       FLUoxetine (PROZAC) 20 MG capsule TAKE 1 CAPSULE BY MOUTH  TWICE DAILY 180 capsule 0     furosemide (LASIX) 20 MG tablet Take 1 tablet (20 mg) by mouth daily 90 tablet 1     gabapentin (NEURONTIN) 300 MG capsule TAKE 1 CAPSULE BY MOUTH 4  TIMES DAILY 360 capsule 3     guaiFENesin (MUCINEX) 600 MG 12 hr tablet Take 2 tablets (1,200 mg) by mouth 2 times daily 14 tablet 0     levothyroxine (SYNTHROID/LEVOTHROID) 25 MCG tablet TAKE 1 TABLET BY MOUTH  DAILY 90 tablet 2     lisinopril (ZESTRIL) 20 MG tablet Take 1 tablet (20 mg) by mouth daily 90 tablet 1     loratadine (CLARITIN) 10 mg tablet Take 10 mg by mouth daily as needed for allergies       magnesium oxide 250 mg Tab [MAGNESIUM OXIDE 250 MG TAB] Take 250 mg by mouth daily.       melatonin 1 mg Tab tablet Take 1 mg by mouth At Bedtime       multivitamin therapeutic tablet Take 1 tablet by mouth every morning       nitroGLYcerin (NITROSTAT) 0.4 MG sublingual tablet Place 1 tablet (0.4 mg) under the tongue every 5 minutes as needed for chest pain 100 tablet 1     omeprazole (PRILOSEC) 20 MG DR capsule Take 20 mg by mouth daily before breakfast       polyethylene glycol (MIRALAX) 17 gram packet Take 17 g by mouth daily as needed for constipation       triamcinolone (KENALOG) 0.025 % external ointment Apply topically 2 times daily (Patient taking  "differently: Apply topically 2 times daily as needed for irritation) 80 g 0       Allergies   Allergen Reactions     Alendronate [Alendronate] Unknown     pain     Codeine Hives     Hydrocodone-Acetaminophen Other (See Comments)     Highly sensitive, \"knocks her out and can't wake up\"     Other Drug Allergy (See Comments)      Risedronate Unknown     Bone pain     Rosuvastatin Muscle Pain (Myalgia)     Simvastatin Muscle Pain (Myalgia)          Lab Results    Chemistry/lipid CBC Cardiac Enzymes/BNP/TSH/INR   Recent Labs   Lab Test 08/18/22  0506   CHOL 121   HDL 29*   LDL 58   TRIG 172*     Recent Labs   Lab Test 08/18/22  0506 12/14/21  1638   LDL 58 32     Recent Labs   Lab Test 05/24/23  1016      POTASSIUM 4.9   CHLORIDE 101   CO2 24   *   BUN 32.4*   CR 0.93   GFRESTIMATED 58*   LIZZY 10.6*     Recent Labs   Lab Test 05/24/23  1016 05/12/23  0504 05/11/23  0437   CR 0.93 1.21* 1.19*     Recent Labs   Lab Test 04/28/23  1437 10/11/22  1712 06/28/22  1616   A1C 6.4* 5.9* 8.0*          Recent Labs   Lab Test 05/12/23  0504 05/09/23  0435 05/08/23  0552   WBC  --   --  6.4   HGB  --   --  12.6   HCT  --   --  38.1   MCV  --   --  88      < > 172    < > = values in this interval not displayed.     Recent Labs   Lab Test 05/08/23  0552 05/07/23  0434 05/06/23  1852   HGB 12.6 13.2 12.8    Recent Labs   Lab Test 05/07/23  0434 05/06/23  2323 05/06/23  1852   TROPONINI 0.04 0.03 0.03     Recent Labs   Lab Test 05/11/23  0437 05/06/23  1852 08/15/22  1920   BNP 82 628* 129     Recent Labs   Lab Test 05/08/23  0552   TSH 1.92     No results for input(s): INR in the last 53353 hours.       Medications  Allergies   Current Outpatient Medications   Medication Sig Dispense Refill     albuterol (PROVENTIL) (2.5 MG/3ML) 0.083% neb solution Take 1 vial (2.5 mg) by nebulization every 6 hours as needed for shortness of breath, wheezing or cough 90 mL 0     aspirin 81 mg chewable tablet [ASPIRIN 81 MG CHEWABLE " TABLET] Chew 81 mg at bedtime.       atorvastatin (LIPITOR) 20 MG tablet TAKE 1 TABLET BY MOUTH AT  BEDTIME 90 tablet 3     carbidopa-levodopa (SINEMET)  MG tablet TAKE 1 TABLET BY MOUTH 3  TIMES DAILY TAKE AT LEAST  1/2 HOUR BEFORE MEALS OR 2  HOURS AFTER MEALS DO NOT  MIX WITH FOOD 270 tablet 3     carvedilol (COREG) 12.5 MG tablet TAKE 1 TABLET BY MOUTH  TWICE DAILY WITH MEALS 180 tablet 3     cholecalciferol, vitamin D3, 1,000 unit tablet Take 1,000 Units by mouth 2 times daily       coenzyme Q10 (CO Q-10) 100 mg capsule [COENZYME Q10 (CO Q-10) 100 MG CAPSULE] Take 100 mg by mouth 2 (two) times a day.       cyanocobalamin (VITAMIN B-12) 1000 MCG tablet Take 1 tablet (1,000 mcg) by mouth daily 90 tablet 3     diazepam (VALIUM) 5 MG tablet For MRI. Take 1 tab 30 min before MRI and repeat if needed. 2 tablet 0     fish oil-omega-3 fatty acids 1000 MG capsule Take 1 g by mouth 2 times daily       FLUoxetine (PROZAC) 20 MG capsule TAKE 1 CAPSULE BY MOUTH  TWICE DAILY 180 capsule 0     furosemide (LASIX) 20 MG tablet Take 1 tablet (20 mg) by mouth daily 90 tablet 1     gabapentin (NEURONTIN) 300 MG capsule TAKE 1 CAPSULE BY MOUTH 4  TIMES DAILY 360 capsule 3     guaiFENesin (MUCINEX) 600 MG 12 hr tablet Take 2 tablets (1,200 mg) by mouth 2 times daily 14 tablet 0     levothyroxine (SYNTHROID/LEVOTHROID) 25 MCG tablet TAKE 1 TABLET BY MOUTH  DAILY 90 tablet 2     lisinopril (ZESTRIL) 20 MG tablet Take 1 tablet (20 mg) by mouth daily 90 tablet 1     loratadine (CLARITIN) 10 mg tablet Take 10 mg by mouth daily as needed for allergies       magnesium oxide 250 mg Tab [MAGNESIUM OXIDE 250 MG TAB] Take 250 mg by mouth daily.       melatonin 1 mg Tab tablet Take 1 mg by mouth At Bedtime       multivitamin therapeutic tablet Take 1 tablet by mouth every morning       nitroGLYcerin (NITROSTAT) 0.4 MG sublingual tablet Place 1 tablet (0.4 mg) under the tongue every 5 minutes as needed for chest pain 100 tablet 1      "omeprazole (PRILOSEC) 20 MG DR capsule Take 20 mg by mouth daily before breakfast       polyethylene glycol (MIRALAX) 17 gram packet Take 17 g by mouth daily as needed for constipation       triamcinolone (KENALOG) 0.025 % external ointment Apply topically 2 times daily (Patient taking differently: Apply topically 2 times daily as needed for irritation) 80 g 0      Allergies   Allergen Reactions     Alendronate [Alendronate] Unknown     pain     Codeine Hives     Hydrocodone-Acetaminophen Other (See Comments)     Highly sensitive, \"knocks her out and can't wake up\"     Other Drug Allergy (See Comments)      Risedronate Unknown     Bone pain     Rosuvastatin Muscle Pain (Myalgia)     Simvastatin Muscle Pain (Myalgia)          Lab Results   Lab Results   Component Value Date     05/24/2023    CO2 24 05/24/2023    CO2 25 05/12/2023    BUN 32.4 05/24/2023    BUN 57 05/12/2023     Lab Results   Component Value Date    WBC 6.4 05/08/2023    HGB 12.6 05/08/2023    HCT 38.1 05/08/2023    MCV 88 05/08/2023     05/12/2023     Lab Results   Component Value Date    CHOL 121 08/18/2022    TRIG 172 08/18/2022    HDL 29 08/18/2022     No results found for: INR  Lab Results   Component Value Date    BNP 82 05/11/2023     Lab Results   Component Value Date    TROPONINI 0.04 05/07/2023    TROPONINI 0.03 05/06/2023    TROPONINI 0.03 05/06/2023     Lab Results   Component Value Date    TSH 1.92 05/08/2023                  "

## 2023-06-13 ENCOUNTER — PATIENT OUTREACH (OUTPATIENT)
Dept: CARE COORDINATION | Facility: CLINIC | Age: 88
End: 2023-06-13
Payer: MEDICARE

## 2023-06-19 ENCOUNTER — LAB (OUTPATIENT)
Dept: LAB | Facility: CLINIC | Age: 88
End: 2023-06-19
Payer: MEDICARE

## 2023-06-19 DIAGNOSIS — R47.81 SLURRED SPEECH: ICD-10-CM

## 2023-06-19 DIAGNOSIS — R94.6 THYROID FUNCTION TEST ABNORMAL: ICD-10-CM

## 2023-06-19 DIAGNOSIS — M80.00XD AGE-RELATED OSTEOPOROSIS WITH CURRENT PATHOLOGICAL FRACTURE WITH ROUTINE HEALING: ICD-10-CM

## 2023-06-19 DIAGNOSIS — E78.1 HYPERTRIGLYCERIDEMIA: ICD-10-CM

## 2023-06-19 PROCEDURE — 82306 VITAMIN D 25 HYDROXY: CPT

## 2023-06-19 PROCEDURE — 86255 FLUORESCENT ANTIBODY SCREEN: CPT | Mod: 90

## 2023-06-19 PROCEDURE — 82310 ASSAY OF CALCIUM: CPT

## 2023-06-19 PROCEDURE — 84443 ASSAY THYROID STIM HORMONE: CPT

## 2023-06-19 PROCEDURE — 99000 SPECIMEN HANDLING OFFICE-LAB: CPT

## 2023-06-19 PROCEDURE — 82040 ASSAY OF SERUM ALBUMIN: CPT

## 2023-06-19 PROCEDURE — 36415 COLL VENOUS BLD VENIPUNCTURE: CPT

## 2023-06-19 PROCEDURE — 82565 ASSAY OF CREATININE: CPT

## 2023-06-19 PROCEDURE — 80061 LIPID PANEL: CPT

## 2023-06-19 PROCEDURE — 83516 IMMUNOASSAY NONANTIBODY: CPT | Mod: 90

## 2023-06-19 PROCEDURE — 83519 RIA NONANTIBODY: CPT | Mod: 90

## 2023-06-20 LAB
ALBUMIN SERPL BCG-MCNC: 4.4 G/DL (ref 3.5–5.2)
CALCIUM SERPL-MCNC: 10.8 MG/DL (ref 8.8–10.2)
CHOLEST SERPL-MCNC: 145 MG/DL
CREAT SERPL-MCNC: 1.04 MG/DL (ref 0.51–0.95)
GFR SERPL CREATININE-BSD FRML MDRD: 51 ML/MIN/1.73M2
HDLC SERPL-MCNC: 39 MG/DL
LDLC SERPL CALC-MCNC: 31 MG/DL
NONHDLC SERPL-MCNC: 106 MG/DL
TRIGL SERPL-MCNC: 376 MG/DL
TSH SERPL DL<=0.005 MIU/L-ACNC: 1.7 UIU/ML (ref 0.3–4.2)

## 2023-06-21 LAB
DEPRECATED CALCIDIOL+CALCIFEROL SERPL-MC: <58 UG/L (ref 20–75)
VITAMIN D2 SERPL-MCNC: <5 UG/L
VITAMIN D3 SERPL-MCNC: 53 UG/L

## 2023-06-22 LAB
ACHR BIND AB SER-SCNC: 0 NMOL/L
ACHR BLOCK AB/ACHR TOTAL SFR SER: 10 %
STRIA MUS IGG SER QL IF: NORMAL

## 2023-06-23 LAB — ACHR MOD AB/ACHR TOTAL SFR SER: 0 %

## 2023-06-27 ENCOUNTER — ALLIED HEALTH/NURSE VISIT (OUTPATIENT)
Dept: ENDOCRINOLOGY | Facility: CLINIC | Age: 88
End: 2023-06-27
Payer: MEDICARE

## 2023-06-27 DIAGNOSIS — N18.30 STAGE 3 CHRONIC KIDNEY DISEASE, UNSPECIFIED WHETHER STAGE 3A OR 3B CKD (H): ICD-10-CM

## 2023-06-27 DIAGNOSIS — T45.8X5A ADVERSE EFFECT OF ORAL BISPHOSPHONATES: ICD-10-CM

## 2023-06-27 DIAGNOSIS — M80.00XD AGE-RELATED OSTEOPOROSIS WITH CURRENT PATHOLOGICAL FRACTURE WITH ROUTINE HEALING: Primary | ICD-10-CM

## 2023-06-27 PROCEDURE — 99207 PR NO CHARGE NURSE ONLY: CPT

## 2023-06-27 PROCEDURE — 96372 THER/PROPH/DIAG INJ SC/IM: CPT | Performed by: INTERNAL MEDICINE

## 2023-06-27 NOTE — PROGRESS NOTES
Clinic Administered Medication Documentation      Prolia Documentation    Indication: Prolia  (denosumab) is a prescription medicine used to treat osteoporosis in patients who:     Are at high risk for fracture, meaning patients who have had a fracture related to osteoporosis, or who have multiple risk factors for fracture.    Cannot use another osteoporosis medicine or other osteoporosis medicines did not work well.    The timeline for early/late injections would be 4 weeks early and any time after the 6 month leonard. If a patient receives their injection late, then the subsequent injection would be 6 months from the date that they actually received the injection.    When was the last injection?  23  Was the last injection at least 6 months ago? Yes  Has the prior authorization been completed?  Yes  Is there an active order (written within the past 365 days, with administrations remaining, not ) in the chart?  Yes  Patient denies any dental work involving the bone (e.g. tooth extraction or dental implants) in the past 4 weeks?  Yes  Patient denies plans for any dental work involving the bone (e.g. tooth extraction or dental implants) in the next 4 weeks? Yes    The following steps were completed to comply with the REMS program for Prolia:    Reviewed information in the Medication Guide and Patient Counseling Chart, including the serious risks of Prolia  and the symptoms of each risk.    Advised patient to seek prompt medical attention if they have signs or symptoms of any of the serious risks.    Provided each patient a copy of the Medication Guide and Patient Brochure.      Prior to injection, verified patient identity using patient's name and date of birth. Medication was administered. Please see MAR and medication order for additional information. Patient instructed to remain in clinic for 15 minutes and report any adverse reaction to staff immediately.    Vial/Syringe: Syringe  Was this medication  supplied by the patient? No  Verified that the patient has refills remaining in their prescription.        Name of provider who requested the medication administration:   Name of provider on site (faculty or community preceptor) at the time of performing the medication administration:     Date of next administration: 12/27/23  Date of next office visit with provider to renew medication plan (must be seen annually): 04/30/24

## 2023-06-28 DIAGNOSIS — F33.42 RECURRENT MAJOR DEPRESSIVE DISORDER, IN FULL REMISSION (H): ICD-10-CM

## 2023-06-28 NOTE — TELEPHONE ENCOUNTER
"Routing refill request to provider for review/approval because:  PHQ-9 score    Last Written Prescription Date:  3/12/2023  Last Fill Quantity: 180,  # refills: 0   Last office visit provider:  5/24/2023     Requested Prescriptions   Pending Prescriptions Disp Refills     FLUoxetine (PROZAC) 20 MG capsule [Pharmacy Med Name: FLUoxetine HCl 20 MG Oral Capsule] 180 capsule 3     Sig: TAKE 1 CAPSULE BY MOUTH TWICE  DAILY       SSRIs Protocol Failed - 6/28/2023  4:01 AM        Failed - PHQ-9 score less than 5 in past 6 months     Please review last PHQ-9 score.           Passed - Medication is active on med list        Passed - Patient is age 18 or older        Passed - No active pregnancy on record        Passed - No positive pregnancy test in last 12 months        Passed - Recent (6 mo) or future (30 days) visit within the authorizing provider's specialty     Patient had office visit in the last 6 months or has a visit in the next 30 days with authorizing provider or within the authorizing provider's specialty.  See \"Patient Info\" tab in inbasket, or \"Choose Columns\" in Meds & Orders section of the refill encounter.                 Kelley Hannon RN 06/28/23 4:10 PM  "

## 2023-07-24 DIAGNOSIS — M80.00XD AGE-RELATED OSTEOPOROSIS WITH CURRENT PATHOLOGICAL FRACTURE WITH ROUTINE HEALING: Primary | ICD-10-CM

## 2023-08-05 ENCOUNTER — HEALTH MAINTENANCE LETTER (OUTPATIENT)
Age: 88
End: 2023-08-05

## 2023-08-09 ENCOUNTER — OFFICE VISIT (OUTPATIENT)
Dept: FAMILY MEDICINE | Facility: CLINIC | Age: 88
End: 2023-08-09
Attending: FAMILY MEDICINE
Payer: MEDICARE

## 2023-08-09 VITALS
HEIGHT: 63 IN | SYSTOLIC BLOOD PRESSURE: 128 MMHG | DIASTOLIC BLOOD PRESSURE: 77 MMHG | RESPIRATION RATE: 16 BRPM | OXYGEN SATURATION: 95 % | WEIGHT: 182.5 LBS | TEMPERATURE: 97.5 F | HEART RATE: 60 BPM | BODY MASS INDEX: 32.34 KG/M2

## 2023-08-09 DIAGNOSIS — E11.42 TYPE 2 DIABETES MELLITUS WITH DIABETIC POLYNEUROPATHY, WITHOUT LONG-TERM CURRENT USE OF INSULIN (H): Primary | ICD-10-CM

## 2023-08-09 DIAGNOSIS — E83.52 HYPERCALCEMIA: ICD-10-CM

## 2023-08-09 DIAGNOSIS — Z00.00 PREVENTATIVE HEALTH CARE: ICD-10-CM

## 2023-08-09 DIAGNOSIS — I65.23 BILATERAL CAROTID ARTERY STENOSIS: ICD-10-CM

## 2023-08-09 DIAGNOSIS — I10 BENIGN ESSENTIAL HYPERTENSION: ICD-10-CM

## 2023-08-09 PROBLEM — R07.9 CHEST PAIN, UNSPECIFIED TYPE: Status: RESOLVED | Noted: 2022-08-15 | Resolved: 2023-08-09

## 2023-08-09 PROBLEM — I51.89 DIASTOLIC DYSFUNCTION: Status: ACTIVE | Noted: 2018-07-10

## 2023-08-09 LAB
HBA1C MFR BLD: 6.5 % (ref 0–5.6)
HOLD SPECIMEN: NORMAL

## 2023-08-09 PROCEDURE — 36415 COLL VENOUS BLD VENIPUNCTURE: CPT | Performed by: FAMILY MEDICINE

## 2023-08-09 PROCEDURE — 99214 OFFICE O/P EST MOD 30 MIN: CPT | Performed by: FAMILY MEDICINE

## 2023-08-09 PROCEDURE — 83036 HEMOGLOBIN GLYCOSYLATED A1C: CPT | Performed by: FAMILY MEDICINE

## 2023-08-09 ASSESSMENT — PATIENT HEALTH QUESTIONNAIRE - PHQ9: SUM OF ALL RESPONSES TO PHQ QUESTIONS 1-9: 0

## 2023-08-09 NOTE — ASSESSMENT & PLAN NOTE
Hypercalcemia - 5/24/2023 slightly high calcium 10.6-should recheck at follow-up.  Its been high sometime in the past and then normalized and now is high again

## 2023-08-09 NOTE — ASSESSMENT & PLAN NOTE
>>ASSESSMENT AND PLAN FOR CORONARY ARTERY STENOSIS WRITTEN ON 6/15/2021  2:12 PM BY ASHLY MORRISON MD    New to [place] from [place]. Needs cardiologist, so referral placed.

## 2023-08-09 NOTE — ASSESSMENT & PLAN NOTE
Normal bp today. No change in plan.   Continue carvedilol 12.5mg po q day  continue lisinopril 20 mg po q day.

## 2023-08-09 NOTE — PROGRESS NOTES
Assessment & Plan   Problem List Items Addressed This Visit          Nervous and Auditory    Type 2 diabetes mellitus with diabetic polyneuropathy, without long-term current use of insulin (H) - Primary     Diabetes well controlled - A1c 6.5. no change in plan.         Relevant Orders    HEMOGLOBIN A1C (Completed)       Endocrine    Hypercalcemia     Hypercalcemia - 5/24/2023 slightly high calcium 10.6-should recheck at follow-up.  Its been high sometime in the past and then normalized and now is high again            Circulatory    Benign essential hypertension     Normal bp today. No change in plan.   Continue carvedilol 12.5mg po q day  continue lisinopril 20 mg po q day.          Bilateral carotid artery stenosis       Other    Preventative health care     Due for annual wellness visit, recommend schedule when able, romana Arriaga MD  [facility]    Ally Marrero is a 89 year old, presenting for the following health issues:  Diabetes and Hypertension        8/9/2023     4:16 PM   Additional Questions   Roomed by sac         8/9/2023     4:16 PM   Patient Reported Additional Medications   Patient reports taking the following new medications no       History of Present Illness       Diabetes:   She presents for follow up of diabetes.    She is not checking blood glucose.         She has no concerns regarding her diabetes at this time.   She is not experiencing numbness or burning in feet, excessive thirst, blurry vision, weight changes or redness, sores or blisters on feet.           She eats 0-1 servings of fruits and vegetables daily.She consumes 0 sweetened beverage(s) daily.She exercises with enough effort to increase her heart rate 10 to 19 minutes per day.  She exercises with enough effort to increase her heart rate 3 or less days per week.   She is taking medications regularly.             Objective    /77 (BP Location: Left arm, Patient Position:  "Sitting, Cuff Size: Adult Large)   Pulse 60   Temp 97.5  F (36.4  C) (Oral)   Resp 16   Ht 1.6 m (5' 3\")   Wt 82.8 kg (182 lb 8 oz)   LMP  (LMP Unknown)   SpO2 95%   BMI 32.33 kg/m    Body mass index is 32.33 kg/m .  Physical Exam  Constitutional:       Appearance: Normal appearance.   HENT:      Head: Normocephalic and atraumatic.   Cardiovascular:      Rate and Rhythm: Normal rate and regular rhythm.   Pulmonary:      Effort: Pulmonary effort is normal.   Musculoskeletal:         General: Normal range of motion.      Cervical back: Normal range of motion and neck supple.   Neurological:      General: No focal deficit present.      Mental Status: She is alert and oriented to person, place, and time.              Diabetic foot exam: normal DP and PT pulses, no trophic changes or ulcerative lesions, and normal sensory exam         "

## 2023-08-10 ENCOUNTER — MEDICAL CORRESPONDENCE (OUTPATIENT)
Dept: HEALTH INFORMATION MANAGEMENT | Facility: CLINIC | Age: 88
End: 2023-08-10
Payer: MEDICARE

## 2023-08-11 ENCOUNTER — OFFICE VISIT (OUTPATIENT)
Dept: PULMONOLOGY | Facility: CLINIC | Age: 88
End: 2023-08-11
Payer: MEDICARE

## 2023-08-11 ENCOUNTER — ALLIED HEALTH/NURSE VISIT (OUTPATIENT)
Dept: PULMONOLOGY | Facility: CLINIC | Age: 88
End: 2023-08-11
Payer: MEDICARE

## 2023-08-11 VITALS
HEIGHT: 63 IN | WEIGHT: 182 LBS | SYSTOLIC BLOOD PRESSURE: 128 MMHG | DIASTOLIC BLOOD PRESSURE: 82 MMHG | BODY MASS INDEX: 32.25 KG/M2 | HEART RATE: 72 BPM | OXYGEN SATURATION: 96 %

## 2023-08-11 DIAGNOSIS — J98.09 BRONCHOMALACIA: Primary | ICD-10-CM

## 2023-08-11 DIAGNOSIS — J98.09 BRONCHOMALACIA: ICD-10-CM

## 2023-08-11 DIAGNOSIS — K21.00 GASTROESOPHAGEAL REFLUX DISEASE WITH ESOPHAGITIS WITHOUT HEMORRHAGE: ICD-10-CM

## 2023-08-11 DIAGNOSIS — I34.0 NONRHEUMATIC MITRAL VALVE REGURGITATION: ICD-10-CM

## 2023-08-11 DIAGNOSIS — I50.22 CHRONIC SYSTOLIC HEART FAILURE (H): ICD-10-CM

## 2023-08-11 LAB — HGB BLD-MCNC: 11.3 G/DL

## 2023-08-11 PROCEDURE — 85018 HEMOGLOBIN: CPT | Performed by: INTERNAL MEDICINE

## 2023-08-11 PROCEDURE — 99204 OFFICE O/P NEW MOD 45 MIN: CPT | Performed by: INTERNAL MEDICINE

## 2023-08-11 PROCEDURE — 94375 RESPIRATORY FLOW VOLUME LOOP: CPT | Performed by: INTERNAL MEDICINE

## 2023-08-11 PROCEDURE — 94726 PLETHYSMOGRAPHY LUNG VOLUMES: CPT | Performed by: INTERNAL MEDICINE

## 2023-08-11 PROCEDURE — 94729 DIFFUSING CAPACITY: CPT | Performed by: INTERNAL MEDICINE

## 2023-08-11 NOTE — PROGRESS NOTES
PULMONARY OUTPATIENT CONSULT NOTE        Assessment:     Moderate tracheobronchomalacia   No tobacco use. No outdoor allergies, no history of asthma.   PFTs showed a normal spirometry , lung volumes and diffusion capacity.   Hx acid reflux. Currently on PPI. Diet modification was discussed.   Bronchodilators as needed  Check overnight pulse oximetry on RA.   HTN, HFrEF, CAD s/p CABG , moderate MR  GERD  Obesity Body mass index is 32.24 kg/m .     Plan:      Overnight pulse oximetry on RA  Bronchodilators as needed  Chin tuck posture with liquid intake  Avoid eating close to bed time   Raise the head of the bed  Omeprazole daily   Contact me one week later after overnight pulse oximetry test is done to discuss results   Follow up in 4 months        Junaid Lowe  Pulmonary / Critical Care  August 11, 2023        CC:     Chief Complaint   Patient presents with    Consult     Bronchomalacia, had PFT today      HPI:         Janes Montero is a 89 year old female who presents for evaluation of tracheo-bronchomalacia   Patient has history of HTN, HFrEF, , moderate MR, CAD s/p CABG 2010, abnormal stress test 5/2023 on medical treatment, CKD, DM, Parkinson's disease , obesity.  Patient was hospitalized from May 6 to 12, 2023, with diagnosis of acute on chronic heart failure, pulmonary edema, acute bronchitis. Chest CT scan showed bronchial wall thickening, tracheobronchomalacia and mosaic attenuation of lung parenchyma. Respiratory symptoms improved with diureses. Echocardiogram showed ejection fraction of 45 to 50% with moderate mitral regurgitation and mild to moderate tricuspid regurgitation. NM stress test showed ischemic changes. A repeat coronary angiography was indicated by in view of renal insufficiency , conservative therapy was advised.   Patient was discharged to assistant living on O2 supplementation.   Os supplementation was later wean off.      Denies exertional dyspnea, able to walk less than  a block, uses a walker to get around, activity is limited due to back pain and knee pain. Reports occasional dry cough, occasional wheezes.  Denies hemoptysis.   No fever, chills or night sweats.   Denies outdoor allergies, no history of asthma. Denies postnasal drip, headaches, or sinus tenderness.   Denies using inhalers.   Denies chest pain, orthopnea, PND, or swelling of LEs.  Denies acid reflux symptoms while taking PPI. Reports occasional swallowing problems with clear liquids.   Denies sleeping problems.   Denies tobacco use.      Past Medical History :     Past Medical History:   Diagnosis Date    Acute diastolic congestive heart failure (H)     Acute kidney injury superimposed on CKD (H) 08/18/2022    Acute on chronic congestive heart failure (H) 06/11/2018    Acute pulmonary edema (H) 05/06/2023    Acute respiratory failure with hypoxia (H) 05/08/2023    Chest pain, unspecified type 08/15/2022    Coronary artery disease     Coronary artery disease involving native coronary artery without angina pectoris, unspecified whether native or transplanted heart 08/17/2022    Diabetes mellitus, type II (H)     Diastolic dysfunction 07/10/2018    Frozen shoulder     bilateral    Heart failure with reduced ejection fraction (H) 05/25/2023    Hyperlipidemia     Hypertension     Hypertensive emergency     Parkinson disease (H)     Primary osteoarthritis involving multiple joints     Primary osteoarthritis of left knee     Recurrent UTI     hx septic shock    Thyroid disease 2021          Medications:     MEDSPRIOR@       Social History :     Social History     Socioeconomic History    Marital status:      Spouse name: Not on file    Number of children: Not on file    Years of education: Not on file    Highest education level: Not on file   Occupational History    Not on file   Tobacco Use    Smoking status: Never     Passive exposure: Never    Smokeless tobacco: Never   Vaping Use    Vaping Use: Never used  "  Substance and Sexual Activity    Alcohol use: No    Drug use: No    Sexual activity: Not Currently     Partners: Male     Birth control/protection: None   Other Topics Concern    Parent/sibling w/ CABG, MI or angioplasty before 65F 55M? No   Social History Narrative    6/15/2021 new to [place] from [place]. She has 6 kids.     Social Determinants of Health     Financial Resource Strain: Not on file   Food Insecurity: Not on file   Transportation Needs: Not on file   Physical Activity: Not on file   Stress: Not on file   Social Connections: Not on file   Intimate Partner Violence: Not on file   Housing Stability: Not on file          Family History :     Family History   Problem Relation Age of Onset    Heart Disease Mother     Heart Disease Father        Review of Systems  A 12 point comprehensive review of systems was negative except as noted.        Objective:     /82 (BP Location: Left arm, Patient Position: Sitting, Cuff Size: Adult Regular)   Pulse 72   Ht 1.6 m (5' 3\")   Wt 82.6 kg (182 lb)   LMP  (LMP Unknown)   SpO2 96%   BMI 32.24 kg/m        Gen: obese, awake, alert, no distress  HEENT: pink conjunctiva, moist mucosa, Mallampati III/IV  Neck: no thyromegaly, masses or JVD  Lungs: clear  CV: irregular, systolic murmurs, no gallops appreciated  Abdomen: soft, NT, BS wnl  Ext: no edema  Neuro: CN II-XII intact, non focal      Diagnostic tests:         PFTs:          IMAGES:      CT CHEST W/O CONTRAST  LOCATION: [facility]  DATE/TIME: 5/10/2023 5:25 PM CDT  INDICATION: persistent hypoxia; eval further for possible interstitial lung disease suggested on CXR  COMPARISON: 05/06/2023  FINDINGS:   LUNGS AND PLEURA: Modest motion artifact. No focal pneumonia. No pleural effusion. No significant peripheral interstitial fibrosis suggested.Mosaic attenuation suggests small airways disease. There is moderate inflammatory bronchial wall thickening most pronounced in the perihilar " regions. In addition, there is flattening of trachea and main bronchial structures consistent with bronchial tracheobronchomalacia.  MEDIASTINUM/AXILLAE: Postoperative changes from CABG. Cardiomegaly. No lymphadenopathy.  CORONARY ARTERY CALCIFICATION: Severe.  UPPER ABDOMEN: 2.4 cm hyperdense round nodule upper pole left kidney most suggestive of benign hyperdense cyst.  MUSCULOSKELETAL: Mild compression superior endplate of L1 age indeterminate. Right shoulder arthroplasty.        IMPRESSION:   1.  No significant interstitial fibrosis suggested. Modest motion artifact does compromise this exam.  2.  There is moderate inflammatory bronchial wall thickening, tracheobronchomalacia and mosaic attenuation of lung parenchyma consistent with small airways disease.  3.  2.4 cm hyperdense structure upper pole left kidney likely a benign hyperdense cyst. Ultrasound may be challenging to perform due to position of lesion in patient body habitus. Suggest a follow-up dedicated renal CT in 4-6 months for confirmation.    Echocardiogram Complete 5/9/2023  Interpretation Summary   Left ventricular function is decreased. The ejection fraction is 45-50% (mildly reduced).  There is borderline-mild global hypokinesia of the left ventricle.  Normal right ventricle size and systolic function.  There is moderate (2+) mitral regurgitation.    NM stress test 5/8/2023    A prior study was conducted on 8/16/2022.  Prior images were unavailable for comparison review.    Pharmacologic regadenoson stress ECG is nondiagnostic due to baseline ECG abnormality.    Pharmacological regadenoson nuclear stress test is abnormal.  There is partially reversible change in inferior and basal to mid inferolateral walls indicating inducible myocardial ischemia.    Normal left ventricular size, wall motion and systolic function.  Calculated left ventricular ejection fraction is 65%.    The patient is at an intermediate risk of future cardiac ischemic  events.

## 2023-08-11 NOTE — PATIENT INSTRUCTIONS
Overnight pulse oximetry on RA  Chin tuck posture with liquid intake  Avoid eating close to bed time   Raise the head of the bed  Omeprazole daily   Contact me one week later after overnight pulse oximetry test is done to discuss results   Follow up in 4 months

## 2023-08-11 NOTE — PROGRESS NOTES
Patient instructed in use of Overnight Oximetry Testing equipment from [facility].  Patient states good understanding ofhow to use the Overnight Testing equipment.  All paperwork signed, faxed to [facility], and copy scanned to chart.   Phone numbers given to patient to call with any questions.

## 2023-08-14 ENCOUNTER — TRANSFERRED RECORDS (OUTPATIENT)
Dept: HEALTH INFORMATION MANAGEMENT | Facility: CLINIC | Age: 88
End: 2023-08-14
Payer: MEDICARE

## 2023-08-15 LAB
DLCOCOR-%PRED-PRE: 83 %
DLCOCOR-PRE: 14.18 ML/MIN/MMHG
DLCOUNC-%PRED-PRE: 77 %
DLCOUNC-PRE: 13.17 ML/MIN/MMHG
DLCOUNC-PRED: 16.9 ML/MIN/MMHG
ERV-%PRED-PRE: 26 %
ERV-PRE: 0.09 L
ERV-PRED: 0.34 L
EXPTIME-PRE: 7.04 SEC
FEF2575-%PRED-PRE: 92 %
FEF2575-PRE: 1.12 L/SEC
FEF2575-PRED: 1.21 L/SEC
FEFMAX-%PRED-PRE: 78 %
FEFMAX-PRE: 2.77 L/SEC
FEFMAX-PRED: 3.55 L/SEC
FEV1-%PRED-PRE: 72 %
FEV1-PRE: 1.1 L
FEV1FEV6-PRE: 81 %
FEV1FEV6-PRED: 76 %
FEV1FVC-PRE: 81 %
FEV1FVC-PRED: 77 %
FEV1SVC-PRE: 65 %
FEV1SVC-PRED: 65 %
FIFMAX-PRE: 1.83 L/SEC
FRCPLETH-%PRED-PRE: 78 %
FRCPLETH-PRE: 2.06 L
FRCPLETH-PRED: 2.62 L
FVC-%PRED-PRE: 68 %
FVC-PRE: 1.35 L
FVC-PRED: 1.98 L
IC-%PRED-PRE: 80 %
IC-PRE: 1.6 L
IC-PRED: 1.99 L
RVPLETH-%PRED-PRE: 86 %
RVPLETH-PRE: 1.97 L
RVPLETH-PRED: 2.27 L
TLCPLETH-%PRED-PRE: 79 %
TLCPLETH-PRE: 3.66 L
TLCPLETH-PRED: 4.6 L
VA-%PRED-PRE: 74 %
VA-PRE: 3.02 L
VC-%PRED-PRE: 72 %
VC-PRE: 1.69 L
VC-PRED: 2.33 L

## 2023-08-22 DIAGNOSIS — G47.30 SLEEP DISORDER BREATHING: Primary | ICD-10-CM

## 2023-08-22 DIAGNOSIS — J39.8 TRACHEOMALACIA: ICD-10-CM

## 2023-08-22 NOTE — PROGRESS NOTES
Pulmonary Note    Results from overnight pulse oximetry 8/14/2023 showed     Valid time : 7 HR 18 MIN    Lowest SpO2 83%  SpO2 < 90% 3 HR 3 MIN  SpO2 < 89% 1 HR 25 MIN    Total desaturations : 286  Desaturation index : 40.9      Plan   Referral to sleep Clinic     Junaid Lowe MD  Pulmonary Medicine   08/22/23

## 2023-08-23 ENCOUNTER — TELEPHONE (OUTPATIENT)
Dept: PULMONOLOGY | Facility: CLINIC | Age: 88
End: 2023-08-23
Payer: MEDICARE

## 2023-08-23 NOTE — TELEPHONE ENCOUNTER
Spoke with Janes today and gave her the results of the FATMATA. Informed her she will need a Sleep Consult and gave her the number to call them if they do not reach out to her in the next 2 business days. She requested that I call her daughter, Tg, to discuss this as well. Left voice mail message for Tg to call nurse line back.

## 2023-08-23 NOTE — TELEPHONE ENCOUNTER
FATMATA results listed Ivia dropping as low as 83%. Called and informed daughter Pat of these results. Pat will make the appt with Sleep to discuss options.

## 2023-08-23 NOTE — TELEPHONE ENCOUNTER
Pat, daughter, called back to go over FATMATA results and discuss need for Sleep Consult as ordered by Dr. Garcia. Pat is asking to know how low she dropped below 89%. Orders for Sleep Consult only state she dropped below 89% for 1 hour 25 minutes, not how low she actually dropped. She would like this information to decide if they want the Sleep consult or not. [facility]. When results come in, will discuss with MD and reach back out to Pat.

## 2023-08-31 ENCOUNTER — OFFICE VISIT (OUTPATIENT)
Dept: NEUROLOGY | Facility: CLINIC | Age: 88
End: 2023-08-31
Payer: MEDICARE

## 2023-08-31 VITALS
WEIGHT: 183 LBS | BODY MASS INDEX: 32.42 KG/M2 | SYSTOLIC BLOOD PRESSURE: 121 MMHG | DIASTOLIC BLOOD PRESSURE: 60 MMHG | HEART RATE: 68 BPM

## 2023-08-31 DIAGNOSIS — E11.42 TYPE 2 DIABETES MELLITUS WITH DIABETIC POLYNEUROPATHY, WITHOUT LONG-TERM CURRENT USE OF INSULIN (H): ICD-10-CM

## 2023-08-31 DIAGNOSIS — G20.A1 PARKINSON'S DISEASE (H): ICD-10-CM

## 2023-08-31 DIAGNOSIS — G50.0 TRIGEMINAL NEURALGIA: ICD-10-CM

## 2023-08-31 DIAGNOSIS — G62.9 NEUROPATHY: Primary | ICD-10-CM

## 2023-08-31 DIAGNOSIS — E53.8 LOW SERUM VITAMIN B12: ICD-10-CM

## 2023-08-31 DIAGNOSIS — R47.81 SLURRED SPEECH: ICD-10-CM

## 2023-08-31 PROCEDURE — 99214 OFFICE O/P EST MOD 30 MIN: CPT | Performed by: PSYCHIATRY & NEUROLOGY

## 2023-08-31 RX ORDER — GABAPENTIN 300 MG/1
CAPSULE ORAL
Qty: 360 CAPSULE | Refills: 3 | Status: SHIPPED | OUTPATIENT
Start: 2023-08-31 | End: 2024-09-06

## 2023-08-31 RX ORDER — CARBIDOPA AND LEVODOPA 25; 100 MG/1; MG/1
TABLET ORAL
Qty: 270 TABLET | Refills: 3 | Status: SHIPPED | OUTPATIENT
Start: 2023-08-31 | End: 2024-09-06

## 2023-08-31 NOTE — PROGRESS NOTES
NEUROLOGY OUTPATIENT PROGRESS NOTE   Aug 31, 2023     CHIEF COMPLAINT/REASON FOR VISIT/REASON FOR CONSULT  Patient presents with:  Parkinson: 3 mo follow-up   No concerns    REASON FOR CONSULTATION-Parkinson's/trigeminal neuralgia    REFERRAL SOURCE  Dr. Maggie Arriaga  CC Dr. Maggie Arriaga    HISTORY OF PRESENT ILLNESS  Janes Montero is a 89 year old female seen today for multiple issues  Patient is moving from [place] to [place].  She is a resident of [place].  And has moved to [place] is a 20 years and is coming back.    1.  Parkinson's disease:-Symptom onset was in 2015.  At that time she was having shaking of her arms and legs.  This was at rest and with activity.  She was losing her balance as well.  She was put on Sinemet 1 tablet 3 times a day.  Feels that the medication is helping.  Does have some wearing off in the evening time and is taking the medication earlier than bedtime which is working.  Does fine in the morning.  Takes the medication on empty stomach.  Denies any other progression or any new symptoms.  Occasionally will use a walker.  Is not very active but does do some walking.  2.  Trigeminal neuralgia:-This started in 2013.  Pain is on the left side of the face.  The whole V1 V2 V3 distribution is involved that the pain is more towards the year.  Pain is sharp and intermittent.  Heating pad helps.  Reports no real provoking factors for her.  She is on gabapentin which has been helpful.  Has not tried any other medications.  Denies any other associated symptoms.  No numbness.  MRI was done and no clear cause for the trigeminal neuralgia was identified.  3.  Patient complains of some numbness in her legs.  Does report some balance issues as well.  She does have a diagnosis of diabetes.  Last A1c was 6.7.    10/19/21  Patient returns today  1.  Parkinson's disease is stable and under good control.  Reports no wearing off of the medication.  Is taking the Sinemet regularly.  Has seen  physical therapy and trying to do some exercise.  She did take her medication this morning.  2.  Trigeminal neuralgia pain is unchanged.  Gabapentin is helping her.  3.  Previously she was complaining of some numbness in her legs and was found to have a wide-based gait.  This does seem to have improved.  She was identified to have B12 deficiency and was put on injections but not oral replacement.  4.  Diabetes-A1c was 7.1.  Encouraged her to exercise and.  Manage her diet.    1/20/21  Patient returns today  1.  Parkinson's stable under good control.  Reports no wearing off with medication.  Is unclear if the medication is really helping or not though she feels that initially she had a lot of tremors and those got better when she started the medication.  2.  Trigeminal pain is under good control.  Gabapentin is helping.  3.  Was having some numbness in her feet.  Feels that this is getting better.  Her gait has also improved.  She feels more steady.  Is only taking the oral replacement at this point  4.  Diabetes has been under appropriate control.  A1c scheduled for March.  Previously 1 was 7.1 is working on improving her diet.  Stays active and is not sitting in the chair all day long    5/19/22  Patient returns today  1.  Patient's Parkinson's is under good control.  There is no wearing off of the medication.  No side effects with the medication.  Exam today does show slightly decreased arm swing though she wants to stay on the medication at her age.  2.  Trigeminal neuralgia pain is under good control.  Gabapentin is helping  3.  B12 has come up and discussed that she could stop the injections at this point.  4.  Diabetes is not under good control.  A1c is worsened.  She is eating too many sweets.  Encourage exercise and cutting down the sweets  5.  She reports that the numbness in her feet is gone away after she got her Prolia injection.  Denies any balance issues.  6.  VINICIO still positive.  Denies any joint pain  except secondary to use of the valsartan.    11/28/22  Patient returns today  1.  Patient since she was last seen has been hospitalized for some chest pain like symptoms.  Coronary angiogram was negative for any significant occlusion.  Patient was thought to have heartburn related symptoms causing chest pain.  She has been little bit weak since then.  2.  No falls or balance issues.  Continues to use a walker.  Neuropathy has not worsened.  Diabetes under better control.  Has stopped the B12 injections though remains on the B12 supplement.  Discussed causes of weakness in elderly patients.  3.  Trigeminal pain is under good control with gabapentin  4.  Remains on Sinemet and feels that that is helping.  Has not really missed a dose.  Reports no wearing off.  Denies any side effects to the medication.  Does not mix the medication with food.    5/31/23  Patient returns today  1.  Parkinson's overall has been under good control.  Was active until 3 weeks ago when she was hospitalized for heart failure.  No side effects to Sinemet  2.  Since she was hospitalized has been having slurred speech and abnormal taste at nighttime.  She does use oxygen which can possibly affect her taste.  She is also been on higher doses of lisinopril.  Further reports that her sodium is low.  Is on Lasix.  Is following up with cardiology to decide on a plan next week.  No associated symptoms with the slurred speech.  Slurred speech is worse in the evening time.  Has not noticed any benefits with taking Sinemet at the time of the slurred speech.  3.  Patient pain is under good control with the gabapentin.  No side effects  4.  Reports abnormal sensation in the feet.  Discussed that it might be related to neuropathy.  Remains on vitamin B12.  No other new symptoms.    8/31/23  Patient demonstrated  1.  Slurred speech has resolved compared to before.  This was thought to be related to her medications and possibly a viral infection.  She did not  complete the MRI I seen and gravis panel was negative  2.  Facial pain is under good control.  Remains on the gabapentin.  No side effects  3.  In terms of her Parkinson's overall she is doing well.  No side effects with the medication.  Wants to stay on the medication.  She further reports that there is some tiredness and fatigue in the evening time around 6 PM onwards.  She takes the medication at 3 PM.  Discussed that most likely is not related to the Parkinson's disease.  She should maybe try taking a longer nap during the daytime or taking a nap later in the day and taking it at 10 AM in the morning.  She is remaining active.  No other new symptoms.  B12 might be helping she is not sure.  Neuropathy stable.    Previous history is reviewed and this is unchanged.    PAST MEDICAL/SURGICAL HISTORY  Past Medical History:   Diagnosis Date    Acute diastolic congestive heart failure (H)     Acute kidney injury superimposed on CKD (H) 08/18/2022    Acute on chronic congestive heart failure (H) 06/11/2018    Acute pulmonary edema (H) 05/06/2023    Acute respiratory failure with hypoxia (H) 05/08/2023    Chest pain, unspecified type 08/15/2022    Coronary artery disease     Coronary artery disease involving native coronary artery without angina pectoris, unspecified whether native or transplanted heart 08/17/2022    Diabetes mellitus, type II (H)     Diastolic dysfunction 07/10/2018    Frozen shoulder     bilateral    Heart failure with reduced ejection fraction (H) 05/25/2023    Hyperlipidemia     Hypertension     Hypertensive emergency     Parkinson disease (H)     Primary osteoarthritis involving multiple joints     Primary osteoarthritis of left knee     Recurrent UTI     hx septic shock    Thyroid disease 2021     Patient Active Problem List   Diagnosis    Benign essential hypertension    S/P CABG (coronary artery bypass graft)    Parkinson's disease (H)    Trigeminal neuralgia    Chronic bilateral back pain     Aortic valve insufficiency    Tricuspid insufficiency    Mitral valve insufficiency    Bilateral carotid artery stenosis    CKD (chronic kidney disease) stage 3, GFR 30-59 ml/min (H)    Depression    Diastolic dysfunction    GERD (gastroesophageal reflux disease)    Left bundle branch block    Primary osteoarthritis involving multiple joints    Sensorineural hearing loss (SNHL) of both ears    Subclinical hypothyroidism    Type 2 diabetes mellitus with diabetic polyneuropathy, without long-term current use of insulin (H)    Secondary renal hyperparathyroidism (H)    Age-related osteoporosis without current pathological fracture    Mixed hypercholesterolemia and hypertriglyceridemia    Mixed incontinence urge and stress (male)(female)    Vitamin B12 deficiency (non anemic)    Non-seasonal allergic rhinitis due to pollen    Preventative health care    Vaginal irritation    Polypharmacy    Age-related osteoporosis with current pathological fracture with routine healing    Recurrent major depressive disorder, in full remission (H)    PAD (peripheral artery disease) (H)    Bronchomalacia    Renal mass    Hypercalcemia   Significant for CABG/bypass/coronary artery disease, high cholesterol, high blood pressure, diabetes, migraines, Parkinson's disease, arthritis, depression, osteoporosis, hyperparathyroidism    FAMILY HISTORY  Family History   Problem Relation Age of Onset    Heart Disease Mother     Heart Disease Father    Family history reviewed and noncontributory no family history of neuropathy.    SOCIAL HISTORY  Social History     Tobacco Use    Smoking status: Never     Passive exposure: Never    Smokeless tobacco: Never   Vaping Use    Vaping Use: Never used   Substance Use Topics    Alcohol use: No    Drug use: No       SYSTEMS REVIEW  Twelve-system ROS was done and other than the HPI this was negative except for neck pain, back pain, arm and leg pain, joint pain, numbness and tingling, weakness paralysis,  difficulty walking, falling, balance coordination problems, hearing loss, sleepy during the day, sleeping problems, headaches, anxiety, depression, cardiac/heart problems.  No new symptoms.    MEDICATIONS  aspirin 81 mg chewable tablet, [ASPIRIN 81 MG CHEWABLE TABLET] Chew 81 mg at bedtime.  atorvastatin (LIPITOR) 20 MG tablet, TAKE 1 TABLET BY MOUTH AT  BEDTIME  carvedilol (COREG) 12.5 MG tablet, TAKE 1 TABLET BY MOUTH  TWICE DAILY WITH MEALS  cholecalciferol, vitamin D3, 1,000 unit tablet, Take 1,000 Units by mouth 2 times daily  coenzyme Q10 (CO Q-10) 100 mg capsule, [COENZYME Q10 (CO Q-10) 100 MG CAPSULE] Take 100 mg by mouth 2 (two) times a day.  cyanocobalamin (VITAMIN B-12) 1000 MCG tablet, Take 1 tablet (1,000 mcg) by mouth daily  fish oil-omega-3 fatty acids 1000 MG capsule, Take 1 g by mouth 2 times daily  FLUoxetine (PROZAC) 20 MG capsule, TAKE 1 CAPSULE BY MOUTH TWICE  DAILY  furosemide (LASIX) 20 MG tablet, Take 1 tablet (20 mg) by mouth daily  levothyroxine (SYNTHROID/LEVOTHROID) 25 MCG tablet, TAKE 1 TABLET BY MOUTH  DAILY  lisinopril (ZESTRIL) 20 MG tablet, Take 1 tablet (20 mg) by mouth daily  loratadine (CLARITIN) 10 mg tablet, Take 10 mg by mouth daily as needed for allergies  magnesium oxide 250 mg Tab, [MAGNESIUM OXIDE 250 MG TAB] Take 250 mg by mouth daily.  melatonin 1 mg Tab tablet, Take 1 mg by mouth At Bedtime  multivitamin therapeutic tablet, Take 1 tablet by mouth every morning  nitroGLYcerin (NITROSTAT) 0.4 MG sublingual tablet, Place 1 tablet (0.4 mg) under the tongue every 5 minutes as needed for chest pain  omeprazole (PRILOSEC) 20 MG DR capsule, Take 20 mg by mouth daily before breakfast  polyethylene glycol (MIRALAX) 17 gram packet, Take 17 g by mouth daily as needed for constipation  triamcinolone (KENALOG) 0.025 % external ointment, Apply topically 2 times daily (Patient not taking: Reported on 8/11/2023)    denosumab (PROLIA) injection 60 mg         PHYSICAL EXAMINATION  VITALS:  /60   Pulse 68   Wt 83 kg (183 lb)   LMP  (LMP Unknown)   BMI 32.42 kg/m    GENERAL: Healthy appearing, alert, no acute distress, normal habitus.  CARDIOVASCULAR: Extremities warm and well perfused. Pulses present.   NEUROLOGICAL:  Patient is awake and oriented to self, place and time.  Attention span is normal.  Memory is grossly intact.  Language is fluent and follows commands appropriately.  Appropriate fund of knowledge. Cranial nerves 2-12 are intact. There is no pronator drift.  Motor exam shows 5/5 strength in all extremities.  Tone is symmetric bilaterally in upper and lower extremities.  No cogwheeling or tremor noted.  (Previously reflexes are symmetric and 2+ in upper extremities and absent in lower extremities.) Sensory exam is grossly intact to light touch, pin prick and vibration intact today.  Previously this was decreased up to the knees. Finger to nose and heel to shin is without dysmetria.  Romberg is negative.  Gait is almost normal to slightly wide-based with bilateral decreased arm swing (mild today).  Mild difficulty with tandem.  Previously drawing concentric circles did not show any tremor.  Exam stable.  No tremors cogwheeling.  Gait is slightly wide-based though no decreased arm swing.    DIAGNOSTICS  RELEVANT LABS  TSH 2.01   A1c 6.7    Carotid US 2020  1: Right: 1-39% carotid artery stenosis with mild velocities and antegrade   vertebral flow.   2: Left: 1-39% carotid artery stenosis with mild velocities and antegrade   vertebral flow.   3: Essentially unchanged.   4: Recommend a two year follow-up if patient remains asymptomatic.       CT head 2020  IMPRESSION   1. No acute intracranial findings.   2. Senescent changes.     MRI 2013  IMPRESSION:   Scattered small vessel ischemic disease including pontine involvement.      OUTSIDE RECORDS  Outside referral notes and chart notes were reviewed and pertinent information has been summarized (in addition to the HPI):-Patient has a  diagnosis of Parkinson's disease.  Diagnosed in 2015.  Is looking to establish with a neurologist.  Is on Sinemet  times a day.  Also has trigeminal neuralgia controlled with gabapentin 300 mg p.o. 4 times a day.  Also has bilateral carotid artery stenosis.  Is moving here from [place].    LABS    Component      Latest Ref Rng & Units 7/27/2021           3:50 PM   Total Protein Serum for ELP      6.0 - 8.0 g/dL 6.8   Albumin %      51.0 - 67.0 % 66.6   Albumin Fraction      3.2 - 4.7 g/dL 4.5   Alpha 1 %      2.0 - 4.0 % 1.9 (L)   Alpha 1 Fraction      0.1 - 0.3 g/dL 0.1   Alpha 2 %      5.0 - 13.0 % 9.8   Alpha 2 Fraction      0.4 - 0.9 g/dL 0.7   Beta %      10.0 - 17.0 % 10.5   Beta Fraction      0.7 - 1.2 g/dL 0.7   Gamma Globulin %      9.0 - 20.0 % 11.2   Gamma Fraction      0.6 - 1.4 g/dL 0.8   ELP Interpretation:       Unremarkable protein electrophoresis.   Path ICD       G62.9   Interpreted By       Lisa Cantu MD   Immunofixation ELP          VINICIO interpretation      Negative Borderline Positive (A)   VINICIO pattern 1       Homogeneous   VINICIO titer 1       1:80   Vitamin B12      213 - 816 pg/mL 383   Vitamin B1 Whole Blood Level      70 - 180 nmol/L 184 (H)   Methylmalonic Acid      0.00 - 0.40 umol/L 1.02 (H)   Lyme Disease Antibodies Serum      <0.90 0.04     Component      Latest Ref Rng & Units 7/27/2021           3:50 PM   Total Protein Serum for ELP      6.0 - 8.0 g/dL 6.9   Albumin %      51.0 - 67.0 %    Albumin Fraction      3.2 - 4.7 g/dL    Alpha 1 %      2.0 - 4.0 %    Alpha 1 Fraction      0.1 - 0.3 g/dL    Alpha 2 %      5.0 - 13.0 %    Alpha 2 Fraction      0.4 - 0.9 g/dL    Beta %      10.0 - 17.0 %    Beta Fraction      0.7 - 1.2 g/dL    Gamma Globulin %      9.0 - 20.0 %    Gamma Fraction      0.6 - 1.4 g/dL    ELP Interpretation:          Path ICD       G62.9   Interpreted By       Lisa Cantu MD   Immunofixation ELP       Unremarkable immunofixation  electrophoresis.   VINICIO interpretation      Negative    VINICIO pattern 1          VINICIO titer 1          Vitamin B12      213 - 816 pg/mL    Vitamin B1 Whole Blood Level      70 - 180 nmol/L    Methylmalonic Acid      0.00 - 0.40 umol/L    Lyme Disease Antibodies Serum      <0.90      EMG  CLINICAL INTERPRETATION:  This is a mildly abnormal nerve conduction and EMG study.  The abnormalities seen above could suggest early/mild sensorimotor polyneuropathy though could be artifactual also.  Further clinical correlation is needed.     PCP notes regarding B12 reviewed.     Component      Latest Ref Rng & Units 1/20/2022 3/22/2022   VINICIO interpretation      Negative Borderline Positive (A)    VINICIO pattern 1       Homogeneous    VINICIO titer 1       1:80    Vitamin B12      213 - 816 pg/mL 1,908 (H)    Methylmalonic Acid      0.00 - 0.40 umol/L 0.35    Hemoglobin A1C      0.0 - 5.6 %  7.6 (H)     Component      Latest Ref Rng & Units 10/11/2022   Hemoglobin A1C      0.0 - 5.6 % 5.9 (H)     Component      Latest Ref Rng 4/28/2023  2:37 PM   Vitamin B12      232 - 1,245 pg/mL 1,497 (H)       Legend:  (H) High    Component      Latest Ref Rng 5/12/2023  5:04 AM 5/24/2023  10:16 AM   Sodium      136 - 145 mmol/L 130 (L)  137    Potassium      3.5 - 5.0 mmol/L 4.9     Chloride      98 - 107 mmol/L 98     Carbon Dioxide      22 - 31 mmol/L 25     Anion Gap      7 - 15 mmol/L 7  12    Urea Nitrogen      8 - 28 mg/dL 57 (H)     Creatinine      0.51 - 0.95 mg/dL 1.21 (H)  0.93    Calcium      8.8 - 10.2 mg/dL 10.0  10.6 (H)    Glucose      70 - 125 mg/dL 131 (H)     GFR Estimate      >60 mL/min/1.73m2 43 (L)  58 (L)    Potassium      3.4 - 5.3 mmol/L  4.9    Chloride      98 - 107 mmol/L  101    Carbon Dioxide (CO2)      22 - 29 mmol/L  24    Urea Nitrogen      8.0 - 23.0 mg/dL  32.4 (H)    Glucose      70 - 99 mg/dL  151 (H)       Legend:  (L) Low  (H) High    Component      Latest Ref Rng 6/19/2023  3:38 PM 8/9/2023  4:16 PM   AcetChol  Binding Seema      0.0 - 0.4 nmol/L 0.0     Striated Muscle Antibody IgG      <1:40  <1:40     AcetChol Modul Seema      <=45 % 0     AcetChol Block Seema      0 - 26 % 10     Hemoglobin A1C      0.0 - 5.6 %  6.5 (H)       Legend:  (H) High    Hospital notes reviewed.  Primary care notes reviewed.  IMPRESSION/REPORT/PLAN  Parkinson's disease (H)  Trigeminal neuralgia  Questionable neuropathy-questionable related to diabetes versus B12  Type 2 diabetes mellitus with diabetic polyneuropathy, without long-term current use of insulin (H)   Low B12  Positive VINICIO  Slurred speech- rule out stroke    This is a 89 year old female with  1.  Trigeminal neuralgia:-MRI brain in 2013 did not show any structural lesions.  Currently pain is under good control with gabapentin and will continue gabapentin.  Stable.  Continue gabapentin.  No change.    2.  Parkinson's disease:-Examination does not show a lot of evidence of Parkinson's disease.  Possibly this is being masked by use of Sinemet (was started previously by other provider).  She does have slightly decreased arm swing today.  Exam today off the Sinemet shows no evidence of parkinsonism.  Patient would like to continue the medication.  Recommend exercise and staying active.  Physical therapy in the past made things worse.  She does have some fatigue in the knee which I do not think is related to the wearing of the med.  Continue current dose.    3.  On examination she has a wide-based gait with decreased pinprick sensation in her legs.  Examination suggestive of neuropathy.  Examination does look better today/stable.  EMG shows questionable neuropathy versus artifact.   Could be related to diabetes.  A1c remains borderline elevated.  B12 was low and she was treated with tablets and injections.  B12 level now looks normal.  We will continue oral supplementation.  Recheck B12 in 1 year.    4.  She does have a positive VINICIO.  I think this is a false positive.  Repeat VINICIO was positive  as well.  Reports some joint pain related to the valsartan.  Can discuss further with primary care.  Could be related to age.  No change.    5.  She does complain of some slurred speech with change in mouth.  Differential for this would include cerebrovascular event, heart failure, use of oxygen, less likely to be from Parkinson's, myasthenia gravis, increased dose of lisinopril versus use of Lasix, hyponatremia.  Myasthenia gravis panel was negative.  Symptoms have not resolved and will hold off on MRI brain that was ordered previously.    Return back in 1 year.    -     carbidopa-levodopa (SINEMET)  MG tablet; TAKE 1 TABLET BY MOUTH 3  TIMES DAILY TAKE AT LEAST  1/2 HOUR BEFORE MEALS OR 2  HOURS AFTER MEALS DO NOT  MIX WITH FOOD  -     Vitamin B12; Future  -     gabapentin (NEURONTIN) 300 MG capsule; TAKE 1 CAPSULE BY MOUTH 4  TIMES DAILY  - Continue B12    Return in about 1 year (around 8/31/2024) for In-Clinic Visit (must).    Over 30 minutes were spent coordinating the care for the patient on the day of the encounter.  This includes previsit, during visit and post visit activities as documented above.  Counseling patient.  Multiple problems reviewed/addressed.  Prescription management.  (Activities include but not inclusive of reviewing chart, reviewing outside records, reviewing labs and imaging study results as well as the images, patient visit time including getting history and exam,  use if applicable, review of test results with the patient and coming up with a plan in a shared model, counseling patient and family, education and answering patient questions, EMR , EMR diagnosis entry and problem list management, medication reconciliation and prescription management if applicable, paperwork if applicable, printing documents and documentation of the visit activities.)    Brennon Moya MD  Neurologist  [facility]  Tel:- [phone]    This note  was dictated using voice recognition software.  Any grammatical or context distortions are unintentional and inherent to the software.

## 2023-08-31 NOTE — LETTER
8/31/2023         RE: Janes Montero  [address]  [facility] [address]        Dear Colleague,    Thank you for referring your patient, Janes Montero, to the [facility] [place]. Please see a copy of my visit note below.    NEUROLOGY OUTPATIENT PROGRESS NOTE   Aug 31, 2023     CHIEF COMPLAINT/REASON FOR VISIT/REASON FOR CONSULT  Patient presents with:  Parkinson: 3 mo follow-up   No concerns    REASON FOR CONSULTATION-Parkinson's/trigeminal neuralgia    REFERRAL SOURCE  Dr. Maggie Arriaga  CC Dr. Maggie Arriaga    HISTORY OF PRESENT ILLNESS  Janes Montero is a 89 year old female seen today for multiple issues  Patient is moving from [place] to [place].  She is a resident of [place].  And has moved to [place] is a 20 years and is coming back.    1.  Parkinson's disease:-Symptom onset was in 2015.  At that time she was having shaking of her arms and legs.  This was at rest and with activity.  She was losing her balance as well.  She was put on Sinemet 1 tablet 3 times a day.  Feels that the medication is helping.  Does have some wearing off in the evening time and is taking the medication earlier than bedtime which is working.  Does fine in the morning.  Takes the medication on empty stomach.  Denies any other progression or any new symptoms.  Occasionally will use a walker.  Is not very active but does do some walking.  2.  Trigeminal neuralgia:-This started in 2013.  Pain is on the left side of the face.  The whole V1 V2 V3 distribution is involved that the pain is more towards the year.  Pain is sharp and intermittent.  Heating pad helps.  Reports no real provoking factors for her.  She is on gabapentin which has been helpful.  Has not tried any other medications.  Denies any other associated symptoms.  No numbness.  MRI was done and no clear cause for the trigeminal neuralgia was identified.  3.  Patient complains of some numbness in her legs.  Does report  some balance issues as well.  She does have a diagnosis of diabetes.  Last A1c was 6.7.    10/19/21  Patient returns today  1.  Parkinson's disease is stable and under good control.  Reports no wearing off of the medication.  Is taking the Sinemet regularly.  Has seen physical therapy and trying to do some exercise.  She did take her medication this morning.  2.  Trigeminal neuralgia pain is unchanged.  Gabapentin is helping her.  3.  Previously she was complaining of some numbness in her legs and was found to have a wide-based gait.  This does seem to have improved.  She was identified to have B12 deficiency and was put on injections but not oral replacement.  4.  Diabetes-A1c was 7.1.  Encouraged her to exercise and.  Manage her diet.    1/20/21  Patient returns today  1.  Parkinson's stable under good control.  Reports no wearing off with medication.  Is unclear if the medication is really helping or not though she feels that initially she had a lot of tremors and those got better when she started the medication.  2.  Trigeminal pain is under good control.  Gabapentin is helping.  3.  Was having some numbness in her feet.  Feels that this is getting better.  Her gait has also improved.  She feels more steady.  Is only taking the oral replacement at this point  4.  Diabetes has been under appropriate control.  A1c scheduled for March.  Previously 1 was 7.1 is working on improving her diet.  Stays active and is not sitting in the chair all day long    5/19/22  Patient returns today  1.  Patient's Parkinson's is under good control.  There is no wearing off of the medication.  No side effects with the medication.  Exam today does show slightly decreased arm swing though she wants to stay on the medication at her age.  2.  Trigeminal neuralgia pain is under good control.  Gabapentin is helping  3.  B12 has come up and discussed that she could stop the injections at this point.  4.  Diabetes is not under good control.   A1c is worsened.  She is eating too many sweets.  Encourage exercise and cutting down the sweets  5.  She reports that the numbness in her feet is gone away after she got her Prolia injection.  Denies any balance issues.  6.  VINICIO still positive.  Denies any joint pain except secondary to use of the valsartan.    11/28/22  Patient returns today  1.  Patient since she was last seen has been hospitalized for some chest pain like symptoms.  Coronary angiogram was negative for any significant occlusion.  Patient was thought to have heartburn related symptoms causing chest pain.  She has been little bit weak since then.  2.  No falls or balance issues.  Continues to use a walker.  Neuropathy has not worsened.  Diabetes under better control.  Has stopped the B12 injections though remains on the B12 supplement.  Discussed causes of weakness in elderly patients.  3.  Trigeminal pain is under good control with gabapentin  4.  Remains on Sinemet and feels that that is helping.  Has not really missed a dose.  Reports no wearing off.  Denies any side effects to the medication.  Does not mix the medication with food.    5/31/23  Patient returns today  1.  Parkinson's overall has been under good control.  Was active until 3 weeks ago when she was hospitalized for heart failure.  No side effects to Sinemet  2.  Since she was hospitalized has been having slurred speech and abnormal taste at nighttime.  She does use oxygen which can possibly affect her taste.  She is also been on higher doses of lisinopril.  Further reports that her sodium is low.  Is on Lasix.  Is following up with cardiology to decide on a plan next week.  No associated symptoms with the slurred speech.  Slurred speech is worse in the evening time.  Has not noticed any benefits with taking Sinemet at the time of the slurred speech.  3.  Patient pain is under good control with the gabapentin.  No side effects  4.  Reports abnormal sensation in the feet.  Discussed  that it might be related to neuropathy.  Remains on vitamin B12.  No other new symptoms.    8/31/23  Patient demonstrated  1.  Slurred speech has resolved compared to before.  This was thought to be related to her medications and possibly a viral infection.  She did not complete the MRI I seen and gravis panel was negative  2.  Facial pain is under good control.  Remains on the gabapentin.  No side effects  3.  In terms of her Parkinson's overall she is doing well.  No side effects with the medication.  Wants to stay on the medication.  She further reports that there is some tiredness and fatigue in the evening time around 6 PM onwards.  She takes the medication at 3 PM.  Discussed that most likely is not related to the Parkinson's disease.  She should maybe try taking a longer nap during the daytime or taking a nap later in the day and taking it at 10 AM in the morning.  She is remaining active.  No other new symptoms.  B12 might be helping she is not sure.  Neuropathy stable.    Previous history is reviewed and this is unchanged.    PAST MEDICAL/SURGICAL HISTORY  Past Medical History:   Diagnosis Date     Acute diastolic congestive heart failure (H)      Acute kidney injury superimposed on CKD (H) 08/18/2022     Acute on chronic congestive heart failure (H) 06/11/2018     Acute pulmonary edema (H) 05/06/2023     Acute respiratory failure with hypoxia (H) 05/08/2023     Chest pain, unspecified type 08/15/2022     Coronary artery disease      Coronary artery disease involving native coronary artery without angina pectoris, unspecified whether native or transplanted heart 08/17/2022     Diabetes mellitus, type II (H)      Diastolic dysfunction 07/10/2018     Frozen shoulder     bilateral     Heart failure with reduced ejection fraction (H) 05/25/2023     Hyperlipidemia      Hypertension      Hypertensive emergency      Parkinson disease (H)      Primary osteoarthritis involving multiple joints      Primary  osteoarthritis of left knee      Recurrent UTI     hx septic shock     Thyroid disease 2021     Patient Active Problem List   Diagnosis     Benign essential hypertension     S/P CABG (coronary artery bypass graft)     Parkinson's disease (H)     Trigeminal neuralgia     Chronic bilateral back pain     Aortic valve insufficiency     Tricuspid insufficiency     Mitral valve insufficiency     Bilateral carotid artery stenosis     CKD (chronic kidney disease) stage 3, GFR 30-59 ml/min (H)     Depression     Diastolic dysfunction     GERD (gastroesophageal reflux disease)     Left bundle branch block     Primary osteoarthritis involving multiple joints     Sensorineural hearing loss (SNHL) of both ears     Subclinical hypothyroidism     Type 2 diabetes mellitus with diabetic polyneuropathy, without long-term current use of insulin (H)     Secondary renal hyperparathyroidism (H)     Age-related osteoporosis without current pathological fracture     Mixed hypercholesterolemia and hypertriglyceridemia     Mixed incontinence urge and stress (male)(female)     Vitamin B12 deficiency (non anemic)     Non-seasonal allergic rhinitis due to pollen     Preventative health care     Vaginal irritation     Polypharmacy     Age-related osteoporosis with current pathological fracture with routine healing     Recurrent major depressive disorder, in full remission (H)     PAD (peripheral artery disease) (H)     Bronchomalacia     Renal mass     Hypercalcemia   Significant for CABG/bypass/coronary artery disease, high cholesterol, high blood pressure, diabetes, migraines, Parkinson's disease, arthritis, depression, osteoporosis, hyperparathyroidism    FAMILY HISTORY  Family History   Problem Relation Age of Onset     Heart Disease Mother      Heart Disease Father    Family history reviewed and noncontributory no family history of neuropathy.    SOCIAL HISTORY  Social History     Tobacco Use     Smoking status: Never     Passive exposure:  Never     Smokeless tobacco: Never   Vaping Use     Vaping Use: Never used   Substance Use Topics     Alcohol use: No     Drug use: No       SYSTEMS REVIEW  Twelve-system ROS was done and other than the HPI this was negative except for neck pain, back pain, arm and leg pain, joint pain, numbness and tingling, weakness paralysis, difficulty walking, falling, balance coordination problems, hearing loss, sleepy during the day, sleeping problems, headaches, anxiety, depression, cardiac/heart problems.  No new symptoms.    MEDICATIONS  aspirin 81 mg chewable tablet, [ASPIRIN 81 MG CHEWABLE TABLET] Chew 81 mg at bedtime.  atorvastatin (LIPITOR) 20 MG tablet, TAKE 1 TABLET BY MOUTH AT  BEDTIME  carvedilol (COREG) 12.5 MG tablet, TAKE 1 TABLET BY MOUTH  TWICE DAILY WITH MEALS  cholecalciferol, vitamin D3, 1,000 unit tablet, Take 1,000 Units by mouth 2 times daily  coenzyme Q10 (CO Q-10) 100 mg capsule, [COENZYME Q10 (CO Q-10) 100 MG CAPSULE] Take 100 mg by mouth 2 (two) times a day.  cyanocobalamin (VITAMIN B-12) 1000 MCG tablet, Take 1 tablet (1,000 mcg) by mouth daily  fish oil-omega-3 fatty acids 1000 MG capsule, Take 1 g by mouth 2 times daily  FLUoxetine (PROZAC) 20 MG capsule, TAKE 1 CAPSULE BY MOUTH TWICE  DAILY  furosemide (LASIX) 20 MG tablet, Take 1 tablet (20 mg) by mouth daily  levothyroxine (SYNTHROID/LEVOTHROID) 25 MCG tablet, TAKE 1 TABLET BY MOUTH  DAILY  lisinopril (ZESTRIL) 20 MG tablet, Take 1 tablet (20 mg) by mouth daily  loratadine (CLARITIN) 10 mg tablet, Take 10 mg by mouth daily as needed for allergies  magnesium oxide 250 mg Tab, [MAGNESIUM OXIDE 250 MG TAB] Take 250 mg by mouth daily.  melatonin 1 mg Tab tablet, Take 1 mg by mouth At Bedtime  multivitamin therapeutic tablet, Take 1 tablet by mouth every morning  nitroGLYcerin (NITROSTAT) 0.4 MG sublingual tablet, Place 1 tablet (0.4 mg) under the tongue every 5 minutes as needed for chest pain  omeprazole (PRILOSEC) 20 MG DR capsule, Take 20 mg by  mouth daily before breakfast  polyethylene glycol (MIRALAX) 17 gram packet, Take 17 g by mouth daily as needed for constipation  triamcinolone (KENALOG) 0.025 % external ointment, Apply topically 2 times daily (Patient not taking: Reported on 8/11/2023)    denosumab (PROLIA) injection 60 mg         PHYSICAL EXAMINATION  VITALS: /60   Pulse 68   Wt 83 kg (183 lb)   LMP  (LMP Unknown)   BMI 32.42 kg/m    GENERAL: Healthy appearing, alert, no acute distress, normal habitus.  CARDIOVASCULAR: Extremities warm and well perfused. Pulses present.   NEUROLOGICAL:  Patient is awake and oriented to self, place and time.  Attention span is normal.  Memory is grossly intact.  Language is fluent and follows commands appropriately.  Appropriate fund of knowledge. Cranial nerves 2-12 are intact. There is no pronator drift.  Motor exam shows 5/5 strength in all extremities.  Tone is symmetric bilaterally in upper and lower extremities.  No cogwheeling or tremor noted.  (Previously reflexes are symmetric and 2+ in upper extremities and absent in lower extremities.) Sensory exam is grossly intact to light touch, pin prick and vibration intact today.  Previously this was decreased up to the knees. Finger to nose and heel to shin is without dysmetria.  Romberg is negative.  Gait is almost normal to slightly wide-based with bilateral decreased arm swing (mild today).  Mild difficulty with tandem.  Previously drawing concentric circles did not show any tremor.  Exam stable.  No tremors cogwheeling.  Gait is slightly wide-based though no decreased arm swing.    DIAGNOSTICS  RELEVANT LABS  TSH 2.01   A1c 6.7    Carotid US 2020  1: Right: 1-39% carotid artery stenosis with mild velocities and antegrade   vertebral flow.   2: Left: 1-39% carotid artery stenosis with mild velocities and antegrade   vertebral flow.   3: Essentially unchanged.   4: Recommend a two year follow-up if patient remains asymptomatic.       CT head  2020  IMPRESSION   1. No acute intracranial findings.   2. Senescent changes.     MRI 2013  IMPRESSION:   Scattered small vessel ischemic disease including pontine involvement.      OUTSIDE RECORDS  Outside referral notes and chart notes were reviewed and pertinent information has been summarized (in addition to the HPI):-Patient has a diagnosis of Parkinson's disease.  Diagnosed in 2015.  Is looking to establish with a neurologist.  Is on Sinemet  times a day.  Also has trigeminal neuralgia controlled with gabapentin 300 mg p.o. 4 times a day.  Also has bilateral carotid artery stenosis.  Is moving here from [place].    LABS    Component      Latest Ref Rng & Units 7/27/2021           3:50 PM   Total Protein Serum for ELP      6.0 - 8.0 g/dL 6.8   Albumin %      51.0 - 67.0 % 66.6   Albumin Fraction      3.2 - 4.7 g/dL 4.5   Alpha 1 %      2.0 - 4.0 % 1.9 (L)   Alpha 1 Fraction      0.1 - 0.3 g/dL 0.1   Alpha 2 %      5.0 - 13.0 % 9.8   Alpha 2 Fraction      0.4 - 0.9 g/dL 0.7   Beta %      10.0 - 17.0 % 10.5   Beta Fraction      0.7 - 1.2 g/dL 0.7   Gamma Globulin %      9.0 - 20.0 % 11.2   Gamma Fraction      0.6 - 1.4 g/dL 0.8   ELP Interpretation:       Unremarkable protein electrophoresis.   Path ICD       G62.9   Interpreted By       Lisa Cantu MD   Immunofixation ELP          VINICIO interpretation      Negative Borderline Positive (A)   VINICIO pattern 1       Homogeneous   VINICIO titer 1       1:80   Vitamin B12      213 - 816 pg/mL 383   Vitamin B1 Whole Blood Level      70 - 180 nmol/L 184 (H)   Methylmalonic Acid      0.00 - 0.40 umol/L 1.02 (H)   Lyme Disease Antibodies Serum      <0.90 0.04     Component      Latest Ref Rng & Units 7/27/2021           3:50 PM   Total Protein Serum for ELP      6.0 - 8.0 g/dL 6.9   Albumin %      51.0 - 67.0 %    Albumin Fraction      3.2 - 4.7 g/dL    Alpha 1 %      2.0 - 4.0 %    Alpha 1 Fraction      0.1 - 0.3 g/dL    Alpha 2 %      5.0 - 13.0 %    Alpha 2  Fraction      0.4 - 0.9 g/dL    Beta %      10.0 - 17.0 %    Beta Fraction      0.7 - 1.2 g/dL    Gamma Globulin %      9.0 - 20.0 %    Gamma Fraction      0.6 - 1.4 g/dL    ELP Interpretation:          Path ICD       G62.9   Interpreted By       Lisa Cantu MD   Immunofixation ELP       Unremarkable immunofixation electrophoresis.   VINICIO interpretation      Negative    VINICIO pattern 1          VINICIO titer 1          Vitamin B12      213 - 816 pg/mL    Vitamin B1 Whole Blood Level      70 - 180 nmol/L    Methylmalonic Acid      0.00 - 0.40 umol/L    Lyme Disease Antibodies Serum      <0.90      EMG  CLINICAL INTERPRETATION:  This is a mildly abnormal nerve conduction and EMG study.  The abnormalities seen above could suggest early/mild sensorimotor polyneuropathy though could be artifactual also.  Further clinical correlation is needed.     PCP notes regarding B12 reviewed.     Component      Latest Ref Rng & Units 1/20/2022 3/22/2022   VINICIO interpretation      Negative Borderline Positive (A)    VINICIO pattern 1       Homogeneous    VINICIO titer 1       1:80    Vitamin B12      213 - 816 pg/mL 1,908 (H)    Methylmalonic Acid      0.00 - 0.40 umol/L 0.35    Hemoglobin A1C      0.0 - 5.6 %  7.6 (H)     Component      Latest Ref Rng & Units 10/11/2022   Hemoglobin A1C      0.0 - 5.6 % 5.9 (H)     Component      Latest Ref Rng 4/28/2023  2:37 PM   Vitamin B12      232 - 1,245 pg/mL 1,497 (H)       Legend:  (H) High    Component      Latest Ref Rng 5/12/2023  5:04 AM 5/24/2023  10:16 AM   Sodium      136 - 145 mmol/L 130 (L)  137    Potassium      3.5 - 5.0 mmol/L 4.9     Chloride      98 - 107 mmol/L 98     Carbon Dioxide      22 - 31 mmol/L 25     Anion Gap      7 - 15 mmol/L 7  12    Urea Nitrogen      8 - 28 mg/dL 57 (H)     Creatinine      0.51 - 0.95 mg/dL 1.21 (H)  0.93    Calcium      8.8 - 10.2 mg/dL 10.0  10.6 (H)    Glucose      70 - 125 mg/dL 131 (H)     GFR Estimate      >60 mL/min/1.73m2 43 (L)  58 (L)     Potassium      3.4 - 5.3 mmol/L  4.9    Chloride      98 - 107 mmol/L  101    Carbon Dioxide (CO2)      22 - 29 mmol/L  24    Urea Nitrogen      8.0 - 23.0 mg/dL  32.4 (H)    Glucose      70 - 99 mg/dL  151 (H)       Legend:  (L) Low  (H) High    Component      Latest Ref Rng 6/19/2023  3:38 PM 8/9/2023  4:16 PM   AcetChol Binding Seema      0.0 - 0.4 nmol/L 0.0     Striated Muscle Antibody IgG      <1:40  <1:40     AcetChol Modul Seema      <=45 % 0     AcetChol Block Seema      0 - 26 % 10     Hemoglobin A1C      0.0 - 5.6 %  6.5 (H)       Legend:  (H) High    Hospital notes reviewed.  Primary care notes reviewed.  IMPRESSION/REPORT/PLAN  Parkinson's disease (H)  Trigeminal neuralgia  Questionable neuropathy-questionable related to diabetes versus B12  Type 2 diabetes mellitus with diabetic polyneuropathy, without long-term current use of insulin (H)   Low B12  Positive VINICIO  Slurred speech- rule out stroke    This is a 89 year old female with  1.  Trigeminal neuralgia:-MRI brain in 2013 did not show any structural lesions.  Currently pain is under good control with gabapentin and will continue gabapentin.  Stable.  Continue gabapentin.  No change.    2.  Parkinson's disease:-Examination does not show a lot of evidence of Parkinson's disease.  Possibly this is being masked by use of Sinemet (was started previously by other provider).  She does have slightly decreased arm swing today.  Exam today off the Sinemet shows no evidence of parkinsonism.  Patient would like to continue the medication.  Recommend exercise and staying active.  Physical therapy in the past made things worse.  She does have some fatigue in the knee which I do not think is related to the wearing of the med.  Continue current dose.    3.  On examination she has a wide-based gait with decreased pinprick sensation in her legs.  Examination suggestive of neuropathy.  Examination does look better today/stable.  EMG shows questionable neuropathy versus  artifact.   Could be related to diabetes.  A1c remains borderline elevated.  B12 was low and she was treated with tablets and injections.  B12 level now looks normal.  We will continue oral supplementation.  Recheck B12 in 1 year.    4.  She does have a positive VINICIO.  I think this is a false positive.  Repeat VINICIO was positive as well.  Reports some joint pain related to the valsartan.  Can discuss further with primary care.  Could be related to age.  No change.    5.  She does complain of some slurred speech with change in mouth.  Differential for this would include cerebrovascular event, heart failure, use of oxygen, less likely to be from Parkinson's, myasthenia gravis, increased dose of lisinopril versus use of Lasix, hyponatremia.  Myasthenia gravis panel was negative.  Symptoms have not resolved and will hold off on MRI brain that was ordered previously.    Return back in 1 year.    -     carbidopa-levodopa (SINEMET)  MG tablet; TAKE 1 TABLET BY MOUTH 3  TIMES DAILY TAKE AT LEAST  1/2 HOUR BEFORE MEALS OR 2  HOURS AFTER MEALS DO NOT  MIX WITH FOOD  -     Vitamin B12; Future  -     gabapentin (NEURONTIN) 300 MG capsule; TAKE 1 CAPSULE BY MOUTH 4  TIMES DAILY  - Continue B12    Return in about 1 year (around 8/31/2024) for In-Clinic Visit (must).    Over 30 minutes were spent coordinating the care for the patient on the day of the encounter.  This includes previsit, during visit and post visit activities as documented above.  Counseling patient.  Multiple problems reviewed/addressed.  Prescription management.  (Activities include but not inclusive of reviewing chart, reviewing outside records, reviewing labs and imaging study results as well as the images, patient visit time including getting history and exam,  use if applicable, review of test results with the patient and coming up with a plan in a shared model, counseling patient and family, education and answering patient questions, EMR order  entry, EMR diagnosis entry and problem list management, medication reconciliation and prescription management if applicable, paperwork if applicable, printing documents and documentation of the visit activities.)    Brennon Moya MD  Neurologist  [facility]  Tel:- [phone]    This note was dictated using voice recognition software.  Any grammatical or context distortions are unintentional and inherent to the software.      Again, thank you for allowing me to participate in the care of your patient.        Sincerely,        Brennon Moya MD

## 2023-08-31 NOTE — NURSING NOTE
"Janes Montero is a 89 year old female who presents for:  Chief Complaint   Patient presents with    Parkinson     3 mo follow-up   No concerns        Initial Vitals:  /60   Pulse 68   Wt 83 kg (183 lb)   LMP  (LMP Unknown)   BMI 32.42 kg/m   Estimated body mass index is 32.42 kg/m  as calculated from the following:    Height as of 8/11/23: 1.6 m (5' 3\").    Weight as of this encounter: 83 kg (183 lb).. Body surface area is 1.92 meters squared. BP completed using cuff size: wrist cuff    Lane Briggs  "

## 2023-09-08 ENCOUNTER — OFFICE VISIT (OUTPATIENT)
Dept: CARDIOLOGY | Facility: CLINIC | Age: 88
End: 2023-09-08
Attending: NURSE PRACTITIONER
Payer: MEDICARE

## 2023-09-08 VITALS
OXYGEN SATURATION: 96 % | RESPIRATION RATE: 12 BRPM | WEIGHT: 182 LBS | BODY MASS INDEX: 32.24 KG/M2 | HEART RATE: 65 BPM | SYSTOLIC BLOOD PRESSURE: 132 MMHG | DIASTOLIC BLOOD PRESSURE: 80 MMHG

## 2023-09-08 DIAGNOSIS — I10 HYPERTENSION, UNSPECIFIED TYPE: ICD-10-CM

## 2023-09-08 DIAGNOSIS — I50.20 HEART FAILURE WITH REDUCED EJECTION FRACTION (H): Primary | ICD-10-CM

## 2023-09-08 DIAGNOSIS — I10 BENIGN ESSENTIAL HYPERTENSION: ICD-10-CM

## 2023-09-08 DIAGNOSIS — I25.810 CORONARY ARTERY DISEASE INVOLVING CORONARY BYPASS GRAFT OF NATIVE HEART WITHOUT ANGINA PECTORIS: ICD-10-CM

## 2023-09-08 PROCEDURE — 99214 OFFICE O/P EST MOD 30 MIN: CPT | Performed by: NURSE PRACTITIONER

## 2023-09-08 RX ORDER — CARVEDILOL 12.5 MG/1
12.5 TABLET ORAL 2 TIMES DAILY WITH MEALS
Qty: 180 TABLET | Refills: 3 | Status: SHIPPED | OUTPATIENT
Start: 2023-09-08

## 2023-09-08 NOTE — PATIENT INSTRUCTIONS
Janes Montero,    It was a pleasure to see you today at [facility].     My recommendations after this visit include:  - Please follow up with Dr Ricardo in December   - Please follow up with Shasha Alcaraz in 6 months  - Continue current medications    Shasha Alcaraz, CNP

## 2023-09-08 NOTE — LETTER
9/8/2023    Maggie Arriaga MD  [address] [facility] [address]    RE: Janes Montero       Dear Colleague,     I had the pleasure of seeing Janes Montero in the [facility].        Assessment/Recommendations   Assessment:    1. 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  She denies any acute heart failure symptoms.  Her weights have been stable.  She did try open arms meals but did not like them.  She has been following a low-sodium diet  2.  Hypertension: Controlled.  Blood pressure 132/80  3.  CAD:  Denies angina.  Status post CABG in 2010.  She had a Lexiscan in May that did show ischemia. Given her renal insufficiency medical therapy was advised.  She continues aspirin and atorvastatin     Plan:  1.  Continue current medications  2.  Continue monitoring weights and following a low-sodium diet  3.  Encourage regular activity    Janes Montero will follow up with Dr. Ricardo in December and in the heart failure clinic in 6 months.     History of Present Illness/Subjective    Ms. Janes Montero is a 89 year old female seen at [facility] heart failure clinic today for continued follow-up. Her daughter Kaylynn accompanies her today.  She follows up for heart failure with mildly reduced ejection fraction.  She was hospitalized early May with pulmonary edema and acute heart failure.  She was diuresed and symptoms improved.  She had an echocardiogram which showed ejection fraction of 45 to 50% with moderate mitral regurgitation and mild to moderate tricuspid regurgitation.  She also had a Lexiscan which showed ischemia.  Due to her renal insufficiency medical therapy was advised.  She was started on Lasix at discharge.  She has a past medical history significant for hypertension, CAD, CABG in 2010, hyperlipidemia, diabetes mellitus type 2.     Today, she denies any acute heart failure symptoms.  She denies  lightheadedness, shortness of breath, dyspnea on exertion, orthopnea, PND, palpitations, chest pain, abdominal fullness/bloating, and lower extremity edema.      She is monitoring home weights which are stable between 180-181 pounds.  She is following a low sodium diet. She lives in independent living.     Physical Examination Review of Systems   /80   Pulse 65   Resp 12   Wt 82.6 kg (182 lb)   LMP  (LMP Unknown)   SpO2 96%   BMI 32.24 kg/m    Body mass index is 32.24 kg/m .  Wt Readings from Last 3 Encounters:   09/08/23 82.6 kg (182 lb)   08/31/23 83 kg (183 lb)   08/11/23 82.6 kg (182 lb)       General Appearance:   no acute distress   ENT/Mouth: No abnormalities   EYES:  no scleral icterus, normal conjunctivae   Neck: no thyromegaly   Chest/Lungs:   lungs are clear to auscultation, no rales or wheezing, equal chest wall expansion    Cardiovascular:   Regular. Normal first and second heart sounds with no murmurs, rubs, or gallops, no edema bilaterally    Abdomen:  bowel sounds are present   Extremities: no cyanosis or clubbing   Skin: warm   Neurologic: no tremors     Psychiatric: alert and oriented x3                                              Medical History  Surgical History Family History Social History   Past Medical History:   Diagnosis Date    Acute diastolic congestive heart failure (H)     Acute kidney injury superimposed on CKD (H) 08/18/2022    Acute on chronic congestive heart failure (H) 06/11/2018    Acute pulmonary edema (H) 05/06/2023    Acute respiratory failure with hypoxia (H) 05/08/2023    Chest pain, unspecified type 08/15/2022    Coronary artery disease     Coronary artery disease involving native coronary artery without angina pectoris, unspecified whether native or transplanted heart 08/17/2022    Diabetes mellitus, type II (H)     Diastolic dysfunction 07/10/2018    Frozen shoulder     bilateral    Heart failure with reduced ejection fraction (H) 05/25/2023    Hyperlipidemia      Hypertension     Hypertensive emergency     Parkinson disease (H)     Primary osteoarthritis involving multiple joints     Primary osteoarthritis of left knee     Recurrent UTI     hx septic shock    Thyroid disease     Past Surgical History:   Procedure Laterality Date    APPENDECTOMY      APPENDECTOMY      BACK SURGERY      L4-S1 laminectomy    BYPASS GRAFT ARTERY CORONARY      3 vessel     SECTION       SECTION      CORONARY ARTERY BYPASS      CV CORONARY ANGIOGRAM N/A 2022    Procedure: Coronary Angiogram;  Surgeon: Ignacio Baird MD;  Location: [facility] CATH LAB CV    HERNIORRHAPHY VENTRAL Left     HYSTERECTOMY      HYSTERECTOMY      JOINT REPLACEMENT Left     Total left knee    OTHER SURGICAL HISTORY      right ankle surgery    OTHER SURGICAL HISTORY      right forearm fracture    REPLACEMENT TOTAL KNEE Right     SHOULDER SURGERY Right     reversed shoulder surgery.    Family History   Problem Relation Age of Onset    Heart Disease Mother     Heart Disease Father     Social History     Socioeconomic History    Marital status:      Spouse name: Not on file    Number of children: Not on file    Years of education: Not on file    Highest education level: Not on file   Occupational History    Not on file   Tobacco Use    Smoking status: Never     Passive exposure: Never    Smokeless tobacco: Never   Vaping Use    Vaping Use: Never used   Substance and Sexual Activity    Alcohol use: No    Drug use: No    Sexual activity: Not Currently     Partners: Male     Birth control/protection: None   Other Topics Concern    Parent/sibling w/ CABG, MI or angioplasty before 65F 55M? No   Social History Narrative    6/15/2021 new to [place] from [place]. She has 6 kids.     Social Determinants of Health     Financial Resource Strain: Not on file   Food Insecurity: Not on file   Transportation Needs: Not on file   Physical Activity: Not on file   Stress: Not on file   Social Connections: Not  on file   Intimate Partner Violence: Not on file   Housing Stability: Not on file          Medications  Allergies   Current Outpatient Medications   Medication Sig Dispense Refill    aspirin 81 mg chewable tablet [ASPIRIN 81 MG CHEWABLE TABLET] Chew 81 mg at bedtime.      atorvastatin (LIPITOR) 20 MG tablet TAKE 1 TABLET BY MOUTH AT  BEDTIME 90 tablet 3    carbidopa-levodopa (SINEMET)  MG tablet TAKE 1 TABLET BY MOUTH 3  TIMES DAILY TAKE AT LEAST  1/2 HOUR BEFORE MEALS OR 2  HOURS AFTER MEALS DO NOT  MIX WITH FOOD 270 tablet 3    carvedilol (COREG) 12.5 MG tablet Take 1 tablet (12.5 mg) by mouth 2 times daily (with meals) 180 tablet 3    cholecalciferol, vitamin D3, 1,000 unit tablet Take 1,000 Units by mouth 2 times daily      coenzyme Q10 (CO Q-10) 100 mg capsule [COENZYME Q10 (CO Q-10) 100 MG CAPSULE] Take 100 mg by mouth 2 (two) times a day.      cyanocobalamin (VITAMIN B-12) 1000 MCG tablet Take 1 tablet (1,000 mcg) by mouth daily 90 tablet 3    fish oil-omega-3 fatty acids 1000 MG capsule Take 1 g by mouth 2 times daily      FLUoxetine (PROZAC) 20 MG capsule TAKE 1 CAPSULE BY MOUTH TWICE  DAILY 180 capsule 3    furosemide (LASIX) 20 MG tablet Take 1 tablet (20 mg) by mouth daily 90 tablet 1    gabapentin (NEURONTIN) 300 MG capsule TAKE 1 CAPSULE BY MOUTH 4  TIMES DAILY 360 capsule 3    levothyroxine (SYNTHROID/LEVOTHROID) 25 MCG tablet TAKE 1 TABLET BY MOUTH  DAILY 90 tablet 2    lisinopril (ZESTRIL) 20 MG tablet Take 1 tablet (20 mg) by mouth daily 90 tablet 1    loratadine (CLARITIN) 10 mg tablet Take 10 mg by mouth daily as needed for allergies      magnesium oxide 250 mg Tab [MAGNESIUM OXIDE 250 MG TAB] Take 250 mg by mouth daily.      melatonin 1 mg Tab tablet Take 1 mg by mouth At Bedtime      multivitamin therapeutic tablet Take 1 tablet by mouth every morning      nitroGLYcerin (NITROSTAT) 0.4 MG sublingual tablet Place 1 tablet (0.4 mg) under the tongue every 5 minutes as needed for chest pain  "100 tablet 1    omeprazole (PRILOSEC) 20 MG DR capsule Take 20 mg by mouth daily before breakfast      polyethylene glycol (MIRALAX) 17 gram packet Take 17 g by mouth daily as needed for constipation      triamcinolone (KENALOG) 0.025 % external ointment Apply topically 2 times daily (Patient not taking: Reported on 8/11/2023) 80 g 0    Allergies   Allergen Reactions    Alendronate [Alendronate] Unknown     pain    Codeine Hives    Hydrocodone-Acetaminophen Other (See Comments)     Highly sensitive, \"knocks her out and can't wake up\"    Other Drug Allergy (See Comments)     Risedronate Unknown     Bone pain    Rosuvastatin Muscle Pain (Myalgia)    Simvastatin Muscle Pain (Myalgia)         Lab Results    Chemistry/lipid CBC Cardiac Enzymes/BNP/TSH/INR   Lab Results   Component Value Date    CHOL 145 06/19/2023    HDL 39 (L) 06/19/2023    TRIG 376 (H) 06/19/2023    BUN 32.4 (H) 05/24/2023     05/24/2023    CO2 24 05/24/2023    Lab Results   Component Value Date    WBC 6.4 05/08/2023    HGB 11.3 08/11/2023    HCT 38.1 05/08/2023    MCV 88 05/08/2023     05/12/2023    Lab Results   Component Value Date    TROPONINI 0.04 05/07/2023    BNP 82 05/11/2023    TSH 1.70 06/19/2023             This note has been dictated using voice recognition software. Any grammatical, typographical, or context distortions are unintentional and inherent to the software    30 minutes spent on the date of encounter doing chart review, review of outside records, review of test results, interpretation with above tests, patient visit, documentation, and discussion with family.                Thank you for allowing me to participate in the care of your patient.      Sincerely,     LUIS Forbes CNP     [facility]  cc:   LUIS Forbes CNP  [address], [address]  [place] [address]      "

## 2023-09-08 NOTE — PROGRESS NOTES
Assessment/Recommendations   Assessment:    1. 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  She denies any acute heart failure symptoms.  Her weights have been stable.  She did try open arms meals but did not like them.  She has been following a low-sodium diet  2.  Hypertension: Controlled.  Blood pressure 132/80  3.  CAD:  Denies angina.  Status post CABG in 2010.  She had a Lexiscan in May that did show ischemia. Given her renal insufficiency medical therapy was advised.  She continues aspirin and atorvastatin     Plan:  1.  Continue current medications  2.  Continue monitoring weights and following a low-sodium diet  3.  Encourage regular activity    Janes Montero will follow up with Dr. Ricardo in December and in the heart failure clinic in 6 months.     History of Present Illness/Subjective    Ms. Janes Montero is a 89 year old female seen at [facility] heart failure clinic today for continued follow-up. Her daughter Pat accompanies her today.  She follows up for heart failure with mildly reduced ejection fraction.  She was hospitalized early May with pulmonary edema and acute heart failure.  She was diuresed and symptoms improved.  She had an echocardiogram which showed ejection fraction of 45 to 50% with moderate mitral regurgitation and mild to moderate tricuspid regurgitation.  She also had a Lexiscan which showed ischemia.  Due to her renal insufficiency medical therapy was advised.  She was started on Lasix at discharge.  She has a past medical history significant for hypertension, CAD, CABG in 2010, hyperlipidemia, diabetes mellitus type 2.     Today, she denies any acute heart failure symptoms.  She denies lightheadedness, shortness of breath, dyspnea on exertion, orthopnea, PND, palpitations, chest pain, abdominal fullness/bloating, and lower extremity edema.      She is monitoring home weights which are stable between  180-181 pounds.  She is following a low sodium diet. She lives in independent living.     Physical Examination Review of Systems   /80   Pulse 65   Resp 12   Wt 82.6 kg (182 lb)   LMP  (LMP Unknown)   SpO2 96%   BMI 32.24 kg/m    Body mass index is 32.24 kg/m .  Wt Readings from Last 3 Encounters:   09/08/23 82.6 kg (182 lb)   08/31/23 83 kg (183 lb)   08/11/23 82.6 kg (182 lb)       General Appearance:   no acute distress   ENT/Mouth: No abnormalities   EYES:  no scleral icterus, normal conjunctivae   Neck: no thyromegaly   Chest/Lungs:   lungs are clear to auscultation, no rales or wheezing, equal chest wall expansion    Cardiovascular:   Regular. Normal first and second heart sounds with no murmurs, rubs, or gallops, no edema bilaterally    Abdomen:  bowel sounds are present   Extremities: no cyanosis or clubbing   Skin: warm   Neurologic: no tremors     Psychiatric: alert and oriented x3                                              Medical History  Surgical History Family History Social History   Past Medical History:   Diagnosis Date    Acute diastolic congestive heart failure (H)     Acute kidney injury superimposed on CKD (H) 08/18/2022    Acute on chronic congestive heart failure (H) 06/11/2018    Acute pulmonary edema (H) 05/06/2023    Acute respiratory failure with hypoxia (H) 05/08/2023    Chest pain, unspecified type 08/15/2022    Coronary artery disease     Coronary artery disease involving native coronary artery without angina pectoris, unspecified whether native or transplanted heart 08/17/2022    Diabetes mellitus, type II (H)     Diastolic dysfunction 07/10/2018    Frozen shoulder     bilateral    Heart failure with reduced ejection fraction (H) 05/25/2023    Hyperlipidemia     Hypertension     Hypertensive emergency     Parkinson disease (H)     Primary osteoarthritis involving multiple joints     Primary osteoarthritis of left knee     Recurrent UTI     hx septic shock    Thyroid  disease     Past Surgical History:   Procedure Laterality Date    APPENDECTOMY      APPENDECTOMY      BACK SURGERY      L4-S1 laminectomy    BYPASS GRAFT ARTERY CORONARY      3 vessel     SECTION       SECTION      CORONARY ARTERY BYPASS      CV CORONARY ANGIOGRAM N/A 2022    Procedure: Coronary Angiogram;  Surgeon: Ignacio Baird MD;  Location: [facility] CATH LAB CV    HERNIORRHAPHY VENTRAL Left     HYSTERECTOMY      HYSTERECTOMY      JOINT REPLACEMENT Left     Total left knee    OTHER SURGICAL HISTORY      right ankle surgery    OTHER SURGICAL HISTORY      right forearm fracture    REPLACEMENT TOTAL KNEE Right     SHOULDER SURGERY Right     reversed shoulder surgery.    Family History   Problem Relation Age of Onset    Heart Disease Mother     Heart Disease Father     Social History     Socioeconomic History    Marital status:      Spouse name: Not on file    Number of children: Not on file    Years of education: Not on file    Highest education level: Not on file   Occupational History    Not on file   Tobacco Use    Smoking status: Never     Passive exposure: Never    Smokeless tobacco: Never   Vaping Use    Vaping Use: Never used   Substance and Sexual Activity    Alcohol use: No    Drug use: No    Sexual activity: Not Currently     Partners: Male     Birth control/protection: None   Other Topics Concern    Parent/sibling w/ CABG, MI or angioplasty before 65F 55M? No   Social History Narrative    6/15/2021 new to [place] from [place]. She has 6 kids.     Social Determinants of Health     Financial Resource Strain: Not on file   Food Insecurity: Not on file   Transportation Needs: Not on file   Physical Activity: Not on file   Stress: Not on file   Social Connections: Not on file   Intimate Partner Violence: Not on file   Housing Stability: Not on file          Medications  Allergies   Current Outpatient Medications   Medication Sig Dispense Refill    aspirin 81 mg chewable tablet  [ASPIRIN 81 MG CHEWABLE TABLET] Chew 81 mg at bedtime.      atorvastatin (LIPITOR) 20 MG tablet TAKE 1 TABLET BY MOUTH AT  BEDTIME 90 tablet 3    carbidopa-levodopa (SINEMET)  MG tablet TAKE 1 TABLET BY MOUTH 3  TIMES DAILY TAKE AT LEAST  1/2 HOUR BEFORE MEALS OR 2  HOURS AFTER MEALS DO NOT  MIX WITH FOOD 270 tablet 3    carvedilol (COREG) 12.5 MG tablet Take 1 tablet (12.5 mg) by mouth 2 times daily (with meals) 180 tablet 3    cholecalciferol, vitamin D3, 1,000 unit tablet Take 1,000 Units by mouth 2 times daily      coenzyme Q10 (CO Q-10) 100 mg capsule [COENZYME Q10 (CO Q-10) 100 MG CAPSULE] Take 100 mg by mouth 2 (two) times a day.      cyanocobalamin (VITAMIN B-12) 1000 MCG tablet Take 1 tablet (1,000 mcg) by mouth daily 90 tablet 3    fish oil-omega-3 fatty acids 1000 MG capsule Take 1 g by mouth 2 times daily      FLUoxetine (PROZAC) 20 MG capsule TAKE 1 CAPSULE BY MOUTH TWICE  DAILY 180 capsule 3    furosemide (LASIX) 20 MG tablet Take 1 tablet (20 mg) by mouth daily 90 tablet 1    gabapentin (NEURONTIN) 300 MG capsule TAKE 1 CAPSULE BY MOUTH 4  TIMES DAILY 360 capsule 3    levothyroxine (SYNTHROID/LEVOTHROID) 25 MCG tablet TAKE 1 TABLET BY MOUTH  DAILY 90 tablet 2    lisinopril (ZESTRIL) 20 MG tablet Take 1 tablet (20 mg) by mouth daily 90 tablet 1    loratadine (CLARITIN) 10 mg tablet Take 10 mg by mouth daily as needed for allergies      magnesium oxide 250 mg Tab [MAGNESIUM OXIDE 250 MG TAB] Take 250 mg by mouth daily.      melatonin 1 mg Tab tablet Take 1 mg by mouth At Bedtime      multivitamin therapeutic tablet Take 1 tablet by mouth every morning      nitroGLYcerin (NITROSTAT) 0.4 MG sublingual tablet Place 1 tablet (0.4 mg) under the tongue every 5 minutes as needed for chest pain 100 tablet 1    omeprazole (PRILOSEC) 20 MG DR capsule Take 20 mg by mouth daily before breakfast      polyethylene glycol (MIRALAX) 17 gram packet Take 17 g by mouth daily as needed for constipation       "triamcinolone (KENALOG) 0.025 % external ointment Apply topically 2 times daily (Patient not taking: Reported on 8/11/2023) 80 g 0    Allergies   Allergen Reactions    Alendronate [Alendronate] Unknown     pain    Codeine Hives    Hydrocodone-Acetaminophen Other (See Comments)     Highly sensitive, \"knocks her out and can't wake up\"    Other Drug Allergy (See Comments)     Risedronate Unknown     Bone pain    Rosuvastatin Muscle Pain (Myalgia)    Simvastatin Muscle Pain (Myalgia)         Lab Results    Chemistry/lipid CBC Cardiac Enzymes/BNP/TSH/INR   Lab Results   Component Value Date    CHOL 145 06/19/2023    HDL 39 (L) 06/19/2023    TRIG 376 (H) 06/19/2023    BUN 32.4 (H) 05/24/2023     05/24/2023    CO2 24 05/24/2023    Lab Results   Component Value Date    WBC 6.4 05/08/2023    HGB 11.3 08/11/2023    HCT 38.1 05/08/2023    MCV 88 05/08/2023     05/12/2023    Lab Results   Component Value Date    TROPONINI 0.04 05/07/2023    BNP 82 05/11/2023    TSH 1.70 06/19/2023             This note has been dictated using voice recognition software. Any grammatical, typographical, or context distortions are unintentional and inherent to the software    30 minutes spent on the date of encounter doing chart review, review of outside records, review of test results, interpretation with above tests, patient visit, documentation, and discussion with family.              "

## 2023-09-22 ENCOUNTER — TELEPHONE (OUTPATIENT)
Dept: FAMILY MEDICINE | Facility: CLINIC | Age: 88
End: 2023-09-22
Payer: MEDICARE

## 2023-09-22 NOTE — TELEPHONE ENCOUNTER
Patient Quality Outreach    Patient is due for the following:   Physical Annual Wellness Visit    Next Steps:   Schedule a Annual Wellness Visit 11/10/23    Type of outreach:    Phone, spoke to patient/parent. patient    Next Steps:  Reach out within 90 days via Phone.    Max number of attempts reached: No. Will try again in 90 days if patient still on fail list.    Questions for provider review:    None           Taqueria Henry MA  Chart routed to self.

## 2023-10-26 DIAGNOSIS — E78.5 DYSLIPIDEMIA: ICD-10-CM

## 2023-10-26 DIAGNOSIS — I50.43 ACUTE ON CHRONIC COMBINED SYSTOLIC AND DIASTOLIC HEART FAILURE (H): ICD-10-CM

## 2023-10-26 DIAGNOSIS — I10 BENIGN ESSENTIAL HYPERTENSION: ICD-10-CM

## 2023-10-26 DIAGNOSIS — K21.9 GASTROESOPHAGEAL REFLUX DISEASE WITHOUT ESOPHAGITIS: Primary | ICD-10-CM

## 2023-10-26 RX ORDER — ATORVASTATIN CALCIUM 20 MG/1
TABLET, FILM COATED ORAL
Qty: 90 TABLET | Refills: 3 | Status: SHIPPED | OUTPATIENT
Start: 2023-10-26

## 2023-10-30 RX ORDER — LISINOPRIL 20 MG/1
20 TABLET ORAL DAILY
Qty: 90 TABLET | Refills: 3 | Status: SHIPPED | OUTPATIENT
Start: 2023-10-30 | End: 2024-03-26

## 2023-10-30 RX ORDER — FUROSEMIDE 20 MG
20 TABLET ORAL DAILY
Qty: 90 TABLET | Refills: 3 | Status: ON HOLD | OUTPATIENT
Start: 2023-10-30 | End: 2024-03-30

## 2023-10-31 NOTE — ASSESSMENT & PLAN NOTE
Diabetes is stable to worsening.  She says she has been eating more treats lately and so we discussed cutting back on that.  Her A1c today was 7.1.  This is up from 6.73 months ago.   Render Risk Assessment In Note?: no Additional Notes: Patient has met with Dr. Boby Sen and they agreed upon a graft repair.  The repair will be delayed until the infection has completely resolved Detail Level: Simple

## 2023-11-03 ASSESSMENT — ENCOUNTER SYMPTOMS
CONSTIPATION: 0
EYE PAIN: 0
MYALGIAS: 0
PARESTHESIAS: 0
BREAST MASS: 0
HEMATOCHEZIA: 0
SORE THROAT: 0
NAUSEA: 0
SHORTNESS OF BREATH: 0
ABDOMINAL PAIN: 0
DIARRHEA: 0
HEMATURIA: 0
HEARTBURN: 0
DIZZINESS: 0
CHILLS: 0
JOINT SWELLING: 0
FREQUENCY: 0
COUGH: 0
ARTHRALGIAS: 0
NERVOUS/ANXIOUS: 0
FEVER: 0
DYSURIA: 0
HEADACHES: 0
WEAKNESS: 0
PALPITATIONS: 0

## 2023-11-03 ASSESSMENT — ANXIETY QUESTIONNAIRES
2. NOT BEING ABLE TO STOP OR CONTROL WORRYING: NOT AT ALL
5. BEING SO RESTLESS THAT IT IS HARD TO SIT STILL: NOT AT ALL
IF YOU CHECKED OFF ANY PROBLEMS ON THIS QUESTIONNAIRE, HOW DIFFICULT HAVE THESE PROBLEMS MADE IT FOR YOU TO DO YOUR WORK, TAKE CARE OF THINGS AT HOME, OR GET ALONG WITH OTHER PEOPLE: NOT DIFFICULT AT ALL
6. BECOMING EASILY ANNOYED OR IRRITABLE: NOT AT ALL
GAD7 TOTAL SCORE: 0
GAD7 TOTAL SCORE: 0
7. FEELING AFRAID AS IF SOMETHING AWFUL MIGHT HAPPEN: NOT AT ALL
3. WORRYING TOO MUCH ABOUT DIFFERENT THINGS: NOT AT ALL
4. TROUBLE RELAXING: NOT AT ALL
1. FEELING NERVOUS, ANXIOUS, OR ON EDGE: NOT AT ALL

## 2023-11-03 ASSESSMENT — ACTIVITIES OF DAILY LIVING (ADL): CURRENT_FUNCTION: NO ASSISTANCE NEEDED

## 2023-11-10 ENCOUNTER — OFFICE VISIT (OUTPATIENT)
Dept: FAMILY MEDICINE | Facility: CLINIC | Age: 88
End: 2023-11-10
Payer: MEDICARE

## 2023-11-10 VITALS
BODY MASS INDEX: 32.51 KG/M2 | HEIGHT: 63 IN | WEIGHT: 183.5 LBS | TEMPERATURE: 98.2 F | OXYGEN SATURATION: 93 % | HEART RATE: 68 BPM | SYSTOLIC BLOOD PRESSURE: 137 MMHG | RESPIRATION RATE: 16 BRPM | DIASTOLIC BLOOD PRESSURE: 82 MMHG

## 2023-11-10 DIAGNOSIS — E11.42 TYPE 2 DIABETES MELLITUS WITH DIABETIC POLYNEUROPATHY, WITHOUT LONG-TERM CURRENT USE OF INSULIN (H): Primary | ICD-10-CM

## 2023-11-10 DIAGNOSIS — N25.81 SECONDARY RENAL HYPERPARATHYROIDISM (H): ICD-10-CM

## 2023-11-10 DIAGNOSIS — E03.8 SUBCLINICAL HYPOTHYROIDISM: ICD-10-CM

## 2023-11-10 DIAGNOSIS — K21.9 GASTROESOPHAGEAL REFLUX DISEASE WITHOUT ESOPHAGITIS: ICD-10-CM

## 2023-11-10 DIAGNOSIS — I35.1 NONRHEUMATIC AORTIC VALVE INSUFFICIENCY: ICD-10-CM

## 2023-11-10 DIAGNOSIS — G50.0 TRIGEMINAL NEURALGIA: ICD-10-CM

## 2023-11-10 DIAGNOSIS — Z00.00 HEALTHCARE MAINTENANCE: ICD-10-CM

## 2023-11-10 DIAGNOSIS — M81.0 AGE-RELATED OSTEOPOROSIS WITHOUT CURRENT PATHOLOGICAL FRACTURE: ICD-10-CM

## 2023-11-10 DIAGNOSIS — Z51.81 ENCOUNTER FOR THERAPEUTIC DRUG MONITORING: ICD-10-CM

## 2023-11-10 DIAGNOSIS — I10 BENIGN ESSENTIAL HYPERTENSION: ICD-10-CM

## 2023-11-10 DIAGNOSIS — E83.52 HYPERCALCEMIA: ICD-10-CM

## 2023-11-10 DIAGNOSIS — N28.89 RENAL MASS: ICD-10-CM

## 2023-11-10 DIAGNOSIS — Z23 IMMUNIZATION DUE: ICD-10-CM

## 2023-11-10 DIAGNOSIS — I51.89 DIASTOLIC DYSFUNCTION: ICD-10-CM

## 2023-11-10 DIAGNOSIS — I36.1 NONRHEUMATIC TRICUSPID VALVE REGURGITATION: ICD-10-CM

## 2023-11-10 DIAGNOSIS — E53.8 VITAMIN B12 DEFICIENCY (NON ANEMIC): ICD-10-CM

## 2023-11-10 DIAGNOSIS — Z29.11 NEED FOR VACCINATION AGAINST RESPIRATORY SYNCYTIAL VIRUS: ICD-10-CM

## 2023-11-10 DIAGNOSIS — I50.43 ACUTE ON CHRONIC COMBINED SYSTOLIC AND DIASTOLIC HEART FAILURE (H): ICD-10-CM

## 2023-11-10 DIAGNOSIS — N18.30 STAGE 3 CHRONIC KIDNEY DISEASE, UNSPECIFIED WHETHER STAGE 3A OR 3B CKD (H): ICD-10-CM

## 2023-11-10 DIAGNOSIS — I44.7 LEFT BUNDLE BRANCH BLOCK: ICD-10-CM

## 2023-11-10 DIAGNOSIS — N18.32 STAGE 3B CHRONIC KIDNEY DISEASE (H): ICD-10-CM

## 2023-11-10 DIAGNOSIS — M80.00XD AGE-RELATED OSTEOPOROSIS WITH CURRENT PATHOLOGICAL FRACTURE WITH ROUTINE HEALING: ICD-10-CM

## 2023-11-10 DIAGNOSIS — I34.0 NONRHEUMATIC MITRAL VALVE REGURGITATION: ICD-10-CM

## 2023-11-10 DIAGNOSIS — E78.2 MIXED HYPERCHOLESTEROLEMIA AND HYPERTRIGLYCERIDEMIA: ICD-10-CM

## 2023-11-10 DIAGNOSIS — E66.811 CLASS 1 OBESITY WITH SERIOUS COMORBIDITY AND BODY MASS INDEX (BMI) OF 32.0 TO 32.9 IN ADULT, UNSPECIFIED OBESITY TYPE: ICD-10-CM

## 2023-11-10 DIAGNOSIS — F33.42 RECURRENT MAJOR DEPRESSIVE DISORDER, IN FULL REMISSION (H): ICD-10-CM

## 2023-11-10 DIAGNOSIS — G20.A1 PARKINSON'S DISEASE, UNSPECIFIED WHETHER DYSKINESIA PRESENT, UNSPECIFIED WHETHER MANIFESTATIONS FLUCTUATE (H): ICD-10-CM

## 2023-11-10 DIAGNOSIS — J98.09 BRONCHOMALACIA: ICD-10-CM

## 2023-11-10 DIAGNOSIS — F33.40 RECURRENT MAJOR DEPRESSIVE DISORDER, IN REMISSION (H): ICD-10-CM

## 2023-11-10 PROBLEM — I50.20 HEART FAILURE WITH REDUCED EJECTION FRACTION (H): Status: ACTIVE | Noted: 2018-07-10

## 2023-11-10 LAB
ERYTHROCYTE [DISTWIDTH] IN BLOOD BY AUTOMATED COUNT: 14.5 % (ref 10–15)
HBA1C MFR BLD: 6.7 % (ref 0–5.6)
HCT VFR BLD AUTO: 40.3 % (ref 35–47)
HGB BLD-MCNC: 13.4 G/DL (ref 11.7–15.7)
HOLD SPECIMEN: NORMAL
HOLD SPECIMEN: NORMAL
MCH RBC QN AUTO: 30.4 PG (ref 26.5–33)
MCHC RBC AUTO-ENTMCNC: 33.3 G/DL (ref 31.5–36.5)
MCV RBC AUTO: 91 FL (ref 78–100)
PLATELET # BLD AUTO: 175 10E3/UL (ref 150–450)
RBC # BLD AUTO: 4.41 10E6/UL (ref 3.8–5.2)
WBC # BLD AUTO: 6.1 10E3/UL (ref 4–11)

## 2023-11-10 PROCEDURE — 84443 ASSAY THYROID STIM HORMONE: CPT | Performed by: FAMILY MEDICINE

## 2023-11-10 PROCEDURE — 99214 OFFICE O/P EST MOD 30 MIN: CPT | Mod: 25 | Performed by: FAMILY MEDICINE

## 2023-11-10 PROCEDURE — 36415 COLL VENOUS BLD VENIPUNCTURE: CPT | Performed by: FAMILY MEDICINE

## 2023-11-10 PROCEDURE — 83036 HEMOGLOBIN GLYCOSYLATED A1C: CPT | Performed by: FAMILY MEDICINE

## 2023-11-10 PROCEDURE — G0439 PPPS, SUBSEQ VISIT: HCPCS | Performed by: FAMILY MEDICINE

## 2023-11-10 PROCEDURE — 80053 COMPREHEN METABOLIC PANEL: CPT | Performed by: FAMILY MEDICINE

## 2023-11-10 PROCEDURE — 85027 COMPLETE CBC AUTOMATED: CPT | Performed by: FAMILY MEDICINE

## 2023-11-10 RX ORDER — RESPIRATORY SYNCYTIAL VIRUS VACCINE 120MCG/0.5
0.5 KIT INTRAMUSCULAR ONCE
Qty: 1 EACH | Refills: 0 | Status: SHIPPED | OUTPATIENT
Start: 2023-11-10 | End: 2023-11-10

## 2023-11-10 ASSESSMENT — ENCOUNTER SYMPTOMS
EYE PAIN: 0
HEADACHES: 0
CHILLS: 0
CONSTIPATION: 0
HEARTBURN: 0
PARESTHESIAS: 0
MYALGIAS: 0
HEMATOCHEZIA: 0
SORE THROAT: 0
BREAST MASS: 0
PALPITATIONS: 0
DIZZINESS: 0
WEAKNESS: 0
ARTHRALGIAS: 0
DIARRHEA: 0
HEMATURIA: 0
SHORTNESS OF BREATH: 0
NERVOUS/ANXIOUS: 0
JOINT SWELLING: 0
FEVER: 0
ABDOMINAL PAIN: 0
COUGH: 0
DYSURIA: 0
FREQUENCY: 0
NAUSEA: 0

## 2023-11-10 ASSESSMENT — PATIENT HEALTH QUESTIONNAIRE - PHQ9
10. IF YOU CHECKED OFF ANY PROBLEMS, HOW DIFFICULT HAVE THESE PROBLEMS MADE IT FOR YOU TO DO YOUR WORK, TAKE CARE OF THINGS AT HOME, OR GET ALONG WITH OTHER PEOPLE: NOT DIFFICULT AT ALL
SUM OF ALL RESPONSES TO PHQ QUESTIONS 1-9: 4
SUM OF ALL RESPONSES TO PHQ QUESTIONS 1-9: 4

## 2023-11-10 ASSESSMENT — ACTIVITIES OF DAILY LIVING (ADL): CURRENT_FUNCTION: NO ASSISTANCE NEEDED

## 2023-11-10 NOTE — ASSESSMENT & PLAN NOTE
>>ASSESSMENT AND PLAN FOR DIASTOLIC DYSFUNCTION WRITTEN ON 11/10/2023  4:40 PM BY ASHLY MORRISON MD    Diastolic dysfunction-followed by Dr. Ricardo in the heart failure clinic.  It is recommended that she make a follow-up visit.

## 2023-11-10 NOTE — ASSESSMENT & PLAN NOTE
Diastolic dysfunction-followed by Dr. Ricardo in the heart failure clinic.  It is recommended that she make a follow-up visit.

## 2023-11-10 NOTE — ASSESSMENT & PLAN NOTE
Contraception  - menopause.   Recommended vaccines-RSV  Pap: no indication.   Mammo: no indication  Colonoscopy: no indication (age 89)   Std testing desired:  offered  Osteoporosis prevention discussed.  vitamin d levels ordered. Recommend daily calcium and vitamin d intake to keep good bone health. Recommend weight bearing exercise, no tobacco, and limit alcohol  dexa - recommended, see osteoporosis in the problem list.   Recommend sunscreen, exercise, & healthy diet.  Offered cbc, cmp, lipids and asked what other testing she  desires today  I have had an Advance Directives discussion with the patient.   Body mass index is 32.51 kg/m .   mychart offered.

## 2023-11-10 NOTE — ASSESSMENT & PLAN NOTE
Hypothyroidism-most recent TSH was normal in June 2023 and the patient was off of her thyroid medication at that time.  Thyroid medication is not currently indicated- defer to Dr. Christensen endocrinology

## 2023-11-10 NOTE — ASSESSMENT & PLAN NOTE
2 diabetes controlled with A1c of 6.7 today  Taking lisinopril for nephro protection.  On Lipitor 20 mg orally per day  On an aspirin

## 2023-11-10 NOTE — ASSESSMENT & PLAN NOTE
Obesity with bmi 32 and chronic comorbid health conditions including cad     Health lifestyle discussed.

## 2023-11-10 NOTE — ASSESSMENT & PLAN NOTE
Normal bp today. No change in plan.   Continue carvedilol 12.5mg po q day  continue lisinopril 20 mg po q day.   And Lasix 20 mg p.o. daily

## 2023-11-10 NOTE — PROGRESS NOTES
"Follow-up of renal mass, refill of Prozac and Prilosec  addressed above and beyond usual scope of annual exam.     SUBJECTIVE:   Janes is a 89 year old who presents for Preventive Visit.      11/10/2023     4:22 PM   Additional Questions   Roomed by Taqueria Henry MA   Accompanied by Daughter         11/10/2023     4:22 PM   Patient Reported Additional Medications   Patient reports taking the following new medications None       Are you in the first 12 months of your Medicare coverage?  No    Healthy Habits:     In general, how would you rate your overall health?  Good    Frequency of exercise:  None    Do you usually eat at least 4 servings of fruit and vegetables a day, include whole grains    & fiber and avoid regularly eating high fat or \"junk\" foods?  No    Taking medications regularly:  Yes    Medication side effects:  Not applicable    Ability to successfully perform activities of daily living:  No assistance needed    Home Safety:  No safety concerns identified    Hearing Impairment:  No hearing concerns    In the past 6 months, have you been bothered by leaking of urine?  No    In general, how would you rate your overall mental or emotional health?  Good    Additional concerns today:  No      Today's PHQ-9 Score:       11/10/2023     4:06 PM   PHQ-9 SCORE   PHQ-9 Total Score MyChart 4 (Minimal depression)   PHQ-9 Total Score 4           Have you ever done Advance Care Planning? (For example, a Health Directive, POLST, or a discussion with a medical provider or your loved ones about your wishes): Yes, advance care planning is on file.       Fall risk  Fallen 2 or more times in the past year?: No  Any fall with injury in the past year?: No    Cognitive Screening   1) Repeat 3 items (Leader, Season, Table)    2) Clock draw: NORMAL  3) 3 item recall: Recalls 3 objects  Results: 3 items recalled: COGNITIVE IMPAIRMENT LESS LIKELY    Mini-CogTM Copyright S [employer]. Licensed by the author for use in [employer] " Services; reprinted with permission (soob@[facility]). All rights reserved.      Do you have sleep apnea, excessive snoring or daytime drowsiness? : no    Reviewed and updated as needed this visit by clinical staff   Tobacco  Allergies  Meds  Problems  Med Hx  Surg Hx  Fam Hx  Soc   Hx        Reviewed and updated as needed this visit by Provider   Tobacco  Allergies  Meds  Problems  Med Hx  Surg Hx  Fam Hx         Social History     Tobacco Use    Smoking status: Never     Passive exposure: Never    Smokeless tobacco: Never   Substance Use Topics    Alcohol use: No             11/3/2023     9:47 AM   Alcohol Use   Prescreen: >3 drinks/day or >7 drinks/week? No     Do you have a current opioid prescription? No  Do you use any other controlled substances or medications that are not prescribed by a provider? None        Current providers sharing in care for this patient include:   Patient Care Team:  Maggie Arriaga MD as PCP - General (Family Medicine)  Maggie Arriaga MD as Assigned PCP  Brennon Moya MD as MD (Neurology)  Mathew Ricardo MD as MD (Cardiovascular Disease)  Abner Curtis MD as MD (Endocrinology, Diabetes, and Metabolism)  Brennon Moya MD as Assigned Neuroscience Provider  Mathew Ricardo MD as Assigned Heart and Vascular Provider  Eric Christensen MD as MD (Endocrinology, Diabetes, and Metabolism)  Eric Christensen MD as Assigned Endocrinology Provider  Lauri Mckeon DPM as Assigned Surgical Provider  Junaid Scales MD as Assigned Pulmonology Provider    The following health maintenance items are reviewed in Epic and correct as of today:  Health Maintenance   Topic Date Due    HF ACTION PLAN  Never done    RSV VACCINE (Pregnancy & 60+) (1 - 1-dose 60+ series) Never done    MEDICARE ANNUAL WELLNESS VISIT  06/28/2023    MICROALBUMIN  10/28/2023    BMP  11/24/2023    A1C  02/10/2024    ANNUAL REVIEW OF HM ORDERS   "04/28/2024    ALT  05/07/2024    PHQ-9  05/10/2024    LIPID  06/19/2024    EYE EXAM  07/20/2024    DIABETIC FOOT EXAM  08/09/2024    FALL RISK ASSESSMENT  11/10/2024    CBC  11/10/2024    HEMOGLOBIN  11/10/2024    DTAP/TDAP/TD IMMUNIZATION (2 - Td or Tdap) 07/06/2026    ADVANCE CARE PLANNING  11/10/2028    PARATHYROID  Completed    PHOSPHORUS  Completed    TSH W/FREE T4 REFLEX  Completed    DEPRESSION ACTION PLAN  Completed    INFLUENZA VACCINE  Completed    Pneumococcal Vaccine: 65+ Years  Completed    URINALYSIS  Completed    ALK PHOS  Completed    ZOSTER IMMUNIZATION  Completed    COVID-19 Vaccine  Completed    IPV IMMUNIZATION  Aged Out    HPV IMMUNIZATION  Aged Out    MENINGITIS IMMUNIZATION  Aged Out    RSV MONOCLONAL ANTIBODY  Aged Out       Pertinent mammograms are reviewed under the imaging tab.    Review of Systems   Constitutional:  Negative for chills and fever.   HENT:  Negative for congestion, ear pain, hearing loss and sore throat.    Eyes:  Negative for pain and visual disturbance.   Respiratory:  Negative for cough and shortness of breath.    Cardiovascular:  Negative for chest pain, palpitations and peripheral edema.   Gastrointestinal:  Negative for abdominal pain, constipation, diarrhea, heartburn, hematochezia and nausea.   Breasts:  Negative for tenderness, breast mass and discharge.   Genitourinary:  Negative for dysuria, frequency, genital sores, hematuria, pelvic pain, urgency, vaginal bleeding and vaginal discharge.   Musculoskeletal:  Negative for arthralgias, joint swelling and myalgias.   Skin:  Negative for rash.   Neurological:  Negative for dizziness, weakness, headaches and paresthesias.   Psychiatric/Behavioral:  Negative for mood changes. The patient is not nervous/anxious.          OBJECTIVE:   /82 (BP Location: Left arm, Patient Position: Sitting, Cuff Size: Adult Large)   Pulse 68   Temp 98.2  F (36.8  C) (Oral)   Resp 16   Ht 1.6 m (5' 3\")   Wt 83.2 kg (183 lb 8 oz)  " " LMP  (LMP Unknown)   SpO2 93%   BMI 32.51 kg/m   Estimated body mass index is 32.51 kg/m  as calculated from the following:    Height as of this encounter: 1.6 m (5' 3\").    Weight as of this encounter: 83.2 kg (183 lb 8 oz).  Physical Exam  Constitutional:       Appearance: Normal appearance.   HENT:      Head: Normocephalic and atraumatic.   Cardiovascular:      Rate and Rhythm: Normal rate and regular rhythm.   Pulmonary:      Effort: Pulmonary effort is normal.      Breath sounds: Normal breath sounds.   Musculoskeletal:         General: Normal range of motion.      Cervical back: Normal range of motion and neck supple.   Neurological:      General: No focal deficit present.      Mental Status: She is alert and oriented to person, place, and time.   Psychiatric:         Mood and Affect: Mood normal.         Behavior: Behavior normal.         Thought Content: Thought content normal.         Judgment: Judgment normal.         ASSESSMENT / PLAN:     Problem List Items Addressed This Visit          Nervous and Auditory    Parkinson's disease     Consents-  Managed by Dr. Moya and neurology - follow up due end of November 2023          Trigeminal neuralgia     Trigeminal neuralgia-  Managed by Dr. Moya and neurology follow up due end November 2023          Type 2 diabetes mellitus with diabetic polyneuropathy, without long-term current use of insulin (H) - Primary     2 diabetes controlled with A1c of 6.7 today  Taking lisinopril for nephro protection.  On Lipitor 20 mg orally per day  On an aspirin          Relevant Orders    Hemoglobin A1c (Completed)       Respiratory    Bronchomalacia     Bronchomalacia, seeing pulm 12/12/23 Dr. Garcia            Digestive    GERD (gastroesophageal reflux disease)    Relevant Medications    omeprazole (PRILOSEC) 20 MG DR capsule    Vitamin B12 deficiency (non anemic)     B12 deficiency-Managed by Dr. Moya in neurology          Class 1 obesity with serious " comorbidity and body mass index (BMI) of 32.0 to 32.9 in adult     Obesity with bmi 32 and chronic comorbid health conditions including cad     Health lifestyle discussed.            Endocrine    Subclinical hypothyroidism     Hypothyroidism-most recent TSH was normal in June 2023 and the patient was off of her thyroid medication at that time.  Thyroid medication is not currently indicated- defer to Dr. Christensen endocrinology         Relevant Orders    TSH with free T4 reflex    Secondary renal hyperparathyroidism (H24)     Renal hyperparathyroidism- defer to Dr. Christensen endocrinology         Mixed hypercholesterolemia and hypertriglyceridemia     Hyperlipidemia  Sees Dr. Ricardo in cardiology.  Continue Lipitor 20 mg orally per day          Hypercalcemia     Hyperkalemia-defer to Dr. Christensen endocrinology            Circulatory    Benign essential hypertension     Normal bp today. No change in plan.   Continue carvedilol 12.5mg po q day  continue lisinopril 20 mg po q day.   And Lasix 20 mg p.o. daily         Aortic valve insufficiency     By Dr. Ricardo cardiology         Tricuspid insufficiency     With Dr. Ricardo cardiology         Mitral valve insufficiency     Managed by Dr. Barron of cardiology         Left bundle branch block     Left bundle branch block-managed by Dr. Ricardo            Musculoskeletal and Integumentary    Age-related osteoporosis without current pathological fracture     Osteoporosis defer to Dr. Christensen endocrinology         Age-related osteoporosis with current pathological fracture with routine healing     Osteoporosis defer to Dr. Christensen endocrinology            Urinary    Stage 3b chronic kidney disease (H)     History of CKD-we will check BMP today         Renal mass    Relevant Orders    CT Renal Mass wo & w Contrast       Behavioral    Recurrent major depressive disorder, in remission (H24)     Depression-Controlled on fluoxitine 20 mg po bid, refilled.         "   Relevant Medications    FLUoxetine (PROZAC) 20 MG capsule       Other    Healthcare maintenance     Contraception  - menopause.   Recommended vaccines-RSV  Pap: no indication.   Mammo: no indication  Colonoscopy: no indication (age 89)   Std testing desired:  offered  Osteoporosis prevention discussed.  vitamin d levels ordered. Recommend daily calcium and vitamin d intake to keep good bone health. Recommend weight bearing exercise, no tobacco, and limit alcohol  dexa - recommended, see osteoporosis in the problem list.   Recommend sunscreen, exercise, & healthy diet.  Offered cbc, cmp, lipids and asked what other testing she  desires today  I have had an Advance Directives discussion with the patient.   Body mass index is 32.51 kg/m .   mychart offered.            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 Bili, TP)    Immunization due        Relevant Medications    respiratory syncytial virus vaccine, bivalent (ABRYSVO) injection            Patient has been advised of split billing requirements and indicates understanding: Yes      COUNSELING:  Reviewed preventive health counseling, as reflected in patient instructions       Regular exercise       Healthy diet/nutrition      BMI:   Estimated body mass index is 32.51 kg/m  as calculated from the following:    Height as of this encounter: 1.6 m (5' 3\").    Weight as of this encounter: 83.2 kg (183 lb 8 oz).   Weight management plan: Discussed healthy diet and exercise guidelines      She reports that she has never " smoked. She has never been exposed to tobacco smoke. She has never used smokeless tobacco.      Appropriate preventive services were discussed with this patient, including applicable screening as appropriate for fall prevention, nutrition, physical activity, Tobacco-use cessation, weight loss and cognition.  Checklist reviewing preventive services available has been given to the patient.    Reviewed patients plan of care and provided an AVS. The Basic Care Plan (routine screening as documented in Health Maintenance) for Ivia meets the Care Plan requirement. This Care Plan has been established and reviewed with the Patient and caregiver.        Maggie Arriaga MD  [facility]    Identified Health Risks:  I have reviewed Opioid Use Disorder and Substance Use Disorder risk factors and made any needed referrals.   Answers submitted by the patient for this visit:  Patient Health Questionnaire (Submitted on 11/10/2023)  If you checked off any problems, how difficult have these problems made it for you to do your work, take care of things at home, or get along with other people?: Not difficult at all  PHQ9 TOTAL SCORE: 4  ALLI-7 (Submitted on 11/3/2023)  ALLI 7 TOTAL SCORE: 0

## 2023-11-11 LAB
ALBUMIN SERPL BCG-MCNC: 4.4 G/DL (ref 3.5–5.2)
ALP SERPL-CCNC: 73 U/L (ref 35–104)
ALT SERPL W P-5'-P-CCNC: 11 U/L (ref 0–50)
ANION GAP SERPL CALCULATED.3IONS-SCNC: 12 MMOL/L (ref 7–15)
AST SERPL W P-5'-P-CCNC: 27 U/L (ref 0–45)
BILIRUB SERPL-MCNC: 0.5 MG/DL
BUN SERPL-MCNC: 33.6 MG/DL (ref 8–23)
CALCIUM SERPL-MCNC: 10.6 MG/DL (ref 8.2–9.6)
CHLORIDE SERPL-SCNC: 98 MMOL/L (ref 98–107)
CREAT SERPL-MCNC: 1.11 MG/DL (ref 0.51–0.95)
DEPRECATED HCO3 PLAS-SCNC: 28 MMOL/L (ref 22–29)
EGFRCR SERPLBLD CKD-EPI 2021: 47 ML/MIN/1.73M2
GLUCOSE SERPL-MCNC: 140 MG/DL (ref 70–99)
POTASSIUM SERPL-SCNC: 4.7 MMOL/L (ref 3.4–5.3)
PROT SERPL-MCNC: 7.4 G/DL (ref 6.4–8.3)
SODIUM SERPL-SCNC: 138 MMOL/L (ref 135–145)
TSH SERPL DL<=0.005 MIU/L-ACNC: 1.97 UIU/ML (ref 0.3–4.2)

## 2023-11-17 DIAGNOSIS — N18.30 STAGE 3 CHRONIC KIDNEY DISEASE, UNSPECIFIED WHETHER STAGE 3A OR 3B CKD (H): ICD-10-CM

## 2023-11-17 DIAGNOSIS — M80.00XD AGE-RELATED OSTEOPOROSIS WITH CURRENT PATHOLOGICAL FRACTURE WITH ROUTINE HEALING: Primary | ICD-10-CM

## 2023-11-29 ENCOUNTER — HOSPITAL ENCOUNTER (OUTPATIENT)
Dept: CT IMAGING | Facility: HOSPITAL | Age: 88
Discharge: HOME | End: 2023-11-29
Attending: FAMILY MEDICINE | Admitting: FAMILY MEDICINE
Payer: MEDICARE

## 2023-11-29 DIAGNOSIS — M80.00XS AGE-RELATED OSTEOPOROSIS WITH CURRENT PATHOLOGICAL FRACTURE, SEQUELA: ICD-10-CM

## 2023-11-29 DIAGNOSIS — N28.89 RENAL MASS: ICD-10-CM

## 2023-11-29 DIAGNOSIS — M81.0 OSTEOPOROSIS WITHOUT CURRENT PATHOLOGICAL FRACTURE, UNSPECIFIED OSTEOPOROSIS TYPE: Primary | ICD-10-CM

## 2023-11-29 DIAGNOSIS — N18.30 STAGE 3 CHRONIC KIDNEY DISEASE, UNSPECIFIED WHETHER STAGE 3A OR 3B CKD (H): ICD-10-CM

## 2023-11-29 PROCEDURE — G1010 CDSM STANSON: HCPCS

## 2023-11-29 PROCEDURE — 250N000011 HC RX IP 250 OP 636: Mod: JZ | Performed by: FAMILY MEDICINE

## 2023-11-29 RX ORDER — IOPAMIDOL 755 MG/ML
90 INJECTION, SOLUTION INTRAVASCULAR ONCE
Status: COMPLETED | OUTPATIENT
Start: 2023-11-29 | End: 2023-11-29

## 2023-11-29 RX ADMIN — IOPAMIDOL 90 ML: 755 INJECTION, SOLUTION INTRAVENOUS at 18:12

## 2023-11-29 NOTE — PROGRESS NOTES
Prolia order replaced to meet current medicare guidelines.    Per CAM team:  A Prolia order was placed for this patient.  Medicare updated their policy on 10/01/2023 and DX M80.00XD is not longer on the list of covered DX.  May I have your team review the policy's group 3 to see if there is an applicable DX for the patient?

## 2023-12-12 ENCOUNTER — OFFICE VISIT (OUTPATIENT)
Dept: PULMONOLOGY | Facility: CLINIC | Age: 88
End: 2023-12-12
Attending: INTERNAL MEDICINE
Payer: MEDICARE

## 2023-12-12 VITALS
SYSTOLIC BLOOD PRESSURE: 158 MMHG | BODY MASS INDEX: 32.81 KG/M2 | DIASTOLIC BLOOD PRESSURE: 72 MMHG | WEIGHT: 185.2 LBS | HEART RATE: 66 BPM | OXYGEN SATURATION: 95 %

## 2023-12-12 DIAGNOSIS — G47.30 SLEEP-RELATED BREATHING DISORDER: ICD-10-CM

## 2023-12-12 DIAGNOSIS — J98.09 BRONCHOMALACIA: ICD-10-CM

## 2023-12-12 DIAGNOSIS — Z72.821 POOR SLEEP HYGIENE: Primary | ICD-10-CM

## 2023-12-12 PROCEDURE — 99214 OFFICE O/P EST MOD 30 MIN: CPT | Performed by: INTERNAL MEDICINE

## 2023-12-12 NOTE — PATIENT INSTRUCTIONS
Follow up in the sleep clinic  Avoid eating close to bed time   Raise the head of the bed  Omeprazole daily   Follow up in 6 months

## 2023-12-13 NOTE — PROGRESS NOTES
PULMONARY OUTPATIENT CONSULT NOTE        Assessment:     Moderate tracheobronchomalacia   No tobacco use. No outdoor allergies, no history of asthma.   PFTs showed a normal spirometry , lung volumes and diffusion capacity.   Hx acid reflux. Currently on PPI. Diet modification was discussed.   Bronchodilators as needed  Overnight pulse oximetry on RA showed significant nocturnal desaturation  Sleep breathing disorder  Overnight pulse oximetry 8/14/2023 showed significant nocturnal desaturation. Time SpO2 < 90% 3 HR 3 MIN, time of SpO2 < 89% 1 HR 25 MIN. Total desaturations : 286. Desaturation index : 40.9  In view of significant co-morbidities, patient will be seen in the sleep clinic.   HTN, HFrEF, CAD s/p CABG , moderate MR  GERD  Obesity Body mass index is 32.81 kg/m .     Plan:      Follow up in the sleep clinic  Avoid eating close to bed time   Raise the head of the bed  Omeprazole daily   Follow up in 6 months     Junaid Lowe  Pulmonary / Critical Care  12/12/23        CC:     Chief Complaint   Patient presents with    Follow Up     4 MONTH FOLLOW UP:  Bronchomalacia  Chronic systolic heart failure (H)  Nonrheumatic mitral valve regurgitation  Gastroesophageal reflux disease with esophagitis without hemorrhage  FATMATA 8/14/23         HPI:         Janes Montero is a 89 year old female who presents for evaluation of tracheo-bronchomalacia   Patient has history of HTN, HFrEF 45 to 50%, moderate MR, CAD s/p CABG 2010, abnormal stress test 5/2023 on medical treatment, CKD, DM, Parkinson's disease , obesity.  Reports doing well since last visit.   Denies exertional dyspnea, able to walk less than a block, uses a walker to get around, activity is limited due to back pain and knee pain. Reports occasional dry cough, occasional wheezes.  Denies hemoptysis.   No fever, chills or night sweats.   Denies outdoor allergies, no history of asthma. Denies postnasal drip, headaches, or sinus tenderness.   Denies  using inhalers.   Denies chest pain, orthopnea, PND, or swelling of LEs.  Denies acid reflux symptoms while taking PPI. Reports occasional swallowing problems with clear liquids.   Denies sleeping problems.   Denies tobacco use.      Past Medical History :     Past Medical History:   Diagnosis Date    Acute diastolic congestive heart failure (H)     Acute kidney injury superimposed on CKD  08/18/2022    Acute on chronic congestive heart failure (H) 06/11/2018    Acute pulmonary edema (H) 05/06/2023    Acute respiratory failure with hypoxia (H) 05/08/2023    Chest pain, unspecified type 08/15/2022    Chronic bilateral back pain 06/15/2021    Last Assessment & Plan:   Formatting of this note might be different from the original.  She says she has seen a pain specialist and had back surgery.  She needs to establish care with a pain specialist since she is new to [place] from [place].  Referral placed.    Coronary artery disease     Coronary artery disease involving native coronary artery without angina pectoris, unspecified whether native or transplanted heart 08/17/2022    Diabetes mellitus, type II (H)     Diastolic dysfunction 07/10/2018    Frozen shoulder     bilateral    Heart failure with reduced ejection fraction (H) 05/25/2023    Hyperlipidemia     Hypertension     Hypertensive emergency     Parkinson disease     Primary osteoarthritis involving multiple joints     Primary osteoarthritis of left knee     Recurrent UTI     hx septic shock    Thyroid disease 2021          Medications:     Current Outpatient Medications   Medication    aspirin 81 mg chewable tablet    atorvastatin (LIPITOR) 20 MG tablet    carbidopa-levodopa (SINEMET)  MG tablet    carvedilol (COREG) 12.5 MG tablet    cholecalciferol, vitamin D3, 1,000 unit tablet    coenzyme Q10 (CO Q-10) 100 mg capsule    cyanocobalamin (VITAMIN B-12) 1000 MCG tablet    fish oil-omega-3 fatty acids 1000 MG capsule    FLUoxetine (PROZAC) 20 MG capsule     furosemide (LASIX) 20 MG tablet    gabapentin (NEURONTIN) 300 MG capsule    lisinopril (ZESTRIL) 20 MG tablet    loratadine (CLARITIN) 10 mg tablet    magnesium oxide 250 mg Tab    melatonin 1 mg Tab tablet    multivitamin therapeutic tablet    nitroGLYcerin (NITROSTAT) 0.4 MG sublingual tablet    omeprazole (PRILOSEC) 20 MG DR capsule    polyethylene glycol (MIRALAX) 17 gram packet    triamcinolone (KENALOG) 0.025 % external ointment     Current Facility-Administered Medications   Medication    denosumab (PROLIA) injection 60 mg    denosumab (PROLIA) injection 60 mg    denosumab (PROLIA) injection 60 mg        Social History :     Social History     Socioeconomic History    Marital status:      Spouse name: Not on file    Number of children: Not on file    Years of education: Not on file    Highest education level: Not on file   Occupational History    Not on file   Tobacco Use    Smoking status: Never     Passive exposure: Never    Smokeless tobacco: Never   Vaping Use    Vaping Use: Never used   Substance and Sexual Activity    Alcohol use: No    Drug use: No    Sexual activity: Not Currently     Partners: Male     Birth control/protection: None   Other Topics Concern    Parent/sibling w/ CABG, MI or angioplasty before 65F 55M? No   Social History Narrative    6/15/2021 new to [place] from [place]. She has 6 kids.     Social Determinants of Health     Financial Resource Strain: Low Risk  (11/3/2023)    Financial Resource Strain     Within the past 12 months, have you or your family members you live with been unable to get utilities (heat, electricity) when it was really needed?: No   Food Insecurity: Low Risk  (11/3/2023)    Food Insecurity     Within the past 12 months, did you worry that your food would run out before you got money to buy more?: No     Within the past 12 months, did the food you bought just not last and you didn t have money to get more?: No   Transportation Needs: Low Risk  (11/3/2023)     Transportation Needs     Within the past 12 months, has lack of transportation kept you from medical appointments, getting your medicines, non-medical meetings or appointments, work, or from getting things that you need?: No   Physical Activity: Not on file   Stress: Not on file   Social Connections: Not on file   Interpersonal Safety: Low Risk  (11/10/2023)    Interpersonal Safety     Do you feel physically and emotionally safe where you currently live?: Yes     Within the past 12 months, have you been hit, slapped, kicked or otherwise physically hurt by someone?: No     Within the past 12 months, have you been humiliated or emotionally abused in other ways by your partner or ex-partner?: No   Housing Stability: Low Risk  (11/3/2023)    Housing Stability     Do you have housing? : Yes     Are you worried about losing your housing?: No          Family History :     Family History   Problem Relation Age of Onset    Heart Disease Mother     Heart Disease Father        Review of Systems  A 12 point comprehensive review of systems was negative except as noted.        Objective:     BP (!) 158/72 (BP Location: Left arm, Patient Position: Sitting, Cuff Size: Adult Regular)   Pulse 66   Wt 84 kg (185 lb 3.2 oz)   LMP  (LMP Unknown)   SpO2 95%   BMI 32.81 kg/m      Gen: obese, awake, alert, no distress  HEENT: pink conjunctiva, moist mucosa, Mallampati III/IV  Neck: no thyromegaly, masses or JVD  Lungs: clear  CV: irregular, systolic murmurs, no gallops appreciated  Abdomen: soft, NT, BS wnl  Ext: no edema  Neuro: CN II-XII intact, non focal      Diagnostic tests:        Overnight pulse oximetry      Overnight pulse oximetry 8/14/2023    Valid time : 7 HR 18 MIN    Lowest SpO2 83%  SpO2 < 90% 3 HR 3 MIN  SpO2 < 89% 1 HR 25 MIN    Total desaturations : 286  Desaturation index : 40.9    PFTs:          IMAGES:      CT CHEST W/O CONTRAST  LOCATION: [facility]  DATE/TIME: 5/10/2023 5:25 PM  CDT  INDICATION: persistent hypoxia; eval further for possible interstitial lung disease suggested on CXR  COMPARISON: 05/06/2023  FINDINGS:   LUNGS AND PLEURA: Modest motion artifact. No focal pneumonia. No pleural effusion. No significant peripheral interstitial fibrosis suggested.Mosaic attenuation suggests small airways disease. There is moderate inflammatory bronchial wall thickening most pronounced in the perihilar regions. In addition, there is flattening of trachea and main bronchial structures consistent with bronchial tracheobronchomalacia.  MEDIASTINUM/AXILLAE: Postoperative changes from CABG. Cardiomegaly. No lymphadenopathy.  CORONARY ARTERY CALCIFICATION: Severe.  UPPER ABDOMEN: 2.4 cm hyperdense round nodule upper pole left kidney most suggestive of benign hyperdense cyst.  MUSCULOSKELETAL: Mild compression superior endplate of L1 age indeterminate. Right shoulder arthroplasty.        IMPRESSION:   1.  No significant interstitial fibrosis suggested. Modest motion artifact does compromise this exam.  2.  There is moderate inflammatory bronchial wall thickening, tracheobronchomalacia and mosaic attenuation of lung parenchyma consistent with small airways disease.  3.  2.4 cm hyperdense structure upper pole left kidney likely a benign hyperdense cyst. Ultrasound may be challenging to perform due to position of lesion in patient body habitus. Suggest a follow-up dedicated renal CT in 4-6 months for confirmation.    Echocardiogram Complete 5/9/2023  Interpretation Summary   Left ventricular function is decreased. The ejection fraction is 45-50% (mildly reduced).  There is borderline-mild global hypokinesia of the left ventricle.  Normal right ventricle size and systolic function.  There is moderate (2+) mitral regurgitation.    NM stress test 5/8/2023    A prior study was conducted on 8/16/2022.  Prior images were unavailable for comparison review.    Pharmacologic regadenoson stress ECG is nondiagnostic  due to baseline ECG abnormality.    Pharmacological regadenoson nuclear stress test is abnormal.  There is partially reversible change in inferior and basal to mid inferolateral walls indicating inducible myocardial ischemia.    Normal left ventricular size, wall motion and systolic function.  Calculated left ventricular ejection fraction is 65%.    The patient is at an intermediate risk of future cardiac ischemic events.

## 2023-12-20 ENCOUNTER — LAB (OUTPATIENT)
Dept: LAB | Facility: CLINIC | Age: 88
End: 2023-12-20
Payer: MEDICARE

## 2023-12-20 DIAGNOSIS — M80.00XD AGE-RELATED OSTEOPOROSIS WITH CURRENT PATHOLOGICAL FRACTURE WITH ROUTINE HEALING: ICD-10-CM

## 2023-12-20 DIAGNOSIS — N18.32 STAGE 3B CHRONIC KIDNEY DISEASE (H): Primary | ICD-10-CM

## 2023-12-20 PROCEDURE — 82040 ASSAY OF SERUM ALBUMIN: CPT

## 2023-12-20 PROCEDURE — 82565 ASSAY OF CREATININE: CPT

## 2023-12-20 PROCEDURE — 36415 COLL VENOUS BLD VENIPUNCTURE: CPT

## 2023-12-20 PROCEDURE — 82310 ASSAY OF CALCIUM: CPT

## 2023-12-21 ENCOUNTER — TRANSFERRED RECORDS (OUTPATIENT)
Dept: HEALTH INFORMATION MANAGEMENT | Facility: CLINIC | Age: 88
End: 2023-12-21
Payer: MEDICARE

## 2023-12-21 DIAGNOSIS — M80.00XD AGE-RELATED OSTEOPOROSIS WITH CURRENT PATHOLOGICAL FRACTURE WITH ROUTINE HEALING: Primary | ICD-10-CM

## 2023-12-21 LAB
ALBUMIN SERPL BCG-MCNC: 4.5 G/DL (ref 3.5–5.2)
CALCIUM SERPL-MCNC: 10.9 MG/DL (ref 8.2–9.6)
CREAT SERPL-MCNC: 1.34 MG/DL (ref 0.51–0.95)
EGFRCR SERPLBLD CKD-EPI 2021: 37 ML/MIN/1.73M2

## 2023-12-21 RX ORDER — METHYLPREDNISOLONE SODIUM SUCCINATE 125 MG/2ML
125 INJECTION, POWDER, LYOPHILIZED, FOR SOLUTION INTRAMUSCULAR; INTRAVENOUS
Status: CANCELLED
Start: 2023-12-21

## 2023-12-21 RX ORDER — EPINEPHRINE 1 MG/ML
0.3 INJECTION, SOLUTION, CONCENTRATE INTRAVENOUS EVERY 5 MIN PRN
Status: CANCELLED | OUTPATIENT
Start: 2023-12-21

## 2023-12-21 RX ORDER — ALBUTEROL SULFATE 0.83 MG/ML
2.5 SOLUTION RESPIRATORY (INHALATION)
Status: CANCELLED | OUTPATIENT
Start: 2023-12-21

## 2023-12-21 RX ORDER — DIPHENHYDRAMINE HYDROCHLORIDE 50 MG/ML
50 INJECTION INTRAMUSCULAR; INTRAVENOUS
Status: CANCELLED
Start: 2023-12-21

## 2023-12-21 RX ORDER — ALBUTEROL SULFATE 90 UG/1
1-2 AEROSOL, METERED RESPIRATORY (INHALATION)
Status: CANCELLED
Start: 2023-12-21

## 2023-12-27 ENCOUNTER — ALLIED HEALTH/NURSE VISIT (OUTPATIENT)
Dept: ENDOCRINOLOGY | Facility: CLINIC | Age: 88
End: 2023-12-27
Payer: MEDICARE

## 2023-12-27 DIAGNOSIS — M80.00XD AGE-RELATED OSTEOPOROSIS WITH CURRENT PATHOLOGICAL FRACTURE WITH ROUTINE HEALING: Primary | ICD-10-CM

## 2023-12-27 PROCEDURE — 96372 THER/PROPH/DIAG INJ SC/IM: CPT | Performed by: INTERNAL MEDICINE

## 2023-12-27 PROCEDURE — 99207 PR NO CHARGE NURSE ONLY: CPT

## 2023-12-27 NOTE — PROGRESS NOTES
"Prolia Injection Phone Screen      Screening questions have been asked 2-3 days prior to administration visit for Prolia. If any questions are answered with \"Yes,\" this phone encounter were will routed to ordering provider for further evaluation.     1.  When was the last injection?  6/27/23    2.  Has insurance for this injection been verified?  Yes    3.  Did you experience any new onset achiness or rashes that lasted for over a month with your previous Prolia injection?   No    4.  Do you have a fever over 101?F or a new deep cough that is unusual for you today? No    5.  Have you started any new medications in the last 6 months that you were told could affect your immune system? These may have been prescribed by oncologist, transplant, rheumatology, or dermatology.   No    6.  In the last 6 months have you have gastric bypass or parathyroid surgery?   No    7.  Do you plan dental work requiring drilling into the bone such as implants/extractions or oral surgery in the next 2-3 months?   No    8. Do you have new insurance since the last injection?    Patient informed if symptoms discussed above present prior to their administration appointment, they are to notify clinic immediately.     Marianela Nix RN          The following steps were completed to comply with the REMS program for Prolia:  1. Ordering provider has previously reviewed information in the Medication Guide and Patient Counseling Chart, including the serious risks of Prolia  and the symptoms of each risk and have been advised to seek prompt medical attention if they have signs or symptoms of any of the serious risks.  2. Provided each patient a copy of the Medication Guide and Patient Brochure.  See MAR for administration details.   Indication: Prolia  (denosumab) is a prescription medicine used to treat osteoporosis in patients who:   Are at high risk for fracture, meaning patients who have had a fracture related to osteoporosis, or who have " multiple risk factors for fracture; Cannot use another osteoporosis medicine or other osteoporosis medicines did not work well.   The timeline for early/late injections would be 4 weeks early and any time after the 6 month leonard. If a patient receives their injection late, then the subsequent injection would be 6 months from the date that they actually received the injection    Have the screening questions been asked prior to this administration? Yes    Clinic Administered Medication Documentation      Prolia Documentation    Indication: Prolia  (denosumab) is a prescription medicine used to treat osteoporosis in patients who:   Are at high risk for fracture, meaning patients who have had a fracture related to osteoporosis, or who have multiple risk factors for fracture.  Cannot use another osteoporosis medicine or other osteoporosis medicines did not work well.  The timeline for early/late injections would be 4 weeks early and any time after the 6 month leonard. If a patient receives their injection late, then the subsequent injection would be 6 months from the date that they actually received the injection.    When was the last injection?  23  Was the last injection at least 6 months ago? Yes  Has the prior authorization been completed?  Yes  Is there an active order (written within the past 365 days, with administrations remaining, not ) in the chart?  Yes  Patient denies any dental work involving the bone (e.g. tooth extraction or dental implants) in the past 4 weeks?  Yes  Patient denies plans for any dental work involving the bone (e.g. tooth extraction or dental implants) in the next 4 weeks? Yes    The following steps were completed to comply with the REMS program for Prolia:  Reviewed information in the Medication Guide and Patient Counseling Chart, including the serious risks of Prolia  and the symptoms of each risk.  Advised patient to seek prompt medical attention if they have signs or symptoms  of any of the serious risks.  Provided each patient a copy of the Medication Guide and Patient Brochure.    Prior to injection, verified patient identity using patient's name and date of birth. Medication was administered. Please see MAR and medication order for additional information. Patient instructed to remain in clinic for 15 minutes and report any adverse reaction to staff immediately.    Vial/Syringe: Syringe  Was this medication supplied by the patient? No  Verified that the patient has refills remaining in their prescription.    Janes Montero comes into clinic today at the request of Dr Gonzalez Ordering Provider for Med Injection only [place] .        This service provided today was under the supervising provider of the day Dr Gonzalez, who was available if needed.    Marianela Nix RN

## 2024-01-02 ASSESSMENT — SLEEP AND FATIGUE QUESTIONNAIRES
HOW LIKELY ARE YOU TO NOD OFF OR FALL ASLEEP WHILE SITTING AND TALKING TO SOMEONE: WOULD NEVER DOZE
HOW LIKELY ARE YOU TO NOD OFF OR FALL ASLEEP WHEN YOU ARE A PASSENGER IN A CAR FOR AN HOUR WITHOUT A BREAK: SLIGHT CHANCE OF DOZING
HOW LIKELY ARE YOU TO NOD OFF OR FALL ASLEEP WHILE WATCHING TV: SLIGHT CHANCE OF DOZING
HOW LIKELY ARE YOU TO NOD OFF OR FALL ASLEEP WHILE LYING DOWN TO REST IN THE AFTERNOON WHEN CIRCUMSTANCES PERMIT: SLIGHT CHANCE OF DOZING
HOW LIKELY ARE YOU TO NOD OFF OR FALL ASLEEP IN A CAR, WHILE STOPPED FOR A FEW MINUTES IN TRAFFIC: WOULD NEVER DOZE
HOW LIKELY ARE YOU TO NOD OFF OR FALL ASLEEP WHILE SITTING QUIETLY AFTER LUNCH WITHOUT ALCOHOL: SLIGHT CHANCE OF DOZING
HOW LIKELY ARE YOU TO NOD OFF OR FALL ASLEEP WHILE SITTING INACTIVE IN A PUBLIC PLACE: WOULD NEVER DOZE
HOW LIKELY ARE YOU TO NOD OFF OR FALL ASLEEP WHILE SITTING AND READING: WOULD NEVER DOZE

## 2024-01-09 ENCOUNTER — OFFICE VISIT (OUTPATIENT)
Dept: SLEEP MEDICINE | Facility: CLINIC | Age: 89
End: 2024-01-09
Attending: INTERNAL MEDICINE
Payer: MEDICARE

## 2024-01-09 VITALS
OXYGEN SATURATION: 94 % | HEIGHT: 63 IN | RESPIRATION RATE: 18 BRPM | WEIGHT: 184.8 LBS | BODY MASS INDEX: 32.74 KG/M2 | SYSTOLIC BLOOD PRESSURE: 142 MMHG | DIASTOLIC BLOOD PRESSURE: 80 MMHG | HEART RATE: 74 BPM

## 2024-01-09 DIAGNOSIS — I44.7 LEFT BUNDLE BRANCH BLOCK: ICD-10-CM

## 2024-01-09 DIAGNOSIS — G47.34 SLEEP RELATED HYPOXIA: Primary | ICD-10-CM

## 2024-01-09 DIAGNOSIS — R06.83 SNORING: ICD-10-CM

## 2024-01-09 DIAGNOSIS — G47.10 HYPERSOMNIA: ICD-10-CM

## 2024-01-09 DIAGNOSIS — I10 BENIGN ESSENTIAL HYPERTENSION: ICD-10-CM

## 2024-01-09 DIAGNOSIS — J39.8 TRACHEOMALACIA: ICD-10-CM

## 2024-01-09 PROCEDURE — 99204 OFFICE O/P NEW MOD 45 MIN: CPT | Performed by: INTERNAL MEDICINE

## 2024-01-09 NOTE — PATIENT INSTRUCTIONS
Patient education: What is a sleep study?     What is a sleep study? -- A sleep study is a test that measures how well you sleep and checks for sleep problems. For some sleep studies, you stay overnight in a sleep lab at a hospital or sleep center.     What happens during a sleep study? -- Before you go to sleep, a technician attaches small, sticky patches called  electrodes  to your head, chest, and legs. He or she will also place a small tube beneath your nose and might wrap 1 or 2 belts around your chest.   Each of these items has wires that connect to monitors. The monitors record your movement, brain activity, breathing, and other body functions while you sleep.  If you have a history of trouble falling asleep, your doctor might prescribe a medicine to help you fall asleep in the lab. If you have never taken the medicine before, your doctor might ask you take it on a night before your sleep study to see how it affects you.   Why might my doctor order a sleep study? -- Your doctor will order a sleep study if he or she thinks you have sleep apnea or a different condition that makes you:   ?Have sudden jerking leg movements while you sleep, called  periodic limb movements.    ?Feel very sleepy during the day and fall asleep all of a sudden, called  narcolepsy.    ?Have trouble falling asleep or staying asleep over a long period of time, called  chronic insomnia.    ?Do odd things while you sleep, such as walking.  How should I prepare for a sleep study? -- On the day of your sleep study, you should:   ?Avoid alcohol   ?Avoid drinking coffee, tea, sodas, and other drinks that have caffeine in the afternoon and evening   ?Take all of your regular medicines     The cost of care estimate line is [phone]. They are able to give the patient an estimate of the charges and also an estimate of their insurance coverage/patient responsibility.   After your sleep study is performed, please call us at [phone] or  [phone]  to schedule for a follow up to review the results of the sleep study.    Please bring one tab of low dose melatonin 3 mg or less to the night of the study.    Melatonin intake is completely voluntary.    You may take own melatonin after arrival to sleep center. Do not drive or operate machinery after intake of melatonin.     Please make every effort you can to sleep on your back with one pillow behind your head.    Please also call your insurance company next week to see if your sleep study is approved and find out your co-pay.

## 2024-01-09 NOTE — NURSING NOTE
"Chief Complaint   Patient presents with    Consult For     Sleep disorder breathing/Tracheomalacia       Initial LMP  (LMP Unknown)  Estimated body mass index is 32.81 kg/m  as calculated from the following:    Height as of 11/10/23: 1.6 m (5' 3\").    Weight as of 12/12/23: 84 kg (185 lb 3.2 oz).    Medication Reconciliation: complete    Neck circumference:  inches / 42 centimeters.    DME:     Annalee Hightower MA  [facility]    "

## 2024-01-09 NOTE — PROGRESS NOTES
Additional 15 minutes on the date of service was spent performing the following:    -Preparing to see the patient  -Obtaining and/or reviewing separately obtained history   -Ordering medications, tests, or procedures   -Documenting clinical information in the electronic or other health record     Assessment and Plan:    In summary Janes Montero is a 89 year old year old female here for sleep disturbance.  1.  Sleep-related hypoxia/Hypersomnia/Snoring/Tracheomalacia/Hypertension/Left Bundle Branch Block.   Janes Montero has high risk for obstructive sleep apnea based on the history of sleep-related hypoxia, hypersomnia, snoring and a crowded airway. I educated the patient on the underlying pathophysiology of obstructive sleep apnea. We reviewed the risks associated with sleep apnea, including increased cardiovascular risk and overall death. We talked about treatments briefly. I recommend getting an split-night nocturnal polysomnography with TCM. The patient should return to the clinic to discuss results and treatment option in a patient-centered approach.    History of present illness:    She is a 89 year old female who comes to the clinic with a chief complaint of sleep-related hypoxia.  The patient underwent a nocturnal oximetry in August of last year by her pulmonologist and was found to have sleep-related hypoxia.  While the patient denies any episodes of witnessed apnea she has been told that she does have snoring during sleep.  She also takes a nap on a daily basis.  The patient also has a history of tracheomalacia.  She is currently being treated for high blood pressure.  She also has a history of left bundle branch block.     Ideal Sleep-Wake Cycle(devoid of societal pressure):    TIME IN BED:    1) Work/School Days:    Do you work or go to school? No   What time do you usually get into bed? 10:00   About how long does it take you to fall asleep? 15 minutes   How often do you have trouble falling asleep? 0    How often do you wake up during the night? 1   Do you work days/evenings/nights/rotating shifts?    What wakes you up at night?    How often do you have trouble falling back to sleep? 0   About how long does it take to fall back to sleep? 10 minutes   What do you usually do if you have trouble getting back to sleep? don't have trouble   What time do you usually get out of bed to start your day? 8:30   Do you use an alarm? No   2) Weekends/Non-work Days/All Other Days    What time do you usually get into bed? 10:00   About how long does it take you to fall asleep? 15 mintues   What time do you usually get out of bed to start your day? 8:30   Do you use an alarm? No   SLEEP NEED    On average, about how much sleep do you think you get? 10 hours   About how much sleep do you think you need? unknown   SLEEP POSITION    Which sleep positions do you prefer? Side   Do you do any of the following activities in bed?    How often do you take a nap on purpose? 4 days   About how long are your naps? hour   Do you feel better after naps? Yes   How often do you doze off unintentionally? unknown   Have you ever had a driving accident or near-miss due to sleepiness/drowsiness? No       RONEY:  RONEY Total Score: 1  Total score - [place]: 4 (1/2/2024  9:50 AM)     Patient told to return in one week after the sleep study is interpreted.    Patient Active Problem List   Diagnosis    Benign essential hypertension    Parkinson's disease    Trigeminal neuralgia    Aortic valve insufficiency    Tricuspid insufficiency    Mitral valve insufficiency    Bilateral carotid artery stenosis    Stage 3b chronic kidney disease (H)    Recurrent major depressive disorder, in remission (H24)    GERD (gastroesophageal reflux disease)    Left bundle branch block    Primary osteoarthritis involving multiple joints    Sensorineural hearing loss (SNHL) of both ears    Subclinical hypothyroidism    Type 2 diabetes mellitus with diabetic polyneuropathy,  without long-term current use of insulin (H)    Secondary renal hyperparathyroidism (H24)    Age-related osteoporosis without current pathological fracture    Mixed hypercholesterolemia and hypertriglyceridemia    Mixed incontinence urge and stress (male)(female)    Vitamin B12 deficiency (non anemic)    Non-seasonal allergic rhinitis due to pollen    Healthcare maintenance    Vaginal irritation    Polypharmacy    Age-related osteoporosis with current pathological fracture with routine healing    PAD (peripheral artery disease) (H24)    Bronchomalacia    Renal mass    Hypercalcemia    Heart failure with reduced ejection fraction (H)    Coronary artery disease involving coronary bypass graft of native heart without angina pectoris    Class 1 obesity with serious comorbidity and body mass index (BMI) of 32.0 to 32.9 in adult       Past Medical History  Past Medical History:   Diagnosis Date    Acute diastolic congestive heart failure (H)     Acute kidney injury superimposed on CKD  (H24) 08/18/2022    Acute on chronic congestive heart failure (H) 06/11/2018    Acute pulmonary edema (H) 05/06/2023    Acute respiratory failure with hypoxia (H) 05/08/2023    Chest pain, unspecified type 08/15/2022    Chronic bilateral back pain 06/15/2021    Last Assessment & Plan:   Formatting of this note might be different from the original.  She says she has seen a pain specialist and had back surgery.  She needs to establish care with a pain specialist since she is new to [place] from [place].  Referral placed.    Coronary artery disease     Coronary artery disease involving native coronary artery without angina pectoris, unspecified whether native or transplanted heart 08/17/2022    Diabetes mellitus, type II (H)     Diastolic dysfunction 07/10/2018    Frozen shoulder     bilateral    Heart failure with reduced ejection fraction (H) 05/25/2023    Hyperlipidemia     Hypertension     Hypertensive emergency     Parkinson disease      Primary osteoarthritis involving multiple joints     Primary osteoarthritis of left knee     Recurrent UTI     hx septic shock    Thyroid disease         Past Surgical History  Past Surgical History:   Procedure Laterality Date    APPENDECTOMY      APPENDECTOMY      BACK SURGERY      L4-S1 laminectomy    BYPASS GRAFT ARTERY CORONARY      3 vessel     SECTION       SECTION      CORONARY ARTERY BYPASS      CV CORONARY ANGIOGRAM N/A 2022    Procedure: Coronary Angiogram;  Surgeon: Ignacio Baird MD;  [facility] CV    HERNIORRHAPHY VENTRAL Left     HYSTERECTOMY      HYSTERECTOMY      JOINT REPLACEMENT Left     Total left knee    OTHER SURGICAL HISTORY      right ankle surgery    OTHER SURGICAL HISTORY      right forearm fracture    REPLACEMENT TOTAL KNEE Right     SHOULDER SURGERY Right     reversed shoulder surgery.        Meds  Current Outpatient Medications   Medication Sig Dispense Refill    aspirin 81 mg chewable tablet [ASPIRIN 81 MG CHEWABLE TABLET] Chew 81 mg at bedtime.      atorvastatin (LIPITOR) 20 MG tablet TAKE 1 TABLET BY MOUTH AT  BEDTIME 90 tablet 3    carbidopa-levodopa (SINEMET)  MG tablet TAKE 1 TABLET BY MOUTH 3  TIMES DAILY TAKE AT LEAST  1/2 HOUR BEFORE MEALS OR 2  HOURS AFTER MEALS DO NOT  MIX WITH FOOD 270 tablet 3    carvedilol (COREG) 12.5 MG tablet Take 1 tablet (12.5 mg) by mouth 2 times daily (with meals) 180 tablet 3    cholecalciferol, vitamin D3, 1,000 unit tablet Take 1,000 Units by mouth 2 times daily      coenzyme Q10 (CO Q-10) 100 mg capsule [COENZYME Q10 (CO Q-10) 100 MG CAPSULE] Take 100 mg by mouth 2 (two) times a day.      cyanocobalamin (VITAMIN B-12) 1000 MCG tablet Take 1 tablet (1,000 mcg) by mouth daily 90 tablet 3    fish oil-omega-3 fatty acids 1000 MG capsule Take 1 g by mouth 2 times daily      FLUoxetine (PROZAC) 20 MG capsule Take 1 capsule (20 mg) by mouth 2 times daily 180 capsule 1    furosemide (LASIX) 20 MG  tablet TAKE 1 TABLET BY MOUTH DAILY 90 tablet 3    gabapentin (NEURONTIN) 300 MG capsule TAKE 1 CAPSULE BY MOUTH 4  TIMES DAILY 360 capsule 3    lisinopril (ZESTRIL) 20 MG tablet TAKE 1 TABLET BY MOUTH DAILY 90 tablet 3    loratadine (CLARITIN) 10 mg tablet Take 10 mg by mouth daily as needed for allergies      magnesium oxide 250 mg Tab [MAGNESIUM OXIDE 250 MG TAB] Take 250 mg by mouth daily.      melatonin 1 mg Tab tablet Take 1 mg by mouth At Bedtime      multivitamin therapeutic tablet Take 1 tablet by mouth every morning      nitroGLYcerin (NITROSTAT) 0.4 MG sublingual tablet Place 1 tablet (0.4 mg) under the tongue every 5 minutes as needed for chest pain 100 tablet 1    omeprazole (PRILOSEC) 20 MG DR capsule Take 1 capsule (20 mg) by mouth 2 times daily 180 capsule 3    polyethylene glycol (MIRALAX) 17 gram packet Take 17 g by mouth daily as needed for constipation      triamcinolone (KENALOG) 0.025 % external ointment Apply topically 2 times daily 80 g 0        Allergies  Alendronate [alendronate], Codeine, Hydrocodone-acetaminophen, Other drug allergy (see comments), Risedronate, Rosuvastatin, and Simvastatin     Social History  Social History     Socioeconomic History    Marital status:      Spouse name: Not on file    Number of children: Not on file    Years of education: Not on file    Highest education level: Not on file   Occupational History    Not on file   Tobacco Use    Smoking status: Never     Passive exposure: Never    Smokeless tobacco: Never   Vaping Use    Vaping Use: Never used   Substance and Sexual Activity    Alcohol use: No    Drug use: No    Sexual activity: Not Currently     Partners: Male     Birth control/protection: None   Other Topics Concern    Parent/sibling w/ CABG, MI or angioplasty before 65F 55M? No   Social History Narrative    6/15/2021 new to [place] from [place]. She has 6 kids.     Social Determinants of Health     Financial Resource Strain: Low Risk  (11/3/2023)     Financial Resource Strain     Within the past 12 months, have you or your family members you live with been unable to get utilities (heat, electricity) when it was really needed?: No   Food Insecurity: Low Risk  (11/3/2023)    Food Insecurity     Within the past 12 months, did you worry that your food would run out before you got money to buy more?: No     Within the past 12 months, did the food you bought just not last and you didn t have money to get more?: No   Transportation Needs: Low Risk  (11/3/2023)    Transportation Needs     Within the past 12 months, has lack of transportation kept you from medical appointments, getting your medicines, non-medical meetings or appointments, work, or from getting things that you need?: No   Physical Activity: Not on file   Stress: Not on file   Social Connections: Not on file   Interpersonal Safety: Low Risk  (11/10/2023)    Interpersonal Safety     Do you feel physically and emotionally safe where you currently live?: Yes     Within the past 12 months, have you been hit, slapped, kicked or otherwise physically hurt by someone?: No     Within the past 12 months, have you been humiliated or emotionally abused in other ways by your partner or ex-partner?: No   Housing Stability: Low Risk  (11/3/2023)    Housing Stability     Do you have housing? : Yes     Are you worried about losing your housing?: No        Family History  Family History   Problem Relation Age of Onset    Heart Disease Mother     Heart Disease Father     Sleep Apnea Daughter        Review of Systems:  Constitutional: Negative except as noted in HPI.   Eyes: Negative except as noted in HPI.   ENT: Negative except as noted in HPI.   Cardiovascular: Negative except as noted in HPI.   Respiratory: Negative except as noted in HPI.   Gastrointestinal: Negative except as noted in HPI.   Genitourinary: Negative except as noted in HPI.   Musculoskeletal: Negative except as noted in HPI.   Integumentary: Negative  "except as noted in HPI.   Neurological: Negative except as noted in HPI.   Psychiatric: Negative except as noted in HPI.   Endocrine: Negative except as noted in HPI.   Hematologic/Lymphatic: Negative except as noted in HPI.      Physical Exam:  BP (!) 142/80   Pulse 74   Resp 18   Ht 1.6 m (5' 3\")   Wt 83.8 kg (184 lb 12.8 oz)   LMP  (LMP Unknown)   SpO2 94%   BMI 32.74 kg/m    BMI:Body mass index is 32.74 kg/m .   GEN: NAD, obese.  Patient uses a walker.  Head: Normocephalic.  EYES: PERRLA, EOMI  ENT: Oropharynx is clear, [place] class 4+ airway.   Nasal mucosa is moist without erythema  Neck : Thyroid is within normal limits.   CV: Regular rate and rhythm, S1 & S2 positive.  LUNGS: Bilateral breathsounds heard.   ABDOMEN: Positive bowel sounds in all quadrants, soft, no rebound or guarding  MUSCULOSKELETAL: Bilateral trace leg swelling  SKIN: warm, dry, no rashes  Psych: normal mood, normal affect     Labs/Studies:     No results found for: \"PH\", \"PHARTERIAL\", \"PO2\", \"[id number]\", \"SAT\", \"PCO2\", \"HCO3\", \"BASEEXCESS\", \"HEAVEN\", \"BEB\"  Lab Results   Component Value Date    TSH 1.97 11/10/2023    TSH 1.70 06/19/2023     Lab Results   Component Value Date     (H) 11/10/2023     (H) 05/24/2023     Lab Results   Component Value Date    HGB 13.4 11/10/2023    HGB 11.3 08/11/2023     Lab Results   Component Value Date    BUN 33.6 (H) 11/10/2023    BUN 32.4 (H) 05/24/2023    CR 1.34 (H) 12/20/2023    CR 1.11 (H) 11/10/2023     Lab Results   Component Value Date    AST 27 11/10/2023    AST 46 (H) 05/07/2023    ALT 11 11/10/2023    ALT 45 05/07/2023    ALKPHOS 73 11/10/2023    ALKPHOS 77 05/07/2023    BILITOTAL 0.5 11/10/2023    BILITOTAL 1.1 (H) 05/07/2023     No results found for: \"UAMP\", \"UBARB\", \"BENZODIAZEUR\", \"UCANN\", \"UCOC\", \"OPIT\", \"UPCP\"      Patient verbalized understanding of these issues, agrees with the plan and all questions were answered today. Patient was given an opportuntity to voice " any other symptoms or concerns not listed above. Patient did not have any other symptoms or concerns.         Jhonatan Cast DO,   Board Certified in Internal Medicine and Sleep Medicine    (Note created with Dragon voice recognition and unintended spelling errors and word substitutions may occur)

## 2024-01-18 ENCOUNTER — OFFICE VISIT (OUTPATIENT)
Dept: CARDIOLOGY | Facility: CLINIC | Age: 89
End: 2024-01-18
Payer: MEDICARE

## 2024-01-18 VITALS
HEART RATE: 77 BPM | WEIGHT: 184.5 LBS | RESPIRATION RATE: 18 BRPM | BODY MASS INDEX: 32.69 KG/M2 | HEIGHT: 63 IN | TEMPERATURE: 97.5 F | OXYGEN SATURATION: 92 % | SYSTOLIC BLOOD PRESSURE: 146 MMHG | DIASTOLIC BLOOD PRESSURE: 73 MMHG

## 2024-01-18 DIAGNOSIS — I34.0 MITRAL VALVE INSUFFICIENCY, UNSPECIFIED ETIOLOGY: Primary | ICD-10-CM

## 2024-01-18 DIAGNOSIS — I35.0 NONRHEUMATIC AORTIC VALVE STENOSIS: ICD-10-CM

## 2024-01-18 DIAGNOSIS — I10 HYPERTENSION, UNSPECIFIED TYPE: ICD-10-CM

## 2024-01-18 DIAGNOSIS — I25.10 CORONARY ARTERY DISEASE INVOLVING NATIVE CORONARY ARTERY WITHOUT ANGINA PECTORIS, UNSPECIFIED WHETHER NATIVE OR TRANSPLANTED HEART: ICD-10-CM

## 2024-01-18 DIAGNOSIS — E78.5 DYSLIPIDEMIA: ICD-10-CM

## 2024-01-18 PROCEDURE — 99214 OFFICE O/P EST MOD 30 MIN: CPT | Performed by: INTERNAL MEDICINE

## 2024-01-18 NOTE — PROGRESS NOTES
[facility] [address], [place] [address]   [email or web address]   OFFICE: [phone]            Impression and Plan     1.? Coronary artery disease. Janes has known coronary artery disease having had prior coronary artery bypass graft surgery with JEOVANY graft to the LAD in 2010.? She underwent coronary angiography 17 August 2022 that revealed complete occlusion of the proximal native LAD though the JEOVANY graft to the LAD was widely patent.? Otherwise mild-moderate disease only.?      Janes underwent nuclear perfusion imaging 8 May 2023.  This revealed a partially reversible change in inferior and basal to mid inferolateral walls indicating inducible myocardial ischemia.  He was reviewed by my colleague, Dr. Abner Saxena, at that time who indicates that he was reticent to advise repeat coronary angiography in light of renal insufficiency.  Given lack of anginal symptoms, continued medical therapy was advised.  ??   This is stable.? Janes denies any chest pain or other concerning symptoms in this regard.?   ??   2.? Mitral insufficiency.? This was felt moderate in degree on most recent echocardiogram 9 May 2023.  ?   3.? Aortic stenosis.? This was felt mild in degree on echocardiogram 16 August 2022.?  Follow-up echocardiogram 9 May 2023 was reported as having no significant valvular stenosis.  ??   4.? Aortic insufficiency.? Recent echocardiogram suggested only trace aortic insufficiency.  ??   5.? Cardiomyopathy.  Echocardiogram 9 May 2023 revealed mild reduction in left ventricular systolic performance with ejection fraction 45 to 50%.  By nuclear imaging, however, 8 May 2023 ejection fraction was felt to be 65%.     Her weights have been stable at home and her daughter confirms this.      Janes appears volume neutral on clinical exam.     6.  Hypertension.? Plan to continue current management.  ??   7.? Dyslipidemia.? lipid profile 19 June 2023 revealed LDL  31 mg/dL and HDL 39 mg/dL.  Continue atorvastatin.?      Plan on follow-up in approximately 1 year.    35 minutes spent reviewing prior records (including documentation, laboratory studies, cardiac testing/imaging), interview with patient along with physical exam, planning, and subsequent documentation/crafting of note).           History of Present Illness    Once again I would like to thank you again for asking me to participate in the care of your patient, Janes Montero.  As you know, but to reiterate for my own records, Janes Montero is a 89 year old female with known coronary artery disease having had prior coronary artery bypass graft surgery with JEOVANY graft to the LAD in 2010.? She underwent coronary angiography 17 August 2022 that revealed complete occlusion of the proximal native LAD though the JEOVANY graft to the LAD was widely patent.? Otherwise mild-moderate disease only.?   ??   On interview, she denies any chest pain symptoms.  She states that her weights have been stable.  This is confirmed by her daughter who joined in the visit today.  She denies any worsening shortness of breath.  No palpitations or lightheadedness.  He has developed no lower extremity edema.    Further review of systems is otherwise negative/noncontributory (medical record and 13 point review of systems reviewed as well and pertinent positives noted).         Cardiac Diagnostics      Echocardiogram 9 May 2023:  Normal left ventricular size with mildly reduced left ventricular systolic performance.  Ejection fraction 45-50%.  Abnormal septal motion consistent with conduction abnormality.  Moderate mitral insufficiency.  Normal right ventricular size and systolic performance.  Mild-moderate left atrial enlargement.  Right atrium of normal dimension.    Echocardiogram 16 August 2022:?   Normal left ventricular size with mildly reduced left ventricular systolic performance.? Ejection fraction 45-50%.?   There is hypokinesis of the apex  and mid to apical septal segments.?   Mild aortic stenosis.?   Mild aortic insufficiency.?   Mild-moderate mitral insufficiency.?   Right ventricle not well visualized.?   Mild left atrial enlargement.? Right atrium of normal dimension.?   ??   Echocardiogram 5 March 2020:?   Left ventricle: The cavity size is normal. Wall thickness is mildly increased. Systolic function is normal. The estimated ejection fraction is 55-60%.??   Left atrium: The atrium is mildly to moderately dilated.??   Mitral valve: Moderately calcified annulus. Leaflet separation is normal. There is no evidence for stenosis. Moderate regurgitation.??   Aortic valve: Probably trileaflet; mildly calcified leaflets. Thickening, consistent with sclerosis. Cusp separation is normal. There is no stenosis. Mild to moderate regurgitation.??   Tricuspid valve: Structurally normal valve. Leaflet separation is normal. There is no evidence for stenosis. Moderate regurgitation.??   ??  Nuclear perfusion imaging study 8 May 2023:  Pharmacological regadenoson nuclear stress test is abnormal.  There is partially reversible change in inferior and basal to mid inferolateral walls indicating inducible myocardial ischemia.  Normal left ventricular size, wall motion and systolic function.  Calculated left ventricular ejection fraction is 65%.  The patient is at an intermediate risk of future cardiac ischemic events.    Nuclear perfusion imaging study 16 August 2022:?   Nuclear stress test is abnormal.? There is a large area of transmural infarction involving the apex and distal inferolateral wall extending into the inferior wall.? There is a medium size area of stella-infarct ischemia involving the mid to basal inferior and inferolateral wall.?   The left ventricular ejection fraction at stress is 67% with severe hypokinesis of the distal inferior and apical walls.?   ?   Coronary angiography 17 August 2022:?   Left anterior descending coronary artery: Proximal 100%  "stenosis.?   Circumflex coronary artery: First obtuse marginal with 50% stenosis.?   Right coronary artery: 25% mid vessel stenosis.?   JEOVANY graft to the LAD: Widely patent.?   ?   Coronary angiogram 23 May 2011:?   Normal left main.??   Severe eccentric 95% proximal left anterior descending stenosis just after the first diagonal and just after the first septal  branches originate. Second eccentric stenosis of 60-70% is present proximally involving the origin of the second diagonal branch. The second diagonal branch ostium has a 50% stenosis.??   Mild irregularity of the circumflex, circumflex marginal branches.??   Mild irregularity of the right coronary artery. No significant stenosis. PDA arises distally, appears normal.??   Overall left ventricular ejection fraction normal.??   ??     Twelve-lead ECG (personally reviewed) 11 June 2018: Sinus rhythm with occasional PVCs.? Left bundle branch block.?          Physical Examination       BP (!) 146/73 (BP Location: Left arm, Patient Position: Sitting, Cuff Size: Adult Large)   Pulse 77   Temp 97.5  F (36.4  C) (Oral)   Resp 18   Ht 1.6 m (5' 3\")   Wt 83.7 kg (184 lb 8 oz)   LMP  (LMP Unknown)   SpO2 92%   BMI 32.68 kg/m          Wt Readings from Last 3 Encounters:   01/18/24 83.7 kg (184 lb 8 oz)   01/09/24 83.8 kg (184 lb 12.8 oz)   12/12/23 84 kg (185 lb 3.2 oz)       The patient is alert and oriented times three. Sclerae are anicteric. Mucosal membranes are moist. Jugular venous pressure is normal. No significant adenopathy/thyromegally appreciated. Lungs are clear with good expansion. On cardiovascular exam, the patient has a regular S1 and S2.  There is a 2/6 systolic murmur heard in a sash-like distribution.  Abdomen is soft and non-tender. Extremities reveal no clubbing, cyanosis, or edema.         Family History/Social History/Risk Factors   Patient does not smoke.  Family history reviewed, and family history includes Heart Disease in her " father and mother; Sleep Apnea in her daughter.          Medical History  Surgical History Family History Social History   Past Medical History:   Diagnosis Date    Acute diastolic congestive heart failure (H)     Acute kidney injury superimposed on CKD  (H24) 2022    Acute on chronic congestive heart failure (H) 2018    Acute pulmonary edema (H) 2023    Acute respiratory failure with hypoxia (H) 2023    Chest pain, unspecified type 08/15/2022    Chronic bilateral back pain 06/15/2021    Last Assessment & Plan:   Formatting of this note might be different from the original.  She says she has seen a pain specialist and had back surgery.  She needs to establish care with a pain specialist since she is new to [place] from [place].  Referral placed.    Coronary artery disease     Coronary artery disease involving native coronary artery without angina pectoris, unspecified whether native or transplanted heart 2022    Diabetes mellitus, type II (H)     Diastolic dysfunction 07/10/2018    Frozen shoulder     bilateral    Heart failure with reduced ejection fraction (H) 2023    Hyperlipidemia     Hypertension     Hypertensive emergency     Parkinson disease     Primary osteoarthritis involving multiple joints     Primary osteoarthritis of left knee     Recurrent UTI     hx septic shock    Thyroid disease      Past Surgical History:   Procedure Laterality Date    APPENDECTOMY      APPENDECTOMY      BACK SURGERY      L4-S1 laminectomy    BYPASS GRAFT ARTERY CORONARY      3 vessel     SECTION       SECTION      CORONARY ARTERY BYPASS      CV CORONARY ANGIOGRAM N/A 2022    Procedure: Coronary Angiogram;  Surgeon: Ignacio Baird MD;  Location: [facility] CATH LAB CV    HERNIORRHAPHY VENTRAL Left     HYSTERECTOMY      HYSTERECTOMY      JOINT REPLACEMENT Left     Total left knee    OTHER SURGICAL HISTORY      right ankle surgery    OTHER SURGICAL HISTORY      right  forearm fracture    REPLACEMENT TOTAL KNEE Right     SHOULDER SURGERY Right     reversed shoulder surgery.     Family History   Problem Relation Age of Onset    Heart Disease Mother     Heart Disease Father     Sleep Apnea Daughter         Social History     Socioeconomic History    Marital status:      Spouse name: Not on file    Number of children: Not on file    Years of education: Not on file    Highest education level: Not on file   Occupational History    Not on file   Tobacco Use    Smoking status: Never     Passive exposure: Never    Smokeless tobacco: Never   Vaping Use    Vaping Use: Never used   Substance and Sexual Activity    Alcohol use: No    Drug use: No    Sexual activity: Not Currently     Partners: Male     Birth control/protection: None   Other Topics Concern    Parent/sibling w/ CABG, MI or angioplasty before 65F 55M? No   Social History Narrative    6/15/2021 new to [place] from [place]. She has 6 kids.     Social Determinants of Health     Financial Resource Strain: Low Risk  (11/3/2023)    Financial Resource Strain     Within the past 12 months, have you or your family members you live with been unable to get utilities (heat, electricity) when it was really needed?: No   Food Insecurity: Low Risk  (11/3/2023)    Food Insecurity     Within the past 12 months, did you worry that your food would run out before you got money to buy more?: No     Within the past 12 months, did the food you bought just not last and you didn t have money to get more?: No   Transportation Needs: Low Risk  (11/3/2023)    Transportation Needs     Within the past 12 months, has lack of transportation kept you from medical appointments, getting your medicines, non-medical meetings or appointments, work, or from getting things that you need?: No   Physical Activity: Not on file   Stress: Not on file   Social Connections: Not on file   Interpersonal Safety: Low Risk  (11/10/2023)    Interpersonal Safety     Do you feel  physically and emotionally safe where you currently live?: Yes     Within the past 12 months, have you been hit, slapped, kicked or otherwise physically hurt by someone?: No     Within the past 12 months, have you been humiliated or emotionally abused in other ways by your partner or ex-partner?: No   Housing Stability: Low Risk  (11/3/2023)    Housing Stability     Do you have housing? : Yes     Are you worried about losing your housing?: No           Medications  Allergies   Current Outpatient Medications   Medication Sig Dispense Refill    aspirin 81 mg chewable tablet [ASPIRIN 81 MG CHEWABLE TABLET] Chew 81 mg at bedtime.      atorvastatin (LIPITOR) 20 MG tablet TAKE 1 TABLET BY MOUTH AT  BEDTIME 90 tablet 3    carbidopa-levodopa (SINEMET)  MG tablet TAKE 1 TABLET BY MOUTH 3  TIMES DAILY TAKE AT LEAST  1/2 HOUR BEFORE MEALS OR 2  HOURS AFTER MEALS DO NOT  MIX WITH FOOD 270 tablet 3    carvedilol (COREG) 12.5 MG tablet Take 1 tablet (12.5 mg) by mouth 2 times daily (with meals) 180 tablet 3    cholecalciferol, vitamin D3, 1,000 unit tablet Take 1,000 Units by mouth 2 times daily      coenzyme Q10 (CO Q-10) 100 mg capsule [COENZYME Q10 (CO Q-10) 100 MG CAPSULE] Take 100 mg by mouth 2 (two) times a day.      cyanocobalamin (VITAMIN B-12) 1000 MCG tablet Take 1 tablet (1,000 mcg) by mouth daily 90 tablet 3    fish oil-omega-3 fatty acids 1000 MG capsule Take 1 g by mouth 2 times daily      FLUoxetine (PROZAC) 20 MG capsule Take 1 capsule (20 mg) by mouth 2 times daily 180 capsule 1    furosemide (LASIX) 20 MG tablet TAKE 1 TABLET BY MOUTH DAILY 90 tablet 3    gabapentin (NEURONTIN) 300 MG capsule TAKE 1 CAPSULE BY MOUTH 4  TIMES DAILY 360 capsule 3    lisinopril (ZESTRIL) 20 MG tablet TAKE 1 TABLET BY MOUTH DAILY 90 tablet 3    loratadine (CLARITIN) 10 mg tablet Take 10 mg by mouth daily as needed for allergies      magnesium oxide 250 mg Tab [MAGNESIUM OXIDE 250 MG TAB] Take 250 mg by mouth daily.       "melatonin 1 mg Tab tablet Take 1 mg by mouth At Bedtime      multivitamin therapeutic tablet Take 1 tablet by mouth every morning      nitroGLYcerin (NITROSTAT) 0.4 MG sublingual tablet Place 1 tablet (0.4 mg) under the tongue every 5 minutes as needed for chest pain 100 tablet 1    omeprazole (PRILOSEC) 20 MG DR capsule Take 1 capsule (20 mg) by mouth 2 times daily 180 capsule 3    polyethylene glycol (MIRALAX) 17 gram packet Take 17 g by mouth daily as needed for constipation      triamcinolone (KENALOG) 0.025 % external ointment Apply topically 2 times daily 80 g 0       Allergies   Allergen Reactions    Alendronate [Alendronate] Unknown     pain    Codeine Hives    Hydrocodone-Acetaminophen Other (See Comments)     Highly sensitive, \"knocks her out and can't wake up\"    Other Drug Allergy (See Comments)     Risedronate Unknown     Bone pain    Rosuvastatin Muscle Pain (Myalgia)    Simvastatin Muscle Pain (Myalgia)          Lab Results    Chemistry/lipid CBC Cardiac Enzymes/BNP/TSH/INR   Recent Labs   Lab Test 06/19/23  1538   CHOL 145   HDL 39*   LDL 31   TRIG 376*     Recent Labs   Lab Test 06/19/23  1538 08/18/22  0506 12/14/21  1638   LDL 31 58 32     Recent Labs   Lab Test 12/20/23  1545 11/10/23  1707   NA  --  138   POTASSIUM  --  4.7   CHLORIDE  --  98   CO2  --  28   GLC  --  140*   BUN  --  33.6*   CR 1.34* 1.11*   GFRESTIMATED 37* 47*   LIZZY 10.9* 10.6*     Recent Labs   Lab Test 12/20/23  1545 11/10/23  1707 06/19/23  1538   CR 1.34* 1.11* 1.04*     Recent Labs   Lab Test 11/10/23  1622 08/09/23  1616 04/28/23  1437   A1C 6.7* 6.5* 6.4*          Recent Labs   Lab Test 11/10/23  1707   WBC 6.1   HGB 13.4   HCT 40.3   MCV 91        Recent Labs   Lab Test 11/10/23  1707 08/11/23  1517 05/08/23  0552   HGB 13.4 11.3 12.6    Recent Labs   Lab Test 05/07/23  0434 05/06/23  2323 05/06/23  1852   TROPONINI 0.04 0.03 0.03     Recent Labs   Lab Test 05/11/23  0437 05/06/23  1852 08/15/22  1920   BNP 82 " "628* 129     Recent Labs   Lab Test 11/10/23  1707   TSH 1.97     No results for input(s): \"INR\" in the last 29540 hours.       Medications  Allergies   Current Outpatient Medications   Medication Sig Dispense Refill    aspirin 81 mg chewable tablet [ASPIRIN 81 MG CHEWABLE TABLET] Chew 81 mg at bedtime.      atorvastatin (LIPITOR) 20 MG tablet TAKE 1 TABLET BY MOUTH AT  BEDTIME 90 tablet 3    carbidopa-levodopa (SINEMET)  MG tablet TAKE 1 TABLET BY MOUTH 3  TIMES DAILY TAKE AT LEAST  1/2 HOUR BEFORE MEALS OR 2  HOURS AFTER MEALS DO NOT  MIX WITH FOOD 270 tablet 3    carvedilol (COREG) 12.5 MG tablet Take 1 tablet (12.5 mg) by mouth 2 times daily (with meals) 180 tablet 3    cholecalciferol, vitamin D3, 1,000 unit tablet Take 1,000 Units by mouth 2 times daily      coenzyme Q10 (CO Q-10) 100 mg capsule [COENZYME Q10 (CO Q-10) 100 MG CAPSULE] Take 100 mg by mouth 2 (two) times a day.      cyanocobalamin (VITAMIN B-12) 1000 MCG tablet Take 1 tablet (1,000 mcg) by mouth daily 90 tablet 3    fish oil-omega-3 fatty acids 1000 MG capsule Take 1 g by mouth 2 times daily      FLUoxetine (PROZAC) 20 MG capsule Take 1 capsule (20 mg) by mouth 2 times daily 180 capsule 1    furosemide (LASIX) 20 MG tablet TAKE 1 TABLET BY MOUTH DAILY 90 tablet 3    gabapentin (NEURONTIN) 300 MG capsule TAKE 1 CAPSULE BY MOUTH 4  TIMES DAILY 360 capsule 3    lisinopril (ZESTRIL) 20 MG tablet TAKE 1 TABLET BY MOUTH DAILY 90 tablet 3    loratadine (CLARITIN) 10 mg tablet Take 10 mg by mouth daily as needed for allergies      magnesium oxide 250 mg Tab [MAGNESIUM OXIDE 250 MG TAB] Take 250 mg by mouth daily.      melatonin 1 mg Tab tablet Take 1 mg by mouth At Bedtime      multivitamin therapeutic tablet Take 1 tablet by mouth every morning      nitroGLYcerin (NITROSTAT) 0.4 MG sublingual tablet Place 1 tablet (0.4 mg) under the tongue every 5 minutes as needed for chest pain 100 tablet 1    omeprazole (PRILOSEC) 20 MG DR capsule Take 1 " "capsule (20 mg) by mouth 2 times daily 180 capsule 3    polyethylene glycol (MIRALAX) 17 gram packet Take 17 g by mouth daily as needed for constipation      triamcinolone (KENALOG) 0.025 % external ointment Apply topically 2 times daily 80 g 0      Allergies   Allergen Reactions    Alendronate [Alendronate] Unknown     pain    Codeine Hives    Hydrocodone-Acetaminophen Other (See Comments)     Highly sensitive, \"knocks her out and can't wake up\"    Other Drug Allergy (See Comments)     Risedronate Unknown     Bone pain    Rosuvastatin Muscle Pain (Myalgia)    Simvastatin Muscle Pain (Myalgia)          Lab Results   Lab Results   Component Value Date     11/10/2023    CO2 28 11/10/2023    CO2 25 05/12/2023    BUN 33.6 11/10/2023    BUN 57 05/12/2023     Lab Results   Component Value Date    WBC 6.1 11/10/2023    HGB 13.4 11/10/2023    HCT 40.3 11/10/2023    MCV 91 11/10/2023     11/10/2023     Lab Results   Component Value Date    CHOL 145 06/19/2023    TRIG 376 06/19/2023    HDL 39 06/19/2023     No results found for: \"INR\"  Lab Results   Component Value Date    BNP 82 05/11/2023     Lab Results   Component Value Date    TROPONINI 0.04 05/07/2023    TROPONINI 0.03 05/06/2023    TROPONINI 0.03 05/06/2023     Lab Results   Component Value Date    TSH 1.97 11/10/2023    TSH 1.92 05/08/2023                  "

## 2024-01-22 ENCOUNTER — NURSE TRIAGE (OUTPATIENT)
Dept: FAMILY MEDICINE | Facility: CLINIC | Age: 89
End: 2024-01-22
Payer: MEDICARE

## 2024-01-22 DIAGNOSIS — U07.1 INFECTION DUE TO 2019 NOVEL CORONAVIRUS: Primary | ICD-10-CM

## 2024-01-22 NOTE — TELEPHONE ENCOUNTER
COVID Positive/Requesting COVID treatment    Patient is positive for COVID and requesting treatment options.    Date of positive COVID test (PCR or at home)? 1/23/24  Current COVID symptoms: cough, fatigue, and headache  Date COVID symptoms began: 1/19/24    Message should be routed to clinic RN pool. Best phone number to use for call back: [phone]

## 2024-01-22 NOTE — TELEPHONE ENCOUNTER
RN COVID TREATMENT VISIT  01/22/24      The patient has been triaged and does not require a higher level of care.    Janes Montero  89 year old  Current weight? 184    Has the patient been seen by a primary care provider at an [facility] or [facility] within the past two years? Yes.   Have you been in close proximity to/do you have a known exposure to a person with a confirmed case of influenza? No.     General treatment eligibility:  Date of positive COVID test (PCR or at home)?  1/21/24    Are you or have you been hospitalized for this COVID-19 infection? No.   Have you received monoclonal antibodies or antiviral treatment for COVID-19 since this positive test? No.   Do you have any of the following conditions that place you at risk of being very sick from COVID-19?   - Age 50 years or older  Yes, patient has at least one high risk condition as noted above.     Current COVID symptoms:   - cough  - muscle or body aches  - headache  - congestion or runny nose  Yes. Patient has at least one symptom as selected.     How many days since symptoms started? 5 days or less. Established patient, 12 years or older weighing at least 88.2 lbs, who has symptoms that started in the past 5 days, has not been hospitalized nor received treatment already, and is at risk for being very sick from COVID-19.     Treatment eligibility by RN:  Are you currently pregnant or nursing? No  Do you have a clinically significant hypersensitivity to nirmatrelvir or ritonavir, or toxic epidermal necrolysis (TEN) or Jesus-Duarte Syndrome? No  Do you have a history of hepatitis, any hepatic impairment on the Problem List (such as Child-Perla Class C, cirrhosis, fatty liver disease, alcoholic liver disease), or was the last liver lab (hepatic panel, ALT, AST, ALK Phos, bilirubin) elevated in the past 6 months? No  Do you have any history of severe renal impairment (eGFR < 30mL/min)? No    Is patient eligible to  continue? Yes, patient meets all eligibility requirements for the RN COVID treatment (as denoted by all no responses above).     Current Outpatient Medications   Medication Sig Dispense Refill    aspirin 81 mg chewable tablet [ASPIRIN 81 MG CHEWABLE TABLET] Chew 81 mg at bedtime.      atorvastatin (LIPITOR) 20 MG tablet TAKE 1 TABLET BY MOUTH AT  BEDTIME 90 tablet 3    carbidopa-levodopa (SINEMET)  MG tablet TAKE 1 TABLET BY MOUTH 3  TIMES DAILY TAKE AT LEAST  1/2 HOUR BEFORE MEALS OR 2  HOURS AFTER MEALS DO NOT  MIX WITH FOOD 270 tablet 3    carvedilol (COREG) 12.5 MG tablet Take 1 tablet (12.5 mg) by mouth 2 times daily (with meals) 180 tablet 3    cholecalciferol, vitamin D3, 1,000 unit tablet Take 1,000 Units by mouth 2 times daily      coenzyme Q10 (CO Q-10) 100 mg capsule [COENZYME Q10 (CO Q-10) 100 MG CAPSULE] Take 100 mg by mouth 2 (two) times a day.      cyanocobalamin (VITAMIN B-12) 1000 MCG tablet Take 1 tablet (1,000 mcg) by mouth daily 90 tablet 3    fish oil-omega-3 fatty acids 1000 MG capsule Take 1 g by mouth 2 times daily      FLUoxetine (PROZAC) 20 MG capsule Take 1 capsule (20 mg) by mouth 2 times daily 180 capsule 1    furosemide (LASIX) 20 MG tablet TAKE 1 TABLET BY MOUTH DAILY 90 tablet 3    gabapentin (NEURONTIN) 300 MG capsule TAKE 1 CAPSULE BY MOUTH 4  TIMES DAILY 360 capsule 3    lisinopril (ZESTRIL) 20 MG tablet TAKE 1 TABLET BY MOUTH DAILY 90 tablet 3    loratadine (CLARITIN) 10 mg tablet Take 10 mg by mouth daily as needed for allergies      magnesium oxide 250 mg Tab [MAGNESIUM OXIDE 250 MG TAB] Take 250 mg by mouth daily.      melatonin 1 mg Tab tablet Take 1 mg by mouth At Bedtime      multivitamin therapeutic tablet Take 1 tablet by mouth every morning      nitroGLYcerin (NITROSTAT) 0.4 MG sublingual tablet Place 1 tablet (0.4 mg) under the tongue every 5 minutes as needed for chest pain 100 tablet 1    omeprazole (PRILOSEC) 20 MG DR capsule Take 1 capsule (20 mg) by mouth 2  times daily 180 capsule 3    polyethylene glycol (MIRALAX) 17 gram packet Take 17 g by mouth daily as needed for constipation      triamcinolone (KENALOG) 0.025 % external ointment Apply topically 2 times daily 80 g 0       Medications from List 1 of the standing order (on medications that exclude the use of Paxlovid) that patient is taking: NONE. Is patient taking Carrier's Wort? No  Is patient taking Carrier's Wort or any meds from List 1? No.   Medications from List 2 of the standing order (on meds that provider needs to adjust) that patient is taking: NONE. Is patient on any of the meds from List 2? No.   Medications from List 3 of standing order (on meds that a RN needs to adjust) that patient is taking: atorvastatin (Lipitor): Instructed patient to stop atorvastatin while taking Paxlovid and restart atorvastatin 1 day after the completion of Paxlovid.  Is patient on any meds from List 3? Yes. Patient is on meds from list 3. No meds require a provider visit and at least one med required RN to adjust.     Paxlovid has an approximate 90% reduction in hospitalization. Paxlovid can possibly cause altered sense of taste, diarrhea (loose, watery stools), high blood pressure, muscle aches.     Would patient like a Paxlovid prescription?   Yes.   Lab Results   Component Value Date    GFRESTIMATED 37 (L) 12/20/2023       Was last eGFR reduced? Yes, eGFR 30-59 and will require reduced renal function dose of Paxlovid. Prescription sent to [facility].   [facility]    Temporary change to home medications: atorvastatin (Lipitor): Instructed patient to stop atorvastatin while taking Paxlovid and restart atorvastatin 1 day after the completion of Paxlovid.     All medication adjustments (holds, etc) were discussed with the patient and patient was asked to repeat back (teachback) their med adjustment.  Did patient understand med adjustment? Yes, patient repeated back and understood  correctly.        Reviewed the following instructions with the patient:    Paxlovid (nimatrelvir and ritonavir)    How it works  Two medicines (nirmatrelvir and ritonavir) are taken together. They stop the virus from growing. Less amount of virus is easier for your body to fight.    How to take  Medicine comes in a daily container with both medicine tablets. Take by mouth twice daily (once in the morning, once at night) for 5 days.  The number of tablets to take varies by patient.  Don't chew or break capsules. Swallow whole.    When to take  Take as soon as possible after positive COVID-19 test result, and within 5 days of your first symptoms.    Possible side effects  Can cause altered sense of taste, diarrhea (loose, watery stools), high blood pressure, muscle aches.    Barbara Hess RN      Reason for Disposition   [1] HIGH RISK patient (e.g., weak immune system, age > 64 years, obesity with BMI 30 or higher, pregnant, chronic lung disease or other chronic medical condition) AND [2] COVID symptoms (e.g., cough, fever)  (Exceptions: Already seen by PCP and no new or worsening symptoms.)    Additional Information   Negative: SEVERE difficulty breathing (e.g., struggling for each breath, speaks in single words)   Negative: Difficult to awaken or acting confused (e.g., disoriented, slurred speech)   Negative: Bluish (or gray) lips or face now   Negative: Shock suspected (e.g., cold/pale/clammy skin, too weak to stand, low BP, rapid pulse)   Negative: Sounds like a life-threatening emergency to the triager   Negative: [1] Diagnosed or suspected COVID-19 AND [2] symptoms lasting 3 or more weeks   Negative: [1] COVID-19 exposure AND [2] no symptoms   Negative: COVID-19 vaccine reaction suspected (e.g., fever, headache, muscle aches) occurring 1 to 3 days after getting vaccine   Negative: COVID-19 vaccine, questions about   Negative: [1] Lives with someone known to have influenza (flu test positive) AND [2] flu-like  symptoms (e.g., cough, runny nose, sore throat, SOB; with or without fever)   Negative: [1] Possible COVID-19 symptoms AND [2] triager concerned about severity of symptoms or other causes   Negative: COVID-19 and breastfeeding, questions about   Negative: SEVERE or constant chest pain or pressure  (Exception: Mild central chest pain, present only when coughing.)   Negative: MODERATE difficulty breathing (e.g., speaks in phrases, SOB even at rest, pulse 100-120)   Negative: [1] Headache AND [2] stiff neck (can't touch chin to chest)   Negative: Oxygen level (e.g., pulse oximetry) 90 percent or lower   Negative: Chest pain or pressure  (Exception: MILD central chest pain, present only when coughing.)   Negative: [1] Drinking very little AND [2] dehydration suspected (e.g., no urine > 12 hours, very dry mouth, very lightheaded)   Negative: Patient sounds very sick or weak to the triager   Negative: MILD difficulty breathing (e.g., minimal/no SOB at rest, SOB with walking, pulse <100)   Negative: Fever > 103 F (39.4 C)   Negative: [1] Fever > 101 F (38.3 C) AND [2] age > 60 years   Negative: [1] Fever > 100.0 F (37.8 C) AND [2] bedridden (e.g., CVA, chronic illness, recovering from surgery)   Negative: Oxygen level (e.g., pulse oximetry) 91 to 94 percent    Protocols used: Coronavirus (COVID-19) Diagnosed or [id number]

## 2024-03-02 ENCOUNTER — HEALTH MAINTENANCE LETTER (OUTPATIENT)
Age: 89
End: 2024-03-02

## 2024-03-26 ENCOUNTER — HOSPITAL ENCOUNTER (INPATIENT)
Facility: HOSPITAL | Age: 89
LOS: 4 days | Discharge: HOME HEALTH | DRG: 291 | End: 2024-03-30
Attending: EMERGENCY MEDICINE | Admitting: INTERNAL MEDICINE
Payer: MEDICARE

## 2024-03-26 ENCOUNTER — APPOINTMENT (OUTPATIENT)
Dept: RADIOLOGY | Facility: HOSPITAL | Age: 89
DRG: 291 | End: 2024-03-26
Attending: EMERGENCY MEDICINE
Payer: MEDICARE

## 2024-03-26 ENCOUNTER — APPOINTMENT (OUTPATIENT)
Dept: CT IMAGING | Facility: HOSPITAL | Age: 89
DRG: 291 | End: 2024-03-26
Attending: EMERGENCY MEDICINE
Payer: MEDICARE

## 2024-03-26 DIAGNOSIS — I50.9 ACUTE ON CHRONIC CONGESTIVE HEART FAILURE, UNSPECIFIED HEART FAILURE TYPE (H): ICD-10-CM

## 2024-03-26 DIAGNOSIS — J18.9 PNEUMONIA DUE TO INFECTIOUS ORGANISM, UNSPECIFIED LATERALITY, UNSPECIFIED PART OF LUNG: ICD-10-CM

## 2024-03-26 PROBLEM — G89.29 CHRONIC BILATERAL BACK PAIN: Status: RESOLVED | Noted: 2021-06-15 | Resolved: 2023-11-10

## 2024-03-26 PROBLEM — G89.29 CHRONIC BILATERAL BACK PAIN: Status: ACTIVE | Noted: 2021-06-15

## 2024-03-26 PROBLEM — M54.9 CHRONIC BILATERAL BACK PAIN: Status: RESOLVED | Noted: 2021-06-15 | Resolved: 2023-11-10

## 2024-03-26 PROBLEM — M54.9 CHRONIC BILATERAL BACK PAIN: Status: ACTIVE | Noted: 2021-06-15

## 2024-03-26 PROBLEM — N25.81 SECONDARY RENAL HYPERPARATHYROIDISM (H): Status: ACTIVE | Noted: 2021-06-15

## 2024-03-26 LAB
ANION GAP SERPL CALCULATED.3IONS-SCNC: 13 MMOL/L (ref 7–15)
BASOPHILS # BLD AUTO: 0.1 10E3/UL (ref 0–0.2)
BASOPHILS NFR BLD AUTO: 1 %
BUN SERPL-MCNC: 32 MG/DL (ref 8–23)
CALCIUM SERPL-MCNC: 10.1 MG/DL (ref 8.2–9.6)
CHLORIDE SERPL-SCNC: 98 MMOL/L (ref 98–107)
CREAT SERPL-MCNC: 0.99 MG/DL (ref 0.51–0.95)
CREAT SERPL-MCNC: 0.99 MG/DL (ref 0.51–0.95)
D DIMER PPP FEU-MCNC: 0.77 UG/ML FEU (ref 0–0.5)
DEPRECATED HCO3 PLAS-SCNC: 23 MMOL/L (ref 22–29)
EGFRCR SERPLBLD CKD-EPI 2021: 54 ML/MIN/1.73M2
EGFRCR SERPLBLD CKD-EPI 2021: 54 ML/MIN/1.73M2
EOSINOPHIL # BLD AUTO: 0.3 10E3/UL (ref 0–0.7)
EOSINOPHIL NFR BLD AUTO: 4 %
ERYTHROCYTE [DISTWIDTH] IN BLOOD BY AUTOMATED COUNT: 16.2 % (ref 10–15)
FLUAV RNA SPEC QL NAA+PROBE: NEGATIVE
FLUBV RNA RESP QL NAA+PROBE: NEGATIVE
GLUCOSE BLDC GLUCOMTR-MCNC: 153 MG/DL (ref 70–99)
GLUCOSE SERPL-MCNC: 165 MG/DL (ref 70–99)
HBA1C MFR BLD: 6.2 %
HCT VFR BLD AUTO: 34.9 % (ref 35–47)
HGB BLD-MCNC: 11 G/DL (ref 11.7–15.7)
HOLD SPECIMEN: NORMAL
IMM GRANULOCYTES # BLD: 0.1 10E3/UL
IMM GRANULOCYTES NFR BLD: 1 %
LACTATE SERPL-SCNC: 0.9 MMOL/L (ref 0.7–2)
LYMPHOCYTES # BLD AUTO: 1.2 10E3/UL (ref 0.8–5.3)
LYMPHOCYTES NFR BLD AUTO: 16 %
MAGNESIUM SERPL-MCNC: 2.1 MG/DL (ref 1.7–2.3)
MCH RBC QN AUTO: 29.2 PG (ref 26.5–33)
MCHC RBC AUTO-ENTMCNC: 31.5 G/DL (ref 31.5–36.5)
MCV RBC AUTO: 93 FL (ref 78–100)
MONOCYTES # BLD AUTO: 0.6 10E3/UL (ref 0–1.3)
MONOCYTES NFR BLD AUTO: 8 %
NEUTROPHILS # BLD AUTO: 5.2 10E3/UL (ref 1.6–8.3)
NEUTROPHILS NFR BLD AUTO: 70 %
NRBC # BLD AUTO: 0 10E3/UL
NRBC BLD AUTO-RTO: 0 /100
NT-PROBNP SERPL-MCNC: 5747 PG/ML (ref 0–1800)
PLATELET # BLD AUTO: 202 10E3/UL (ref 150–450)
POTASSIUM SERPL-SCNC: 5 MMOL/L (ref 3.4–5.3)
PROCALCITONIN SERPL IA-MCNC: 0.06 NG/ML
RBC # BLD AUTO: 3.77 10E6/UL (ref 3.8–5.2)
RSV RNA SPEC NAA+PROBE: NEGATIVE
SARS-COV-2 RNA RESP QL NAA+PROBE: NEGATIVE
SODIUM SERPL-SCNC: 134 MMOL/L (ref 135–145)
TROPONIN T SERPL HS-MCNC: 23 NG/L
WBC # BLD AUTO: 7.4 10E3/UL (ref 4–11)

## 2024-03-26 PROCEDURE — 250N000011 HC RX IP 250 OP 636: Performed by: INTERNAL MEDICINE

## 2024-03-26 PROCEDURE — 85379 FIBRIN DEGRADATION QUANT: CPT | Performed by: EMERGENCY MEDICINE

## 2024-03-26 PROCEDURE — 71045 X-RAY EXAM CHEST 1 VIEW: CPT

## 2024-03-26 PROCEDURE — 83605 ASSAY OF LACTIC ACID: CPT | Performed by: EMERGENCY MEDICINE

## 2024-03-26 PROCEDURE — 36415 COLL VENOUS BLD VENIPUNCTURE: CPT | Performed by: EMERGENCY MEDICINE

## 2024-03-26 PROCEDURE — 84484 ASSAY OF TROPONIN QUANT: CPT | Performed by: EMERGENCY MEDICINE

## 2024-03-26 PROCEDURE — 71250 CT THORAX DX C-: CPT | Mod: MG

## 2024-03-26 PROCEDURE — 82565 ASSAY OF CREATININE: CPT | Performed by: INTERNAL MEDICINE

## 2024-03-26 PROCEDURE — 84145 PROCALCITONIN (PCT): CPT | Performed by: INTERNAL MEDICINE

## 2024-03-26 PROCEDURE — 83880 ASSAY OF NATRIURETIC PEPTIDE: CPT | Performed by: EMERGENCY MEDICINE

## 2024-03-26 PROCEDURE — 83735 ASSAY OF MAGNESIUM: CPT | Performed by: EMERGENCY MEDICINE

## 2024-03-26 PROCEDURE — 250N000013 HC RX MED GY IP 250 OP 250 PS 637: Performed by: INTERNAL MEDICINE

## 2024-03-26 PROCEDURE — 93005 ELECTROCARDIOGRAM TRACING: CPT | Performed by: EMERGENCY MEDICINE

## 2024-03-26 PROCEDURE — 82962 GLUCOSE BLOOD TEST: CPT

## 2024-03-26 PROCEDURE — 99285 EMERGENCY DEPT VISIT HI MDM: CPT | Mod: 25

## 2024-03-26 PROCEDURE — 250N000011 HC RX IP 250 OP 636: Performed by: EMERGENCY MEDICINE

## 2024-03-26 PROCEDURE — 80048 BASIC METABOLIC PNL TOTAL CA: CPT | Performed by: EMERGENCY MEDICINE

## 2024-03-26 PROCEDURE — 96374 THER/PROPH/DIAG INJ IV PUSH: CPT

## 2024-03-26 PROCEDURE — 83036 HEMOGLOBIN GLYCOSYLATED A1C: CPT | Performed by: INTERNAL MEDICINE

## 2024-03-26 PROCEDURE — 258N000003 HC RX IP 258 OP 636: Performed by: EMERGENCY MEDICINE

## 2024-03-26 PROCEDURE — 87637 SARSCOV2&INF A&B&RSV AMP PRB: CPT | Performed by: EMERGENCY MEDICINE

## 2024-03-26 PROCEDURE — 85025 COMPLETE CBC W/AUTO DIFF WBC: CPT | Performed by: EMERGENCY MEDICINE

## 2024-03-26 PROCEDURE — 99223 1ST HOSP IP/OBS HIGH 75: CPT | Mod: AI | Performed by: INTERNAL MEDICINE

## 2024-03-26 PROCEDURE — 120N000001 HC R&B MED SURG/OB

## 2024-03-26 PROCEDURE — 86140 C-REACTIVE PROTEIN: CPT | Performed by: INTERNAL MEDICINE

## 2024-03-26 RX ORDER — ACETAMINOPHEN 325 MG/1
650 TABLET ORAL EVERY 4 HOURS PRN
Status: DISCONTINUED | OUTPATIENT
Start: 2024-03-26 | End: 2024-03-30 | Stop reason: HOSPADM

## 2024-03-26 RX ORDER — AMOXICILLIN 250 MG
2 CAPSULE ORAL 2 TIMES DAILY PRN
Status: DISCONTINUED | OUTPATIENT
Start: 2024-03-26 | End: 2024-03-30 | Stop reason: HOSPADM

## 2024-03-26 RX ORDER — CARVEDILOL 12.5 MG/1
12.5 TABLET ORAL 2 TIMES DAILY WITH MEALS
Status: DISCONTINUED | OUTPATIENT
Start: 2024-03-26 | End: 2024-03-30 | Stop reason: HOSPADM

## 2024-03-26 RX ORDER — NITROGLYCERIN 0.4 MG/1
0.4 TABLET SUBLINGUAL EVERY 5 MIN PRN
Status: DISCONTINUED | OUTPATIENT
Start: 2024-03-26 | End: 2024-03-30 | Stop reason: HOSPADM

## 2024-03-26 RX ORDER — POLYETHYLENE GLYCOL 3350 17 G/17G
17 POWDER, FOR SOLUTION ORAL DAILY PRN
Status: DISCONTINUED | OUTPATIENT
Start: 2024-03-26 | End: 2024-03-30 | Stop reason: HOSPADM

## 2024-03-26 RX ORDER — CEFTRIAXONE 1 G/1
1 INJECTION, POWDER, FOR SOLUTION INTRAMUSCULAR; INTRAVENOUS EVERY 24 HOURS
Status: DISCONTINUED | OUTPATIENT
Start: 2024-03-27 | End: 2024-03-30

## 2024-03-26 RX ORDER — LISINOPRIL 20 MG/1
20 TABLET ORAL EVERY EVENING
Status: DISCONTINUED | OUTPATIENT
Start: 2024-03-27 | End: 2024-03-30 | Stop reason: HOSPADM

## 2024-03-26 RX ORDER — LIDOCAINE 40 MG/G
CREAM TOPICAL
Status: DISCONTINUED | OUTPATIENT
Start: 2024-03-26 | End: 2024-03-30 | Stop reason: HOSPADM

## 2024-03-26 RX ORDER — ONDANSETRON 4 MG/1
4 TABLET, ORALLY DISINTEGRATING ORAL EVERY 6 HOURS PRN
Status: DISCONTINUED | OUTPATIENT
Start: 2024-03-26 | End: 2024-03-30 | Stop reason: HOSPADM

## 2024-03-26 RX ORDER — LANOLIN ALCOHOL/MO/W.PET/CERES
3 CREAM (GRAM) TOPICAL AT BEDTIME
Status: DISCONTINUED | OUTPATIENT
Start: 2024-03-26 | End: 2024-03-30 | Stop reason: HOSPADM

## 2024-03-26 RX ORDER — CARVEDILOL 12.5 MG/1
12.5 TABLET ORAL 2 TIMES DAILY WITH MEALS
Status: DISCONTINUED | OUTPATIENT
Start: 2024-03-27 | End: 2024-03-27

## 2024-03-26 RX ORDER — ACETAMINOPHEN 650 MG/1
650 SUPPOSITORY RECTAL EVERY 4 HOURS PRN
Status: DISCONTINUED | OUTPATIENT
Start: 2024-03-26 | End: 2024-03-30 | Stop reason: HOSPADM

## 2024-03-26 RX ORDER — GUAIFENESIN 200 MG/10ML
200 LIQUID ORAL EVERY 4 HOURS PRN
Status: DISCONTINUED | OUTPATIENT
Start: 2024-03-26 | End: 2024-03-30 | Stop reason: HOSPADM

## 2024-03-26 RX ORDER — NICOTINE POLACRILEX 4 MG
15-30 LOZENGE BUCCAL
Status: DISCONTINUED | OUTPATIENT
Start: 2024-03-26 | End: 2024-03-30 | Stop reason: HOSPADM

## 2024-03-26 RX ORDER — HYDRALAZINE HYDROCHLORIDE 10 MG/1
10 TABLET, FILM COATED ORAL EVERY 4 HOURS PRN
Status: DISCONTINUED | OUTPATIENT
Start: 2024-03-26 | End: 2024-03-30 | Stop reason: HOSPADM

## 2024-03-26 RX ORDER — CEFTRIAXONE 1 G/1
1 INJECTION, POWDER, FOR SOLUTION INTRAMUSCULAR; INTRAVENOUS ONCE
Status: COMPLETED | OUTPATIENT
Start: 2024-03-26 | End: 2024-03-26

## 2024-03-26 RX ORDER — FUROSEMIDE 10 MG/ML
40 INJECTION INTRAMUSCULAR; INTRAVENOUS ONCE
Status: COMPLETED | OUTPATIENT
Start: 2024-03-26 | End: 2024-03-26

## 2024-03-26 RX ORDER — ATORVASTATIN CALCIUM 10 MG/1
20 TABLET, FILM COATED ORAL EVERY EVENING
Status: DISCONTINUED | OUTPATIENT
Start: 2024-03-26 | End: 2024-03-30 | Stop reason: HOSPADM

## 2024-03-26 RX ORDER — GABAPENTIN 300 MG/1
300 CAPSULE ORAL ONCE
Status: COMPLETED | OUTPATIENT
Start: 2024-03-26 | End: 2024-03-26

## 2024-03-26 RX ORDER — ATORVASTATIN CALCIUM 10 MG/1
20 TABLET, FILM COATED ORAL AT BEDTIME
Status: DISCONTINUED | OUTPATIENT
Start: 2024-03-27 | End: 2024-03-26

## 2024-03-26 RX ORDER — ENOXAPARIN SODIUM 100 MG/ML
40 INJECTION SUBCUTANEOUS EVERY 24 HOURS
Status: DISCONTINUED | OUTPATIENT
Start: 2024-03-26 | End: 2024-03-30 | Stop reason: HOSPADM

## 2024-03-26 RX ORDER — AMOXICILLIN 250 MG
1 CAPSULE ORAL 2 TIMES DAILY PRN
Status: DISCONTINUED | OUTPATIENT
Start: 2024-03-26 | End: 2024-03-30 | Stop reason: HOSPADM

## 2024-03-26 RX ORDER — DEXTROSE MONOHYDRATE 25 G/50ML
25-50 INJECTION, SOLUTION INTRAVENOUS
Status: DISCONTINUED | OUTPATIENT
Start: 2024-03-26 | End: 2024-03-30 | Stop reason: HOSPADM

## 2024-03-26 RX ORDER — GABAPENTIN 300 MG/1
300 CAPSULE ORAL 4 TIMES DAILY
Status: DISCONTINUED | OUTPATIENT
Start: 2024-03-27 | End: 2024-03-30 | Stop reason: HOSPADM

## 2024-03-26 RX ORDER — FUROSEMIDE 10 MG/ML
60 INJECTION INTRAMUSCULAR; INTRAVENOUS EVERY 8 HOURS
Status: DISCONTINUED | OUTPATIENT
Start: 2024-03-26 | End: 2024-03-28

## 2024-03-26 RX ORDER — LISINOPRIL 20 MG/1
20 TABLET ORAL EVERY EVENING
COMMUNITY

## 2024-03-26 RX ORDER — LANOLIN ALCOHOL/MO/W.PET/CERES
1000 CREAM (GRAM) TOPICAL EVERY EVENING
COMMUNITY
End: 2024-09-06

## 2024-03-26 RX ORDER — HYDRALAZINE HYDROCHLORIDE 20 MG/ML
10 INJECTION INTRAMUSCULAR; INTRAVENOUS EVERY 4 HOURS PRN
Status: DISCONTINUED | OUTPATIENT
Start: 2024-03-26 | End: 2024-03-30 | Stop reason: HOSPADM

## 2024-03-26 RX ORDER — CARBIDOPA AND LEVODOPA 25; 100 MG/1; MG/1
1 TABLET ORAL 3 TIMES DAILY
Status: DISCONTINUED | OUTPATIENT
Start: 2024-03-27 | End: 2024-03-30 | Stop reason: HOSPADM

## 2024-03-26 RX ORDER — ASPIRIN 81 MG/1
81 TABLET, CHEWABLE ORAL AT BEDTIME
Status: DISCONTINUED | OUTPATIENT
Start: 2024-03-27 | End: 2024-03-30 | Stop reason: HOSPADM

## 2024-03-26 RX ORDER — PANTOPRAZOLE SODIUM 40 MG/1
40 TABLET, DELAYED RELEASE ORAL
Status: DISCONTINUED | OUTPATIENT
Start: 2024-03-27 | End: 2024-03-30 | Stop reason: HOSPADM

## 2024-03-26 RX ORDER — CALCIUM CARBONATE 500 MG/1
1000 TABLET, CHEWABLE ORAL 4 TIMES DAILY PRN
Status: DISCONTINUED | OUTPATIENT
Start: 2024-03-26 | End: 2024-03-30 | Stop reason: HOSPADM

## 2024-03-26 RX ORDER — ONDANSETRON 2 MG/ML
4 INJECTION INTRAMUSCULAR; INTRAVENOUS EVERY 6 HOURS PRN
Status: DISCONTINUED | OUTPATIENT
Start: 2024-03-26 | End: 2024-03-30 | Stop reason: HOSPADM

## 2024-03-26 RX ADMIN — Medication 3 MG: at 22:30

## 2024-03-26 RX ADMIN — FUROSEMIDE 40 MG: 10 INJECTION, SOLUTION INTRAVENOUS at 20:28

## 2024-03-26 RX ADMIN — ATORVASTATIN CALCIUM 20 MG: 10 TABLET, FILM COATED ORAL at 22:30

## 2024-03-26 RX ADMIN — AZITHROMYCIN MONOHYDRATE 500 MG: 500 INJECTION, POWDER, LYOPHILIZED, FOR SOLUTION INTRAVENOUS at 22:30

## 2024-03-26 RX ADMIN — CARVEDILOL 12.5 MG: 12.5 TABLET, FILM COATED ORAL at 22:30

## 2024-03-26 RX ADMIN — ENOXAPARIN SODIUM 40 MG: 40 INJECTION SUBCUTANEOUS at 22:31

## 2024-03-26 RX ADMIN — GABAPENTIN 300 MG: 300 CAPSULE ORAL at 22:30

## 2024-03-26 RX ADMIN — CEFTRIAXONE SODIUM 1 G: 1 INJECTION, POWDER, FOR SOLUTION INTRAMUSCULAR; INTRAVENOUS at 22:30

## 2024-03-26 ASSESSMENT — ACTIVITIES OF DAILY LIVING (ADL)
ADLS_ACUITY_SCORE: 38
ADLS_ACUITY_SCORE: 36
ADLS_ACUITY_SCORE: 38

## 2024-03-26 ASSESSMENT — COLUMBIA-SUICIDE SEVERITY RATING SCALE - C-SSRS
1. IN THE PAST MONTH, HAVE YOU WISHED YOU WERE DEAD OR WISHED YOU COULD GO TO SLEEP AND NOT WAKE UP?: NO
2. HAVE YOU ACTUALLY HAD ANY THOUGHTS OF KILLING YOURSELF IN THE PAST MONTH?: NO
6. HAVE YOU EVER DONE ANYTHING, STARTED TO DO ANYTHING, OR PREPARED TO DO ANYTHING TO END YOUR LIFE?: NO

## 2024-03-27 ENCOUNTER — APPOINTMENT (OUTPATIENT)
Dept: CARDIOLOGY | Facility: HOSPITAL | Age: 89
DRG: 291 | End: 2024-03-27
Attending: INTERNAL MEDICINE
Payer: MEDICARE

## 2024-03-27 ENCOUNTER — TELEPHONE (OUTPATIENT)
Dept: CARDIOLOGY | Facility: CLINIC | Age: 89
End: 2024-03-27
Payer: MEDICARE

## 2024-03-27 DIAGNOSIS — I50.9 ACUTE DECOMPENSATED HEART FAILURE (H): Primary | ICD-10-CM

## 2024-03-27 LAB
ANION GAP SERPL CALCULATED.3IONS-SCNC: 13 MMOL/L (ref 7–15)
BUN SERPL-MCNC: 30.2 MG/DL (ref 8–23)
C PNEUM DNA SPEC QL NAA+PROBE: NOT DETECTED
CALCIUM SERPL-MCNC: 10.2 MG/DL (ref 8.2–9.6)
CHLORIDE SERPL-SCNC: 99 MMOL/L (ref 98–107)
CREAT SERPL-MCNC: 1.04 MG/DL (ref 0.51–0.95)
CRP SERPL-MCNC: 24.9 MG/L
DEPRECATED HCO3 PLAS-SCNC: 29 MMOL/L (ref 22–29)
EGFRCR SERPLBLD CKD-EPI 2021: 51 ML/MIN/1.73M2
ERYTHROCYTE [DISTWIDTH] IN BLOOD BY AUTOMATED COUNT: 16.3 % (ref 10–15)
FLUAV H1 2009 PAND RNA SPEC QL NAA+PROBE: NOT DETECTED
FLUAV H1 RNA SPEC QL NAA+PROBE: NOT DETECTED
FLUAV H3 RNA SPEC QL NAA+PROBE: NOT DETECTED
FLUAV RNA SPEC QL NAA+PROBE: NOT DETECTED
FLUBV RNA SPEC QL NAA+PROBE: NOT DETECTED
GLUCOSE BLDC GLUCOMTR-MCNC: 125 MG/DL (ref 70–99)
GLUCOSE BLDC GLUCOMTR-MCNC: 143 MG/DL (ref 70–99)
GLUCOSE BLDC GLUCOMTR-MCNC: 152 MG/DL (ref 70–99)
GLUCOSE BLDC GLUCOMTR-MCNC: 173 MG/DL (ref 70–99)
GLUCOSE SERPL-MCNC: 150 MG/DL (ref 70–99)
HADV DNA SPEC QL NAA+PROBE: NOT DETECTED
HCOV PNL SPEC NAA+PROBE: NOT DETECTED
HCT VFR BLD AUTO: 35.1 % (ref 35–47)
HGB BLD-MCNC: 10.7 G/DL (ref 11.7–15.7)
HMPV RNA SPEC QL NAA+PROBE: NOT DETECTED
HPIV1 RNA SPEC QL NAA+PROBE: NOT DETECTED
HPIV2 RNA SPEC QL NAA+PROBE: NOT DETECTED
HPIV3 RNA SPEC QL NAA+PROBE: NOT DETECTED
HPIV4 RNA SPEC QL NAA+PROBE: NOT DETECTED
L PNEUMO1 AG UR QL IA: NEGATIVE
LVEF ECHO: NORMAL
M PNEUMO DNA SPEC QL NAA+PROBE: NOT DETECTED
MAGNESIUM SERPL-MCNC: 2.2 MG/DL (ref 1.7–2.3)
MCH RBC QN AUTO: 28.8 PG (ref 26.5–33)
MCHC RBC AUTO-ENTMCNC: 30.5 G/DL (ref 31.5–36.5)
MCV RBC AUTO: 94 FL (ref 78–100)
PLATELET # BLD AUTO: 201 10E3/UL (ref 150–450)
POTASSIUM SERPL-SCNC: 4.6 MMOL/L (ref 3.4–5.3)
RBC # BLD AUTO: 3.72 10E6/UL (ref 3.8–5.2)
RSV RNA SPEC QL NAA+PROBE: NOT DETECTED
RSV RNA SPEC QL NAA+PROBE: NOT DETECTED
RV+EV RNA SPEC QL NAA+PROBE: NOT DETECTED
S PNEUM AG SPEC QL: NEGATIVE
SODIUM SERPL-SCNC: 141 MMOL/L (ref 135–145)
WBC # BLD AUTO: 6.9 10E3/UL (ref 4–11)

## 2024-03-27 PROCEDURE — 85027 COMPLETE CBC AUTOMATED: CPT | Performed by: INTERNAL MEDICINE

## 2024-03-27 PROCEDURE — 87633 RESP VIRUS 12-25 TARGETS: CPT | Performed by: INTERNAL MEDICINE

## 2024-03-27 PROCEDURE — 87899 AGENT NOS ASSAY W/OPTIC: CPT | Performed by: INTERNAL MEDICINE

## 2024-03-27 PROCEDURE — 99233 SBSQ HOSP IP/OBS HIGH 50: CPT | Performed by: STUDENT IN AN ORGANIZED HEALTH CARE EDUCATION/TRAINING PROGRAM

## 2024-03-27 PROCEDURE — 250N000011 HC RX IP 250 OP 636: Performed by: INTERNAL MEDICINE

## 2024-03-27 PROCEDURE — 93306 TTE W/DOPPLER COMPLETE: CPT | Mod: 26 | Performed by: INTERNAL MEDICINE

## 2024-03-27 PROCEDURE — 83735 ASSAY OF MAGNESIUM: CPT | Performed by: INTERNAL MEDICINE

## 2024-03-27 PROCEDURE — 120N000001 HC R&B MED SURG/OB

## 2024-03-27 PROCEDURE — 999N000208 ECHOCARDIOGRAM COMPLETE

## 2024-03-27 PROCEDURE — 250N000013 HC RX MED GY IP 250 OP 250 PS 637: Performed by: INTERNAL MEDICINE

## 2024-03-27 PROCEDURE — 258N000003 HC RX IP 258 OP 636: Performed by: INTERNAL MEDICINE

## 2024-03-27 PROCEDURE — 250N000012 HC RX MED GY IP 250 OP 636 PS 637: Performed by: INTERNAL MEDICINE

## 2024-03-27 PROCEDURE — 82962 GLUCOSE BLOOD TEST: CPT

## 2024-03-27 PROCEDURE — 99222 1ST HOSP IP/OBS MODERATE 55: CPT | Performed by: STUDENT IN AN ORGANIZED HEALTH CARE EDUCATION/TRAINING PROGRAM

## 2024-03-27 PROCEDURE — 80048 BASIC METABOLIC PNL TOTAL CA: CPT | Performed by: INTERNAL MEDICINE

## 2024-03-27 PROCEDURE — 36415 COLL VENOUS BLD VENIPUNCTURE: CPT | Performed by: INTERNAL MEDICINE

## 2024-03-27 RX ADMIN — PANTOPRAZOLE SODIUM 40 MG: 20 TABLET, DELAYED RELEASE ORAL at 08:31

## 2024-03-27 RX ADMIN — LISINOPRIL 20 MG: 20 TABLET ORAL at 20:47

## 2024-03-27 RX ADMIN — GABAPENTIN 300 MG: 300 CAPSULE ORAL at 08:31

## 2024-03-27 RX ADMIN — INSULIN ASPART 1 UNITS: 100 INJECTION, SOLUTION INTRAVENOUS; SUBCUTANEOUS at 08:30

## 2024-03-27 RX ADMIN — INSULIN ASPART 1 UNITS: 100 INJECTION, SOLUTION INTRAVENOUS; SUBCUTANEOUS at 12:38

## 2024-03-27 RX ADMIN — MAGNESIUM OXIDE TAB 400 MG (241.3 MG ELEMENTAL MG) 200 MG: 400 (241.3 MG) TAB at 08:32

## 2024-03-27 RX ADMIN — GABAPENTIN 300 MG: 300 CAPSULE ORAL at 16:50

## 2024-03-27 RX ADMIN — FUROSEMIDE 60 MG: 10 INJECTION, SOLUTION INTRAMUSCULAR; INTRAVENOUS at 03:56

## 2024-03-27 RX ADMIN — CARBIDOPA AND LEVODOPA 1 TABLET: 25; 100 TABLET ORAL at 01:28

## 2024-03-27 RX ADMIN — GABAPENTIN 300 MG: 300 CAPSULE ORAL at 12:31

## 2024-03-27 RX ADMIN — CARBIDOPA AND LEVODOPA 1 TABLET: 25; 100 TABLET ORAL at 14:07

## 2024-03-27 RX ADMIN — ATORVASTATIN CALCIUM 20 MG: 10 TABLET, FILM COATED ORAL at 20:47

## 2024-03-27 RX ADMIN — CEFTRIAXONE SODIUM 1 G: 1 INJECTION, POWDER, FOR SOLUTION INTRAMUSCULAR; INTRAVENOUS at 21:23

## 2024-03-27 RX ADMIN — ASPIRIN 81 MG CHEWABLE TABLET 81 MG: 81 TABLET CHEWABLE at 21:01

## 2024-03-27 RX ADMIN — CARVEDILOL 12.5 MG: 12.5 TABLET, FILM COATED ORAL at 08:35

## 2024-03-27 RX ADMIN — ASPIRIN 81 MG CHEWABLE TABLET 81 MG: 81 TABLET CHEWABLE at 00:17

## 2024-03-27 RX ADMIN — PANTOPRAZOLE SODIUM 40 MG: 20 TABLET, DELAYED RELEASE ORAL at 16:50

## 2024-03-27 RX ADMIN — Medication 3 MG: at 21:01

## 2024-03-27 RX ADMIN — CARVEDILOL 12.5 MG: 12.5 TABLET, FILM COATED ORAL at 18:46

## 2024-03-27 RX ADMIN — CARBIDOPA AND LEVODOPA 1 TABLET: 25; 100 TABLET ORAL at 08:31

## 2024-03-27 RX ADMIN — FLUOXETINE HYDROCHLORIDE 20 MG: 20 CAPSULE ORAL at 08:32

## 2024-03-27 RX ADMIN — ENOXAPARIN SODIUM 40 MG: 40 INJECTION SUBCUTANEOUS at 21:01

## 2024-03-27 RX ADMIN — FLUOXETINE HYDROCHLORIDE 20 MG: 20 CAPSULE ORAL at 20:44

## 2024-03-27 RX ADMIN — FUROSEMIDE 60 MG: 10 INJECTION, SOLUTION INTRAMUSCULAR; INTRAVENOUS at 12:32

## 2024-03-27 RX ADMIN — GABAPENTIN 300 MG: 300 CAPSULE ORAL at 20:47

## 2024-03-27 RX ADMIN — CARBIDOPA AND LEVODOPA 1 TABLET: 25; 100 TABLET ORAL at 20:45

## 2024-03-27 RX ADMIN — AZITHROMYCIN MONOHYDRATE 500 MG: 500 INJECTION, POWDER, LYOPHILIZED, FOR SOLUTION INTRAVENOUS at 21:22

## 2024-03-27 RX ADMIN — FUROSEMIDE 60 MG: 10 INJECTION, SOLUTION INTRAMUSCULAR; INTRAVENOUS at 20:44

## 2024-03-27 ASSESSMENT — ACTIVITIES OF DAILY LIVING (ADL)
ADLS_ACUITY_SCORE: 37
ADLS_ACUITY_SCORE: 43
DEPENDENT_IADLS:: TRANSPORTATION
ADLS_ACUITY_SCORE: 43
ADLS_ACUITY_SCORE: 38
ADLS_ACUITY_SCORE: 43
ADLS_ACUITY_SCORE: 38
ADLS_ACUITY_SCORE: 45
ADLS_ACUITY_SCORE: 38
ADLS_ACUITY_SCORE: 38
ADLS_ACUITY_SCORE: 37
ADLS_ACUITY_SCORE: 38
ADLS_ACUITY_SCORE: 43
ADLS_ACUITY_SCORE: 38
ADLS_ACUITY_SCORE: 43
ADLS_ACUITY_SCORE: 36
ADLS_ACUITY_SCORE: 45
ADLS_ACUITY_SCORE: 43
ADLS_ACUITY_SCORE: 36
ADLS_ACUITY_SCORE: 38
ADLS_ACUITY_SCORE: 43
ADLS_ACUITY_SCORE: 38

## 2024-03-27 NOTE — PROGRESS NOTES
[facility]    Medicine Progress Note - Hospitalist Service    Date of Admission:  3/26/2024    Assessment & Plan   Janes Montero is a 89 year old female with PMH of diastolic CHF, CAD, s/p CABG 2010, DM 2, HLD, admitted on 3/26/2024 with:    #Acute exacerbation of chronic HFpEF  #Acute hypoxemic respiratory failure  Presented with 1-2 weeks of worsening shortness of breath. Initially requiring 2L O2 without a baseline. Patient checks weight daily and denies any weight gain. States compliance with restricted salt diet and medications. ProBNP elevated 5700. CT chest showed diffuse pulmonary edema with trace bilateral pleural effusions, multiple new nodular opacities in right lung suggestive of superimposed infection/inflammatory process.  - Cardiology conulted  - Diuresis with IV Lasix 60mg TID for now (home dose 20mg daily)  - Daily labs, weight, closely monitor vitals while diuresing  - TTE    #CAD, s/p CABG  Nuc med stress test in May 2023 showed inferior and basal wall inducible ischemia. Cardiology and patient ultimately decided against coronary angiogram given lack of anginal symptoms and impaired renal function.  - Continue home ASA 81mg daily and atorvastatin    #Community-acquired pneumonia  Multiple nodular opacities in the right lung. RVP negative. Urine strep pneumo and legionella antigen negative. Normal procalcitonin. Suspect less of a contribution of pneumonia to her presenting symptoms relative to CHF, but given CT findings will treat.  - Empiric ceftriaxone/azithromycin  - Repeat CT in 3 months to evaluate for resolution of nodules.    #Moderate to severe mitral insufficiency  #Mild to moderate mitral stenosis  - Noted on TTE 3/27/24    #Parkinsons disease  - Continue home Sinemet    #HTN  - Continue home Coreg and lisinopril    #GERD  - Continue home PPI  Suspect GERMAN.  Seen by sleep medicine on 1/9/2024, recommended polysomnography.  This has not been completed  "yet.    #Moderate tracheobronchomalacia, per recent pulmonology visit on 12/12/2023  PFT showed normal spirometry, lung volumes and diffusion capacity.    #Pre-DM  A1C 6.2    Diet: Combination Diet Low Saturated Fat Na <2400mg Diet    DVT Prophylaxis: Enoxaparin (Lovenox) SQ  Holguin Catheter: Not present  Lines: None     Cardiac Monitoring: ACTIVE order. Indication: QTc prolonging medication (48 hours)  Code Status: No CPR- Do NOT Intubate verified with patient and Daughter at the bedside.          Diet: Combination Diet Low Saturated Fat Na <2400mg Diet    DVT Prophylaxis: Enoxaparin (Lovenox) SQ  Holguin Catheter: Not present  Lines: None     Cardiac Monitoring: ACTIVE order. Indication: QTc prolonging medication (48 hours)  Code Status: No CPR- Do NOT Intubate      Clinically Significant Risk Factors Present on Admission           # Hypercalcemia: Highest Ca = 10.2 mg/dL in last 2 days, will monitor as appropriate      # Drug Induced Platelet Defect: home medication list includes an antiplatelet medication   # Hypertension: Noted on problem list  # Acute heart failure with preserved ejection fraction: heart failure noted on problem list, last echo with EF >50%, and receiving IV diuretics     # Obesity: Estimated body mass index is 32.42 kg/m  as calculated from the following:    Height as of this encounter: 1.6 m (5' 3\").    Weight as of this encounter: 83 kg (183 lb).        # History of CABG: noted on surgical history       Disposition Plan      Expected Discharge Date: 03/28/2024                    JEZ STUART MD  Hospitalist Service  [facility]  Securely message with [employer] (more info)  Text page via [employer] Paging/Directory   ______________________________________________________________________    Interval History   Breathing already feeling better. Says she started feeling more short of breath a week or two ago. Doesn't feel it's particularly positional, but worse with exertion. " No fevers or chills. Does have some cough.    Physical Exam   Vital Signs: Temp: 98.1  F (36.7  C) Temp src: Oral BP: (!) 145/67 Pulse: 66   Resp: 25 SpO2: 96 % O2 Device: Nasal cannula Oxygen Delivery: 2 LPM  Weight: 183 lbs 0 oz    General: Alert and oriented x 3. No distress, conversational.  Chest: Good air movement bilaterally, bases diminished, no rhonchi or wheezes.  Heart: S1S2 regular.  2/6 systolic murmur.  Abdomen: Soft. NT, ND.  Extremities: Trace edema of LLE, 1+ edema of RLE; patient states this is typical for her  Neuro: Cranial nerves 1-12 grossly normal. No focal neurological deficit    Medical Decision Making       50 MINUTES SPENT BY ME on the date of service doing chart review, history, exam, documentation & further activities per the note.      Data     I have personally reviewed the following data over the past 24 hrs:    6.9  \   10.7 (L)   / 201     141 99 30.2 (H) /  152 (H)   4.6 29 1.04 (H) \     Trop: 23 (H) BNP: 5,747 (H)     TSH: N/A T4: N/A A1C: 6.2 (H)     Procal: 0.06 CRP: 24.90 (H) Lactic Acid: 0.9       INR:  N/A PTT:  N/A   D-dimer:  0.77 (H) Fibrinogen:  N/A       Imaging results reviewed over the past 24 hrs:   Recent Results (from the past 24 hour(s))   XR Chest Port 1 View    Narrative    EXAM: XR CHEST PORT 1 VIEW  LOCATION: [facility]  DATE: 3/26/2024    INDICATION: short of breath  COMPARISON: CT 5/10/2023      Impression    IMPRESSION: Patchy bilateral airspace opacities, which could represent multifocal infection or asymmetric pulmonary edema. No pleural effusion or pneumothorax. Unchanged borderline enlarged cardiac silhouette. Median sternotomy wires. Right shoulder   hardware partially visualized.   Chest CT w/o contrast    Narrative    EXAM: CT CHEST W/O CONTRAST  LOCATION: [facility]  DATE: 3/26/2024    INDICATION: short of breath  COMPARISON: CT chest 05/10/2023.  TECHNIQUE: CT chest without IV contrast. Multiplanar  reformats were obtained. Dose reduction techniques were used.  CONTRAST: None.    FINDINGS:   LUNGS AND PLEURA: Respiratory motion artifact. Diffuse interlobular septal thickening and intermixed groundglass opacities compatible with pulmonary edema. There are additional nodular opacity right lung which are new compared to prior (for example 4 mm   nodular opacity in the right middle lobe on series 4 image 107). Trace bilateral pleural effusions.    MEDIASTINUM/AXILLAE: Cardiomegaly with multichamber enlargement. No significant pericardial effusion. Mitral annulus calcification. Borderline enlarged right lower paratracheal lymph node and mild prominence of the bilateral valentin, nonspecific but favored   reactive.    CORONARY ARTERY CALCIFICATION: Previous intervention (CABG).    UPPER ABDOMEN: Unchanged 2.4 cm hyperdense lesion of the left upper pole kidney which likely represents a hemorrhagic cyst.    MUSCULOSKELETAL: Median sternotomy. Partially imaged right shoulder arthroplasty. Remote compression deformity of L1.      Impression    IMPRESSION:   1.  Interstitial and alveolar pulmonary edema with trace bilateral pleural effusions. Additionally, there are multiple new nodular opacities within the right lung suggestive of a superimposed infectious/inflammatory process. Consider a follow-up chest CT   in 3 months to assess for resolution.  2.  Additional unchanged findings as above.     Echocardiogram Complete   Result Value    LVEF  55-60%    Narrative    [id number]  [id number]  [id number]  [id number]^BEENA^[place] [address]     Name: EMMANUELLE DÍAZ  MRN: [id number]  : 10/24/1933  Study Date: 2024 11:29 AM  Age: 89 yrs  Gender: Female  Patient Location: [facility]  Reason For Study: CHF  Ordering Physician: DEVAN HARGROVE  Referring Physician: DEVAN HARGROVE  Performed By: LA     BSA: 1.9 m2  Height: 63 in  Weight: 183 lb  HR: 63  BP: 145/67  mmHg  ______________________________________________________________________________  Procedure  Complete Echo Adult. Definity (NDC #83743-072) given intravenously.  ______________________________________________________________________________  Interpretation Summary     The visual ejection fraction is 55-60%.  The right ventricle is normal in size and function.  Biatrial enlargement.  There is mod-severe to severe (3-4+) mitral regurgitation.  There is mild to moderate mitral stenosis.  Mild valvular aortic stenosis.  There is moderate (2+) tricuspid regurgitation.  The right ventricular systolic pressure is approximated at 56 mmHg.  Compared to the prior study dated 5/9/2023, there are changes as noted. There  is now moderate-severe mitral regurgitation and moderate tricuspid  regurgitation with moderate pulmonary hypertension.  ______________________________________________________________________________  Left Ventricle  The left ventricle is normal in size. There is borderline concentric left  ventricular hypertrophy. Proximal septal thickening is noted. The visual  ejection fraction is 55-60%. Left ventricular diastolic function is abnormal.  Diastolic Doppler findings (E/E' ratio and/or other parameters) suggest left  ventricular filling pressures are increased. No regional wall motion  abnormalities noted.     Right Ventricle  The right ventricle is normal in size and function.     Atria  The left atrium is moderate to severely dilated. The right atrium is  moderately dilated. There is no color Doppler evidence of an atrial shunt.     Mitral Valve  There is moderate mitral annular calcification. There is mod-severe to severe  (3-4+) mitral regurgitation. There is mild to moderate mitral stenosis.  Calcified mitral apparatus causing mitral stenosis.     Tricuspid Valve  Tricuspid valve leaflets appear normal. There is moderate (2+) tricuspid  regurgitation. The right ventricular systolic pressure is  approximated at  48mmHg plus the right atrial pressure.     Aortic Valve  Mild aortic valve calcification is present. There is mild (1+) aortic  regurgitation. Mild valvular aortic stenosis.     Pulmonic Valve  Normal pulmonic valve. There is trace pulmonic valvular regurgitation.     Vessels  The aorta root is normal. Normal size ascending aorta. IVC diameter and  respiratory changes fall into an intermediate range suggesting an RA pressure  of 8 mmHg.     Pericardium  There is no pericardial effusion.     ______________________________________________________________________________  MMode/2D Measurements & Calculations  IVSd: 2.0 cm  LVIDd: 4.6 cm  LVIDs: 3.3 cm  LVPWd: 0.97 cm  FS: 28.0 %  LV mass(C)d: 275.5 grams  LV mass(C)dI: 148.0 grams/m2     Ao root diam: 3.3 cm  asc Aorta Diam: 3.5 cm  LVOT diam: 1.9 cm  LVOT area: 2.8 cm2  Ao root diam index Ht(cm/m): 2.0  Ao root diam index BSA (cm/m2): 1.8  Asc Ao diam index BSA (cm/m2): 1.9  Asc Ao diam index Ht(cm/m): 2.2  EF Biplane: 51.0 %  LA Volume (BP): 80.6 ml  LA Volume Index (BP): 43.3 ml/m2     LA Volume Indexed (AL/bp): 44.8 ml/m2  RV Base: 4.0 cm  RWT: 0.42  TAPSE: 1.9 cm     Doppler Measurements & Calculations  MV E max edward: 170.0 cm/sec  MV A max edward: 86.9 cm/sec  MV E/A: 2.0  MV max PG: 10.2 mmHg  MV mean PG: 3.6 mmHg  MV V2 VTI: 48.5 cm  MVA(VTI): 1.4 cm2  MV dec slope: 933.8 cm/sec2  MV dec time: 0.18 sec     Ao V2 max: 218.3 cm/sec  Ao max P.0 mmHg  Ao V2 mean: 166.1 cm/sec  Ao mean P.1 mmHg  Ao V2 VTI: 57.9 cm  CHINMAY(I,D): 1.2 cm2  CHINAMY(V,D): 1.3 cm2  AI P1/2t: 621.0 msec  LV V1 max P.4 mmHg  LV V1 max: 104.5 cm/sec  LV V1 VTI: 24.9 cm  MR PISA: 4.4 cm2  MR ERO: 0.28 cm2  MR volume: 50.1 ml  SV(LVOT): 69.8 ml  SI(LVOT): 37.5 ml/m2  PA acc time: 0.10 sec  TR max edward: 344.8 cm/sec  TR max P.5 mmHg  AV Edward Ratio (DI): 0.48  CHINMAY Index (cm2/m2): 0.65  E/E' av.8  Lateral E/e': 17.4  Medial E/e': 22.3  RV S Edward: 9.5 cm/sec      ______________________________________________________________________________  Report approved by: Shazia Dempsey 03/27/2024 12:53 PM

## 2024-03-27 NOTE — H&P
[facility]    History and Physical - Hospitalist Service       Date of Admission:  3/26/2024    Assessment & Plan      Janes Montero is a 89 year old female with PMH of diastolic CHF, CAD, s/p CABG 2010, DM 2, HLD, admitted on 3/26/2024 with:    Diastolic CHF with acute exacerbation.    Acute respiratory failure with hypoxia.  NAD on RA 88-89%, 96% on 2 LPM.  PTA not oxygen dependent.  Unclear trigger for CHF exacerbation.  Patient checks weight daily and denies any weight gain.  States compliance with restricted salt diet and medications.  proBNP elevated 5700.  CT chest: Interstitial level pulmonary edema with trace bilateral pleural effusion, multiple new nodular opacities in right lung suggestive of superimposed infection/inflammatory process.  Last echo on 5/6/2023: LVEF 45-50%.  -Admit to telemetry bed  - Rule out ACS, cardiac arrhythmia-troponin mildly elevated 23.  NSR with known LBBB on EKG/telemetry  - Diuresis with IV Lasix  - Daily labs, weight, closely monitor vitals while diuresing  - Obtain echo  - Cardiology consulted    CAD, s/p CABG.  Nuc med stress test in May 2023 showed inferior and basal wall inducible ischemia.  Cardiology recommended against coronary angiogram given lack of anginal symptoms and impaired renal function.  -Continue PT    Community-acquired pneumonia.  Multiple nodular opacities in the right lung.  COVID/influenza/RSV ruled out.  Normal procalcitonin.  -Empiric ceftriaxone/azithromycin.  - Check Legionella/strep pneumo  - Symptomatic treatment.  - Appointment oxygen to keep SpO2 above 90%.  - Respiratory panel  -Repeat CT in 3 months to evaluate for resolution of nodules.    Moderate mitral insufficiency.  Trace aortic insufficiency.    HLD-most recent LDL 31, HDL 39.  On atorvastatin.    GERD-on PPI.  Suspect GERMAN.  Seen by sleep medicine on 1/9/2024, recommended polysomnography.  This has not been completed yet.    Moderate tracheobronchomalacia, per  "recent pulmonology visit on 12/12/2023.  PFT showed normal spirometry, lung volumes and diffusion capacity.    Diet: Combination Diet Low Saturated Fat Na <2400mg Diet    DVT Prophylaxis: Enoxaparin (Lovenox) SQ  Holguin Catheter: Not present  Lines: None     Cardiac Monitoring: ACTIVE order. Indication: QTc prolonging medication (48 hours)  Code Status: No CPR- Do NOT Intubate verified with patient and Daughter at the bedside.    Clinically Significant Risk Factors Present on Admission   # Drug Induced Platelet Defect: home medication list includes an antiplatelet medication   # Hypertension: Noted on problem list  # Heart failure, NOS: heart failure noted on the problem list and last echo with EF 40-50%    # DMII: A1C = N/A within past 6 months    # Obesity: Estimated body mass index is 32.42 kg/m  as calculated from the following:    Height as of this encounter: 1.6 m (5' 3\").    Weight as of this encounter: 83 kg (183 lb).        # History of CABG: noted on surgical history    Disposition Plan      Expected Discharge Date: 03/28/2024               Cindi Olivares MD  Hospitalist Service  [facility]  Securely message with [employer] (more info)  Text page via [facility] Paging/Directory   _____________________________________________________________________    Chief Complaint   Dyspnea, cough    History is obtained from the patient, electronic health record, and emergency department physician    History of Present Illness   Janes Montero is a 89 year old female with PMH of diastolic CHF, CAD, s/p CABG 2010, DM 2, HLD, presented to ED for evaluation of 1 week of worsening dyspnea.  Patient denies associated fever, chills, productive cough, chest pain, cardiac palpitations.  She checks her weight daily, and denies weight fluctuating for more than 1-2 pounds from week to week.  She denies abdominal pain, nausea, vomiting, dysuria.  Workup in ED showed CT exacerbation with pulmonary edema, pulmonary " infiltrates, elevated blood pressure .  She was treated with 40 mg of IV Lasix, dose of ceftriaxone and azithromycin.  Good diuretic response, already avoidant 1.5 L of clear urine.  Patient already feeling dyspnea improved, by the time I saw her in ED.    Past Medical History    Past Medical History:   Diagnosis Date    Acute diastolic congestive heart failure (H)     Acute kidney injury superimposed on CKD  (H24) 2022    Acute on chronic congestive heart failure (H) 2018    Acute pulmonary edema (H) 2023    Acute respiratory failure with hypoxia (H) 2023    Chest pain, unspecified type 08/15/2022    Chronic bilateral back pain 06/15/2021    Last Assessment & Plan:   Formatting of this note might be different from the original.  She says she has seen a pain specialist and had back surgery.  She needs to establish care with a pain specialist since she is new to [place] from [place].  Referral placed.    Coronary artery disease     Coronary artery disease involving native coronary artery without angina pectoris, unspecified whether native or transplanted heart 2022    Diabetes mellitus, type II (H)     Diastolic dysfunction 07/10/2018    Frozen shoulder     bilateral    Heart failure with reduced ejection fraction (H) 2023    Hyperlipidemia     Hypertension     Hypertensive emergency     Parkinson disease     Primary osteoarthritis involving multiple joints     Primary osteoarthritis of left knee     Recurrent UTI     hx septic shock    Thyroid disease      Past Surgical History   Past Surgical History:   Procedure Laterality Date    APPENDECTOMY      APPENDECTOMY      BACK SURGERY      L4-S1 laminectomy    BYPASS GRAFT ARTERY CORONARY      3 vessel     SECTION       SECTION      CORONARY ARTERY BYPASS      CV CORONARY ANGIOGRAM N/A 2022    Procedure: Coronary Angiogram;  Surgeon: Ignacio Baird MD;  Location: [facility] CATH LAB CV    HERNIORRHAPHY  VENTRAL Left     HYSTERECTOMY      HYSTERECTOMY      JOINT REPLACEMENT Left     Total left knee    OTHER SURGICAL HISTORY      right ankle surgery    OTHER SURGICAL HISTORY      right forearm fracture    REPLACEMENT TOTAL KNEE Right     SHOULDER SURGERY Right     reversed shoulder surgery.     Prior to Admission Medications   Prior to Admission Medications   Prescriptions Last Dose Informant Patient Reported? Taking?   FLUoxetine (PROZAC) 20 MG capsule 3/26/2024 at 1500 (2nd dose)  No Yes   Sig: Take 1 capsule (20 mg) by mouth 2 times daily   aspirin 81 mg chewable tablet 3/25/2024 at pm  Yes Yes   Sig: [ASPIRIN 81 MG CHEWABLE TABLET] Chew 81 mg at bedtime.   atorvastatin (LIPITOR) 20 MG tablet 3/25/2024 at pm  No Yes   Sig: TAKE 1 TABLET BY MOUTH AT  BEDTIME   carbidopa-levodopa (SINEMET)  MG tablet 3/26/2024 at 1300 (2nd dose)  No Yes   Sig: TAKE 1 TABLET BY MOUTH 3  TIMES DAILY TAKE AT LEAST  1/2 HOUR BEFORE MEALS OR 2  HOURS AFTER MEALS DO NOT  MIX WITH FOOD   carvedilol (COREG) 12.5 MG tablet 3/26/2024 at am  No Yes   Sig: Take 1 tablet (12.5 mg) by mouth 2 times daily (with meals)   cholecalciferol, vitamin D3, 1,000 unit tablet 3/26/2024 at am  Yes Yes   Sig: Take 1,000 Units by mouth 2 times daily   coenzyme Q10 (CO Q-10) 100 mg capsule 3/26/2024 at am  Yes Yes   Sig: [COENZYME Q10 (CO Q-10) 100 MG CAPSULE] Take 100 mg by mouth 2 (two) times a day.   cyanocobalamin (VITAMIN B-12) 1000 MCG tablet 3/25/2024 at pm  Yes Yes   Sig: Take 1,000 mcg by mouth every evening   fish oil-omega-3 fatty acids 1000 MG capsule 3/26/2024 at am  Yes Yes   Sig: Take 1 g by mouth 2 times daily   furosemide (LASIX) 20 MG tablet 3/26/2024 at am  No Yes   Sig: TAKE 1 TABLET BY MOUTH DAILY   gabapentin (NEURONTIN) 300 MG capsule 3/26/2024 at 1300 (2nd dose)  No Yes   Sig: TAKE 1 CAPSULE BY MOUTH 4  TIMES DAILY   lisinopril (ZESTRIL) 20 MG tablet 3/25/2024 at pm  Yes Yes   Sig: Take 20 mg by mouth every evening   loratadine  (CLARITIN) 10 mg tablet Past Month at prn  Yes Yes   Sig: Take 10 mg by mouth daily as needed for allergies   magnesium oxide 250 mg Tab 3/26/2024 at am  Yes Yes   Sig: Take 250 mg by mouth every morning   melatonin 1 mg Tab tablet 3/25/2024 at pm  Yes Yes   Sig: Take 1 mg by mouth At Bedtime   multivitamin therapeutic tablet 3/26/2024 at am  Yes Yes   Sig: Take 1 tablet by mouth every morning   nitroGLYcerin (NITROSTAT) 0.4 MG sublingual tablet n/a at n/a  No No   Sig: Place 1 tablet (0.4 mg) under the tongue every 5 minutes as needed for chest pain   omeprazole (PRILOSEC) 20 MG DR capsule 3/26/2024 at am  No Yes   Sig: Take 1 capsule (20 mg) by mouth 2 times daily   polyethylene glycol (MIRALAX) 17 gram packet Past Month at prn  Yes Yes   Sig: Take 17 g by mouth daily as needed for constipation      Facility-Administered Medications Last Administration Doses Remaining   denosumab (PROLIA) injection 60 mg 12/27/2023  3:50 PM    denosumab (PROLIA) injection 60 mg None recorded    denosumab (PROLIA) injection 60 mg None recorded          Review of Systems    10 point ROS is negative except as in HPI.     Physical Exam   Vital Signs: Temp: 98  F (36.7  C) Temp src: Oral BP: (!) 202/91 Pulse: 80   Resp: 16   O2 Device: Nasal cannula Oxygen Delivery: 2 LPM  Weight: 183 lbs 0 oz    General: Alert and oriented x 3. Not in obvious distress.  Patient's daughter at the bedside, with patient's permission.  HEENT: NC, AT. Neck- supple, No JVP elevation, lymphadenopathy or thyromegaly. Trachea-central.  Chest: Good air movement bilaterally, bases diminished, no rhonchi or wheezes.  Heart: S1S2 regular.  2/6 systolic murmur.  Abdomen: Soft. NT, ND. No organomegaly. Bowel sounds- active.  Back: No spine tenderness. No CVA tenderness.  Extremities: No leg swelling. Peripheral pulses 2+ bilaterally.  Neuro: Cranial nerves 1-12 grossly normal. No focal neurological deficit    Medical Decision Making     75 MINUTES SPENT BY ME on the  date of service doing chart review, history, exam, documentation & further activities per the note.      Data     I have personally reviewed the following data over the past 24 hrs:    7.4  \   11.0 (L)   / 202     134 (L) 98 32.0 (H) /  153 (H)   5.0 23 0.99 (H) \     Trop: 23 (H) BNP: 5,747 (H)     TSH: N/A T4: N/A A1C: 6.2 (H)     Procal: 0.06 CRP: N/A Lactic Acid: 0.9       INR:  N/A PTT:  N/A   D-dimer:  0.77 (H) Fibrinogen:  N/A       Imaging results reviewed over the past 24 hrs:   Recent Results (from the past 24 hour(s))   XR Chest Port 1 View    Narrative    EXAM: XR CHEST PORT 1 VIEW  LOCATION: [facility]  DATE: 3/26/2024    INDICATION: short of breath  COMPARISON: CT 5/10/2023      Impression    IMPRESSION: Patchy bilateral airspace opacities, which could represent multifocal infection or asymmetric pulmonary edema. No pleural effusion or pneumothorax. Unchanged borderline enlarged cardiac silhouette. Median sternotomy wires. Right shoulder   hardware partially visualized.   Chest CT w/o contrast    Narrative    EXAM: CT CHEST W/O CONTRAST  LOCATION: [facility]  DATE: 3/26/2024    INDICATION: short of breath  COMPARISON: CT chest 05/10/2023.  TECHNIQUE: CT chest without IV contrast. Multiplanar reformats were obtained. Dose reduction techniques were used.  CONTRAST: None.    FINDINGS:   LUNGS AND PLEURA: Respiratory motion artifact. Diffuse interlobular septal thickening and intermixed groundglass opacities compatible with pulmonary edema. There are additional nodular opacity right lung which are new compared to prior (for example 4 mm   nodular opacity in the right middle lobe on series 4 image 107). Trace bilateral pleural effusions.    MEDIASTINUM/AXILLAE: Cardiomegaly with multichamber enlargement. No significant pericardial effusion. Mitral annulus calcification. Borderline enlarged right lower paratracheal lymph node and mild prominence of the  bilateral valentin, nonspecific but favored   reactive.    CORONARY ARTERY CALCIFICATION: Previous intervention (CABG).    UPPER ABDOMEN: Unchanged 2.4 cm hyperdense lesion of the left upper pole kidney which likely represents a hemorrhagic cyst.    MUSCULOSKELETAL: Median sternotomy. Partially imaged right shoulder arthroplasty. Remote compression deformity of L1.      Impression    IMPRESSION:   1.  Interstitial and alveolar pulmonary edema with trace bilateral pleural effusions. Additionally, there are multiple new nodular opacities within the right lung suggestive of a superimposed infectious/inflammatory process. Consider a follow-up chest CT   in 3 months to assess for resolution.  2.  Additional unchanged findings as above.

## 2024-03-27 NOTE — MEDICATION SCRIBE - ADMISSION MEDICATION HISTORY
Medication Scribe Admission Medication History    Admission medication history is complete. The information provided in this note is only as accurate as the sources available at the time of the update.    Information Source(s): Patient via in-person    Pertinent Information: patient reports self management of medications.     Patient reports no longer taking: Kenalog    Changes made to PTA medication list:  Added: None  Deleted: Kenalog  Changed: None    Allergies reviewed with patient and updates made in EHR: yes    Medication History Completed By: Rosendo Hyman 3/26/2024 9:43 PM    Current Facility-Administered Medications for the 3/26/24 encounter (Hospital Encounter)   Medication    denosumab (PROLIA) injection 60 mg    denosumab (PROLIA) injection 60 mg    denosumab (PROLIA) injection 60 mg     PTA Med List   Medication Sig Last Dose    aspirin 81 mg chewable tablet [ASPIRIN 81 MG CHEWABLE TABLET] Chew 81 mg at bedtime. 3/25/2024 at pm    atorvastatin (LIPITOR) 20 MG tablet TAKE 1 TABLET BY MOUTH AT  BEDTIME 3/25/2024 at pm    carbidopa-levodopa (SINEMET)  MG tablet TAKE 1 TABLET BY MOUTH 3  TIMES DAILY TAKE AT LEAST  1/2 HOUR BEFORE MEALS OR 2  HOURS AFTER MEALS DO NOT  MIX WITH FOOD 3/26/2024 at 1300 (2nd dose)    carvedilol (COREG) 12.5 MG tablet Take 1 tablet (12.5 mg) by mouth 2 times daily (with meals) 3/26/2024 at am    cholecalciferol, vitamin D3, 1,000 unit tablet Take 1,000 Units by mouth 2 times daily 3/26/2024 at am    coenzyme Q10 (CO Q-10) 100 mg capsule [COENZYME Q10 (CO Q-10) 100 MG CAPSULE] Take 100 mg by mouth 2 (two) times a day. 3/26/2024 at am    cyanocobalamin (VITAMIN B-12) 1000 MCG tablet Take 1,000 mcg by mouth every evening 3/25/2024 at pm    fish oil-omega-3 fatty acids 1000 MG capsule Take 1 g by mouth 2 times daily 3/26/2024 at am    FLUoxetine (PROZAC) 20 MG capsule Take 1 capsule (20 mg) by mouth 2 times daily 3/26/2024 at 1500 (2nd dose)    furosemide (LASIX) 20 MG tablet  TAKE 1 TABLET BY MOUTH DAILY 3/26/2024 at am    gabapentin (NEURONTIN) 300 MG capsule TAKE 1 CAPSULE BY MOUTH 4  TIMES DAILY 3/26/2024 at 1300 (2nd dose)    lisinopril (ZESTRIL) 20 MG tablet Take 20 mg by mouth every evening 3/25/2024 at pm    loratadine (CLARITIN) 10 mg tablet Take 10 mg by mouth daily as needed for allergies Past Month at prn    magnesium oxide 250 mg Tab Take 250 mg by mouth every morning 3/26/2024 at am    melatonin 1 mg Tab tablet Take 1 mg by mouth At Bedtime 3/25/2024 at pm    multivitamin therapeutic tablet Take 1 tablet by mouth every morning 3/26/2024 at am    omeprazole (PRILOSEC) 20 MG DR capsule Take 1 capsule (20 mg) by mouth 2 times daily 3/26/2024 at am    polyethylene glycol (MIRALAX) 17 gram packet Take 17 g by mouth daily as needed for constipation Past Month at prn

## 2024-03-27 NOTE — ED TRIAGE NOTES
Pt arrived via [facility] for SOB that has lasted the last week, and gotten worse the last couple days. Hx of CHF, triple bypass, on lasix. No edema noted, LS clear, 89% on RA. On 2L 96%. Pt takes Sinomet for Parkinsons. Pt's headache and SOB resolved after O2 placed. EKG showed BBB, borderline 1st degree block.      Triage Assessment (Adult)       Row Name 03/26/24 1621          Triage Assessment    Airway WDL WDL        Respiratory WDL    Respiratory WDL X        Skin Circulation/Temperature WDL    Skin Circulation/Temperature WDL WDL        Cardiac WDL    Cardiac WDL X  NSR with BBB        Peripheral/Neurovascular WDL    Peripheral Neurovascular WDL WDL        Cognitive/Neuro/Behavioral WDL    Cognitive/Neuro/Behavioral WDL WDL

## 2024-03-27 NOTE — CONSULTS
[facility] HEART CARE [address], [place] [address]   [email or web address]   OFFICE: [phone]     CARDIOLOGY INPATIENT CONSULT NOTE     Impression and Plan     Assessment/Plan:  Ms. Janes Montero is a 89 year old female with CAD s/p CABG with LIMA-LAD 2010, borderline aortic stenosis, chronic HFmrEF, HTN, HLD, who presented to the hospital with dyspnea on exertion and productive cough.  She was daignosed with acute on chronic HFmrEF and possible multifocal pneumonia.    Acute on chronic HFmrEF   - Continue lasix 60mg TID.  She has responded well and may be able to de-escalate soon   - Maintain K > 4.0, Mg >  2.0   - TTE     CAD   - Abnormal NM stress in 5/2023 but deemed low risk and optied for meidcal management given lack of anginal symptoms   - Continue medical therapy with statin, aspirin, lisinopril, and carvedilol.     Will continue to follow    Primary Cardiologist: Dr. Ricardo    History of Present Illness      Ms. Janes Montero is a 89 year old female with CAD s/p CABG with LIMA-LAD 2010, borderline aortic stenosis, chronic HFmrEF, HTN, HLD, who presented to the hospital with dyspnea on exertion and productive cough.  She was daignosed with acute on chronic HFmrEF and possible multifocal pneumonia.  The patient notes she had been doing well until a few weeks ago.  She does note stable weights but admits to poor appetite recently.  She lives in a facility, and notes there are sick residents.        Review of Systems:  Further review of systems is otherwise negative/noncontributory (based on review of medical record (admission H&P) and 13 point review of systems reviewed. Pertinent positives noted).    Cardiac Diagnostics     ECG: Personally reviewed and interpreted: 3/26/2024  NSR, LBBB    Most recent:  Echocardiogram (results reviewed): 5/9/2023  Interpretation Summary     Left ventricular function is decreased. The ejection fraction is 45-50%  (mildly  reduced).  There is borderline-mild global hypokinesia of the left ventricle.  Normal right ventricle size and systolic function.  There is moderate (2+) mitral regurgitation.    Cardiac Cath (results reviewed): 8/17/2022  1st Mrg lesion is 50% stenosed.  Prox LAD lesion is 100% stenosed.  Prox RCA to Mid RCA lesion is 25% stenosed.  Atretic nonfunctional JEOVANY graft of uncertain destination  Widely patent graft to mid LAD from left aota    Cardiac Stress Testing (results reviewed): 5/8/2023    A prior study was conducted on 8/16/2022.  Prior images were unavailable for comparison review.    Pharmacologic regadenoson stress ECG is nondiagnostic due to baseline ECG abnormality.    Pharmacological regadenoson nuclear stress test is abnormal.  There is partially reversible change in inferior and basal to mid inferolateral walls indicating inducible myocardial ischemia.    Normal left ventricular size, wall motion and systolic function.  Calculated left ventricular ejection fraction is 65%.    The patient is at an intermediate risk of future cardiac ischemic events.      Medical History  Surgical History Family History Social History   Past Medical History:   Diagnosis Date    Acute diastolic congestive heart failure (H)     Acute kidney injury superimposed on CKD  (H24) 08/18/2022    Acute on chronic congestive heart failure (H) 06/11/2018    Acute pulmonary edema (H) 05/06/2023    Acute respiratory failure with hypoxia (H) 05/08/2023    Chest pain, unspecified type 08/15/2022    Chronic bilateral back pain 06/15/2021    Last Assessment & Plan:   Formatting of this note might be different from the original.  She says she has seen a pain specialist and had back surgery.  She needs to establish care with a pain specialist since she is new to [place] from [place].  Referral placed.    Coronary artery disease     Coronary artery disease involving native coronary artery without angina pectoris, unspecified whether native or  transplanted heart 2022    Diabetes mellitus, type II (H)     Diastolic dysfunction 07/10/2018    Frozen shoulder     bilateral    Heart failure with reduced ejection fraction (H) 2023    Hyperlipidemia     Hypertension     Hypertensive emergency     Parkinson disease     Primary osteoarthritis involving multiple joints     Primary osteoarthritis of left knee     Recurrent UTI     hx septic shock    Thyroid disease      Past Surgical History:   Procedure Laterality Date    APPENDECTOMY      APPENDECTOMY      BACK SURGERY      L4-S1 laminectomy    BYPASS GRAFT ARTERY CORONARY      3 vessel     SECTION       SECTION      CORONARY ARTERY BYPASS      CV CORONARY ANGIOGRAM N/A 2022    Procedure: Coronary Angiogram;  Surgeon: Ignacio Baird MD;  Location: [facility] CATH LAB CV    HERNIORRHAPHY VENTRAL Left     HYSTERECTOMY      HYSTERECTOMY      JOINT REPLACEMENT Left     Total left knee    OTHER SURGICAL HISTORY      right ankle surgery    OTHER SURGICAL HISTORY      right forearm fracture    REPLACEMENT TOTAL KNEE Right     SHOULDER SURGERY Right     reversed shoulder surgery.     Family History   Problem Relation Age of Onset    Heart Disease Mother     Heart Disease Father     Sleep Apnea Daughter            Social History     Socioeconomic History    Marital status:      Spouse name: Not on file    Number of children: Not on file    Years of education: Not on file    Highest education level: Not on file   Occupational History    Not on file   Tobacco Use    Smoking status: Never     Passive exposure: Never    Smokeless tobacco: Never   Vaping Use    Vaping Use: Never used   Substance and Sexual Activity    Alcohol use: No    Drug use: No    Sexual activity: Not Currently     Partners: Male     Birth control/protection: None   Other Topics Concern    Parent/sibling w/ CABG, MI or angioplasty before 65F 55M? No   Social History Narrative    6/15/2021 new to [place] from  [place]. She has 6 kids.     Social Determinants of Health     Financial Resource Strain: Low Risk  (11/3/2023)    Financial Resource Strain     Within the past 12 months, have you or your family members you live with been unable to get utilities (heat, electricity) when it was really needed?: No   Food Insecurity: Low Risk  (11/3/2023)    Food Insecurity     Within the past 12 months, did you worry that your food would run out before you got money to buy more?: No     Within the past 12 months, did the food you bought just not last and you didn t have money to get more?: No   Transportation Needs: Low Risk  (11/3/2023)    Transportation Needs     Within the past 12 months, has lack of transportation kept you from medical appointments, getting your medicines, non-medical meetings or appointments, work, or from getting things that you need?: No   Physical Activity: Not on file   Stress: Not on file   Social Connections: Not on file   Interpersonal Safety: Low Risk  (11/10/2023)    Interpersonal Safety     Do you feel physically and emotionally safe where you currently live?: Yes     Within the past 12 months, have you been hit, slapped, kicked or otherwise physically hurt by someone?: No     Within the past 12 months, have you been humiliated or emotionally abused in other ways by your partner or ex-partner?: No   Housing Stability: Low Risk  (11/3/2023)    Housing Stability     Do you have housing? : Yes     Are you worried about losing your housing?: No             Physical Examination   VITALS: BP (!) 145/67 (BP Location: Left arm)   Pulse 66   Temp 98.1  F (36.7  C) (Oral)   Resp 25   Ht 1.6 m (5' 3\")   Wt 83 kg (183 lb)   LMP  (LMP Unknown)   SpO2 96%   BMI 32.42 kg/m    BMI: Body mass index is 32.42 kg/m .  Wt Readings from Last 3 Encounters:   03/26/24 83 kg (183 lb)   01/18/24 83.7 kg (184 lb 8 oz)   01/09/24 83.8 kg (184 lb 12.8 oz)       Intake/Output Summary (Last 24 hours) at 3/27/2024 1222  Last " "data filed at 3/27/2024 1000  Gross per 24 hour   Intake 60 ml   Output 2750 ml   Net -2690 ml       General: pleasant female. No acute distress.   HENT: external ears normal. Nares patent. Mucous membranes moist.  Neck: + JVD  Lungs: Bibasilar crackles  COR:  regular rate and rhythm, No murmurs, rubs, or gallops  Abd: nondistended, BS present  Extrem: No edema         Non-cardiac Imaging Studies Reviewed        Lab Results Reviewed    Chemistry/lipid CBC Cardiac Enzymes/BNP/TSH/INR   Recent Labs   Lab Test 06/19/23  1538   CHOL 145   HDL 39*   LDL 31   TRIG 376*     Recent Labs   Lab Test 06/19/23  1538 08/18/22  0506 12/14/21  1638   LDL 31 58 32     Recent Labs   Lab Test 03/27/24  1205 03/27/24  0826   NA  --  141   POTASSIUM  --  4.6   CHLORIDE  --  99   CO2  --  29   * 150*   BUN  --  30.2*   CR  --  1.04*   GFRESTIMATED  --  51*   LIZZY  --  10.2*     Recent Labs   Lab Test 03/27/24  0826 03/26/24  2228 03/26/24 1926   CR 1.04* 0.99* 0.99*     Recent Labs   Lab Test 03/26/24  1926 11/10/23  1622 08/09/23  1616   A1C 6.2* 6.7* 6.5*          Recent Labs   Lab Test 03/27/24  0826   WBC 6.9   HGB 10.7*   HCT 35.1   MCV 94        Recent Labs   Lab Test 03/27/24  0826 03/26/24  1926 11/10/23  1707   HGB 10.7* 11.0* 13.4    Recent Labs   Lab Test 05/07/23  0434 05/06/23  2323 05/06/23  1852   TROPONINI 0.04 0.03 0.03     Recent Labs   Lab Test 03/26/24  1926 05/11/23  0437 05/06/23  1852 08/15/22  1920   BNP  --  82 628* 129   NTBNPI 5,747*  --   --   --      Recent Labs   Lab Test 11/10/23  1707   TSH 1.97     No results for input(s): \"INR\" in the last 32247 hours.        Current Inpatient Scheduled Medications   Scheduled Meds:   aspirin  81 mg Oral At Bedtime    atorvastatin  20 mg Oral QPM    azithromycin  500 mg Intravenous Q24H    carbidopa-levodopa  1 tablet Oral TID    carvedilol  12.5 mg Oral BID w/meals    cefTRIAXone  1 g Intravenous Q24H    denosumab  60 mg Subcutaneous Q6 Months    " denosumab  60 mg Subcutaneous Q6 Months    denosumab  60 mg Subcutaneous Q6 Months    enoxaparin ANTICOAGULANT  40 mg Subcutaneous Q24H    FLUoxetine  20 mg Oral BID    furosemide  60 mg Intravenous Q8H    gabapentin  300 mg Oral 4x Daily    insulin aspart  1-3 Units Subcutaneous TID AC    insulin aspart  1-3 Units Subcutaneous At Bedtime    lisinopril  20 mg Oral QPM    magnesium oxide  200 mg Oral QAM    melatonin  3 mg Oral At Bedtime    pantoprazole  40 mg Oral BID AC    sodium chloride (PF)  3 mL Intracatheter Q8H     Continuous Infusions:   - MEDICATION INSTRUCTIONS -         Current Outpatient Medications   Medication Sig Dispense Refill    aspirin 81 mg chewable tablet [ASPIRIN 81 MG CHEWABLE TABLET] Chew 81 mg at bedtime.      atorvastatin (LIPITOR) 20 MG tablet TAKE 1 TABLET BY MOUTH AT  BEDTIME 90 tablet 3    carbidopa-levodopa (SINEMET)  MG tablet TAKE 1 TABLET BY MOUTH 3  TIMES DAILY TAKE AT LEAST  1/2 HOUR BEFORE MEALS OR 2  HOURS AFTER MEALS DO NOT  MIX WITH FOOD 270 tablet 3    carvedilol (COREG) 12.5 MG tablet Take 1 tablet (12.5 mg) by mouth 2 times daily (with meals) 180 tablet 3    cholecalciferol, vitamin D3, 1,000 unit tablet Take 1,000 Units by mouth 2 times daily      coenzyme Q10 (CO Q-10) 100 mg capsule [COENZYME Q10 (CO Q-10) 100 MG CAPSULE] Take 100 mg by mouth 2 (two) times a day.      cyanocobalamin (VITAMIN B-12) 1000 MCG tablet Take 1,000 mcg by mouth every evening      fish oil-omega-3 fatty acids 1000 MG capsule Take 1 g by mouth 2 times daily      FLUoxetine (PROZAC) 20 MG capsule Take 1 capsule (20 mg) by mouth 2 times daily 180 capsule 1    furosemide (LASIX) 20 MG tablet TAKE 1 TABLET BY MOUTH DAILY 90 tablet 3    gabapentin (NEURONTIN) 300 MG capsule TAKE 1 CAPSULE BY MOUTH 4  TIMES DAILY 360 capsule 3    lisinopril (ZESTRIL) 20 MG tablet Take 20 mg by mouth every evening      loratadine (CLARITIN) 10 mg tablet Take 10 mg by mouth daily as needed for allergies       magnesium oxide 250 mg Tab Take 250 mg by mouth every morning      melatonin 1 mg Tab tablet Take 1 mg by mouth At Bedtime      multivitamin therapeutic tablet Take 1 tablet by mouth every morning      omeprazole (PRILOSEC) 20 MG DR capsule Take 1 capsule (20 mg) by mouth 2 times daily 180 capsule 3    polyethylene glycol (MIRALAX) 17 gram packet Take 17 g by mouth daily as needed for constipation      nitroGLYcerin (NITROSTAT) 0.4 MG sublingual tablet Place 1 tablet (0.4 mg) under the tongue every 5 minutes as needed for chest pain 100 tablet 1          Medications Prior to Admission   Prior to Admission medications    Medication Sig Start Date End Date Taking? Authorizing Provider   aspirin 81 mg chewable tablet [ASPIRIN 81 MG CHEWABLE TABLET] Chew 81 mg at bedtime. 6/11/18  Yes Provider, Historical   atorvastatin (LIPITOR) 20 MG tablet TAKE 1 TABLET BY MOUTH AT  BEDTIME 10/26/23  Yes Mathew Ricardo MD   carbidopa-levodopa (SINEMET)  MG tablet TAKE 1 TABLET BY MOUTH 3  TIMES DAILY TAKE AT LEAST  1/2 HOUR BEFORE MEALS OR 2  HOURS AFTER MEALS DO NOT  MIX WITH FOOD 8/31/23  Yes Brennon Moya MD   carvedilol (COREG) 12.5 MG tablet Take 1 tablet (12.5 mg) by mouth 2 times daily (with meals) 9/8/23  Yes Shasha Alcaraz APRN CNP   cholecalciferol, vitamin D3, 1,000 unit tablet Take 1,000 Units by mouth 2 times daily 6/11/18  Yes Provider, Historical   coenzyme Q10 (CO Q-10) 100 mg capsule [COENZYME Q10 (CO Q-10) 100 MG CAPSULE] Take 100 mg by mouth 2 (two) times a day. 6/11/18  Yes Provider, Historical   cyanocobalamin (VITAMIN B-12) 1000 MCG tablet Take 1,000 mcg by mouth every evening   Yes Reported, Patient   fish oil-omega-3 fatty acids 1000 MG capsule Take 1 g by mouth 2 times daily   Yes Unknown, Entered By History   FLUoxetine (PROZAC) 20 MG capsule Take 1 capsule (20 mg) by mouth 2 times daily 11/10/23  Yes Maggie Arriaga MD   furosemide (LASIX) 20 MG tablet TAKE 1 TABLET BY  "MOUTH DAILY 10/30/23  Yes Shasha Alcaraz APRN CNP   gabapentin (NEURONTIN) 300 MG capsule TAKE 1 CAPSULE BY MOUTH 4  TIMES DAILY 8/31/23  Yes Brennon Moya MD   lisinopril (ZESTRIL) 20 MG tablet Take 20 mg by mouth every evening   Yes Reported, Patient   loratadine (CLARITIN) 10 mg tablet Take 10 mg by mouth daily as needed for allergies 6/11/18  Yes Provider, Historical   magnesium oxide 250 mg Tab Take 250 mg by mouth every morning 6/11/18  Yes Provider, Historical   melatonin 1 mg Tab tablet Take 1 mg by mouth At Bedtime 6/11/18  Yes Provider, Historical   multivitamin therapeutic tablet Take 1 tablet by mouth every morning 6/11/18  Yes Provider, Historical   omeprazole (PRILOSEC) 20 MG DR capsule Take 1 capsule (20 mg) by mouth 2 times daily 11/10/23  Yes Maggie Arriaga MD   polyethylene glycol (MIRALAX) 17 gram packet Take 17 g by mouth daily as needed for constipation 6/15/21  Yes Provider, Historical   nitroGLYcerin (NITROSTAT) 0.4 MG sublingual tablet Place 1 tablet (0.4 mg) under the tongue every 5 minutes as needed for chest pain 5/24/23   Maggie Arriaga MD Timothy Shih, DO [facility]  Non-invasive Cardiologist  [facility]      Clinically Significant Risk Factors Present on Admission           # Hypercalcemia: Highest Ca = 10.2 mg/dL in last 2 days, will monitor as appropriate      # Drug Induced Platelet Defect: home medication list includes an antiplatelet medication   # Hypertension: Noted on problem list  # Heart failure, NOS: heart failure noted on the problem list and last echo with EF 40-50%     # Obesity: Estimated body mass index is 32.42 kg/m  as calculated from the following:    Height as of this encounter: 1.6 m (5' 3\").    Weight as of this encounter: 83 kg (183 lb).        # History of CABG: noted on surgical history            "

## 2024-03-27 NOTE — CONSULTS
Care Management Initial Consult    General Information  Assessment completed with: Patient,    Type of CM/SW Visit: Initial Assessment    Primary Care Provider verified and updated as needed: Yes   Readmission within the last 30 days: no previous admission in last 30 days         Advance Care Planning:            Communication Assessment  Patient's communication style: spoken language (English or Bilingual)             Cognitive  Cognitive/Neuro/Behavioral: WDL                      Living Environment:   People in home: alone     Current living Arrangements: apartment      Able to return to prior arrangements: yes       Family/Social Support:  Care provided by: self  Provides care for: no one     Children          Description of Support System: Supportive, Involved         Current Resources:   Patient receiving home care services: No     Community Resources: None  Equipment currently used at home:  Walker  Supplies currently used at home: None    Does the patient's insurance plan have a 3 day qualifying hospital stay waiver?  Yes     Which insurance plan 3 day waiver is available? [employer]    Will the waiver be used for post-acute placement? Undetermined at this time    Lifestyle & Psychosocial Needs:  Social Determinants of Health     Food Insecurity: Low Risk  (11/3/2023)    Food Insecurity     Within the past 12 months, did you worry that your food would run out before you got money to buy more?: No     Within the past 12 months, did the food you bought just not last and you didn t have money to get more?: No   Depression: Not at risk (11/10/2023)    PHQ-2     PHQ-2 Score: 2   Housing Stability: Low Risk  (11/3/2023)    Housing Stability     Do you have housing? : Yes     Are you worried about losing your housing?: No   Tobacco Use: Low Risk  (1/18/2024)    Patient History     Smoking Tobacco Use: Never     Smokeless Tobacco Use: Never     Passive Exposure: Never   Financial Resource Strain: Low Risk  (11/3/2023)     Financial Resource Strain     Within the past 12 months, have you or your family members you live with been unable to get utilities (heat, electricity) when it was really needed?: No   Alcohol Use: Not on file   Transportation Needs: Low Risk  (11/3/2023)    Transportation Needs     Within the past 12 months, has lack of transportation kept you from medical appointments, getting your medicines, non-medical meetings or appointments, work, or from getting things that you need?: No   Physical Activity: Not on file   Interpersonal Safety: Low Risk  (11/10/2023)    Interpersonal Safety     Do you feel physically and emotionally safe where you currently live?: Yes     Within the past 12 months, have you been hit, slapped, kicked or otherwise physically hurt by someone?: No     Within the past 12 months, have you been humiliated or emotionally abused in other ways by your partner or ex-partner?: No   Stress: Not on file   Social Connections: Not on file       Functional Status:  Prior to admission patient needed assistance:   Dependent ADLs:: Ambulation-walker  Dependent IADLs:: Transportation    Additional Information:    Assessment completed with patient. Patient reports she lives alone in independent living apartment at [place]. She is independent with ADLs and most IADLs, no longer driving. She ambulates with walker. She receives occasional meals within her apartment complex. Daughter Tg is primary family contact and family willing to transport at discharge.       Vikki Capone RN

## 2024-03-27 NOTE — ED NOTES
Potassium and Magnesium protocols managed. Both electrolytes are WNL. Labs will be drawn tomorrow.

## 2024-03-28 ENCOUNTER — APPOINTMENT (OUTPATIENT)
Dept: PHYSICAL THERAPY | Facility: HOSPITAL | Age: 89
DRG: 291 | End: 2024-03-28
Attending: INTERNAL MEDICINE
Payer: MEDICARE

## 2024-03-28 ENCOUNTER — APPOINTMENT (OUTPATIENT)
Dept: OCCUPATIONAL THERAPY | Facility: HOSPITAL | Age: 89
DRG: 291 | End: 2024-03-28
Attending: INTERNAL MEDICINE
Payer: MEDICARE

## 2024-03-28 LAB
ANION GAP SERPL CALCULATED.3IONS-SCNC: 11 MMOL/L (ref 7–15)
BUN SERPL-MCNC: 37.5 MG/DL (ref 8–23)
CALCIUM SERPL-MCNC: 9.9 MG/DL (ref 8.2–9.6)
CHLORIDE SERPL-SCNC: 96 MMOL/L (ref 98–107)
CREAT SERPL-MCNC: 1.24 MG/DL (ref 0.51–0.95)
DEPRECATED HCO3 PLAS-SCNC: 31 MMOL/L (ref 22–29)
EGFRCR SERPLBLD CKD-EPI 2021: 41 ML/MIN/1.73M2
GLUCOSE BLDC GLUCOMTR-MCNC: 142 MG/DL (ref 70–99)
GLUCOSE BLDC GLUCOMTR-MCNC: 146 MG/DL (ref 70–99)
GLUCOSE BLDC GLUCOMTR-MCNC: 154 MG/DL (ref 70–99)
GLUCOSE BLDC GLUCOMTR-MCNC: 186 MG/DL (ref 70–99)
GLUCOSE SERPL-MCNC: 137 MG/DL (ref 70–99)
HOLD SPECIMEN: NORMAL
MAGNESIUM SERPL-MCNC: 2.1 MG/DL (ref 1.7–2.3)
POTASSIUM SERPL-SCNC: 4.1 MMOL/L (ref 3.4–5.3)
SODIUM SERPL-SCNC: 138 MMOL/L (ref 135–145)

## 2024-03-28 PROCEDURE — 97116 GAIT TRAINING THERAPY: CPT | Mod: GP | Performed by: PHYSICAL THERAPIST

## 2024-03-28 PROCEDURE — 120N000001 HC R&B MED SURG/OB

## 2024-03-28 PROCEDURE — 99233 SBSQ HOSP IP/OBS HIGH 50: CPT | Performed by: STUDENT IN AN ORGANIZED HEALTH CARE EDUCATION/TRAINING PROGRAM

## 2024-03-28 PROCEDURE — 97530 THERAPEUTIC ACTIVITIES: CPT | Mod: GO

## 2024-03-28 PROCEDURE — 99232 SBSQ HOSP IP/OBS MODERATE 35: CPT | Performed by: STUDENT IN AN ORGANIZED HEALTH CARE EDUCATION/TRAINING PROGRAM

## 2024-03-28 PROCEDURE — 97110 THERAPEUTIC EXERCISES: CPT | Mod: GP | Performed by: PHYSICAL THERAPIST

## 2024-03-28 PROCEDURE — 36415 COLL VENOUS BLD VENIPUNCTURE: CPT | Performed by: STUDENT IN AN ORGANIZED HEALTH CARE EDUCATION/TRAINING PROGRAM

## 2024-03-28 PROCEDURE — 250N000011 HC RX IP 250 OP 636: Performed by: STUDENT IN AN ORGANIZED HEALTH CARE EDUCATION/TRAINING PROGRAM

## 2024-03-28 PROCEDURE — 97535 SELF CARE MNGMENT TRAINING: CPT | Mod: GO

## 2024-03-28 PROCEDURE — 97162 PT EVAL MOD COMPLEX 30 MIN: CPT | Mod: GP | Performed by: PHYSICAL THERAPIST

## 2024-03-28 PROCEDURE — 250N000013 HC RX MED GY IP 250 OP 250 PS 637: Performed by: INTERNAL MEDICINE

## 2024-03-28 PROCEDURE — 83735 ASSAY OF MAGNESIUM: CPT | Performed by: STUDENT IN AN ORGANIZED HEALTH CARE EDUCATION/TRAINING PROGRAM

## 2024-03-28 PROCEDURE — 250N000013 HC RX MED GY IP 250 OP 250 PS 637: Performed by: STUDENT IN AN ORGANIZED HEALTH CARE EDUCATION/TRAINING PROGRAM

## 2024-03-28 PROCEDURE — 258N000003 HC RX IP 258 OP 636: Performed by: INTERNAL MEDICINE

## 2024-03-28 PROCEDURE — 97166 OT EVAL MOD COMPLEX 45 MIN: CPT | Mod: GO

## 2024-03-28 PROCEDURE — 80048 BASIC METABOLIC PNL TOTAL CA: CPT | Performed by: STUDENT IN AN ORGANIZED HEALTH CARE EDUCATION/TRAINING PROGRAM

## 2024-03-28 PROCEDURE — 250N000011 HC RX IP 250 OP 636: Performed by: INTERNAL MEDICINE

## 2024-03-28 RX ORDER — FUROSEMIDE 10 MG/ML
40 INJECTION INTRAMUSCULAR; INTRAVENOUS
Status: DISCONTINUED | OUTPATIENT
Start: 2024-03-28 | End: 2024-03-29

## 2024-03-28 RX ADMIN — CARBIDOPA AND LEVODOPA 1 TABLET: 25; 100 TABLET ORAL at 14:16

## 2024-03-28 RX ADMIN — PANTOPRAZOLE SODIUM 40 MG: 20 TABLET, DELAYED RELEASE ORAL at 16:05

## 2024-03-28 RX ADMIN — GABAPENTIN 300 MG: 300 CAPSULE ORAL at 20:39

## 2024-03-28 RX ADMIN — Medication 3 MG: at 21:10

## 2024-03-28 RX ADMIN — FLUOXETINE HYDROCHLORIDE 20 MG: 20 CAPSULE ORAL at 16:05

## 2024-03-28 RX ADMIN — POLYPROPYLENE GLYCOL 400, PROPYLENE GLYCOL 1 DROP: .4; .3 LIQUID OPHTHALMIC at 12:21

## 2024-03-28 RX ADMIN — INSULIN ASPART 1 UNITS: 100 INJECTION, SOLUTION INTRAVENOUS; SUBCUTANEOUS at 12:18

## 2024-03-28 RX ADMIN — INSULIN ASPART 1 UNITS: 100 INJECTION, SOLUTION INTRAVENOUS; SUBCUTANEOUS at 08:32

## 2024-03-28 RX ADMIN — CARVEDILOL 12.5 MG: 12.5 TABLET, FILM COATED ORAL at 08:29

## 2024-03-28 RX ADMIN — FLUOXETINE HYDROCHLORIDE 20 MG: 20 CAPSULE ORAL at 08:28

## 2024-03-28 RX ADMIN — PANTOPRAZOLE SODIUM 40 MG: 20 TABLET, DELAYED RELEASE ORAL at 06:29

## 2024-03-28 RX ADMIN — POLYPROPYLENE GLYCOL 400, PROPYLENE GLYCOL 1 DROP: .4; .3 LIQUID OPHTHALMIC at 10:50

## 2024-03-28 RX ADMIN — GABAPENTIN 300 MG: 300 CAPSULE ORAL at 16:05

## 2024-03-28 RX ADMIN — GABAPENTIN 300 MG: 300 CAPSULE ORAL at 08:29

## 2024-03-28 RX ADMIN — MAGNESIUM OXIDE TAB 400 MG (241.3 MG ELEMENTAL MG) 200 MG: 400 (241.3 MG) TAB at 09:44

## 2024-03-28 RX ADMIN — CARBIDOPA AND LEVODOPA 1 TABLET: 25; 100 TABLET ORAL at 08:29

## 2024-03-28 RX ADMIN — CEFTRIAXONE SODIUM 1 G: 1 INJECTION, POWDER, FOR SOLUTION INTRAMUSCULAR; INTRAVENOUS at 20:48

## 2024-03-28 RX ADMIN — LISINOPRIL 20 MG: 20 TABLET ORAL at 20:39

## 2024-03-28 RX ADMIN — CARBIDOPA AND LEVODOPA 1 TABLET: 25; 100 TABLET ORAL at 20:39

## 2024-03-28 RX ADMIN — AZITHROMYCIN MONOHYDRATE 500 MG: 500 INJECTION, POWDER, LYOPHILIZED, FOR SOLUTION INTRAVENOUS at 20:49

## 2024-03-28 RX ADMIN — FUROSEMIDE 40 MG: 10 INJECTION, SOLUTION INTRAMUSCULAR; INTRAVENOUS at 16:20

## 2024-03-28 RX ADMIN — ASPIRIN 81 MG CHEWABLE TABLET 81 MG: 81 TABLET CHEWABLE at 21:10

## 2024-03-28 RX ADMIN — FUROSEMIDE 60 MG: 10 INJECTION, SOLUTION INTRAMUSCULAR; INTRAVENOUS at 04:46

## 2024-03-28 RX ADMIN — ATORVASTATIN CALCIUM 20 MG: 10 TABLET, FILM COATED ORAL at 20:39

## 2024-03-28 RX ADMIN — CARVEDILOL 12.5 MG: 12.5 TABLET, FILM COATED ORAL at 18:39

## 2024-03-28 RX ADMIN — ENOXAPARIN SODIUM 40 MG: 40 INJECTION SUBCUTANEOUS at 21:10

## 2024-03-28 RX ADMIN — INSULIN ASPART 1 UNITS: 100 INJECTION, SOLUTION INTRAVENOUS; SUBCUTANEOUS at 18:34

## 2024-03-28 RX ADMIN — GABAPENTIN 300 MG: 300 CAPSULE ORAL at 12:21

## 2024-03-28 ASSESSMENT — ACTIVITIES OF DAILY LIVING (ADL)
ADLS_ACUITY_SCORE: 37
ADLS_ACUITY_SCORE: 38
ADLS_ACUITY_SCORE: 37
ADLS_ACUITY_SCORE: 38
ADLS_ACUITY_SCORE: 37

## 2024-03-28 NOTE — PROGRESS NOTES
[facility] via Christo Azevedo RN was introduced to the patient today including our role in the home management of their heart failure. Heart Failure Education Materials were shared today with the patient. This includes a daily weight log, low sodium diet recommendations, clinic information, and important guidelines of care.     Expectations discussed for heart failure management included but were not limited to; daily weight tracking,  B/P monitoring, a low sodium diet of 2000mg or less daily, Daily Fluid restriction of 50-60oz per 24 hours, exercise as tolerated and advised, the monitoring of heart failure symptoms and how to report them to the [facility] clinic or emergency resources if applicable.     Follow up expectations were established, including the continued guidance and assessments as needed from [facility] providers and nurses.     Christo Azevedo RN [facility]       [facility]   [phone] [facility]   [facility]   [address] [place] [address]

## 2024-03-28 NOTE — PROGRESS NOTES
"   03/28/24 1310   Appointment Info   Signing Clinician's Name / Credentials (PT) Mindi Hollis, PT, DPT   Living Environment   People in Home alone   Current Living Arrangements independent living facility   Home Accessibility no concerns   Transportation Anticipated family or friend will provide   Self-Care   Equipment Currently Used at Home walker, rolling;grab bar, tub/shower;grab bar, toilet;tub bench   Fall history within last six months yes   Number of times patient has fallen within last six months 1   Activity/Exercise/Self-Care Comment Uses 4WW only for ambulating to dining room.   General Information   Onset of Illness/Injury or Date of Surgery 03/26/24   Referring Physician Cindi Olivares MD   Patient/Family Therapy Goals Statement (PT) None stated.   Pertinent History of Current Problem (include personal factors and/or comorbidities that impact the POC) Per H&P: \"89 year old female with PMH of diastolic CHF, CAD, s/p CABG 2010, DM 2, HLD, presented to ED for evaluation of 1 week of worsening dyspnea.  Patient denies associated fever, chills, productive cough, chest pain, cardiac palpitations.  She checks her weight daily, and denies weight fluctuating for more than 1-2 pounds from week to week.  She denies abdominal pain, nausea, vomiting, dysuria.\"   Existing Precautions/Restrictions fall   Range of Motion (ROM)   Range of Motion ROM is WFL   Strength (Manual Muscle Testing)   Strength (Manual Muscle Testing) Deficits observed during functional mobility   Bed Mobility   Comment, (Bed Mobility) Pt seated in chair at start of session. Based on functional mobility observed during the session, pt likely to need some help for bed mobility.   Transfers   Transfers sit-stand transfer   Sit-Stand Transfer   Sit-Stand [place] (Transfers) supervision   Assistive Device (Sit-Stand Transfers) walker, front-wheeled   Gait/Stairs (Locomotion)   [place] Level (Gait) supervision   Assistive Device " (Gait) walker, front-wheeled   Distance in Feet (Gait) 40'   Pattern (Gait) step-to   Deviations/Abnormal Patterns (Gait) mima decreased;gait speed decreased   Clinical Impression   Criteria for Skilled Therapeutic Intervention Yes, treatment indicated   PT Diagnosis (PT) impaired functional mobility   Influenced by the following impairments decreased strength, impaired balance   Functional limitations due to impairments transfers, ambulation   Clinical Presentation (PT Evaluation Complexity) evolving   Clinical Presentation Rationale Clinical judgment.   Clinical Decision Making (Complexity) moderate complexity   Planned Therapy Interventions (PT) balance training;bed mobility training;gait training;home exercise program;neuromuscular re-education;patient/family education;strengthening;transfer training   Risk & Benefits of therapy have been explained evaluation/treatment results reviewed;participants voiced agreement with care plan;participants included;patient   PT Total Evaluation Time   PT Eval, Moderate Complexity Minutes (98118) 10   Physical Therapy Goals   PT Frequency One time eval and treatment only   PT Predicted Duration/Target Date for Goal Attainment 03/28/24   PT Goals Bed Mobility;Transfers;Gait;PT Goal 1   PT: Transfers Supervision/stand-by assist;Sit to/from stand;Assistive device   PT: Gait Supervision/stand-by assist;Assistive device;Rolling walker   PT: Goal 1 Pt will demonstrate independence with HEP using handout.   Interventions   Interventions Quick Adds Gait Training;Therapeutic Procedure   Therapeutic Procedure/Exercise   Ther. Procedure: strength, endurance, ROM, flexibillity Minutes (29337) 8   Symptoms Noted During/After Treatment none   Treatment Detail/Skilled Intervention Patient instructed in seated and standing HEP. Patient reports chronic pain in bilateral knees,back, and R shoulder. Discussed completing exercises within pain tolerance.   Gait Training   Gait Training Minutes  (60832) 10   Symptoms Noted During/After Treatment (Gait Training) fatigue   Treatment Detail/Skilled Intervention With FWW. Steady, no LOB.   Distance in Feet additional 110'   [place] Level (Gait Training) stand-by assist   Physical Assistance Level (Gait Training) verbal cues;1 person assist   Assistive Device (Gait Training) rolling walker   Gait Analysis Deviations decreased mima;decreased step length   Impairments (Gait Analysis/Training) balance impaired;strength decreased   PT Discharge Planning   PT Plan d/c PT   PT Discharge Recommendation (DC Rec) home with assist   PT Rationale for DC Rec Patient appears close to baseline with functional mobility. Further endurance to be addressed by cardiac rehab. No further inpatient PT needs.   PT Brief overview of current status Sit <> stand, SBA. Ambulates 110' with FWW, SBA.   Total Session Time   Timed Code Treatment Minutes 18   Total Session Time (sum of timed and untimed services) 28

## 2024-03-28 NOTE — PLAN OF CARE
"  Problem: Adult Inpatient Plan of Care  Goal: Plan of Care Review  Description: The Plan of Care Review/Shift note should be completed every shift.  The Outcome Evaluation is a brief statement about your assessment that the patient is improving, declining, or no change.  This information will be displayed automatically on your shift  note.  Outcome: Progressing  Goal: Patient-Specific Goal (Individualized)  Description: You can add care plan individualizations to a care plan. Examples of Individualization might be:  \"Parent requests to be called daily at 9am for status\", \"I have a hard time hearing out of my right ear\", or \"Do not touch me to wake me up as it startles  me\".  Outcome: Progressing  Goal: Absence of Hospital-Acquired Illness or Injury  Outcome: Progressing  Intervention: Identify and Manage Fall Risk  Recent Flowsheet Documentation  Taken 3/28/2024 0100 by Fadia Parks, RN  Safety Promotion/Fall Prevention: activity supervised  Goal: Optimal Comfort and Wellbeing  Outcome: Progressing  Goal: Readiness for Transition of Care  Outcome: Progressing   Goal Outcome Evaluation:      Pt denies pain and nausea. Slept well. Lung sounds coarse on right. Oxygen saturation 94% on 2L. Medium incontinence plus 800ml urine output after lasix.                  "

## 2024-03-28 NOTE — PROGRESS NOTES
[facility]    Medicine Progress Note - Hospitalist Service    Date of Admission:  3/26/2024    Assessment & Plan   Janes Montero is a 89 year old female with PMH of diastolic CHF, CAD, s/p CABG 2010, DM 2, HLD, admitted on 3/26/2024 with:    #Acute exacerbation of chronic HFpEF  #Acute hypoxemic respiratory failure  Presented with 1-2 weeks of worsening shortness of breath. Initially requiring 2L O2 without a baseline. Patient checks weight daily and denies any weight gain. States compliance with restricted salt diet and medications. ProBNP elevated 5700. CT chest showed diffuse pulmonary edema with trace bilateral pleural effusions, multiple new nodular opacities in right lung suggestive of superimposed infection/inflammatory process.  - Cardiology conulted  - Diuresis with IV Lasix, decrease to 40mg BID for now (home dose 20mg daily)  - Daily labs, weight, closely monitor vitals while diuresing  - TTE 3/27/24 with EF 55-60%, biatrial enlargement    #CAD, s/p CABG  Nuc med stress test in May 2023 showed inferior and basal wall inducible ischemia. Cardiology and patient ultimately decided against coronary angiogram given lack of anginal symptoms and impaired renal function.  - Continue home ASA 81mg daily and atorvastatin    #Community-acquired pneumonia  Multiple nodular opacities in the right lung. RVP negative. Urine strep pneumo and legionella antigen negative. Normal procalcitonin. Suspect less of a contribution of pneumonia to her presenting symptoms relative to CHF, but given CT findings will treat.  - Empiric ceftriaxone/azithromycin  - Repeat CT in 3 months to evaluate for resolution of nodules.    #Moderate to severe mitral insufficiency  #Mild to moderate mitral stenosis  - Noted on TTE 3/27/24    #Parkinsons disease  - Continue home Sinemet    #HTN  - Continue home Coreg and lisinopril    #GERD  - Continue home PPI  Suspect GERMAN.  Seen by sleep medicine on 1/9/2024, recommended  "polysomnography.  This has not been completed yet.    #Moderate tracheobronchomalacia, per recent pulmonology visit on 12/12/2023  PFT showed normal spirometry, lung volumes and diffusion capacity.    #Pre-DM  A1C 6.2    Diet: Combination Diet Low Saturated Fat Na <2400mg Diet    DVT Prophylaxis: Enoxaparin (Lovenox) SQ  Holguin Catheter: Not present  Lines: None     Cardiac Monitoring: ACTIVE order. Indication: QTc prolonging medication (48 hours)  Code Status: No CPR- Do NOT Intubate verified with patient and Daughter at the bedside.          Diet: Combination Diet Low Saturated Fat Na <2400mg Diet    DVT Prophylaxis: Enoxaparin (Lovenox) SQ  Holguin Catheter: Not present  Lines: None     Cardiac Monitoring: ACTIVE order. Indication: QTc prolonging medication (48 hours)  Code Status: No CPR- Do NOT Intubate      Clinically Significant Risk Factors           # Hypercalcemia: Highest Ca = 10.2 mg/dL in last 2 days, will monitor as appropriate        # Hypertension: Noted on problem list    # Acute heart failure with preserved ejection fraction: heart failure noted on problem list, last echo with EF >50%, and receiving IV diuretics       # Obesity: Estimated body mass index is 31.51 kg/m  as calculated from the following:    Height as of this encounter: 1.6 m (5' 3\").    Weight as of this encounter: 80.7 kg (177 lb 14.4 oz)., PRESENT ON ADMISSION      # History of CABG: noted on surgical history       Disposition Plan      Expected Discharge Date: 03/29/2024    Discharge Delays: IV Medication - consider oral or Home Infusion                JEZ STUART MD  Hospitalist Service  [facility]  Securely message with [employer] (more info)  Text page via [facility] Paging/Directory   ______________________________________________________________________    Interval History   Breathing already feeling better. Says she started feeling more short of breath a week or two ago. Doesn't feel it's particularly " positional, but worse with exertion. No fevers or chills. Does have some cough.    Physical Exam   Vital Signs: Temp: 97.9  F (36.6  C) Temp src: Oral BP: 129/88 Pulse: 65   Resp: 18 SpO2: 93 % O2 Device: Nasal cannula Oxygen Delivery: 1 LPM  Weight: 177 lbs 14.4 oz    General: Alert and oriented x 3. No distress, conversational.  Chest: Good air movement bilaterally, bases diminished, no rhonchi or wheezes.  Heart: S1S2 regular.  2/6 systolic murmur.  Abdomen: Soft. NT, ND.  Extremities: Trace edema of LLE, 1+ edema of RLE; patient states this is typical for her  Neuro: Cranial nerves 1-12 grossly normal. No focal neurological deficit    Medical Decision Making       50 MINUTES SPENT BY ME on the date of service doing chart review, history, exam, documentation & further activities per the note.      Data     I have personally reviewed the following data over the past 24 hrs:    N/A  \   N/A   / N/A     138 96 (L) 37.5 (H) /  146 (H)   4.1 31 (H) 1.24 (H) \       Imaging results reviewed over the past 24 hrs:   No results found for this or any previous visit (from the past 24 hour(s)).

## 2024-03-28 NOTE — PLAN OF CARE
Problem: Pneumonia  Goal: Effective Oxygenation and Ventilation  3/28/2024 1433 by Merari Clayton, RN  Outcome: Progressing  Problem: Pneumonia  Goal: Resolution of Infection Signs and Symptoms  3/28/2024 1433 by Merari Clayton, RN  Outcome: Progressing  Patient reports work of breathing improved from previous day.   On 2L start of shift, SpO2 currently at 95% on 1L via nasal cannula.   IV abx as ordered.     Problem: Heart Failure  Goal: Fluid and Electrolyte Balance  3/28/2024 1433 by Merari Clayton, RN  Outcome: Progressing  Strict I & O, daily weights.   Cardiac monitoring in place.  Cardiology following.    Walked in torres with staff assist utilizing walker - tolerated well.  PT/OT following.    Merari Clayton RN

## 2024-03-28 NOTE — PROGRESS NOTES
Pt arrived to unit by wheelchair at 1930. Daughter was with patient. Patient is AO x4, very cooperative. Patient is on 2L oxygen. Lung sounds diminished. Azithromycin and Rocephin infusions complete. Purewick in use, helped to bathroom before bed. Assist of 1 and use of walker. Minimal SOB. Small BM, good urine output. [place], uses hearing aids, they are charging by computer in room.

## 2024-03-28 NOTE — PLAN OF CARE
Goal Outcome Evaluation:      Pt denies pain, Tele- sinus dysrhythmia w/ BBB. Pure wick in place patent w/ good urine output. On 2L NC. BG ac 125. Pt A&O x4 pleasant and can make her needs known. Family member at the bedside.      Problem: Adult Inpatient Plan of Care  Goal: Optimal Comfort and Wellbeing  Outcome: Progressing     Problem: Adult Inpatient Plan of Care  Goal: Readiness for Transition of Care  Outcome: Progressing     Problem: Adult Inpatient Plan of Care  Goal: Plan of Care Review  Description: The Plan of Care Review/Shift note should be completed every shift.  The Outcome Evaluation is a brief statement about your assessment that the patient is improving, declining, or no change.  This information will be displayed automatically on your shift  note.  Outcome: Progressing  Flowsheets (Taken 3/27/2024 1902)  Plan of Care Reviewed With:   patient   family       Plan of Care Reviewed With: patient, family

## 2024-03-28 NOTE — PLAN OF CARE
Physical Therapy Discharge Summary    Reason for therapy discharge:    All goals and outcomes met, no further needs identified.    Progress towards therapy goal(s). See goals on Care Plan in Epic electronic health record for goal details.  Goals met    Therapy recommendation(s):    No further inpatient PT needs. Patient to continue to address mobility goals with cardiac rehab.    Mindi Hollis, PT  3/28/2024

## 2024-03-28 NOTE — CONSULTS
NUTRITION EDUCATION      REASON FOR ASSESSMENT:  Consult: Heart Failure - Dietitian to instruct patient on 2 gram sodium diet     NUTRITION HISTORY:  Information obtained from pt    Familiar with a low sodium diet and tries her best to follow at home. Has her lunch meal provided at her living facility, does not think they add a lot of seasoning and avoids higher sodium items such as gravy. Prepares own  breakfast and dinner. Has CIB, donuts, waffles, or rice cakes for breakfast. For dinner has frozen meals such as Healthy Choice, or makes her own soup or chili and freezes leftovers. Uses low sodium broth, ingredients. Reads food labels. Does not use the salt shaker, uses Mrs. Dash or a salt free seasoning from  [employer].    CURRENT DIET:  Low saturated fat Na < 2400 mg    NUTRITION DIAGNOSIS:  Food- and nutrition-related knowledge deficit R/t heart failure as evidenced by need for a low sodium diet.     INTERVENTIONS:    Nutrition Prescription:  Low saturated fat Na < 2400 mg    Implementation:      *  Nutrition Education (Content):   A)  Provided handout low sodium nutrition therapy, salt free seasoning, label reading   B)  Discussed foods allowed and foods to avoid      *  Nutrition Education (Application):   A)  Discussed current eating habits and recommended alternative food choices      *  Anticipate good compliance      *  Diet Education - refer to Education Flowsheet    Goals:      *  Patient will verbalize understanding of diet       *  All of the above goals met during the education session    Follow Up/Monitoring:         *  Recommended Out-Patient Nutrition Referral, if further diet instructions are needed

## 2024-03-28 NOTE — PROGRESS NOTES
SPIRITUAL HEALTH SERVICES Note     Saw pt Janes Montero and administered Taoist sacrament of anointing for the healing of the sick.    Fr. Kt Jesus

## 2024-03-28 NOTE — PROGRESS NOTES
"Care Management Follow Up    Length of Stay (days): 2    Expected Discharge Date: 03/29/2024     Concerns to be Addressed:   medical progression    Patient plan of care discussed at interdisciplinary rounds: Yes    Anticipated Discharge Disposition:  home     Anticipated Discharge Services:    Anticipated Discharge DME:      Patient/family educated on Medicare website which has current facility and service quality ratings:    Education Provided on the Discharge Plan:    Patient/Family in Agreement with the Plan:      Referrals Placed by CM/SW:    Private pay costs discussed: Not applicable    Additional Information:  Social history per previous CM note:   \"she lives alone in independent living apartment at [place]. She is independent with ADLs and most IADLs, no longer driving. She ambulates with walker. She receives occasional meals within her apartment complex. Daughter Tg is primary family contact and family willing to transport at discharge.\"     Therapy recommendation is home with home care. CM went to pt room to discuss this and pt declines the need for home care. She told CM she exercises daily.    Pt not medically ready to discharge. Pt remains on IV ABX.    RNCM to follow for medical progression, recommendations, and final discharge plan.      Andie Franco RN      "

## 2024-03-28 NOTE — PROGRESS NOTES
03/28/24 0845   Appointment Info   Signing Clinician's Name / Credentials (OT) Mary Reyes,OTR/L   Living Environment   People in Home alone   Current Living Arrangements independent living facility   Home Accessibility no concerns   Transportation Anticipated family or friend will provide   Self-Care   Current Activity Tolerance moderate   Equipment Currently Used at Home walker, rolling;grab bar, tub/shower;grab bar, toilet;tub bench  (uses 4WW, has FWW as well, walk in tub)   Fall history within last six months yes   Number of times patient has fallen within last six months 1   Instrumental Activities of Daily Living (IADL)   IADL Comments IND drsg, bathing, A w/ laundry, cleaning, walks to 1 meal per day in dining room,   General Information   Onset of Illness/Injury or Date of Surgery 03/26/24   Patient/Family Therapy Goal Statement (OT) home   Additional Occupational Profile Info/Pertinent History of Current Problem Janes Montero is a 89 year old female with PMH of diastolic CHF, CAD, s/p CABG 2010, DM 2, Parkinson's,HLD, admitted on 3/26/2024 with:CHF exacerbation, pneumonia   Existing Precautions/Restrictions fall;oxygen therapy device and L/min   Cognitive Status Examination   Orientation Status   (alert, answered all Q's, follows conversation well,)   Behavioral Issues   (cooperative)   Affect/Mental Status (Cognitive) WFL   Follows Commands WFL   Visual Perception   Visual Impairment/Limitations corrective lenses full-time   Range of Motion Comprehensive   General Range of Motion no range of motion deficits identified   Strength Comprehensive (MMT)   General Manual Muscle Testing (MMT) Assessment   (WFL for ADLs)   Coordination   Upper Extremity Coordination No deficits were identified   Bed Mobility   Bed Mobility supine-sit   Supine-Sit [place] (Bed Mobility) supervision   Transfers   Transfers toilet transfer;sit-stand transfer;bed-chair transfer   Transfer Skill: Bed to Chair/Chair to Bed    Bed-Chair [place] (Transfers) contact guard   Sit-Stand Transfer   Sit-Stand [place] (Transfers) minimum assist (75% patient effort)   Toilet Transfer   Type (Toilet Transfer) sit-stand;stand-sit   [place] Level (Toilet Transfer) contact guard   Balance   Balance Assessment standing dynamic balance   Balance Comments WFL   Activities of Daily Living   BADL Assessment/Intervention grooming;toileting   Grooming Assessment/Training   [place] Level (Grooming) supervision   Toileting   [place] Level (Toileting) supervision   Clinical Impression   Criteria for Skilled Therapeutic Interventions Met (OT) Yes, treatment indicated   OT Diagnosis decreased ADLs   OT Problem List-Impairments impacting ADL activity tolerance impaired;balance;mobility;strength   Assessment of Occupational Performance 3-5 Performance Deficits   Identified Performance Deficits g/h, trsfs, activity tolerance for ADLs   Planned Therapy Interventions (OT) ADL retraining;transfer training;home program guidelines;progressive activity/exercise   Clinical Decision Making Complexity (OT) detailed assessment/moderate complexity   Risk & Benefits of therapy have been explained evaluation/treatment results reviewed;care plan/treatment goals reviewed;patient;participants voiced agreement with care plan   OT Total Evaluation Time   OT Eval, Moderate Complexity Minutes (65776) 8   OT Goals   Therapy Frequency (OT) Daily   OT Predicted Duration/Target Date for Goal Attainment 04/04/24   OT Goals Cardiac Phase 1;Transfers   OT: Transfer Supervision/stand-by assist   OT: Understanding of cardiac education to maximize quality of life, condition management, and health outcomes Patient   OT: Perform aerobic activity with stable cardiovascular response intermittent;10 minutes   OT: Functional/aerobic ambulation tolerance with stable cardiovascular response in order to return to home and community environment Supervision/SBA;Rolling  walker  (150 feet)   Interventions   Interventions Quick Adds Therapeutic Activity;Self-Care/Home Management;Cardiac Rehab   Self-Care/Home Management   Self-Care/Home Mgmt/ADL, Compensatory, Meal Prep Minutes (54286) 8   Symptoms Noted During/After Treatment (Meal Preparation/Planning Training) shortness of breath   Treatment Detail/Skilled Intervention pt demo supine>sit w/ SBA, STS from bed w/ min A after several attempts and height of bed elevated, cues and supervision for technique and safety, trsf bed>toilet>chair w/ CGA, preferred to amb. in rm without walker as does at home in apt, call light in reach, seat alarm on   [place] Level (Grooming Training) stand-by assist  (SOB upon returning from BR, o2 sats 87% on 1L o2, PLB education, to 90% within a few breaths,)   Assistance (Grooming Training) set-up required;supervision;verbal cues   [place] Level (Toilet Training) stand-by assist   Assistance (Toilet Training) set-up required;supervision;verbal cues;1 person assist   Therapeutic Activities   Therapeutic Activity Minutes (42301) 20   Treatment Detail/Skilled Intervention prior to ambulation in torres  bp 145/66,  HR 64, o2 sats 87%, PLB education, CHF booklet issued and reviewed   Ambulation   Workload 75 ft   Symptoms Dyspnea   Exercise Details amb. in torres 75 ft w/ FWW w/ CGA   Vital Signs Details o2 sats 86% on 1L NC, bp 143/60, HR 66   OT Discharge Planning   OT Plan CHF ed, walk,   OT Discharge Recommendation (DC Rec) home with home care occupational therapy;home with assist   OT Rationale for DC Rec Ax1 for ADLs and functional mobility, anticipate d/c home w/ continued services in place pending progress and o2 needs, may benefit from home OT assessment   OT Brief overview of current status amb. 75 ft   Total Session Time   Timed Code Treatment Minutes 28   Total Session Time (sum of timed and untimed services) 36

## 2024-03-28 NOTE — PROGRESS NOTES
[facility] HEART CARE   [address], [place] [address]   [email or web address]   OFFICE: [phone]     CARDIOLOGY INPATIENT PROGRESS NOTE     Impression and Plan     Assessment/Plan:  Ms. Janes Montero is a 89 year old female with CAD s/p CABG with LIMA-LAD 2010, borderline aortic stenosis, chronic HFmrEF, HTN, HLD, who presented to the hospital with dyspnea on exertion and productive cough.  She was daignosed with acute on chronic HFmrEF and possible multifocal pneumonia.     Acute on chronic HFmrEF   - Continue lasix, decrease to 40mg BID.     - Maintain K > 4.0, Mg >  2.0    Mod-severe MR   - Likely degenerative/calcific mitral valve disease, possible contribution related to fluid overload.   - Unclear that she would be a good candidate for mitraclip given significant MAC and calcific MV disease.  This could be investigated as an outpt with a DORIS.     CAD   - Abnormal NM stress in 5/2023 but deemed low risk and optied for meidcal management given lack of anginal symptoms   - Continue medical therapy with statin, aspirin, lisinopril, and carvedilol.      Will continue to follow     Primary Cardiologist: Dr. Ricardo    Subjective     Janes is feeling better.  Breathing is improved    Cardiac Diagnostics     ECG: Personally reviewed and interpreted: 3/26/2024  NSR, LBBB    Telemetry (personally reviewed): NSR    Most recent:  Echocardiogram (results reviewed): 3/27/2024  Interpretation Summary     The visual ejection fraction is 55-60%.  The right ventricle is normal in size and function.  Biatrial enlargement.  There is mod-severe to severe (3-4+) mitral regurgitation.  There is mild to moderate mitral stenosis.  Mild valvular aortic stenosis.  There is moderate (2+) tricuspid regurgitation.  The right ventricular systolic pressure is approximated at 56 mmHg.  Compared to the prior study dated 5/9/2023, there are changes as noted. There  is now moderate-severe mitral  regurgitation and moderate tricuspid  regurgitation with moderate pulmonary hypertension.    Cardiac Cath (results reviewed): 8/17/2022  1st Mrg lesion is 50% stenosed.  Prox LAD lesion is 100% stenosed.  Prox RCA to Mid RCA lesion is 25% stenosed.  Atretic nonfunctional JEOVANY graft of uncertain destination  Widely patent graft to mid LAD from left aota     Cardiac Stress Testing (results reviewed): 5/8/2023    A prior study was conducted on 8/16/2022.  Prior images were unavailable for comparison review.    Pharmacologic regadenoson stress ECG is nondiagnostic due to baseline ECG abnormality.    Pharmacological regadenoson nuclear stress test is abnormal.  There is partially reversible change in inferior and basal to mid inferolateral walls indicating inducible myocardial ischemia.    Normal left ventricular size, wall motion and systolic function.  Calculated left ventricular ejection fraction is 65%.    The patient is at an intermediate risk of future cardiac ischemic events.        Medical History  Surgical History Family History Social History   Past Medical History:   Diagnosis Date    Acute diastolic congestive heart failure (H)     Acute kidney injury superimposed on CKD  (H24) 08/18/2022    Acute on chronic congestive heart failure (H) 06/11/2018    Acute pulmonary edema (H) 05/06/2023    Acute respiratory failure with hypoxia (H) 05/08/2023    Chest pain, unspecified type 08/15/2022    Chronic bilateral back pain 06/15/2021    Last Assessment & Plan:   Formatting of this note might be different from the original.  She says she has seen a pain specialist and had back surgery.  She needs to establish care with a pain specialist since she is new to [place] from [place].  Referral placed.    Coronary artery disease     Coronary artery disease involving native coronary artery without angina pectoris, unspecified whether native or transplanted heart 08/17/2022    Diabetes mellitus, type II (H)     Diastolic  dysfunction 07/10/2018    Frozen shoulder     bilateral    Heart failure with reduced ejection fraction (H) 2023    Hyperlipidemia     Hypertension     Hypertensive emergency     Parkinson disease     Primary osteoarthritis involving multiple joints     Primary osteoarthritis of left knee     Recurrent UTI     hx septic shock    Thyroid disease      Past Surgical History:   Procedure Laterality Date    APPENDECTOMY      APPENDECTOMY      BACK SURGERY      L4-S1 laminectomy    BYPASS GRAFT ARTERY CORONARY      3 vessel     SECTION       SECTION      CORONARY ARTERY BYPASS      CV CORONARY ANGIOGRAM N/A 2022    Procedure: Coronary Angiogram;  Surgeon: Ignacio Baird MD;  Location: [facility] CATH LAB CV    HERNIORRHAPHY VENTRAL Left     HYSTERECTOMY      HYSTERECTOMY      JOINT REPLACEMENT Left     Total left knee    OTHER SURGICAL HISTORY      right ankle surgery    OTHER SURGICAL HISTORY      right forearm fracture    REPLACEMENT TOTAL KNEE Right     SHOULDER SURGERY Right     reversed shoulder surgery.     Family History   Problem Relation Age of Onset    Heart Disease Mother     Heart Disease Father     Sleep Apnea Daughter            Social History     Socioeconomic History    Marital status:      Spouse name: Not on file    Number of children: Not on file    Years of education: Not on file    Highest education level: Not on file   Occupational History    Not on file   Tobacco Use    Smoking status: Never     Passive exposure: Never    Smokeless tobacco: Never   Vaping Use    Vaping Use: Never used   Substance and Sexual Activity    Alcohol use: No    Drug use: No    Sexual activity: Not Currently     Partners: Male     Birth control/protection: None   Other Topics Concern    Parent/sibling w/ CABG, MI or angioplasty before 65F 55M? No   Social History Narrative    6/15/2021 new to [place] from [place]. She has 6 kids.     Social Determinants of Health     Financial Resource  "Strain: Low Risk  (11/3/2023)    Financial Resource Strain     Within the past 12 months, have you or your family members you live with been unable to get utilities (heat, electricity) when it was really needed?: No   Food Insecurity: Low Risk  (11/3/2023)    Food Insecurity     Within the past 12 months, did you worry that your food would run out before you got money to buy more?: No     Within the past 12 months, did the food you bought just not last and you didn t have money to get more?: No   Transportation Needs: Low Risk  (11/3/2023)    Transportation Needs     Within the past 12 months, has lack of transportation kept you from medical appointments, getting your medicines, non-medical meetings or appointments, work, or from getting things that you need?: No   Physical Activity: Not on file   Stress: Not on file   Social Connections: Not on file   Interpersonal Safety: Low Risk  (11/10/2023)    Interpersonal Safety     Do you feel physically and emotionally safe where you currently live?: Yes     Within the past 12 months, have you been hit, slapped, kicked or otherwise physically hurt by someone?: No     Within the past 12 months, have you been humiliated or emotionally abused in other ways by your partner or ex-partner?: No   Housing Stability: Low Risk  (11/3/2023)    Housing Stability     Do you have housing? : Yes     Are you worried about losing your housing?: No             Physical Examination   VITALS: /61 (BP Location: Left arm)   Pulse 62   Temp 98.2  F (36.8  C) (Oral)   Resp 17   Ht 1.6 m (5' 3\")   Wt 80.7 kg (177 lb 14.4 oz)   LMP  (LMP Unknown)   SpO2 95%   BMI 31.51 kg/m    BMI: Body mass index is 31.51 kg/m .  Wt Readings from Last 3 Encounters:   03/28/24 80.7 kg (177 lb 14.4 oz)   01/18/24 83.7 kg (184 lb 8 oz)   01/09/24 83.8 kg (184 lb 12.8 oz)       Intake/Output Summary (Last 24 hours) at 3/28/2024 1113  Last data filed at 3/28/2024 0650  Gross per 24 hour   Intake 600 ml " "  Output 2225 ml   Net -1625 ml       General: pleasant female. No acute distress.   HENT: external ears normal. Nares patent. Mucous membranes moist.  Neck: + JVD  Lungs: clear to auscultation  COR:  regular rate and rhythm, No murmurs, rubs, or gallops  Abd: nondistended, BS present  Extrem: No edema         Non-cardiac Imaging Studies Reviewed             Lab Results Reviewed    Chemistry/lipid CBC Cardiac Enzymes/BNP/TSH/INR   Recent Labs   Lab Test 06/19/23  1538   CHOL 145   HDL 39*   LDL 31   TRIG 376*     Recent Labs   Lab Test 06/19/23  1538 08/18/22  0506 12/14/21  1638   LDL 31 58 32     Recent Labs   Lab Test 03/28/24  0811 03/28/24  0614   NA  --  138   POTASSIUM  --  4.1   CHLORIDE  --  96*   CO2  --  31*   * 137*   BUN  --  37.5*   CR  --  1.24*   GFRESTIMATED  --  41*   LIZZY  --  9.9*     Recent Labs   Lab Test 03/28/24  0614 03/27/24  0826 03/26/24  2228   CR 1.24* 1.04* 0.99*     Recent Labs   Lab Test 03/26/24  1926 11/10/23  1622 08/09/23  1616   A1C 6.2* 6.7* 6.5*          Recent Labs   Lab Test 03/27/24  0826   WBC 6.9   HGB 10.7*   HCT 35.1   MCV 94        Recent Labs   Lab Test 03/27/24  0826 03/26/24  1926 11/10/23  1707   HGB 10.7* 11.0* 13.4    Recent Labs   Lab Test 05/07/23  0434 05/06/23  2323 05/06/23  1852   TROPONINI 0.04 0.03 0.03     Recent Labs   Lab Test 03/26/24  1926 05/11/23  0437 05/06/23  1852 08/15/22  1920   BNP  --  82 628* 129   NTBNPI 5,747*  --   --   --      Recent Labs   Lab Test 11/10/23  1707   TSH 1.97     No results for input(s): \"INR\" in the last 10889 hours.        Current Inpatient Scheduled Medications   Scheduled Meds:   aspirin  81 mg Oral At Bedtime    atorvastatin  20 mg Oral QPM    azithromycin  500 mg Intravenous Q24H    carbidopa-levodopa  1 tablet Oral TID    carvedilol  12.5 mg Oral BID w/meals    cefTRIAXone  1 g Intravenous Q24H    enoxaparin ANTICOAGULANT  40 mg Subcutaneous Q24H    FLUoxetine  20 mg Oral BID    furosemide  40 mg " Intravenous BID    gabapentin  300 mg Oral 4x Daily    insulin aspart  1-3 Units Subcutaneous TID AC    insulin aspart  1-3 Units Subcutaneous At Bedtime    lisinopril  20 mg Oral QPM    magnesium oxide  200 mg Oral QAM    melatonin  3 mg Oral At Bedtime    pantoprazole  40 mg Oral BID AC    perflutren diluted in saline  8 mL Intravenous Once    sodium chloride (PF)  3 mL Intracatheter Q8H     Continuous Infusions:   - MEDICATION INSTRUCTIONS -         No current outpatient medications on file.          Medications Prior to Admission   Prior to Admission medications    Medication Sig Start Date End Date Taking? Authorizing Provider   aspirin 81 mg chewable tablet [ASPIRIN 81 MG CHEWABLE TABLET] Chew 81 mg at bedtime. 6/11/18  Yes Provider, Historical   atorvastatin (LIPITOR) 20 MG tablet TAKE 1 TABLET BY MOUTH AT  BEDTIME 10/26/23  Yes Mathew Ricardo MD   carbidopa-levodopa (SINEMET)  MG tablet TAKE 1 TABLET BY MOUTH 3  TIMES DAILY TAKE AT LEAST  1/2 HOUR BEFORE MEALS OR 2  HOURS AFTER MEALS DO NOT  MIX WITH FOOD 8/31/23  Yes Brennon Moya MD   carvedilol (COREG) 12.5 MG tablet Take 1 tablet (12.5 mg) by mouth 2 times daily (with meals) 9/8/23  Yes Shasha Alcaraz APRN CNP   cholecalciferol, vitamin D3, 1,000 unit tablet Take 1,000 Units by mouth 2 times daily 6/11/18  Yes Provider, Historical   coenzyme Q10 (CO Q-10) 100 mg capsule [COENZYME Q10 (CO Q-10) 100 MG CAPSULE] Take 100 mg by mouth 2 (two) times a day. 6/11/18  Yes Provider, Historical   cyanocobalamin (VITAMIN B-12) 1000 MCG tablet Take 1,000 mcg by mouth every evening   Yes Reported, Patient   fish oil-omega-3 fatty acids 1000 MG capsule Take 1 g by mouth 2 times daily   Yes Unknown, Entered By History   FLUoxetine (PROZAC) 20 MG capsule Take 1 capsule (20 mg) by mouth 2 times daily 11/10/23  Yes Maggie Arriaga MD   furosemide (LASIX) 20 MG tablet TAKE 1 TABLET BY MOUTH DAILY 10/30/23  Yes Shasha Alcaraz  "APRN CNP   gabapentin (NEURONTIN) 300 MG capsule TAKE 1 CAPSULE BY MOUTH 4  TIMES DAILY 8/31/23  Yes Brennon Moya MD   lisinopril (ZESTRIL) 20 MG tablet Take 20 mg by mouth every evening   Yes Reported, Patient   loratadine (CLARITIN) 10 mg tablet Take 10 mg by mouth daily as needed for allergies 6/11/18  Yes Provider, Historical   magnesium oxide 250 mg Tab Take 250 mg by mouth every morning 6/11/18  Yes Provider, Historical   melatonin 1 mg Tab tablet Take 1 mg by mouth At Bedtime 6/11/18  Yes Provider, Historical   multivitamin therapeutic tablet Take 1 tablet by mouth every morning 6/11/18  Yes Provider, Historical   omeprazole (PRILOSEC) 20 MG DR capsule Take 1 capsule (20 mg) by mouth 2 times daily 11/10/23  Yes Maggie Arriaga MD   polyethylene glycol (MIRALAX) 17 gram packet Take 17 g by mouth daily as needed for constipation 6/15/21  Yes Provider, Historical   nitroGLYcerin (NITROSTAT) 0.4 MG sublingual tablet Place 1 tablet (0.4 mg) under the tongue every 5 minutes as needed for chest pain 5/24/23   Maggie Arriaga MD Timothy Shih,  [facility]  Non-invasive Cardiologist  [facility]      Clinically Significant Risk Factors           # Hypercalcemia: Highest Ca = 10.2 mg/dL in last 2 days, will monitor as appropriate        # Hypertension: Noted on problem list  # Acute heart failure with preserved ejection fraction: heart failure noted on problem list, last echo with EF >50%, and receiving IV diuretics       # Obesity: Estimated body mass index is 31.51 kg/m  as calculated from the following:    Height as of this encounter: 1.6 m (5' 3\").    Weight as of this encounter: 80.7 kg (177 lb 14.4 oz)., PRESENT ON ADMISSION      # History of CABG: noted on surgical history          "

## 2024-03-29 ENCOUNTER — APPOINTMENT (OUTPATIENT)
Dept: OCCUPATIONAL THERAPY | Facility: HOSPITAL | Age: 89
DRG: 291 | End: 2024-03-29
Payer: MEDICARE

## 2024-03-29 LAB
ANION GAP SERPL CALCULATED.3IONS-SCNC: 10 MMOL/L (ref 7–15)
BUN SERPL-MCNC: 49.8 MG/DL (ref 8–23)
CALCIUM SERPL-MCNC: 9.5 MG/DL (ref 8.2–9.6)
CHLORIDE SERPL-SCNC: 97 MMOL/L (ref 98–107)
CREAT SERPL-MCNC: 1.46 MG/DL (ref 0.51–0.95)
DEPRECATED HCO3 PLAS-SCNC: 29 MMOL/L (ref 22–29)
EGFRCR SERPLBLD CKD-EPI 2021: 34 ML/MIN/1.73M2
GLUCOSE BLDC GLUCOMTR-MCNC: 132 MG/DL (ref 70–99)
GLUCOSE BLDC GLUCOMTR-MCNC: 146 MG/DL (ref 70–99)
GLUCOSE BLDC GLUCOMTR-MCNC: 170 MG/DL (ref 70–99)
GLUCOSE BLDC GLUCOMTR-MCNC: 210 MG/DL (ref 70–99)
GLUCOSE SERPL-MCNC: 143 MG/DL (ref 70–99)
MAGNESIUM SERPL-MCNC: 2.1 MG/DL (ref 1.7–2.3)
PLATELET # BLD AUTO: 182 10E3/UL (ref 150–450)
POTASSIUM SERPL-SCNC: 4 MMOL/L (ref 3.4–5.3)
SODIUM SERPL-SCNC: 136 MMOL/L (ref 135–145)

## 2024-03-29 PROCEDURE — 99232 SBSQ HOSP IP/OBS MODERATE 35: CPT | Performed by: STUDENT IN AN ORGANIZED HEALTH CARE EDUCATION/TRAINING PROGRAM

## 2024-03-29 PROCEDURE — 120N000001 HC R&B MED SURG/OB

## 2024-03-29 PROCEDURE — 250N000011 HC RX IP 250 OP 636: Performed by: STUDENT IN AN ORGANIZED HEALTH CARE EDUCATION/TRAINING PROGRAM

## 2024-03-29 PROCEDURE — 36415 COLL VENOUS BLD VENIPUNCTURE: CPT | Performed by: STUDENT IN AN ORGANIZED HEALTH CARE EDUCATION/TRAINING PROGRAM

## 2024-03-29 PROCEDURE — 250N000013 HC RX MED GY IP 250 OP 250 PS 637: Performed by: INTERNAL MEDICINE

## 2024-03-29 PROCEDURE — 80048 BASIC METABOLIC PNL TOTAL CA: CPT | Performed by: STUDENT IN AN ORGANIZED HEALTH CARE EDUCATION/TRAINING PROGRAM

## 2024-03-29 PROCEDURE — 83735 ASSAY OF MAGNESIUM: CPT | Performed by: STUDENT IN AN ORGANIZED HEALTH CARE EDUCATION/TRAINING PROGRAM

## 2024-03-29 PROCEDURE — 97535 SELF CARE MNGMENT TRAINING: CPT | Mod: GO

## 2024-03-29 PROCEDURE — 85049 AUTOMATED PLATELET COUNT: CPT | Performed by: INTERNAL MEDICINE

## 2024-03-29 PROCEDURE — 250N000013 HC RX MED GY IP 250 OP 250 PS 637: Performed by: STUDENT IN AN ORGANIZED HEALTH CARE EDUCATION/TRAINING PROGRAM

## 2024-03-29 PROCEDURE — 258N000003 HC RX IP 258 OP 636: Performed by: INTERNAL MEDICINE

## 2024-03-29 PROCEDURE — 250N000011 HC RX IP 250 OP 636: Performed by: INTERNAL MEDICINE

## 2024-03-29 PROCEDURE — 97110 THERAPEUTIC EXERCISES: CPT | Mod: GO

## 2024-03-29 RX ORDER — FUROSEMIDE 20 MG
40 TABLET ORAL
Status: DISCONTINUED | OUTPATIENT
Start: 2024-03-30 | End: 2024-03-30 | Stop reason: HOSPADM

## 2024-03-29 RX ADMIN — ATORVASTATIN CALCIUM 20 MG: 10 TABLET, FILM COATED ORAL at 20:14

## 2024-03-29 RX ADMIN — CARBIDOPA AND LEVODOPA 1 TABLET: 25; 100 TABLET ORAL at 20:15

## 2024-03-29 RX ADMIN — INSULIN ASPART 2 UNITS: 100 INJECTION, SOLUTION INTRAVENOUS; SUBCUTANEOUS at 12:17

## 2024-03-29 RX ADMIN — GABAPENTIN 300 MG: 300 CAPSULE ORAL at 20:15

## 2024-03-29 RX ADMIN — Medication 3 MG: at 21:43

## 2024-03-29 RX ADMIN — GABAPENTIN 300 MG: 300 CAPSULE ORAL at 12:20

## 2024-03-29 RX ADMIN — GABAPENTIN 300 MG: 300 CAPSULE ORAL at 08:33

## 2024-03-29 RX ADMIN — CARVEDILOL 12.5 MG: 12.5 TABLET, FILM COATED ORAL at 17:02

## 2024-03-29 RX ADMIN — CEFTRIAXONE SODIUM 1 G: 1 INJECTION, POWDER, FOR SOLUTION INTRAMUSCULAR; INTRAVENOUS at 20:15

## 2024-03-29 RX ADMIN — CARBIDOPA AND LEVODOPA 1 TABLET: 25; 100 TABLET ORAL at 08:33

## 2024-03-29 RX ADMIN — FUROSEMIDE 40 MG: 10 INJECTION, SOLUTION INTRAMUSCULAR; INTRAVENOUS at 08:33

## 2024-03-29 RX ADMIN — GABAPENTIN 300 MG: 300 CAPSULE ORAL at 16:39

## 2024-03-29 RX ADMIN — FLUOXETINE HYDROCHLORIDE 20 MG: 20 CAPSULE ORAL at 16:39

## 2024-03-29 RX ADMIN — FLUOXETINE HYDROCHLORIDE 20 MG: 20 CAPSULE ORAL at 08:33

## 2024-03-29 RX ADMIN — ENOXAPARIN SODIUM 40 MG: 40 INJECTION SUBCUTANEOUS at 21:43

## 2024-03-29 RX ADMIN — PANTOPRAZOLE SODIUM 40 MG: 20 TABLET, DELAYED RELEASE ORAL at 06:16

## 2024-03-29 RX ADMIN — CARBIDOPA AND LEVODOPA 1 TABLET: 25; 100 TABLET ORAL at 13:55

## 2024-03-29 RX ADMIN — INSULIN ASPART 1 UNITS: 100 INJECTION, SOLUTION INTRAVENOUS; SUBCUTANEOUS at 08:34

## 2024-03-29 RX ADMIN — ASPIRIN 81 MG CHEWABLE TABLET 81 MG: 81 TABLET CHEWABLE at 21:43

## 2024-03-29 RX ADMIN — MAGNESIUM OXIDE TAB 400 MG (241.3 MG ELEMENTAL MG) 200 MG: 400 (241.3 MG) TAB at 08:33

## 2024-03-29 RX ADMIN — AZITHROMYCIN MONOHYDRATE 500 MG: 500 INJECTION, POWDER, LYOPHILIZED, FOR SOLUTION INTRAVENOUS at 20:15

## 2024-03-29 RX ADMIN — PANTOPRAZOLE SODIUM 40 MG: 20 TABLET, DELAYED RELEASE ORAL at 16:39

## 2024-03-29 RX ADMIN — CARVEDILOL 12.5 MG: 12.5 TABLET, FILM COATED ORAL at 08:33

## 2024-03-29 RX ADMIN — LISINOPRIL 20 MG: 20 TABLET ORAL at 20:15

## 2024-03-29 ASSESSMENT — ACTIVITIES OF DAILY LIVING (ADL)
ADLS_ACUITY_SCORE: 38

## 2024-03-29 NOTE — PROGRESS NOTES
[facility]    Medicine Progress Note - Hospitalist Service    Date of Admission:  3/26/2024    Assessment & Plan   Janes Montero is a 89 year old female with PMH of diastolic CHF, CAD, s/p CABG 2010, DM 2, HLD, admitted on 3/26/2024 with:    #Acute exacerbation of chronic HFpEF  #Acute hypoxemic respiratory failure  Presented with 1-2 weeks of worsening shortness of breath. Initially requiring 2L O2 without a baseline. Patient checks weight daily and denies any weight gain. States compliance with restricted salt diet and medications. ProBNP elevated 5700. CT chest showed diffuse pulmonary edema with trace bilateral pleural effusions, multiple new nodular opacities in right lung suggestive of superimposed infection/inflammatory process.  - Cardiology conulted  - IV Lasix held today for ISAIAH; Lasix 40mg PO BID scheduled for tomorrow, but Cardiology can adjust if they recommend further IV diuresis  - Daily labs, weight, closely monitor vitals while diuresing  - TTE 3/27/24 with EF 55-60%, biatrial enlargement    #CAD, s/p CABG  Nuc med stress test in May 2023 showed inferior and basal wall inducible ischemia. Cardiology and patient ultimately decided against coronary angiogram given lack of anginal symptoms and impaired renal function.  - Continue home ASA 81mg daily and atorvastatin    #Community-acquired pneumonia  Multiple nodular opacities in the right lung. RVP negative. Urine strep pneumo and legionella antigen negative. Normal procalcitonin. Suspect less of a contribution of pneumonia to her presenting symptoms relative to CHF, but given CT findings will treat.  - Continue ceftriaxone/azithromycin  - Repeat CT in 3 months to evaluate for resolution of nodules.    #Moderate to severe mitral insufficiency  #Mild to moderate mitral stenosis  - Noted on TTE 3/27/24    #Parkinsons disease  - Continue home Sinemet    #HTN  - Continue home Coreg and lisinopril    #GERD  - Continue home  "PPI  Suspect GERMAN.  Seen by sleep medicine on 1/9/2024, recommended polysomnography.  This has not been completed yet.    #Moderate tracheobronchomalacia, per recent pulmonology visit on 12/12/2023  PFT showed normal spirometry, lung volumes and diffusion capacity.    #Pre-DM  A1C 6.2    Diet: Combination Diet Low Saturated Fat Na <2400mg Diet    DVT Prophylaxis: Enoxaparin (Lovenox) SQ  Holguin Catheter: Not present  Lines: None     Cardiac Monitoring: ACTIVE order. Indication: QTc prolonging medication (48 hours)  Code Status: No CPR- Do NOT Intubate verified with patient and Daughter at the bedside.          Diet: Combination Diet Low Saturated Fat Na <2400mg Diet    DVT Prophylaxis: Enoxaparin (Lovenox) SQ  Holguin Catheter: Not present  Lines: None     Cardiac Monitoring: None  Code Status: No CPR- Do NOT Intubate      Clinically Significant Risk Factors                 # Acute Kidney Injury, unspecified: based on a >150% or 0.3 mg/dL increase in last creatinine compared to past 90 day average, will monitor renal function  # Hypertension: Noted on problem list    # Acute heart failure with preserved ejection fraction: heart failure noted on problem list, last echo with EF >50%, and receiving IV diuretics       # Obesity: Estimated body mass index is 31.51 kg/m  as calculated from the following:    Height as of this encounter: 1.6 m (5' 3\").    Weight as of this encounter: 80.7 kg (177 lb 14.4 oz)., PRESENT ON ADMISSION      # History of CABG: noted on surgical history       Disposition Plan      Expected Discharge Date: 03/30/2024    Discharge Delays: IV Medication - consider oral or Home Infusion  Oxygen Needs - Arrange Home O2                JEZ STUART MD  Hospitalist Service  [facility]  Securely message with [employer] (more info)  Text page via [facility] Paging/Directory   ______________________________________________________________________    Interval History   Breathing continues to " feel better, but still needing 1L O2    Physical Exam   Vital Signs: Temp: 97.9  F (36.6  C) Temp src: Oral BP: 130/62 Pulse: 60   Resp: 18 SpO2: 94 % O2 Device: Nasal cannula Oxygen Delivery: 1 LPM  Weight: 177 lbs 14.4 oz    General: Alert and oriented x 3. No distress, conversational.  Chest: Good air movement bilaterally, bases diminished, no rhonchi or wheezes.  Heart: S1S2 regular.  2/6 systolic murmur.  Abdomen: Soft. NT, ND.  Extremities: BLE edema has resolved  Neuro: Cranial nerves 1-12 grossly normal. No focal neurological deficit    Medical Decision Making       50 MINUTES SPENT BY ME on the date of service doing chart review, history, exam, documentation & further activities per the note.      Data     I have personally reviewed the following data over the past 24 hrs:    N/A  \   N/A   / 182     136 97 (L) 49.8 (H) /  210 (H)   4.0 29 1.46 (H) \       Imaging results reviewed over the past 24 hrs:   No results found for this or any previous visit (from the past 24 hour(s)).

## 2024-03-29 NOTE — PROGRESS NOTES
[facility]   [address], [place] [address]   [email or web address]   OFFICE: [phone]     CARDIOLOGY INPATIENT PROGRESS NOTE     Impression and Plan     Assessment/Plan:  Ms. Janes Montero is a 89 year old female with CAD s/p CABG with LIMA-LAD 2010, borderline aortic stenosis, chronic HFmrEF, HTN, HLD, who presented to the hospital with dyspnea on exertion and productive cough.  She was daignosed with acute on chronic HFmrEF and possible multifocal pneumonia.     Acute on chronic HFmrEF   - Appears euvolemic today.  Transition to lasix PO   - Maintain K > 4.0, Mg >  2.0     Mod-severe MR   - Likely degenerative/calcific mitral valve disease, possible contribution related to fluid overload.   - Unclear that she would be a good candidate for mitraclip given significant MAC and calcific MV disease.  This could be investigated as an outpt with a DORIS.     CAD   - Abnormal NM stress in 5/2023 but deemed low risk and optied for meidcal management given lack of anginal symptoms   - Continue medical therapy with statin, aspirin, lisinopril, and carvedilol.      Will sign off     Primary Cardiologist: Dr. Ricardo    Subjective     Janes was confused this morning, but better in the late morning and feeling well.  Continues on a small amount of O2    Cardiac Diagnostics     ECG: Personally reviewed and interpreted: 3/26/2024  NSR, LBBB     Telemetry (personally reviewed): NSR     Most recent:  Echocardiogram (results reviewed): 3/27/2024  Interpretation Summary     The visual ejection fraction is 55-60%.  The right ventricle is normal in size and function.  Biatrial enlargement.  There is mod-severe to severe (3-4+) mitral regurgitation.  There is mild to moderate mitral stenosis.  Mild valvular aortic stenosis.  There is moderate (2+) tricuspid regurgitation.  The right ventricular systolic pressure is approximated at 56 mmHg.  Compared to the prior study dated  5/9/2023, there are changes as noted. There  is now moderate-severe mitral regurgitation and moderate tricuspid  regurgitation with moderate pulmonary hypertension.     Cardiac Cath (results reviewed): 8/17/2022  1st Mrg lesion is 50% stenosed.  Prox LAD lesion is 100% stenosed.  Prox RCA to Mid RCA lesion is 25% stenosed.  Atretic nonfunctional JEOVANY graft of uncertain destination  Widely patent graft to mid LAD from left aota     Cardiac Stress Testing (results reviewed): 5/8/2023    A prior study was conducted on 8/16/2022.  Prior images were unavailable for comparison review.    Pharmacologic regadenoson stress ECG is nondiagnostic due to baseline ECG abnormality.    Pharmacological regadenoson nuclear stress test is abnormal.  There is partially reversible change in inferior and basal to mid inferolateral walls indicating inducible myocardial ischemia.    Normal left ventricular size, wall motion and systolic function.  Calculated left ventricular ejection fraction is 65%.    The patient is at an intermediate risk of future cardiac ischemic events.        Medical History  Surgical History Family History Social History   Past Medical History:   Diagnosis Date    Acute diastolic congestive heart failure (H)     Acute kidney injury superimposed on CKD  (H24) 08/18/2022    Acute on chronic congestive heart failure (H) 06/11/2018    Acute pulmonary edema (H) 05/06/2023    Acute respiratory failure with hypoxia (H) 05/08/2023    Chest pain, unspecified type 08/15/2022    Chronic bilateral back pain 06/15/2021    Last Assessment & Plan:   Formatting of this note might be different from the original.  She says she has seen a pain specialist and had back surgery.  She needs to establish care with a pain specialist since she is new to [place] from [place].  Referral placed.    Coronary artery disease     Coronary artery disease involving native coronary artery without angina pectoris, unspecified whether native or transplanted  heart 2022    Diabetes mellitus, type II (H)     Diastolic dysfunction 07/10/2018    Frozen shoulder     bilateral    Heart failure with reduced ejection fraction (H) 2023    Hyperlipidemia     Hypertension     Hypertensive emergency     Parkinson disease     Primary osteoarthritis involving multiple joints     Primary osteoarthritis of left knee     Recurrent UTI     hx septic shock    Thyroid disease      Past Surgical History:   Procedure Laterality Date    APPENDECTOMY      APPENDECTOMY      BACK SURGERY      L4-S1 laminectomy    BYPASS GRAFT ARTERY CORONARY      3 vessel     SECTION       SECTION      CORONARY ARTERY BYPASS      CV CORONARY ANGIOGRAM N/A 2022    Procedure: Coronary Angiogram;  Surgeon: Ignacio Baird MD;  Location: [facility] CATH LAB CV    HERNIORRHAPHY VENTRAL Left     HYSTERECTOMY      HYSTERECTOMY      JOINT REPLACEMENT Left     Total left knee    OTHER SURGICAL HISTORY      right ankle surgery    OTHER SURGICAL HISTORY      right forearm fracture    REPLACEMENT TOTAL KNEE Right     SHOULDER SURGERY Right     reversed shoulder surgery.     Family History   Problem Relation Age of Onset    Heart Disease Mother     Heart Disease Father     Sleep Apnea Daughter            Social History     Socioeconomic History    Marital status:      Spouse name: Not on file    Number of children: Not on file    Years of education: Not on file    Highest education level: Not on file   Occupational History    Not on file   Tobacco Use    Smoking status: Never     Passive exposure: Never    Smokeless tobacco: Never   Vaping Use    Vaping Use: Never used   Substance and Sexual Activity    Alcohol use: No    Drug use: No    Sexual activity: Not Currently     Partners: Male     Birth control/protection: None   Other Topics Concern    Parent/sibling w/ CABG, MI or angioplasty before 65F 55M? No   Social History Narrative    6/15/2021 new to [place] from [place]. She has 6  "kids.     Social Determinants of Health     Financial Resource Strain: Low Risk  (11/3/2023)    Financial Resource Strain     Within the past 12 months, have you or your family members you live with been unable to get utilities (heat, electricity) when it was really needed?: No   Food Insecurity: Low Risk  (11/3/2023)    Food Insecurity     Within the past 12 months, did you worry that your food would run out before you got money to buy more?: No     Within the past 12 months, did the food you bought just not last and you didn t have money to get more?: No   Transportation Needs: Low Risk  (11/3/2023)    Transportation Needs     Within the past 12 months, has lack of transportation kept you from medical appointments, getting your medicines, non-medical meetings or appointments, work, or from getting things that you need?: No   Physical Activity: Not on file   Stress: Not on file   Social Connections: Not on file   Interpersonal Safety: Low Risk  (11/10/2023)    Interpersonal Safety     Do you feel physically and emotionally safe where you currently live?: Yes     Within the past 12 months, have you been hit, slapped, kicked or otherwise physically hurt by someone?: No     Within the past 12 months, have you been humiliated or emotionally abused in other ways by your partner or ex-partner?: No   Housing Stability: Low Risk  (11/3/2023)    Housing Stability     Do you have housing? : Yes     Are you worried about losing your housing?: No             Physical Examination   VITALS: /59 (BP Location: Left arm)   Pulse 66   Temp 98  F (36.7  C) (Oral)   Resp 20   Ht 1.6 m (5' 3\")   Wt 80.7 kg (177 lb 14.4 oz)   LMP  (LMP Unknown)   SpO2 98%   BMI 31.51 kg/m    BMI: Body mass index is 31.51 kg/m .  Wt Readings from Last 3 Encounters:   03/28/24 80.7 kg (177 lb 14.4 oz)   01/18/24 83.7 kg (184 lb 8 oz)   01/09/24 83.8 kg (184 lb 12.8 oz)       Intake/Output Summary (Last 24 hours) at 3/29/2024 1711  Last data " "filed at 3/29/2024 1300  Gross per 24 hour   Intake --   Output 1400 ml   Net -1400 ml       General: pleasant female. No acute distress.   HENT: external ears normal. Nares patent. Mucous membranes moist.  Neck: No JVD  Lungs: Bibasilar crackles  COR:  regular rate and rhythm, No murmurs, rubs, or gallops  Abd: nondistended, BS present  Extrem: No edema         Non-cardiac Imaging Studies Reviewed             Lab Results Reviewed    Chemistry/lipid CBC Cardiac Enzymes/BNP/TSH/INR   Recent Labs   Lab Test 06/19/23  1538   CHOL 145   HDL 39*   LDL 31   TRIG 376*     Recent Labs   Lab Test 06/19/23  1538 08/18/22  0506 12/14/21  1638   LDL 31 58 32     Recent Labs   Lab Test 03/29/24  1145 03/29/24  0808 03/29/24  0524   NA  --   --  136   POTASSIUM  --   --  4.0   CHLORIDE  --   --  97*   CO2  --   --  29   *   < > 143*   BUN  --   --  49.8*   CR  --   --  1.46*   GFRESTIMATED  --   --  34*   LIZZY  --   --  9.5    < > = values in this interval not displayed.     Recent Labs   Lab Test 03/29/24  0524 03/28/24  0614 03/27/24  0826   CR 1.46* 1.24* 1.04*     Recent Labs   Lab Test 03/26/24  1926 11/10/23  1622 08/09/23  1616   A1C 6.2* 6.7* 6.5*          Recent Labs   Lab Test 03/29/24  0524 03/27/24  0826   WBC  --  6.9   HGB  --  10.7*   HCT  --  35.1   MCV  --  94    201     Recent Labs   Lab Test 03/27/24  0826 03/26/24 1926 11/10/23  1707   HGB 10.7* 11.0* 13.4    Recent Labs   Lab Test 05/07/23 0434 05/06/23 2323 05/06/23 1852   TROPONINI 0.04 0.03 0.03     Recent Labs   Lab Test 03/26/24  1926 05/11/23  0437 05/06/23  1852 08/15/22  1920   BNP  --  82 628* 129   NTBNPI 5,747*  --   --   --      Recent Labs   Lab Test 11/10/23  1707   TSH 1.97     No results for input(s): \"INR\" in the last 97206 hours.        Current Inpatient Scheduled Medications   Scheduled Meds:   aspirin  81 mg Oral At Bedtime    atorvastatin  20 mg Oral QPM    azithromycin  500 mg Intravenous Q24H    carbidopa-levodopa  1 " tablet Oral TID    carvedilol  12.5 mg Oral BID w/meals    cefTRIAXone  1 g Intravenous Q24H    enoxaparin ANTICOAGULANT  40 mg Subcutaneous Q24H    FLUoxetine  20 mg Oral BID    [START ON 3/30/2024] furosemide  40 mg Oral BID    gabapentin  300 mg Oral 4x Daily    insulin aspart  1-3 Units Subcutaneous TID AC    insulin aspart  1-3 Units Subcutaneous At Bedtime    lisinopril  20 mg Oral QPM    magnesium oxide  200 mg Oral QAM    melatonin  3 mg Oral At Bedtime    pantoprazole  40 mg Oral BID AC    perflutren diluted in saline  8 mL Intravenous Once    sodium chloride (PF)  3 mL Intracatheter Q8H     Continuous Infusions:   - MEDICATION INSTRUCTIONS -         No current outpatient medications on file.          Medications Prior to Admission   Prior to Admission medications    Medication Sig Start Date End Date Taking? Authorizing Provider   aspirin 81 mg chewable tablet [ASPIRIN 81 MG CHEWABLE TABLET] Chew 81 mg at bedtime. 6/11/18  Yes Provider, Historical   atorvastatin (LIPITOR) 20 MG tablet TAKE 1 TABLET BY MOUTH AT  BEDTIME 10/26/23  Yes Mathew Ricardo MD   carbidopa-levodopa (SINEMET)  MG tablet TAKE 1 TABLET BY MOUTH 3  TIMES DAILY TAKE AT LEAST  1/2 HOUR BEFORE MEALS OR 2  HOURS AFTER MEALS DO NOT  MIX WITH FOOD 8/31/23  Yes Brennon Moya MD   carvedilol (COREG) 12.5 MG tablet Take 1 tablet (12.5 mg) by mouth 2 times daily (with meals) 9/8/23  Yes Shasha Alcaraz APRN CNP   cholecalciferol, vitamin D3, 1,000 unit tablet Take 1,000 Units by mouth 2 times daily 6/11/18  Yes Provider, Historical   coenzyme Q10 (CO Q-10) 100 mg capsule [COENZYME Q10 (CO Q-10) 100 MG CAPSULE] Take 100 mg by mouth 2 (two) times a day. 6/11/18  Yes Provider, Historical   cyanocobalamin (VITAMIN B-12) 1000 MCG tablet Take 1,000 mcg by mouth every evening   Yes Reported, Patient   fish oil-omega-3 fatty acids 1000 MG capsule Take 1 g by mouth 2 times daily   Yes Unknown, Entered By History   FLUoxetine  "(PROZAC) 20 MG capsule Take 1 capsule (20 mg) by mouth 2 times daily 11/10/23  Yes Maggie Arriaga MD   furosemide (LASIX) 20 MG tablet TAKE 1 TABLET BY MOUTH DAILY 10/30/23  Yes Shasha Alcaraz APRN CNP   gabapentin (NEURONTIN) 300 MG capsule TAKE 1 CAPSULE BY MOUTH 4  TIMES DAILY 8/31/23  Yes Brennon Moya MD   lisinopril (ZESTRIL) 20 MG tablet Take 20 mg by mouth every evening   Yes Reported, Patient   loratadine (CLARITIN) 10 mg tablet Take 10 mg by mouth daily as needed for allergies 6/11/18  Yes Provider, Historical   magnesium oxide 250 mg Tab Take 250 mg by mouth every morning 6/11/18  Yes Provider, Historical   melatonin 1 mg Tab tablet Take 1 mg by mouth At Bedtime 6/11/18  Yes Provider, Historical   multivitamin therapeutic tablet Take 1 tablet by mouth every morning 6/11/18  Yes Provider, Historical   omeprazole (PRILOSEC) 20 MG DR capsule Take 1 capsule (20 mg) by mouth 2 times daily 11/10/23  Yes Maggie Arriaga MD   polyethylene glycol (MIRALAX) 17 gram packet Take 17 g by mouth daily as needed for constipation 6/15/21  Yes Provider, Historical   nitroGLYcerin (NITROSTAT) 0.4 MG sublingual tablet Place 1 tablet (0.4 mg) under the tongue every 5 minutes as needed for chest pain 5/24/23   Maggie Arriaga MD Timothy Shih, DO [facility]  Non-invasive Cardiologist  [facility]      Clinically Significant Risk Factors                 # Acute Kidney Injury, unspecified: based on a >150% or 0.3 mg/dL increase in last creatinine compared to past 90 day average, will monitor renal function  # Hypertension: Noted on problem list  # Acute heart failure with preserved ejection fraction: heart failure noted on problem list, last echo with EF >50%, and receiving IV diuretics       # Obesity: Estimated body mass index is 31.51 kg/m  as calculated from the following:    Height as of this encounter: 1.6 m (5' 3\").    Weight as of this encounter: 80.7 kg (177 lb 14.4 oz)., " PRESENT ON ADMISSION      # History of CABG: noted on surgical history

## 2024-03-29 NOTE — PROGRESS NOTES
"Care Management Follow Up    Length of Stay (days): 3    Expected Discharge Date: 03/30/2024     Concerns to be Addressed:     IV Lasix, 1L O2  Patient plan of care discussed at interdisciplinary rounds: Yes    Anticipated Discharge Disposition:  back to [facility]     Anticipated Discharge Services:    Anticipated Discharge DME:      Patient/family educated on Medicare website which has current facility and service quality ratings:    Education Provided on the Discharge Plan:    Patient/Family in Agreement with the Plan:      Referrals Placed by CM/SW:  none at this time  Private pay costs discussed: Not applicable    Additional Information:  Social history per previous CM note:   \"she lives alone in independent living apartment at [place]. She is independent with ADLs and most IADLs, no longer driving. She ambulates with walker. She receives occasional meals within her apartment complex. Daughter Tg is primary family contact and family willing to transport at discharge.\"      Therapy recommendation is home with home care. CM went to pt room to discuss this and pt declines the need for home care. She told CM she exercises daily.    Per provider update pt will stay another day. Pt remains on IV lasix.    RNCM to follow for medical progression, recommendations, and final discharge plan.      Andie Franco RN      "

## 2024-03-29 NOTE — PLAN OF CARE
"  Problem: Adult Inpatient Plan of Care  Goal: Plan of Care Review  Description: The Plan of Care Review/Shift note should be completed every shift.  The Outcome Evaluation is a brief statement about your assessment that the patient is improving, declining, or no change.  This information will be displayed automatically on your shift  note.  Outcome: Progressing  Goal: Patient-Specific Goal (Individualized)  Description: You can add care plan individualizations to a care plan. Examples of Individualization might be:  \"Parent requests to be called daily at 9am for status\", \"I have a hard time hearing out of my right ear\", or \"Do not touch me to wake me up as it startles  me\".  Outcome: Progressing  Goal: Absence of Hospital-Acquired Illness or Injury  Outcome: Progressing  Intervention: Identify and Manage Fall Risk  Recent Flowsheet Documentation  Taken 3/28/2024 1630 by Barbara Petty  Safety Promotion/Fall Prevention:   activity supervised   safety round/check completed   room door open   patient and family education   nonskid shoes/slippers when out of bed   supervised activity   assistive device/personal items within reach   increased rounding and observation  Intervention: Prevent Skin Injury  Recent Flowsheet Documentation  Taken 3/28/2024 1630 by Barbara Petty  Body Position: supine, head elevated  Intervention: Prevent Infection  Recent Flowsheet Documentation  Taken 3/28/2024 1630 by Barbara Petty  Infection Prevention: rest/sleep promoted  Goal: Optimal Comfort and Wellbeing  Outcome: Progressing  Goal: Readiness for Transition of Care  Outcome: Progressing     Problem: Risk for Delirium  Goal: Optimal Coping  Outcome: Progressing  Intervention: Optimize Psychosocial Adjustment to Delirium  Recent Flowsheet Documentation  Taken 3/28/2024 1630 by Barbara Petty  Supportive Measures: active listening utilized  Goal: Improved Behavioral Control  Outcome: Progressing  Intervention: Minimize Safety Risk  Recent Flowsheet " Documentation  Taken 3/28/2024 1630 by Barbara Petty  Communication Enhancement Strategies: call light answered in person  Enhanced Safety Measures: room near unit station  Goal: Improved Attention and Thought Clarity  Outcome: Progressing  Intervention: Maximize Cognitive Function  Recent Flowsheet Documentation  Taken 3/28/2024 1630 by Barbara Petty  Sensory Stimulation Regulation: care clustered  Reorientation Measures: clock in view  Goal: Improved Sleep  Outcome: Progressing     Problem: Pneumonia  Goal: Fluid Balance  Outcome: Progressing  Goal: Resolution of Infection Signs and Symptoms  Outcome: Progressing  Goal: Effective Oxygenation and Ventilation  Outcome: Progressing  Intervention: Promote Airway Secretion Clearance  Recent Flowsheet Documentation  Taken 3/28/2024 1630 by Barbara Petty  Cough And Deep Breathing: done with encouragement     Problem: Heart Failure  Goal: Optimal Coping  Outcome: Progressing  Intervention: Support Psychosocial Response  Recent Flowsheet Documentation  Taken 3/28/2024 1630 by Barbara Petty  Supportive Measures: active listening utilized  Goal: Optimal Cardiac Output  Outcome: Progressing  Goal: Stable Heart Rate and Rhythm  Outcome: Progressing  Goal: Optimal Functional Ability  Outcome: Progressing  Intervention: Optimize Functional Ability  Recent Flowsheet Documentation  Taken 3/28/2024 1630 by Barbara Petty  Activity Management: up in chair  Goal: Fluid and Electrolyte Balance  Outcome: Progressing  Goal: Improved Oral Intake  Outcome: Progressing  Goal: Effective Oxygenation and Ventilation  Outcome: Progressing  Intervention: Promote Airway Secretion Clearance  Recent Flowsheet Documentation  Taken 3/28/2024 1630 by Barbara Petty  Cough And Deep Breathing: done with encouragement  Activity Management: up in chair  Goal: Effective Breathing Pattern During Sleep  Outcome: Progressing  Intervention: Monitor and Manage Obstructive Sleep Apnea  Recent Flowsheet Documentation  Taken 3/28/2024  1630 by Barbara Petty  Medication Review/Management: medications reviewed     Problem: Comorbidity Management  Goal: Maintenance of Asthma Control  Outcome: Progressing  Intervention: Maintain Asthma Symptom Control  Recent Flowsheet Documentation  Taken 3/28/2024 1630 by Barbara Petty  Medication Review/Management: medications reviewed  Goal: Maintenance of Behavioral Health Symptom Control  Outcome: Progressing  Intervention: Maintain Behavioral Health Symptom Control  Recent Flowsheet Documentation  Taken 3/28/2024 1630 by Barbara Petty  Medication Review/Management: medications reviewed  Goal: Maintenance of COPD Symptom Control  Outcome: Progressing  Intervention: Maintain COPD Symptom Control  Recent Flowsheet Documentation  Taken 3/28/2024 1630 by Barbara Petty  Supportive Measures: active listening utilized  Medication Review/Management: medications reviewed  Goal: Blood Glucose Levels Within Targeted Range  Outcome: Progressing  Intervention: Monitor and Manage Glycemia  Recent Flowsheet Documentation  Taken 3/28/2024 1630 by Barbara Petty  Medication Review/Management: medications reviewed  Goal: Maintenance of Heart Failure Symptom Control  Outcome: Progressing  Intervention: Maintain Heart Failure Management  Recent Flowsheet Documentation  Taken 3/28/2024 1630 by Barbara Petty  Medication Review/Management: medications reviewed  Goal: Blood Pressure in Desired Range  Outcome: Progressing  Intervention: Maintain Blood Pressure Management  Recent Flowsheet Documentation  Taken 3/28/2024 1630 by Barbara Petty  Medication Review/Management: medications reviewed  Goal: Maintenance of Osteoarthritis Symptom Control  Outcome: Progressing  Intervention: Maintain Osteoarthritis Symptom Control  Recent Flowsheet Documentation  Taken 3/28/2024 1630 by Barbara Petty  Assistive Device Utilized: gait belt  Activity Management: up in chair  Medication Review/Management: medications reviewed  Goal: Bariatric Home Regimen Maintained  Outcome:  Progressing  Intervention: Maintain and Manage Postbariatric Surgery Care  Recent Flowsheet Documentation  Taken 3/28/2024 1630 by Barbara Petty  Medication Review/Management: medications reviewed  Goal: Maintenance of Seizure Control  Outcome: Progressing  Intervention: Maintain Seizure Symptom Control  Recent Flowsheet Documentation  Taken 3/28/2024 1630 by Barbara Petty  Sensory Stimulation Regulation: care clustered  Medication Review/Management: medications reviewed   Goal Outcome Evaluation:    Patient is calm and cooperative. AO x 4. Reports feeling much better than yesterday. Tele showing sinus dysrhythmia with bundle branch block and PVCs. Pts HR has been joao at times (58 bpm). On 1L O2. Oxygen sitting at 93-95%. Lung sounds are diminished. Patient moved well to the bathroom, assist of 1. No shortness of breathe reported, even with moving. Good urine output, tiny BM as well. Purewick in for overnight. Patient requested Metamucil in the morning, messaged physician. Pt had good appetite for dinner. IV antibiotics infused per orders.

## 2024-03-29 NOTE — PLAN OF CARE
Problem: Adult Inpatient Plan of Care  Goal: Optimal Comfort and Wellbeing  Outcome: Progressing   Goal Outcome Evaluation:        Pt has been A/O all shift, also denied pain, has been pleasant and cooperative, ambulated a few times to the bathroom with standby assist, up in the chair for quit some time, had OT and PT today, daughter was at bedside, IV saline locked, VSS, on 1L NC. Is on k, mg protocols, had 1 medium and 1 large bm today, BS have been 146, 210, and 132.

## 2024-03-29 NOTE — PLAN OF CARE
Problem: Pneumonia  Goal: Fluid Balance  Outcome: Progressing  Goal: Resolution of Infection Signs and Symptoms  Outcome: Progressing  Goal: Effective Oxygenation and Ventilation  Outcome: Progressing  Intervention: Optimize Oxygenation and Ventilation  Recent Flowsheet Documentation  Taken 3/29/2024 0020 by Fadia Parks RN  Head of Bed ([facility]) Positioning: HOB at 15 degrees     Problem: Heart Failure  Goal: Optimal Coping  Outcome: Progressing  Goal: Optimal Cardiac Output  Outcome: Progressing  Goal: Stable Heart Rate and Rhythm  Outcome: Progressing  Goal: Optimal Functional Ability  Outcome: Progressing  Intervention: Optimize Functional Ability  Recent Flowsheet Documentation  Taken 3/29/2024 0020 by Fadia Parks RN  Activity Management: activity adjusted per tolerance  Goal: Fluid and Electrolyte Balance  Outcome: Progressing  Goal: Improved Oral Intake  Outcome: Progressing  Goal: Effective Oxygenation and Ventilation  Outcome: Progressing  Intervention: Promote Airway Secretion Clearance  Recent Flowsheet Documentation  Taken 3/29/2024 0020 by Fadia Parks RN  Activity Management: activity adjusted per tolerance  Intervention: Optimize Oxygenation and Ventilation  Recent Flowsheet Documentation  Taken 3/29/2024 0020 by Fadia Parks RN  Head of Bed ([facility]) Positioning: HOB at 15 degrees  Goal: Effective Breathing Pattern During Sleep  Outcome: Progressing  Intervention: Monitor and Manage Obstructive Sleep Apnea  Recent Flowsheet Documentation  Taken 3/29/2024 0020 by Faida Parks RN  Medication Review/Management: medications reviewed   Goal Outcome Evaluation:       Pt denies pain and nausea. Oxygen 95% on 1L NC. Pt denies cough. No sob noted. Slept well

## 2024-03-30 ENCOUNTER — APPOINTMENT (OUTPATIENT)
Dept: OCCUPATIONAL THERAPY | Facility: HOSPITAL | Age: 89
DRG: 291 | End: 2024-03-30
Payer: MEDICARE

## 2024-03-30 VITALS
HEART RATE: 63 BPM | BODY MASS INDEX: 31.52 KG/M2 | OXYGEN SATURATION: 94 % | TEMPERATURE: 98 F | HEIGHT: 63 IN | WEIGHT: 177.9 LBS | RESPIRATION RATE: 20 BRPM | DIASTOLIC BLOOD PRESSURE: 56 MMHG | SYSTOLIC BLOOD PRESSURE: 120 MMHG

## 2024-03-30 LAB
ANION GAP SERPL CALCULATED.3IONS-SCNC: 8 MMOL/L (ref 7–15)
BUN SERPL-MCNC: 46.5 MG/DL (ref 8–23)
CALCIUM SERPL-MCNC: 9.3 MG/DL (ref 8.2–9.6)
CHLORIDE SERPL-SCNC: 99 MMOL/L (ref 98–107)
CREAT SERPL-MCNC: 1.26 MG/DL (ref 0.51–0.95)
DEPRECATED HCO3 PLAS-SCNC: 29 MMOL/L (ref 22–29)
EGFRCR SERPLBLD CKD-EPI 2021: 40 ML/MIN/1.73M2
GLUCOSE BLDC GLUCOMTR-MCNC: 129 MG/DL (ref 70–99)
GLUCOSE BLDC GLUCOMTR-MCNC: 159 MG/DL (ref 70–99)
GLUCOSE SERPL-MCNC: 127 MG/DL (ref 70–99)
MAGNESIUM SERPL-MCNC: 2.2 MG/DL (ref 1.7–2.3)
POTASSIUM SERPL-SCNC: 3.9 MMOL/L (ref 3.4–5.3)
SODIUM SERPL-SCNC: 136 MMOL/L (ref 135–145)

## 2024-03-30 PROCEDURE — 250N000013 HC RX MED GY IP 250 OP 250 PS 637: Performed by: STUDENT IN AN ORGANIZED HEALTH CARE EDUCATION/TRAINING PROGRAM

## 2024-03-30 PROCEDURE — 99239 HOSP IP/OBS DSCHRG MGMT >30: CPT | Performed by: INTERNAL MEDICINE

## 2024-03-30 PROCEDURE — 97110 THERAPEUTIC EXERCISES: CPT | Mod: GO

## 2024-03-30 PROCEDURE — 250N000013 HC RX MED GY IP 250 OP 250 PS 637: Performed by: INTERNAL MEDICINE

## 2024-03-30 PROCEDURE — 36415 COLL VENOUS BLD VENIPUNCTURE: CPT | Performed by: STUDENT IN AN ORGANIZED HEALTH CARE EDUCATION/TRAINING PROGRAM

## 2024-03-30 PROCEDURE — 83735 ASSAY OF MAGNESIUM: CPT | Performed by: STUDENT IN AN ORGANIZED HEALTH CARE EDUCATION/TRAINING PROGRAM

## 2024-03-30 PROCEDURE — 80048 BASIC METABOLIC PNL TOTAL CA: CPT | Performed by: STUDENT IN AN ORGANIZED HEALTH CARE EDUCATION/TRAINING PROGRAM

## 2024-03-30 RX ORDER — CEFDINIR 300 MG/1
300 CAPSULE ORAL DAILY
Status: DISCONTINUED | OUTPATIENT
Start: 2024-03-30 | End: 2024-03-30 | Stop reason: HOSPADM

## 2024-03-30 RX ORDER — FUROSEMIDE 40 MG
40 TABLET ORAL
Qty: 60 TABLET | Refills: 1 | Status: SHIPPED | OUTPATIENT
Start: 2024-03-30 | End: 2024-04-04 | Stop reason: DRUGHIGH

## 2024-03-30 RX ORDER — AZITHROMYCIN 250 MG/1
250 TABLET, FILM COATED ORAL ONCE
Status: COMPLETED | OUTPATIENT
Start: 2024-03-30 | End: 2024-03-30

## 2024-03-30 RX ORDER — CEFDINIR 300 MG/1
300 CAPSULE ORAL DAILY
Qty: 5 CAPSULE | Refills: 0 | Status: SHIPPED | OUTPATIENT
Start: 2024-03-31 | End: 2024-04-05

## 2024-03-30 RX ADMIN — CARBIDOPA AND LEVODOPA 1 TABLET: 25; 100 TABLET ORAL at 08:15

## 2024-03-30 RX ADMIN — CARVEDILOL 12.5 MG: 12.5 TABLET, FILM COATED ORAL at 08:14

## 2024-03-30 RX ADMIN — PANTOPRAZOLE SODIUM 40 MG: 20 TABLET, DELAYED RELEASE ORAL at 07:22

## 2024-03-30 RX ADMIN — INSULIN ASPART 1 UNITS: 100 INJECTION, SOLUTION INTRAVENOUS; SUBCUTANEOUS at 12:17

## 2024-03-30 RX ADMIN — AZITHROMYCIN DIHYDRATE 250 MG: 250 TABLET, FILM COATED ORAL at 08:14

## 2024-03-30 RX ADMIN — MAGNESIUM OXIDE TAB 400 MG (241.3 MG ELEMENTAL MG) 200 MG: 400 (241.3 MG) TAB at 08:15

## 2024-03-30 RX ADMIN — GABAPENTIN 300 MG: 300 CAPSULE ORAL at 12:18

## 2024-03-30 RX ADMIN — POLYPROPYLENE GLYCOL 400, PROPYLENE GLYCOL 1 DROP: .4; .3 LIQUID OPHTHALMIC at 09:51

## 2024-03-30 RX ADMIN — GABAPENTIN 300 MG: 300 CAPSULE ORAL at 08:15

## 2024-03-30 RX ADMIN — FUROSEMIDE 40 MG: 20 TABLET ORAL at 08:15

## 2024-03-30 RX ADMIN — CEFDINIR 300 MG: 300 CAPSULE ORAL at 08:14

## 2024-03-30 RX ADMIN — FLUOXETINE HYDROCHLORIDE 20 MG: 20 CAPSULE ORAL at 08:15

## 2024-03-30 RX ADMIN — CARBIDOPA AND LEVODOPA 1 TABLET: 25; 100 TABLET ORAL at 13:28

## 2024-03-30 ASSESSMENT — ACTIVITIES OF DAILY LIVING (ADL)
ADLS_ACUITY_SCORE: 38

## 2024-03-30 NOTE — DISCHARGE SUMMARY
[facility]  Hospitalist Discharge Summary      Date of Admission:  3/26/2024  Date of Discharge:  3/30/2024  3:05 PM  Discharging Provider: Ko Caceres,   Discharge Service: Hospitalist Service        Follow-ups Needed After Discharge   Follow-up Appointments     Follow-up and recommended labs and tests       Follow up with primary care provider, Maggie Arriaga, within 7 days for   hospital follow- up.            Unresulted Labs Ordered in the Past 30 Days of this Admission       No orders found from 2/25/2024 to 3/27/2024.        These results will be followed up by Hospitalist results pool    Discharge Disposition   Discharged to home  Condition at discharge: Stable      Hospital Course     89 year old female with PMH of diastolic CHF, CAD, s/p CABG 2010, DM 2, HLD, admitted on 3/26/2024 with respiratory failure secondary to CHF exacerbation and CAP     Acute exacerbation of chronic HFpEF  Presented with 1-2 weeks of worsening shortness of breath. Initially requiring 2L O2 without a baseline. Patient checks weight daily and denies any weight gain. States compliance with restricted salt diet and medications. ProBNP elevated 5700. CT chest showed diffuse pulmonary edema with trace bilateral pleural effusions, multiple new nodular opacities in right lung suggestive of superimposed infection/inflammatory process.  - Cardiology conulted  - TTE 3/27/24 with EF 55-60%, biatrial enlargement, Moderate to severe mitral insufficiency, Mild to moderate mitral stenosis  - plan discharge home on Lasix 40mg PO BID    - continue home coreg and lisinopril  - outpatient CHF follow up       CAD, s/p CABG  Nuc med stress test in May 2023 showed inferior and basal wall inducible ischemia. Cardiology and patient ultimately decided against coronary angiogram given lack of anginal symptoms and impaired renal function.  - Continue home ASA 81mg daily and atorvastatin       Community-acquired  pneumonia  Multiple nodular opacities in the right lung. RVP negative. Urine strep pneumo and legionella antigen negative. Normal procalcitonin. Suspect less of a contribution of pneumonia to her presenting symptoms relative to CHF, but given CT findings will treat.  - Improved with ceftriaxone/azithromycin, completed course of azithro  - plan discharge home on 5 additional days of cefdinir   - Repeat CT in 3 months to evaluate for resolution of nodules.       Parkinsons disease  - Continue home Sinemet       GERD  - Continue home PPI      Mood disorder: continue home Prozac      Suspect GERMAN.  Seen by sleep medicine on 1/9/2024, recommended polysomnography.  This has not been completed yet.       Moderate tracheobronchomalacia, per recent pulmonology visit on 12/12/2023  PFT showed normal spirometry, lung volumes and diffusion capacity.  - outpatient follow up       Pre-DM  A1C 6.2  - outpatient follow up      Consultations This Hospital Stay   CORE CLINIC EVALUATION IP CONSULT  OCCUPATIONAL THERAPY ADULT IP CONSULT  NUTRITION SERVICES ADULT IP CONSULT  CARE MANAGEMENT / SOCIAL WORK IP CONSULT  PHYSICAL THERAPY ADULT IP CONSULT  OCCUPATIONAL THERAPY ADULT IP CONSULT  CARDIOLOGY IP CONSULT    Code Status   No CPR- Do NOT Intubate    Time Spent on this Encounter   IKo DO, personally saw the patient today and spent greater than 30 minutes discharging this patient.       Ko Caceres DO  [place] [id number]  Phone: [phone]  Fax: [phone]  ______________________________________________________________________    Physical Exam   Vital Signs: Temp: 98  F (36.7  C) Temp src: Oral BP: 120/56 Pulse: 63   Resp: 20 SpO2: 94 % O2 Device: Nasal cannula Oxygen Delivery: 1/2 LPM  Weight: 177 lbs 14.4 oz    General: NAD  HEENT: Without congestion or inflammation  RESPIRATORY: Respirations nonlabored  CARDIOVASCULAR: No le edema  bilat.  NEUROLOGIC: No focal arm or leg weakness, speech is clear    Primary Care Physician   Maggie Arriaga    Discharge Orders      Home Care Referral      Primary Care - Care Coordination Referral      Reason for your hospital stay    CHF and pneumonia     Follow-up and recommended labs and tests     Follow up with primary care provider, Maggie Arriaga, within 7 days for hospital follow- up.     Activity    Your activity upon discharge: activity as tolerated     Diet    Follow this diet upon discharge: Orders Placed This Encounter      Combination Diet Low Saturated Fat Na <2400mg Diet     \    Discharge Medications   Discharge Medication List as of 3/30/2024  2:14 PM        START taking these medications    Details   cefdinir (OMNICEF) 300 MG capsule Take 1 capsule (300 mg) by mouth daily for 5 days, Disp-5 capsule, R-0, E-Prescribe           CONTINUE these medications which have CHANGED    Details   furosemide (LASIX) 40 MG tablet Take 1 tablet (40 mg) by mouth 2 times daily, Disp-60 tablet, R-1, E-Prescribe           CONTINUE these medications which have NOT CHANGED    Details   aspirin 81 mg chewable tablet [ASPIRIN 81 MG CHEWABLE TABLET] Chew 81 mg at bedtime., Historical      atorvastatin (LIPITOR) 20 MG tablet TAKE 1 TABLET BY MOUTH AT  BEDTIME, Disp-90 tablet, R-3, E-PrescribePlease send a replace/new response with 90-Day Supply if appropriate to maximize member benefit. Requesting 1 year supply.      carbidopa-levodopa (SINEMET)  MG tablet TAKE 1 TABLET BY MOUTH 3  TIMES DAILY TAKE AT LEAST  1/2 HOUR BEFORE MEALS OR 2  HOURS AFTER MEALS DO NOT  MIX WITH FOOD, Disp-270 tablet, R-3, E-Prescribe      carvedilol (COREG) 12.5 MG tablet Take 1 tablet (12.5 mg) by mouth 2 times daily (with meals), Disp-180 tablet, R-3, E-Prescribe      cholecalciferol, vitamin D3, 1,000 unit tablet Take 1,000 Units by mouth 2 times daily, Historical      coenzyme Q10 (CO Q-10) 100 mg capsule [COENZYME Q10 (CO Q-10) 100  "MG CAPSULE] Take 100 mg by mouth 2 (two) times a day., Historical      cyanocobalamin (VITAMIN B-12) 1000 MCG tablet Take 1,000 mcg by mouth every evening, Historical      fish oil-omega-3 fatty acids 1000 MG capsule Take 1 g by mouth 2 times daily, Historical      FLUoxetine (PROZAC) 20 MG capsule Take 1 capsule (20 mg) by mouth 2 times daily, Disp-180 capsule, R-1, E-Prescribe      gabapentin (NEURONTIN) 300 MG capsule TAKE 1 CAPSULE BY MOUTH 4  TIMES DAILY, Disp-360 capsule, R-3, E-Prescribe      lisinopril (ZESTRIL) 20 MG tablet Take 20 mg by mouth every evening, Historical      loratadine (CLARITIN) 10 mg tablet Take 10 mg by mouth daily as needed for allergies, Historical      magnesium oxide 250 mg Tab Take 250 mg by mouth every morning, Historical      melatonin 1 mg Tab tablet Take 1 mg by mouth At Bedtime, Historical      multivitamin therapeutic tablet Take 1 tablet by mouth every morning, Historical      nitroGLYcerin (NITROSTAT) 0.4 MG sublingual tablet Place 1 tablet (0.4 mg) under the tongue every 5 minutes as needed for chest pain, Disp-100 tablet, R-1, E-Prescribe      omeprazole (PRILOSEC) 20 MG DR capsule Take 1 capsule (20 mg) by mouth 2 times daily, Disp-180 capsule, R-3, E-Prescribe      polyethylene glycol (MIRALAX) 17 gram packet Take 17 g by mouth daily as needed for constipation, Historical           Allergies   Allergies   Allergen Reactions    Alendronate [Alendronate] Unknown     pain    Codeine Hives    Hydrocodone-Acetaminophen Other (See Comments)     Highly sensitive, \"knocks her out and can't wake up\"  3/26/24 verbal with pt plain acetaminophen is ok to take    Other Drug Allergy (See Comments)     Risedronate Unknown     Bone pain    Rosuvastatin Muscle Pain (Myalgia)    Simvastatin Muscle Pain (Myalgia)         "

## 2024-03-30 NOTE — PLAN OF CARE
Problem: Adult Inpatient Plan of Care  Goal: Plan of Care Review  Description: The Plan of Care Review/Shift note should be completed every shift.  The Outcome Evaluation is a brief statement about your assessment that the patient is improving, declining, or no change.  This information will be displayed automatically on your shift  note.  Outcome: Progressing     Problem: Heart Failure  Goal: Optimal Coping  Outcome: Progressing  Intervention: Support Psychosocial Response  Recent Flowsheet Documentation  Taken 3/29/2024 1627 by Dedra Key, RN  Supportive Measures: active listening utilized     Problem: Comorbidity Management  Goal: Maintenance of Asthma Control  Outcome: Progressing  Intervention: Maintain Asthma Symptom Control  Recent Flowsheet Documentation  Taken 3/29/2024 1627 by Dedra Key, RN  Medication Review/Management: medications reviewed   Goal Outcome Evaluation:                  Pt. Is A&OX4. She is on mag and K+ protocol recheck in the morning. She ambulates with a standby assist. She is on telemetry. She is on 1 L oxygen.

## 2024-03-30 NOTE — PLAN OF CARE
Problem: Adult Inpatient Plan of Care  Goal: Optimal Comfort and Wellbeing  Outcome: Progressing  Problem: Pneumonia  Goal: Effective Oxygenation and Ventilation  Outcome: Progressing  Problem: Heart Failure  Goal: Optimal Functional Ability  Outcome: Progressing  Problem: Heart Failure  Goal: Effective Oxygenation and Ventilation  Outcome: Progressing  Problem: Comorbidity Management  Goal: Blood Glucose Levels Within Targeted Range  Outcome: Progressing     AOx4, VSS, weaned down to RA this shift. Denies pain. Infrequent cough observed this shift, pt denies SoB, but states legs get tired over time with exertion. Home O2 eval completed today, pt did not qualify for O2. Discharged with family at approx 1500. No acute events reported at this time.    Richard Murray RN

## 2024-03-30 NOTE — PROGRESS NOTES
Patient has been assessed for Home Oxygen needs.     Pulse oximetry (SpO2) and Oxygen flow readings:    SpO2 = 92% on room air at rest while awake.    SpO2 improved to n/a% on n/a liters/minute at rest.    SpO2 = 89% on room air during activity/with exercise.    *SpO2 improved to n/a% on n/a liters/minute during activity/with exercise.    Richard Murray RN

## 2024-03-30 NOTE — PROGRESS NOTES
Occupational Therapy Discharge Summary    Reason for therapy discharge:    Discharged to home.    Progress towards therapy goal(s). See goals on Care Plan in [facility] electronic health record for goal details.  Goals met    Therapy recommendation(s):    Continued therapy is recommended.  Rationale/Recommendations:  Home OT recommend - pt declining at this time.

## 2024-03-30 NOTE — PROGRESS NOTES
Care Management Discharge Note    Discharge Date: 03/30/2024       Discharge Disposition:  home    Discharge Services:  none    Discharge DME:  none    Discharge Transportation: family or friend will provide    Private pay costs discussed: Not applicable    Does the patient's insurance plan have a 3 day qualifying hospital stay waiver?  No    PAS Confirmation Code:    Patient/family educated on Medicare website which has current facility and service quality ratings:      Education Provided on the Discharge Plan:    Persons Notified of Discharge Plans: yes  Patient/Family in Agreement with the Plan:      Handoff Referral Completed: Yes    Additional Information:  Pt adequate for discharge. Pt declines the therapy recommendation for home care. No further CM needs at this time. CM will sign off.    Andie Franco RN

## 2024-03-30 NOTE — PLAN OF CARE
Problem: Adult Inpatient Plan of Care  Goal: Plan of Care Review  Description: The Plan of Care Review/Shift note should be completed every shift.  The Outcome Evaluation is a brief statement about your assessment that the patient is improving, declining, or no change.  This information will be displayed automatically on your shift  note.  Outcome: Progressing     Problem: Heart Failure  Goal: Optimal Coping  Outcome: Progressing  Intervention: Support Psychosocial Response  Recent Flowsheet Documentation  Taken 3/30/2024 0043 by Zeynep Washington RN  Supportive Measures: active listening utilized   Goal Outcome Evaluation:               Pt alert and oriented x 4,denied having any pain,no c/o SOB,VSS,slept most of the night.

## 2024-03-31 LAB
ATRIAL RATE - MUSE: 81 BPM
DIASTOLIC BLOOD PRESSURE - MUSE: 91 MMHG
INTERPRETATION ECG - MUSE: NORMAL
P AXIS - MUSE: 17 DEGREES
PR INTERVAL - MUSE: 162 MS
QRS DURATION - MUSE: 152 MS
QT - MUSE: 428 MS
QTC - MUSE: 497 MS
R AXIS - MUSE: -40 DEGREES
SYSTOLIC BLOOD PRESSURE - MUSE: 202 MMHG
T AXIS - MUSE: 106 DEGREES
VENTRICULAR RATE- MUSE: 81 BPM

## 2024-04-01 ENCOUNTER — PATIENT OUTREACH (OUTPATIENT)
Dept: CARE COORDINATION | Facility: CLINIC | Age: 89
End: 2024-04-01
Payer: MEDICARE

## 2024-04-01 ENCOUNTER — MEDICAL CORRESPONDENCE (OUTPATIENT)
Dept: HEALTH INFORMATION MANAGEMENT | Facility: CLINIC | Age: 89
End: 2024-04-01

## 2024-04-01 ENCOUNTER — TELEPHONE (OUTPATIENT)
Dept: FAMILY MEDICINE | Facility: CLINIC | Age: 89
End: 2024-04-01
Payer: MEDICARE

## 2024-04-01 NOTE — Clinical Note
FYI - spoke with pt for post hospital follow-up call. Pt is doing well at home; would like assistance setting up hospital follow-up visit (to be coordinated through daughter Tg Coffeydigna)

## 2024-04-01 NOTE — PROGRESS NOTES
Clinic Care Coordination Contact  [facility]: Post-Discharge Note  SITUATION                                                      Admission:    Admission Date: 04/26/24   Reason for Admission: Shortness of Breath  Discharge:   Discharge Date: 04/30/24  Discharge Diagnosis: CHF and pneumonia    BACKGROUND                                                      Per hospital discharge summary and inpatient provider notes:  89 year old female with PMH of diastolic CHF, CAD, s/p CABG 2010, DM 2, HLD, admitted on 3/26/2024 with respiratory failure secondary to CHF exacerbation and CAP     Acute exacerbation of chronic HFpEF  Presented with 1-2 weeks of worsening shortness of breath. Initially requiring 2L O2 without a baseline. Patient checks weight daily and denies any weight gain. States compliance with restricted salt diet and medications. ProBNP elevated 5700. CT chest showed diffuse pulmonary edema with trace bilateral pleural effusions, multiple new nodular opacities in right lung suggestive of superimposed infection/inflammatory process.  - Cardiology conulted  - TTE 3/27/24 with EF 55-60%, biatrial enlargement, Moderate to severe mitral insufficiency, Mild to moderate mitral stenosis  - plan discharge home on Lasix 40mg PO BID    - continue home coreg and lisinopril  - outpatient CHF follow up     ASSESSMENT           Discharge Assessment  How are you doing now that you are home?: Doing well - happy to be home; no questiosn or concerns; has all medications and resting as much as possible  How are your symptoms? (Red Flag symptoms escalate to triage hotline per guidelines): Improved  Does the patient have their discharge instructions? : Yes  Does the patient have questions regarding their discharge instructions? : No  Were you started on any new medications or were there changes to any of your previous medications? : Yes  Does the patient have all of their medications?: Yes  Do you have questions regarding any  of your medications? : No  Discharge follow-up appointment scheduled within 14 calendar days? : No  Is patient agreeable to assistance with scheduling? : Yes         Post-op (Clinicians Only)  Did the patient have surgery or a procedure: No  Fever: No  Chills: No  Eating & Drinking: eating and drinking without complaints/concerns  PO Intake: regular diet;low fat diet        PLAN                                                      Outpatient Plan:  Follow-up Appointments     Follow-up and recommended labs and tests       Follow up with primary care provider, Maggie Arriaga, within 7 days for   hospital follow- up.      SWCC and pt spoke about how things are going at home. Pt noted they are doing well and happy to be home; trying to rest as much as possible. Pt noted they have no questions or concerns - denied need for CCC; program not opened. Pt noted they have all medications and family support. Pt would like assistance setting up PCP appointment : to be coordinated with daughter Kaylynn. [facility] updated PCP's team. No further outreach from this [facility].     Future Appointments   Date Time Provider Department Center   4/12/2024  7:50 AM [facility]   4/30/2024  4:00 PM Eric Christensen MD MDENDO [facility]   5/10/2024  4:20 PM Maggie Arriaga MD SWFMOB [facility]WT   5/10/2024  8:00 PM BK BED 4 [facility]SC BK SLEEP   5/23/2024  8:00 AM Jhonatan Cast, DO [facility]   6/27/2024  3:45 PM STWT LAB SWLABR [facility] STWT   7/3/2024  3:00 PM MPLW ENDOCRINOLOGY CSS MARCUSO [facility]   8/20/2024  3:30 PM Junaid Scales MD MBPULM [facility]   9/6/2024  7:50 AM Brennon Moya MD NUNEU [facility]         For any urgent concerns, please contact our 24 hour nurse triage line: [phone] ([phone])         CELIA Watts

## 2024-04-01 NOTE — TELEPHONE ENCOUNTER
Home Care is calling regarding an established patient with [facility].       Requesting orders from: Maggie Arriaga  Provider is following patient: No       Home Care start of care visit for post hospital discharge (admitted at  3/26/24 to 3/30/24) completed today with SN.    Noted below med discrepancies:  Furosemide 40 mg BID ordered on discharge, patient was taking previous dose of 20 mg BID to finish the bottle she had at home, not realizing that she was supposed to change to 40 mg BID.  Has new prescription at home and understands her medications and will start taking 40 mg BID.  Magnesium Oxide 250 mg daily ordered on discharge, patient has been taking 400 mg daily as she has this at home.      Orders Requested    Skilled Nursing  Request for initial certification (first set of orders)   Frequency:  2x/wk for 1 wks, 1x/wk for 2 wks, and then every other wk for 6 wks    Occupational Therapy  Request for initial evaluation and treatment (one time) (first set of orders)   For energy conservation and ADLs    Information was gathered and will be sent to provider for review.  RN will contact Home Care with information after provider review.  Information was gathered and will be sent to provider to confirm provider will be following patient.  RN will contact Home Care with information after provider review.  Confirmed ok to leave a detailed message with call back.  Contact information confirmed and updated as needed.    Itzel Bird RN

## 2024-04-01 NOTE — TELEPHONE ENCOUNTER
Yanelis Whalen, CELIA  P [facility] Care Team Pool  FYI - spoke with pt for post hospital follow-up call. Pt is doing well at home; would like assistance setting up hospital follow-up visit (to be coordinated through daughter Tg Akins)

## 2024-04-02 NOTE — TELEPHONE ENCOUNTER
Called Ayesha with [facility] and provided verbal orders per Dr. Arriaga.  No further action required of RN at this time.      Itzel Bird RN  [facility]

## 2024-04-02 NOTE — TELEPHONE ENCOUNTER
Patient has appointment with Dr. Arriaga on 4/4/24 for hospital follow up appointment.    Home care RN was out to see patient for start of care visit on 4/1/24 and called to reports med discrepancies (FYI) and requested orders to confirm Dr. Arriaga will follow patient for home care/orders, skilled nursing first set of orders, and OT evaluation and treat.  Please advise on orders.       Itzel Bird RN  [facility]

## 2024-04-04 ENCOUNTER — OFFICE VISIT (OUTPATIENT)
Dept: FAMILY MEDICINE | Facility: CLINIC | Age: 89
End: 2024-04-04
Payer: MEDICARE

## 2024-04-04 VITALS
RESPIRATION RATE: 18 BRPM | DIASTOLIC BLOOD PRESSURE: 54 MMHG | HEIGHT: 63 IN | WEIGHT: 178.4 LBS | HEART RATE: 64 BPM | BODY MASS INDEX: 31.61 KG/M2 | TEMPERATURE: 97.9 F | SYSTOLIC BLOOD PRESSURE: 118 MMHG | OXYGEN SATURATION: 96 %

## 2024-04-04 DIAGNOSIS — J98.09 BRONCHOMALACIA: ICD-10-CM

## 2024-04-04 DIAGNOSIS — K21.9 GASTROESOPHAGEAL REFLUX DISEASE WITHOUT ESOPHAGITIS: ICD-10-CM

## 2024-04-04 DIAGNOSIS — N25.81 SECONDARY RENAL HYPERPARATHYROIDISM (H): ICD-10-CM

## 2024-04-04 DIAGNOSIS — I50.20 HEART FAILURE WITH REDUCED EJECTION FRACTION (H): Primary | ICD-10-CM

## 2024-04-04 DIAGNOSIS — I25.810 CORONARY ARTERY DISEASE INVOLVING CORONARY BYPASS GRAFT OF NATIVE HEART WITHOUT ANGINA PECTORIS: ICD-10-CM

## 2024-04-04 DIAGNOSIS — I10 BENIGN ESSENTIAL HYPERTENSION: ICD-10-CM

## 2024-04-04 DIAGNOSIS — J15.9 COMMUNITY ACQUIRED BACTERIAL PNEUMONIA: ICD-10-CM

## 2024-04-04 DIAGNOSIS — E83.42 HYPOMAGNESEMIA: ICD-10-CM

## 2024-04-04 DIAGNOSIS — N18.32 STAGE 3B CHRONIC KIDNEY DISEASE (H): ICD-10-CM

## 2024-04-04 DIAGNOSIS — E11.42 TYPE 2 DIABETES MELLITUS WITH DIABETIC POLYNEUROPATHY, WITHOUT LONG-TERM CURRENT USE OF INSULIN (H): ICD-10-CM

## 2024-04-04 DIAGNOSIS — E03.8 SUBCLINICAL HYPOTHYROIDISM: ICD-10-CM

## 2024-04-04 DIAGNOSIS — M81.0 AGE-RELATED OSTEOPOROSIS WITHOUT CURRENT PATHOLOGICAL FRACTURE: ICD-10-CM

## 2024-04-04 DIAGNOSIS — I73.9 PAD (PERIPHERAL ARTERY DISEASE) (H): ICD-10-CM

## 2024-04-04 DIAGNOSIS — G20.A1 PARKINSON'S DISEASE, UNSPECIFIED WHETHER DYSKINESIA PRESENT, UNSPECIFIED WHETHER MANIFESTATIONS FLUCTUATE (H): ICD-10-CM

## 2024-04-04 DIAGNOSIS — R91.8 PULMONARY NODULES: ICD-10-CM

## 2024-04-04 PROBLEM — M80.00XD AGE-RELATED OSTEOPOROSIS WITH CURRENT PATHOLOGICAL FRACTURE WITH ROUTINE HEALING: Status: RESOLVED | Noted: 2022-12-02 | Resolved: 2024-04-04

## 2024-04-04 PROCEDURE — 99495 TRANSJ CARE MGMT MOD F2F 14D: CPT | Performed by: FAMILY MEDICINE

## 2024-04-04 RX ORDER — FUROSEMIDE 20 MG
60 TABLET ORAL DAILY
Qty: 90 TABLET | Refills: 0 | Status: SHIPPED | OUTPATIENT
Start: 2024-04-04 | End: 2024-04-30

## 2024-04-04 RX ORDER — MULTIVITAMIN WITH IRON
2 TABLET ORAL DAILY
COMMUNITY
Start: 2024-04-04

## 2024-04-04 NOTE — ASSESSMENT & PLAN NOTE
Diabetes is improving/stable/worsening: improving with lifestyle modifications.  Continue current treatment regimen.  Diabetes will be reassessed in 3 months.  Most recent A1c was 6.2.

## 2024-04-04 NOTE — ASSESSMENT & PLAN NOTE
Pulmonary nodules noted during hospitalization-repeat CT scan in 3 months is recommended.  This was ordered today.

## 2024-04-04 NOTE — Clinical Note
Madi Pulliam, I believe you are following this patient for congestive heart failure.    I saw her in follow-up for her hospitalization  Hypertension -  blood pressure is low today at 118/54 given she is 89 years old we discussed this.  She says she is getting lightheaded.  Because of falls risk I have recommended she reduce her Lasix cautiously continuing to watch her weight given her recent hospitalization for congestive heart failure.   Discontinue Lasix 80 mg p.o. daily Start Lasix 60 mg p.o. daily Continue carvedilol 12.5mg po q day continue lisinopril 20 mg po q day.

## 2024-04-04 NOTE — PROGRESS NOTES
I spent a total of 56 minutes with this patient, and review of the medical record and with documentation.  This time was spent on the day of service.          Assessment & Plan   Problem List Items Addressed This Visit          Nervous and Auditory    Parkinson's disease, unspecified whether dyskinesia present, unspecified whether manifestations fluctuate (H)     Managed by Dr. Moya and neurology continue Sinemet         Type 2 diabetes mellitus with diabetic polyneuropathy, without long-term current use of insulin (H)     Diabetes is improving/stable/worsening: improving with lifestyle modifications.  Continue current treatment regimen.  Diabetes will be reassessed in 3 months.  Most recent A1c was 6.2.            Respiratory    Bronchomalacia     Bronchomalacia-sees Dr. Garcia of pulmonology and has an appointment in August.  Sleep study has been recommended suspecting GERMAN.  I did recommend polysomnography to her today         Pulmonary nodules     Pulmonary nodules noted during hospitalization-repeat CT scan in 3 months is recommended.  This was ordered today.         Relevant Orders    CT Chest w/o Contrast       Digestive    GERD (gastroesophageal reflux disease)     Continue omeprazole 20 mg p.o. B ID            Endocrine    Subclinical hypothyroidism     Hypothyroidism-they have an appoint with Dr. Christensen at the end of April         Secondary renal hyperparathyroidism (H24)     Secondary renal hyperparathyroidism-they have an appoint with Dr. Christensen at the end of April            Circulatory    Benign essential hypertension     Hypertension blood pressure is low today at 118/54 given she is 89 years old we discussed this.  She says she is getting lightheaded.  Because of falls risk I have recommended she reduce her Lasix cautiously continuing to watch her weight given her recent hospitalization for congestive heart failure.    Discontinue Lasix 80 mg p.o. daily  Start Lasix 60 mg p.o.  daily  Continue carvedilol 12.5mg po q day  continue lisinopril 20 mg po q day.   I will send a message to Shasha Alcaraz of cardiology so cardiology is aware          Relevant Medications    furosemide (LASIX) 20 MG tablet    PAD (peripheral artery disease) (H24)    Heart failure with reduced ejection fraction (H) - Primary     Heart failure -managed by cardiology.  Today because her blood pressure was low I did decrease her furosemide from 80 mg p.o. daily to 60 mg p.o. daily because she was getting some lightheadedness and I do not want her to fall and hit her head.    In addition a handicapped parking sticker was given to her.         Relevant Medications    furosemide (LASIX) 20 MG tablet    Coronary artery disease involving coronary bypass graft of native heart without angina pectoris     Continue aspirin and atorvastatin -managed by cardiology            Musculoskeletal and Integumentary    Age-related osteoporosis without current pathological fracture     Osteoporosis-they have an appoint with Dr. Christensen at the end of April            Urinary    Stage 3b chronic kidney disease (H)     Chronic kidney disease-creatinine is 1.26 most recently and is stable/improved          Other Visit Diagnoses       Hypomagnesemia        Relevant Medications    magnesium 250 MG tablet    Community acquired bacterial pneumonia        She completed her course of antibiotics and is feeling much better.  CT scan planned in 3 months to follow-up pulmonary nodules                  MED REC REQUIRED  Post Medication Reconciliation Status: discharge medications reconciled and changed, per note/orders        Ally Marrero is a 89 year old, presenting for the following health issues:  Hospital F/U        4/4/2024     2:49 PM   Additional Questions   Roomed by Taqueria Henry MA   Accompanied by Daughter         4/4/2024     2:49 PM   Patient Reported Additional Medications   Patient reports taking the following new medications  "None     HPI         4/1/2024    10:05 AM   Post Discharge Outreach   Admission Date 4/26/2024   Reason for Admission Shortness of Breath   Discharge Date 4/30/2024   Discharge Diagnosis CHF and pneumonia   How are you doing now that you are home? Doing well - happy to be home; no questiosn or concerns; has all medications and resting as much as possible   How are your symptoms? (Red Flag symptoms escalate to triage hotline per guidelines) Improved   Does the patient have their discharge instructions?  Yes   Does the patient have questions regarding their discharge instructions?  No   Were you started on any new medications or were there changes to any of your previous medications?  Yes   Does the patient have all of their medications? Yes   Do you have questions regarding any of your medications?  No   Discharge follow-up appointment scheduled within 14 calendar days?  No     Hospital Follow-up Visit:    Hospital/Nursing Home/ Rehab Facility: [facility]  Date of Admission: 3/26/24  Date of Discharge: 3/30/24  Reason(s) for Admission: CHF    Was your hospitalization related to COVID-19? No   Problems taking medications regularly:  None  Medication changes since discharge: None  Problems adhering to non-medication therapy:  None    Summary of hospitalization:  [facility] discharge summary reviewed  Diagnostic Tests/Treatments reviewed.  Follow up needed: ct scan chest in 3 months, follow up with cardiology as planned, follow up with pulm 8/2024, follow up endo 4/2024  Other Healthcare Providers Involved in Patient s Care:         Specialist appointment - cardiology, endocrinology, pulmonology  Update since discharge: improved.    Plan of care communicated with patient and family             Objective    /54 (BP Location: Left arm, Patient Position: Sitting, Cuff Size: Adult Large)   Pulse 64   Temp 97.9  F (36.6  C) (Oral)   Resp 18   Ht 1.6 m (5' 3\")   Wt 80.9 " kg (178 lb 6.4 oz)   LMP  (LMP Unknown)   SpO2 96%   BMI 31.60 kg/m    Body mass index is 31.6 kg/m .  Physical Exam  Constitutional:       Appearance: Normal appearance.   HENT:      Head: Normocephalic and atraumatic.   Cardiovascular:      Rate and Rhythm: Normal rate and regular rhythm.      Heart sounds: Murmur (systolic murmur appreciated) heard.   Pulmonary:      Effort: Pulmonary effort is normal.      Breath sounds: Normal breath sounds.   Abdominal:      General: Bowel sounds are normal.      Palpations: Abdomen is soft.   Musculoskeletal:         General: Normal range of motion.      Cervical back: Normal range of motion and neck supple.   Neurological:      General: No focal deficit present.      Mental Status: She is alert and oriented to person, place, and time.                    Signed Electronically by: Maggie Arriaga MD

## 2024-04-04 NOTE — ASSESSMENT & PLAN NOTE
Heart failure -managed by cardiology.  Today because her blood pressure was low I did decrease her furosemide from 80 mg p.o. daily to 60 mg p.o. daily because she was getting some lightheadedness and I do not want her to fall and hit her head.    In addition a handicapped parking sticker was given to her.

## 2024-04-04 NOTE — ASSESSMENT & PLAN NOTE
Hypertension blood pressure is low today at 118/54 given she is 89 years old we discussed this.  She says she is getting lightheaded.  Because of falls risk I have recommended she reduce her Lasix cautiously continuing to watch her weight given her recent hospitalization for congestive heart failure.    Discontinue Lasix 80 mg p.o. daily  Start Lasix 60 mg p.o. daily  Continue carvedilol 12.5mg po q day  continue lisinopril 20 mg po q day.   I will send a message to Shasha Alcaraz of cardiology so cardiology is aware

## 2024-04-04 NOTE — ASSESSMENT & PLAN NOTE
Bronchomalacia-sees Dr. Garcia of pulmonology and has an appointment in August.  Sleep study has been recommended suspecting GERMAN.  I did recommend polysomnography to her today

## 2024-04-12 ENCOUNTER — MEDICAL CORRESPONDENCE (OUTPATIENT)
Dept: HEALTH INFORMATION MANAGEMENT | Facility: CLINIC | Age: 89
End: 2024-04-12

## 2024-04-12 DIAGNOSIS — Z53.9 DIAGNOSIS NOT YET DEFINED: Primary | ICD-10-CM

## 2024-04-12 PROCEDURE — G0180 MD CERTIFICATION HHA PATIENT: HCPCS | Performed by: FAMILY MEDICINE

## 2024-04-15 DIAGNOSIS — F33.42 RECURRENT MAJOR DEPRESSIVE DISORDER, IN FULL REMISSION (H): ICD-10-CM

## 2024-04-23 ASSESSMENT — ENCOUNTER SYMPTOMS
INCREASED ENERGY: 0
POLYPHAGIA: 0
WEIGHT LOSS: 1
ALTERED TEMPERATURE REGULATION: 0
DECREASED APPETITE: 1
FEVER: 0
WEIGHT GAIN: 0
NIGHT SWEATS: 0
FATIGUE: 1
CHILLS: 0
POLYDIPSIA: 0
HALLUCINATIONS: 0

## 2024-04-30 ENCOUNTER — OFFICE VISIT (OUTPATIENT)
Dept: CARDIOLOGY | Facility: CLINIC | Age: 89
End: 2024-04-30
Attending: INTERNAL MEDICINE
Payer: MEDICARE

## 2024-04-30 ENCOUNTER — OFFICE VISIT (OUTPATIENT)
Dept: ENDOCRINOLOGY | Facility: CLINIC | Age: 89
End: 2024-04-30
Payer: MEDICARE

## 2024-04-30 VITALS
BODY MASS INDEX: 31.64 KG/M2 | SYSTOLIC BLOOD PRESSURE: 143 MMHG | OXYGEN SATURATION: 94 % | WEIGHT: 178.6 LBS | HEART RATE: 69 BPM | DIASTOLIC BLOOD PRESSURE: 65 MMHG

## 2024-04-30 VITALS
HEIGHT: 63 IN | HEART RATE: 67 BPM | SYSTOLIC BLOOD PRESSURE: 144 MMHG | BODY MASS INDEX: 31.36 KG/M2 | RESPIRATION RATE: 16 BRPM | WEIGHT: 177 LBS | DIASTOLIC BLOOD PRESSURE: 68 MMHG

## 2024-04-30 DIAGNOSIS — S32.010S COMPRESSION FRACTURE OF L1 VERTEBRA, SEQUELA: ICD-10-CM

## 2024-04-30 DIAGNOSIS — S42.209S CLOSED FRACTURE OF PROXIMAL END OF HUMERUS, UNSPECIFIED FRACTURE MORPHOLOGY, UNSPECIFIED LATERALITY, SEQUELA: ICD-10-CM

## 2024-04-30 DIAGNOSIS — I50.20 HEART FAILURE WITH REDUCED EJECTION FRACTION (H): Primary | ICD-10-CM

## 2024-04-30 DIAGNOSIS — I10 BENIGN ESSENTIAL HYPERTENSION: ICD-10-CM

## 2024-04-30 DIAGNOSIS — I35.1 NONRHEUMATIC AORTIC VALVE INSUFFICIENCY: ICD-10-CM

## 2024-04-30 DIAGNOSIS — R94.6 THYROID FUNCTION TEST ABNORMAL: ICD-10-CM

## 2024-04-30 DIAGNOSIS — Z79.899 ENCOUNTER FOR MONITORING DENOSUMAB THERAPY: ICD-10-CM

## 2024-04-30 DIAGNOSIS — E23.6 EMPTY SELLA (H): ICD-10-CM

## 2024-04-30 DIAGNOSIS — F33.42 RECURRENT MAJOR DEPRESSIVE DISORDER, IN FULL REMISSION (H): ICD-10-CM

## 2024-04-30 DIAGNOSIS — M81.0 AGE-RELATED OSTEOPOROSIS WITHOUT CURRENT PATHOLOGICAL FRACTURE: ICD-10-CM

## 2024-04-30 DIAGNOSIS — I25.810 CORONARY ARTERY DISEASE INVOLVING CORONARY BYPASS GRAFT OF NATIVE HEART WITHOUT ANGINA PECTORIS: ICD-10-CM

## 2024-04-30 DIAGNOSIS — Z51.81 ENCOUNTER FOR MONITORING DENOSUMAB THERAPY: ICD-10-CM

## 2024-04-30 DIAGNOSIS — N18.30 STAGE 3 CHRONIC KIDNEY DISEASE, UNSPECIFIED WHETHER STAGE 3A OR 3B CKD (H): ICD-10-CM

## 2024-04-30 DIAGNOSIS — S62.109S CLOSED FRACTURE OF WRIST, UNSPECIFIED LATERALITY, SEQUELA: ICD-10-CM

## 2024-04-30 DIAGNOSIS — E21.0 PRIMARY HYPERPARATHYROIDISM (H): ICD-10-CM

## 2024-04-30 DIAGNOSIS — I34.0 NONRHEUMATIC MITRAL VALVE REGURGITATION: ICD-10-CM

## 2024-04-30 DIAGNOSIS — E83.52 HYPERCALCEMIA: ICD-10-CM

## 2024-04-30 PROCEDURE — 99214 OFFICE O/P EST MOD 30 MIN: CPT | Performed by: NURSE PRACTITIONER

## 2024-04-30 PROCEDURE — G2211 COMPLEX E/M VISIT ADD ON: HCPCS | Performed by: INTERNAL MEDICINE

## 2024-04-30 PROCEDURE — 99214 OFFICE O/P EST MOD 30 MIN: CPT | Performed by: INTERNAL MEDICINE

## 2024-04-30 RX ORDER — FUROSEMIDE 40 MG
TABLET ORAL
Qty: 90 TABLET | Refills: 1 | Status: SHIPPED | OUTPATIENT
Start: 2024-04-30

## 2024-04-30 RX ORDER — FUROSEMIDE 20 MG
TABLET ORAL
Qty: 90 TABLET | Refills: 3 | Status: SHIPPED | OUTPATIENT
Start: 2024-04-30

## 2024-04-30 NOTE — LETTER
4/30/2024    Maggie Arriaga MD  [address] [facility] [address]    RE: Janes Montero       Dear Colleague,     I had the pleasure of seeing Janes Montero in the [facility].        Assessment/Recommendations   Assessment:    1.  heart failure with improved ejection fraction, ejection fraction 55-60%, NYHA class II: Compensated.  She states she is feeling well.  Her weight has been stable.  She tries to follow a low-sodium diet.  2.  Hypertension: Blood pressure 144/68.  She states she has not taken her medications yet this morning.  3.  CAD: Denies angina.  Status post CABG in 2010.  She had a Lexiscan May 2023 that did show ischemia.  Medical therapy was advised.  She continues aspirin and atorvastatin    Plan:  1.  Continue current medications  2.  Continue monitoring daily weights and following a low-sodium diet  3.  Encouraged regular activity    Janes Montero will follow up with Dr. Ricardo in 4 months.     History of Present Illness/Subjective    Ms. Janes Montero is a 89 year old female seen at [facility] heart failure clinic today for continued follow-up.  Her daughter accompanies her today.  She follows up for heart failure with recovered ejection fraction.  She was hospitalized the end of March with acute heart failure and pneumonia.  She had an echocardiogram which showed ejection fraction of 55 to 60% with moderate to severe mitral regurgitation and mild to moderate aortic stenosis.  She has a past medical history significant for hypertension, CAD, suspected GERMAN, GERD, Parkinson's disease, status post CABG 2010, hyperlipidemia.    During the last clinic visit, her primary reduced her Lasix to 60 mg daily due to lower blood pressure.  Today, she states she is feeling well and has not noticed any new or worsening acute heart failure symptoms.  She denies lightheadedness, shortness of breath, orthopnea, PND, palpitations, chest pain, abdominal fullness/bloating,  "and lower extremity edema.      She is monitoring home weights which are stable.  She is following a low sodium diet.        ECHOCARDIOGRAM: 3/27/2024-reviewed  Interpretation Summary     The visual ejection fraction is 55-60%.  The right ventricle is normal in size and function.  Biatrial enlargement.  There is mod-severe to severe (3-4+) mitral regurgitation.  There is mild to moderate mitral stenosis.  Mild valvular aortic stenosis.  There is moderate (2+) tricuspid regurgitation.  The right ventricular systolic pressure is approximated at 56 mmHg.  Compared to the prior study dated 5/9/2023, there are changes as noted. There  is now moderate-severe mitral regurgitation and moderate tricuspid  regurgitation with moderate pulmonary hypertension.     Physical Examination Review of Systems   BP (!) 144/68 (BP Location: Left arm, Patient Position: Sitting, Cuff Size: Adult Regular)   Pulse 67   Resp 16   Ht 1.6 m (5' 3\")   Wt 80.3 kg (177 lb)   LMP  (LMP Unknown)   BMI 31.35 kg/m    Body mass index is 31.35 kg/m .  Wt Readings from Last 3 Encounters:   04/30/24 80.3 kg (177 lb)   04/04/24 80.9 kg (178 lb 6.4 oz)   03/28/24 80.7 kg (177 lb 14.4 oz)       General Appearance:   no acute distress   ENT/Mouth: No abnormalities   EYES:  no scleral icterus, normal conjunctivae   Neck: no thyromegaly   Chest/Lungs:   lungs are clear to auscultation, no rales or wheezing, equal chest wall expansion    Cardiovascular:   Regular. Normal first and second heart sounds with 2/6 systolic murmur, no rubs, or gallops, no edema bilaterally    Abdomen:  bowel sounds are present   Extremities: no cyanosis or clubbing   Skin: warm   Neurologic: no tremors     Psychiatric: alert and oriented x3    Enc Vitals  BP: (!) 144/68  Pulse: 67  Resp: 16  Weight: 80.3 kg (177 lb)  Height: 160 cm (5' 3\")                                         Medical History  Surgical History Family History Social History   Past Medical History:   Diagnosis " Date    Acute diastolic congestive heart failure (H)     Acute kidney injury superimposed on CKD  (H24) 2022    Acute on chronic congestive heart failure (H) 2018    Acute pulmonary edema (H) 2023    Acute respiratory failure with hypoxia (H) 2023    Age-related osteoporosis with current pathological fracture with routine healing     Chest pain, unspecified type 08/15/2022    Chronic bilateral back pain 06/15/2021    Last Assessment & Plan:   Formatting of this note might be different from the original.  She says she has seen a pain specialist and had back surgery.  She needs to establish care with a pain specialist since she is new to [place] from [place].  Referral placed.    Coronary artery disease     Coronary artery disease involving native coronary artery without angina pectoris, unspecified whether native or transplanted heart 2022    Diabetes mellitus, type II (H)     Diastolic dysfunction 07/10/2018    Frozen shoulder     bilateral    Heart failure with reduced ejection fraction (H) 2023    Hyperlipidemia     Hypertension     Hypertensive emergency     Parkinson disease (H)     Primary osteoarthritis involving multiple joints     Primary osteoarthritis of left knee     Recurrent UTI     hx septic shock    Thyroid disease     Past Surgical History:   Procedure Laterality Date    APPENDECTOMY      APPENDECTOMY      BACK SURGERY      L4-S1 laminectomy    BYPASS GRAFT ARTERY CORONARY      3 vessel     SECTION       SECTION      CORONARY ARTERY BYPASS      CV CORONARY ANGIOGRAM N/A 2022    Procedure: Coronary Angiogram;  Surgeon: Ignacio Baird MD;  Location: [facility] CATH LAB CV    HERNIORRHAPHY VENTRAL Left     HYSTERECTOMY      HYSTERECTOMY      JOINT REPLACEMENT Left     Total left knee    OTHER SURGICAL HISTORY      right ankle surgery    OTHER SURGICAL HISTORY      right forearm fracture    REPLACEMENT TOTAL KNEE Right     SHOULDER SURGERY Right      reversed shoulder surgery.    Family History   Problem Relation Age of Onset    Heart Disease Mother     Heart Disease Father     Sleep Apnea Daughter     Social History     Socioeconomic History    Marital status:      Spouse name: Not on file    Number of children: Not on file    Years of education: Not on file    Highest education level: Not on file   Occupational History    Not on file   Tobacco Use    Smoking status: Never     Passive exposure: Never    Smokeless tobacco: Never   Vaping Use    Vaping status: Never Used   Substance and Sexual Activity    Alcohol use: No    Drug use: No    Sexual activity: Not Currently     Partners: Male     Birth control/protection: None   Other Topics Concern    Parent/sibling w/ CABG, MI or angioplasty before 65F 55M? No   Social History Narrative    6/15/2021 new to [place] from [place]. She has 6 kids.     Social Determinants of Health     Financial Resource Strain: Low Risk  (11/3/2023)    Financial Resource Strain     Within the past 12 months, have you or your family members you live with been unable to get utilities (heat, electricity) when it was really needed?: No   Food Insecurity: Low Risk  (11/3/2023)    Food Insecurity     Within the past 12 months, did you worry that your food would run out before you got money to buy more?: No     Within the past 12 months, did the food you bought just not last and you didn t have money to get more?: No   Transportation Needs: Low Risk  (11/3/2023)    Transportation Needs     Within the past 12 months, has lack of transportation kept you from medical appointments, getting your medicines, non-medical meetings or appointments, work, or from getting things that you need?: No   Physical Activity: Not on file   Stress: Not on file   Social Connections: Not on file   Interpersonal Safety: Low Risk  (11/10/2023)    Interpersonal Safety     Do you feel physically and emotionally safe where you currently live?: Yes     Within the  past 12 months, have you been hit, slapped, kicked or otherwise physically hurt by someone?: No     Within the past 12 months, have you been humiliated or emotionally abused in other ways by your partner or ex-partner?: No   Housing Stability: Low Risk  (11/3/2023)    Housing Stability     Do you have housing? : Yes     Are you worried about losing your housing?: No          Medications  Allergies   Current Outpatient Medications   Medication Sig Dispense Refill    aspirin 81 mg chewable tablet [ASPIRIN 81 MG CHEWABLE TABLET] Chew 81 mg at bedtime.      atorvastatin (LIPITOR) 20 MG tablet TAKE 1 TABLET BY MOUTH AT  BEDTIME 90 tablet 3    carbidopa-levodopa (SINEMET)  MG tablet TAKE 1 TABLET BY MOUTH 3  TIMES DAILY TAKE AT LEAST  1/2 HOUR BEFORE MEALS OR 2  HOURS AFTER MEALS DO NOT  MIX WITH FOOD 270 tablet 3    carvedilol (COREG) 12.5 MG tablet Take 1 tablet (12.5 mg) by mouth 2 times daily (with meals) 180 tablet 3    cholecalciferol, vitamin D3, 1,000 unit tablet Take 1,000 Units by mouth 2 times daily      cyanocobalamin (VITAMIN B-12) 1000 MCG tablet Take 1,000 mcg by mouth every evening      FLUoxetine (PROZAC) 20 MG capsule TAKE 1 CAPSULE BY MOUTH TWICE  DAILY 180 capsule 0    furosemide (LASIX) 20 MG tablet Take with one 40 mg tablet daily 90 tablet 3    furosemide (LASIX) 40 MG tablet Take with one 20 mg tablet daily 90 tablet 1    gabapentin (NEURONTIN) 300 MG capsule TAKE 1 CAPSULE BY MOUTH 4  TIMES DAILY 360 capsule 3    lisinopril (ZESTRIL) 20 MG tablet Take 20 mg by mouth every evening      loratadine (CLARITIN) 10 mg tablet Take 10 mg by mouth daily as needed for allergies      magnesium 250 MG tablet Take 2 tablets (500 mg) by mouth daily      melatonin 1 mg Tab tablet Take 1 mg by mouth At Bedtime      multivitamin therapeutic tablet Take 1 tablet by mouth every morning      nitroGLYcerin (NITROSTAT) 0.4 MG sublingual tablet Place 1 tablet (0.4 mg) under the tongue every 5 minutes as needed for  "chest pain 100 tablet 1    omeprazole (PRILOSEC) 20 MG DR capsule Take 1 capsule (20 mg) by mouth 2 times daily 180 capsule 3    polyethylene glycol (MIRALAX) 17 gram packet Take 17 g by mouth daily as needed for constipation      Allergies   Allergen Reactions    Alendronate [Alendronate] Unknown     pain    Codeine Hives    Hydrocodone-Acetaminophen Other (See Comments)     Highly sensitive, \"knocks her out and can't wake up\"  3/26/24 verbal with pt plain acetaminophen is ok to take    Other Drug Allergy (See Comments)     Risedronate Unknown     Bone pain    Rosuvastatin Muscle Pain (Myalgia)    Simvastatin Muscle Pain (Myalgia)         Lab Results    Chemistry/lipid CBC Cardiac Enzymes/BNP/TSH/INR   Lab Results   Component Value Date    CHOL 145 06/19/2023    HDL 39 (L) 06/19/2023    TRIG 376 (H) 06/19/2023    BUN 46.5 (H) 03/30/2024     03/30/2024    CO2 29 03/30/2024    Lab Results   Component Value Date    WBC 6.9 03/27/2024    HGB 10.7 (L) 03/27/2024    HCT 35.1 03/27/2024    MCV 94 03/27/2024     03/29/2024    Lab Results   Component Value Date    TROPONINI 0.04 05/07/2023    BNP 82 05/11/2023    TSH 1.97 11/10/2023             This note has been dictated using voice recognition software. Any grammatical, typographical, or context distortions are unintentional and inherent to the software    30 minutes spent on the date of encounter doing chart review, review of outside records, review of test results, interpretation with above tests, patient visit, documentation, and discussion with family.                      Thank you for allowing me to participate in the care of your patient.      Sincerely,     Shasha Alcaraz, [facility]  cc:   Mela Poole MD  [address]  [place] [address]      "

## 2024-04-30 NOTE — PROGRESS NOTES
Assessment/Recommendations   Assessment:    1.  heart failure with improved ejection fraction, ejection fraction 55-60%, NYHA class II: Compensated.  She states she is feeling well.  Her weight has been stable.  She tries to follow a low-sodium diet.  2.  Hypertension: Blood pressure 144/68.  She states she has not taken her medications yet this morning.  3.  CAD: Denies angina.  Status post CABG in 2010.  She had a Lexiscan May 2023 that did show ischemia.  Medical therapy was advised.  She continues aspirin and atorvastatin    Plan:  1.  Continue current medications  2.  Continue monitoring daily weights and following a low-sodium diet  3.  Encouraged regular activity    Janes Montero will follow up with Dr. Ricardo in 4 months.     History of Present Illness/Subjective    Ms. Janes Montero is a 89 year old female seen at [facility] heart failure clinic today for continued follow-up.  Her daughter accompanies her today.  She follows up for heart failure with recovered ejection fraction.  She was hospitalized the end of March with acute heart failure and pneumonia.  She had an echocardiogram which showed ejection fraction of 55 to 60% with moderate to severe mitral regurgitation and mild to moderate aortic stenosis.  She has a past medical history significant for hypertension, CAD, suspected GERMAN, GERD, Parkinson's disease, status post CABG 2010, hyperlipidemia.    During the last clinic visit, her primary reduced her Lasix to 60 mg daily due to lower blood pressure.  Today, she states she is feeling well and has not noticed any new or worsening acute heart failure symptoms.  She denies lightheadedness, shortness of breath, orthopnea, PND, palpitations, chest pain, abdominal fullness/bloating, and lower extremity edema.      She is monitoring home weights which are stable.  She is following a low sodium diet.        ECHOCARDIOGRAM: 3/27/2024-reviewed  Interpretation Summary     The visual ejection  "fraction is 55-60%.  The right ventricle is normal in size and function.  Biatrial enlargement.  There is mod-severe to severe (3-4+) mitral regurgitation.  There is mild to moderate mitral stenosis.  Mild valvular aortic stenosis.  There is moderate (2+) tricuspid regurgitation.  The right ventricular systolic pressure is approximated at 56 mmHg.  Compared to the prior study dated 5/9/2023, there are changes as noted. There  is now moderate-severe mitral regurgitation and moderate tricuspid  regurgitation with moderate pulmonary hypertension.     Physical Examination Review of Systems   BP (!) 144/68 (BP Location: Left arm, Patient Position: Sitting, Cuff Size: Adult Regular)   Pulse 67   Resp 16   Ht 1.6 m (5' 3\")   Wt 80.3 kg (177 lb)   LMP  (LMP Unknown)   BMI 31.35 kg/m    Body mass index is 31.35 kg/m .  Wt Readings from Last 3 Encounters:   04/30/24 80.3 kg (177 lb)   04/04/24 80.9 kg (178 lb 6.4 oz)   03/28/24 80.7 kg (177 lb 14.4 oz)       General Appearance:   no acute distress   ENT/Mouth: No abnormalities   EYES:  no scleral icterus, normal conjunctivae   Neck: no thyromegaly   Chest/Lungs:   lungs are clear to auscultation, no rales or wheezing, equal chest wall expansion    Cardiovascular:   Regular. Normal first and second heart sounds with 2/6 systolic murmur, no rubs, or gallops, no edema bilaterally    Abdomen:  bowel sounds are present   Extremities: no cyanosis or clubbing   Skin: warm   Neurologic: no tremors     Psychiatric: alert and oriented x3    Enc Vitals  BP: (!) 144/68  Pulse: 67  Resp: 16  Weight: 80.3 kg (177 lb)  Height: 160 cm (5' 3\")                                         Medical History  Surgical History Family History Social History   Past Medical History:   Diagnosis Date    Acute diastolic congestive heart failure (H)     Acute kidney injury superimposed on CKD  (H24) 08/18/2022    Acute on chronic congestive heart failure (H) 06/11/2018    Acute pulmonary edema (H) " 2023    Acute respiratory failure with hypoxia (H) 2023    Age-related osteoporosis with current pathological fracture with routine healing     Chest pain, unspecified type 08/15/2022    Chronic bilateral back pain 06/15/2021    Last Assessment & Plan:   Formatting of this note might be different from the original.  She says she has seen a pain specialist and had back surgery.  She needs to establish care with a pain specialist since she is new to [place] from [place].  Referral placed.    Coronary artery disease     Coronary artery disease involving native coronary artery without angina pectoris, unspecified whether native or transplanted heart 2022    Diabetes mellitus, type II (H)     Diastolic dysfunction 07/10/2018    Frozen shoulder     bilateral    Heart failure with reduced ejection fraction (H) 2023    Hyperlipidemia     Hypertension     Hypertensive emergency     Parkinson disease (H)     Primary osteoarthritis involving multiple joints     Primary osteoarthritis of left knee     Recurrent UTI     hx septic shock    Thyroid disease     Past Surgical History:   Procedure Laterality Date    APPENDECTOMY      APPENDECTOMY      BACK SURGERY      L4-S1 laminectomy    BYPASS GRAFT ARTERY CORONARY      3 vessel     SECTION       SECTION      CORONARY ARTERY BYPASS      CV CORONARY ANGIOGRAM N/A 2022    Procedure: Coronary Angiogram;  Surgeon: Ignacio Baird MD;  Location: [facility] CATH LAB CV    HERNIORRHAPHY VENTRAL Left     HYSTERECTOMY      HYSTERECTOMY      JOINT REPLACEMENT Left     Total left knee    OTHER SURGICAL HISTORY      right ankle surgery    OTHER SURGICAL HISTORY      right forearm fracture    REPLACEMENT TOTAL KNEE Right     SHOULDER SURGERY Right     reversed shoulder surgery.    Family History   Problem Relation Age of Onset    Heart Disease Mother     Heart Disease Father     Sleep Apnea Daughter     Social History     Socioeconomic History     Marital status:      Spouse name: Not on file    Number of children: Not on file    Years of education: Not on file    Highest education level: Not on file   Occupational History    Not on file   Tobacco Use    Smoking status: Never     Passive exposure: Never    Smokeless tobacco: Never   Vaping Use    Vaping status: Never Used   Substance and Sexual Activity    Alcohol use: No    Drug use: No    Sexual activity: Not Currently     Partners: Male     Birth control/protection: None   Other Topics Concern    Parent/sibling w/ CABG, MI or angioplasty before 65F 55M? No   Social History Narrative    6/15/2021 new to [place] from [place]. She has 6 kids.     Social Determinants of Health     Financial Resource Strain: Low Risk  (11/3/2023)    Financial Resource Strain     Within the past 12 months, have you or your family members you live with been unable to get utilities (heat, electricity) when it was really needed?: No   Food Insecurity: Low Risk  (11/3/2023)    Food Insecurity     Within the past 12 months, did you worry that your food would run out before you got money to buy more?: No     Within the past 12 months, did the food you bought just not last and you didn t have money to get more?: No   Transportation Needs: Low Risk  (11/3/2023)    Transportation Needs     Within the past 12 months, has lack of transportation kept you from medical appointments, getting your medicines, non-medical meetings or appointments, work, or from getting things that you need?: No   Physical Activity: Not on file   Stress: Not on file   Social Connections: Not on file   Interpersonal Safety: Low Risk  (11/10/2023)    Interpersonal Safety     Do you feel physically and emotionally safe where you currently live?: Yes     Within the past 12 months, have you been hit, slapped, kicked or otherwise physically hurt by someone?: No     Within the past 12 months, have you been humiliated or emotionally abused in other ways by your partner  or ex-partner?: No   Housing Stability: Low Risk  (11/3/2023)    Housing Stability     Do you have housing? : Yes     Are you worried about losing your housing?: No          Medications  Allergies   Current Outpatient Medications   Medication Sig Dispense Refill    aspirin 81 mg chewable tablet [ASPIRIN 81 MG CHEWABLE TABLET] Chew 81 mg at bedtime.      atorvastatin (LIPITOR) 20 MG tablet TAKE 1 TABLET BY MOUTH AT  BEDTIME 90 tablet 3    carbidopa-levodopa (SINEMET)  MG tablet TAKE 1 TABLET BY MOUTH 3  TIMES DAILY TAKE AT LEAST  1/2 HOUR BEFORE MEALS OR 2  HOURS AFTER MEALS DO NOT  MIX WITH FOOD 270 tablet 3    carvedilol (COREG) 12.5 MG tablet Take 1 tablet (12.5 mg) by mouth 2 times daily (with meals) 180 tablet 3    cholecalciferol, vitamin D3, 1,000 unit tablet Take 1,000 Units by mouth 2 times daily      cyanocobalamin (VITAMIN B-12) 1000 MCG tablet Take 1,000 mcg by mouth every evening      FLUoxetine (PROZAC) 20 MG capsule TAKE 1 CAPSULE BY MOUTH TWICE  DAILY 180 capsule 0    furosemide (LASIX) 20 MG tablet Take with one 40 mg tablet daily 90 tablet 3    furosemide (LASIX) 40 MG tablet Take with one 20 mg tablet daily 90 tablet 1    gabapentin (NEURONTIN) 300 MG capsule TAKE 1 CAPSULE BY MOUTH 4  TIMES DAILY 360 capsule 3    lisinopril (ZESTRIL) 20 MG tablet Take 20 mg by mouth every evening      loratadine (CLARITIN) 10 mg tablet Take 10 mg by mouth daily as needed for allergies      magnesium 250 MG tablet Take 2 tablets (500 mg) by mouth daily      melatonin 1 mg Tab tablet Take 1 mg by mouth At Bedtime      multivitamin therapeutic tablet Take 1 tablet by mouth every morning      nitroGLYcerin (NITROSTAT) 0.4 MG sublingual tablet Place 1 tablet (0.4 mg) under the tongue every 5 minutes as needed for chest pain 100 tablet 1    omeprazole (PRILOSEC) 20 MG DR capsule Take 1 capsule (20 mg) by mouth 2 times daily 180 capsule 3    polyethylene glycol (MIRALAX) 17 gram packet Take 17 g by mouth daily as  "needed for constipation      Allergies   Allergen Reactions    Alendronate [Alendronate] Unknown     pain    Codeine Hives    Hydrocodone-Acetaminophen Other (See Comments)     Highly sensitive, \"knocks her out and can't wake up\"  3/26/24 verbal with pt plain acetaminophen is ok to take    Other Drug Allergy (See Comments)     Risedronate Unknown     Bone pain    Rosuvastatin Muscle Pain (Myalgia)    Simvastatin Muscle Pain (Myalgia)         Lab Results    Chemistry/lipid CBC Cardiac Enzymes/BNP/TSH/INR   Lab Results   Component Value Date    CHOL 145 06/19/2023    HDL 39 (L) 06/19/2023    TRIG 376 (H) 06/19/2023    BUN 46.5 (H) 03/30/2024     03/30/2024    CO2 29 03/30/2024    Lab Results   Component Value Date    WBC 6.9 03/27/2024    HGB 10.7 (L) 03/27/2024    HCT 35.1 03/27/2024    MCV 94 03/27/2024     03/29/2024    Lab Results   Component Value Date    TROPONINI 0.04 05/07/2023    BNP 82 05/11/2023    TSH 1.97 11/10/2023             This note has been dictated using voice recognition software. Any grammatical, typographical, or context distortions are unintentional and inherent to the software    30 minutes spent on the date of encounter doing chart review, review of outside records, review of test results, interpretation with above tests, patient visit, documentation, and discussion with family.                    "

## 2024-04-30 NOTE — PROGRESS NOTES
Subjective:    Established patient.    Janes Montero is a 89 year old female who presents to review bone health. Chart fully reviewed for bone health and partially empty sella and the following is a comprehensive summary of her bone health and partially empty sella care to date.     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    12/27/2022: 24 hour urine (1.4 L) with calcium 140 mg    12/13/2022: PTH 65 (ULN 65 pg/mL), uncorrected serum calcium 10.6 mg/dL (ULN 10.2), albumin 4.2 g/dL, corrected serum calcium 10.4 mg/dL, serum phosphorous normal, 25-OH vitamin D mid normal, GFR 66, bone specific alkaline phosphatase in the mid premenopausal range         -2018 - 2021 she has had frequent mildly elevated uncorrected serum calcium (highest uncorrected serum calcium value 10.9 mg/deciliter) and on many of these draws serum albumin was also elevated.  She has had a few PTH draws from 9669-8462 that were mildly elevated.  On 1 of these draws PTH was mildly elevated as was uncorrected serum calcium without concomitant albumin.    Serum phosphorus has been normal.    She has never had a kidney stone. CT renal 11/29/2023: no stones    4/30/2024: no overt symptoms of hypercalcemia    She is unsure if she has previously been on thiazide diuretics but she isn't on these currently. No use of lithium.     Fractured right humerus in 2017 after trip and fall from standing height.     Right wrist fractured in 2015, trip and fall from standing height.     DEXA 7/1/2021:  -No spine images  -Lowest T score at the hips is -1.9 at the right femoral neck, significant -9.9% decline in BMD at the total hip compared to 2008  -Distal one third radius T score -6.1    Lumbar spine x-ray 7/2022: L1 superior endplate compression fracture with approximately 20-30% loss of vertebral body height. No trauma  that would have explained this L1 fracture.     Spine XR T10 - L5 1/24/2023: stable mild L1 compression fracture (about 15% height loss per radiology)     As of 4/2023 Janes has completed a complete evaluation for secondary causes of increased fracture risk with the following notable findings:  -CKD-3  -PTH with minerals, urine calcium as above  -6/2023: 25-OH vitamin D mid normal     Nuclear medicine parathyroid scan 8/24/2021: No evidence of parathyroid adenoma in the neck or chest    Fosamax, Actonel: remotely used - these are listed as allergies in Epic, she doesn't recall why     Denosumab 60 mg given: 12/27/2023, 6/27/2023, 12/27/2022, 6/21/2022, 12/15/2021 (I verified these doses in Epic and these are the only injections to date); well tolerated    Outside of PO bisphosphonate and denosumab she has not received other pharmacologic therapy to reduce fracture risk.     4/30/2024: No calcium pill. She takes vitamin D BID (we think 1000 units per pill). She takes a MVI. She drinks 1 glass of milk daily and has 1 other serving of dairy each day.     No recent/upcoming tooth extraction or dental implant.     # Subcentimeter thyroid nodules    OS thyroid US 8/21/2019 ([place], [place]): report per OSH radiology  -Right thyroid lobe: 1.5 x 3.7 x 1.8 cm in size. Upper pole cystic nodule measures 7 x 4 x 6 mm. Complex cystic nodule in the midpole measures 6 x 4 x 6 mm. Upper pole cystic nodule measures 5 x 3 x 5 mm.   -Left thyroid lobe: 1.4 x 4.5 x 1.4 cm in size. Colloid cyst along the posterior margin of the midpole measures 5 x 5 x 6 mm. Complex appearing upper pole hypoechoic cystic lesion measures 6 x 3 x 6 mm. Upper pole cystic nodule adjacent to this measures 3 x 3 x 5 mm.    She had been on LT4 for years, dose had been 25 mcg daily. We stopped it 4/2023. 6/2023: TSH 1.7. 11/10/2023: TSH 1.97    No prior thyroid surgery. No H/N radiation. No prior MCCARTNEY therapy. No history of  hyperthyroidism. No prior thyroid FNA.    No history of cancer. No radiation therapy. 4/30/2023: No compressive symptoms.     # Empty sella    OSH MRI brain 4/24/2015 report described empty sella - no prior evaluation for this before our labs 12/2022.     12/2022: she denies recent oral or parenteral glucocorticoid exposure although Flonase is listed on her medication list as a nasal spray. She denies a history of known pituitary disease.    -12/2022 at 8:25 AM: serum cortisol 19.2 mcg/dL, DHEA-S 36 mcg/dL, TSH and free T4 normal, prolactin normal, IGF-1 normal      Other comorbidities: Parkinson's disease, GERD, DM not on pharmacologic therapy (3/2024: Hemoglobin A1c 6.2%, over the years it has been 8.0% or less), CAD s/p CABG.    Objective:    Pleasant and conversational.  BMI 31.6 kg/meters squared, blood pressure 143/65.    4/2023: Pleasant and conversational.  BMI 30.6 kg/meters squared, blood pressure 116/78.  No thyroid eye disease.  She has mild bilateral thyroid nodularity.  No cervical lymphadenopathy.  Dentition reasonable.  She is kyphotic.    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  Surgical indications include: very low bone mineral density with prior fragility fractures and GFR <60 at times. However Ivia has a preference to avoid surgery unless absolute necessary.  I am in full agreement with this approach.  We will manage her primary hyperparathyroidism medically.    For her low bone mineral density and multiple prior fragility fractures we will continue Prolia 60 mg every 6 months. We reviewed potential adverse effects including osteonecrosis of the jaw, atypical femoral fracture, etc.  No need to monitor with DEXA since this will not .    She is scheduled for denosumab 7/3/2024.  I ordered labs before hand to include  standard bone labs and TSH.  When those labs result I can order additional labs/denosumab.    She will monitor for symptoms of hypercalcemia and if these develop we can use Cinacalcet as needed.    Maintain hydration and avoid thiazide diuretics.    She will continue her current dose of vitamin D.  She will continue her current multivitamin and dietary calcium intake.  I recommend that she start s a calcium supplement that provides an additional 500 mg of calcium daily.    Return to see me in 1 year.    # Subcentimeter thyroid nodules    No need for FNA.  She will monitor for compressive symptoms such as dysphagia and hoarseness.  She will monitor for increasing thyroid nodularity and cervical lymphadenopathy.  I will perform a clinical neck exam when I see her in 1 year.    30 minutes spent on the date of the encounter doing chart review, history and exam, documentation and further activities as noted above.     The longitudinal plan of care for the diagnosis(es)/condition(s) as documented were addressed during this visit. Due to the added complexity in care, I will continue to support Ivia in the subsequent management and with ongoing continuity of care.

## 2024-04-30 NOTE — PATIENT INSTRUCTIONS
Janes Montero,    It was a pleasure to see you today at [facility].     My recommendations after this visit include:  - Please follow up with Dr Ricardo in 4 months   - Continue current medications    Shasha Alcaraz, CNP

## 2024-04-30 NOTE — LETTER
4/30/2024         RE: Janes Montero  [address]  [facility] [address]        Dear Colleague,    Thank you for referring your patient, Janes Montero, to the [facility]. Please see a copy of my visit note below.    Subjective:    Established patient.    Janes Montero is a 89 year old female who presents to review bone health. Chart fully reviewed for bone health and partially empty sella and the following is a comprehensive summary of her bone health and partially empty sella care to date.     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    12/27/2022: 24 hour urine (1.4 L) with calcium 140 mg    12/13/2022: PTH 65 (ULN 65 pg/mL), uncorrected serum calcium 10.6 mg/dL (ULN 10.2), albumin 4.2 g/dL, corrected serum calcium 10.4 mg/dL, serum phosphorous normal, 25-OH vitamin D mid normal, GFR 66, bone specific alkaline phosphatase in the mid premenopausal range         -2018 - 2021 she has had frequent mildly elevated uncorrected serum calcium (highest uncorrected serum calcium value 10.9 mg/deciliter) and on many of these draws serum albumin was also elevated.  She has had a few PTH draws from 7127-7779 that were mildly elevated.  On 1 of these draws PTH was mildly elevated as was uncorrected serum calcium without concomitant albumin.    Serum phosphorus has been normal.    She has never had a kidney stone. CT renal 11/29/2023: no stones    4/30/2024: no overt symptoms of hypercalcemia    She is unsure if she has previously been on thiazide diuretics but she isn't on these currently. No use of lithium.     Fractured right humerus in 2017 after trip and fall from standing height.     Right wrist fractured in 2015, trip and fall from standing height.     DEXA 7/1/2021:  -No spine images  -Lowest T score at the hips is -1.9 at the right  femoral neck, significant -9.9% decline in BMD at the total hip compared to 2008  -Distal one third radius T score -6.1    Lumbar spine x-ray 7/2022: L1 superior endplate compression fracture with approximately 20-30% loss of vertebral body height. No trauma that would have explained this L1 fracture.     Spine XR T10 - L5 1/24/2023: stable mild L1 compression fracture (about 15% height loss per radiology)     As of 4/2023 Ivia has completed a complete evaluation for secondary causes of increased fracture risk with the following notable findings:  -CKD-3  -PTH with minerals, urine calcium as above  -6/2023: 25-OH vitamin D mid normal     Nuclear medicine parathyroid scan 8/24/2021: No evidence of parathyroid adenoma in the neck or chest    Fosamax, Actonel: remotely used - these are listed as allergies in Epic, she doesn't recall why     Denosumab 60 mg given: 12/27/2023, 6/27/2023, 12/27/2022, 6/21/2022, 12/15/2021 (I verified these doses in Epic and these are the only injections to date); well tolerated    Outside of PO bisphosphonate and denosumab she has not received other pharmacologic therapy to reduce fracture risk.     4/30/2024: No calcium pill. She takes vitamin D BID (we think 1000 units per pill). She takes a MVI. She drinks 1 glass of milk daily and has 1 other serving of dairy each day.     No recent/upcoming tooth extraction or dental implant.     # Subcentimeter thyroid nodules    OS thyroid US 8/21/2019 ([place], [place]): report per OSH radiology  -Right thyroid lobe: 1.5 x 3.7 x 1.8 cm in size. Upper pole cystic nodule measures 7 x 4 x 6 mm. Complex cystic nodule in the midpole measures 6 x 4 x 6 mm. Upper pole cystic nodule measures 5 x 3 x 5 mm.   -Left thyroid lobe: 1.4 x 4.5 x 1.4 cm in size. Colloid cyst along the posterior margin of the midpole measures 5 x 5 x 6 mm. Complex appearing upper pole hypoechoic cystic lesion measures 6 x 3 x 6 mm. Upper pole  cystic nodule adjacent to this measures 3 x 3 x 5 mm.    She had been on LT4 for years, dose had been 25 mcg daily. We stopped it 4/2023. 6/2023: TSH 1.7. 11/10/2023: TSH 1.97    No prior thyroid surgery. No H/N radiation. No prior MCCARTNEY therapy. No history of hyperthyroidism. No prior thyroid FNA.    No history of cancer. No radiation therapy. 4/30/2023: No compressive symptoms.     # Empty sella    OSH MRI brain 4/24/2015 report described empty sella - no prior evaluation for this before our labs 12/2022.     12/2022: she denies recent oral or parenteral glucocorticoid exposure although Flonase is listed on her medication list as a nasal spray. She denies a history of known pituitary disease.    -12/2022 at 8:25 AM: serum cortisol 19.2 mcg/dL, DHEA-S 36 mcg/dL, TSH and free T4 normal, prolactin normal, IGF-1 normal      Other comorbidities: Parkinson's disease, GERD, DM not on pharmacologic therapy (3/2024: Hemoglobin A1c 6.2%, over the years it has been 8.0% or less), CAD s/p CABG.    Objective:    Pleasant and conversational.  BMI 31.6 kg/meters squared, blood pressure 143/65.    4/2023: Pleasant and conversational.  BMI 30.6 kg/meters squared, blood pressure 116/78.  No thyroid eye disease.  She has mild bilateral thyroid nodularity.  No cervical lymphadenopathy.  Dentition reasonable.  She is kyphotic.    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  Surgical indications include: very low bone mineral density with prior fragility fractures and GFR <60 at times. However Ivia has a preference to avoid surgery unless absolute necessary.  I am in full agreement with this approach.  We will manage her primary hyperparathyroidism medically.    For her low bone mineral density and multiple prior fragility fractures we will continue Prolia 60 mg every 6 months.  We reviewed potential adverse effects including osteonecrosis of the jaw, atypical femoral fracture, etc.  No need to monitor with DEXA since this will not .    She is scheduled for denosumab 7/3/2024.  I ordered labs before hand to include standard bone labs and TSH.  When those labs result I can order additional labs/denosumab.    She will monitor for symptoms of hypercalcemia and if these develop we can use Cinacalcet as needed.    Maintain hydration and avoid thiazide diuretics.    She will continue her current dose of vitamin D.  She will continue her current multivitamin and dietary calcium intake.  I recommend that she start s a calcium supplement that provides an additional 500 mg of calcium daily.    Return to see me in 1 year.    # Subcentimeter thyroid nodules    No need for FNA.  She will monitor for compressive symptoms such as dysphagia and hoarseness.  She will monitor for increasing thyroid nodularity and cervical lymphadenopathy.  I will perform a clinical neck exam when I see her in 1 year.    30 minutes spent on the date of the encounter doing chart review, history and exam, documentation and further activities as noted above.     The longitudinal plan of care for the diagnosis(es)/condition(s) as documented were addressed during this visit. Due to the added complexity in care, I will continue to support Ivia in the subsequent management and with ongoing continuity of care.      Again, thank you for allowing me to participate in the care of your patient.        Sincerely,        Eric Christensen MD

## 2024-05-03 ENCOUNTER — TELEPHONE (OUTPATIENT)
Dept: FAMILY MEDICINE | Facility: CLINIC | Age: 89
End: 2024-05-03
Payer: MEDICARE

## 2024-05-08 ENCOUNTER — TELEPHONE (OUTPATIENT)
Dept: FAMILY MEDICINE | Facility: CLINIC | Age: 89
End: 2024-05-08
Payer: MEDICARE

## 2024-05-08 NOTE — TELEPHONE ENCOUNTER
Home Care is calling regarding an established patient with [facility].       Requesting orders from: Maggie Arriaga  Provider is following patient: Yes  Is this a 60-day recertification request?  No    Orders Requested    Occupational Therapy  Request for initial certification (first set of orders)   Frequency:  1x/wk for 3 wks      Verbal orders given.  Home Care will send orders for provider to sign.  Confirmed ok to leave a detailed message with call back.  Contact information confirmed and updated as needed.    Suni Barba RN

## 2024-05-10 ENCOUNTER — MEDICAL CORRESPONDENCE (OUTPATIENT)
Dept: HEALTH INFORMATION MANAGEMENT | Facility: CLINIC | Age: 89
End: 2024-05-10

## 2024-05-17 ENCOUNTER — TELEPHONE (OUTPATIENT)
Dept: ENDOCRINOLOGY | Facility: CLINIC | Age: 89
End: 2024-05-17
Payer: MEDICARE

## 2024-05-17 NOTE — TELEPHONE ENCOUNTER
[facility] Call Center    Phone Message    May a detailed message be left on voicemail: yes     Reason for Call: Other: Pls reach out to daughter, Pat regarding patient's prolia injection currently scheduled for 7/3.       Action Taken: Other: endo    Travel Screening: Not Applicable

## 2024-05-24 ENCOUNTER — DOCUMENTATION ONLY (OUTPATIENT)
Dept: CARDIOLOGY | Facility: CLINIC | Age: 89
End: 2024-05-24
Payer: MEDICARE

## 2024-05-24 NOTE — PROGRESS NOTES
Received refill request from [facility] for furosemide. Per chart review, LUIS Adler CNP saw pt on 4/30/24 and sent for prescriptions of furosemide 20mg and furosemide 40mg w/ instruction for pt to take 1 tablet of each daily for a total of furosemide 60mg daily.     Spoke w/ [facility] pharmacist Leigha, they just needed clarification of this. They will now release the medication.    LAURA Rao RN

## 2024-05-28 ENCOUNTER — TELEPHONE (OUTPATIENT)
Dept: FAMILY MEDICINE | Facility: CLINIC | Age: 89
End: 2024-05-28
Payer: MEDICARE

## 2024-05-28 NOTE — TELEPHONE ENCOUNTER
Called Sujey back, OT with [facility], and left detailed message on confidential voicemail with okay for requested verbal orders.      Itzel Bird RN  [facility]

## 2024-05-28 NOTE — TELEPHONE ENCOUNTER
Home Care is calling regarding an established patient with [facility].       Requesting orders from: Maggie Arriaga  Provider is following patient: Yes  Is this a 60-day recertification request?  Yes    Orders Requested    Occupational Therapy  Request for recertification   Frequency: 1 visit every 3 weeks for 3 weeks for one visit    Information was gathered and will be sent to provider for review.  RN will contact Home Care with information after provider review.  Confirmed ok to leave a detailed message with call back.  Contact information confirmed and updated as needed.    Itzel Bird RN

## 2024-05-30 ENCOUNTER — TRANSFERRED RECORDS (OUTPATIENT)
Dept: HEALTH INFORMATION MANAGEMENT | Facility: CLINIC | Age: 89
End: 2024-05-30

## 2024-05-30 ENCOUNTER — TELEPHONE (OUTPATIENT)
Dept: FAMILY MEDICINE | Facility: CLINIC | Age: 89
End: 2024-05-30
Payer: MEDICARE

## 2024-05-30 LAB — RETINOPATHY: NEGATIVE

## 2024-05-30 NOTE — TELEPHONE ENCOUNTER
Called Alannah and relayed OK for verbal orders as written.    Maggie Arriaga MD  You14 minutes ago (4:25 PM)     OK for home care

## 2024-05-30 NOTE — TELEPHONE ENCOUNTER
Routing to Dr Arriaga to advise on verbal orders for recertification    Home Care is calling regarding an established patient with [facility].       Requesting orders from: Maggie Arriaga  Provider is following patient: Yes  Is this a 60-day recertification request?  Yes    Orders Requested    Skilled Nursing  Request for recertification   Frequency:   x/wk for   wks  1x/ EOW for 8 weeks      Information was gathered and will be sent to provider for review.  RN will contact Home Care with information after provider review.  Confirmed ok to leave a detailed message with call back.  Contact information confirmed and updated as needed.    Suni Barba, RN

## 2024-05-31 ENCOUNTER — MEDICAL CORRESPONDENCE (OUTPATIENT)
Dept: HEALTH INFORMATION MANAGEMENT | Facility: CLINIC | Age: 89
End: 2024-05-31

## 2024-06-12 DIAGNOSIS — Z53.9 DIAGNOSIS NOT YET DEFINED: Primary | ICD-10-CM

## 2024-06-12 PROCEDURE — G0179 MD RECERTIFICATION HHA PT: HCPCS | Performed by: FAMILY MEDICINE

## 2024-06-20 ENCOUNTER — TRANSFERRED RECORDS (OUTPATIENT)
Dept: MULTI SPECIALTY CLINIC | Facility: CLINIC | Age: 89
End: 2024-06-20

## 2024-06-20 LAB — RETINOPATHY: NORMAL

## 2024-06-27 ENCOUNTER — LAB (OUTPATIENT)
Dept: LAB | Facility: CLINIC | Age: 89
End: 2024-06-27
Payer: MEDICARE

## 2024-06-27 ENCOUNTER — TELEPHONE (OUTPATIENT)
Dept: ENDOCRINOLOGY | Facility: CLINIC | Age: 89
End: 2024-06-27

## 2024-06-27 DIAGNOSIS — I25.810 CORONARY ARTERY DISEASE INVOLVING CORONARY BYPASS GRAFT OF NATIVE HEART WITHOUT ANGINA PECTORIS: Primary | ICD-10-CM

## 2024-06-27 DIAGNOSIS — M81.0 AGE-RELATED OSTEOPOROSIS WITHOUT CURRENT PATHOLOGICAL FRACTURE: Primary | ICD-10-CM

## 2024-06-27 DIAGNOSIS — M81.0 AGE-RELATED OSTEOPOROSIS WITHOUT CURRENT PATHOLOGICAL FRACTURE: ICD-10-CM

## 2024-06-27 DIAGNOSIS — R94.6 THYROID FUNCTION TEST ABNORMAL: ICD-10-CM

## 2024-06-27 DIAGNOSIS — E21.0 PRIMARY HYPERPARATHYROIDISM (H): ICD-10-CM

## 2024-06-27 DIAGNOSIS — E11.42 TYPE 2 DIABETES MELLITUS WITH DIABETIC POLYNEUROPATHY, WITHOUT LONG-TERM CURRENT USE OF INSULIN (H): ICD-10-CM

## 2024-06-27 LAB
HBA1C MFR BLD: 7.1 % (ref 0–5.6)
HOLD SPECIMEN: NORMAL

## 2024-06-27 PROCEDURE — 84100 ASSAY OF PHOSPHORUS: CPT

## 2024-06-27 PROCEDURE — 83036 HEMOGLOBIN GLYCOSYLATED A1C: CPT

## 2024-06-27 PROCEDURE — 82310 ASSAY OF CALCIUM: CPT

## 2024-06-27 PROCEDURE — 82040 ASSAY OF SERUM ALBUMIN: CPT

## 2024-06-27 PROCEDURE — 82565 ASSAY OF CREATININE: CPT

## 2024-06-27 PROCEDURE — 82306 VITAMIN D 25 HYDROXY: CPT

## 2024-06-27 PROCEDURE — 36415 COLL VENOUS BLD VENIPUNCTURE: CPT

## 2024-06-27 PROCEDURE — 84443 ASSAY THYROID STIM HORMONE: CPT

## 2024-06-27 NOTE — TELEPHONE ENCOUNTER
[facility] Call Center    Phone Message    May a detailed message be left on voicemail: yes     Reason for Call: Other: please call Mera back at [place] [facility], stated that order for prolia shot is incorrect and needs to be updated to draw. She added an extra tube.any questions call her at  [phone] thank you     Action Taken: Message routed to:  [facility] ([facility]):      Travel Screening: Not Applicable     Date of Service:

## 2024-06-28 LAB
ALBUMIN SERPL BCG-MCNC: 4.4 G/DL (ref 3.5–5.2)
CALCIUM SERPL-MCNC: 10.9 MG/DL (ref 8.2–9.6)
CREAT SERPL-MCNC: 1.17 MG/DL (ref 0.51–0.95)
EGFRCR SERPLBLD CKD-EPI 2021: 44 ML/MIN/1.73M2
PHOSPHATE SERPL-MCNC: 3.4 MG/DL (ref 2.5–4.5)
TSH SERPL DL<=0.005 MIU/L-ACNC: 3.29 UIU/ML (ref 0.3–4.2)

## 2024-07-02 LAB
DEPRECATED CALCIDIOL+CALCIFEROL SERPL-MC: <61 UG/L (ref 20–75)
VITAMIN D2 SERPL-MCNC: <5 UG/L
VITAMIN D3 SERPL-MCNC: 56 UG/L

## 2024-07-10 ENCOUNTER — HOSPITAL ENCOUNTER (OUTPATIENT)
Dept: CT IMAGING | Facility: HOSPITAL | Age: 89
Discharge: HOME | End: 2024-07-10
Attending: FAMILY MEDICINE | Admitting: FAMILY MEDICINE
Payer: MEDICARE

## 2024-07-10 ENCOUNTER — ALLIED HEALTH/NURSE VISIT (OUTPATIENT)
Dept: ENDOCRINOLOGY | Facility: CLINIC | Age: 89
End: 2024-07-10
Payer: MEDICARE

## 2024-07-10 DIAGNOSIS — N18.30 STAGE 3 CHRONIC KIDNEY DISEASE, UNSPECIFIED WHETHER STAGE 3A OR 3B CKD (H): ICD-10-CM

## 2024-07-10 DIAGNOSIS — R91.8 PULMONARY NODULES: ICD-10-CM

## 2024-07-10 DIAGNOSIS — M81.0 AGE-RELATED OSTEOPOROSIS WITHOUT CURRENT PATHOLOGICAL FRACTURE: Primary | ICD-10-CM

## 2024-07-10 PROCEDURE — 71250 CT THORAX DX C-: CPT | Mod: ME

## 2024-07-10 PROCEDURE — 99207 PR NO CHARGE NURSE ONLY: CPT

## 2024-07-10 PROCEDURE — 96372 THER/PROPH/DIAG INJ SC/IM: CPT | Performed by: INTERNAL MEDICINE

## 2024-07-10 NOTE — PROGRESS NOTES
Clinic Administered Medication Documentation      Prolia Documentation    Indication: Prolia  (denosumab) is a prescription medicine used to treat osteoporosis in patients who:   Are at high risk for fracture, meaning patients who have had a fracture related to osteoporosis, or who have multiple risk factors for fracture.  Cannot use another osteoporosis medicine or other osteoporosis medicines did not work well.  The timeline for early/late injections would be 4 weeks early and any time after the 6 month leonard. If a patient receives their injection late, then the subsequent injection would be 6 months from the date that they actually received the injection.    When was the last injection?  23  Was the last injection at least 6 months ago? Yes  Has the prior authorization been completed?  Yes  Is there an active order (written within the past 365 days, with administrations remaining, not ) in the chart?  Yes   GFR Estimate   Date Value Ref Range Status   2024 44 (L) >60 mL/min/1.73m2 Final     Comment:     eGFR calculated using  CKD-EPI equation.   2018 41 (L) >60 mL/min/1.73m2 Final     Has patient had a GFR within the last 12 months? Yes   Is GFR under 30, or patient has a diagnosis of CKD4 or CKD5? No   Patient denies gastric bypass or parathyroid surgery in past 6 months? Yes - patient denies.   Patient denies dental work in the past two months involving drilling into the bone, such as implants/extractions, oral surgery or a tooth extraction that has not healed yet?  Yes  Patient denies plans for an emergency tooth extraction within the next week? Yes    The following steps were completed to comply with the REMS program for Prolia:  Reviewed information in the Medication Guide, including the serious risks of Prolia  and the symptoms of each risk.  Advised patient to seek prompt medical attention if they have signs or symptoms of any of the serious risks.  Provided each patient a copy  of the Medication Guide and Patient Guide.    Prior to injection, verified patient identity using patient's name and date of birth. Medication was administered. Please see MAR and medication order for additional information. Patient instructed to remain in clinic for 15 minutes and report any adverse reaction to staff immediately.    Vial/Syringe: Syringe  Was this medication supplied by the patient? No  Verified that the patient has refills remaining in their prescription.    Name of provider who requested the medication administration:   Name of provider on site (faculty or community preceptor) at the time of performing the medication administration:     Date of next administration: 01/14/25  Date of next office visit with provider to renew medication plan (must be seen annually): 05/13/25

## 2024-07-11 DIAGNOSIS — F33.42 RECURRENT MAJOR DEPRESSIVE DISORDER, IN FULL REMISSION (H): ICD-10-CM

## 2024-07-16 ENCOUNTER — LAB (OUTPATIENT)
Dept: LAB | Facility: CLINIC | Age: 89
End: 2024-07-16
Payer: MEDICARE

## 2024-07-16 DIAGNOSIS — E53.8 LOW SERUM VITAMIN B12: ICD-10-CM

## 2024-07-16 DIAGNOSIS — N18.32 STAGE 3B CHRONIC KIDNEY DISEASE (H): ICD-10-CM

## 2024-07-16 DIAGNOSIS — I25.810 CORONARY ARTERY DISEASE INVOLVING CORONARY BYPASS GRAFT OF NATIVE HEART WITHOUT ANGINA PECTORIS: ICD-10-CM

## 2024-07-16 PROCEDURE — 80061 LIPID PANEL: CPT

## 2024-07-16 PROCEDURE — 36415 COLL VENOUS BLD VENIPUNCTURE: CPT

## 2024-07-16 PROCEDURE — 82570 ASSAY OF URINE CREATININE: CPT

## 2024-07-16 PROCEDURE — 82043 UR ALBUMIN QUANTITATIVE: CPT

## 2024-07-16 PROCEDURE — 82607 VITAMIN B-12: CPT

## 2024-07-17 LAB
CHOLEST SERPL-MCNC: 143 MG/DL
CREAT UR-MCNC: 69.8 MG/DL
FASTING STATUS PATIENT QL REPORTED: YES
HDLC SERPL-MCNC: 44 MG/DL
LDLC SERPL CALC-MCNC: 62 MG/DL
MICROALBUMIN UR-MCNC: <12 MG/L
MICROALBUMIN/CREAT UR: NORMAL MG/G{CREAT}
NONHDLC SERPL-MCNC: 99 MG/DL
TRIGL SERPL-MCNC: 186 MG/DL
VIT B12 SERPL-MCNC: 1582 PG/ML (ref 232–1245)

## 2024-07-22 DIAGNOSIS — M81.0 AGE-RELATED OSTEOPOROSIS WITHOUT CURRENT PATHOLOGICAL FRACTURE: Primary | ICD-10-CM

## 2024-07-22 DIAGNOSIS — R94.6 THYROID FUNCTION TEST ABNORMAL: ICD-10-CM

## 2024-07-23 ENCOUNTER — OFFICE VISIT (OUTPATIENT)
Dept: FAMILY MEDICINE | Facility: CLINIC | Age: 89
End: 2024-07-23
Payer: MEDICARE

## 2024-07-23 VITALS
DIASTOLIC BLOOD PRESSURE: 71 MMHG | OXYGEN SATURATION: 95 % | RESPIRATION RATE: 18 BRPM | SYSTOLIC BLOOD PRESSURE: 128 MMHG | BODY MASS INDEX: 32.07 KG/M2 | WEIGHT: 181 LBS | TEMPERATURE: 97.5 F | HEIGHT: 63 IN | HEART RATE: 73 BPM

## 2024-07-23 DIAGNOSIS — M81.0 AGE-RELATED OSTEOPOROSIS WITHOUT CURRENT PATHOLOGICAL FRACTURE: ICD-10-CM

## 2024-07-23 DIAGNOSIS — I10 BENIGN ESSENTIAL HYPERTENSION: ICD-10-CM

## 2024-07-23 DIAGNOSIS — F33.40 RECURRENT MAJOR DEPRESSIVE DISORDER, IN REMISSION (H): ICD-10-CM

## 2024-07-23 DIAGNOSIS — R91.8 PULMONARY NODULES: ICD-10-CM

## 2024-07-23 DIAGNOSIS — F33.42 RECURRENT MAJOR DEPRESSIVE DISORDER, IN FULL REMISSION (H): ICD-10-CM

## 2024-07-23 DIAGNOSIS — I50.20 HEART FAILURE WITH REDUCED EJECTION FRACTION (H): ICD-10-CM

## 2024-07-23 DIAGNOSIS — E11.42 TYPE 2 DIABETES MELLITUS WITH DIABETIC POLYNEUROPATHY, WITHOUT LONG-TERM CURRENT USE OF INSULIN (H): Primary | ICD-10-CM

## 2024-07-23 PROCEDURE — 96127 BRIEF EMOTIONAL/BEHAV ASSMT: CPT | Performed by: FAMILY MEDICINE

## 2024-07-23 PROCEDURE — 99214 OFFICE O/P EST MOD 30 MIN: CPT | Performed by: FAMILY MEDICINE

## 2024-07-23 ASSESSMENT — ANXIETY QUESTIONNAIRES
5. BEING SO RESTLESS THAT IT IS HARD TO SIT STILL: NOT AT ALL
7. FEELING AFRAID AS IF SOMETHING AWFUL MIGHT HAPPEN: NOT AT ALL
4. TROUBLE RELAXING: NOT AT ALL
6. BECOMING EASILY ANNOYED OR IRRITABLE: NOT AT ALL
GAD7 TOTAL SCORE: 3
7. FEELING AFRAID AS IF SOMETHING AWFUL MIGHT HAPPEN: NOT AT ALL
3. WORRYING TOO MUCH ABOUT DIFFERENT THINGS: SEVERAL DAYS
IF YOU CHECKED OFF ANY PROBLEMS ON THIS QUESTIONNAIRE, HOW DIFFICULT HAVE THESE PROBLEMS MADE IT FOR YOU TO DO YOUR WORK, TAKE CARE OF THINGS AT HOME, OR GET ALONG WITH OTHER PEOPLE: NOT DIFFICULT AT ALL
2. NOT BEING ABLE TO STOP OR CONTROL WORRYING: SEVERAL DAYS
8. IF YOU CHECKED OFF ANY PROBLEMS, HOW DIFFICULT HAVE THESE MADE IT FOR YOU TO DO YOUR WORK, TAKE CARE OF THINGS AT HOME, OR GET ALONG WITH OTHER PEOPLE?: NOT DIFFICULT AT ALL
GAD7 TOTAL SCORE: 3
1. FEELING NERVOUS, ANXIOUS, OR ON EDGE: SEVERAL DAYS
GAD7 TOTAL SCORE: 3

## 2024-07-23 ASSESSMENT — PATIENT HEALTH QUESTIONNAIRE - PHQ9
SUM OF ALL RESPONSES TO PHQ QUESTIONS 1-9: 1
SUM OF ALL RESPONSES TO PHQ QUESTIONS 1-9: 1

## 2024-07-23 NOTE — PROGRESS NOTES
Problem List Items Addressed This Visit          Nervous and Auditory    Type 2 diabetes mellitus with diabetic polyneuropathy, without long-term current use of insulin (H) - Primary     Diabetes is improving/stable/worsening: worsening.but still controlled. A1c 7.1 today. These past three months have been stressful and she has been sick so will watch and wait.   Taking lisinopril for nephro protection.  On Lipitor 20 mg orally per day  On an aspirin   Due for diabetic eye exam.         Relevant Orders    Adult Eye  Referral       Respiratory    RESOLVED: Pulmonary nodules     Resolved on most recent CT.            Circulatory    Benign essential hypertension     Htn improved. No more lightheadedness.   continue Lasix 60 mg p.o. daily  Continue carvedilol 12.5mg po q day  continue lisinopril 20 mg po q day.          Heart failure with reduced ejection fraction (H)     Stable despite lowering lasix in April 2024            Musculoskeletal and Integumentary    Age-related osteoporosis without current pathological fracture     Continue prolia with Dr. Christensen            Behavioral    Recurrent major depressive disorder, in remission (H24)     Depression-Controlled on fluoxitine 20 mg po bid, refilled.           Relevant Medications    FLUoxetine (PROZAC) 20 MG capsule     Other Visit Diagnoses       Recurrent major depressive disorder, in full remission (H24)        Relevant Medications    FLUoxetine (PROZAC) 20 MG capsule             Ally Marrero is a 89 year old, presenting for the following health issues:  follow up prozac        7/23/2024     4:49 PM   Additional Questions   Roomed by as   Accompanied by self         7/23/2024     4:49 PM   Patient Reported Additional Medications   Patient reports taking the following new medications no     Via the Health Maintenance questionnaire, the patient has reported the following services have been completed -Eye Exam: associated 2024-06-20, this  "information has been sent to the abstraction team.  History of Present Illness       Reason for visit:  3 month check in    She eats 2-3 servings of fruits and vegetables daily.She consumes 0 sweetened beverage(s) daily.She exercises with enough effort to increase her heart rate 9 or less minutes per day.  She exercises with enough effort to increase her heart rate 3 or less days per week.   She is taking medications regularly.             Objective    /71 (BP Location: Left arm, Patient Position: Left side, Cuff Size: Adult Large)   Pulse 73   Temp 97.5  F (36.4  C) (Oral)   Resp 18   Ht 1.6 m (5' 3\")   Wt 82.1 kg (181 lb)   LMP  (LMP Unknown)   SpO2 95%   BMI 32.06 kg/m    Body mass index is 32.06 kg/m .  Physical Exam  Constitutional:       Appearance: Normal appearance.   HENT:      Head: Normocephalic and atraumatic.   Cardiovascular:      Rate and Rhythm: Normal rate and regular rhythm.   Pulmonary:      Effort: Pulmonary effort is normal.   Musculoskeletal:         General: Normal range of motion.      Cervical back: Normal range of motion and neck supple.   Neurological:      General: No focal deficit present.      Mental Status: She is alert and oriented to person, place, and time.                Signed Electronically by: Maggie Arriaga MD    "

## 2024-07-23 NOTE — ASSESSMENT & PLAN NOTE
Diabetes is improving/stable/worsening: worsening.but still controlled. A1c 7.1 today. These past three months have been stressful and she has been sick so will watch and wait.   Taking lisinopril for nephro protection.  On Lipitor 20 mg orally per day  On an aspirin   Due for diabetic eye exam.

## 2024-07-23 NOTE — ASSESSMENT & PLAN NOTE
Htn improved. No more lightheadedness.   continue Lasix 60 mg p.o. daily  Continue carvedilol 12.5mg po q day  continue lisinopril 20 mg po q day.

## 2024-08-14 DIAGNOSIS — N89.8 VAGINAL IRRITATION: ICD-10-CM

## 2024-08-17 RX ORDER — NYSTATIN 100000 U/G
CREAM TOPICAL
Qty: 120 G | Refills: 0 | Status: SHIPPED | OUTPATIENT
Start: 2024-08-17

## 2024-09-06 ENCOUNTER — OFFICE VISIT (OUTPATIENT)
Dept: NEUROLOGY | Facility: CLINIC | Age: 89
End: 2024-09-06
Payer: MEDICARE

## 2024-09-06 VITALS
WEIGHT: 181 LBS | HEIGHT: 63 IN | BODY MASS INDEX: 32.07 KG/M2 | HEART RATE: 74 BPM | DIASTOLIC BLOOD PRESSURE: 72 MMHG | SYSTOLIC BLOOD PRESSURE: 128 MMHG

## 2024-09-06 DIAGNOSIS — G62.9 NEUROPATHY: ICD-10-CM

## 2024-09-06 DIAGNOSIS — G50.0 TRIGEMINAL NEURALGIA: ICD-10-CM

## 2024-09-06 DIAGNOSIS — E53.8 VITAMIN B12 DEFICIENCY (NON ANEMIC): ICD-10-CM

## 2024-09-06 DIAGNOSIS — G20.A1 PARKINSON'S DISEASE WITHOUT DYSKINESIA OR FLUCTUATING MANIFESTATIONS (H): Primary | ICD-10-CM

## 2024-09-06 DIAGNOSIS — E11.42 TYPE 2 DIABETES MELLITUS WITH DIABETIC POLYNEUROPATHY, WITHOUT LONG-TERM CURRENT USE OF INSULIN (H): ICD-10-CM

## 2024-09-06 PROCEDURE — G2211 COMPLEX E/M VISIT ADD ON: HCPCS | Performed by: PSYCHIATRY & NEUROLOGY

## 2024-09-06 PROCEDURE — 99214 OFFICE O/P EST MOD 30 MIN: CPT | Performed by: PSYCHIATRY & NEUROLOGY

## 2024-09-06 RX ORDER — CARBIDOPA AND LEVODOPA 25; 100 MG/1; MG/1
TABLET ORAL
Qty: 270 TABLET | Refills: 3 | Status: SHIPPED | OUTPATIENT
Start: 2024-09-06

## 2024-09-06 RX ORDER — GABAPENTIN 300 MG/1
CAPSULE ORAL
Qty: 360 CAPSULE | Refills: 3 | Status: SHIPPED | OUTPATIENT
Start: 2024-09-06

## 2024-09-06 RX ORDER — LANOLIN ALCOHOL/MO/W.PET/CERES
1000 CREAM (GRAM) TOPICAL EVERY EVENING
Qty: 90 TABLET | Refills: 3 | Status: SHIPPED | OUTPATIENT
Start: 2024-09-06

## 2024-09-06 NOTE — NURSING NOTE
Chief Complaint   Patient presents with    NEUROPATHY     1 year follow up. Would like to discuss neuropathy today. Doing well otherwise.      Harriett Llanes MA

## 2024-09-06 NOTE — PROGRESS NOTES
NEUROLOGY OUTPATIENT PROGRESS NOTE   Sep 6, 2024     CHIEF COMPLAINT/REASON FOR VISIT/REASON FOR CONSULT  Patient presents with:  NEUROPATHY: 1 year follow up. Would like to discuss neuropathy today. Doing well otherwise.     REASON FOR CONSULTATION-Parkinson's/trigeminal neuralgia    REFERRAL SOURCE  Dr. Maggie Arriaga  CC Dr. Maggie Arriaga    HISTORY OF PRESENT ILLNESS  Janes Montero is a 89 year old female seen today for multiple issues  Patient is moving from [place] to [place].  She is a resident of [place].  And has moved to [place] is a 20 years and is coming back.    1.  Parkinson's disease:-Symptom onset was in 2015.  At that time she was having shaking of her arms and legs.  This was at rest and with activity.  She was losing her balance as well.  She was put on Sinemet 1 tablet 3 times a day.  Feels that the medication is helping.  Does have some wearing off in the evening time and is taking the medication earlier than bedtime which is working.  Does fine in the morning.  Takes the medication on empty stomach.  Denies any other progression or any new symptoms.  Occasionally will use a walker.  Is not very active but does do some walking.  2.  Trigeminal neuralgia:-This started in 2013.  Pain is on the left side of the face.  The whole V1 V2 V3 distribution is involved that the pain is more towards the year.  Pain is sharp and intermittent.  Heating pad helps.  Reports no real provoking factors for her.  She is on gabapentin which has been helpful.  Has not tried any other medications.  Denies any other associated symptoms.  No numbness.  MRI was done and no clear cause for the trigeminal neuralgia was identified.  3.  Patient complains of some numbness in her legs.  Does report some balance issues as well.  She does have a diagnosis of diabetes.  Last A1c was 6.7.    10/19/21  Patient returns today  1.  Parkinson's disease is stable and under good control.  Reports no wearing off of the  medication.  Is taking the Sinemet regularly.  Has seen physical therapy and trying to do some exercise.  She did take her medication this morning.  2.  Trigeminal neuralgia pain is unchanged.  Gabapentin is helping her.  3.  Previously she was complaining of some numbness in her legs and was found to have a wide-based gait.  This does seem to have improved.  She was identified to have B12 deficiency and was put on injections but not oral replacement.  4.  Diabetes-A1c was 7.1.  Encouraged her to exercise and.  Manage her diet.    1/20/21  Patient returns today  1.  Parkinson's stable under good control.  Reports no wearing off with medication.  Is unclear if the medication is really helping or not though she feels that initially she had a lot of tremors and those got better when she started the medication.  2.  Trigeminal pain is under good control.  Gabapentin is helping.  3.  Was having some numbness in her feet.  Feels that this is getting better.  Her gait has also improved.  She feels more steady.  Is only taking the oral replacement at this point  4.  Diabetes has been under appropriate control.  A1c scheduled for March.  Previously 1 was 7.1 is working on improving her diet.  Stays active and is not sitting in the chair all day long    5/19/22  Patient returns today  1.  Patient's Parkinson's is under good control.  There is no wearing off of the medication.  No side effects with the medication.  Exam today does show slightly decreased arm swing though she wants to stay on the medication at her age.  2.  Trigeminal neuralgia pain is under good control.  Gabapentin is helping  3.  B12 has come up and discussed that she could stop the injections at this point.  4.  Diabetes is not under good control.  A1c is worsened.  She is eating too many sweets.  Encourage exercise and cutting down the sweets  5.  She reports that the numbness in her feet is gone away after she got her Prolia injection.  Denies any balance  issues.  6.  VINICIO still positive.  Denies any joint pain except secondary to use of the valsartan.    11/28/22  Patient returns today  1.  Patient since she was last seen has been hospitalized for some chest pain like symptoms.  Coronary angiogram was negative for any significant occlusion.  Patient was thought to have heartburn related symptoms causing chest pain.  She has been little bit weak since then.  2.  No falls or balance issues.  Continues to use a walker.  Neuropathy has not worsened.  Diabetes under better control.  Has stopped the B12 injections though remains on the B12 supplement.  Discussed causes of weakness in elderly patients.  3.  Trigeminal pain is under good control with gabapentin  4.  Remains on Sinemet and feels that that is helping.  Has not really missed a dose.  Reports no wearing off.  Denies any side effects to the medication.  Does not mix the medication with food.    5/31/23  Patient returns today  1.  Parkinson's overall has been under good control.  Was active until 3 weeks ago when she was hospitalized for heart failure.  No side effects to Sinemet  2.  Since she was hospitalized has been having slurred speech and abnormal taste at nighttime.  She does use oxygen which can possibly affect her taste.  She is also been on higher doses of lisinopril.  Further reports that her sodium is low.  Is on Lasix.  Is following up with cardiology to decide on a plan next week.  No associated symptoms with the slurred speech.  Slurred speech is worse in the evening time.  Has not noticed any benefits with taking Sinemet at the time of the slurred speech.  3.  Patient pain is under good control with the gabapentin.  No side effects  4.  Reports abnormal sensation in the feet.  Discussed that it might be related to neuropathy.  Remains on vitamin B12.  No other new symptoms.    8/31/23  Patient demonstrated  1.  Slurred speech has resolved compared to before.  This was thought to be related to her  medications and possibly a viral infection.  She did not complete the MRI I seen and gravis panel was negative  2.  Facial pain is under good control.  Remains on the gabapentin.  No side effects  3.  In terms of her Parkinson's overall she is doing well.  No side effects with the medication.  Wants to stay on the medication.  She further reports that there is some tiredness and fatigue in the evening time around 6 PM onwards.  She takes the medication at 3 PM.  Discussed that most likely is not related to the Parkinson's disease.  She should maybe try taking a longer nap during the daytime or taking a nap later in the day and taking it at 10 AM in the morning.  She is remaining active.  No other new symptoms.  B12 might be helping she is not sure.  Neuropathy stable.    9/6/24  Patient returned today  1.  Slurred speech is only in the evening rarely.  This is more when she is tired  2.  Parkinson's stable with the Sinemet.  No side effects of wearing off  3.  Feels that she has more pain in the feet.  Discussed this in detail and this could be more of a burning pain.  Discussed about neuropathy causing neuropathic pain.  Gabapentin is helping and does not want to try more medications.  Overall neuropathy stable.  4.  Remains on the B12 which might be helping  No other new concerns.    Previous history is reviewed and this is unchanged.    PAST MEDICAL/SURGICAL HISTORY  Past Medical History:   Diagnosis Date    Acute diastolic congestive heart failure (H)     Acute kidney injury superimposed on CKD  (H24) 08/18/2022    Acute on chronic congestive heart failure (H) 06/11/2018    Acute pulmonary edema (H) 05/06/2023    Acute respiratory failure with hypoxia (H) 05/08/2023    Age-related osteoporosis with current pathological fracture with routine healing     Chest pain, unspecified type 08/15/2022    Chronic bilateral back pain 06/15/2021    Last Assessment & Plan:   Formatting of this note might be different from the  original.  She says she has seen a pain specialist and had back surgery.  She needs to establish care with a pain specialist since she is new to [place] from [place].  Referral placed.    Coronary artery disease     Coronary artery disease involving native coronary artery without angina pectoris, unspecified whether native or transplanted heart 08/17/2022    Diabetes mellitus, type II (H)     Diastolic dysfunction 07/10/2018    Frozen shoulder     bilateral    Heart failure with reduced ejection fraction (H) 05/25/2023    Hyperlipidemia     Hypertension     Hypertensive emergency     Parkinson disease (H)     Primary osteoarthritis involving multiple joints     Primary osteoarthritis of left knee     Pulmonary nodules 04/04/2024    Recurrent UTI     hx septic shock    Thyroid disease 2021     Patient Active Problem List   Diagnosis    Benign essential hypertension    Parkinson's disease, unspecified whether dyskinesia present, unspecified whether manifestations fluctuate (H)    Trigeminal neuralgia    Chronic bilateral back pain    Aortic valve insufficiency    Tricuspid insufficiency    Mitral valve insufficiency    Bilateral carotid artery stenosis    Stage 3b chronic kidney disease (H)    Recurrent major depressive disorder, in remission (H24)    GERD (gastroesophageal reflux disease)    Left bundle branch block    Primary osteoarthritis involving multiple joints    Sensorineural hearing loss (SNHL) of both ears    Subclinical hypothyroidism    Type 2 diabetes mellitus with diabetic polyneuropathy, without long-term current use of insulin (H)    Secondary renal hyperparathyroidism (H24)    Age-related osteoporosis without current pathological fracture    Mixed hypercholesterolemia and hypertriglyceridemia    Mixed incontinence urge and stress (male)(female)    Vitamin B12 deficiency (non anemic)    Non-seasonal allergic rhinitis due to pollen    Healthcare maintenance    Vaginal irritation    Polypharmacy    PAD  (peripheral artery disease) (H24)    Bronchomalacia    Renal mass    Hypercalcemia    Heart failure with reduced ejection fraction (H)    Coronary artery disease involving coronary bypass graft of native heart without angina pectoris    Class 1 obesity with serious comorbidity and body mass index (BMI) of 32.0 to 32.9 in adult   Significant for CABG/bypass/coronary artery disease, high cholesterol, high blood pressure, diabetes, migraines, Parkinson's disease, arthritis, depression, osteoporosis, hyperparathyroidism    FAMILY HISTORY  Family History   Problem Relation Age of Onset    Heart Disease Mother     Heart Disease Father     Sleep Apnea Daughter    Family history reviewed and noncontributory no family history of neuropathy.    SOCIAL HISTORY  Social History     Tobacco Use    Smoking status: Never     Passive exposure: Never    Smokeless tobacco: Never   Vaping Use    Vaping status: Never Used   Substance Use Topics    Alcohol use: No    Drug use: No       SYSTEMS REVIEW  Twelve-system ROS was done and other than the HPI this was negative except for neck pain, back pain, arm and leg pain, joint pain, numbness and tingling, weakness paralysis, difficulty walking, falling, balance coordination problems, hearing loss, sleepy during the day, sleeping problems, headaches, anxiety, depression, cardiac/heart problems.  No new concerns.    MEDICATIONS  Current Outpatient Medications   Medication Sig Dispense Refill    aspirin 81 mg chewable tablet [ASPIRIN 81 MG CHEWABLE TABLET] Chew 81 mg at bedtime.      atorvastatin (LIPITOR) 20 MG tablet TAKE 1 TABLET BY MOUTH AT  BEDTIME 90 tablet 3    carbidopa-levodopa (SINEMET)  MG tablet TAKE 1 TABLET BY MOUTH 3  TIMES DAILY TAKE AT LEAST  1/2 HOUR BEFORE MEALS OR 2  HOURS AFTER MEALS DO NOT  MIX WITH FOOD 270 tablet 3    carvedilol (COREG) 12.5 MG tablet Take 1 tablet (12.5 mg) by mouth 2 times daily (with meals) 180 tablet 3    cholecalciferol, vitamin D3, 1,000  "unit tablet Take 1,000 Units by mouth 2 times daily      cyanocobalamin (VITAMIN B-12) 1000 MCG tablet Take 1 tablet (1,000 mcg) by mouth every evening. 90 tablet 3    FLUoxetine (PROZAC) 20 MG capsule Take 1 capsule (20 mg) by mouth 2 times daily 180 capsule 1    furosemide (LASIX) 20 MG tablet Take with one 40 mg tablet daily 90 tablet 3    furosemide (LASIX) 40 MG tablet Take with one 20 mg tablet daily 90 tablet 1    gabapentin (NEURONTIN) 300 MG capsule TAKE 1 CAPSULE BY MOUTH 4  TIMES DAILY 360 capsule 3    lisinopril (ZESTRIL) 20 MG tablet Take 20 mg by mouth every evening      loratadine (CLARITIN) 10 mg tablet Take 10 mg by mouth daily as needed for allergies      magnesium 250 MG tablet Take 2 tablets (500 mg) by mouth daily      melatonin 1 mg Tab tablet Take 1 mg by mouth At Bedtime      multivitamin therapeutic tablet Take 1 tablet by mouth every morning      nitroGLYcerin (NITROSTAT) 0.4 MG sublingual tablet Place 1 tablet (0.4 mg) under the tongue every 5 minutes as needed for chest pain 100 tablet 1    nystatin (MYCOSTATIN) 951148 UNIT/GM external cream APPLY TO AFFECTED AREA(S)  TOPICALLY TWICE DAILY 120 g 0    omeprazole (PRILOSEC) 20 MG DR capsule Take 1 capsule (20 mg) by mouth 2 times daily 180 capsule 3    polyethylene glycol (MIRALAX) 17 gram packet Take 17 g by mouth daily as needed for constipation       Current Facility-Administered Medications   Medication Dose Route Frequency Provider Last Rate Last Admin    [START ON 1/14/2025] denosumab (PROLIA) injection 60 mg  60 mg Subcutaneous Q6 Months         denosumab (PROLIA) injection 60 mg  60 mg Subcutaneous Q6 Months Eric Christensen MD   60 mg at 07/10/24 1449        PHYSICAL EXAMINATION  VITALS: /72 (BP Location: Left arm, Patient Position: Sitting)   Pulse 74   Ht 1.6 m (5' 3\")   Wt 82.1 kg (181 lb)   LMP  (LMP Unknown)   BMI 32.06 kg/m    GENERAL: Healthy appearing, alert, no acute distress, normal " habitus.  CARDIOVASCULAR: Extremities warm and well perfused. Pulses present.   NEUROLOGICAL:  Patient is awake and oriented to self, place and time.  Attention span is normal.  Memory is grossly intact.  Language is fluent and follows commands appropriately.  Appropriate fund of knowledge. Cranial nerves 2-12 are intact. There is no pronator drift.  Motor exam shows 5/5 strength in all extremities.  Tone is symmetric bilaterally in upper and lower extremities.  No cogwheeling or tremor noted.  (Previously reflexes are symmetric and 2+ in upper extremities and absent in lower extremities.) Sensory exam is grossly intact to light touch, pin prick and vibration intact today.  Previously this was decreased up to the knees. Finger to nose and heel to shin is without dysmetria.  Romberg is negative.  Gait is almost normal to slightly wide-based with bilateral decreased arm swing (mild today).  Mild difficulty with tandem.  Previously drawing concentric circles did not show any tremor.  No change in exam.  No tremors cogwheeling.  Gait is slightly wide-based though no decreased arm swing.    DIAGNOSTICS  RELEVANT LABS  TSH 2.01   A1c 6.7    Carotid US 2020  1: Right: 1-39% carotid artery stenosis with mild velocities and antegrade   vertebral flow.   2: Left: 1-39% carotid artery stenosis with mild velocities and antegrade   vertebral flow.   3: Essentially unchanged.   4: Recommend a two year follow-up if patient remains asymptomatic.       CT head 2020  IMPRESSION   1. No acute intracranial findings.   2. Senescent changes.     MRI 2013  IMPRESSION:   Scattered small vessel ischemic disease including pontine involvement.      OUTSIDE RECORDS  Outside referral notes and chart notes were reviewed and pertinent information has been summarized (in addition to the HPI):-Patient has a diagnosis of Parkinson's disease.  Diagnosed in 2015.  Is looking to establish with a neurologist.  Is on Sinemet  times a day.  Also has  trigeminal neuralgia controlled with gabapentin 300 mg p.o. 4 times a day.  Also has bilateral carotid artery stenosis.  Is moving here from [place].    LABS    Component      Latest Ref Rng & Units 7/27/2021           3:50 PM   Total Protein Serum for ELP      6.0 - 8.0 g/dL 6.8   Albumin %      51.0 - 67.0 % 66.6   Albumin Fraction      3.2 - 4.7 g/dL 4.5   Alpha 1 %      2.0 - 4.0 % 1.9 (L)   Alpha 1 Fraction      0.1 - 0.3 g/dL 0.1   Alpha 2 %      5.0 - 13.0 % 9.8   Alpha 2 Fraction      0.4 - 0.9 g/dL 0.7   Beta %      10.0 - 17.0 % 10.5   Beta Fraction      0.7 - 1.2 g/dL 0.7   Gamma Globulin %      9.0 - 20.0 % 11.2   Gamma Fraction      0.6 - 1.4 g/dL 0.8   ELP Interpretation:       Unremarkable protein electrophoresis.   Path ICD       G62.9   Interpreted By       Lisa Cantu MD   Immunofixation ELP          VINICIO interpretation      Negative Borderline Positive (A)   VINICIO pattern 1       Homogeneous   VINICIO titer 1       1:80   Vitamin B12      213 - 816 pg/mL 383   Vitamin B1 Whole Blood Level      70 - 180 nmol/L 184 (H)   Methylmalonic Acid      0.00 - 0.40 umol/L 1.02 (H)   Lyme Disease Antibodies Serum      <0.90 0.04     Component      Latest Ref Rng & Units 7/27/2021           3:50 PM   Total Protein Serum for ELP      6.0 - 8.0 g/dL 6.9   Albumin %      51.0 - 67.0 %    Albumin Fraction      3.2 - 4.7 g/dL    Alpha 1 %      2.0 - 4.0 %    Alpha 1 Fraction      0.1 - 0.3 g/dL    Alpha 2 %      5.0 - 13.0 %    Alpha 2 Fraction      0.4 - 0.9 g/dL    Beta %      10.0 - 17.0 %    Beta Fraction      0.7 - 1.2 g/dL    Gamma Globulin %      9.0 - 20.0 %    Gamma Fraction      0.6 - 1.4 g/dL    ELP Interpretation:          Path ICD       G62.9   Interpreted By       Lisa Cantu MD   Immunofixation ELP       Unremarkable immunofixation electrophoresis.   VINICIO interpretation      Negative    VINICIO pattern 1          VINICIO titer 1          Vitamin B12      213 - 816 pg/mL    Vitamin B1 Whole Blood  Level      70 - 180 nmol/L    Methylmalonic Acid      0.00 - 0.40 umol/L    Lyme Disease Antibodies Serum      <0.90      EMG  CLINICAL INTERPRETATION:  This is a mildly abnormal nerve conduction and EMG study.  The abnormalities seen above could suggest early/mild sensorimotor polyneuropathy though could be artifactual also.  Further clinical correlation is needed.     PCP notes regarding B12 reviewed.     Component      Latest Ref Rng & Units 1/20/2022 3/22/2022   VINICIO interpretation      Negative Borderline Positive (A)    VINICIO pattern 1       Homogeneous    VINICIO titer 1       1:80    Vitamin B12      213 - 816 pg/mL 1,908 (H)    Methylmalonic Acid      0.00 - 0.40 umol/L 0.35    Hemoglobin A1C      0.0 - 5.6 %  7.6 (H)     Component      Latest Ref Rng & Units 10/11/2022   Hemoglobin A1C      0.0 - 5.6 % 5.9 (H)     Component      Latest Ref Rng 4/28/2023  2:37 PM   Vitamin B12      232 - 1,245 pg/mL 1,497 (H)       Legend:  (H) High    Component      Latest Ref Rng 5/12/2023  5:04 AM 5/24/2023  10:16 AM   Sodium      136 - 145 mmol/L 130 (L)  137    Potassium      3.5 - 5.0 mmol/L 4.9     Chloride      98 - 107 mmol/L 98     Carbon Dioxide      22 - 31 mmol/L 25     Anion Gap      7 - 15 mmol/L 7  12    Urea Nitrogen      8 - 28 mg/dL 57 (H)     Creatinine      0.51 - 0.95 mg/dL 1.21 (H)  0.93    Calcium      8.8 - 10.2 mg/dL 10.0  10.6 (H)    Glucose      70 - 125 mg/dL 131 (H)     GFR Estimate      >60 mL/min/1.73m2 43 (L)  58 (L)    Potassium      3.4 - 5.3 mmol/L  4.9    Chloride      98 - 107 mmol/L  101    Carbon Dioxide (CO2)      22 - 29 mmol/L  24    Urea Nitrogen      8.0 - 23.0 mg/dL  32.4 (H)    Glucose      70 - 99 mg/dL  151 (H)       Legend:  (L) Low  (H) High    Component      Latest Ref Rng 6/19/2023  3:38 PM 8/9/2023  4:16 PM   AcetChol Binding Seema      0.0 - 0.4 nmol/L 0.0     Striated Muscle Antibody IgG      <1:40  <1:40     AcetChol Modul Seema      <=45 % 0     AcetChol Block Seema      0 - 26 %  10     Hemoglobin A1C      0.0 - 5.6 %  6.5 (H)       Legend:  (H) High    Hospital notes reviewed.  Primary care notes reviewed.    Component      Latest Ref Rng 7/16/2024  9:19 AM   Vitamin B12      232 - 1,245 pg/mL 1,582 (H)       Legend:  (H) High    IMPRESSION/REPORT/PLAN  Parkinson's disease (H)  Trigeminal neuralgia  Questionable neuropathy-questionable related to diabetes versus B12  Type 2 diabetes mellitus with diabetic polyneuropathy, without long-term current use of insulin (H)   Low B12  Positive VINICIO  Slurred speech- rule out stroke    This is a 89 year old female with  1.  Trigeminal neuralgia:-MRI brain in 2013 did not show any structural lesions.  Currently pain is under good control with gabapentin and will continue gabapentin.  Stable.  Continue gabapentin.  No change.  Stable.    2.  Parkinson's disease:-Examination does not show a lot of evidence of Parkinson's disease.  Possibly this is being masked by use of Sinemet (was started previously by other provider).  She does have slightly decreased arm swing today.  Exam today off the Sinemet shows no evidence of parkinsonism.  Patient would like to continue the medication.  Recommend exercise and staying active.  Physical therapy in the past made things worse.  She does have some fatigue in the knee which I do not think is related to the wearing of the med.  Continue current dose.  Stable.    3.  On examination she has a wide-based gait with decreased pinprick sensation in her legs.  Examination suggestive of neuropathy.  Examination does look better today/stable.  EMG shows questionable neuropathy versus artifact.   Could be related to diabetes.  A1c remains borderline elevated.  B12 was low and she was treated with tablets and injections.  B12 level now looks normal.  She prefers to continue the B12 supplementation.  She does have neuropathic pain though gabapentin for trigeminal neuralgia is helping and wants to continue.  Discussed  nonpharmacological methods.    4.  She does have a positive VINICIO.  I think this is a false positive.  Repeat VINICIO was positive as well.  Reports some joint pain related to the valsartan.  Can discuss further with primary care.  Could be related to age.  No change.    5.  She does complain of some slurred speech with change in mouth.  Differential for this would include cerebrovascular event, heart failure, use of oxygen, less likely to be from Parkinson's, myasthenia gravis, increased dose of lisinopril versus use of Lasix, hyponatremia.  Myasthenia gravis panel was negative.  Symptoms have not resolved and will hold off on MRI brain that was ordered previously.  Unchanged.  Will monitor.    Return back in 1 year.  Medications refilled.      -     carbidopa-levodopa (SINEMET)  MG tablet; TAKE 1 TABLET BY MOUTH 3  TIMES DAILY TAKE AT LEAST  1/2 HOUR BEFORE MEALS OR 2  HOURS AFTER MEALS DO NOT  MIX WITH FOOD  -     gabapentin (NEURONTIN) 300 MG capsule; TAKE 1 CAPSULE BY MOUTH 4  TIMES DAILY  -     cyanocobalamin (VITAMIN B-12) 1000 MCG tablet; Take 1 tablet (1,000 mcg) by mouth every evening.      Return in about 1 year (around 9/6/2025) for In-Clinic Visit (must).    Over 31 minutes were spent coordinating the care for the patient on the day of the encounter.  This includes previsit, during visit and post visit activities as documented above.  Counseling patient.  Multiple problems reviewed.  New problem.  Prescription management.  (Activities include but not inclusive of reviewing chart, reviewing outside records, reviewing labs and imaging study results as well as the images, patient visit time including getting history and exam,  use if applicable, review of test results with the patient and coming up with a plan in a shared model, counseling patient and family, education and answering patient questions, EMR , EMR diagnosis entry and problem list management, medication reconciliation and  prescription management if applicable, paperwork if applicable, printing documents and documentation of the visit activities.)    Brennon Moya MD  Neurologist  [facility]  Tel:- [phone]    This note was dictated using voice recognition software.  Any grammatical or context distortions are unintentional and inherent to the software.    The longitudinal plan of care for the diagnosis(es)/condition(s) as documented were addressed during this visit. Due to the added complexity in care, I will continue to support Ivia in the subsequent management and with ongoing [phone] [id number] [id number]

## 2024-09-06 NOTE — LETTER
9/6/2024      Janes Montero  [address]  [facility] [address]      Dear Colleague,    Thank you for referring your patient, Janes Montero, to the [facility] [place]. Please see a copy of my visit note below.    NEUROLOGY OUTPATIENT PROGRESS NOTE   Sep 6, 2024     CHIEF COMPLAINT/REASON FOR VISIT/REASON FOR CONSULT  Patient presents with:  NEUROPATHY: 1 year follow up. Would like to discuss neuropathy today. Doing well otherwise.     REASON FOR CONSULTATION-Parkinson's/trigeminal neuralgia    REFERRAL SOURCE  Dr. Maggie Arriaga  CC Dr. Maggie Arriaga    HISTORY OF PRESENT ILLNESS  Janes Montero is a 89 year old female seen today for multiple issues  Patient is moving from [place] to [place].  She is a resident of [place].  And has moved to [place] is a 20 years and is coming back.    1.  Parkinson's disease:-Symptom onset was in 2015.  At that time she was having shaking of her arms and legs.  This was at rest and with activity.  She was losing her balance as well.  She was put on Sinemet 1 tablet 3 times a day.  Feels that the medication is helping.  Does have some wearing off in the evening time and is taking the medication earlier than bedtime which is working.  Does fine in the morning.  Takes the medication on empty stomach.  Denies any other progression or any new symptoms.  Occasionally will use a walker.  Is not very active but does do some walking.  2.  Trigeminal neuralgia:-This started in 2013.  Pain is on the left side of the face.  The whole V1 V2 V3 distribution is involved that the pain is more towards the year.  Pain is sharp and intermittent.  Heating pad helps.  Reports no real provoking factors for her.  She is on gabapentin which has been helpful.  Has not tried any other medications.  Denies any other associated symptoms.  No numbness.  MRI was done and no clear cause for the trigeminal neuralgia was identified.  3.  Patient complains of some  numbness in her legs.  Does report some balance issues as well.  She does have a diagnosis of diabetes.  Last A1c was 6.7.    10/19/21  Patient returns today  1.  Parkinson's disease is stable and under good control.  Reports no wearing off of the medication.  Is taking the Sinemet regularly.  Has seen physical therapy and trying to do some exercise.  She did take her medication this morning.  2.  Trigeminal neuralgia pain is unchanged.  Gabapentin is helping her.  3.  Previously she was complaining of some numbness in her legs and was found to have a wide-based gait.  This does seem to have improved.  She was identified to have B12 deficiency and was put on injections but not oral replacement.  4.  Diabetes-A1c was 7.1.  Encouraged her to exercise and.  Manage her diet.    1/20/21  Patient returns today  1.  Parkinson's stable under good control.  Reports no wearing off with medication.  Is unclear if the medication is really helping or not though she feels that initially she had a lot of tremors and those got better when she started the medication.  2.  Trigeminal pain is under good control.  Gabapentin is helping.  3.  Was having some numbness in her feet.  Feels that this is getting better.  Her gait has also improved.  She feels more steady.  Is only taking the oral replacement at this point  4.  Diabetes has been under appropriate control.  A1c scheduled for March.  Previously 1 was 7.1 is working on improving her diet.  Stays active and is not sitting in the chair all day long    5/19/22  Patient returns today  1.  Patient's Parkinson's is under good control.  There is no wearing off of the medication.  No side effects with the medication.  Exam today does show slightly decreased arm swing though she wants to stay on the medication at her age.  2.  Trigeminal neuralgia pain is under good control.  Gabapentin is helping  3.  B12 has come up and discussed that she could stop the injections at this point.  4.   Diabetes is not under good control.  A1c is worsened.  She is eating too many sweets.  Encourage exercise and cutting down the sweets  5.  She reports that the numbness in her feet is gone away after she got her Prolia injection.  Denies any balance issues.  6.  VINICIO still positive.  Denies any joint pain except secondary to use of the valsartan.    11/28/22  Patient returns today  1.  Patient since she was last seen has been hospitalized for some chest pain like symptoms.  Coronary angiogram was negative for any significant occlusion.  Patient was thought to have heartburn related symptoms causing chest pain.  She has been little bit weak since then.  2.  No falls or balance issues.  Continues to use a walker.  Neuropathy has not worsened.  Diabetes under better control.  Has stopped the B12 injections though remains on the B12 supplement.  Discussed causes of weakness in elderly patients.  3.  Trigeminal pain is under good control with gabapentin  4.  Remains on Sinemet and feels that that is helping.  Has not really missed a dose.  Reports no wearing off.  Denies any side effects to the medication.  Does not mix the medication with food.    5/31/23  Patient returns today  1.  Parkinson's overall has been under good control.  Was active until 3 weeks ago when she was hospitalized for heart failure.  No side effects to Sinemet  2.  Since she was hospitalized has been having slurred speech and abnormal taste at nighttime.  She does use oxygen which can possibly affect her taste.  She is also been on higher doses of lisinopril.  Further reports that her sodium is low.  Is on Lasix.  Is following up with cardiology to decide on a plan next week.  No associated symptoms with the slurred speech.  Slurred speech is worse in the evening time.  Has not noticed any benefits with taking Sinemet at the time of the slurred speech.  3.  Patient pain is under good control with the gabapentin.  No side effects  4.  Reports  abnormal sensation in the feet.  Discussed that it might be related to neuropathy.  Remains on vitamin B12.  No other new symptoms.    8/31/23  Patient demonstrated  1.  Slurred speech has resolved compared to before.  This was thought to be related to her medications and possibly a viral infection.  She did not complete the MRI I seen and gravis panel was negative  2.  Facial pain is under good control.  Remains on the gabapentin.  No side effects  3.  In terms of her Parkinson's overall she is doing well.  No side effects with the medication.  Wants to stay on the medication.  She further reports that there is some tiredness and fatigue in the evening time around 6 PM onwards.  She takes the medication at 3 PM.  Discussed that most likely is not related to the Parkinson's disease.  She should maybe try taking a longer nap during the daytime or taking a nap later in the day and taking it at 10 AM in the morning.  She is remaining active.  No other new symptoms.  B12 might be helping she is not sure.  Neuropathy stable.    9/6/24  Patient returned today  1.  Slurred speech is only in the evening rarely.  This is more when she is tired  2.  Parkinson's stable with the Sinemet.  No side effects of wearing off  3.  Feels that she has more pain in the feet.  Discussed this in detail and this could be more of a burning pain.  Discussed about neuropathy causing neuropathic pain.  Gabapentin is helping and does not want to try more medications.  Overall neuropathy stable.  4.  Remains on the B12 which might be helping  No other new concerns.    Previous history is reviewed and this is unchanged.    PAST MEDICAL/SURGICAL HISTORY  Past Medical History:   Diagnosis Date     Acute diastolic congestive heart failure (H)      Acute kidney injury superimposed on CKD  (H24) 08/18/2022     Acute on chronic congestive heart failure (H) 06/11/2018     Acute pulmonary edema (H) 05/06/2023     Acute respiratory failure with hypoxia (H)  05/08/2023     Age-related osteoporosis with current pathological fracture with routine healing      Chest pain, unspecified type 08/15/2022     Chronic bilateral back pain 06/15/2021    Last Assessment & Plan:   Formatting of this note might be different from the original.  She says she has seen a pain specialist and had back surgery.  She needs to establish care with a pain specialist since she is new to [place] from [place].  Referral placed.     Coronary artery disease      Coronary artery disease involving native coronary artery without angina pectoris, unspecified whether native or transplanted heart 08/17/2022     Diabetes mellitus, type II (H)      Diastolic dysfunction 07/10/2018     Frozen shoulder     bilateral     Heart failure with reduced ejection fraction (H) 05/25/2023     Hyperlipidemia      Hypertension      Hypertensive emergency      Parkinson disease (H)      Primary osteoarthritis involving multiple joints      Primary osteoarthritis of left knee      Pulmonary nodules 04/04/2024     Recurrent UTI     hx septic shock     Thyroid disease 2021     Patient Active Problem List   Diagnosis     Benign essential hypertension     Parkinson's disease, unspecified whether dyskinesia present, unspecified whether manifestations fluctuate (H)     Trigeminal neuralgia     Chronic bilateral back pain     Aortic valve insufficiency     Tricuspid insufficiency     Mitral valve insufficiency     Bilateral carotid artery stenosis     Stage 3b chronic kidney disease (H)     Recurrent major depressive disorder, in remission (H24)     GERD (gastroesophageal reflux disease)     Left bundle branch block     Primary osteoarthritis involving multiple joints     Sensorineural hearing loss (SNHL) of both ears     Subclinical hypothyroidism     Type 2 diabetes mellitus with diabetic polyneuropathy, without long-term current use of insulin (H)     Secondary renal hyperparathyroidism (H24)     Age-related osteoporosis without  current pathological fracture     Mixed hypercholesterolemia and hypertriglyceridemia     Mixed incontinence urge and stress (male)(female)     Vitamin B12 deficiency (non anemic)     Non-seasonal allergic rhinitis due to pollen     Healthcare maintenance     Vaginal irritation     Polypharmacy     PAD (peripheral artery disease) (H24)     Bronchomalacia     Renal mass     Hypercalcemia     Heart failure with reduced ejection fraction (H)     Coronary artery disease involving coronary bypass graft of native heart without angina pectoris     Class 1 obesity with serious comorbidity and body mass index (BMI) of 32.0 to 32.9 in adult   Significant for CABG/bypass/coronary artery disease, high cholesterol, high blood pressure, diabetes, migraines, Parkinson's disease, arthritis, depression, osteoporosis, hyperparathyroidism    FAMILY HISTORY  Family History   Problem Relation Age of Onset     Heart Disease Mother      Heart Disease Father      Sleep Apnea Daughter    Family history reviewed and noncontributory no family history of neuropathy.    SOCIAL HISTORY  Social History     Tobacco Use     Smoking status: Never     Passive exposure: Never     Smokeless tobacco: Never   Vaping Use     Vaping status: Never Used   Substance Use Topics     Alcohol use: No     Drug use: No       SYSTEMS REVIEW  Twelve-system ROS was done and other than the HPI this was negative except for neck pain, back pain, arm and leg pain, joint pain, numbness and tingling, weakness paralysis, difficulty walking, falling, balance coordination problems, hearing loss, sleepy during the day, sleeping problems, headaches, anxiety, depression, cardiac/heart problems.  No new concerns.    MEDICATIONS  Current Outpatient Medications   Medication Sig Dispense Refill     aspirin 81 mg chewable tablet [ASPIRIN 81 MG CHEWABLE TABLET] Chew 81 mg at bedtime.       atorvastatin (LIPITOR) 20 MG tablet TAKE 1 TABLET BY MOUTH AT  BEDTIME 90 tablet 3      carbidopa-levodopa (SINEMET)  MG tablet TAKE 1 TABLET BY MOUTH 3  TIMES DAILY TAKE AT LEAST  1/2 HOUR BEFORE MEALS OR 2  HOURS AFTER MEALS DO NOT  MIX WITH FOOD 270 tablet 3     carvedilol (COREG) 12.5 MG tablet Take 1 tablet (12.5 mg) by mouth 2 times daily (with meals) 180 tablet 3     cholecalciferol, vitamin D3, 1,000 unit tablet Take 1,000 Units by mouth 2 times daily       cyanocobalamin (VITAMIN B-12) 1000 MCG tablet Take 1 tablet (1,000 mcg) by mouth every evening. 90 tablet 3     FLUoxetine (PROZAC) 20 MG capsule Take 1 capsule (20 mg) by mouth 2 times daily 180 capsule 1     furosemide (LASIX) 20 MG tablet Take with one 40 mg tablet daily 90 tablet 3     furosemide (LASIX) 40 MG tablet Take with one 20 mg tablet daily 90 tablet 1     gabapentin (NEURONTIN) 300 MG capsule TAKE 1 CAPSULE BY MOUTH 4  TIMES DAILY 360 capsule 3     lisinopril (ZESTRIL) 20 MG tablet Take 20 mg by mouth every evening       loratadine (CLARITIN) 10 mg tablet Take 10 mg by mouth daily as needed for allergies       magnesium 250 MG tablet Take 2 tablets (500 mg) by mouth daily       melatonin 1 mg Tab tablet Take 1 mg by mouth At Bedtime       multivitamin therapeutic tablet Take 1 tablet by mouth every morning       nitroGLYcerin (NITROSTAT) 0.4 MG sublingual tablet Place 1 tablet (0.4 mg) under the tongue every 5 minutes as needed for chest pain 100 tablet 1     nystatin (MYCOSTATIN) 653667 UNIT/GM external cream APPLY TO AFFECTED AREA(S)  TOPICALLY TWICE DAILY 120 g 0     omeprazole (PRILOSEC) 20 MG DR capsule Take 1 capsule (20 mg) by mouth 2 times daily 180 capsule 3     polyethylene glycol (MIRALAX) 17 gram packet Take 17 g by mouth daily as needed for constipation       Current Facility-Administered Medications   Medication Dose Route Frequency Provider Last Rate Last Admin     [START ON 1/14/2025] denosumab (PROLIA) injection 60 mg  60 mg Subcutaneous Q6 Months          denosumab (PROLIA) injection 60 mg  60 mg  "Subcutaneous Q6 Months Eric Christensen MD   60 mg at 07/10/24 1449        PHYSICAL EXAMINATION  VITALS: /72 (BP Location: Left arm, Patient Position: Sitting)   Pulse 74   Ht 1.6 m (5' 3\")   Wt 82.1 kg (181 lb)   LMP  (LMP Unknown)   BMI 32.06 kg/m    GENERAL: Healthy appearing, alert, no acute distress, normal habitus.  CARDIOVASCULAR: Extremities warm and well perfused. Pulses present.   NEUROLOGICAL:  Patient is awake and oriented to self, place and time.  Attention span is normal.  Memory is grossly intact.  Language is fluent and follows commands appropriately.  Appropriate fund of knowledge. Cranial nerves 2-12 are intact. There is no pronator drift.  Motor exam shows 5/5 strength in all extremities.  Tone is symmetric bilaterally in upper and lower extremities.  No cogwheeling or tremor noted.  (Previously reflexes are symmetric and 2+ in upper extremities and absent in lower extremities.) Sensory exam is grossly intact to light touch, pin prick and vibration intact today.  Previously this was decreased up to the knees. Finger to nose and heel to shin is without dysmetria.  Romberg is negative.  Gait is almost normal to slightly wide-based with bilateral decreased arm swing (mild today).  Mild difficulty with tandem.  Previously drawing concentric circles did not show any tremor.  No change in exam.  No tremors cogwheeling.  Gait is slightly wide-based though no decreased arm swing.    DIAGNOSTICS  RELEVANT LABS  TSH 2.01   A1c 6.7    Carotid US 2020  1: Right: 1-39% carotid artery stenosis with mild velocities and antegrade   vertebral flow.   2: Left: 1-39% carotid artery stenosis with mild velocities and antegrade   vertebral flow.   3: Essentially unchanged.   4: Recommend a two year follow-up if patient remains asymptomatic.       CT head 2020  IMPRESSION   1. No acute intracranial findings.   2. Senescent changes.     MRI 2013  IMPRESSION:   Scattered small vessel ischemic disease " including pontine involvement.      OUTSIDE RECORDS  Outside referral notes and chart notes were reviewed and pertinent information has been summarized (in addition to the HPI):-Patient has a diagnosis of Parkinson's disease.  Diagnosed in 2015.  Is looking to establish with a neurologist.  Is on Sinemet  times a day.  Also has trigeminal neuralgia controlled with gabapentin 300 mg p.o. 4 times a day.  Also has bilateral carotid artery stenosis.  Is moving here from [place].    LABS    Component      Latest Ref Rng & Units 7/27/2021           3:50 PM   Total Protein Serum for ELP      6.0 - 8.0 g/dL 6.8   Albumin %      51.0 - 67.0 % 66.6   Albumin Fraction      3.2 - 4.7 g/dL 4.5   Alpha 1 %      2.0 - 4.0 % 1.9 (L)   Alpha 1 Fraction      0.1 - 0.3 g/dL 0.1   Alpha 2 %      5.0 - 13.0 % 9.8   Alpha 2 Fraction      0.4 - 0.9 g/dL 0.7   Beta %      10.0 - 17.0 % 10.5   Beta Fraction      0.7 - 1.2 g/dL 0.7   Gamma Globulin %      9.0 - 20.0 % 11.2   Gamma Fraction      0.6 - 1.4 g/dL 0.8   ELP Interpretation:       Unremarkable protein electrophoresis.   Path ICD       G62.9   Interpreted By       Lisa Cantu MD   Immunofixation ELP          VINICIO interpretation      Negative Borderline Positive (A)   VINICIO pattern 1       Homogeneous   VINICIO titer 1       1:80   Vitamin B12      213 - 816 pg/mL 383   Vitamin B1 Whole Blood Level      70 - 180 nmol/L 184 (H)   Methylmalonic Acid      0.00 - 0.40 umol/L 1.02 (H)   Lyme Disease Antibodies Serum      <0.90 0.04     Component      Latest Ref Rng & Units 7/27/2021           3:50 PM   Total Protein Serum for ELP      6.0 - 8.0 g/dL 6.9   Albumin %      51.0 - 67.0 %    Albumin Fraction      3.2 - 4.7 g/dL    Alpha 1 %      2.0 - 4.0 %    Alpha 1 Fraction      0.1 - 0.3 g/dL    Alpha 2 %      5.0 - 13.0 %    Alpha 2 Fraction      0.4 - 0.9 g/dL    Beta %      10.0 - 17.0 %    Beta Fraction      0.7 - 1.2 g/dL    Gamma Globulin %      9.0 - 20.0 %    Gamma  Fraction      0.6 - 1.4 g/dL    ELP Interpretation:          Path ICD       G62.9   Interpreted By       Lisa Cantu MD   Immunofixation ELP       Unremarkable immunofixation electrophoresis.   VINICIO interpretation      Negative    VINICIO pattern 1          VINICIO titer 1          Vitamin B12      213 - 816 pg/mL    Vitamin B1 Whole Blood Level      70 - 180 nmol/L    Methylmalonic Acid      0.00 - 0.40 umol/L    Lyme Disease Antibodies Serum      <0.90      EMG  CLINICAL INTERPRETATION:  This is a mildly abnormal nerve conduction and EMG study.  The abnormalities seen above could suggest early/mild sensorimotor polyneuropathy though could be artifactual also.  Further clinical correlation is needed.     PCP notes regarding B12 reviewed.     Component      Latest Ref Rng & Units 1/20/2022 3/22/2022   VINICIO interpretation      Negative Borderline Positive (A)    VINICIO pattern 1       Homogeneous    VINICIO titer 1       1:80    Vitamin B12      213 - 816 pg/mL 1,908 (H)    Methylmalonic Acid      0.00 - 0.40 umol/L 0.35    Hemoglobin A1C      0.0 - 5.6 %  7.6 (H)     Component      Latest Ref Rng & Units 10/11/2022   Hemoglobin A1C      0.0 - 5.6 % 5.9 (H)     Component      Latest Ref Rng 4/28/2023  2:37 PM   Vitamin B12      232 - 1,245 pg/mL 1,497 (H)       Legend:  (H) High    Component      Latest Ref Rng 5/12/2023  5:04 AM 5/24/2023  10:16 AM   Sodium      136 - 145 mmol/L 130 (L)  137    Potassium      3.5 - 5.0 mmol/L 4.9     Chloride      98 - 107 mmol/L 98     Carbon Dioxide      22 - 31 mmol/L 25     Anion Gap      7 - 15 mmol/L 7  12    Urea Nitrogen      8 - 28 mg/dL 57 (H)     Creatinine      0.51 - 0.95 mg/dL 1.21 (H)  0.93    Calcium      8.8 - 10.2 mg/dL 10.0  10.6 (H)    Glucose      70 - 125 mg/dL 131 (H)     GFR Estimate      >60 mL/min/1.73m2 43 (L)  58 (L)    Potassium      3.4 - 5.3 mmol/L  4.9    Chloride      98 - 107 mmol/L  101    Carbon Dioxide (CO2)      22 - 29 mmol/L  24    Urea Nitrogen       8.0 - 23.0 mg/dL  32.4 (H)    Glucose      70 - 99 mg/dL  151 (H)       Legend:  (L) Low  (H) High    Component      Latest Ref Rng 6/19/2023  3:38 PM 8/9/2023  4:16 PM   AcetChol Binding Seema      0.0 - 0.4 nmol/L 0.0     Striated Muscle Antibody IgG      <1:40  <1:40     AcetChol Modul Seema      <=45 % 0     AcetChol Block Seema      0 - 26 % 10     Hemoglobin A1C      0.0 - 5.6 %  6.5 (H)       Legend:  (H) High    Hospital notes reviewed.  Primary care notes reviewed.    Component      Latest Ref Rng 7/16/2024  9:19 AM   Vitamin B12      232 - 1,245 pg/mL 1,582 (H)       Legend:  (H) High    IMPRESSION/REPORT/PLAN  Parkinson's disease (H)  Trigeminal neuralgia  Questionable neuropathy-questionable related to diabetes versus B12  Type 2 diabetes mellitus with diabetic polyneuropathy, without long-term current use of insulin (H)   Low B12  Positive VINICIO  Slurred speech- rule out stroke    This is a 89 year old female with  1.  Trigeminal neuralgia:-MRI brain in 2013 did not show any structural lesions.  Currently pain is under good control with gabapentin and will continue gabapentin.  Stable.  Continue gabapentin.  No change.  Stable.    2.  Parkinson's disease:-Examination does not show a lot of evidence of Parkinson's disease.  Possibly this is being masked by use of Sinemet (was started previously by other provider).  She does have slightly decreased arm swing today.  Exam today off the Sinemet shows no evidence of parkinsonism.  Patient would like to continue the medication.  Recommend exercise and staying active.  Physical therapy in the past made things worse.  She does have some fatigue in the knee which I do not think is related to the wearing of the med.  Continue current dose.  Stable.    3.  On examination she has a wide-based gait with decreased pinprick sensation in her legs.  Examination suggestive of neuropathy.  Examination does look better today/stable.  EMG shows questionable neuropathy versus  artifact.   Could be related to diabetes.  A1c remains borderline elevated.  B12 was low and she was treated with tablets and injections.  B12 level now looks normal.  She prefers to continue the B12 supplementation.  She does have neuropathic pain though gabapentin for trigeminal neuralgia is helping and wants to continue.  Discussed nonpharmacological methods.    4.  She does have a positive VINICIO.  I think this is a false positive.  Repeat VINICIO was positive as well.  Reports some joint pain related to the valsartan.  Can discuss further with primary care.  Could be related to age.  No change.    5.  She does complain of some slurred speech with change in mouth.  Differential for this would include cerebrovascular event, heart failure, use of oxygen, less likely to be from Parkinson's, myasthenia gravis, increased dose of lisinopril versus use of Lasix, hyponatremia.  Myasthenia gravis panel was negative.  Symptoms have not resolved and will hold off on MRI brain that was ordered previously.  Unchanged.  Will monitor.    Return back in 1 year.  Medications refilled.      -     carbidopa-levodopa (SINEMET)  MG tablet; TAKE 1 TABLET BY MOUTH 3  TIMES DAILY TAKE AT LEAST  1/2 HOUR BEFORE MEALS OR 2  HOURS AFTER MEALS DO NOT  MIX WITH FOOD  -     gabapentin (NEURONTIN) 300 MG capsule; TAKE 1 CAPSULE BY MOUTH 4  TIMES DAILY  -     cyanocobalamin (VITAMIN B-12) 1000 MCG tablet; Take 1 tablet (1,000 mcg) by mouth every evening.      Return in about 1 year (around 9/6/2025) for In-Clinic Visit (must).    Over 31 minutes were spent coordinating the care for the patient on the day of the encounter.  This includes previsit, during visit and post visit activities as documented above.  Counseling patient.  Multiple problems reviewed.  New problem.  Prescription management.  (Activities include but not inclusive of reviewing chart, reviewing outside records, reviewing labs and imaging study results as well as the images,  patient visit time including getting history and exam,  use if applicable, review of test results with the patient and coming up with a plan in a shared model, counseling patient and family, education and answering patient questions, EMR , EMR diagnosis entry and problem list management, medication reconciliation and prescription management if applicable, paperwork if applicable, printing documents and documentation of the visit activities.)    Brennon Moya MD  Neurologist  [facility]  Tel:- [phone]    This note was dictated using voice recognition software.  Any grammatical or context distortions are unintentional and inherent to the software.    The longitudinal plan of care for the diagnosis(es)/condition(s) as documented were addressed during this visit. Due to the added complexity in care, I will continue to support Ivia in the subsequent management and with ongoing continuity of care.      Again, thank you for allowing me to participate in the care of your patient.        Sincerely,        Brennon Moya MD

## 2024-09-11 ENCOUNTER — OFFICE VISIT (OUTPATIENT)
Dept: PULMONOLOGY | Facility: CLINIC | Age: 89
End: 2024-09-11
Payer: MEDICARE

## 2024-09-11 VITALS
HEART RATE: 74 BPM | OXYGEN SATURATION: 95 % | SYSTOLIC BLOOD PRESSURE: 126 MMHG | WEIGHT: 185.2 LBS | BODY MASS INDEX: 32.81 KG/M2 | DIASTOLIC BLOOD PRESSURE: 72 MMHG

## 2024-09-11 DIAGNOSIS — I50.30 HEART FAILURE WITH PRESERVED EJECTION FRACTION, NYHA CLASS II (H): ICD-10-CM

## 2024-09-11 DIAGNOSIS — G47.30 SLEEP-RELATED BREATHING DISORDER: ICD-10-CM

## 2024-09-11 DIAGNOSIS — J98.09 BRONCHOMALACIA: Primary | ICD-10-CM

## 2024-09-11 DIAGNOSIS — Z72.821 POOR SLEEP HYGIENE: ICD-10-CM

## 2024-09-11 PROCEDURE — G2211 COMPLEX E/M VISIT ADD ON: HCPCS | Performed by: INTERNAL MEDICINE

## 2024-09-11 PROCEDURE — 99214 OFFICE O/P EST MOD 30 MIN: CPT | Performed by: INTERNAL MEDICINE

## 2024-09-11 NOTE — PATIENT INSTRUCTIONS
Overnight pulse oximetry on RA  Titrate BP meds and diuresis, currently 40 mg QAM and 20 mg QPM   Daily weight, current weight 185 lbs.   Avoid eating close to bed time   Raise the head of the bed  Omeprazole daily   Sleep hygiene was discussed.   Please contact me after overnight pulse oximetry, clinic number [phone], nurse Robbie  Follow up in 6 months

## 2024-09-18 ENCOUNTER — OFFICE VISIT (OUTPATIENT)
Dept: CARDIOLOGY | Facility: CLINIC | Age: 89
End: 2024-09-18
Payer: MEDICARE

## 2024-09-18 VITALS
OXYGEN SATURATION: 95 % | BODY MASS INDEX: 32.77 KG/M2 | RESPIRATION RATE: 16 BRPM | DIASTOLIC BLOOD PRESSURE: 66 MMHG | SYSTOLIC BLOOD PRESSURE: 126 MMHG | HEART RATE: 70 BPM | WEIGHT: 185 LBS

## 2024-09-18 DIAGNOSIS — I25.810 CORONARY ARTERY DISEASE INVOLVING CORONARY BYPASS GRAFT OF NATIVE HEART WITHOUT ANGINA PECTORIS: ICD-10-CM

## 2024-09-18 DIAGNOSIS — I10 BENIGN ESSENTIAL HYPERTENSION: ICD-10-CM

## 2024-09-18 DIAGNOSIS — I50.20 HEART FAILURE WITH REDUCED EJECTION FRACTION (H): Primary | ICD-10-CM

## 2024-09-18 DIAGNOSIS — R94.39 ABNORMAL CARDIOVASCULAR STRESS TEST: ICD-10-CM

## 2024-09-18 DIAGNOSIS — I34.0 NONRHEUMATIC MITRAL VALVE REGURGITATION: ICD-10-CM

## 2024-09-18 PROCEDURE — G2211 COMPLEX E/M VISIT ADD ON: HCPCS

## 2024-09-18 PROCEDURE — 99214 OFFICE O/P EST MOD 30 MIN: CPT

## 2024-09-18 NOTE — PATIENT INSTRUCTIONS
It was a pleasure taking part in your care today:    - Follow up in 6 months after echocardiogram  - Continue current medicaitons      Please call the [facility] clinic with any questions or concerns at [phone].     Otilia Meneses PA-C

## 2024-09-18 NOTE — LETTER
9/18/2024    Maggie Arriaga MD  [address]  [facility] [address]    RE: Janes BERNAL Winston       Dear Colleague,     I had the pleasure of seeing Jan[facility] Monter[facility] in the [facility].    HEART CARE ENCOUNTER CONSULTATON NOTE      [facility]  [phone]      Assessment/Recommendations   Assessment:   Ischemic cardiomyopathy, HFrEF: Improved ejection fraction per echocardiogram 3/2024 shows LVEF 55 to 60%.  Previously 45-50% 5/2023.  Continues carvedilol 12.5 mg twice daily, lisinopril 20 mg daily, furosemide 60 mg daily daily  Coronary artery disease, s/p CABG 2010: Known reversible ischemia on NM stress test 5/2023, recommending medical management with lack of angina, and preserved EF.  Denies angina, continues aspirin and atorvastatin  Valvular disease: Moderate to severe mitral insufficiency with mild-moderate stenosis, mild aortic stenosis.  Likely worsened in setting of acute heart failure.  Hypertension: Controlled   T2DM, CKD  Parkinson's disease      Plan:   Had a conversation about valve disease monitoring, with progression to severe potential intervention/procedures that could be offered to improve function.  Patient would like to continue monitoring with echocardiograms, repeat in 6 months.  Continue heart failure medication regimen  Continue aspirin and atorvastatin      Follow up in 6 months with Dr. Ricardo or myself     History of Present Illness/Subjective    HPI: Janes Montero is a 89 year old female with PMHx of CAD, ischemic cardiomyopathy, abnormal stress test, valvular disease, hypertension, T2DM, CKD presents for follow-up. Hospitalized March 2024 with acute HFpEF exacerbation and pneumonia.  Echocardiogram at that time showed improvement in LVEF into normal range, previously mildly reduced.  I been following in heart failure clinic with adjustments to medications and condition has been stabilized for several months.    Patient is accompanied by her  daughter, Kaylynn.  Patient reports no new shortness of breath, chest pain, lower extremity swelling mental bloating/fluid retention.  She walks daily to the cafeteria at her apartment using a walker.  Feels good doing this.    Daughter expresses frustration with ability to schedule with Dr. Ricardo.     She denies lightheadedness, shortness of breath, dyspnea on exertion, orthopnea, PND, palpitations, chest pain, and lower extremity edema.        Echocardiogram 3/2024 Results:  The visual ejection fraction is 55-60%.  The right ventricle is normal in size and function.  Biatrial enlargement.  There is mod-severe to severe (3-4+) mitral regurgitation.  There is mild to moderate mitral stenosis.  Mild valvular aortic stenosis.  There is moderate (2+) tricuspid regurgitation.  The right ventricular systolic pressure is approximated at 56 mmHg.  Compared to the prior study dated 5/9/2023, there are changes as noted. There  is now moderate-severe mitral regurgitation and moderate tricuspid  regurgitation with moderate pulmonary hypertension.    NM Lexiscan stress test 5/2023     A prior study was conducted on 8/16/2022.  Prior images were unavailable for comparison review.     Pharmacologic regadenoson stress ECG is nondiagnostic due to baseline ECG abnormality.     Pharmacological regadenoson nuclear stress test is abnormal.  There is partially reversible change in inferior and basal to mid inferolateral walls indicating inducible myocardial ischemia.     Normal left ventricular size, wall motion and systolic function.  Calculated left ventricular ejection fraction is 65%.     The patient is at an intermediate risk of future cardiac ischemic events.        Physical Examination  Review of Systems   Vitals: /66 (BP Location: Right arm, Patient Position: Sitting, Cuff Size: Adult Regular)   Pulse 70   Resp 16   Wt 83.9 kg (185 lb)   LMP  (LMP Unknown)   SpO2 95%   BMI 32.77 kg/m    BMI= Body mass index is 32.77  kg/m .  Wt Readings from Last 3 Encounters:   09/18/24 83.9 kg (185 lb)   09/11/24 84 kg (185 lb 3.2 oz)   09/06/24 82.1 kg (181 lb)           ENT/Mouth: membranes moist, no oral lesions or bleeding gums.      EYES:  no scleral icterus, normal conjunctivae       Chest/Lungs:   lungs are clear to auscultation, no rales or wheezing, equal chest wall expansion    Cardiovascular:   Regular. Normal first and second heart sounds with systolic murmur, rubs, or gallops; the, radial and posterior tibial pulses are intact, absent edema bilaterally        Extremities: no cyanosis or clubbing   Skin: no xanthelasma, warm.    Neurologic:  no tremors     Psychiatric: alert and oriented x3, calm        Please refer above for cardiac ROS details.        Medical History  Surgical History Family History Social History   Past Medical History:   Diagnosis Date     Acute diastolic congestive heart failure (H)      Acute kidney injury superimposed on CKD  (H24) 08/18/2022     Acute on chronic congestive heart failure (H) 06/11/2018     Acute pulmonary edema (H) 05/06/2023     Acute respiratory failure with hypoxia (H) 05/08/2023     Age-related osteoporosis with current pathological fracture with routine healing      Chest pain, unspecified type 08/15/2022     Chronic bilateral back pain 06/15/2021    Last Assessment & Plan:   Formatting of this note might be different from the original.  She says she has seen a pain specialist and had back surgery.  She needs to establish care with a pain specialist since she is new to [place] from [place].  Referral placed.     Coronary artery disease      Coronary artery disease involving native coronary artery without angina pectoris, unspecified whether native or transplanted heart 08/17/2022     Diabetes mellitus, type II (H)      Diastolic dysfunction 07/10/2018     Frozen shoulder     bilateral     Heart failure with reduced ejection fraction (H) 05/25/2023     Hyperlipidemia      Hypertension       Hypertensive emergency      Parkinson disease (H)      Primary osteoarthritis involving multiple joints      Primary osteoarthritis of left knee      Pulmonary nodules 2024     Recurrent UTI     hx septic shock     Thyroid disease      Past Surgical History:   Procedure Laterality Date     APPENDECTOMY       APPENDECTOMY       BACK SURGERY      L4-S1 laminectomy     BYPASS GRAFT ARTERY CORONARY      3 vessel      SECTION        SECTION       CORONARY ARTERY BYPASS       CV CORONARY ANGIOGRAM N/A 2022    Procedure: Coronary Angiogram;  Surgeon: Ignacio Baird MD;  Location: [facility] CATH LAB CV     HERNIORRHAPHY VENTRAL Left      HYSTERECTOMY       HYSTERECTOMY       JOINT REPLACEMENT Left     Total left knee     OTHER SURGICAL HISTORY      right ankle surgery     OTHER SURGICAL HISTORY      right forearm fracture     REPLACEMENT TOTAL KNEE Right      SHOULDER SURGERY Right     reversed shoulder surgery.     Family History   Problem Relation Age of Onset     Heart Disease Mother      Heart Disease Father      Sleep Apnea Daughter         Social History     Socioeconomic History     Marital status:      Spouse name: Not on file     Number of children: Not on file     Years of education: Not on file     Highest education level: Not on file   Occupational History     Not on file   Tobacco Use     Smoking status: Never     Passive exposure: Never     Smokeless tobacco: Never   Vaping Use     Vaping status: Never Used   Substance and Sexual Activity     Alcohol use: No     Drug use: No     Sexual activity: Not Currently     Partners: Male     Birth control/protection: None   Other Topics Concern     Parent/sibling w/ CABG, MI or angioplasty before 65F 55M? No   Social History Narrative    6/15/2021 new to [place] from [place]. She has 6 kids.     Social Determinants of Health     Financial Resource Strain: Low Risk  (11/3/2023)    Financial Resource Strain      Within the past 12  months, have you or your family members you live with been unable to get utilities (heat, electricity) when it was really needed?: No   Food Insecurity: Low Risk  (11/3/2023)    Food Insecurity      Within the past 12 months, did you worry that your food would run out before you got money to buy more?: No      Within the past 12 months, did the food you bought just not last and you didn t have money to get more?: No   Transportation Needs: Low Risk  (11/3/2023)    Transportation Needs      Within the past 12 months, has lack of transportation kept you from medical appointments, getting your medicines, non-medical meetings or appointments, work, or from getting things that you need?: No   Physical Activity: Not on file   Stress: Not on file   Social Connections: Not on file   Interpersonal Safety: Low Risk  (11/10/2023)    Interpersonal Safety      Do you feel physically and emotionally safe where you currently live?: Yes      Within the past 12 months, have you been hit, slapped, kicked or otherwise physically hurt by someone?: No      Within the past 12 months, have you been humiliated or emotionally abused in other ways by your partner or ex-partner?: No   Housing Stability: Low Risk  (11/3/2023)    Housing Stability      Do you have housing? : Yes      Are you worried about losing your housing?: No           Medications  Allergies   Current Outpatient Medications   Medication Sig Dispense Refill     aspirin 81 mg chewable tablet [ASPIRIN 81 MG CHEWABLE TABLET] Chew 81 mg at bedtime.       atorvastatin (LIPITOR) 20 MG tablet TAKE 1 TABLET BY MOUTH AT  BEDTIME 90 tablet 3     carbidopa-levodopa (SINEMET)  MG tablet TAKE 1 TABLET BY MOUTH 3  TIMES DAILY TAKE AT LEAST  1/2 HOUR BEFORE MEALS OR 2  HOURS AFTER MEALS DO NOT  MIX WITH FOOD 270 tablet 3     carvedilol (COREG) 12.5 MG tablet Take 1 tablet (12.5 mg) by mouth 2 times daily (with meals) 180 tablet 3     cholecalciferol, vitamin D3, 1,000 unit tablet  "Take 1,000 Units by mouth 2 times daily       cyanocobalamin (VITAMIN B-12) 1000 MCG tablet Take 1 tablet (1,000 mcg) by mouth every evening. 90 tablet 3     FLUoxetine (PROZAC) 20 MG capsule Take 1 capsule (20 mg) by mouth 2 times daily 180 capsule 1     furosemide (LASIX) 20 MG tablet Take with one 40 mg tablet daily 90 tablet 3     furosemide (LASIX) 40 MG tablet Take with one 20 mg tablet daily 90 tablet 1     gabapentin (NEURONTIN) 300 MG capsule TAKE 1 CAPSULE BY MOUTH 4  TIMES DAILY 360 capsule 3     lisinopril (ZESTRIL) 20 MG tablet Take 20 mg by mouth every evening       loratadine (CLARITIN) 10 mg tablet Take 10 mg by mouth daily as needed for allergies       magnesium 250 MG tablet Take 2 tablets (500 mg) by mouth daily       melatonin 1 mg Tab tablet Take 1 mg by mouth At Bedtime       multivitamin therapeutic tablet Take 1 tablet by mouth every morning       nystatin (MYCOSTATIN) 241043 UNIT/GM external cream APPLY TO AFFECTED AREA(S)  TOPICALLY TWICE DAILY 120 g 0     omeprazole (PRILOSEC) 20 MG DR capsule Take 1 capsule (20 mg) by mouth 2 times daily 180 capsule 3     polyethylene glycol (MIRALAX) 17 gram packet Take 17 g by mouth daily as needed for constipation       nitroGLYcerin (NITROSTAT) 0.4 MG sublingual tablet Place 1 tablet (0.4 mg) under the tongue every 5 minutes as needed for chest pain 100 tablet 1       Allergies   Allergen Reactions     Alendronate [Alendronate] Unknown     pain     Codeine Hives     Hydrocodone-Acetaminophen Other (See Comments)     Highly sensitive, \"knocks her out and can't wake up\"  3/26/24 verbal with pt plain acetaminophen is ok to take     Other Drug Allergy (See Comments)      Risedronate Unknown     Bone pain     Rosuvastatin Muscle Pain (Myalgia)     Simvastatin Muscle Pain (Myalgia)          Lab Results    Chemistry/lipid CBC Cardiac Enzymes/BNP/TSH/INR   Recent Labs   Lab Test 07/16/24  0919   CHOL 143   HDL 44*   LDL 62   TRIG 186*     Recent Labs   Lab " "Test 07/16/24  0919 06/19/23  1538 08/18/22  0506   LDL 62 31 58     Recent Labs   Lab Test 06/27/24  1601 03/30/24  1207 03/30/24  0800 03/30/24  0538   NA  --   --   --  136   POTASSIUM  --   --   --  3.9   CHLORIDE  --   --   --  99   CO2  --   --   --  29   GLC  --  159*   < > 127*   BUN  --   --   --  46.5*   CR 1.17*  --   --  1.26*   GFRESTIMATED 44*  --   --  40*   LIZZY 10.9*  --   --  9.3    < > = values in this interval not displayed.     Recent Labs   Lab Test 06/27/24  1601 03/30/24  0538 03/29/24  0524   CR 1.17* 1.26* 1.46*     Recent Labs   Lab Test 06/27/24  1601 03/26/24  1926 11/10/23  1622   A1C 7.1* 6.2* 6.7*          Recent Labs   Lab Test 03/29/24  0524 03/27/24  0826   WBC  --  6.9   HGB  --  10.7*   HCT  --  35.1   MCV  --  94    201     Recent Labs   Lab Test 03/27/24  0826 03/26/24  1926 11/10/23  1707   HGB 10.7* 11.0* 13.4    Recent Labs   Lab Test 05/07/23  0434 05/06/23  2323 05/06/23  1852   TROPONINI 0.04 0.03 0.03     Recent Labs   Lab Test 03/26/24  1926 05/11/23  0437 05/06/23  1852 08/15/22  1920   BNP  --  82 628* 129   NTBNPI 5,747*  --   --   --      Recent Labs   Lab Test 06/27/24  1601   TSH 3.29     No results for input(s): \"INR\" in the last 83856 hours.       This note has been dictated using voice recognition software. Any grammatical, typographical, or context distortions are unintentional and inherent to the software    Otilia Meneses PA-C                                         Thank you for allowing me to participate in the care of your patient.      Sincerely,     CHELSEY Whitten [facility]  cc:   Shasha Alcaraz, APRN CNP  No address on file      "

## 2024-09-18 NOTE — PROGRESS NOTES
HEART CARE ENCOUNTER CONSULTATON NOTE      [facility]  [phone]      Assessment/Recommendations   Assessment:   Ischemic cardiomyopathy, HFrEF: Improved ejection fraction per echocardiogram 3/2024 shows LVEF 55 to 60%.  Previously 45-50% 5/2023.  Continues carvedilol 12.5 mg twice daily, lisinopril 20 mg daily, furosemide 60 mg daily daily  Coronary artery disease, s/p CABG 2010: Known reversible ischemia on NM stress test 5/2023, recommending medical management with lack of angina, and preserved EF.  Denies angina, continues aspirin and atorvastatin  Valvular disease: Moderate to severe mitral insufficiency with mild-moderate stenosis, mild aortic stenosis.  Likely worsened in setting of acute heart failure.  Hypertension: Controlled   T2DM, CKD  Parkinson's disease      Plan:   Had a conversation about valve disease monitoring, with progression to severe potential intervention/procedures that could be offered to improve function.  Patient would like to continue monitoring with echocardiograms, repeat in 6 months.  Continue heart failure medication regimen  Continue aspirin and atorvastatin      Follow up in 6 months with Dr. Ricardo or myself     History of Present Illness/Subjective    HPI: Janes Montero is a 89 year old female with PMHx of CAD, ischemic cardiomyopathy, abnormal stress test, valvular disease, hypertension, T2DM, CKD presents for follow-up. Hospitalized March 2024 with acute HFpEF exacerbation and pneumonia.  Echocardiogram at that time showed improvement in LVEF into normal range, previously mildly reduced.  I been following in heart failure clinic with adjustments to medications and condition has been stabilized for several months.    Patient is accompanied by her daughter, Kaylynn.  Patient reports no new shortness of breath, chest pain, lower extremity swelling mental bloating/fluid retention.  She walks daily to the cafeteria at her apartment using a walker.  Feels  good doing this.    Daughter expresses frustration with ability to schedule with Dr. Ricardo.     She denies lightheadedness, shortness of breath, dyspnea on exertion, orthopnea, PND, palpitations, chest pain, and lower extremity edema.        Echocardiogram 3/2024 Results:  The visual ejection fraction is 55-60%.  The right ventricle is normal in size and function.  Biatrial enlargement.  There is mod-severe to severe (3-4+) mitral regurgitation.  There is mild to moderate mitral stenosis.  Mild valvular aortic stenosis.  There is moderate (2+) tricuspid regurgitation.  The right ventricular systolic pressure is approximated at 56 mmHg.  Compared to the prior study dated 5/9/2023, there are changes as noted. There  is now moderate-severe mitral regurgitation and moderate tricuspid  regurgitation with moderate pulmonary hypertension.    NM Lexiscan stress test 5/2023    A prior study was conducted on 8/16/2022.  Prior images were unavailable for comparison review.    Pharmacologic regadenoson stress ECG is nondiagnostic due to baseline ECG abnormality.    Pharmacological regadenoson nuclear stress test is abnormal.  There is partially reversible change in inferior and basal to mid inferolateral walls indicating inducible myocardial ischemia.    Normal left ventricular size, wall motion and systolic function.  Calculated left ventricular ejection fraction is 65%.    The patient is at an intermediate risk of future cardiac ischemic events.        Physical Examination  Review of Systems   Vitals: /66 (BP Location: Right arm, Patient Position: Sitting, Cuff Size: Adult Regular)   Pulse 70   Resp 16   Wt 83.9 kg (185 lb)   LMP  (LMP Unknown)   SpO2 95%   BMI 32.77 kg/m    BMI= Body mass index is 32.77 kg/m .  Wt Readings from Last 3 Encounters:   09/18/24 83.9 kg (185 lb)   09/11/24 84 kg (185 lb 3.2 oz)   09/06/24 82.1 kg (181 lb)           ENT/Mouth: membranes moist, no oral lesions or bleeding gums.       EYES:  no scleral icterus, normal conjunctivae       Chest/Lungs:   lungs are clear to auscultation, no rales or wheezing, equal chest wall expansion    Cardiovascular:   Regular. Normal first and second heart sounds with systolic murmur, rubs, or gallops; the, radial and posterior tibial pulses are intact, absent edema bilaterally        Extremities: no cyanosis or clubbing   Skin: no xanthelasma, warm.    Neurologic:  no tremors     Psychiatric: alert and oriented x3, calm        Please refer above for cardiac ROS details.        Medical History  Surgical History Family History Social History   Past Medical History:   Diagnosis Date    Acute diastolic congestive heart failure (H)     Acute kidney injury superimposed on CKD  (H24) 08/18/2022    Acute on chronic congestive heart failure (H) 06/11/2018    Acute pulmonary edema (H) 05/06/2023    Acute respiratory failure with hypoxia (H) 05/08/2023    Age-related osteoporosis with current pathological fracture with routine healing     Chest pain, unspecified type 08/15/2022    Chronic bilateral back pain 06/15/2021    Last Assessment & Plan:   Formatting of this note might be different from the original.  She says she has seen a pain specialist and had back surgery.  She needs to establish care with a pain specialist since she is new to [place] from [place].  Referral placed.    Coronary artery disease     Coronary artery disease involving native coronary artery without angina pectoris, unspecified whether native or transplanted heart 08/17/2022    Diabetes mellitus, type II (H)     Diastolic dysfunction 07/10/2018    Frozen shoulder     bilateral    Heart failure with reduced ejection fraction (H) 05/25/2023    Hyperlipidemia     Hypertension     Hypertensive emergency     Parkinson disease (H)     Primary osteoarthritis involving multiple joints     Primary osteoarthritis of left knee     Pulmonary nodules 04/04/2024    Recurrent UTI     hx septic shock    Thyroid  disease      Past Surgical History:   Procedure Laterality Date    APPENDECTOMY      APPENDECTOMY      BACK SURGERY      L4-S1 laminectomy    BYPASS GRAFT ARTERY CORONARY      3 vessel     SECTION       SECTION      CORONARY ARTERY BYPASS      CV CORONARY ANGIOGRAM N/A 2022    Procedure: Coronary Angiogram;  Surgeon: Ignacio Baird MD;  Location: [facility] CATH LAB CV    HERNIORRHAPHY VENTRAL Left     HYSTERECTOMY      HYSTERECTOMY      JOINT REPLACEMENT Left     Total left knee    OTHER SURGICAL HISTORY      right ankle surgery    OTHER SURGICAL HISTORY      right forearm fracture    REPLACEMENT TOTAL KNEE Right     SHOULDER SURGERY Right     reversed shoulder surgery.     Family History   Problem Relation Age of Onset    Heart Disease Mother     Heart Disease Father     Sleep Apnea Daughter         Social History     Socioeconomic History    Marital status:      Spouse name: Not on file    Number of children: Not on file    Years of education: Not on file    Highest education level: Not on file   Occupational History    Not on file   Tobacco Use    Smoking status: Never     Passive exposure: Never    Smokeless tobacco: Never   Vaping Use    Vaping status: Never Used   Substance and Sexual Activity    Alcohol use: No    Drug use: No    Sexual activity: Not Currently     Partners: Male     Birth control/protection: None   Other Topics Concern    Parent/sibling w/ CABG, MI or angioplasty before 65F 55M? No   Social History Narrative    6/15/2021 new to [place] from [place]. She has 6 kids.     Social Determinants of Health     Financial Resource Strain: Low Risk  (11/3/2023)    Financial Resource Strain     Within the past 12 months, have you or your family members you live with been unable to get utilities (heat, electricity) when it was really needed?: No   Food Insecurity: Low Risk  (11/3/2023)    Food Insecurity     Within the past 12 months, did you worry that your food would run  out before you got money to buy more?: No     Within the past 12 months, did the food you bought just not last and you didn t have money to get more?: No   Transportation Needs: Low Risk  (11/3/2023)    Transportation Needs     Within the past 12 months, has lack of transportation kept you from medical appointments, getting your medicines, non-medical meetings or appointments, work, or from getting things that you need?: No   Physical Activity: Not on file   Stress: Not on file   Social Connections: Not on file   Interpersonal Safety: Low Risk  (11/10/2023)    Interpersonal Safety     Do you feel physically and emotionally safe where you currently live?: Yes     Within the past 12 months, have you been hit, slapped, kicked or otherwise physically hurt by someone?: No     Within the past 12 months, have you been humiliated or emotionally abused in other ways by your partner or ex-partner?: No   Housing Stability: Low Risk  (11/3/2023)    Housing Stability     Do you have housing? : Yes     Are you worried about losing your housing?: No           Medications  Allergies   Current Outpatient Medications   Medication Sig Dispense Refill    aspirin 81 mg chewable tablet [ASPIRIN 81 MG CHEWABLE TABLET] Chew 81 mg at bedtime.      atorvastatin (LIPITOR) 20 MG tablet TAKE 1 TABLET BY MOUTH AT  BEDTIME 90 tablet 3    carbidopa-levodopa (SINEMET)  MG tablet TAKE 1 TABLET BY MOUTH 3  TIMES DAILY TAKE AT LEAST  1/2 HOUR BEFORE MEALS OR 2  HOURS AFTER MEALS DO NOT  MIX WITH FOOD 270 tablet 3    carvedilol (COREG) 12.5 MG tablet Take 1 tablet (12.5 mg) by mouth 2 times daily (with meals) 180 tablet 3    cholecalciferol, vitamin D3, 1,000 unit tablet Take 1,000 Units by mouth 2 times daily      cyanocobalamin (VITAMIN B-12) 1000 MCG tablet Take 1 tablet (1,000 mcg) by mouth every evening. 90 tablet 3    FLUoxetine (PROZAC) 20 MG capsule Take 1 capsule (20 mg) by mouth 2 times daily 180 capsule 1    furosemide (LASIX) 20 MG  "tablet Take with one 40 mg tablet daily 90 tablet 3    furosemide (LASIX) 40 MG tablet Take with one 20 mg tablet daily 90 tablet 1    gabapentin (NEURONTIN) 300 MG capsule TAKE 1 CAPSULE BY MOUTH 4  TIMES DAILY 360 capsule 3    lisinopril (ZESTRIL) 20 MG tablet Take 20 mg by mouth every evening      loratadine (CLARITIN) 10 mg tablet Take 10 mg by mouth daily as needed for allergies      magnesium 250 MG tablet Take 2 tablets (500 mg) by mouth daily      melatonin 1 mg Tab tablet Take 1 mg by mouth At Bedtime      multivitamin therapeutic tablet Take 1 tablet by mouth every morning      nystatin (MYCOSTATIN) 385264 UNIT/GM external cream APPLY TO AFFECTED AREA(S)  TOPICALLY TWICE DAILY 120 g 0    omeprazole (PRILOSEC) 20 MG DR capsule Take 1 capsule (20 mg) by mouth 2 times daily 180 capsule 3    polyethylene glycol (MIRALAX) 17 gram packet Take 17 g by mouth daily as needed for constipation      nitroGLYcerin (NITROSTAT) 0.4 MG sublingual tablet Place 1 tablet (0.4 mg) under the tongue every 5 minutes as needed for chest pain 100 tablet 1       Allergies   Allergen Reactions    Alendronate [Alendronate] Unknown     pain    Codeine Hives    Hydrocodone-Acetaminophen Other (See Comments)     Highly sensitive, \"knocks her out and can't wake up\"  3/26/24 verbal with pt plain acetaminophen is ok to take    Other Drug Allergy (See Comments)     Risedronate Unknown     Bone pain    Rosuvastatin Muscle Pain (Myalgia)    Simvastatin Muscle Pain (Myalgia)          Lab Results    Chemistry/lipid CBC Cardiac Enzymes/BNP/TSH/INR   Recent Labs   Lab Test 07/16/24  0919   CHOL 143   HDL 44*   LDL 62   TRIG 186*     Recent Labs   Lab Test 07/16/24  0919 06/19/23  1538 08/18/22  0506   LDL 62 31 58     Recent Labs   Lab Test 06/27/24  1601 03/30/24  1207 03/30/24  0800 03/30/24  0538   NA  --   --   --  136   POTASSIUM  --   --   --  3.9   CHLORIDE  --   --   --  99   CO2  --   --   --  29   GLC  --  159*   < > 127*   BUN  --   -- " "  --  46.5*   CR 1.17*  --   --  1.26*   GFRESTIMATED 44*  --   --  40*   LIZZY 10.9*  --   --  9.3    < > = values in this interval not displayed.     Recent Labs   Lab Test 06/27/24  1601 03/30/24  0538 03/29/24 0524   CR 1.17* 1.26* 1.46*     Recent Labs   Lab Test 06/27/24  1601 03/26/24  1926 11/10/23  1622   A1C 7.1* 6.2* 6.7*          Recent Labs   Lab Test 03/29/24  0524 03/27/24  0826   WBC  --  6.9   HGB  --  10.7*   HCT  --  35.1   MCV  --  94    201     Recent Labs   Lab Test 03/27/24  0826 03/26/24  1926 11/10/23  1707   HGB 10.7* 11.0* 13.4    Recent Labs   Lab Test 05/07/23  0434 05/06/23  2323 05/06/23  1852   TROPONINI 0.04 0.03 0.03     Recent Labs   Lab Test 03/26/24 1926 05/11/23  0437 05/06/23  1852 08/15/22  1920   BNP  --  82 628* 129   NTBNPI 5,747*  --   --   --      Recent Labs   Lab Test 06/27/24  1601   TSH 3.29     No results for input(s): \"INR\" in the last 01694 hours.       This note has been dictated using voice recognition software. Any grammatical, typographical, or context distortions are unintentional and inherent to the software    Otilia Meneses PA-C                                       "

## 2024-09-20 NOTE — PROGRESS NOTES
PULMONARY OUTPATIENT FOLLOW UP NOTE        Assessment:     Moderate tracheobronchomalacia   No tobacco use. No outdoor allergies, no history of asthma.   PFTs showed a normal spirometry , lung volumes and diffusion capacity.   Hx acid reflux. Currently on PPI. Diet modification was discussed.   Bronchodilators as needed  Overnight pulse oximetry on RA showed significant nocturnal desaturation  Declined evaluation in the sleep clinic.   Sleep breathing disorder  Overnight pulse oximetry 8/14/2023 showed significant nocturnal desaturation. Time SpO2 < 90% 3 HR 3 MIN, time of SpO2 < 89% 1 HR 25 MIN. Total desaturations : 286. Desaturation index : 40.9  Declines evaluation in the sleep clinic. Plan to repeat overnight pulse and start O2 supplementation  HTN, HFrEF, CAD s/p CABG , moderate MR  GERD  Obesity Body mass index is 32.81 kg/m .     Plan:      Overnight pulse oximetry on RA  Titrate BP meds and diuresis, currently 40 mg QAM and 20 mg QPM   Daily weight, current weight 185 lbs.   Avoid eating close to bed time   Raise the head of the bed  Omeprazole daily   Sleep hygiene was discussed.   Please contact me after overnight pulse oximetry, clinic number [phone], nurse Robbie  Follow up in 6 months    Junaid Lowe  Pulmonary / Critical Care  9/11/2024        CC:     Chief Complaint   Patient presents with    Follow Up     Poor sleep hygiene    Bronchomalacia    Sleep-related breathing disorder         HPI:         Janes Montero is a 89 year old female who presents for follow up tracheo-bronchomalacia   Patient has history of HTN, HFrEF 45 to 50%, moderate MR, CAD s/p CABG 2010, abnormal stress test 5/2023 on medical treatment, CKD, DM, Parkinson's disease , tracheobronchomalacia, GERD, obesity.  Patient was referred to sleep clinic due to significant desaturation in overnight pulse oximetry. But patient decided not to purse sleep evaluation.    Patient was hospitalized from 3/26 to 3/30/2024 for  evaluation of acute on chronic HFpEF and pneumonia. Diuresis was titrated up and discharged home on cefdinir and azithromycin.   Follow up chest CT scan 7/10/2024 showed resolution of lung infiltrates.   Reports unchanged exertional dyspnea, able to walk less than a block, uses a walker to get around, activity is limited due to back pain and knee pain. Reports occasional dry cough, occasional wheezes.  Denies hemoptysis.   No fever, chills or night sweats.   Denies outdoor allergies, no history of asthma. Denies postnasal drip, headaches, or sinus tenderness.   Denies using inhalers.   Denies chest pain, orthopnea, PND, or swelling of LEs.  Denies acid reflux symptoms while taking PPI. Reports occasional swallowing problems with clear liquids.   Denies sleeping problems.   Denies tobacco use.      Past Medical History :     Past Medical History:   Diagnosis Date    Acute diastolic congestive heart failure (H)     Acute kidney injury superimposed on CKD  (H24) 08/18/2022    Acute on chronic congestive heart failure (H) 06/11/2018    Acute pulmonary edema (H) 05/06/2023    Acute respiratory failure with hypoxia (H) 05/08/2023    Age-related osteoporosis with current pathological fracture with routine healing     Chest pain, unspecified type 08/15/2022    Chronic bilateral back pain 06/15/2021    Last Assessment & Plan:   Formatting of this note might be different from the original.  She says she has seen a pain specialist and had back surgery.  She needs to establish care with a pain specialist since she is new to [place] from [place].  Referral placed.    Coronary artery disease     Coronary artery disease involving native coronary artery without angina pectoris, unspecified whether native or transplanted heart 08/17/2022    Diabetes mellitus, type II (H)     Diastolic dysfunction 07/10/2018    Frozen shoulder     bilateral    Heart failure with reduced ejection fraction (H) 05/25/2023    Hyperlipidemia     Hypertension      Hypertensive emergency     Parkinson disease (H)     Primary osteoarthritis involving multiple joints     Primary osteoarthritis of left knee     Pulmonary nodules 04/04/2024    Recurrent UTI     hx septic shock    Thyroid disease 2021          Medications:     Current Outpatient Medications   Medication Sig Dispense Refill    aspirin 81 mg chewable tablet [ASPIRIN 81 MG CHEWABLE TABLET] Chew 81 mg at bedtime.      atorvastatin (LIPITOR) 20 MG tablet TAKE 1 TABLET BY MOUTH AT  BEDTIME 90 tablet 3    carbidopa-levodopa (SINEMET)  MG tablet TAKE 1 TABLET BY MOUTH 3  TIMES DAILY TAKE AT LEAST  1/2 HOUR BEFORE MEALS OR 2  HOURS AFTER MEALS DO NOT  MIX WITH FOOD 270 tablet 3    carvedilol (COREG) 12.5 MG tablet Take 1 tablet (12.5 mg) by mouth 2 times daily (with meals) 180 tablet 3    cholecalciferol, vitamin D3, 1,000 unit tablet Take 1,000 Units by mouth 2 times daily      cyanocobalamin (VITAMIN B-12) 1000 MCG tablet Take 1 tablet (1,000 mcg) by mouth every evening. 90 tablet 3    FLUoxetine (PROZAC) 20 MG capsule Take 1 capsule (20 mg) by mouth 2 times daily 180 capsule 1    furosemide (LASIX) 20 MG tablet Take with one 40 mg tablet daily 90 tablet 3    furosemide (LASIX) 40 MG tablet Take with one 20 mg tablet daily 90 tablet 1    gabapentin (NEURONTIN) 300 MG capsule TAKE 1 CAPSULE BY MOUTH 4  TIMES DAILY 360 capsule 3    lisinopril (ZESTRIL) 20 MG tablet Take 20 mg by mouth every evening      loratadine (CLARITIN) 10 mg tablet Take 10 mg by mouth daily as needed for allergies      magnesium 250 MG tablet Take 2 tablets (500 mg) by mouth daily      melatonin 1 mg Tab tablet Take 1 mg by mouth At Bedtime      multivitamin therapeutic tablet Take 1 tablet by mouth every morning      nitroGLYcerin (NITROSTAT) 0.4 MG sublingual tablet Place 1 tablet (0.4 mg) under the tongue every 5 minutes as needed for chest pain 100 tablet 1    nystatin (MYCOSTATIN) 107853 UNIT/GM external cream APPLY TO AFFECTED AREA(S)   TOPICALLY TWICE DAILY 120 g 0    omeprazole (PRILOSEC) 20 MG DR capsule Take 1 capsule (20 mg) by mouth 2 times daily 180 capsule 3    polyethylene glycol (MIRALAX) 17 gram packet Take 17 g by mouth daily as needed for constipation       Current Facility-Administered Medications   Medication Dose Route Frequency Provider Last Rate Last Admin    [START ON 1/14/2025] denosumab (PROLIA) injection 60 mg  60 mg Subcutaneous Q6 Months         denosumab (PROLIA) injection 60 mg  60 mg Subcutaneous Q6 Months Eric Christensen MD   60 mg at 07/10/24 1449        Social History :     Social History     Socioeconomic History    Marital status:      Spouse name: Not on file    Number of children: Not on file    Years of education: Not on file    Highest education level: Not on file   Occupational History    Not on file   Tobacco Use    Smoking status: Never     Passive exposure: Never    Smokeless tobacco: Never   Vaping Use    Vaping status: Never Used   Substance and Sexual Activity    Alcohol use: No    Drug use: No    Sexual activity: Not Currently     Partners: Male     Birth control/protection: None   Other Topics Concern    Parent/sibling w/ CABG, MI or angioplasty before 65F 55M? No   Social History Narrative    6/15/2021 new to [place] from [place]. She has 6 kids.     Social Determinants of Health     Financial Resource Strain: Low Risk  (11/3/2023)    Financial Resource Strain     Within the past 12 months, have you or your family members you live with been unable to get utilities (heat, electricity) when it was really needed?: No   Food Insecurity: Low Risk  (11/3/2023)    Food Insecurity     Within the past 12 months, did you worry that your food would run out before you got money to buy more?: No     Within the past 12 months, did the food you bought just not last and you didn t have money to get more?: No   Transportation Needs: Low Risk  (11/3/2023)    Transportation Needs     Within the past 12 months, has  lack of transportation kept you from medical appointments, getting your medicines, non-medical meetings or appointments, work, or from getting things that you need?: No   Physical Activity: Not on file   Stress: Not on file   Social Connections: Not on file   Interpersonal Safety: Low Risk  (11/10/2023)    Interpersonal Safety     Do you feel physically and emotionally safe where you currently live?: Yes     Within the past 12 months, have you been hit, slapped, kicked or otherwise physically hurt by someone?: No     Within the past 12 months, have you been humiliated or emotionally abused in other ways by your partner or ex-partner?: No   Housing Stability: Low Risk  (11/3/2023)    Housing Stability     Do you have housing? : Yes     Are you worried about losing your housing?: No          Family History :     Family History   Problem Relation Age of Onset    Heart Disease Mother     Heart Disease Father     Sleep Apnea Daughter        Review of Systems  A 12 point comprehensive review of systems was negative except as noted.        Objective:     /72 (BP Location: Left arm, Patient Position: Sitting, Cuff Size: Adult Regular)   Pulse 74   Wt 84 kg (185 lb 3.2 oz)   LMP  (LMP Unknown)   SpO2 95%   BMI 32.81 kg/m      Gen: obese, awake, alert, no distress  HEENT: pink conjunctiva, moist mucosa, Mallampati III/IV  Neck: no thyromegaly, masses or JVD  Lungs: clear  CV: irregular, systolic murmurs, no gallops appreciated  Abdomen: soft, NT, BS wnl  Ext: no edema  Neuro: CN II-XII intact, non focal      Diagnostic tests:        Overnight pulse oximetry      Overnight pulse oximetry 8/14/2023    Valid time : 7 HR 18 MIN    Lowest SpO2 83%  SpO2 < 90% 3 HR 3 MIN  SpO2 < 89% 1 HR 25 MIN    Total desaturations : 286  Desaturation index : 40.9    PFTs:          IMAGES:      CT CHEST W/O CONTRAST  LOCATION: [facility]  DATE/TIME: 5/10/2023 5:25 PM CDT  INDICATION: persistent hypoxia; eval  further for possible interstitial lung disease suggested on CXR  COMPARISON: 05/06/2023  FINDINGS:   LUNGS AND PLEURA: Modest motion artifact. No focal pneumonia. No pleural effusion. No significant peripheral interstitial fibrosis suggested.Mosaic attenuation suggests small airways disease. There is moderate inflammatory bronchial wall thickening most pronounced in the perihilar regions. In addition, there is flattening of trachea and main bronchial structures consistent with bronchial tracheobronchomalacia.  MEDIASTINUM/AXILLAE: Postoperative changes from CABG. Cardiomegaly. No lymphadenopathy.  CORONARY ARTERY CALCIFICATION: Severe.  UPPER ABDOMEN: 2.4 cm hyperdense round nodule upper pole left kidney most suggestive of benign hyperdense cyst.  MUSCULOSKELETAL: Mild compression superior endplate of L1 age indeterminate. Right shoulder arthroplasty.        IMPRESSION:   1.  No significant interstitial fibrosis suggested. Modest motion artifact does compromise this exam.  2.  There is moderate inflammatory bronchial wall thickening, tracheobronchomalacia and mosaic attenuation of lung parenchyma consistent with small airways disease.  3.  2.4 cm hyperdense structure upper pole left kidney likely a benign hyperdense cyst. Ultrasound may be challenging to perform due to position of lesion in patient body habitus. Suggest a follow-up dedicated renal CT in 4-6 months for confirmation.    CT CHEST W/O CONTRAST  LOCATION: [facility]  DATE: 7/10/2024  INDICATION:  Pulmonary nodules  COMPARISON: CT chest without contrast 3/26/2024, 5/10/2023  FINDINGS:   LUNGS AND PLEURA: Assessment of the lung parenchyma is limited by motion-related blurring which was also similar to the comparison studies. Heterogeneous attenuation of the parenchyma due to variable lung inflation. No acute airspace opacities. Minimal   subpleural scarring in the bases near the diaphragmatic pleura. The pattern of interstitial  thickening and nodularity in the right upper and middle lobes in March 2024 has resolved. There are no actionable lung nodules. Central airways are patent. No   pleural space abnormality.  MEDIASTINUM: Cardiac chambers are normal in size. No pericardial effusion. Nonaneurysmal thoracic aorta. Moderate great vessel, aortic arch and descending thoracic aorta atheromatous calcifications. Moderate mitral annular calcifications. The main pulmonary artery is enlarged measuring 3.4 cm in diameter which is associated with pulmonary hypertension.. The esophagus is decompressed. Reactive lower paratracheal and subcarinal nodes present 3/26/2024 have resolved. No lymphadenopathy.  CORONARY ARTERY CALCIFICATION: Previous intervention (stents or CABG).  UPPER ABDOMEN: No actionable findings in the imaged upper abdomen.  MUSCULOSKELETAL: Chronic mild superior endplate deformity of L1. Multilevel thoracic spine disc space narrowing and small marginal osteophytes. Sternal wires are intact and aligned. Previous right shoulder arthroplasty.                                        IMPRESSION:    1.  Resolution of interlobular septal thickening and nodularity in the right upper and middle lobes present in March 2024 consistent with clearing of interstitial edema. No active lung, airway, or pleural inflammatory process.  2.  Previous coronary revascularization. Enlarged main pulmonary artery is associated with pulmonary hypertension.    Echocardiogram Complete 5/9/2023  Interpretation Summary   Left ventricular function is decreased. The ejection fraction is 45-50% (mildly reduced).  There is borderline-mild global hypokinesia of the left ventricle.  Normal right ventricle size and systolic function.  There is moderate (2+) mitral regurgitation.    NM stress test 5/8/2023    A prior study was conducted on 8/16/2022.  Prior images were unavailable for comparison review.    Pharmacologic regadenoson stress ECG is nondiagnostic due to  baseline ECG abnormality.    Pharmacological regadenoson nuclear stress test is abnormal.  There is partially reversible change in inferior and basal to mid inferolateral walls indicating inducible myocardial ischemia.    Normal left ventricular size, wall motion and systolic function.  Calculated left ventricular ejection fraction is 65%.    The patient is at an intermediate risk of future cardiac ischemic events.    Echocardiogram 3/26/2024  The visual ejection fraction is 55-60%.  The right ventricle is normal in size and function.  Biatrial enlargement.  There is mod-severe to severe (3-4+) mitral regurgitation.  There is mild to moderate mitral stenosis.  Mild valvular aortic stenosis.  There is moderate (2+) tricuspid regurgitation.  The right ventricular systolic pressure is approximated at 56 mmHg.  Compared to the prior study dated 5/9/2023, there are changes as noted. There  is now moderate-severe mitral regurgitation and moderate tricuspid  regurgitation with moderate pulmonary hypertension.

## 2024-09-28 ENCOUNTER — HEALTH MAINTENANCE LETTER (OUTPATIENT)
Age: 89
End: 2024-09-28

## 2024-10-02 ENCOUNTER — TELEPHONE (OUTPATIENT)
Dept: PULMONOLOGY | Facility: CLINIC | Age: 89
End: 2024-10-02
Payer: MEDICARE

## 2024-10-02 NOTE — TELEPHONE ENCOUNTER
DATE: 10/02/2024   DME PROVIDER: Chavo   SUPPLY ORDERED:FATMATA  PROVIDER: Dr. Garcia    Faxed order and facesheet.    Itzel Nicholas LPN

## 2024-10-07 ENCOUNTER — TRANSFERRED RECORDS (OUTPATIENT)
Dept: HEALTH INFORMATION MANAGEMENT | Facility: CLINIC | Age: 89
End: 2024-10-07
Payer: MEDICARE

## 2024-10-10 ENCOUNTER — PATIENT OUTREACH (OUTPATIENT)
Dept: CARE COORDINATION | Facility: CLINIC | Age: 89
End: 2024-10-10
Payer: MEDICARE

## 2024-10-10 ENCOUNTER — TELEPHONE (OUTPATIENT)
Dept: PULMONOLOGY | Facility: CLINIC | Age: 89
End: 2024-10-10
Payer: MEDICARE

## 2024-10-10 NOTE — TELEPHONE ENCOUNTER
Called and spoke with patient's daughter, Pat.   Per Dr. Garcia:  Overnight oxygen testing looks good.  No further work up needed.

## 2024-10-11 ENCOUNTER — PATIENT OUTREACH (OUTPATIENT)
Dept: CARE COORDINATION | Facility: CLINIC | Age: 89
End: 2024-10-11
Payer: MEDICARE

## 2024-10-15 DIAGNOSIS — E78.5 DYSLIPIDEMIA: ICD-10-CM

## 2024-10-15 DIAGNOSIS — K21.9 GASTROESOPHAGEAL REFLUX DISEASE WITHOUT ESOPHAGITIS: ICD-10-CM

## 2024-10-15 RX ORDER — ATORVASTATIN CALCIUM 20 MG/1
TABLET, FILM COATED ORAL
Qty: 90 TABLET | Refills: 3 | Status: SHIPPED | OUTPATIENT
Start: 2024-10-15

## 2024-10-25 ENCOUNTER — PATIENT OUTREACH (OUTPATIENT)
Dept: CARE COORDINATION | Facility: CLINIC | Age: 89
End: 2024-10-25
Payer: MEDICARE

## 2024-11-08 ENCOUNTER — OFFICE VISIT (OUTPATIENT)
Dept: FAMILY MEDICINE | Facility: CLINIC | Age: 89
End: 2024-11-08
Payer: MEDICARE

## 2024-11-08 VITALS
SYSTOLIC BLOOD PRESSURE: 139 MMHG | TEMPERATURE: 97.8 F | HEART RATE: 67 BPM | RESPIRATION RATE: 16 BRPM | HEIGHT: 63 IN | OXYGEN SATURATION: 96 % | WEIGHT: 184.6 LBS | DIASTOLIC BLOOD PRESSURE: 72 MMHG | BODY MASS INDEX: 32.71 KG/M2

## 2024-11-08 DIAGNOSIS — F33.40 RECURRENT MAJOR DEPRESSIVE DISORDER, IN REMISSION (H): ICD-10-CM

## 2024-11-08 DIAGNOSIS — I10 BENIGN ESSENTIAL HYPERTENSION: ICD-10-CM

## 2024-11-08 DIAGNOSIS — N18.32 STAGE 3B CHRONIC KIDNEY DISEASE (H): ICD-10-CM

## 2024-11-08 DIAGNOSIS — E78.2 MIXED HYPERCHOLESTEROLEMIA AND HYPERTRIGLYCERIDEMIA: ICD-10-CM

## 2024-11-08 DIAGNOSIS — E11.42 TYPE 2 DIABETES MELLITUS WITH DIABETIC POLYNEUROPATHY, WITHOUT LONG-TERM CURRENT USE OF INSULIN (H): Primary | ICD-10-CM

## 2024-11-08 LAB
EST. AVERAGE GLUCOSE BLD GHB EST-MCNC: 189 MG/DL
HBA1C MFR BLD: 8.2 % (ref 0–5.6)
HOLD SPECIMEN: NORMAL
HOLD SPECIMEN: NORMAL

## 2024-11-08 PROCEDURE — G2211 COMPLEX E/M VISIT ADD ON: HCPCS | Performed by: FAMILY MEDICINE

## 2024-11-08 PROCEDURE — 36415 COLL VENOUS BLD VENIPUNCTURE: CPT | Performed by: FAMILY MEDICINE

## 2024-11-08 PROCEDURE — 83036 HEMOGLOBIN GLYCOSYLATED A1C: CPT | Performed by: FAMILY MEDICINE

## 2024-11-08 PROCEDURE — 99214 OFFICE O/P EST MOD 30 MIN: CPT | Performed by: FAMILY MEDICINE

## 2024-11-08 RX ORDER — AMOXICILLIN 500 MG/1
CAPSULE ORAL
COMMUNITY
Start: 2024-11-05

## 2024-11-08 NOTE — ASSESSMENT & PLAN NOTE
Well controlled today   continue Lasix 60 mg p.o. daily  Continue carvedilol 12.5mg po q day  continue lisinopril 20 mg po q day.

## 2024-11-08 NOTE — ASSESSMENT & PLAN NOTE
Diabetes is worsening.  Currently not on antidiabetic med.   Will start jardiance 10mg po qday.   Diabetic eye exam done 6/20/2024  Foot exam done 11/8/2024   On asa and lipitor 20 mg   Follow up in 3 months when she should also do annual wellness visit.

## 2024-11-08 NOTE — PROGRESS NOTES
Assessment & Plan  Type 2 diabetes mellitus with diabetic polyneuropathy, without long-term current use of insulin (H)  Diabetes is worsening.  Currently not on antidiabetic med.   Will start jardiance 10mg po qday.   Diabetic eye exam done 6/20/2024  Foot exam done 11/8/2024   On asa and lipitor 20 mg   Follow up in 3 months when she should also do annual wellness visit.   Stage 3b chronic kidney disease (H)  Jardiance started today for diabetes, but may also benefit kidneys.  Mixed hypercholesterolemia and hypertriglyceridemia    Benign essential hypertension  Well controlled today   continue Lasix 60 mg p.o. daily  Continue carvedilol 12.5mg po q day  continue lisinopril 20 mg po q day.   Recurrent major depressive disorder, in remission (H)  Depression-Controlled on fluoxitine 20 mg po bid, refilled.    Her best friend Vani was given less than a year to live due to bone cancer, so we will watch her mood and adjust as needed.   Counseling offered but declined.          Ally Marrero is a 89 year old, presenting for the following health issues:  Follow Up (A1C and DM) and Imm/Inj      11/8/2024     4:30 PM   Additional Questions   Roomed by Taqueria Henry MA   Accompanied by Daughter         11/8/2024     4:30 PM   Patient Reported Additional Medications   Patient reports taking the following new medications None     History of Present Illness       Diabetes:   She presents for follow up of diabetes.    She is not checking blood glucose.         She has no concerns regarding her diabetes at this time.   She is not experiencing numbness or burning in feet, excessive thirst, blurry vision, weight changes or redness, sores or blisters on feet.           She eats 2-3 servings of fruits and vegetables daily.She consumes 0 sweetened beverage(s) daily.She exercises with enough effort to increase her heart rate 9 or less minutes per day.  She exercises with enough effort to increase her heart rate 3 or less days per  "week.   She is taking medications regularly.           Objective    /72 (BP Location: Left arm, Patient Position: Sitting, Cuff Size: Adult Large)   Pulse 67   Temp 97.8  F (36.6  C) (Oral)   Resp 16   Ht 1.6 m (5' 3\")   Wt 83.7 kg (184 lb 9.6 oz)   LMP  (LMP Unknown)   SpO2 96%   BMI 32.70 kg/m    Body mass index is 32.7 kg/m .  Physical Exam  Constitutional:       Appearance: Normal appearance.   HENT:      Head: Normocephalic and atraumatic.   Cardiovascular:      Rate and Rhythm: Normal rate and regular rhythm.      Heart sounds: Normal heart sounds.   Pulmonary:      Effort: Pulmonary effort is normal.      Breath sounds: Normal breath sounds.   Musculoskeletal:         General: Normal range of motion.      Cervical back: Normal range of motion and neck supple.   Neurological:      General: No focal deficit present.      Mental Status: She is alert and oriented to person, place, and time.       Diabetic foot exam: normal DP and PT pulses, no trophic changes or ulcerative lesions, and normal sensory exam         Signed Electronically by: Maggie Arriaga MD    "

## 2024-11-08 NOTE — ASSESSMENT & PLAN NOTE
Depression-Controlled on fluoxitine 20 mg po bid, refilled.    Her best friend Vani was given less than a year to live due to bone cancer, so we will watch her mood and adjust as needed.   Counseling offered but declined.

## 2024-11-18 ENCOUNTER — TELEPHONE (OUTPATIENT)
Dept: CARDIOLOGY | Facility: CLINIC | Age: 89
End: 2024-11-18
Payer: MEDICARE

## 2024-11-18 DIAGNOSIS — I10 HYPERTENSION, UNSPECIFIED TYPE: ICD-10-CM

## 2024-11-18 RX ORDER — CARVEDILOL 12.5 MG/1
12.5 TABLET ORAL 2 TIMES DAILY WITH MEALS
Qty: 180 TABLET | Refills: 3 | Status: SHIPPED | OUTPATIENT
Start: 2024-11-18

## 2024-11-26 ENCOUNTER — NURSE TRIAGE (OUTPATIENT)
Dept: FAMILY MEDICINE | Facility: CLINIC | Age: 89
End: 2024-11-26

## 2024-11-26 ENCOUNTER — VIRTUAL VISIT (OUTPATIENT)
Dept: FAMILY MEDICINE | Facility: CLINIC | Age: 89
End: 2024-11-26
Payer: MEDICARE

## 2024-11-26 DIAGNOSIS — E11.42 TYPE 2 DIABETES MELLITUS WITH DIABETIC POLYNEUROPATHY, WITHOUT LONG-TERM CURRENT USE OF INSULIN (H): Primary | ICD-10-CM

## 2024-11-26 PROCEDURE — 99441 PR PHYSICIAN TELEPHONE EVALUATION 5-10 MIN: CPT | Mod: 93 | Performed by: FAMILY MEDICINE

## 2024-11-26 NOTE — TELEPHONE ENCOUNTER
"Called patient's daughter, Kaylynn (consent to communicate in chart dated 7/13/21) to follow up on concerns in attached [employer] message.  Kaylynn reports her mom has been experiencing dizziness and weakness in her legs since a few days after starting Jardiance (prescribed 11/8/24) and symptoms have gotten worse; \"today she's get her apt to the lunch room & back in her independent/[facility] building because her legs were really weak and could hardly get back.\"  Normally able to walk this TID without any issues.  Unsure if any other symptoms \"as my mom doesn't tell me as much as she should.\"      RN called and spoke with Janes.  She states the lightheadedness is more concerning \"because I have a bad back, have for years, really gives me a lot of problems with my legs and the weakness in my legs is from this; worse on one side but it's not new.\"  Feels the dizziness is from the Jardiance she recently started so hasn't taken it since Sunday, 11/24/24.  States leg weakness was worse yesterday, had difficulty walking to the lunch room but today her legs \"are a little better.\"  She questioned dehydration as she's also on Lasix 60 mg daily, so working on pushing more fluids this afternoon and feels a little better after drinking more.  She drinks around 48-56 oz of fluids/day.  States she has a fluid restriction in place but unsure what it is.      She additionally reports urine is \"sometimes cloudy\" in the morning, but not first void; first noticed \"sometime\" last week.  Denies pain on voiding.  No fevers.      Asking if she can stop Jardiance.    RN huddled with Dr. Bart pena to add patient to schedule for a telephone visit at 4:40pm.    Called patient back and appointment scheduled.      Itzel Bird RN  [facility]       Reason for Disposition   Taking a medicine that could cause dizziness (e.g., blood pressure medications, diuretics)    Additional Information   Negative: SEVERE difficulty " "breathing (e.g., struggling for each breath, speaks in single words)   Negative: Shock suspected (e.g., cold/pale/clammy skin, too weak to stand, low BP, rapid pulse)   Negative: Difficult to awaken or acting confused (e.g., disoriented, slurred speech)   Negative: Fainted, and still feels dizzy afterwards   Negative: Overdose (accidental or intentional) of medications   Negative: New neurologic deficit that is present now: * Weakness of the face, arm, or leg on one side of the body * Numbness of the face, arm, or leg on one side of the body * Loss of speech or garbled speech     States she has \"a bad back for years\" and weakness in her legs from this, one side always worse than the other, but not new or worse.   Negative: Heart beating < 50 beats per minute OR > 140 beats per minute   Negative: Sounds like a life-threatening emergency to the triager   Negative: Chest pain   Negative: Rectal bleeding, bloody stool, or tarry-black stool   Negative: Vomiting is main symptom   Negative: Diarrhea is main symptom     Had \"a little bit\" of diarrhea twice yesterday, but not since   Negative: Headache is main symptom   Negative: Heat exhaustion suspected (i.e., dehydration from heat exposure)   Negative: Patient states that they are having an anxiety or panic attack   Negative: Dizziness from low blood sugar (i.e., < 60 mg/dl or 3.5 mmol/l)   Negative: SEVERE dizziness (e.g., unable to stand, requires support to walk, feels like passing out now)   Negative: SEVERE headache or neck pain   Negative: Spinning or tilting sensation (vertigo) present now and one or more stroke risk factors (i.e., hypertension, diabetes mellitus, prior stroke/TIA, heart attack, age over 60) (Exception: Prior physician evaluation for this AND no different/worse than usual.)   Negative: Neurologic deficit that was brief (now gone), ANY of the following:* Weakness of the face, arm, or leg on one side of the body* Numbness of the face, arm, or leg on " "one side of the body* Loss of speech or garbled speech   Negative: Loss of vision or double vision  (Exception: Similar to previous migraines.)   Negative: Extra heartbeats, irregular heart beating, or heart is beating very fast (i.e., 'palpitations')   Negative: Difficulty breathing   Negative: Drinking very little and dehydration suspected (e.g., no urine > 12 hours, very dry mouth, very lightheaded)   Negative: Follows bleeding (e.g., stomach, rectum, vagina)  (Exception: Became dizzy from sight of small amount blood.)   Negative: Patient sounds very sick or weak to the triager   Negative: Lightheadedness (dizziness) present now, after 2 hours of rest and fluids     States better now after resting and fluids.   Negative: Spinning or tilting sensation (vertigo) present now   Negative: Fever > 103 F (39.4 C)   Negative: Fever > 100.0 F (37.8 C) and has diabetes mellitus or a weak immune system (e.g., HIV positive, cancer chemotherapy, organ transplant, splenectomy, chronic steroids)   Negative: MODERATE dizziness (e.g., interferes with normal activities)  (Exception: Dizziness caused by heat exposure, sudden standing, or poor fluid intake.)   Negative: Vomiting occurs with dizziness   Negative: Patient wants to be seen    Answer Assessment - Initial Assessment Questions  1. DESCRIPTION: \"Describe your dizziness.\"      \"It just seems off, like I'm not as clear headed.\"   2. LIGHTHEADED: \"Do you feel lightheaded?\" (e.g., somewhat faint, woozy, weak upon standing)      No  3. VERTIGO: \"Do you feel like either you or the room is spinning or tilting?\" (i.e. vertigo)      No  4. SEVERITY: \"How bad is it?\"  \"Do you feel like you are going to faint?\" \"Can you stand and walk?\"    - MILD: Feels slightly dizzy, but walking normally.    - MODERATE: Feels unsteady when walking, but not falling; interferes with normal activities (e.g., school, work).    - SEVERE: Unable to walk without falling, or requires assistance to walk " "without falling; feels like passing out now.       Mild, \"I'm not unsteady on my feet, using my walker as I always do.\"  5. ONSET:  \"When did the dizziness begin?\"      About a week ago  6. AGGRAVATING FACTORS: \"Does anything make it worse?\" (e.g., standing, change in head position)      \"I don't really have anything unusual.\"  7. HEART RATE: \"Can you tell me your heart rate?\" \"How many beats in 15 seconds?\"  (Note: not all patients can do this)        Around 2:15pm today, BP was 125/65 and HR was 70  8. CAUSE: \"What do you think is causing the dizziness?\"      New medication, Jardiance  9. RECURRENT SYMPTOM: \"Have you had dizziness before?\" If Yes, ask: \"When was the last time?\" \"What happened that time?\"      No  10. OTHER SYMPTOMS: \"Do you have any other symptoms?\" (e.g., fever, chest pain, vomiting, diarrhea, bleeding)       Had \"a little\" diarrhea yesterday but nothing today.  No fevers, temp today was 97.1 (O)    Protocols used: Dizziness-A-OH    "

## 2024-11-26 NOTE — ASSESSMENT & PLAN NOTE
Janes called in today for feeling unwell, weak and head is a 'little cloudy' for the past few days.   Wonders if it is the jardiance.     Recommend drug holiday x 2-4 weeks.   if sx resolve may resume jardiance and if sx return we will know that this is a side effect.   If sx do not resolve she is asked to follow up by contacting clinic.

## 2024-11-26 NOTE — PROGRESS NOTES
Janes is a 89 year old who is being evaluated via a billable telephone visit.    What phone number would you like to be contacted at? [phone]  How would you like to obtain your AVS? [employer]  Originating Location (pt. Location): Home    Distant Location (provider location):  On-site    Assessment & Plan  Type 2 diabetes mellitus with diabetic polyneuropathy, without long-term current use of insulin (H)  Janes called in today for feeling unwell, weak and head is a 'little cloudy' for the past few days.   Wonders if it is the jardiance.     Recommend drug holiday x 2-4 weeks.   if sx resolve may resume jardiance and if sx return we will know that this is a side effect.   If sx do not resolve she is asked to follow up by contacting clinic.               Subjective   Janes is a 89 year old, presenting for the following health issues:  dizziness (Dizziness and weakness the last week or so, concerned due to new medication, Jardiance)  Last few days she has felt a bit weak walking and her head is a little cloudy.   She wonders if it is the jardiance  No fever, chills or sweats.   No urinary symptoms.         11/26/2024     4:27 PM   Additional Questions   Roomed by Itzel Bird RN   Accompanied by self           Objective    Vitals - Patient Reported  Systolic (Patient Reported): 125  Diastolic (Patient Reported): 65  Pulse (Patient Reported): 70  Pain Score: No Pain (0)        Physical Exam   General: Alert and no distress //Respiratory: No audible wheeze, cough, or shortness of breath // Psychiatric:  Appropriate affect, tone, and pace of words            Phone call duration: 5 minutes  Signed Electronically by: Maggie Arriaga MD

## 2024-11-30 ENCOUNTER — OFFICE VISIT (OUTPATIENT)
Dept: URGENT CARE | Facility: URGENT CARE | Age: 89
End: 2024-11-30
Payer: MEDICARE

## 2024-11-30 VITALS
DIASTOLIC BLOOD PRESSURE: 105 MMHG | TEMPERATURE: 98.9 F | WEIGHT: 182 LBS | BODY MASS INDEX: 32.24 KG/M2 | RESPIRATION RATE: 16 BRPM | SYSTOLIC BLOOD PRESSURE: 168 MMHG | HEART RATE: 90 BPM | OXYGEN SATURATION: 95 %

## 2024-11-30 DIAGNOSIS — B02.9 HERPES ZOSTER WITHOUT COMPLICATION: ICD-10-CM

## 2024-11-30 DIAGNOSIS — R30.0 DYSURIA: Primary | ICD-10-CM

## 2024-11-30 DIAGNOSIS — N39.0 URINARY TRACT INFECTION WITHOUT HEMATURIA, SITE UNSPECIFIED: ICD-10-CM

## 2024-11-30 LAB
ALBUMIN UR-MCNC: NEGATIVE MG/DL
APPEARANCE UR: CLEAR
BACTERIA #/AREA URNS HPF: ABNORMAL /HPF
BILIRUB UR QL STRIP: NEGATIVE
COLOR UR AUTO: YELLOW
GLUCOSE UR STRIP-MCNC: 250 MG/DL
HGB UR QL STRIP: ABNORMAL
KETONES UR STRIP-MCNC: NEGATIVE MG/DL
LEUKOCYTE ESTERASE UR QL STRIP: ABNORMAL
NITRATE UR QL: NEGATIVE
PH UR STRIP: 5.5 [PH] (ref 5–8)
RBC #/AREA URNS AUTO: ABNORMAL /HPF
SP GR UR STRIP: 1.01 (ref 1–1.03)
SQUAMOUS #/AREA URNS AUTO: ABNORMAL /LPF
UROBILINOGEN UR STRIP-ACNC: 0.2 E.U./DL
WBC #/AREA URNS AUTO: ABNORMAL /HPF
WBC CLUMPS #/AREA URNS HPF: PRESENT /HPF

## 2024-11-30 PROCEDURE — 99213 OFFICE O/P EST LOW 20 MIN: CPT | Performed by: FAMILY MEDICINE

## 2024-11-30 PROCEDURE — 81001 URINALYSIS AUTO W/SCOPE: CPT | Performed by: FAMILY MEDICINE

## 2024-11-30 PROCEDURE — 87088 URINE BACTERIA CULTURE: CPT | Performed by: FAMILY MEDICINE

## 2024-11-30 PROCEDURE — 87086 URINE CULTURE/COLONY COUNT: CPT | Performed by: FAMILY MEDICINE

## 2024-11-30 PROCEDURE — 87186 SC STD MICRODIL/AGAR DIL: CPT | Performed by: FAMILY MEDICINE

## 2024-11-30 RX ORDER — VALACYCLOVIR HYDROCHLORIDE 1 G/1
1000 TABLET, FILM COATED ORAL 3 TIMES DAILY
Qty: 21 TABLET | Refills: 0 | Status: ON HOLD | OUTPATIENT
Start: 2024-11-30 | End: 2024-12-07

## 2024-11-30 RX ORDER — SULFAMETHOXAZOLE AND TRIMETHOPRIM 800; 160 MG/1; MG/1
1 TABLET ORAL 2 TIMES DAILY
Qty: 10 TABLET | Refills: 0 | Status: ON HOLD | OUTPATIENT
Start: 2024-11-30 | End: 2024-12-05

## 2024-12-01 NOTE — PROGRESS NOTES
Janes Montero is a 89 year old female who comes in today for one week of symptoms    Getting worse    Bad back but wondering about urine    Cloudy urine    Some urinary infections in past    Pain from back to front    Drinking fluids okay    Stopped the jardiance about a week ago     Was on for about 10 days, felt weak and dizzy    Patient points from her right lumbar area around right flank to right groin area when asked where pain is      .Full physical not done     Mentation and affect are fine    No tremor of speech or extremity    No rash present on examined area    No costovertebral angle tenderness    A bit tender in right flank and right lower abd/ groin area subjectively. No peritoneal signs at all.      Not distended.    Ua somewhat suspicious for infection, wbc clumps and bacteria but also squamous cells    ASSESSMENT / PLAN:  (R30.0) Dysuria  (primary encounter diagnosis)  Comment: ua as above    Plan: UA Macroscopic with reflex to Microscopic and         Culture - Clinic Collect, UA Microscopic with         Reflex to Culture, Urine Culture Aerobic         Bacterial - lab collect             (N39.0) Urinary tract infection without hematuria, site unspecified  Comment: will cover with antibiotics then adjust if needed based on culture. In past she developed sepsis after urinary infection she states   Plan: sulfamethoxazole-trimethoprim (BACTRIM DS)         800-160 MG tablet             (B02.9) Herpes zoster without complication  Comment: has had shingles 5 times in past and the distribution of symptoms is suspicious and she states pain feels superficial.  Will cover for this.   Plan: valACYclovir (VALTREX) 1000 mg tablet             They agreed with plan    Be seen promptly if symptoms acutely worsen       I reviewed the patient's medications, allergies, medical history, family history, and social history.    Matthieu Christianson MD

## 2024-12-01 NOTE — PATIENT INSTRUCTIONS
Take the antibiotic    Take the medicine for shingles    Stay well hydrated    Follow up as needed based on symptoms      Be seen promptly if symptoms acutely worsen

## 2024-12-02 LAB — BACTERIA UR CULT: ABNORMAL

## 2024-12-03 ENCOUNTER — APPOINTMENT (OUTPATIENT)
Dept: CT IMAGING | Facility: HOSPITAL | Age: 89
DRG: 086 | End: 2024-12-03
Attending: EMERGENCY MEDICINE
Payer: MEDICARE

## 2024-12-03 ENCOUNTER — APPOINTMENT (OUTPATIENT)
Dept: CT IMAGING | Facility: HOSPITAL | Age: 89
DRG: 086 | End: 2024-12-03
Attending: STUDENT IN AN ORGANIZED HEALTH CARE EDUCATION/TRAINING PROGRAM
Payer: MEDICARE

## 2024-12-03 ENCOUNTER — HOSPITAL ENCOUNTER (INPATIENT)
Facility: HOSPITAL | Age: 89
DRG: 086 | End: 2024-12-03
Attending: EMERGENCY MEDICINE | Admitting: STUDENT IN AN ORGANIZED HEALTH CARE EDUCATION/TRAINING PROGRAM
Payer: MEDICARE

## 2024-12-03 ENCOUNTER — APPOINTMENT (OUTPATIENT)
Dept: RADIOLOGY | Facility: HOSPITAL | Age: 89
DRG: 086 | End: 2024-12-03
Attending: EMERGENCY MEDICINE
Payer: MEDICARE

## 2024-12-03 DIAGNOSIS — N17.9 AKI (ACUTE KIDNEY INJURY) (H): ICD-10-CM

## 2024-12-03 DIAGNOSIS — N30.00 ACUTE CYSTITIS WITHOUT HEMATURIA: Primary | ICD-10-CM

## 2024-12-03 DIAGNOSIS — I10 HYPERTENSION, UNSPECIFIED TYPE: ICD-10-CM

## 2024-12-03 DIAGNOSIS — B02.9 HERPES ZOSTER WITHOUT COMPLICATION: ICD-10-CM

## 2024-12-03 DIAGNOSIS — I50.20 HEART FAILURE WITH REDUCED EJECTION FRACTION (H): ICD-10-CM

## 2024-12-03 DIAGNOSIS — R55 SYNCOPE, UNSPECIFIED SYNCOPE TYPE: ICD-10-CM

## 2024-12-03 DIAGNOSIS — G50.0 TRIGEMINAL NEURALGIA: ICD-10-CM

## 2024-12-03 DIAGNOSIS — I60.9 SUBARACHNOID HEMORRHAGE (H): ICD-10-CM

## 2024-12-03 LAB
ALBUMIN SERPL BCG-MCNC: 4.1 G/DL (ref 3.5–5.2)
ALBUMIN UR-MCNC: NEGATIVE MG/DL
ALP SERPL-CCNC: 81 U/L (ref 40–150)
ALT SERPL W P-5'-P-CCNC: 19 U/L (ref 0–50)
ANION GAP SERPL CALCULATED.3IONS-SCNC: 10 MMOL/L (ref 7–15)
APPEARANCE UR: CLEAR
AST SERPL W P-5'-P-CCNC: 24 U/L (ref 0–45)
BASOPHILS # BLD AUTO: 0 10E3/UL (ref 0–0.2)
BASOPHILS NFR BLD AUTO: 1 %
BILIRUB DIRECT SERPL-MCNC: <0.2 MG/DL (ref 0–0.3)
BILIRUB SERPL-MCNC: 0.4 MG/DL
BILIRUB UR QL STRIP: NEGATIVE
BUN SERPL-MCNC: 42.5 MG/DL (ref 8–23)
CALCIUM SERPL-MCNC: 10.5 MG/DL (ref 8.8–10.4)
CHLORIDE SERPL-SCNC: 99 MMOL/L (ref 98–107)
CK SERPL-CCNC: 93 U/L (ref 26–192)
COLOR UR AUTO: ABNORMAL
CREAT SERPL-MCNC: 1.77 MG/DL (ref 0.51–0.95)
EGFRCR SERPLBLD CKD-EPI 2021: 27 ML/MIN/1.73M2
EOSINOPHIL # BLD AUTO: 0.2 10E3/UL (ref 0–0.7)
EOSINOPHIL NFR BLD AUTO: 3 %
ERYTHROCYTE [DISTWIDTH] IN BLOOD BY AUTOMATED COUNT: 14.4 % (ref 10–15)
GLUCOSE SERPL-MCNC: 224 MG/DL (ref 70–99)
GLUCOSE UR STRIP-MCNC: NEGATIVE MG/DL
HCO3 SERPL-SCNC: 24 MMOL/L (ref 22–29)
HCT VFR BLD AUTO: 38.9 % (ref 35–47)
HGB BLD-MCNC: 13 G/DL (ref 11.7–15.7)
HGB UR QL STRIP: NEGATIVE
IMM GRANULOCYTES # BLD: 0.1 10E3/UL
IMM GRANULOCYTES NFR BLD: 2 %
KETONES UR STRIP-MCNC: NEGATIVE MG/DL
LEUKOCYTE ESTERASE UR QL STRIP: NEGATIVE
LYMPHOCYTES # BLD AUTO: 1.3 10E3/UL (ref 0.8–5.3)
LYMPHOCYTES NFR BLD AUTO: 16 %
MCH RBC QN AUTO: 30.1 PG (ref 26.5–33)
MCHC RBC AUTO-ENTMCNC: 33.4 G/DL (ref 31.5–36.5)
MCV RBC AUTO: 90 FL (ref 78–100)
MONOCYTES # BLD AUTO: 0.6 10E3/UL (ref 0–1.3)
MONOCYTES NFR BLD AUTO: 7 %
MUCOUS THREADS #/AREA URNS LPF: PRESENT /LPF
NEUTROPHILS # BLD AUTO: 5.9 10E3/UL (ref 1.6–8.3)
NEUTROPHILS NFR BLD AUTO: 72 %
NITRATE UR QL: NEGATIVE
NRBC # BLD AUTO: 0 10E3/UL
NRBC BLD AUTO-RTO: 0 /100
NT-PROBNP SERPL-MCNC: 3107 PG/ML (ref 0–1800)
PH UR STRIP: 6 [PH] (ref 5–7)
PLATELET # BLD AUTO: 164 10E3/UL (ref 150–450)
POTASSIUM SERPL-SCNC: 4.8 MMOL/L (ref 3.4–5.3)
PROT SERPL-MCNC: 7.1 G/DL (ref 6.4–8.3)
RBC # BLD AUTO: 4.32 10E6/UL (ref 3.8–5.2)
RBC URINE: 0 /HPF
SODIUM SERPL-SCNC: 133 MMOL/L (ref 135–145)
SP GR UR STRIP: 1.01 (ref 1–1.03)
TROPONIN T SERPL HS-MCNC: 36 NG/L
TROPONIN T SERPL HS-MCNC: 36 NG/L
UROBILINOGEN UR STRIP-MCNC: <2 MG/DL
WBC # BLD AUTO: 8.2 10E3/UL (ref 4–11)
WBC URINE: 1 /HPF

## 2024-12-03 PROCEDURE — 82550 ASSAY OF CK (CPK): CPT | Performed by: STUDENT IN AN ORGANIZED HEALTH CARE EDUCATION/TRAINING PROGRAM

## 2024-12-03 PROCEDURE — 82248 BILIRUBIN DIRECT: CPT | Performed by: EMERGENCY MEDICINE

## 2024-12-03 PROCEDURE — 99291 CRITICAL CARE FIRST HOUR: CPT | Mod: 25

## 2024-12-03 PROCEDURE — 96365 THER/PROPH/DIAG IV INF INIT: CPT

## 2024-12-03 PROCEDURE — 93005 ELECTROCARDIOGRAM TRACING: CPT | Performed by: EMERGENCY MEDICINE

## 2024-12-03 PROCEDURE — G1010 CDSM STANSON: HCPCS

## 2024-12-03 PROCEDURE — 70450 CT HEAD/BRAIN W/O DYE: CPT | Mod: ME

## 2024-12-03 PROCEDURE — 80053 COMPREHEN METABOLIC PANEL: CPT | Performed by: EMERGENCY MEDICINE

## 2024-12-03 PROCEDURE — 85025 COMPLETE CBC W/AUTO DIFF WBC: CPT | Performed by: EMERGENCY MEDICINE

## 2024-12-03 PROCEDURE — 99223 1ST HOSP IP/OBS HIGH 75: CPT | Performed by: STUDENT IN AN ORGANIZED HEALTH CARE EDUCATION/TRAINING PROGRAM

## 2024-12-03 PROCEDURE — 99222 1ST HOSP IP/OBS MODERATE 55: CPT | Performed by: NURSE PRACTITIONER

## 2024-12-03 PROCEDURE — 250N000013 HC RX MED GY IP 250 OP 250 PS 637: Performed by: STUDENT IN AN ORGANIZED HEALTH CARE EDUCATION/TRAINING PROGRAM

## 2024-12-03 PROCEDURE — 82310 ASSAY OF CALCIUM: CPT | Performed by: EMERGENCY MEDICINE

## 2024-12-03 PROCEDURE — 250N000011 HC RX IP 250 OP 636: Performed by: EMERGENCY MEDICINE

## 2024-12-03 PROCEDURE — 71045 X-RAY EXAM CHEST 1 VIEW: CPT

## 2024-12-03 PROCEDURE — 84484 ASSAY OF TROPONIN QUANT: CPT | Performed by: EMERGENCY MEDICINE

## 2024-12-03 PROCEDURE — 120N000001 HC R&B MED SURG/OB

## 2024-12-03 PROCEDURE — 81001 URINALYSIS AUTO W/SCOPE: CPT | Performed by: EMERGENCY MEDICINE

## 2024-12-03 PROCEDURE — 70450 CT HEAD/BRAIN W/O DYE: CPT | Mod: MG,77

## 2024-12-03 PROCEDURE — 99207 PR NO CHARGE LOS: CPT | Performed by: INTERNAL MEDICINE

## 2024-12-03 PROCEDURE — 72125 CT NECK SPINE W/O DYE: CPT | Mod: ME

## 2024-12-03 PROCEDURE — 83880 ASSAY OF NATRIURETIC PEPTIDE: CPT | Performed by: STUDENT IN AN ORGANIZED HEALTH CARE EDUCATION/TRAINING PROGRAM

## 2024-12-03 PROCEDURE — 36415 COLL VENOUS BLD VENIPUNCTURE: CPT | Performed by: EMERGENCY MEDICINE

## 2024-12-03 RX ORDER — ACETAMINOPHEN 650 MG/1
650 SUPPOSITORY RECTAL EVERY 4 HOURS PRN
Status: DISCONTINUED | OUTPATIENT
Start: 2024-12-03 | End: 2024-12-06 | Stop reason: HOSPADM

## 2024-12-03 RX ORDER — ONDANSETRON 2 MG/ML
4 INJECTION INTRAMUSCULAR; INTRAVENOUS EVERY 6 HOURS PRN
Status: DISCONTINUED | OUTPATIENT
Start: 2024-12-03 | End: 2024-12-06 | Stop reason: HOSPADM

## 2024-12-03 RX ORDER — AMOXICILLIN 250 MG
1 CAPSULE ORAL 2 TIMES DAILY PRN
Status: DISCONTINUED | OUTPATIENT
Start: 2024-12-03 | End: 2024-12-06 | Stop reason: HOSPADM

## 2024-12-03 RX ORDER — LISINOPRIL 20 MG/1
20 TABLET ORAL EVERY EVENING
Status: DISCONTINUED | OUTPATIENT
Start: 2024-12-03 | End: 2024-12-06 | Stop reason: HOSPADM

## 2024-12-03 RX ORDER — CALCIUM CARBONATE 300MG(750)
1 TABLET,CHEWABLE ORAL DAILY
Status: DISCONTINUED | OUTPATIENT
Start: 2024-12-03 | End: 2024-12-03

## 2024-12-03 RX ORDER — MAGNESIUM OXIDE 400 MG/1
400 TABLET ORAL DAILY
Status: DISCONTINUED | OUTPATIENT
Start: 2024-12-04 | End: 2024-12-06 | Stop reason: HOSPADM

## 2024-12-03 RX ORDER — POLYETHYLENE GLYCOL 3350 17 G/17G
8.5 POWDER, FOR SOLUTION ORAL EVERY EVENING
Status: DISCONTINUED | OUTPATIENT
Start: 2024-12-03 | End: 2024-12-06 | Stop reason: HOSPADM

## 2024-12-03 RX ORDER — NITROGLYCERIN 0.4 MG/1
0.4 TABLET SUBLINGUAL EVERY 5 MIN PRN
Status: DISCONTINUED | OUTPATIENT
Start: 2024-12-03 | End: 2024-12-06 | Stop reason: HOSPADM

## 2024-12-03 RX ORDER — POLYETHYLENE GLYCOL 3350 17 G/17G
17 POWDER, FOR SOLUTION ORAL 2 TIMES DAILY PRN
Status: DISCONTINUED | OUTPATIENT
Start: 2024-12-03 | End: 2024-12-06 | Stop reason: HOSPADM

## 2024-12-03 RX ORDER — CARBIDOPA AND LEVODOPA 25; 100 MG/1; MG/1
1 TABLET ORAL 3 TIMES DAILY
Status: DISCONTINUED | OUTPATIENT
Start: 2024-12-03 | End: 2024-12-06 | Stop reason: HOSPADM

## 2024-12-03 RX ORDER — CARBIDOPA AND LEVODOPA 25; 100 MG/1; MG/1
1 TABLET ORAL 3 TIMES DAILY
Status: DISCONTINUED | OUTPATIENT
Start: 2024-12-03 | End: 2024-12-03

## 2024-12-03 RX ORDER — ACETAMINOPHEN 325 MG/1
650 TABLET ORAL EVERY 4 HOURS PRN
Status: DISCONTINUED | OUTPATIENT
Start: 2024-12-03 | End: 2024-12-06 | Stop reason: HOSPADM

## 2024-12-03 RX ORDER — FUROSEMIDE 20 MG/1
40 TABLET ORAL EVERY MORNING
Status: DISCONTINUED | OUTPATIENT
Start: 2024-12-04 | End: 2024-12-06 | Stop reason: HOSPADM

## 2024-12-03 RX ORDER — PANTOPRAZOLE SODIUM 40 MG/1
40 TABLET, DELAYED RELEASE ORAL DAILY
Status: DISCONTINUED | OUTPATIENT
Start: 2024-12-04 | End: 2024-12-06 | Stop reason: HOSPADM

## 2024-12-03 RX ORDER — VALACYCLOVIR HYDROCHLORIDE 500 MG/1
1000 TABLET, FILM COATED ORAL DAILY
Status: DISCONTINUED | OUTPATIENT
Start: 2024-12-03 | End: 2024-12-06 | Stop reason: HOSPADM

## 2024-12-03 RX ORDER — CEFDINIR 300 MG/1
300 CAPSULE ORAL DAILY
Status: DISCONTINUED | OUTPATIENT
Start: 2024-12-03 | End: 2024-12-03

## 2024-12-03 RX ORDER — GABAPENTIN 300 MG/1
300 CAPSULE ORAL 2 TIMES DAILY
Status: DISCONTINUED | OUTPATIENT
Start: 2024-12-03 | End: 2024-12-06 | Stop reason: HOSPADM

## 2024-12-03 RX ORDER — AMOXICILLIN 250 MG
2 CAPSULE ORAL 2 TIMES DAILY PRN
Status: DISCONTINUED | OUTPATIENT
Start: 2024-12-03 | End: 2024-12-06 | Stop reason: HOSPADM

## 2024-12-03 RX ORDER — CEFDINIR 300 MG/1
300 CAPSULE ORAL DAILY
Status: DISCONTINUED | OUTPATIENT
Start: 2024-12-03 | End: 2024-12-06 | Stop reason: HOSPADM

## 2024-12-03 RX ORDER — LIDOCAINE 40 MG/G
CREAM TOPICAL
Status: DISCONTINUED | OUTPATIENT
Start: 2024-12-03 | End: 2024-12-06 | Stop reason: HOSPADM

## 2024-12-03 RX ORDER — MAGNESIUM OXIDE 400 MG/1
400 TABLET ORAL DAILY
Status: DISCONTINUED | OUTPATIENT
Start: 2024-12-03 | End: 2024-12-03

## 2024-12-03 RX ORDER — ATORVASTATIN CALCIUM 10 MG/1
20 TABLET, FILM COATED ORAL AT BEDTIME
Status: DISCONTINUED | OUTPATIENT
Start: 2024-12-03 | End: 2024-12-06 | Stop reason: HOSPADM

## 2024-12-03 RX ORDER — ONDANSETRON 4 MG/1
4 TABLET, ORALLY DISINTEGRATING ORAL EVERY 6 HOURS PRN
Status: DISCONTINUED | OUTPATIENT
Start: 2024-12-03 | End: 2024-12-06 | Stop reason: HOSPADM

## 2024-12-03 RX ORDER — CALCIUM CARBONATE 500 MG/1
1000 TABLET, CHEWABLE ORAL 4 TIMES DAILY PRN
Status: DISCONTINUED | OUTPATIENT
Start: 2024-12-03 | End: 2024-12-06 | Stop reason: HOSPADM

## 2024-12-03 RX ORDER — FUROSEMIDE 20 MG/1
20 TABLET ORAL
Status: DISCONTINUED | OUTPATIENT
Start: 2024-12-03 | End: 2024-12-06 | Stop reason: HOSPADM

## 2024-12-03 RX ORDER — CARVEDILOL 12.5 MG/1
12.5 TABLET ORAL 2 TIMES DAILY WITH MEALS
Status: DISCONTINUED | OUTPATIENT
Start: 2024-12-03 | End: 2024-12-06 | Stop reason: HOSPADM

## 2024-12-03 RX ADMIN — FLUOXETINE HYDROCHLORIDE 20 MG: 20 CAPSULE ORAL at 20:43

## 2024-12-03 RX ADMIN — CEFDINIR 300 MG: 300 CAPSULE ORAL at 20:43

## 2024-12-03 RX ADMIN — CARBIDOPA AND LEVODOPA 1 TABLET: 25; 100 TABLET ORAL at 20:43

## 2024-12-03 RX ADMIN — GABAPENTIN 300 MG: 300 CAPSULE ORAL at 20:43

## 2024-12-03 RX ADMIN — NICARDIPINE HYDROCHLORIDE 2.5 MG/HR: 0.2 INJECTION, SOLUTION INTRAVENOUS at 13:14

## 2024-12-03 ASSESSMENT — ACTIVITIES OF DAILY LIVING (ADL)
ADLS_ACUITY_SCORE: 58
WALKING_OR_CLIMBING_STAIRS_DIFFICULTY: YES
ADLS_ACUITY_SCORE: 58

## 2024-12-03 ASSESSMENT — COLUMBIA-SUICIDE SEVERITY RATING SCALE - C-SSRS
2. HAVE YOU ACTUALLY HAD ANY THOUGHTS OF KILLING YOURSELF IN THE PAST MONTH?: NO
1. IN THE PAST MONTH, HAVE YOU WISHED YOU WERE DEAD OR WISHED YOU COULD GO TO SLEEP AND NOT WAKE UP?: NO
6. HAVE YOU EVER DONE ANYTHING, STARTED TO DO ANYTHING, OR PREPARED TO DO ANYTHING TO END YOUR LIFE?: NO

## 2024-12-03 NOTE — PHARMACY-ADMISSION MEDICATION HISTORY
Pharmacist Admission Medication History    Admission medication history is complete. The information provided in this note is only as accurate as the sources available at the time of the update.    Information Source(s): Patient and CareEverywhere/SureScripts via in-person    Pertinent Information: Patient resides in an Assisted Living Facility, but manages her medications on her own.     Patient stopped taking Jardiance a little over a week ago (11/24/24).     Changes made to PTA medication list:  Added: None  Deleted: vit D3, amoxicillin  Changed: furosemide to separate AM and PM doses, nystatin cream to PRN, Miralax dose to half a capful every evening.     Allergies reviewed with patient and updates made in EHR: yes    Medication History Completed By: Marie Rowe [facility] 12/3/2024 2:56 PM    Current Facility-Administered Medications for the 12/3/24 encounter (Hospital Encounter)   Medication    [START ON 1/14/2025] denosumab (PROLIA) injection 60 mg    denosumab (PROLIA) injection 60 mg     PTA Med List   Medication Sig Last Dose/Taking    aspirin 81 mg chewable tablet [ASPIRIN 81 MG CHEWABLE TABLET] Chew 81 mg at bedtime. 12/2/2024 Bedtime    atorvastatin (LIPITOR) 20 MG tablet TAKE 1 TABLET BY MOUTH AT  BEDTIME 12/2/2024 Bedtime    carbidopa-levodopa (SINEMET)  MG tablet TAKE 1 TABLET BY MOUTH 3  TIMES DAILY TAKE AT LEAST  1/2 HOUR BEFORE MEALS OR 2  HOURS AFTER MEALS DO NOT  MIX WITH FOOD 12/2/2024 Evening    carvedilol (COREG) 12.5 MG tablet Take 1 tablet (12.5 mg) by mouth 2 times daily (with meals). 12/2/2024 Bedtime    cyanocobalamin (VITAMIN B-12) 1000 MCG tablet Take 1 tablet (1,000 mcg) by mouth every evening. 12/2/2024 Evening    FLUoxetine (PROZAC) 20 MG capsule Take 1 capsule (20 mg) by mouth 2 times daily 12/2/2024 Evening    furosemide (LASIX) 20 MG tablet Take with one 40 mg tablet daily (Patient taking differently: Take 20 mg by mouth daily (before supper). Around 1500) 12/2/2024 Evening     furosemide (LASIX) 40 MG tablet Take with one 20 mg tablet daily (Patient taking differently: Take 40 mg by mouth every morning.) 12/2/2024 Morning    gabapentin (NEURONTIN) 300 MG capsule TAKE 1 CAPSULE BY MOUTH 4  TIMES DAILY 12/2/2024 Bedtime    lisinopril (ZESTRIL) 20 MG tablet Take 1 tablet (20 mg) by mouth every evening. 12/2/2024 Evening    loratadine (CLARITIN) 10 mg tablet Take 10 mg by mouth daily as needed for allergies 12/2/2024 Morning    Magnesium 400 MG TABS Take 1 tablet by mouth daily. 12/2/2024 Morning    melatonin 1 mg Tab tablet Take 1 mg by mouth At Bedtime 12/2/2024 Bedtime    multivitamin therapeutic tablet Take 1 tablet by mouth every morning 12/2/2024 Morning    nitroGLYcerin (NITROSTAT) 0.4 MG sublingual tablet Place 1 tablet (0.4 mg) under the tongue every 5 minutes as needed for chest pain Unknown    nystatin (MYCOSTATIN) 177070 UNIT/GM external cream APPLY TO AFFECTED AREA(S)  TOPICALLY TWICE DAILY (Patient taking differently: 2 times daily as needed for other.) Unknown    omeprazole (PRILOSEC) 20 MG DR capsule TAKE 1 CAPSULE BY MOUTH TWICE  DAILY (Patient taking differently: Take 20 mg by mouth daily.) 12/3/2024 Morning    polyethylene glycol (MIRALAX) 17 gram packet Take 8.5 g by mouth every evening. 12/2/2024 Evening    sulfamethoxazole-trimethoprim (BACTRIM DS) 800-160 MG tablet Take 1 tablet by mouth 2 times daily for 5 days. 12/2/2024 Evening    valACYclovir (VALTREX) 1000 mg tablet Take 1 tablet (1,000 mg) by mouth 3 times daily for 7 days. 12/2/2024 Evening

## 2024-12-03 NOTE — ED PROVIDER NOTES
EMERGENCY DEPARTMENT ENCOUNTER      NAME: Janes Montero  AGE: 89 year old female  YOB: 1933  MRN: [id number]  EVALUATION DATE & TIME: 12/3/2024  9:56 AM    PCP: Maggie Arriaga    ED PROVIDER: Prakash Reyes D.O.      Chief Complaint   Patient presents with    Fall    Headache       FINAL IMPRESSION:  1. Subarachnoid hemorrhage (H)    2. ISAIAH (acute kidney injury) (H)    3. Syncope, unspecified syncope type    4. Hypertension, unspecified type        ED COURSE & MEDICAL DECISION MAKING:    10:10 AM I met with the patient to gather history and to perform my initial exam. I discussed the plan for care while in the Emergency Department.  11:32 AM I spoke with [facility]. She has a subarachnoid hemorrhage.   11:36 AM I updated the patient and she is agreeable with plan to transfer to [facility].   11:53 AM I spoke with Jenna JEROME from our neurosurgery team. Will plan to speak with [facility]'s team to discuss transfer.   1:50 PM I spoke with the intensivist, Dr. Acevedo. They agree with plan to admit to ICU bed.    2:12 PM spoke with Dr Reynolds, Hospitalist, for admission         Pertinent Labs & Imaging studies reviewed. (See chart for details)  89 year old female presents to the Emergency Department for evaluation of syncopal fall with head trauma.  Has quite a large hematoma on the back of her head.  She was otherwise unremarkable on exam.  Imaging does show what appears to be a subarachnoid hemorrhage, and she was hypertensive above the parameters recommended by neurosurgery after their evaluation of her.  Therefore she was started on a nicardipine drip.  As she did have a syncopal episode, without evidence of arrhythmia genic event in the emergency department, and no evidence of ACS or infection, will require admission both for the subarachnoid hemorrhage but also evaluation for syncope.  Patient was comfortable with admission.  Neurosurgery believes that the patient can be admitted to [facility] therefore she will be admitted to the ICU here for further management.    Medical Decision Making  Obtained supplemental history:Supplemental history obtained?: No  Reviewed external records: External records reviewed?: Outpatient Record: Urgent Care 11/30  Care impacted by chronic illness:Chronic Kidney Disease, Diabetes, Heart Disease, Hypertension, and Peripheral Vascular Disease  Did you consider but not order tests?: Work up considered but not performed and documented in chart, if applicable  Did you interpret images independently?: Independent interpretation of ECG and images noted in documentation, when applicable.  Consultation discussion with other provider:Did you involve another provider (consultant, , pharmacy, etc.)?: I discussed the care with another health care provider, see documentation for details.  Admit.    MIPS: Not Applicable        At the conclusion of the encounter I discussed the results of all of the tests and the disposition. The questions were answered. The patient or family acknowledged understanding and was agreeable with the care plan.      CRITICAL CARE:  Critical Care  Performed by: JERRY PARRY  Authorized by: JERRY PARRY  Total critical care time: 35 minutes  Critical care time was exclusive of separately billable procedures and treating other patients.  Critical care was necessary to treat or prevent imminent or life-threatening deterioration of the following conditions: Subarachnoid hemorrhage  Critical care was time spent personally by me on the following activities: development of treatment plan with patient or surrogate, discussions with consultants, examination of patient, evaluation of patient's response to treatment, obtaining history from patient or surrogate, ordering and performing treatments and interventions, ordering and review of laboratory studies, ordering and review of radiographic studies and re-evaluation of patient's condition, this  "excludes any separately billable procedures.        HPI    Patient information was obtained from: patient     Use of : N/A      Janes Montero is a 89 year old female who presents after a fall.     The patient arrives by EMS from assisted living facility after she fell this morning. She was waling to the shower when she fell backwards and hit her head. She has a hematoma to the posterior scalp. No loss of consciousness. No thinners. The patient is unsure why she fell but denies dizziness or lightheadedness. The patient has been feeling \"a little shaky\" and weak. Since the fall, she has had a headache. She does take aspirin.     Of note, on 11/30 the patient was seen at Urgent Care for evaluation of left side/flank pain. She was started on medications to treat both shingles and UTI. (The patient is concerned these medications made her weak today.) Last night the left flank pain peaked and caused her to \"cry and cry\". However, at one point it suddenly resolved. She thinks she may have passed a kidney stone.    She denies chest pain, shortness of breath, nausea, vomiting, abdominal pain, fever, or any other complaints at this time.     Per Chart Review: The patient was seen on 11/30 at Urgent Care for evaluation of left flank pain. She was started on Valtrex and Bactrim.       PAST MEDICAL HISTORY:  Past Medical History:   Diagnosis Date    Acute diastolic congestive heart failure (H)     Acute kidney injury superimposed on CKD (H) 08/18/2022    Acute on chronic congestive heart failure (H) 06/11/2018    Acute pulmonary edema (H) 05/06/2023    Acute respiratory failure with hypoxia (H) 05/08/2023    Age-related osteoporosis with current pathological fracture with routine healing     Chest pain, unspecified type 08/15/2022    Chronic bilateral back pain 06/15/2021    Last Assessment & Plan:   Formatting of this note might be different from the original.  She says she has seen a pain specialist and had back " surgery.  She needs to establish care with a pain specialist since she is new to [place] from [place].  Referral placed.    Coronary artery disease     Coronary artery disease involving native coronary artery without angina pectoris, unspecified whether native or transplanted heart 2022    Diabetes mellitus, type II (H)     Diastolic dysfunction 07/10/2018    Frozen shoulder     bilateral    Heart failure with reduced ejection fraction (H) 2023    Hyperlipidemia     Hypertension     Hypertensive emergency     Parkinson disease (H)     Primary osteoarthritis involving multiple joints     Primary osteoarthritis of left knee     Pulmonary nodules 2024    Recurrent UTI     hx septic shock    Thyroid disease        PAST SURGICAL HISTORY:  Past Surgical History:   Procedure Laterality Date    APPENDECTOMY      APPENDECTOMY      BACK SURGERY      L4-S1 laminectomy    BYPASS GRAFT ARTERY CORONARY      3 vessel     SECTION       SECTION      CORONARY ARTERY BYPASS      CV CORONARY ANGIOGRAM N/A 2022    Procedure: Coronary Angiogram;  Surgeon: Ignacio Baird MD;  Location: [facility] CATH LAB CV    HERNIORRHAPHY VENTRAL Left     HYSTERECTOMY      HYSTERECTOMY      JOINT REPLACEMENT Left     Total left knee    OTHER SURGICAL HISTORY      right ankle surgery    OTHER SURGICAL HISTORY      right forearm fracture    REPLACEMENT TOTAL KNEE Right     SHOULDER SURGERY Right     reversed shoulder surgery.         CURRENT MEDICATIONS:    Current Facility-Administered Medications   Medication Dose Route Frequency Provider Last Rate Last Admin    [START ON 2025] denosumab (PROLIA) injection 60 mg  60 mg Subcutaneous Q6 Months         denosumab (PROLIA) injection 60 mg  60 mg Subcutaneous Q6 Months Eric Christensen MD   60 mg at 07/10/24 1449    niCARdipine 40 mg in 200 mL NS (CARDENE) infusion  0.5-15 mg/hr Intravenous Continuous Prakash Reyes DO 25 mL/hr at 24 1327 5 mg/hr  at 12/03/24 1327     Current Outpatient Medications   Medication Sig Dispense Refill    amoxicillin (AMOXIL) 500 MG capsule  (Patient not taking: Reported on 11/8/2024)      aspirin 81 mg chewable tablet [ASPIRIN 81 MG CHEWABLE TABLET] Chew 81 mg at bedtime.      atorvastatin (LIPITOR) 20 MG tablet TAKE 1 TABLET BY MOUTH AT  BEDTIME 90 tablet 3    carbidopa-levodopa (SINEMET)  MG tablet TAKE 1 TABLET BY MOUTH 3  TIMES DAILY TAKE AT LEAST  1/2 HOUR BEFORE MEALS OR 2  HOURS AFTER MEALS DO NOT  MIX WITH FOOD 270 tablet 3    carvedilol (COREG) 12.5 MG tablet Take 1 tablet (12.5 mg) by mouth 2 times daily (with meals). 180 tablet 3    cholecalciferol, vitamin D3, 1,000 unit tablet Take 1,000 Units by mouth 2 times daily      cyanocobalamin (VITAMIN B-12) 1000 MCG tablet Take 1 tablet (1,000 mcg) by mouth every evening. 90 tablet 3    empagliflozin (JARDIANCE) 10 MG TABS tablet Take 1 tablet (10 mg) by mouth daily. (Patient not taking: Reported on 11/30/2024) 90 tablet 1    FLUoxetine (PROZAC) 20 MG capsule Take 1 capsule (20 mg) by mouth 2 times daily 180 capsule 1    furosemide (LASIX) 20 MG tablet Take with one 40 mg tablet daily 90 tablet 3    furosemide (LASIX) 40 MG tablet Take with one 20 mg tablet daily 90 tablet 0    gabapentin (NEURONTIN) 300 MG capsule TAKE 1 CAPSULE BY MOUTH 4  TIMES DAILY 360 capsule 3    lisinopril (ZESTRIL) 20 MG tablet Take 1 tablet (20 mg) by mouth every evening. 90 tablet 0    loratadine (CLARITIN) 10 mg tablet Take 10 mg by mouth daily as needed for allergies      Magnesium 400 MG TABS Take 1 tablet by mouth daily.      melatonin 1 mg Tab tablet Take 1 mg by mouth At Bedtime      multivitamin therapeutic tablet Take 1 tablet by mouth every morning      nitroGLYcerin (NITROSTAT) 0.4 MG sublingual tablet Place 1 tablet (0.4 mg) under the tongue every 5 minutes as needed for chest pain 100 tablet 1    nystatin (MYCOSTATIN) 397369 UNIT/GM external cream APPLY TO AFFECTED AREA(S)   "TOPICALLY TWICE DAILY 120 g 0    omeprazole (PRILOSEC) 20 MG DR capsule TAKE 1 CAPSULE BY MOUTH TWICE  DAILY (Patient taking differently: Take 20 mg by mouth daily.) 180 capsule 2    polyethylene glycol (MIRALAX) 17 gram packet Take 17 g by mouth daily as needed for constipation      sulfamethoxazole-trimethoprim (BACTRIM DS) 800-160 MG tablet Take 1 tablet by mouth 2 times daily for 5 days. 10 tablet 0    valACYclovir (VALTREX) 1000 mg tablet Take 1 tablet (1,000 mg) by mouth 3 times daily for 7 days. 21 tablet 0         ALLERGIES:  Allergies   Allergen Reactions    Alendronate [Alendronate] Unknown     pain    Codeine Hives    Hydrocodone-Acetaminophen Other (See Comments)     Highly sensitive, \"knocks her out and can't wake up\"  3/26/24 verbal with pt plain acetaminophen is ok to take    Other Drug Allergy (See Comments)     Risedronate Unknown     Bone pain    Rosuvastatin Muscle Pain (Myalgia)    Simvastatin Muscle Pain (Myalgia)       FAMILY HISTORY:  Family History   Problem Relation Age of Onset    Heart Disease Mother     Heart Disease Father     Sleep Apnea Daughter        SOCIAL HISTORY:  Social History     Socioeconomic History    Marital status:    Tobacco Use    Smoking status: Never     Passive exposure: Never    Smokeless tobacco: Never   Vaping Use    Vaping status: Never Used   Substance and Sexual Activity    Alcohol use: No    Drug use: No    Sexual activity: Not Currently     Partners: Male     Birth control/protection: None   Other Topics Concern    Parent/sibling w/ CABG, MI or angioplasty before 65F 55M? No   Social History Narrative    6/15/2021 new to [place] from [place]. She has 6 kids.     Social Drivers of Health     Financial Resource Strain: Low Risk  (11/3/2023)    Financial Resource Strain     Within the past 12 months, have you or your family members you live with been unable to get utilities (heat, electricity) when it was really needed?: No   Food Insecurity: Low Risk  " "(11/3/2023)    Food Insecurity     Within the past 12 months, did you worry that your food would run out before you got money to buy more?: No     Within the past 12 months, did the food you bought just not last and you didn t have money to get more?: No   Transportation Needs: Low Risk  (11/3/2023)    Transportation Needs     Within the past 12 months, has lack of transportation kept you from medical appointments, getting your medicines, non-medical meetings or appointments, work, or from getting things that you need?: No   Interpersonal Safety: Low Risk  (11/10/2023)    Interpersonal Safety     Do you feel physically and emotionally safe where you currently live?: Yes     Within the past 12 months, have you been hit, slapped, kicked or otherwise physically hurt by someone?: No     Within the past 12 months, have you been humiliated or emotionally abused in other ways by your partner or ex-partner?: No   Housing Stability: Low Risk  (11/3/2023)    Housing Stability     Do you have housing? : Yes     Are you worried about losing your housing?: No       VITALS:  Patient Vitals for the past 24 hrs:   BP Temp Temp src Pulse Resp SpO2 Height Weight   12/03/24 1340 135/65 -- -- 73 16 95 % -- --   12/03/24 1330 136/61 -- -- 74 18 (!) 89 % -- --   12/03/24 1323 (!) 161/72 -- -- 73 16 91 % -- --   12/03/24 1311 (!) 163/72 -- -- 77 24 93 % -- --   12/03/24 1256 (!) 164/77 -- -- 73 -- 92 % -- --   12/03/24 1242 (!) 163/102 -- -- 88 -- 92 % -- --   12/03/24 1232 (!) 167/107 -- -- 79 -- 90 % -- --   12/03/24 1227 (!) 179/92 -- -- 75 -- 90 % -- --   12/03/24 1222 (!) 175/74 -- -- 73 -- 92 % -- --   12/03/24 1156 (!) 153/69 -- -- 77 -- 93 % -- --   12/03/24 1149 132/65 -- -- 76 -- 90 % -- --   12/03/24 1132 (!) 158/69 -- -- 71 -- 91 % -- --   12/03/24 1126 (!) 176/78 -- -- -- -- 91 % -- --   12/03/24 1034 (!) 176/77 -- -- 73 -- 90 % -- --   12/03/24 1000 (!) 182/81 97.8  F (36.6  C) Oral 80 16 91 % 1.651 m (5' 5\") 83 kg (183 lb) " "      PHYSICAL EXAM    VITAL SIGNS: /65   Pulse 73   Temp 97.8  F (36.6  C) (Oral)   Resp 16   Ht 1.651 m (5' 5\")   Wt 83 kg (183 lb)   LMP  (LMP Unknown)   SpO2 95%   BMI 30.45 kg/m      General Appearance: Well-appearing, well-nourished, no acute distress   Head:  Normocephalic, without obvious abnormality. Large hematoma to posterior left scalp.   Eyes:  PERRL, conjunctiva/corneas clear, EOM's intact,  ENT:  Lips, mucosa, and tongue normal, membranes are moist without pallor  Neck:  Normal ROM, symmetrical, trachea midline    Cardio:  Regular rate and rhythm, no murmur, rub or gallop, 2+ pulses symmetric in all extremities  Pulm:  Clear to auscultation bilaterally, respirations unlabored,  Abdomen:  Soft, non-tender, no rebound or guarding.  Musculoskeletal: Full ROM, no edema, no cyanosis, good ROM of major joints  Integument:  Warm, Dry, No erythema, No rash.    Neurologic:  Alert & oriented.  No focal deficits appreciated.    Psychiatric:  Affect normal, Judgment normal, Mood normal.      Large hematoma post l scalp.       LABS  Results for orders placed or performed during the hospital encounter of 12/03/24 (from the past 24 hours)   CBC with platelets + differential    Narrative    The following orders were created for panel order CBC with platelets + differential.  Procedure                               Abnormality         Status                     ---------                               -----------         ------                     CBC with platelets and d...[[id number]]                      Final result                 Please view results for these tests on the individual orders.   Basic metabolic panel   Result Value Ref Range    Sodium 133 (L) 135 - 145 mmol/L    Potassium 4.8 3.4 - 5.3 mmol/L    Chloride 99 98 - 107 mmol/L    Carbon Dioxide (CO2) 24 22 - 29 mmol/L    Anion Gap 10 7 - 15 mmol/L    Urea Nitrogen 42.5 (H) 8.0 - 23.0 mg/dL    Creatinine 1.77 (H) 0.51 - 0.95 mg/dL    GFR " Estimate 27 (L) >60 mL/min/1.73m2    Calcium 10.5 (H) 8.8 - 10.4 mg/dL    Glucose 224 (H) 70 - 99 mg/dL   Troponin T, High Sensitivity   Result Value Ref Range    Troponin T, High Sensitivity 36 (H) <=14 ng/L   Hepatic function panel   Result Value Ref Range    Protein Total 7.1 6.4 - 8.3 g/dL    Albumin 4.1 3.5 - 5.2 g/dL    Bilirubin Total 0.4 <=1.2 mg/dL    Alkaline Phosphatase 81 40 - 150 U/L    AST 24 0 - 45 U/L    ALT 19 0 - 50 U/L    Bilirubin Direct <0.20 0.00 - 0.30 mg/dL   CBC with platelets and differential   Result Value Ref Range    WBC Count 8.2 4.0 - 11.0 10e3/uL    RBC Count 4.32 3.80 - 5.20 10e6/uL    Hemoglobin 13.0 11.7 - 15.7 g/dL    Hematocrit 38.9 35.0 - 47.0 %    MCV 90 78 - 100 fL    MCH 30.1 26.5 - 33.0 pg    MCHC 33.4 31.5 - 36.5 g/dL    RDW 14.4 10.0 - 15.0 %    Platelet Count 164 150 - 450 10e3/uL    % Neutrophils 72 %    % Lymphocytes 16 %    % Monocytes 7 %    % Eosinophils 3 %    % Basophils 1 %    % Immature Granulocytes 2 %    NRBCs per 100 WBC 0 <1 /100    Absolute Neutrophils 5.9 1.6 - 8.3 10e3/uL    Absolute Lymphocytes 1.3 0.8 - 5.3 10e3/uL    Absolute Monocytes 0.6 0.0 - 1.3 10e3/uL    Absolute Eosinophils 0.2 0.0 - 0.7 10e3/uL    Absolute Basophils 0.0 0.0 - 0.2 10e3/uL    Absolute Immature Granulocytes 0.1 <=0.4 10e3/uL    Absolute NRBCs 0.0 10e3/uL   XR Chest Port 1 View    Narrative    EXAM: XR CHEST PORT 1 VIEW  LOCATION: [facility]  DATE: 12/3/2024    INDICATION: Weakness  COMPARISON: CT 7/10/2024      Impression    IMPRESSION: Sternotomy and right shoulder arthroplasty. Low lung volumes. Asymmetric elevation of the right hemidiaphragm. Cardiomegaly with central vascular congestion and mild interstitial edema. Mild bibasilar opacities likely reflect atelectasis. No   definite pleural effusion.   Head CT w/o contrast    Narrative    EXAM: CT HEAD W/O CONTRAST  LOCATION: [facility]  DATE: 12/3/2024    INDICATION:  Trauma.    COMPARISON: None.    TECHNIQUE: Routine CT head without intravenous contrast. Multiplanar reformats. Dose reduction techniques were used.    FINDINGS:  INTRACRANIAL CONTENTS: Small volume acute subarachnoid hemorrhage in a single left parietal lobe sulcus. No other acute intracranial hemorrhage, extra-axial fluid collection, or mass effect. No CT evidence of acute infarct. Mild presumed chronic small   vessel ischemic changes. Mild generalized volume loss. No hydrocephalus. Partially empty sella. Bilateral carotid siphon and vertebral artery V4 segment atherosclerotic calcifications.    VISUALIZED ORBITS/SINUSES/MASTOIDS: No intraorbital abnormality. No paranasal sinus mucosal disease. No middle ear or mastoid effusion.    BONES/SOFT TISSUES: No acute osseous abnormality. Large left parietal scalp hematoma.      Impression    IMPRESSION:  1.  Small volume acute subarachnoid hemorrhage in a left parietal lobe sulcus.  2.  Large left parietal scalp hematoma without underlying calvarial fracture.  3.  Brain atrophy and presumed chronic microvascular ischemic changes as above.    These findings were communicated by phone to Dr. Prakash Reyes on 12/3/2024 11:30 AM CST.     CT Cervical Spine w/o Contrast    Narrative    EXAM: CT CERVICAL SPINE W/O CONTRAST  LOCATION: [facility]  DATE: 12/3/2024    INDICATION: Trauma, headache. No focal neck pain.  COMPARISON: None.  TECHNIQUE: Routine CT Cervical Spine without IV contrast. Multiplanar reformats. Dose reduction techniques were used.    FINDINGS:  VERTEBRA: Grade I anterolisthesis of C3 on C4 and C4 on C5. No fracture or posttraumatic subluxation. Multilevel disc space height loss with associated endplate osteophyte formation and facet arthropathy. There is slight widening of the C6-C7   interspinous space, favored to be degenerative due to disc space height loss and minimal reversal of the cervical lordosis at this  level.    CANAL/FORAMINA: No high-grade neural foraminal narrowing or canal stenosis.    PARASPINAL: Bilateral carotid bulb atherosclerotic calcifications.      Impression    IMPRESSION:  1.  No fracture.  2.  Minimal widening of the C6-C7 disc space, which is favored to be degenerative given disc space height loss and slight focal kyphosis at this level. Ligamentous injury could have a similar appearance but is thought to be unlikely given appearance and   lack of cervical spine pain.    These findings were communicated by phone to Dr. Reyes on 12/3/2024 11:30 AM CST.                 UA with Microscopic reflex to Culture    Specimen: Urine, Catheter   Result Value Ref Range    Color Urine Light Yellow Colorless, Straw, Light Yellow, Yellow    Appearance Urine Clear Clear    Glucose Urine Negative Negative mg/dL    Bilirubin Urine Negative Negative    Ketones Urine Negative Negative mg/dL    Specific Gravity Urine 1.013 1.001 - 1.030    Blood Urine Negative Negative    pH Urine 6.0 5.0 - 7.0    Protein Albumin Urine Negative Negative mg/dL    Urobilinogen Urine <2.0 <2.0 mg/dL    Nitrite Urine Negative Negative    Leukocyte Esterase Urine Negative Negative    Mucus Urine Present (A) None Seen /LPF    RBC Urine 0 <=2 /HPF    WBC Urine 1 <=5 /HPF    Narrative    Urine Culture not indicated   Troponin T, High Sensitivity   Result Value Ref Range    Troponin T, High Sensitivity 36 (H) <=14 ng/L         RADIOLOGY  CT Cervical Spine w/o Contrast   Final Result   IMPRESSION:   1.  No fracture.   2.  Minimal widening of the C6-C7 disc space, which is favored to be degenerative given disc space height loss and slight focal kyphosis at this level. Ligamentous injury could have a similar appearance but is thought to be unlikely given appearance and    lack of cervical spine pain.      These findings were communicated by phone to Dr. Reyes on 12/3/2024 11:30 AM CST.                         Head CT w/o contrast   Final Result    IMPRESSION:   1.  Small volume acute subarachnoid hemorrhage in a left parietal lobe sulcus.   2.  Large left parietal scalp hematoma without underlying calvarial fracture.   3.  Brain atrophy and presumed chronic microvascular ischemic changes as above.      These findings were communicated by phone to Dr. Prakash Reyes on 12/3/2024 11:30 AM CST.         XR Chest Port 1 View   Final Result   IMPRESSION: Sternotomy and right shoulder arthroplasty. Low lung volumes. Asymmetric elevation of the right hemidiaphragm. Cardiomegaly with central vascular congestion and mild interstitial edema. Mild bibasilar opacities likely reflect atelectasis. No    definite pleural effusion.             EKG:    Rate: 72 bpm  Rhythm: Normal Sinus Rhythm  Axis: Normal  Interval: Normal  Conduction: Left bundle branch block  QRS: Wide  ST: Normal  T-wave: Inverted V4, V5, V6, 1, 2, aVF  QT: Not prolonged  Comparison EKG: T wave inversions in leads II and aVF are new compared to 26 March 2024 otherwise no significant change.  Impression:  No acute ischemic change   I have independently reviewed and interpreted today's EKG, pending Cardiologist read    PROCEDURES:  None         MEDICATIONS GIVEN IN THE EMERGENCY:  Medications   niCARdipine 40 mg in 200 mL NS (CARDENE) infusion (5 mg/hr Intravenous Rate/Dose Change 12/3/24 1327)       NEW PRESCRIPTIONS STARTED AT TODAY'S ER VISIT  New Prescriptions    No medications on file        I, Eladio Estrada, am serving as a scribe to document services personally performed by Prakash Reyes D.O., based on my observations and the provider's statements to me.  I, Prakash Reyes D.O., attest that Eladio Estrada is acting in a scribe capacity, has observed my performance of the services and has documented them in accordance with my direction.     Prakash Reyes D.O.  Emergency Medicine  [facility] EMERGENCY DEPARTMENT  [employer] [facility] [id number] [phone]  Dept:  [phone]       Prakash Reyes, DO  12/03/24 1447

## 2024-12-03 NOTE — ED NOTES
Bed: [facility] [address]  Expected date:   Expected time:   Means of arrival: Ambulance  Comments:  [place]: 89F fall, head injury

## 2024-12-03 NOTE — H&P
[facility]    History and Physical - Hospitalist Service       Date of Admission:  12/3/2024    Assessment & Plan      Janes Montero is a 89 year old female admitted on 12/3/2024. She presented from [facility] following a fall     S/p Fall  Syncope  Acute Subarachnoid hemorrhage  -- Pt denied palpitations, seizure activity pre/during/post fall.  -- Ddx: postural hypotension, arrythmia  -- Cardiac tele. TTE pending  -- CT-head: Small volume acute subarachnoid hemorrhage in a left parietal lobe sulcus. Large left parietal scalp hematoma without underlying calvarial fracture  -- CT- C-spine: No fracture  -- NSGY recommendations appreciated: Repeat head CT in 6 hours. Hold anticoagulation including ASA. HOB 30. SBP <150. 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  -- Strict I/O    Elevated troponin  -- Troponin flat. No CP  -- Likely 2/2 demand ischemia    Hypercalcemia  -- Chronic. 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 Vlatrex started in Urgent care. Will continue with dose renally adjusted.    Chronic HFpEF  -- CXR: Cardiomegaly with central vascular congestion and mild interstitial edema   -- Probnp elevated but less than from prior  -- clinically looks dry  -- C/w PTA Coreg. Holding Lasix due to ISAIAH    CAD s/p CABG  -- C/w PTA Lipitor. Hold ASA given SAH    PD: C/w PTA Sinemet  GERD: C/w PTA PPI  Mood disorder: C/w PTA Prozac  Pre-DM: outpt follow up            Diet: NPO for  "Medical/Clinical Reasons Except for: No Exceptions  DVT Prophylaxis: Pneumatic Compression Devices  Holguin Catheter: Not present  Lines: None     Cardiac Monitoring: ACTIVE order. Indication: Syncope- low cardiac risk (24 hours)  Code Status: No CPR- Pre-arrest intubation OK    Clinically Significant Risk Factors Present on Admission         # Hyponatremia: Lowest Na = 133 mmol/L in last 2 days, will monitor as appropriate         # Drug Induced Platelet Defect: home medication list includes an antiplatelet medication   # Hypertension: Noted on problem list          # DMII: A1C = 8.2 % (Ref range: 0.0 - 5.6 %) within past 6 months    # Obesity: Estimated body mass index is 30.45 kg/m  as calculated from the following:    Height as of this encounter: 1.651 m (5' 5\").    Weight as of this encounter: 83 kg (183 lb).        # History of CABG: noted on surgical history       Disposition Plan     Medically Ready for Discharge: Anticipated in 2-4 Days           Deanna Reynolds MD  Hospitalist Service  [facility]  Securely message with [employer] (more info)  Text page via [employer] Paging/Directory     ______________________________________________________________________    Chief Complaint   S/p fall    History is obtained from the patient. Son present during encounter    History of Present Illness   Janes Montero is a 89 year old female who  presented from [facility] following a fall. Pt denied any mechanical cause. She was going to the bathroom and found herself on the floor. She denied any seizure like activity or incontinence, tongue bite, or foaming. She denied any palpitations or light headedness. In the ED, CT-head showed small SAH. Pt is admitted for observation per NS.      Past Medical History    Past Medical History:   Diagnosis Date    Acute diastolic congestive heart failure (H)     Acute kidney injury superimposed on CKD (H) 08/18/2022    Acute on chronic congestive heart failure (H) " 2018    Acute pulmonary edema (H) 2023    Acute respiratory failure with hypoxia (H) 2023    Age-related osteoporosis with current pathological fracture with routine healing     Chest pain, unspecified type 08/15/2022    Chronic bilateral back pain 06/15/2021    Last Assessment & Plan:   Formatting of this note might be different from the original.  She says she has seen a pain specialist and had back surgery.  She needs to establish care with a pain specialist since she is new to [place] from [place].  Referral placed.    Coronary artery disease     Coronary artery disease involving native coronary artery without angina pectoris, unspecified whether native or transplanted heart 2022    Diabetes mellitus, type II (H)     Diastolic dysfunction 07/10/2018    Frozen shoulder     bilateral    Heart failure with reduced ejection fraction (H) 2023    Hyperlipidemia     Hypertension     Hypertensive emergency     Parkinson disease (H)     Primary osteoarthritis involving multiple joints     Primary osteoarthritis of left knee     Pulmonary nodules 2024    Recurrent UTI     hx septic shock    Thyroid disease        Past Surgical History   Past Surgical History:   Procedure Laterality Date    APPENDECTOMY      APPENDECTOMY      BACK SURGERY      L4-S1 laminectomy    BYPASS GRAFT ARTERY CORONARY      3 vessel     SECTION       SECTION      CORONARY ARTERY BYPASS      CV CORONARY ANGIOGRAM N/A 2022    Procedure: Coronary Angiogram;  Surgeon: Ignacio Baird MD;  Location: [facility] CV    HERNIORRHAPHY VENTRAL Left     HYSTERECTOMY      HYSTERECTOMY      JOINT REPLACEMENT Left     Total left knee    OTHER SURGICAL HISTORY      right ankle surgery    OTHER SURGICAL HISTORY      right forearm fracture    REPLACEMENT TOTAL KNEE Right     SHOULDER SURGERY Right     reversed shoulder surgery.       Prior to Admission Medications   Prior to Admission Medications    Prescriptions Last Dose Informant Patient Reported? Taking?   FLUoxetine (PROZAC) 20 MG capsule 12/2/2024 Evening  No Yes   Sig: Take 1 capsule (20 mg) by mouth 2 times daily   Magnesium 400 MG TABS 12/2/2024 Morning  Yes Yes   Sig: Take 1 tablet by mouth daily.   aspirin 81 mg chewable tablet 12/2/2024 Bedtime  Yes Yes   Sig: [ASPIRIN 81 MG CHEWABLE TABLET] Chew 81 mg at bedtime.   atorvastatin (LIPITOR) 20 MG tablet 12/2/2024 Bedtime  No Yes   Sig: TAKE 1 TABLET BY MOUTH AT  BEDTIME   carbidopa-levodopa (SINEMET)  MG tablet 12/2/2024 Evening  No Yes   Sig: TAKE 1 TABLET BY MOUTH 3  TIMES DAILY TAKE AT LEAST  1/2 HOUR BEFORE MEALS OR 2  HOURS AFTER MEALS DO NOT  MIX WITH FOOD   carvedilol (COREG) 12.5 MG tablet 12/2/2024 Bedtime  No Yes   Sig: Take 1 tablet (12.5 mg) by mouth 2 times daily (with meals).   cyanocobalamin (VITAMIN B-12) 1000 MCG tablet 12/2/2024 Evening  No Yes   Sig: Take 1 tablet (1,000 mcg) by mouth every evening.   empagliflozin (JARDIANCE) 10 MG TABS tablet   No No   Sig: Take 1 tablet (10 mg) by mouth daily.   Patient not taking: Reported on 11/26/2024   furosemide (LASIX) 20 MG tablet 12/2/2024 Evening  No Yes   Sig: Take with one 40 mg tablet daily   Patient taking differently: Take 20 mg by mouth daily (before supper). Around 1500   furosemide (LASIX) 40 MG tablet 12/2/2024 Morning  No Yes   Sig: Take with one 20 mg tablet daily   Patient taking differently: Take 40 mg by mouth every morning.   gabapentin (NEURONTIN) 300 MG capsule 12/2/2024 Bedtime  No Yes   Sig: TAKE 1 CAPSULE BY MOUTH 4  TIMES DAILY   lisinopril (ZESTRIL) 20 MG tablet 12/2/2024 Evening  No Yes   Sig: Take 1 tablet (20 mg) by mouth every evening.   loratadine (CLARITIN) 10 mg tablet 12/2/2024 Morning  Yes Yes   Sig: Take 10 mg by mouth daily as needed for allergies   melatonin 1 mg Tab tablet 12/2/2024 Bedtime  Yes Yes   Sig: Take 1 mg by mouth At Bedtime   multivitamin therapeutic tablet 12/2/2024 Morning  Yes Yes    Sig: Take 1 tablet by mouth every morning   nitroGLYcerin (NITROSTAT) 0.4 MG sublingual tablet Unknown  No Yes   Sig: Place 1 tablet (0.4 mg) under the tongue every 5 minutes as needed for chest pain   nystatin (MYCOSTATIN) 165208 UNIT/GM external cream Unknown  No Yes   Sig: APPLY TO AFFECTED AREA(S)  TOPICALLY TWICE DAILY   Patient taking differently: 2 times daily as needed for other.   omeprazole (PRILOSEC) 20 MG DR capsule 12/3/2024 Morning  No Yes   Sig: TAKE 1 CAPSULE BY MOUTH TWICE  DAILY   Patient taking differently: Take 20 mg by mouth daily.   polyethylene glycol (MIRALAX) 17 gram packet 12/2/2024 Evening  Yes Yes   Sig: Take 8.5 g by mouth every evening.   sulfamethoxazole-trimethoprim (BACTRIM DS) 800-160 MG tablet 12/2/2024 Evening  No Yes   Sig: Take 1 tablet by mouth 2 times daily for 5 days.   valACYclovir (VALTREX) 1000 mg tablet 12/2/2024 Evening  No Yes   Sig: Take 1 tablet (1,000 mg) by mouth 3 times daily for 7 days.      Facility-Administered Medications Last Administration Doses Remaining   denosumab (PROLIA) injection 60 mg 7/10/2024  2:49 PM    denosumab (PROLIA) injection 60 mg None recorded 2           Review of Systems    The 10 point Review of Systems is negative other than noted in the HPI or here.      Physical Exam   Vital Signs: Temp: 97.8  F (36.6  C) Temp src: Oral BP: 123/60 Pulse: 71   Resp: 22 SpO2: 95 % O2 Device: None (Room air)    Weight: 183 lbs 0 oz    GEN: Alert and oriented. Not in acute distress.  HEENT: Atraumatic, mucous membrane- moist and pink.  Chest: Bilateral air entry.  CVS: S1S2 regular.   Abdomen: Soft. Non-tender, non-distended. No organomegaly. No guarding or rigidity. Bowel sounds active.   Extremities: No pedal edema.  CNS: No involuntary movements.  Skin: no cyanosis or clubbing.     Medical Decision Making       78 MINUTES SPENT BY ME on the date of service doing chart review, history, exam, documentation & further activities per the note.      Data

## 2024-12-03 NOTE — ED NOTES
Pt requesting tylenol for pain. NPO with no exceptions. Pt also has home oral medications ordered. Paged MD about NPO status and asked for order to be changed to allow sips with meds or to discontinue oral medications. Waiting on response.

## 2024-12-03 NOTE — CONSULTS
[facility]    Neurosurgery Consultation     Date of Admission:  12/3/2024  Date of Consult (When I saw the patient): 12/03/24    Assessment & Plan   Janes Montero is a 89 year old female who was admitted on 12/3/2024. I was asked to see the patient for SAH.    Active Problems:    SAH    Assessment: small volume acute SAH in left parietal lobe    Plan:   -Agree with admission for observation, ok to keep at [facility], however ED MD informed due to capacity/bed availability they have begun working toward transfer to   -Repeat head CT in 6 hours  -Hold anticoagulation including ASA  -HOB 30  -SBP <150  -Further recommendations pending repeat scan  -Will defer to NSGY at  when she arrives    I have discussed the following assessment and plan with Dr. Singh who is in agreement with initial plan and will follow up with further consultation recommendations.    Jenna Winston CNP  [facility]  [address] [place] [id number]  Tel [phone]  Fax [phone]  Securely message with [employer]oc[employer] (more info)  Text page via [employer] Paging/Directory     Code Status    Prior    Reason for Consult   Reason for consult: I was asked by Dr. Reyes to evaluate this patient for SAH.    Primary Care Physician   Maggie Arriaga    Chief Complaint   Fall    History is obtained from the patient and EMR.     History of Present Illness   Janes Montero is a 89 year old female who takes a daily baby ASA presented to the ED after a fall, possible syncopal episode this morning. She denies any loss of consciousness. She reports hitting the back of the left side of her head. At this time she has a left parietal scalp hematoma. She reports a mild-moderate headache. She denies dizziness, nausea/vomiting, vision changes or weakness.     Past Medical History   I have reviewed this patient's medical history and updated it with pertinent information if needed.   Past Medical History:    Diagnosis Date    Acute diastolic congestive heart failure (H)     Acute kidney injury superimposed on CKD (H) 2022    Acute on chronic congestive heart failure (H) 2018    Acute pulmonary edema (H) 2023    Acute respiratory failure with hypoxia (H) 2023    Age-related osteoporosis with current pathological fracture with routine healing     Chest pain, unspecified type 08/15/2022    Chronic bilateral back pain 06/15/2021    Last Assessment & Plan:   Formatting of this note might be different from the original.  She says she has seen a pain specialist and had back surgery.  She needs to establish care with a pain specialist since she is new to [place] from [place].  Referral placed.    Coronary artery disease     Coronary artery disease involving native coronary artery without angina pectoris, unspecified whether native or transplanted heart 2022    Diabetes mellitus, type II (H)     Diastolic dysfunction 07/10/2018    Frozen shoulder     bilateral    Heart failure with reduced ejection fraction (H) 2023    Hyperlipidemia     Hypertension     Hypertensive emergency     Parkinson disease (H)     Primary osteoarthritis involving multiple joints     Primary osteoarthritis of left knee     Pulmonary nodules 2024    Recurrent UTI     hx septic shock    Thyroid disease        Past Surgical History   I have reviewed this patient's surgical history and updated it with pertinent information if needed.  Past Surgical History:   Procedure Laterality Date    APPENDECTOMY      APPENDECTOMY      BACK SURGERY      L4-S1 laminectomy    BYPASS GRAFT ARTERY CORONARY      3 vessel     SECTION       SECTION      CORONARY ARTERY BYPASS      CV CORONARY ANGIOGRAM N/A 2022    Procedure: Coronary Angiogram;  Surgeon: Ignacio Baird MD;  Location: [facility] CV    HERNIORRHAPHY VENTRAL Left     HYSTERECTOMY      HYSTERECTOMY      JOINT REPLACEMENT Left     Total  left knee    OTHER SURGICAL HISTORY      right ankle surgery    OTHER SURGICAL HISTORY      right forearm fracture    REPLACEMENT TOTAL KNEE Right     SHOULDER SURGERY Right     reversed shoulder surgery.       Prior to Admission Medications   Prior to Admission Medications   Prescriptions Last Dose Informant Patient Reported? Taking?   FLUoxetine (PROZAC) 20 MG capsule   No No   Sig: Take 1 capsule (20 mg) by mouth 2 times daily   Magnesium 400 MG TABS   Yes No   Sig: Take 1 tablet by mouth daily.   amoxicillin (AMOXIL) 500 MG capsule   Yes No   Patient not taking: Reported on 11/8/2024   aspirin 81 mg chewable tablet   Yes No   Sig: [ASPIRIN 81 MG CHEWABLE TABLET] Chew 81 mg at bedtime.   atorvastatin (LIPITOR) 20 MG tablet   No No   Sig: TAKE 1 TABLET BY MOUTH AT  BEDTIME   carbidopa-levodopa (SINEMET)  MG tablet   No No   Sig: TAKE 1 TABLET BY MOUTH 3  TIMES DAILY TAKE AT LEAST  1/2 HOUR BEFORE MEALS OR 2  HOURS AFTER MEALS DO NOT  MIX WITH FOOD   carvedilol (COREG) 12.5 MG tablet   No No   Sig: Take 1 tablet (12.5 mg) by mouth 2 times daily (with meals).   cholecalciferol, vitamin D3, 1,000 unit tablet   Yes No   Sig: Take 1,000 Units by mouth 2 times daily   cyanocobalamin (VITAMIN B-12) 1000 MCG tablet   No No   Sig: Take 1 tablet (1,000 mcg) by mouth every evening.   empagliflozin (JARDIANCE) 10 MG TABS tablet   No No   Sig: Take 1 tablet (10 mg) by mouth daily.   Patient not taking: Reported on 11/30/2024   furosemide (LASIX) 20 MG tablet   No No   Sig: Take with one 40 mg tablet daily   furosemide (LASIX) 40 MG tablet   No No   Sig: Take with one 20 mg tablet daily   gabapentin (NEURONTIN) 300 MG capsule   No No   Sig: TAKE 1 CAPSULE BY MOUTH 4  TIMES DAILY   lisinopril (ZESTRIL) 20 MG tablet   No No   Sig: Take 1 tablet (20 mg) by mouth every evening.   loratadine (CLARITIN) 10 mg tablet   Yes No   Sig: Take 10 mg by mouth daily as needed for allergies   melatonin 1 mg Tab tablet   Yes No   Sig:  "Take 1 mg by mouth At Bedtime   multivitamin therapeutic tablet   Yes No   Sig: Take 1 tablet by mouth every morning   nitroGLYcerin (NITROSTAT) 0.4 MG sublingual tablet   No No   Sig: Place 1 tablet (0.4 mg) under the tongue every 5 minutes as needed for chest pain   nystatin (MYCOSTATIN) 231299 UNIT/GM external cream   No No   Sig: APPLY TO AFFECTED AREA(S)  TOPICALLY TWICE DAILY   omeprazole (PRILOSEC) 20 MG DR capsule   No No   Sig: TAKE 1 CAPSULE BY MOUTH TWICE  DAILY   Patient taking differently: Take 20 mg by mouth daily.   polyethylene glycol (MIRALAX) 17 gram packet   Yes No   Sig: Take 17 g by mouth daily as needed for constipation   sulfamethoxazole-trimethoprim (BACTRIM DS) 800-160 MG tablet   No No   Sig: Take 1 tablet by mouth 2 times daily for 5 days.   valACYclovir (VALTREX) 1000 mg tablet   No No   Sig: Take 1 tablet (1,000 mg) by mouth 3 times daily for 7 days.      Facility-Administered Medications Last Administration Doses Remaining   denosumab (PROLIA) injection 60 mg 7/10/2024  2:49 PM    denosumab (PROLIA) injection 60 mg None recorded 2        Allergies   Allergies   Allergen Reactions    Alendronate [Alendronate] Unknown     pain    Codeine Hives    Hydrocodone-Acetaminophen Other (See Comments)     Highly sensitive, \"knocks her out and can't wake up\"  3/26/24 verbal with pt plain acetaminophen is ok to take    Other Drug Allergy (See Comments)     Risedronate Unknown     Bone pain    Rosuvastatin Muscle Pain (Myalgia)    Simvastatin Muscle Pain (Myalgia)       Social History   I have reviewed this patient's social history and updated it with pertinent information if needed. Janes Montero  reports that she has never smoked. She has never been exposed to tobacco smoke. She has never used smokeless tobacco. She reports that she does not drink alcohol and does not use drugs.    Family History   I have reviewed this patient's family history and updated it with pertinent information if needed. " "  Family History   Problem Relation Age of Onset    Heart Disease Mother     Heart Disease Father     Sleep Apnea Daughter        Review of Systems   10 point ROS negative except noted above.    Physical Exam   Temp: 97.8  F (36.6  C) Temp src: Oral BP: (!) 153/69 Pulse: 77   Resp: 16 SpO2: 93 % O2 Device: None (Room air)    Vital Signs with Ranges  Temp:  [97.8  F (36.6  C)] 97.8  F (36.6  C)  Pulse:  [71-80] 77  Resp:  [16] 16  BP: (132-182)/(65-81) 153/69  SpO2:  [90 %-93 %] 93 %  183 lbs 0 oz     , Blood pressure (!) 153/69, pulse 77, temperature 97.8  F (36.6  C), temperature source Oral, resp. rate 16, height 1.651 m (5' 5\"), weight 83 kg (183 lb), SpO2 93%, not currently breastfeeding.  183 lbs 0 oz    NEUROLOGICAL EXAMINATION:   Mental status:  Alert and Oriented x 3, speech is fluent.  Cranial nerves:  II-XII intact.   Motor:  Strength is 5/5 throughout the upper and lower extremities  Shoulder Abduction:  Right:  5/5   Left:  5/5  Biceps:                      Right:  5/5   Left:  5/5  Triceps:                     Right:  5/5   Left:  5/5  Wrist Extensors:       Right:  5/5   Left:  5/5  Wrist Flexors:           Right:  5/5   Left:  5/5  interosseus :            Right:  5/5   Left:  5/5   Hip Flexor:                Right: 5/5  Left:  5/5  Hip Adductor:             Right:  5/5  Left:  5/5  Hip Abductor:             Right:  5/5  Left:  5/5  Gastroc Soleus:        Right:  5/5  Left:  5/5  Tib/Ant:                      Right:  5/5  Left:  5/5  EHL:                     Right:  5/5  Left:  5/5  Sensation:  intact  Coordination:  Smooth finger to nose testing.   Negative pronator drift.  .       Data     CBC RESULTS:   Recent Labs   Lab Test 12/03/24  1027   WBC 8.2   RBC 4.32   HGB 13.0   HCT 38.9   MCV 90   MCH 30.1   MCHC 33.4   RDW 14.4        Basic Metabolic Panel:  Lab Results   Component Value Date     12/03/2024      Lab Results   Component Value Date    POTASSIUM 4.8 12/03/2024    POTASSIUM " "4.9 05/12/2023     Lab Results   Component Value Date    CHLORIDE 99 12/03/2024    CHLORIDE 98 05/12/2023     Lab Results   Component Value Date    LIZZY 10.5 12/03/2024     Lab Results   Component Value Date    CO2 24 12/03/2024    CO2 25 05/12/2023     Lab Results   Component Value Date    BUN 42.5 12/03/2024    BUN 57 05/12/2023     Lab Results   Component Value Date    CR 1.77 12/03/2024     Lab Results   Component Value Date     12/03/2024     03/30/2024     05/12/2023     INR:  No results found for: \"INR\"      "

## 2024-12-04 ENCOUNTER — APPOINTMENT (OUTPATIENT)
Dept: CARDIOLOGY | Facility: HOSPITAL | Age: 89
DRG: 086 | End: 2024-12-04
Attending: STUDENT IN AN ORGANIZED HEALTH CARE EDUCATION/TRAINING PROGRAM
Payer: MEDICARE

## 2024-12-04 ENCOUNTER — APPOINTMENT (OUTPATIENT)
Dept: ULTRASOUND IMAGING | Facility: HOSPITAL | Age: 89
DRG: 086 | End: 2024-12-04
Attending: STUDENT IN AN ORGANIZED HEALTH CARE EDUCATION/TRAINING PROGRAM
Payer: MEDICARE

## 2024-12-04 ENCOUNTER — APPOINTMENT (OUTPATIENT)
Dept: PHYSICAL THERAPY | Facility: HOSPITAL | Age: 89
DRG: 086 | End: 2024-12-04
Attending: STUDENT IN AN ORGANIZED HEALTH CARE EDUCATION/TRAINING PROGRAM
Payer: MEDICARE

## 2024-12-04 LAB
ANION GAP SERPL CALCULATED.3IONS-SCNC: 12 MMOL/L (ref 7–15)
BASOPHILS # BLD AUTO: 0.1 10E3/UL (ref 0–0.2)
BASOPHILS NFR BLD AUTO: 1 %
BUN SERPL-MCNC: 48.8 MG/DL (ref 8–23)
CALCIUM SERPL-MCNC: 10.7 MG/DL (ref 8.8–10.4)
CHLORIDE SERPL-SCNC: 102 MMOL/L (ref 98–107)
CREAT SERPL-MCNC: 2.17 MG/DL (ref 0.51–0.95)
EGFRCR SERPLBLD CKD-EPI 2021: 21 ML/MIN/1.73M2
EOSINOPHIL # BLD AUTO: 0.4 10E3/UL (ref 0–0.7)
EOSINOPHIL NFR BLD AUTO: 4 %
ERYTHROCYTE [DISTWIDTH] IN BLOOD BY AUTOMATED COUNT: 14.7 % (ref 10–15)
GLUCOSE SERPL-MCNC: 159 MG/DL (ref 70–99)
HCO3 SERPL-SCNC: 24 MMOL/L (ref 22–29)
HCT VFR BLD AUTO: 39.2 % (ref 35–47)
HGB BLD-MCNC: 13 G/DL (ref 11.7–15.7)
IMM GRANULOCYTES # BLD: 0.1 10E3/UL
IMM GRANULOCYTES NFR BLD: 1 %
LVEF ECHO: NORMAL
LYMPHOCYTES # BLD AUTO: 1.4 10E3/UL (ref 0.8–5.3)
LYMPHOCYTES NFR BLD AUTO: 17 %
MCH RBC QN AUTO: 30.1 PG (ref 26.5–33)
MCHC RBC AUTO-ENTMCNC: 33.2 G/DL (ref 31.5–36.5)
MCV RBC AUTO: 91 FL (ref 78–100)
MONOCYTES # BLD AUTO: 0.8 10E3/UL (ref 0–1.3)
MONOCYTES NFR BLD AUTO: 9 %
NEUTROPHILS # BLD AUTO: 5.8 10E3/UL (ref 1.6–8.3)
NEUTROPHILS NFR BLD AUTO: 69 %
NRBC # BLD AUTO: 0 10E3/UL
NRBC BLD AUTO-RTO: 0 /100
PLATELET # BLD AUTO: 178 10E3/UL (ref 150–450)
POTASSIUM SERPL-SCNC: 5.1 MMOL/L (ref 3.4–5.3)
RBC # BLD AUTO: 4.32 10E6/UL (ref 3.8–5.2)
SODIUM SERPL-SCNC: 138 MMOL/L (ref 135–145)
WBC # BLD AUTO: 8.5 10E3/UL (ref 4–11)

## 2024-12-04 PROCEDURE — 250N000011 HC RX IP 250 OP 636: Mod: JZ | Performed by: STUDENT IN AN ORGANIZED HEALTH CARE EDUCATION/TRAINING PROGRAM

## 2024-12-04 PROCEDURE — 99233 SBSQ HOSP IP/OBS HIGH 50: CPT | Performed by: STUDENT IN AN ORGANIZED HEALTH CARE EDUCATION/TRAINING PROGRAM

## 2024-12-04 PROCEDURE — 36415 COLL VENOUS BLD VENIPUNCTURE: CPT | Performed by: STUDENT IN AN ORGANIZED HEALTH CARE EDUCATION/TRAINING PROGRAM

## 2024-12-04 PROCEDURE — 99222 1ST HOSP IP/OBS MODERATE 55: CPT | Performed by: STUDENT IN AN ORGANIZED HEALTH CARE EDUCATION/TRAINING PROGRAM

## 2024-12-04 PROCEDURE — 82435 ASSAY OF BLOOD CHLORIDE: CPT | Performed by: STUDENT IN AN ORGANIZED HEALTH CARE EDUCATION/TRAINING PROGRAM

## 2024-12-04 PROCEDURE — 255N000002 HC RX 255 OP 636: Performed by: STUDENT IN AN ORGANIZED HEALTH CARE EDUCATION/TRAINING PROGRAM

## 2024-12-04 PROCEDURE — 97161 PT EVAL LOW COMPLEX 20 MIN: CPT | Mod: GP

## 2024-12-04 PROCEDURE — 85004 AUTOMATED DIFF WBC COUNT: CPT | Performed by: STUDENT IN AN ORGANIZED HEALTH CARE EDUCATION/TRAINING PROGRAM

## 2024-12-04 PROCEDURE — 97116 GAIT TRAINING THERAPY: CPT | Mod: GP

## 2024-12-04 PROCEDURE — 85048 AUTOMATED LEUKOCYTE COUNT: CPT | Performed by: STUDENT IN AN ORGANIZED HEALTH CARE EDUCATION/TRAINING PROGRAM

## 2024-12-04 PROCEDURE — 93306 TTE W/DOPPLER COMPLETE: CPT | Mod: 26 | Performed by: INTERNAL MEDICINE

## 2024-12-04 PROCEDURE — 120N000001 HC R&B MED SURG/OB

## 2024-12-04 PROCEDURE — 250N000013 HC RX MED GY IP 250 OP 250 PS 637: Performed by: STUDENT IN AN ORGANIZED HEALTH CARE EDUCATION/TRAINING PROGRAM

## 2024-12-04 PROCEDURE — P9047 ALBUMIN (HUMAN), 25%, 50ML: HCPCS | Mod: JZ | Performed by: STUDENT IN AN ORGANIZED HEALTH CARE EDUCATION/TRAINING PROGRAM

## 2024-12-04 PROCEDURE — 250N000013 HC RX MED GY IP 250 OP 250 PS 637

## 2024-12-04 PROCEDURE — 76770 US EXAM ABDO BACK WALL COMP: CPT

## 2024-12-04 PROCEDURE — C8929 TTE W OR WO FOL WCON,DOPPLER: HCPCS

## 2024-12-04 PROCEDURE — 80048 BASIC METABOLIC PNL TOTAL CA: CPT | Performed by: STUDENT IN AN ORGANIZED HEALTH CARE EDUCATION/TRAINING PROGRAM

## 2024-12-04 PROCEDURE — 82565 ASSAY OF CREATININE: CPT | Performed by: STUDENT IN AN ORGANIZED HEALTH CARE EDUCATION/TRAINING PROGRAM

## 2024-12-04 RX ORDER — ALBUMIN (HUMAN) 12.5 G/50ML
50 SOLUTION INTRAVENOUS ONCE
Status: COMPLETED | OUTPATIENT
Start: 2024-12-04 | End: 2024-12-04

## 2024-12-04 RX ORDER — HYDRALAZINE HYDROCHLORIDE 20 MG/ML
5 INJECTION INTRAMUSCULAR; INTRAVENOUS EVERY 4 HOURS PRN
Status: DISCONTINUED | OUTPATIENT
Start: 2024-12-04 | End: 2024-12-06 | Stop reason: HOSPADM

## 2024-12-04 RX ORDER — TETRAHYDROZOLINE HCL 0.05 %
1 DROPS OPHTHALMIC (EYE) 2 TIMES DAILY PRN
Status: DISCONTINUED | OUTPATIENT
Start: 2024-12-04 | End: 2024-12-06 | Stop reason: HOSPADM

## 2024-12-04 RX ADMIN — CARVEDILOL 12.5 MG: 12.5 TABLET, FILM COATED ORAL at 18:29

## 2024-12-04 RX ADMIN — FLUOXETINE HYDROCHLORIDE 20 MG: 20 CAPSULE ORAL at 10:10

## 2024-12-04 RX ADMIN — GABAPENTIN 300 MG: 300 CAPSULE ORAL at 21:59

## 2024-12-04 RX ADMIN — ACETAMINOPHEN 650 MG: 325 TABLET ORAL at 21:58

## 2024-12-04 RX ADMIN — Medication 1 MG: at 21:59

## 2024-12-04 RX ADMIN — GABAPENTIN 300 MG: 300 CAPSULE ORAL at 10:10

## 2024-12-04 RX ADMIN — CARBIDOPA AND LEVODOPA 1 TABLET: 25; 100 TABLET ORAL at 21:59

## 2024-12-04 RX ADMIN — TETRAHYDROZOLINE HCL 1 DROP: 0.05 LIQUID OPHTHALMIC at 21:58

## 2024-12-04 RX ADMIN — FLUOXETINE HYDROCHLORIDE 20 MG: 20 CAPSULE ORAL at 21:58

## 2024-12-04 RX ADMIN — PERFLUTREN 2 ML: 6.52 INJECTION, SUSPENSION INTRAVENOUS at 12:34

## 2024-12-04 RX ADMIN — CARBIDOPA AND LEVODOPA 1 TABLET: 25; 100 TABLET ORAL at 14:26

## 2024-12-04 RX ADMIN — CARBIDOPA AND LEVODOPA 1 TABLET: 25; 100 TABLET ORAL at 10:10

## 2024-12-04 RX ADMIN — CEFDINIR 300 MG: 300 CAPSULE ORAL at 10:10

## 2024-12-04 RX ADMIN — ATORVASTATIN CALCIUM 20 MG: 10 TABLET, FILM COATED ORAL at 00:23

## 2024-12-04 RX ADMIN — ACETAMINOPHEN 650 MG: 325 TABLET ORAL at 10:12

## 2024-12-04 RX ADMIN — ACETAMINOPHEN 650 MG: 325 TABLET ORAL at 00:23

## 2024-12-04 RX ADMIN — ACETAMINOPHEN 650 MG: 325 TABLET ORAL at 18:32

## 2024-12-04 RX ADMIN — PANTOPRAZOLE SODIUM 40 MG: 40 TABLET, DELAYED RELEASE ORAL at 10:10

## 2024-12-04 RX ADMIN — VALACYCLOVIR HYDROCHLORIDE 1000 MG: 500 TABLET, FILM COATED ORAL at 10:09

## 2024-12-04 RX ADMIN — Medication 1 MG: at 00:24

## 2024-12-04 RX ADMIN — ALBUMIN HUMAN 50 G: 0.25 SOLUTION INTRAVENOUS at 14:30

## 2024-12-04 RX ADMIN — MAGNESIUM OXIDE TAB 400 MG (241.3 MG ELEMENTAL MG) 400 MG: 400 (241.3 MG) TAB at 10:10

## 2024-12-04 RX ADMIN — ATORVASTATIN CALCIUM 20 MG: 10 TABLET, FILM COATED ORAL at 21:59

## 2024-12-04 ASSESSMENT — ACTIVITIES OF DAILY LIVING (ADL)
ADLS_ACUITY_SCORE: 46
ADLS_ACUITY_SCORE: 50
ADLS_ACUITY_SCORE: 46
ADLS_ACUITY_SCORE: 50
ADLS_ACUITY_SCORE: 46
ADLS_ACUITY_SCORE: 44
ADLS_ACUITY_SCORE: 50
ADLS_ACUITY_SCORE: 46
ADLS_ACUITY_SCORE: 50
ADLS_ACUITY_SCORE: 44
ADLS_ACUITY_SCORE: 50
ADLS_ACUITY_SCORE: 46
ADLS_ACUITY_SCORE: 50
ADLS_ACUITY_SCORE: 46

## 2024-12-04 NOTE — PROGRESS NOTES
"   12/04/24 1600   Appointment Info   Signing Clinician's Name / Credentials (PT) Carisa Alarcon, PT, DPT   Living Environment   People in Home alone   Current Living Arrangements independent living facility   Home Accessibility no concerns   Transportation Anticipated family or friend will provide   Self-Care   Usual Activity Tolerance good   Current Activity Tolerance moderate   Equipment Currently Used at Home walker, rolling  (4WW)   Fall history within last six months yes   Number of times patient has fallen within last six months 1   General Information   Onset of Illness/Injury or Date of Surgery 12/03/24   Referring Physician Deanna Reynolds MD   Patient/Family Therapy Goals Statement (PT) Return home   Pertinent History of Current Problem (include personal factors and/or comorbidities that impact the POC) Per chart, \"Ms. Janes Montero is a 89 year old female with  CAD s/p CABG with LIMA-LAD 2010, borderline aortic stenosis, chronic HFmrEF, HTN, HLD, who presented to the hospital s/p fall/syncope and subarachnoid hemorrhage.\"   Existing Precautions/Restrictions fall   Range of Motion (ROM)   Range of Motion ROM is WFL   Strength (Manual Muscle Testing)   Strength (Manual Muscle Testing) strength is WFL   Bed Mobility   Bed Mobility supine-sit-supine   Supine-Sit-Supine [place] (Bed Mobility) set up;supervision   Transfers   Transfers sit-stand transfer   Sit-Stand Transfer   Sit-Stand [place] (Transfers) set up;supervision   Assistive Device (Sit-Stand Transfers) walker, front-wheeled   Gait/Stairs (Locomotion)   [place] Level (Gait) set up;supervision   Assistive Device (Gait) walker, front-wheeled   Distance in Feet (Gait) 10'   Pattern (Gait) step-through   Deviations/Abnormal Patterns (Gait) mima decreased   Clinical Impression   Criteria for Skilled Therapeutic Intervention Yes, treatment indicated   PT Diagnosis (PT) Impaired functional mobility   Influenced by the following " impairments Generalized weakness s/p fall   Functional limitations due to impairments Bed mobility, transfers, gait   Clinical Presentation (PT Evaluation Complexity) stable   Clinical Presentation Rationale Patient presents as medically diagnosed.   Clinical Decision Making (Complexity) low complexity   Planned Therapy Interventions (PT) bed mobility training;gait training;home exercise program;patient/family education;transfer training;progressive activity/exercise;home program guidelines   Risk & Benefits of therapy have been explained evaluation/treatment results reviewed;care plan/treatment goals reviewed;patient   PT Total Evaluation Time   PT Eval, Low Complexity Minutes (69618) 10   Physical Therapy Goals   PT Frequency Daily   PT Predicted Duration/Target Date for Goal Attainment 12/11/24   PT Goals Bed Mobility;Transfers;Gait   PT: Bed Mobility Modified independent;Supine to/from sit   PT: Transfers Modified independent;Sit to/from stand;Assistive device  (4WW)   PT: Gait Modified independent;Assistive device;Greater than 200 feet  (4WW)   Interventions   Interventions Quick Adds Gait Training   Gait Training   Gait Training Minutes (55938) 10   Symptoms Noted During/After Treatment (Gait Training) fatigue   Treatment Detail/Skilled Intervention Patient continued gait training in hallway with SBA and FWW. Decreased pace noted.   Distance in Feet 150'   [place] Level (Gait Training) stand-by assist   Physical Assistance Level (Gait Training) supervision   Weight Bearing (Gait Training) full weight-bearing   Assistive Device (Gait Training) rolling walker  (FWW)   PT Discharge Planning   PT Plan Bring 4WW for gait training and transfers   PT Discharge Recommendation (DC Rec) home with assist   PT Rationale for DC Rec Patient appears to be near her baseline for functional mobility and is mobilizing with SBA. Anticipate patient will be safe to return home at discharge.   PT Brief overview of current  status SBA with FWW x 150'.   PT Equipment Needed at Discharge walker, rolling  (4WW)   Physical Therapy Time and Intention   Timed Code Treatment Minutes 10   Total Session Time (sum of timed and untimed services) 20

## 2024-12-04 NOTE — PROGRESS NOTES
[facility]    Medicine Progress Note - Hospitalist Service    Date of Admission:  12/3/2024    Assessment & Plan      Janes Montero is a 89 year old female admitted on 12/3/2024. She presented from [facility] following a fall     S/p Fall  Syncope  Acute Subarachnoid hemorrhage  -- Pt denied palpitations, seizure activity pre/during/post fall.  -- Ddx: postural hypotension, arrythmia  -- Cardiac tele. TTE pending  -- CT-head: Small volume acute subarachnoid hemorrhage in a left parietal lobe sulcus. Large left parietal scalp hematoma without underlying calvarial fracture  -- CT- C-spine: No fracture  -- NSGY recommendations appreciated: -No plans for surgical intervention. Continue to hold anticoagulation including aspirin. NSGY will follow up in 1 month with repeat head CT prior, our office will contact her to schedule this appointment.  -- Fall precautions  -- S/p Cardizem gtt for BP control  -- CK 93  -- Cardiology consult appreciated:  - Most likely orthostatic syncope. Suspect she is a bit over-diuresed and also chronically struggles with positional lightheadedness likely in part due to her mitral regurgitation. Episode of mobmicki I noted.  Likely related to episode of high vagal tone.  Unlikely that this contributed to the fall.  However, will recommend 14 day ziopatch on discharge to evaluate for other arrhythmia. Continue to monitor on telemetry while in the hospital    Hyponatremia, mild- chronic. 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  -- Strict I/O  -- 12/04: Cr up to 2.17. Will give albumin gtt once and continue to hold lasix. Encouraged po intake. Will do US-renal    Elevated troponin  -- Troponin flat. No CP  -- Likely 2/2 demand  "ischemia    Hypercalcemia  -- Chronic. 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 Vlatrex started in Urgent care. Will continue with dose renally adjusted.    Chronic HFpEF  -- CXR: Cardiomegaly with central vascular congestion and mild interstitial edema   -- Probnp elevated but less than from prior  -- clinically looks dry  -- C/w PTA Coreg. Holding Lasix due to ISAIAH  -- 12/04: Albumin gtt once.   -- Cardiology consult appreciated: Recommend decreasing PO lasix to 20mg BID on discharge     Essential Hypertension  -- Monitor vital signs per protocol  -- C/w PTA Carvedilol. Continue to hold Lisinopril due to ISAIAH.    CAD s/p CABG  -- C/w PTA Lipitor. Hold ASA given SAH    PD: C/w PTA Sinemet  GERD: C/w PTA PPI  Mood disorder: C/w PTA Prozac  Pre-DM: outpt follow up            Diet: Combination Diet 2 gm NA Diet    DVT Prophylaxis: Pneumatic Compression Devices  Holguin Catheter: Not present  Lines: None     Cardiac Monitoring: ACTIVE order. Indication: Syncope- low cardiac risk (24 hours)  Code Status: No CPR- Pre-arrest intubation OK      Clinically Significant Risk Factors         # Hyponatremia: Lowest Na = 133 mmol/L in last 2 days, will monitor as appropriate    # Hypercalcemia: Highest Ca = 10.7 mg/dL in last 2 days, will monitor as appropriate        # Hypertension: Noted on problem list           # DMII: A1C = 8.2 % (Ref range: 0.0 - 5.6 %) within past 6 months, PRESENT ON ADMISSION  # Obesity: Estimated body mass index is 31 kg/m  as calculated from the following:    Height as of this encounter: 1.615 m (5' 3.6\").    Weight as of this encounter: 80.9 kg (178 lb 5.6 oz)., PRESENT ON ADMISSION      # History of CABG: noted on surgical history       Social Drivers of Health            Disposition Plan     Medically Ready for Discharge: Anticipated " Tomorrow             Deanna Reynolds MD  Hospitalist Service  [facility]  Securely message with [employer] (more info)  Text page via [employer] Paging/Directory   ______________________________________________________________________    Interval History   Patient is seen and examined at bedside.   No complaints.  Plan of care discussed with patient. All questions answered. Pt verbalized understanding.     Physical Exam   Vital Signs: Temp: 97.7  F (36.5  C) Temp src: Oral BP: 135/80 Pulse: 66   Resp: 18 SpO2: 92 % O2 Device: None (Room air) Oxygen Delivery: 1 LPM  Weight: 178 lbs 5.63 oz    GEN: Alert and oriented. Not in acute distress.  HEENT: Atraumatic, mucous membrane- dry.  Chest: Bilateral air entry.  CVS: S1S2 regular.   Abdomen: Soft. Non-tender, non-distended. No organomegaly. No guarding or rigidity. Bowel sounds active.   Extremities: No pedal edema.  CNS: No involuntary movements.  Skin: no cyanosis or clubbing.     Medical Decision Making       56 MINUTES SPENT BY ME on the date of service doing chart review, history, exam, documentation & further activities per the note.      Data

## 2024-12-04 NOTE — ED NOTES
Patient SBP <150 for multiple cycles. Nicardipine paused. Charge nurse notified. Will assess continued SBP cycles and restart if goal of <150 is not met

## 2024-12-04 NOTE — PROGRESS NOTES
"[facility]    Neurosurgery Progress Note    Date of Service (when I saw the patient): 12/04/2024     Assessment & Plan   Janes Montero is a 89 year old female who takes a daily baby ASA presented to the ED after a fall. Imaging revealed a small volume acute SAH in the left parietal lobe. Repeat imaging was stable. Today she was seen lying in bed. She reports an ongoing mild-moderate headache. She denies any dizziness, nausea/vomiting, vision changes or overt weakness.     Plan:  -No plans for surgical intervention  -Continue to hold anticoagulation including aspirin  -NSGY will follow up in 1 month with repeat head CT prior, our office will contact her to schedule this appointment  -NSGY will sign off, please page with questions.     Jenna Winston CNP  [facility]  [address] [place] [address]  Tel [phone]  Fax [phone]  Securely message with [employer] (more info)  Text page via [employer] Paging/Directory     Interval History   Stable.    Physical Exam   Temp: 97.4  F (36.3  C) Temp src: Oral BP: 133/60 Pulse: 67   Resp: 20 SpO2: 97 % O2 Device: Nasal cannula Oxygen Delivery: 2 LPM  Vitals:    12/03/24 1000 12/03/24 2317 12/04/24 0630   Weight: 83 kg (183 lb) 83.3 kg (183 lb 10.3 oz) 80.9 kg (178 lb 5.6 oz)     Vital Signs with Ranges  Temp:  [97.4  F (36.3  C)-97.9  F (36.6  C)] 97.4  F (36.3  C)  Pulse:  [65-88] 67  Resp:  [12-28] 20  BP: (107-182)/() 133/60  SpO2:  [89 %-97 %] 97 %  I/O last 3 completed shifts:  In: -   Out: 400 [Urine:400]     , Blood pressure 133/60, pulse 67, temperature 97.4  F (36.3  C), temperature source Oral, resp. rate 20, height 1.615 m (5' 3.6\"), weight 80.9 kg (178 lb 5.6 oz), SpO2 97%, not currently breastfeeding.  178 lbs 5.63 oz    NEUROLOGICAL EXAMINATION:   Mental status:  Alert and Oriented x 3, speech is fluent.  Cranial nerves:  II-XII intact.   Motor:  Strength is 5/5 throughout the upper " and lower extremities  Shoulder Abduction:  Right:  5/5   Left:  5/5  Biceps:                      Right:  5/5   Left:  5/5  Triceps:                     Right:  5/5   Left:  5/5  Wrist Extensors:       Right:  5/5   Left:  5/5  Wrist Flexors:           Right:  5/5   Left:  5/5  interosseus :            Right:  5/5   Left:  5/5   Hip Flexor:                Right: 5/5  Left:  5/5  Hip Adductor:             Right:  5/5  Left:  5/5  Hip Abductor:             Right:  5/5  Left:  5/5  Gastroc Soleus:        Right:  5/5  Left:  5/5  Tib/Ant:                      Right:  5/5  Left:  5/5  EHL:                     Right:  5/5  Left:  5/5  Sensation:  intact  Coordination:  Smooth finger to nose testing.   Negative pronator drift.      Medications   Current Facility-Administered Medications   Medication Dose Route Frequency Provider Last Rate Last Admin    [Held by provider] niCARdipine 40 mg in 200 mL NS (CARDENE) infusion  0.5-15 mg/hr Intravenous Continuous Deanna Reynolds MD   Paused at 12/03/24 1732     Current Facility-Administered Medications   Medication Dose Route Frequency Provider Last Rate Last Admin    atorvastatin (LIPITOR) tablet 20 mg  20 mg Oral At Bedtime Deanna Reynolds MD   20 mg at 12/04/24 0023    carbidopa-levodopa (SINEMET)  MG per tablet 1 tablet  1 tablet Oral TID Deanna Reynolds MD   1 tablet at 12/03/24 2043    carvedilol (COREG) tablet 12.5 mg  12.5 mg Oral BID w/meals Deanna Reynolds MD        cefdinir (OMNICEF) capsule 300 mg  300 mg Oral Daily Deanna Reynolds MD   300 mg at 12/03/24 2043    FLUoxetine (PROzac) capsule 20 mg  20 mg Oral BID Deanna Reynolds MD   20 mg at 12/03/24 2043    [Held by provider] furosemide (LASIX) tablet 20 mg  20 mg Oral Daily before supper Deanna Reynolds MD        [Held by provider] furosemide (LASIX) tablet 40 mg  40 mg Oral QAM Deanna Reynolds MD        gabapentin (NEURONTIN) capsule 300 mg  300 mg Oral BID Deanna Reynolds,  "MD   300 mg at 12/03/24 2043    [Held by provider] lisinopril (ZESTRIL) tablet 20 mg  20 mg Oral QPM Deanna Reynolds MD        magnesium oxide (MAG-OX) tablet 400 mg  400 mg Oral Daily Deanna Reynolds MD        pantoprazole (PROTONIX) EC tablet 40 mg  40 mg Oral Daily Deanna Reynolds MD        polyethylene glycol (MIRALAX) Packet 8.5 g  8.5 g Oral QPM Deanna Reynolds MD        sodium chloride (PF) 0.9% PF flush 3 mL  3 mL Intracatheter Q8H Deanna Reynolds MD   3 mL at 12/04/24 0024    valACYclovir (VALTREX) tablet 1,000 mg  1,000 mg Oral Daily Deanna Reynolds MD           Data     CBC RESULTS:   Recent Labs   Lab Test 12/04/24  0751   WBC 8.5   RBC 4.32   HGB 13.0   HCT 39.2   MCV 91   MCH 30.1   MCHC 33.2   RDW 14.7        Basic Metabolic Panel:  Lab Results   Component Value Date     12/04/2024      Lab Results   Component Value Date    POTASSIUM 5.1 12/04/2024    POTASSIUM 4.9 05/12/2023     Lab Results   Component Value Date    CHLORIDE 102 12/04/2024    CHLORIDE 98 05/12/2023     Lab Results   Component Value Date    LIZZY 10.7 12/04/2024     Lab Results   Component Value Date    CO2 24 12/04/2024    CO2 25 05/12/2023     Lab Results   Component Value Date    BUN 48.8 12/04/2024    BUN 57 05/12/2023     Lab Results   Component Value Date    CR 2.17 12/04/2024     Lab Results   Component Value Date     12/04/2024     03/30/2024     05/12/2023     INR:  No results found for: \"INR\"      "

## 2024-12-04 NOTE — ED NOTES
[facility] ED Handoff Report    ED Chief Complaint: Fall    ED Diagnosis:  (I60.9) Subarachnoid hemorrhage (H)  Comment: stable  Plan: monitor and neuro to follow    (N17.9) ISAIAH (acute kidney injury) (H)  Comment: unspecified  Plan: has UTI    (R55) Syncope, unspecified syncope type  Comment: unsure as to why she had episode  Plan: Evaluation and assessement    (I10) Hypertension, unspecified type  Comment: chronic  Plan: assess, was treated with nicardipine initially for SBP above 150s. She is now OFF the drip with SBP in 120s regularly       PMH:    Past Medical History:   Diagnosis Date    Acute diastolic congestive heart failure (H)     Acute kidney injury superimposed on CKD (H) 08/18/2022    Acute on chronic congestive heart failure (H) 06/11/2018    Acute pulmonary edema (H) 05/06/2023    Acute respiratory failure with hypoxia (H) 05/08/2023    Age-related osteoporosis with current pathological fracture with routine healing     Chest pain, unspecified type 08/15/2022    Chronic bilateral back pain 06/15/2021    Last Assessment & Plan:   Formatting of this note might be different from the original.  She says she has seen a pain specialist and had back surgery.  She needs to establish care with a pain specialist since she is new to [place] from [place].  Referral placed.    Coronary artery disease     Coronary artery disease involving native coronary artery without angina pectoris, unspecified whether native or transplanted heart 08/17/2022    Diabetes mellitus, type II (H)     Diastolic dysfunction 07/10/2018    Frozen shoulder     bilateral    Heart failure with reduced ejection fraction (H) 05/25/2023    Hyperlipidemia     Hypertension     Hypertensive emergency     Parkinson disease (H)     Primary osteoarthritis involving multiple joints     Primary osteoarthritis of left knee     Pulmonary nodules 04/04/2024    Recurrent UTI     hx septic shock    Thyroid disease 2021        Code Status:  No CPR-  "Pre-arrest intubation OK     Falls Risk: Yes Band: Applied    Current Living Situation/Residence: lives in an assisted living facility     Elimination Status: Continent: Yes     Activity Level: SBA    Patients Preferred Language:  English     Needed: NO    Vital Signs:  /56   Pulse 76   Temp 97.8  F (36.6  C) (Oral)   Resp 24   Ht 1.651 m (5' 5\")   Wt 83 kg (183 lb)   LMP  (LMP Unknown)   SpO2 95%   BMI 30.45 kg/m       Cardiac Rhythm: SR, bundle branch block noted on EKG    Pain Score: 3/10    Is the Patient Confused:  No    Last Food or Drink: 12/03/24 at 1900     Focused Assessment:  Patient with large left parietal scalp hematoma. Presented with fall at facility in bathroom where she hit back of head on floor. Subarachnoid hemorrhage left parietal lobe, stable upon CT repeat. Patient was HTN and was placed on nicardipine initially with goal of 150 SBP or less. Goal met and drip stopped. Patient is regular rate and rhythm, +2 pulses, CMS intact, PERRLA and is AO4. VSS. Endorses headache. Denies N/V, SOB, CP. Neuro to follow. Patient is bedrest currently.      Tests Performed: Done: Labs and Imaging    Treatments Provided:  Nicardipine, Prozac, Abran, Carbidopa    Family Dynamics/Concerns: No    Family Updated On Visitor Policy: Yes    Plan of Care Communicated to Family: Yes    Who Was Updated about Plan of Care: Abdelrahman - son    Belongings Checklist Done and Signed by Patient: Yes    Medications sent with patient:     Covid: asymptomatic , NOT tested    Additional Information:     RN: Dante Bennett RN   12/3/2024 10:19 PM       "

## 2024-12-04 NOTE — CONSULTS
[facility] [address], [place] [address]   [email or web address]   OFFICE: [phone]     CARDIOLOGY INPATIENT CONSULT NOTE     Impression and Plan     Assessment/Plan:  Ms. Janes Montero is a 89 year old female with  CAD s/p CABG with LIMA-LAD 2010, borderline aortic stenosis, chronic HFmrEF, HTN, HLD, who presented to the hospital s/p fall/syncope and subarachnoid hemorrhage.      Syncope   - Most likely orthostatic syncope   - Suspect she is a bit over-diuresed and also chronically struggles with positional lightheadedness likely in part due to her mitral regurgitation.   - Episode of mobmicki I noted.  Likely related to episode of high vagal tone.  Unlikely that this contributed to the fall.  However, will recommend 14 day ziopatch on discharge to evaluate for other arrhythmia.   - Continue to monitor on telemetry while in the hospital    Chronic HFmrEF   - Appears euvolemic.     - Recommend decreasing PO lasix to 20mg BID on discharge    CAD   - Abnormal NM stress in 5/2023 but deemed low risk and opted for medical management given lack of anginal symptoms   - Continue medical therapy with statin, aspirin, lisinopril, and carvedilol.  Lisinopril and carvedilol currently being held     Mod-severe MR   - Likely degenerative/calcific mitral valve disease.   - Unclear that she would be a good candidate for mitraclip given significant MAC and calcific MV disease.  This could be investigated as an outpt with a DORIS in the future if indicated.    Will sign off    Primary Cardiologist: Dr. Ricardo    History of Present Illness      Ms. Janes Montero is a 89 year old female with  CAD s/p CABG with LIMA-LAD 2010, borderline aortic stenosis, chronic HFmrEF, HTN, HLD, who presented tot  hospital s/p fall/syncope and subarachnoid hemorrhage.  The patient notes she was sleeping, got up to turn up the thermostat, and then got dizzy and passed out.  She notes she  usually gets dizzy with position changes and typically changes position very slowly to compensate.  She thinks she did this as well before her fall.  She was found to have mild dehydration.  An episode of Mobitz I was found ton telemetry this morning.  The patient merlos snot remember anything specific going on at the time.        Review of Systems:  Further review of systems is otherwise negative/noncontributory (based on review of medical record (admission H&P) and 13 point review of systems reviewed. Pertinent positives noted).    Cardiac Diagnostics     ECG: Personally reviewed and interpreted: 12/3/2024  NSR, LBBB     Telemetry (personally reviewed): NSR. One episode of Mobitz I     Most recent:  Echocardiogram (results reviewed): 3/27/2024  Interpretation Summary     The visual ejection fraction is 55-60%.  The right ventricle is normal in size and function.  Biatrial enlargement.  There is mod-severe to severe (3-4+) mitral regurgitation.  There is mild to moderate mitral stenosis.  Mild valvular aortic stenosis.  There is moderate (2+) tricuspid regurgitation.  The right ventricular systolic pressure is approximated at 56 mmHg.  Compared to the prior study dated 5/9/2023, there are changes as noted. There  is now moderate-severe mitral regurgitation and moderate tricuspid  regurgitation with moderate pulmonary hypertension.     Cardiac Cath (results reviewed): 8/17/2022  1st Mrg lesion is 50% stenosed.  Prox LAD lesion is 100% stenosed.  Prox RCA to Mid RCA lesion is 25% stenosed.  Atretic nonfunctional JEOVANY graft of uncertain destination  Widely patent graft to mid LAD from left aota     Cardiac Stress Testing (results reviewed): 5/8/2023    A prior study was conducted on 8/16/2022.  Prior images were unavailable for comparison review.    Pharmacologic regadenoson stress ECG is nondiagnostic due to baseline ECG abnormality.    Pharmacological regadenoson nuclear stress test is abnormal.  There is partially  reversible change in inferior and basal to mid inferolateral walls indicating inducible myocardial ischemia.    Normal left ventricular size, wall motion and systolic function.  Calculated left ventricular ejection fraction is 65%.    The patient is at an intermediate risk of future cardiac ischemic events.              Medical History  Surgical History Family History Social History   Past Medical History:   Diagnosis Date    Acute diastolic congestive heart failure (H)     Acute kidney injury superimposed on CKD (H) 2022    Acute on chronic congestive heart failure (H) 2018    Acute pulmonary edema (H) 2023    Acute respiratory failure with hypoxia (H) 2023    Age-related osteoporosis with current pathological fracture with routine healing     Chest pain, unspecified type 08/15/2022    Chronic bilateral back pain 06/15/2021    Last Assessment & Plan:   Formatting of this note might be different from the original.  She says she has seen a pain specialist and had back surgery.  She needs to establish care with a pain specialist since she is new to [place] from [place].  Referral placed.    Coronary artery disease     Coronary artery disease involving native coronary artery without angina pectoris, unspecified whether native or transplanted heart 2022    Diabetes mellitus, type II (H)     Diastolic dysfunction 07/10/2018    Frozen shoulder     bilateral    Heart failure with reduced ejection fraction (H) 2023    Hyperlipidemia     Hypertension     Hypertensive emergency     Parkinson disease (H)     Primary osteoarthritis involving multiple joints     Primary osteoarthritis of left knee     Pulmonary nodules 2024    Recurrent UTI     hx septic shock    Thyroid disease      Past Surgical History:   Procedure Laterality Date    APPENDECTOMY      APPENDECTOMY      BACK SURGERY      L4-S1 laminectomy    BYPASS GRAFT ARTERY CORONARY      3 vessel     SECTION        SECTION      CORONARY ARTERY BYPASS      CV CORONARY ANGIOGRAM N/A 08/17/2022    Procedure: Coronary Angiogram;  Surgeon: Ignacio Baird MD;  Location: [facility] LAB CV    HERNIORRHAPHY VENTRAL Left     HYSTERECTOMY      HYSTERECTOMY      JOINT REPLACEMENT Left     Total left knee    OTHER SURGICAL HISTORY      right ankle surgery    OTHER SURGICAL HISTORY      right forearm fracture    REPLACEMENT TOTAL KNEE Right     SHOULDER SURGERY Right     reversed shoulder surgery.     Family History   Problem Relation Age of Onset    Heart Disease Mother     Heart Disease Father     Sleep Apnea Daughter            Social History     Socioeconomic History    Marital status:      Spouse name: Not on file    Number of children: Not on file    Years of education: Not on file    Highest education level: Not on file   Occupational History    Not on file   Tobacco Use    Smoking status: Never     Passive exposure: Never    Smokeless tobacco: Never   Vaping Use    Vaping status: Never Used   Substance and Sexual Activity    Alcohol use: No    Drug use: No    Sexual activity: Not Currently     Partners: Male     Birth control/protection: None   Other Topics Concern    Parent/sibling w/ CABG, MI or angioplasty before 65F 55M? No   Social History Narrative    6/15/2021 new to [place] from [place]. She has 6 kids.     Social Drivers of Health     Financial Resource Strain: Low Risk  (12/3/2024)    Financial Resource Strain     Within the past 12 months, have you or your family members you live with been unable to get utilities (heat, electricity) when it was really needed?: No   Food Insecurity: Low Risk  (12/3/2024)    Food Insecurity     Within the past 12 months, did you worry that your food would run out before you got money to buy more?: No     Within the past 12 months, did the food you bought just not last and you didn t have money to get more?: No   Transportation Needs: Low Risk  (12/3/2024)    Transportation Needs  "    Within the past 12 months, has lack of transportation kept you from medical appointments, getting your medicines, non-medical meetings or appointments, work, or from getting things that you need?: No   Physical Activity: Not on file   Stress: Not on file   Social Connections: Not on file   Interpersonal Safety: Low Risk  (12/3/2024)    Interpersonal Safety     Do you feel physically and emotionally safe where you currently live?: Yes     Within the past 12 months, have you been hit, slapped, kicked or otherwise physically hurt by someone?: No     Within the past 12 months, have you been humiliated or emotionally abused in other ways by your partner or ex-partner?: No   Housing Stability: Low Risk  (12/3/2024)    Housing Stability     Do you have housing? : Yes     Are you worried about losing your housing?: No             Physical Examination   VITALS: BP (!) 147/67 (BP Location: Right arm)   Pulse 66   Temp 97.8  F (36.6  C) (Oral)   Resp 20   Ht 1.615 m (5' 3.6\")   Wt 80.9 kg (178 lb 5.6 oz)   LMP  (LMP Unknown)   SpO2 94%   BMI 31.00 kg/m    BMI: Body mass index is 31 kg/m .  Wt Readings from Last 3 Encounters:   12/04/24 80.9 kg (178 lb 5.6 oz)   11/30/24 82.6 kg (182 lb)   11/08/24 83.7 kg (184 lb 9.6 oz)       Intake/Output Summary (Last 24 hours) at 12/4/2024 1221  Last data filed at 12/4/2024 0900  Gross per 24 hour   Intake 240 ml   Output --   Net 240 ml       General: pleasant female. No acute distress.   HENT: external ears normal. Nares patent. Mucous membranes moist.  Neck: No JVD  Lungs: clear to auscultation  COR:  regular rate and rhythm, No murmurs, rubs, or gallops  Abd: nondistended, BS present  Extrem: No edema            Lab Results Reviewed    Chemistry/lipid CBC Cardiac Enzymes/BNP/TSH/INR   Recent Labs   Lab Test 07/16/24  0919   CHOL 143   HDL 44*   LDL 62   TRIG 186*     Recent Labs   Lab Test 07/16/24  0919 06/19/23  1538 08/18/22  0506   LDL 62 31 58     Recent Labs   Lab " "Test 12/04/24  0751      POTASSIUM 5.1   CHLORIDE 102   CO2 24   *   BUN 48.8*   CR 2.17*   GFRESTIMATED 21*   LIZZY 10.7*     Recent Labs   Lab Test 12/04/24  0751 12/03/24  1027 06/27/24  1601   CR 2.17* 1.77* 1.17*     Recent Labs   Lab Test 11/08/24  1653 06/27/24  1601 03/26/24  1926   A1C 8.2* 7.1* 6.2*          Recent Labs   Lab Test 12/04/24  0751   WBC 8.5   HGB 13.0   HCT 39.2   MCV 91        Recent Labs   Lab Test 12/04/24  0751 12/03/24  1027 03/27/24  0826   HGB 13.0 13.0 10.7*    Recent Labs   Lab Test 05/07/23  0434 05/06/23  2323 05/06/23  1852   TROPONINI 0.04 0.03 0.03     Recent Labs   Lab Test 12/03/24  1223 03/26/24  1926 05/11/23  0437 05/06/23  1852 08/15/22  1920   BNP  --   --  82 628* 129   NTBNPI 3,107* 5,747*  --   --   --      Recent Labs   Lab Test 06/27/24  1601   TSH 3.29     No results for input(s): \"INR\" in the last 69321 hours.        Current Inpatient Scheduled Medications   Scheduled Meds:  Current Facility-Administered Medications   Medication Dose Route Frequency Provider Last Rate Last Admin    atorvastatin (LIPITOR) tablet 20 mg  20 mg Oral At Bedtime Deanna Reynolds MD   20 mg at 12/04/24 0023    carbidopa-levodopa (SINEMET)  MG per tablet 1 tablet  1 tablet Oral TID Deanna Reynolds MD   1 tablet at 12/04/24 1010    [Held by provider] carvedilol (COREG) tablet 12.5 mg  12.5 mg Oral BID w/meals Deanna Reynolds MD        cefdinir (OMNICEF) capsule 300 mg  300 mg Oral Daily Deanna Reynolds MD   300 mg at 12/04/24 1010    FLUoxetine (PROzac) capsule 20 mg  20 mg Oral BID Deanna Reynolds MD   20 mg at 12/04/24 1010    [Held by provider] furosemide (LASIX) tablet 20 mg  20 mg Oral Daily before supper Deanna Reynolds MD        [Held by provider] furosemide (LASIX) tablet 40 mg  40 mg Oral QAM Deanna Reynolds MD        gabapentin (NEURONTIN) capsule 300 mg  300 mg Oral BID Deanna Reynolds MD   300 mg at 12/04/24 1010    [Held " by provider] lisinopril (ZESTRIL) tablet 20 mg  20 mg Oral QPM Deanna Reynolds MD        magnesium oxide (MAG-OX) tablet 400 mg  400 mg Oral Daily Deanna Reynolds MD   400 mg at 12/04/24 1010    pantoprazole (PROTONIX) EC tablet 40 mg  40 mg Oral Daily Deanna Reynolds MD   40 mg at 12/04/24 1010    polyethylene glycol (MIRALAX) Packet 8.5 g  8.5 g Oral QPM Deanna Reynolds MD        sodium chloride (PF) 0.9% PF flush 3 mL  3 mL Intracatheter Q8H Deanna Reynolds MD   3 mL at 12/04/24 1013    valACYclovir (VALTREX) tablet 1,000 mg  1,000 mg Oral Daily Deanna Reynolds MD   1,000 mg at 12/04/24 1009     Continuous Infusions:  Current Facility-Administered Medications   Medication Dose Route Frequency Provider Last Rate Last Admin    [Held by provider] niCARdipine 40 mg in 200 mL NS (CARDENE) infusion  0.5-15 mg/hr Intravenous Continuous Deanna Reynolds MD   Paused at 12/03/24 1732       No current outpatient medications on file.          Medications Prior to Admission   Prior to Admission medications    Medication Sig Start Date End Date Taking? Authorizing Provider   aspirin 81 mg chewable tablet [ASPIRIN 81 MG CHEWABLE TABLET] Chew 81 mg at bedtime. 6/11/18  Yes Provider, Historical   atorvastatin (LIPITOR) 20 MG tablet TAKE 1 TABLET BY MOUTH AT  BEDTIME 10/15/24  Yes Mathew Ricardo MD   carbidopa-levodopa (SINEMET)  MG tablet TAKE 1 TABLET BY MOUTH 3  TIMES DAILY TAKE AT LEAST  1/2 HOUR BEFORE MEALS OR 2  HOURS AFTER MEALS DO NOT  MIX WITH FOOD 9/6/24  Yes Brennon Moya MD   carvedilol (COREG) 12.5 MG tablet Take 1 tablet (12.5 mg) by mouth 2 times daily (with meals). 11/18/24  Yes Mathew Ricardo MD   cyanocobalamin (VITAMIN B-12) 1000 MCG tablet Take 1 tablet (1,000 mcg) by mouth every evening. 9/6/24  Yes Brennon Moya MD   FLUoxetine (PROZAC) 20 MG capsule Take 1 capsule (20 mg) by mouth 2 times daily 7/23/24  Yes Maggie Arriaga MD   furosemide (LASIX)  20 MG tablet Take with one 40 mg tablet daily  Patient taking differently: Take 20 mg by mouth daily (before supper). Around 1500 4/30/24  Yes Shasha Alcaraz APRN CNP   furosemide (LASIX) 40 MG tablet Take with one 20 mg tablet daily  Patient taking differently: Take 40 mg by mouth every morning. 11/22/24  Yes Otilia Meneses PA-C   gabapentin (NEURONTIN) 300 MG capsule TAKE 1 CAPSULE BY MOUTH 4  TIMES DAILY 9/6/24  Yes Brennon Moya MD   lisinopril (ZESTRIL) 20 MG tablet Take 1 tablet (20 mg) by mouth every evening. 11/22/24  Yes Otilia Meneses PA-C   loratadine (CLARITIN) 10 mg tablet Take 10 mg by mouth daily as needed for allergies 6/11/18  Yes Provider, Historical   Magnesium 400 MG TABS Take 1 tablet by mouth daily. 4/4/24  Yes Maggie Arriaga MD   melatonin 1 mg Tab tablet Take 1 mg by mouth At Bedtime 6/11/18  Yes Provider, Historical   multivitamin therapeutic tablet Take 1 tablet by mouth every morning 6/11/18  Yes Provider, Historical   nitroGLYcerin (NITROSTAT) 0.4 MG sublingual tablet Place 1 tablet (0.4 mg) under the tongue every 5 minutes as needed for chest pain 5/24/23  Yes Maggie Arriaga MD   nystatin (MYCOSTATIN) 872603 UNIT/GM external cream APPLY TO AFFECTED AREA(S)  TOPICALLY TWICE DAILY  Patient taking differently: 2 times daily as needed for other. 8/17/24  Yes Maggie Arriaga MD   omeprazole (PRILOSEC) 20 MG DR capsule TAKE 1 CAPSULE BY MOUTH TWICE  DAILY  Patient taking differently: Take 20 mg by mouth daily. 10/15/24  Yes Maggie Arriaga MD   polyethylene glycol (MIRALAX) 17 gram packet Take 8.5 g by mouth every evening. 6/15/21  Yes Provider, Historical   sulfamethoxazole-trimethoprim (BACTRIM DS) 800-160 MG tablet Take 1 tablet by mouth 2 times daily for 5 days. 11/30/24 12/5/24 Yes Matthieu Christianson MD   valACYclovir (VALTREX) 1000 mg tablet Take 1 tablet (1,000 mg) by mouth 3 times daily for 7 days. 11/30/24 12/7/24 Yes Matthieu Christianson, MD  "  empagliflozin (JARDIANCE) 10 MG TABS tablet Take 1 tablet (10 mg) by mouth daily.  Patient not taking: Reported on 11/26/2024 11/8/24   Maggie Arriaga MD Timothy Shih [facility]  Non-invasive Cardiologist  [facility]      Clinically Significant Risk Factors Present on Admission         # Hyponatremia: Lowest Na = 133 mmol/L in last 2 days, will monitor as appropriate    # Hypercalcemia: Highest Ca = 10.7 mg/dL in last 2 days, will monitor as appropriate      # Drug Induced Platelet Defect: home medication list includes an antiplatelet medication   # Hypertension: Noted on problem list          # DMII: A1C = 8.2 % (Ref range: 0.0 - 5.6 %) within past 6 months    # Obesity: Estimated body mass index is 31 kg/m  as calculated from the following:    Height as of this encounter: 1.615 m (5' 3.6\").    Weight as of this encounter: 80.9 kg (178 lb 5.6 oz).        # History of CABG: noted on surgical history          "

## 2024-12-04 NOTE — PLAN OF CARE
"  Problem: Gas Exchange Impaired  Goal: Optimal Gas Exchange  Outcome: Progressing     Problem: Adult Inpatient Plan of Care  Goal: Optimal Comfort and Wellbeing  Outcome: Progressing  Intervention: Monitor Pain and Promote Comfort  Recent Flowsheet Documentation  Taken 12/4/2024 0023 by Rose Marie Mccartney RN  Pain Management Interventions: medication (see MAR)   Goal Outcome Evaluation:    Patient arrived on unit at midnight from ED. A&O x4. RA. VSS. Reported minor headache. PRN tylenol given for pain. PRN melatonin given to aide with sleep per patient request. Patient does have a hematoma about the size of an egg on the back of her head. Patient denies vision changes, nausea, vomiting, dizziness or neck pain. Tele = NSR w/ BBB. Patient able to change positions independently. Bed alarm remains on, No new changes noted during this time, care plan is ongoing.     /57 (BP Location: Right arm, Patient Position: Semi-Chang's, Cuff Size: Adult Regular)   Pulse 73   Temp 97.9  F (36.6  C) (Oral)   Resp 20   Ht 1.615 m (5' 3.6\")   Wt 83.3 kg (183 lb 10.3 oz)   LMP  (LMP Unknown)   SpO2 95%   BMI 31.92 kg/m      "

## 2024-12-04 NOTE — PROGRESS NOTES
Paged by nurse regarding stability of BP. Discontinue Nicardipine GTT and change admit status to neurology,

## 2024-12-05 ENCOUNTER — APPOINTMENT (OUTPATIENT)
Dept: PHYSICAL THERAPY | Facility: HOSPITAL | Age: 89
DRG: 086 | End: 2024-12-05
Attending: STUDENT IN AN ORGANIZED HEALTH CARE EDUCATION/TRAINING PROGRAM
Payer: MEDICARE

## 2024-12-05 ENCOUNTER — APPOINTMENT (OUTPATIENT)
Dept: OCCUPATIONAL THERAPY | Facility: HOSPITAL | Age: 89
DRG: 086 | End: 2024-12-05
Attending: STUDENT IN AN ORGANIZED HEALTH CARE EDUCATION/TRAINING PROGRAM
Payer: MEDICARE

## 2024-12-05 VITALS
OXYGEN SATURATION: 93 % | HEIGHT: 64 IN | RESPIRATION RATE: 18 BRPM | SYSTOLIC BLOOD PRESSURE: 147 MMHG | TEMPERATURE: 98 F | WEIGHT: 181.6 LBS | BODY MASS INDEX: 31 KG/M2 | HEART RATE: 73 BPM | DIASTOLIC BLOOD PRESSURE: 65 MMHG

## 2024-12-05 LAB
ANION GAP SERPL CALCULATED.3IONS-SCNC: 11 MMOL/L (ref 7–15)
BUN SERPL-MCNC: 50.1 MG/DL (ref 8–23)
CALCIUM SERPL-MCNC: 10.3 MG/DL (ref 8.8–10.4)
CHLORIDE SERPL-SCNC: 102 MMOL/L (ref 98–107)
CREAT SERPL-MCNC: 1.84 MG/DL (ref 0.51–0.95)
EGFRCR SERPLBLD CKD-EPI 2021: 25 ML/MIN/1.73M2
GLUCOSE SERPL-MCNC: 161 MG/DL (ref 70–99)
HCO3 SERPL-SCNC: 22 MMOL/L (ref 22–29)
POTASSIUM SERPL-SCNC: 4.9 MMOL/L (ref 3.4–5.3)
SODIUM SERPL-SCNC: 135 MMOL/L (ref 135–145)

## 2024-12-05 PROCEDURE — 97116 GAIT TRAINING THERAPY: CPT | Mod: GP

## 2024-12-05 PROCEDURE — 120N000001 HC R&B MED SURG/OB

## 2024-12-05 PROCEDURE — 80048 BASIC METABOLIC PNL TOTAL CA: CPT | Performed by: STUDENT IN AN ORGANIZED HEALTH CARE EDUCATION/TRAINING PROGRAM

## 2024-12-05 PROCEDURE — 250N000013 HC RX MED GY IP 250 OP 250 PS 637: Performed by: STUDENT IN AN ORGANIZED HEALTH CARE EDUCATION/TRAINING PROGRAM

## 2024-12-05 PROCEDURE — 250N000013 HC RX MED GY IP 250 OP 250 PS 637

## 2024-12-05 PROCEDURE — 97535 SELF CARE MNGMENT TRAINING: CPT | Mod: GO

## 2024-12-05 PROCEDURE — 250N000011 HC RX IP 250 OP 636: Mod: JZ | Performed by: STUDENT IN AN ORGANIZED HEALTH CARE EDUCATION/TRAINING PROGRAM

## 2024-12-05 PROCEDURE — 97165 OT EVAL LOW COMPLEX 30 MIN: CPT | Mod: GO

## 2024-12-05 PROCEDURE — P9047 ALBUMIN (HUMAN), 25%, 50ML: HCPCS | Mod: JZ | Performed by: STUDENT IN AN ORGANIZED HEALTH CARE EDUCATION/TRAINING PROGRAM

## 2024-12-05 PROCEDURE — 97530 THERAPEUTIC ACTIVITIES: CPT | Mod: GO

## 2024-12-05 PROCEDURE — 36415 COLL VENOUS BLD VENIPUNCTURE: CPT | Performed by: STUDENT IN AN ORGANIZED HEALTH CARE EDUCATION/TRAINING PROGRAM

## 2024-12-05 PROCEDURE — 97110 THERAPEUTIC EXERCISES: CPT | Mod: GP

## 2024-12-05 PROCEDURE — 99232 SBSQ HOSP IP/OBS MODERATE 35: CPT | Performed by: STUDENT IN AN ORGANIZED HEALTH CARE EDUCATION/TRAINING PROGRAM

## 2024-12-05 RX ORDER — ALBUMIN (HUMAN) 12.5 G/50ML
25 SOLUTION INTRAVENOUS ONCE
Status: COMPLETED | OUTPATIENT
Start: 2024-12-05 | End: 2024-12-05

## 2024-12-05 RX ADMIN — CARVEDILOL 12.5 MG: 12.5 TABLET, FILM COATED ORAL at 09:45

## 2024-12-05 RX ADMIN — MICONAZOLE NITRATE ANTIFUNGAL POWDER: 2 POWDER TOPICAL at 14:22

## 2024-12-05 RX ADMIN — ALBUMIN HUMAN 25 G: 0.25 SOLUTION INTRAVENOUS at 16:48

## 2024-12-05 RX ADMIN — Medication 1 MG: at 23:06

## 2024-12-05 RX ADMIN — TETRAHYDROZOLINE HCL 1 DROP: 0.05 LIQUID OPHTHALMIC at 22:55

## 2024-12-05 RX ADMIN — ACETAMINOPHEN 650 MG: 325 TABLET ORAL at 04:19

## 2024-12-05 RX ADMIN — CARBIDOPA AND LEVODOPA 1 TABLET: 25; 100 TABLET ORAL at 19:54

## 2024-12-05 RX ADMIN — CARBIDOPA AND LEVODOPA 1 TABLET: 25; 100 TABLET ORAL at 14:21

## 2024-12-05 RX ADMIN — VALACYCLOVIR HYDROCHLORIDE 1000 MG: 500 TABLET, FILM COATED ORAL at 09:45

## 2024-12-05 RX ADMIN — CARVEDILOL 12.5 MG: 12.5 TABLET, FILM COATED ORAL at 18:17

## 2024-12-05 RX ADMIN — GABAPENTIN 300 MG: 300 CAPSULE ORAL at 09:45

## 2024-12-05 RX ADMIN — ATORVASTATIN CALCIUM 20 MG: 10 TABLET, FILM COATED ORAL at 19:54

## 2024-12-05 RX ADMIN — PANTOPRAZOLE SODIUM 40 MG: 40 TABLET, DELAYED RELEASE ORAL at 09:45

## 2024-12-05 RX ADMIN — TETRAHYDROZOLINE HCL 1 DROP: 0.05 LIQUID OPHTHALMIC at 10:29

## 2024-12-05 RX ADMIN — FLUOXETINE HYDROCHLORIDE 20 MG: 20 CAPSULE ORAL at 09:46

## 2024-12-05 RX ADMIN — CARBIDOPA AND LEVODOPA 1 TABLET: 25; 100 TABLET ORAL at 09:53

## 2024-12-05 RX ADMIN — FLUOXETINE HYDROCHLORIDE 20 MG: 20 CAPSULE ORAL at 18:17

## 2024-12-05 RX ADMIN — GABAPENTIN 300 MG: 300 CAPSULE ORAL at 19:54

## 2024-12-05 RX ADMIN — ACETAMINOPHEN 650 MG: 325 TABLET ORAL at 16:48

## 2024-12-05 RX ADMIN — ACETAMINOPHEN 650 MG: 325 TABLET ORAL at 22:50

## 2024-12-05 RX ADMIN — ACETAMINOPHEN 650 MG: 325 TABLET ORAL at 09:53

## 2024-12-05 RX ADMIN — CEFDINIR 300 MG: 300 CAPSULE ORAL at 09:44

## 2024-12-05 RX ADMIN — MICONAZOLE NITRATE ANTIFUNGAL POWDER: 2 POWDER TOPICAL at 19:54

## 2024-12-05 ASSESSMENT — ACTIVITIES OF DAILY LIVING (ADL)
ADLS_ACUITY_SCORE: 56
ADLS_ACUITY_SCORE: 56
PREVIOUS_RESPONSIBILITIES: MEDICATION MANAGEMENT
ADLS_ACUITY_SCORE: 56
ADLS_ACUITY_SCORE: 50
ADLS_ACUITY_SCORE: 56

## 2024-12-05 NOTE — PLAN OF CARE
"  Problem: Adult Inpatient Plan of Care  Goal: Absence of Hospital-Acquired Illness or Injury  Outcome: Progressing  Intervention: Identify and Manage Fall Risk  Recent Flowsheet Documentation  Taken 12/5/2024 0000 by Rose Marie Mccartney RN  Safety Promotion/Fall Prevention: safety round/check completed  Intervention: Prevent and Manage VTE (Venous Thromboembolism) Risk  Recent Flowsheet Documentation  Taken 12/5/2024 0000 by Rose Marie Mccartney RN  VTE Prevention/Management: SCDs on (sequential compression devices)  Intervention: Prevent Infection  Recent Flowsheet Documentation  Taken 12/5/2024 0000 by Rose Marie Mccartney RN  Infection Prevention:   hand hygiene promoted   rest/sleep promoted   Goal Outcome Evaluation:    A&O x4. RA. VSS. Afebrile. Reports occasional headaches to which PRN tylenol helps manage the pain well. Denies vision changes, nausea, vomiting, dizziness and/or neck pain. Neuro remains intact. Tele = NSR w/ BBB. Adequate PO intakes. Continent of both bowel and urine. Patient has had 3 loose stools overnight. Call light appropriate and able to make needs known. No new changes noted during this time, care plan is ongoing.     /61 (BP Location: Right arm)   Pulse 66   Temp 97.7  F (36.5  C) (Oral)   Resp 18   Ht 1.615 m (5' 3.6\")   Wt 82.4 kg (181 lb 9.6 oz)   LMP  (LMP Unknown)   SpO2 93%   BMI 31.56 kg/m          "

## 2024-12-05 NOTE — PROGRESS NOTES
12/05/24 0800   Appointment Info   Signing Clinician's Name / Credentials (OT) Kike Barrientos,OTR/L   Living Environment   People in Home alone   Current Living Arrangements independent living facility   Home Accessibility no concerns   Transportation Anticipated family or friend will provide   Living Environment Comments Patient lives an in independent living apartment. She sleeps in a regular flat bed w/ rails. Bathroom setup includes: walk-in shower with grab bars, high-rise toilet w/ grab bars. She owns a cane, FWW, 4WW, and a w/c. She uses 4WW at baseline.   Self-Care   Usual Activity Tolerance good   Current Activity Tolerance moderate   Equipment Currently Used at Home walker, rolling   Fall history within last six months yes   Number of times patient has fallen within last six months 1  (Patient reports a small handful of falls within the last year)   Activity/Exercise/Self-Care Comment Patient independent with ADL's at baseline   Instrumental Activities of Daily Living (IADL)   Previous Responsibilities medication management   IADL Comments Facility staff provide assistance with meals, laundry, and cleaning. Patient's daughter assists with grocery shopping/delivery.   General Information   Onset of Illness/Injury or Date of Surgery 12/03/24   Referring Physician Deanna Reynolds MD   Patient/Family Therapy Goal Statement (OT) To return home   Additional Occupational Profile Info/Pertinent History of Current Problem Janes Montero is a 89 year old female admitted on 12/3/2024. She presented from [facility] following a fall   Existing Precautions/Restrictions fall   Limitations/Impairments hearing  (Uses haering aids)   Cognitive Status Examination   Orientation Status orientation to person, place and time   Cognitive Status Comments No overt concerns, will continue to monitor and assess as appropriate/indicated   Visual Perception   Visual Impairment/Limitations corrective lenses full-time   Sensory   Sensory  Quick Adds sensation intact   Range of Motion Comprehensive   General Range of Motion bilateral upper extremity ROM WFL   Strength Comprehensive (MMT)   Comment, General Manual Muscle Testing (MMT) Assessment Generalized weakness   Coordination   Upper Extremity Coordination No deficits were identified   Bed Mobility   Bed Mobility supine-sit;rolling left;rolling right   Rolling Left [place] (Bed Mobility) supervision   Rolling Right [place] (Bed Mobility) supervision   Supine-Sit [place] (Bed Mobility) supervision   Comment (Bed Mobility) Supervision for safety; however, patient performs bed mobility laregely under her own power.   Transfers   Transfers sit-stand transfer   Sit-Stand Transfer   Sit-Stand [place] (Transfers) supervision   Assistive Device (Sit-Stand Transfers) walker, standard   Sit/Stand Transfer Comments Supervision for safety; however, patient is able to perform transfers under her own power while demonstrating controlled ascent/descent.   Balance   Balance Assessment sitting dynamic balance   Sitting Balance: Dynamic supervision   Position, Sitting Balance unsupported;sitting edge of bed   Balance Comments Supervision provided for safety; however, patient demonstrates adequate dynamic sitting balance to particiapte in self-cares.   Activities of Daily Living   BADL Assessment/Intervention lower body dressing   Lower Body Dressing Assessment/Training   Position (Lower Body Dressing) edge of bed sitting;unsupported sitting   [place] Level (Lower Body Dressing) don;socks;modified independence   Clinical Impression   Criteria for Skilled Therapeutic Interventions Met (OT) Yes, treatment indicated   OT Diagnosis Decreased independence with transfers/mobility and home management   Influenced by the following impairments Decreased balance, decreased activity tolernace, generalized weakness   OT Problem List-Impairments impacting ADL activity tolerance impaired;balance;strength    Assessment of Occupational Performance 1-3 Performance Deficits   Planned Therapy Interventions (OT) ADL retraining;strengthening;transfer training;home program guidelines   Clinical Decision Making Complexity (OT) problem focused assessment/low complexity   Risk & Benefits of therapy have been explained evaluation/treatment results reviewed;care plan/treatment goals reviewed;risks/benefits reviewed;participants voiced agreement with care plan;participants included;patient   Clinical Impression Comments Patient would benefit from continued skilled OT services to progress towards baseline, to decrease caregiver burden, and to optimize safe return to meaningful daily activity.   OT Total Evaluation Time   OT Eval, Low Complexity Minutes (71974) 10   OT Goals   Therapy Frequency (OT) 2 times/week   OT Predicted Duration/Target Date for Goal Attainment 12/09/24   OT Goals Hygiene/Grooming;Lower Body Dressing;Toilet Transfer/Toileting;Aerobic Activity   OT: Hygiene/Grooming Goal Met;modified independent   OT: Lower Body Dressing Modified independent;including set-up/clothing retrieval   OT: Toilet Transfer/Toileting Goal Met;Modified independent   OT: Perform aerobic activity with stable cardiovascular response continuous activity;10 minutes   Interventions   Interventions Quick Adds Self-Care/Home Management;Therapeutic Activity   Self-Care/Home Management   Self-Care/Home Mgmt/ADL, Compensatory, Meal Prep Minutes (48064) 11   Symptoms Noted During/After Treatment (Meal Preparation/Planning Training) none   Treatment Detail/Skilled Intervention Patient supine when approached, agreeable to OT eval and treat. Eval completed and treat initiated. Patient performed bed mobility (rolling left, supine<>sitting) largely under her own power, with supervision provided for safety. From EOB, patient donned socks with use of figure-4 method. Facilitated toileting process (transfers to/from, clothing management, and hygiene) by  providing supervision; however, patient is able to perform process largely under her own power. Patient performs hand hygiene standing sinkside with MOD I with good thoroughness noted. Patient participated in standing dynamic therapeutic activity, see below for details. Session ended with patient sitting at EOB with call light within reach with nursing staff present.   Therapeutic Activities   Therapeutic Activity Minutes (22044) 9   Symptoms noted during/after treatment none   Treatment Detail/Skilled Intervention Facilitated ambulation of household distances as to progress patient's safety with mobilty related ADL's as well as to progress patient's ability to participate in IADL/meal management. Patient ambulated roughly the distance it would be from her apartment to the dining room at her home facility. FWW utilized throughout. No rest breaks required, no losses of balance noted, and no shortness of breath observed. Supervision provided for increased safety.   OT Discharge Planning   OT Plan Next session: progress standing tolernace, progress mobility as able, dynamic standing activity (reaching above/below waistline), shower transfers, homegoing safety/equipment recs (as needed)   OT Discharge Recommendation (DC Rec) home with home care occupational therapy   OT Rationale for DC Rec Patient appears to be at/nearing her baseline for self-cares/ADL's and transfers/mobility.   OT Brief overview of current status Supervision/SBA for self-cares/ADL's and transfers/mobility.   Total Session Time   Timed Code Treatment Minutes 20   Total Session Time (sum of timed and untimed services) 30

## 2024-12-05 NOTE — PROGRESS NOTES
SPIRITUAL HEALTH SERVICES Note     Saw pt Janes Montero and administered Taoism sacrament of anointing for the healing of the sick.    Fr. Kt Jesus

## 2024-12-05 NOTE — PLAN OF CARE
Goal Outcome Evaluation:    Patient is AAO. Neuros intact. Reports having a lingering headache that is controlled with PRN Tylenol. Was cleared from bed rest this afternoon. On oral ABX and has been afebrile. Echo completed today.

## 2024-12-05 NOTE — PLAN OF CARE
Problem: Pain Acute  Goal: Optimal Pain Control and Function  Outcome: Progressing  Intervention: Develop Pain Management Plan  Recent Flowsheet Documentation  Taken 12/5/2024 0945 by Zollinger, Nancy K, RN  Pain Management Interventions: medication (see MAR)   Goal Outcome Evaluation:  Patient taking tylenol for headache.  NSR, BBB, inverted T-wave on tele.  Ambulated in hallway with walker and SBA.  Denies lightheaded or dizziness.  New orders for miconazole powder for rash to abdominal folds and under breasts.         Orthostatic BP:  Lying Orthostatic BP: 159/70  Sitting Orthostatic BP: 145/67  Standing Orthostatic BP: 146/67

## 2024-12-05 NOTE — PLAN OF CARE
Physical Therapy Discharge Summary    Reason for therapy discharge:    All goals and outcomes met, no further needs identified.    Progress towards therapy goal(s). See goals on Care Plan in Epic electronic health record for goal details.  Goals met    Therapy recommendation(s):    Continue ambulation with nursing staff while remains in hospital.

## 2024-12-05 NOTE — PROGRESS NOTES
[facility]    Medicine Progress Note - Hospitalist Service    Date of Admission:  12/3/2024    Assessment & Plan      Janes Montero is a 89 year old female admitted on 12/3/2024. She presented from [facility] following a fall     S/p Fall  Syncope  Acute Subarachnoid hemorrhage  -- Pt denied palpitations, seizure activity pre/during/post fall.  -- Ddx: postural hypotension, arrythmia  -- Cardiac tele. TTE pending  -- CT-head: Small volume acute subarachnoid hemorrhage in a left parietal lobe sulcus. Large left parietal scalp hematoma without underlying calvarial fracture  -- CT- C-spine: No fracture  -- NSGY recommendations appreciated: -No plans for surgical intervention. Continue to hold anticoagulation including aspirin. NSGY will follow up in 1 month with repeat head CT prior, our office will contact her to schedule this appointment.  -- Fall precautions  -- S/p Cardizem gtt for BP control  -- CK 93  -- Cardiology consult appreciated:  - Most likely orthostatic syncope. Suspect she is a bit over-diuresed and also chronically struggles with positional lightheadedness likely in part due to her mitral regurgitation. Episode of mobmicki I noted.  Likely related to episode of high vagal tone.  Unlikely that this contributed to the fall.  However, will recommend 14 day ziopatch on discharge to evaluate for other arrhythmia. Continue to monitor on telemetry while in the hospital    Hyponatremia, mild- chronic. 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  -- Strict I/O  -- 12/04: Cr up to 2.17. Will give albumin gtt once and continue to hold lasix. Encouraged po intake. Will do US-renal  -- 12/05: Cr trending down to 1.84. Continue to hold lasix. Albumin gtt  "once.    Elevated troponin  -- Troponin flat. No CP  -- Likely 2/2 demand ischemia    Hypercalcemia  -- Chronic. 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 Vlatrex started in Urgent care. Will continue with dose renally adjusted.    Chronic HFpEF  -- CXR: Cardiomegaly with central vascular congestion and mild interstitial edema   -- Probnp elevated but less than from prior  -- clinically looks dry  -- C/w PTA Coreg. Holding Lasix due to ISAIAH  -- 12/04: Albumin gtt once.   -- Cardiology consult appreciated: Recommend decreasing PO lasix to 20mg BID on discharge     Essential Hypertension  -- Monitor vital signs per protocol  -- C/w PTA Carvedilol. Continue to hold Lisinopril due to ISAIAH.    CAD s/p CABG  -- C/w PTA Lipitor. Hold ASA given SAH    PD: C/w PTA Sinemet  GERD: C/w PTA PPI  Mood disorder: C/w PTA Prozac  Pre-DM: outpt follow up            Diet: Combination Diet 2 gm NA Diet    DVT Prophylaxis: Pneumatic Compression Devices  Holguin Catheter: Not present  Lines: None     Cardiac Monitoring: ACTIVE order. Indication: Syncope- low cardiac risk (24 hours)  Code Status: No CPR- Pre-arrest intubation OK      Clinically Significant Risk Factors            # Hypercalcemia: Highest Ca = 10.7 mg/dL in last 2 days, will monitor as appropriate        # Hypertension: Noted on problem list           # DMII: A1C = 8.2 % (Ref range: 0.0 - 5.6 %) within past 6 months, PRESENT ON ADMISSION  # Obesity: Estimated body mass index is 31.56 kg/m  as calculated from the following:    Height as of this encounter: 1.615 m (5' 3.6\").    Weight as of this encounter: 82.4 kg (181 lb 9.6 oz)., PRESENT ON ADMISSION      # History of CABG: noted on surgical history       Social Drivers of Health            Disposition Plan     Medically Ready for Discharge: Anticipated " Tomorrow             Deanna Reynolds MD  Hospitalist Service  [facility]  Securely message with [facility] (more info)  Text page via [employer] Paging/Directory   ______________________________________________________________________    Interval History   Patient is seen and examined at bedside.   No complaints. Son present during encounter.  Plan of care discussed with patient. All questions answered. Pt verbalized understanding.     Physical Exam   Vital Signs: Temp: 97.7  F (36.5  C) Temp src: Oral BP: 124/61 Pulse: 68   Resp: 18 SpO2: 94 % O2 Device: None (Room air)    Weight: 181 lbs 9.6 oz    GEN: Alert and oriented. Not in acute distress.  HEENT: Atraumatic, mucous membrane- dry.  Chest: Bilateral air entry.  CVS: S1S2 regular.   Abdomen: Soft. Non-tender, non-distended. No organomegaly. No guarding or rigidity. Bowel sounds active.   Extremities: No pedal edema.  CNS: No involuntary movements.  Skin: no cyanosis or clubbing.     Medical Decision Making       48 MINUTES SPENT BY ME on the date of service doing chart review, history, exam, documentation & further activities per the note.      Data

## 2024-12-06 ENCOUNTER — APPOINTMENT (OUTPATIENT)
Dept: CARDIOLOGY | Facility: HOSPITAL | Age: 89
DRG: 086 | End: 2024-12-06
Attending: STUDENT IN AN ORGANIZED HEALTH CARE EDUCATION/TRAINING PROGRAM
Payer: MEDICARE

## 2024-12-06 ENCOUNTER — ORDERS ONLY (AUTO-RELEASED) (OUTPATIENT)
Dept: MEDSURG UNIT | Facility: HOSPITAL | Age: 89
End: 2024-12-06
Payer: MEDICARE

## 2024-12-06 VITALS
DIASTOLIC BLOOD PRESSURE: 65 MMHG | WEIGHT: 181.4 LBS | OXYGEN SATURATION: 97 % | TEMPERATURE: 98.7 F | RESPIRATION RATE: 18 BRPM | HEIGHT: 64 IN | HEART RATE: 71 BPM | SYSTOLIC BLOOD PRESSURE: 167 MMHG | BODY MASS INDEX: 30.97 KG/M2

## 2024-12-06 DIAGNOSIS — R55 SYNCOPE, UNSPECIFIED SYNCOPE TYPE: ICD-10-CM

## 2024-12-06 LAB
ANION GAP SERPL CALCULATED.3IONS-SCNC: 11 MMOL/L (ref 7–15)
BUN SERPL-MCNC: 45.7 MG/DL (ref 8–23)
CALCIUM SERPL-MCNC: 10.9 MG/DL (ref 8.8–10.4)
CHLORIDE SERPL-SCNC: 102 MMOL/L (ref 98–107)
CREAT SERPL-MCNC: 1.44 MG/DL (ref 0.51–0.95)
EGFRCR SERPLBLD CKD-EPI 2021: 34 ML/MIN/1.73M2
GLUCOSE SERPL-MCNC: 182 MG/DL (ref 70–99)
HCO3 SERPL-SCNC: 21 MMOL/L (ref 22–29)
HOLD SPECIMEN: NORMAL
POTASSIUM SERPL-SCNC: 5.1 MMOL/L (ref 3.4–5.3)
SODIUM SERPL-SCNC: 134 MMOL/L (ref 135–145)

## 2024-12-06 PROCEDURE — 250N000013 HC RX MED GY IP 250 OP 250 PS 637: Performed by: STUDENT IN AN ORGANIZED HEALTH CARE EDUCATION/TRAINING PROGRAM

## 2024-12-06 PROCEDURE — 99239 HOSP IP/OBS DSCHRG MGMT >30: CPT | Performed by: STUDENT IN AN ORGANIZED HEALTH CARE EDUCATION/TRAINING PROGRAM

## 2024-12-06 PROCEDURE — 250N000013 HC RX MED GY IP 250 OP 250 PS 637

## 2024-12-06 PROCEDURE — 999N000096 CARDIAC MOBILE TELEMETRY MONITOR

## 2024-12-06 PROCEDURE — 82565 ASSAY OF CREATININE: CPT | Performed by: STUDENT IN AN ORGANIZED HEALTH CARE EDUCATION/TRAINING PROGRAM

## 2024-12-06 PROCEDURE — 36415 COLL VENOUS BLD VENIPUNCTURE: CPT | Performed by: STUDENT IN AN ORGANIZED HEALTH CARE EDUCATION/TRAINING PROGRAM

## 2024-12-06 RX ORDER — CEFDINIR 300 MG/1
300 CAPSULE ORAL DAILY
Qty: 1 CAPSULE | Refills: 0 | Status: SHIPPED | OUTPATIENT
Start: 2024-12-07 | End: 2024-12-08

## 2024-12-06 RX ORDER — FUROSEMIDE 20 MG/1
20 TABLET ORAL 2 TIMES DAILY
Status: SHIPPED
Start: 2024-12-06

## 2024-12-06 RX ORDER — GABAPENTIN 300 MG/1
CAPSULE ORAL
Status: SHIPPED
Start: 2024-12-06

## 2024-12-06 RX ORDER — VALACYCLOVIR HYDROCHLORIDE 1 G/1
1000 TABLET, FILM COATED ORAL DAILY
Status: SHIPPED
Start: 2024-12-07 | End: 2024-12-08

## 2024-12-06 RX ADMIN — PANTOPRAZOLE SODIUM 40 MG: 40 TABLET, DELAYED RELEASE ORAL at 09:03

## 2024-12-06 RX ADMIN — CARBIDOPA AND LEVODOPA 1 TABLET: 25; 100 TABLET ORAL at 09:06

## 2024-12-06 RX ADMIN — CEFDINIR 300 MG: 300 CAPSULE ORAL at 09:05

## 2024-12-06 RX ADMIN — ACETAMINOPHEN 650 MG: 325 TABLET ORAL at 04:41

## 2024-12-06 RX ADMIN — VALACYCLOVIR HYDROCHLORIDE 1000 MG: 500 TABLET, FILM COATED ORAL at 09:03

## 2024-12-06 RX ADMIN — ACETAMINOPHEN 650 MG: 325 TABLET ORAL at 11:36

## 2024-12-06 RX ADMIN — CARVEDILOL 12.5 MG: 12.5 TABLET, FILM COATED ORAL at 09:05

## 2024-12-06 RX ADMIN — MICONAZOLE NITRATE ANTIFUNGAL POWDER: 2 POWDER TOPICAL at 09:03

## 2024-12-06 RX ADMIN — GABAPENTIN 300 MG: 300 CAPSULE ORAL at 09:04

## 2024-12-06 RX ADMIN — FLUOXETINE HYDROCHLORIDE 20 MG: 20 CAPSULE ORAL at 09:05

## 2024-12-06 ASSESSMENT — ACTIVITIES OF DAILY LIVING (ADL)
ADLS_ACUITY_SCORE: 56
ADLS_ACUITY_SCORE: 56
ADLS_ACUITY_SCORE: 54
ADLS_ACUITY_SCORE: 56

## 2024-12-06 NOTE — PROGRESS NOTES
Care Management Follow Up    Length of Stay (days): 3    Expected Discharge Date: 12/06/2024    Anticipated Discharge Plan:       Transportation: TBD    PT Recommendations: home with assist  OT Recommendations:  home with home care occupational therapy     Barriers to Discharge: medical stability    Prior Living Situation:   with      Discussed  Partnership in Safe Discharge Planning  document with patient/family: No     Handoff Completed: No, handoff not indicated or clinically appropriate    Patient/Spokesperson Updated: No    Additional Information: The patient is being discharged home with Home Care services (RN/PT/OT) and has been accepted by [facility] for services. The  updated the patient and family. No other needs are noted today. The family will provide discharge transportation.      Next Steps: No, other needs are noted; notify SW if concerns arise.     Anna Dobbins, GINNYSW

## 2024-12-06 NOTE — PLAN OF CARE
Occupational Therapy Discharge Summary    Reason for therapy discharge:    Discharged to home with home therapy.    Progress towards therapy goal(s). See goals on Care Plan in [facility] electronic health record for goal details.  Goals partially met.  Barriers to achieving goals:   discharge from facility.    Therapy recommendation(s):    Continued therapy is recommended.  Rationale/Recommendations:  Patient will benefit from  OT to facilitate safe discharge, and continue to progress activity tolerance and IND.    Joyce Bashir OTR/L. 12/6/2024, 1:21 PM

## 2024-12-06 NOTE — DISCHARGE SUMMARY
"[facility]  Hospitalist Discharge Summary      Date of Admission:  12/3/2024  Date of Discharge:  12/6/2024  Discharging Provider: Deanna Reynolds MD  Discharge Service: Hospitalist Service    Discharge Diagnoses   S/p Fall  Syncope  Acute Subarachnoid hemorrhage  ISAIAH    Clinically Significant Risk Factors     # DMII: A1C = 8.2 % (Ref range: 0.0 - 5.6 %) within past 6 months  # Obesity: Estimated body mass index is 31.53 kg/m  as calculated from the following:    Height as of this encounter: 1.615 m (5' 3.6\").    Weight as of this encounter: 82.3 kg (181 lb 6.4 oz).       Follow-ups Needed After Discharge   Follow-up Appointments       Follow Up      Please follow up at [facility] Neurosurgery in 1 month with repeat head CT prior.  Please call the clinic at [phone] to schedule your appointment.        Follow Up      Follow up with Neurosurgery clinic in 1 month with repeat head CT prior        Hospital Follow-up with Existing Primary Care Provider (PCP)      Please see details below         Schedule Primary Care visit within: 7 Days   Recommended labs and Imaging (to be ordered by Primary Care Provider): BMP. Cardica monitor given at discharge. Primary care provider to follow result.               Unresulted Labs Ordered in the Past 30 Days of this Admission       No orders found from 11/3/2024 to 12/4/2024.            Discharge Disposition   Discharged to home  Condition at discharge: Stable    Hospital Course   Janes Montero is a 89 year old female admitted on 12/3/2024. She presented from [facility] following a fall.  -- Acute SAH: repeat CT-head showed no changes. OP NSGY follow up as scheduled with CT-head prior  -- Syncope likely 2/2 orthostatic hypotension 2/2 over diuresis. Cardiac monitor for 14 days on discharge  -- ISAIAH: meds adjusted renally. Improved with holding diuretics and albumin. Lasix on discharge at reduced dose. Lisinopril held. PCP to determine when to " resume  Details below:    S/p Fall  Syncope  Acute Subarachnoid hemorrhage  -- Pt denied palpitations, seizure activity pre/during/post fall.  -- Ddx: postural hypotension, arrythmia  -- Cardiac tele. TTE pending  -- CT-head: Small volume acute subarachnoid hemorrhage in a left parietal lobe sulcus. Large left parietal scalp hematoma without underlying calvarial fracture  -- CT- C-spine: No fracture  -- NSGY recommendations appreciated: -No plans for surgical intervention. Continue to hold anticoagulation including aspirin. NSGY will follow up in 1 month with repeat head CT prior, our office will contact her to schedule this appointment.  -- Fall precautions  -- S/p Cardizem gtt for BP control  -- CK 93  -- Cardiology consult appreciated:  - Most likely orthostatic syncope. Suspect she is a bit over-diuresed and also chronically struggles with positional lightheadedness likely in part due to her mitral regurgitation. Episode of mobitz I noted.  Likely related to episode of high vagal tone.  Unlikely that this contributed to the fall.  However, will recommend 14 day ziopatch on discharge to evaluate for other arrhythmia. Continue to monitor on telemetry while in the hospital  Hyponatremia, mild- chronic. 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  -- Strict I/O  -- 12/04: Cr up to 2.17. Will give albumin gtt once and continue to hold lasix. Encouraged po intake. Will do US-renal  -- 12/05: Cr trending down to 1.84. Continue to hold lasix. Albumin gtt once.  -- 12/06: Cr: 1.44  Elevated troponin  -- Troponin flat. No CP  -- Likely 2/2 demand ischemia  Hypercalcemia  -- Chronic. 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 Vlatrex started in Urgent care. Will continue with dose renally adjusted.  Chronic HFpEF  -- CXR: Cardiomegaly with central vascular congestion and mild interstitial edema   -- Probnp elevated but less than from prior  -- clinically looks dry  -- C/w PTA Coreg. Holding Lasix due to ISAIAH  -- 12/04: Albumin gtt once.   -- Cardiology consult appreciated: Recommend decreasing PO lasix to 20mg BID on discharge   Essential Hypertension  -- Monitor vital signs per protocol  -- C/w PTA Carvedilol. Continue to hold Lisinopril due to ISAIAH on discharge. Discussed with patient and son that if BP consistently is elevated after resuming lasix bid, she can take lisinopril at 10 mg/day. Both verbalized understanding.  CAD s/p CABG  -- C/w PTA Lipitor. Hold ASA given SAH  PD: C/w PTA Sinemet  GERD: C/w PTA PPI  Mood disorder: C/w PTA Prozac  Pre-DM: outpt follow up  Patient is clinically and hemodynamically stable for discharge. Medication reconciliation was done. Medications sent to patient's preferred pharmacy. All labs and radiologic findings discussed with patient. Follow up appointments and recommendations as shown below. Patient/family verbalized understanding and agreed to plan of care. All questions answered.       Consultations This Hospital Stay   NEUROSURGERY IP CONSULT  CARDIOLOGY IP CONSULT  PHYSICAL THERAPY ADULT IP CONSULT  OCCUPATIONAL THERAPY ADULT IP CONSULT    Code Status   No CPR- Pre-arrest intubation OK    Time Spent on this Encounter   I, Deanna Reynolds MD, personally saw the patient today and spent greater than 30 minutes discharging this patient.       Deanna Reynolds MD  [address] [address]  Phone: [phone]  Fax: [phone]  ______________________________________________________________________    Physical Exam   Vital Signs:  Temp: 97.9  F (36.6  C) Temp src: Oral BP: (!) 142/64 Pulse: 67   Resp: 17 SpO2: 96 % O2 Device: None (Room air)    Weight: 181 lbs 6.4 oz  GEN: Alert and oriented. Not in acute distress.  HEENT: Atraumatic, mucous membrane- moist and pink.  Chest: Bilateral air entry.  CVS: S1S2 regular.   Abdomen: Soft. Non-tender, non-distended. No organomegaly. No guarding or rigidity. Bowel sounds active.   Extremities: No pedal edema.  CNS: No involuntary movements.  Skin: no cyanosis or clubbing.        Primary Care Physician   Maggie Arriaga    Discharge Orders      CT Head w/o Contrast     Home Care Referral      Follow Up    Please follow up at [facility] Neurosurgery in 1 month with repeat head CT prior.  Please call the clinic at [phone] to schedule your appointment.     Reason for your hospital stay     Activity    Your activity upon discharge: activity as tolerated. Avoid strenuous activity     Follow Up    Follow up with Neurosurgery clinic in 1 month with repeat head CT prior     Monitor and record    Blood pressure: daily. Keep logs. Take it to your primary care provider for review and medication titration. Lisinopril was discontinued due to acute kidney injury. Primary care provider to decide when to resume.     Diet    Follow this diet upon discharge: Current Diet:Orders Placed This Encounter      Room Service      Combination Diet 2 gm NA Diet     Hospital Follow-up with Existing Primary Care Provider (PCP)    Please see details below            Significant Results and Procedures       Discharge Medications   Current Discharge Medication List        START taking these medications    Details   cefdinir (OMNICEF) 300 MG capsule Take 1 capsule (300 mg) by mouth daily for 1 day.  Qty: 1 capsule, Refills: 0    Associated Diagnoses: Acute cystitis without hematuria           CONTINUE these medications which have CHANGED    Details   furosemide (LASIX) 20 MG tablet Take 1 tablet (20 mg) by mouth 2 times  daily.    Associated Diagnoses: Heart failure with reduced ejection fraction (H)      gabapentin (NEURONTIN) 300 MG capsule TAKE 1 CAPSULE BY MOUTH 3  TIMES DAILY    Associated Diagnoses: Trigeminal neuralgia      valACYclovir (VALTREX) 1000 mg tablet Take 1 tablet (1,000 mg) by mouth daily for 1 dose.    Associated Diagnoses: Herpes zoster without complication           CONTINUE these medications which have NOT CHANGED    Details   atorvastatin (LIPITOR) 20 MG tablet TAKE 1 TABLET BY MOUTH AT  BEDTIME  Qty: 90 tablet, Refills: 3    Comments: Please send a replace/new response with 90-Day Supply if appropriate to maximize member benefit. Requesting 1 year supply.  Associated Diagnoses: Dyslipidemia      carbidopa-levodopa (SINEMET)  MG tablet TAKE 1 TABLET BY MOUTH 3  TIMES DAILY TAKE AT LEAST  1/2 HOUR BEFORE MEALS OR 2  HOURS AFTER MEALS DO NOT  MIX WITH FOOD  Qty: 270 tablet, Refills: 3    Associated Diagnoses: Parkinson's disease without dyskinesia or fluctuating manifestations (H)      carvedilol (COREG) 12.5 MG tablet Take 1 tablet (12.5 mg) by mouth 2 times daily (with meals).  Qty: 180 tablet, Refills: 3    Associated Diagnoses: Hypertension, unspecified type      cyanocobalamin (VITAMIN B-12) 1000 MCG tablet Take 1 tablet (1,000 mcg) by mouth every evening.  Qty: 90 tablet, Refills: 3    Associated Diagnoses: Vitamin B12 deficiency (non anemic)      FLUoxetine (PROZAC) 20 MG capsule Take 1 capsule (20 mg) by mouth 2 times daily  Qty: 180 capsule, Refills: 1    Associated Diagnoses: Recurrent major depressive disorder, in full remission (H)      loratadine (CLARITIN) 10 mg tablet Take 10 mg by mouth daily as needed for allergies      Magnesium 400 MG TABS Take 1 tablet by mouth daily.    Associated Diagnoses: Hypomagnesemia      melatonin 1 mg Tab tablet Take 1 mg by mouth At Bedtime      multivitamin therapeutic tablet Take 1 tablet by mouth every morning      nitroGLYcerin (NITROSTAT) 0.4 MG  "sublingual tablet Place 1 tablet (0.4 mg) under the tongue every 5 minutes as needed for chest pain  Qty: 100 tablet, Refills: 1    Associated Diagnoses: S/P CABG (coronary artery bypass graft)      nystatin (MYCOSTATIN) 314723 UNIT/GM external cream APPLY TO AFFECTED AREA(S)  TOPICALLY TWICE DAILY  Qty: 120 g, Refills: 0    Associated Diagnoses: Vaginal irritation      omeprazole (PRILOSEC) 20 MG DR capsule TAKE 1 CAPSULE BY MOUTH TWICE  DAILY  Qty: 180 capsule, Refills: 2    Comments: Please send a replace/new response with 90-Day Supply if appropriate to maximize member benefit. Requesting 1 year supply.  Associated Diagnoses: Gastroesophageal reflux disease without esophagitis      polyethylene glycol (MIRALAX) 17 gram packet Take 8.5 g by mouth every evening.           STOP taking these medications       aspirin 81 mg chewable tablet Comments:   Reason for Stopping:         empagliflozin (JARDIANCE) 10 MG TABS tablet Comments:   Reason for Stopping:         lisinopril (ZESTRIL) 20 MG tablet Comments:   Reason for Stopping:         sulfamethoxazole-trimethoprim (BACTRIM DS) 800-160 MG tablet Comments:   Reason for Stopping:             Allergies   Allergies   Allergen Reactions    Alendronate [Alendronate] Unknown     pain    Codeine Hives    Hydrocodone-Acetaminophen Other (See Comments)     Highly sensitive, \"knocks her out and can't wake up\"  3/26/24 verbal with pt plain acetaminophen is ok to take    Risedronate Unknown     Bone pain    Rosuvastatin Muscle Pain (Myalgia)    Simvastatin Muscle Pain (Myalgia)     "

## 2024-12-06 NOTE — PLAN OF CARE
Patient tolerating diet. Pain controlled with prn tylenol. SBA with ambulation. Large bump and bruise noted to back of head.      Christa Yan RN

## 2024-12-06 NOTE — PLAN OF CARE
Problem: Adult Inpatient Plan of Care  Goal: Optimal Comfort and Wellbeing  Outcome: Progressing     Problem: Comorbidity Management  Goal: Blood Pressure in Desired Range  Outcome: Progressing  Intervention: Maintain Blood Pressure Management  Recent Flowsheet Documentation  Taken 12/6/2024 0010 by Tong Holder RN  Medication Review/Management: medications reviewed     Problem: Pain Acute  Goal: Optimal Pain Control and Function  Outcome: Progressing  Intervention: Prevent or Manage Pain  Recent Flowsheet Documentation  Taken 12/6/2024 0010 by Tong Holder RN  Medication Review/Management: medications reviewed   Goal Outcome Evaluation:       VSS. Pt had head pain 5/10, PRN tylenol was given. Pt had SCDs off overnight. On tele - Sinus w/ BBB. No new neuro changes overnight.

## 2024-12-07 ENCOUNTER — PATIENT OUTREACH (OUTPATIENT)
Dept: CARE COORDINATION | Facility: CLINIC | Age: 89
End: 2024-12-07
Payer: MEDICARE

## 2024-12-07 NOTE — PROGRESS NOTES
Connected Care Resource Center: [facility] [place]    Background: Transitional Care Management program identified per system criteria and reviewed by [facility] team for possible outreach.    Assessment: Upon chart review, [facility] Team member will not proceed with patient outreach related to this episode of Transitional Care Management program due to reason below:    Patient has discharged to a Memory Care, Long-term Care, Assisted Living or Group Home where patient is receiving on-site support with their daily cares, including support with hospital follow up plan.    Plan: Transitional Care Management episode addressed appropriately per reason noted above.      Saundra Liu MA  Connected Care Resource Center, [facility]    *Connected Care Resource Team does NOT follow patient ongoing. Referrals are identified based on internal discharge reports and the outreach is to ensure patient has an understanding of their discharge instructions.

## 2024-12-10 ENCOUNTER — TELEPHONE (OUTPATIENT)
Dept: FAMILY MEDICINE | Facility: CLINIC | Age: 89
End: 2024-12-10
Payer: MEDICARE

## 2024-12-10 ENCOUNTER — PATIENT OUTREACH (OUTPATIENT)
Dept: CARE COORDINATION | Facility: CLINIC | Age: 89
End: 2024-12-10
Payer: MEDICARE

## 2024-12-10 ENCOUNTER — MEDICAL CORRESPONDENCE (OUTPATIENT)
Dept: HEALTH INFORMATION MANAGEMENT | Facility: CLINIC | Age: 89
End: 2024-12-10

## 2024-12-10 NOTE — PROGRESS NOTES
Patient outreach to remind of post acute follow up appointment on 12/11.  Patient confirmed appointment.

## 2024-12-11 ENCOUNTER — OFFICE VISIT (OUTPATIENT)
Dept: FAMILY MEDICINE | Facility: CLINIC | Age: 89
End: 2024-12-11
Payer: MEDICARE

## 2024-12-11 VITALS
BODY MASS INDEX: 31.17 KG/M2 | SYSTOLIC BLOOD PRESSURE: 150 MMHG | WEIGHT: 182.56 LBS | RESPIRATION RATE: 16 BRPM | HEART RATE: 68 BPM | TEMPERATURE: 97.5 F | OXYGEN SATURATION: 96 % | HEIGHT: 64 IN | DIASTOLIC BLOOD PRESSURE: 69 MMHG

## 2024-12-11 DIAGNOSIS — N39.0 URINARY TRACT INFECTION WITHOUT HEMATURIA, SITE UNSPECIFIED: ICD-10-CM

## 2024-12-11 DIAGNOSIS — E83.52 HYPERCALCEMIA: ICD-10-CM

## 2024-12-11 DIAGNOSIS — G50.0 TRIGEMINAL NEURALGIA: ICD-10-CM

## 2024-12-11 DIAGNOSIS — N18.32 STAGE 3B CHRONIC KIDNEY DISEASE (H): ICD-10-CM

## 2024-12-11 DIAGNOSIS — I60.9 SUBARACHNOID HEMORRHAGE (H): Primary | ICD-10-CM

## 2024-12-11 DIAGNOSIS — R55 SYNCOPE, UNSPECIFIED SYNCOPE TYPE: ICD-10-CM

## 2024-12-11 DIAGNOSIS — I10 HYPERTENSION, UNSPECIFIED TYPE: ICD-10-CM

## 2024-12-11 PROCEDURE — 36415 COLL VENOUS BLD VENIPUNCTURE: CPT | Performed by: FAMILY MEDICINE

## 2024-12-11 NOTE — ASSESSMENT & PLAN NOTE
Syncope resulting in fall and acute subarachnoid hemorrhage thought to be due to postural hypotension relating to being over diuresed and mitral valve regurgitation.    Hospitalized December 3 to December 6.    Lasix was reduced to 20 mg p.o. twice daily and discontinued lisinopril.  Plan to resume lisinopril only if consistently elevated blood pressures are noted.    Condition gabapentin was reduced from 4 times a day to 2 times a day in the hospital.    Continue to follow BMP.    14-day Zio patch was placed in the hospital and we need to follow-up on the results.  At this point she still has it on.    Message sent to cardiology, Dr. Ricardo as she will need someone to follow up on the ziopatch result and I will be out of the country when it is expected to be resulted.

## 2024-12-11 NOTE — ASSESSMENT & PLAN NOTE
Chronic kidney disease is noted.  Renal dosing and avoiding neurotoxic medications recommended.

## 2024-12-11 NOTE — ASSESSMENT & PLAN NOTE
Trigeminal neuralgia-Managed by Dr. Moya and neurology follow up due end November 2023  Was taking gabapentin 300 mg po qid, but now on gabapentin 300 mg po tid.

## 2024-12-11 NOTE — PROGRESS NOTES
Assessment & Plan  Subarachnoid hemorrhage (H)  Status post fall December 3 resulting in subarachnoid hemorrhage.  Was hospitalized from December 3 to December 6.  Suspect the cause of the fall was postural hypotension due to being over diuresed and mitral regurgitation.     Plan neurosurgery clinic follow-up in 1 month with CT of the head prior to that visit.  Order was placed for the CT of the head today.    Neurosurgery appointment is planned for: 1/7/2025         Syncope, unspecified syncope type  Syncope resulting in fall and acute subarachnoid hemorrhage thought to be due to postural hypotension relating to being over diuresed and mitral valve regurgitation.    Hospitalized December 3 to December 6.    Lasix was reduced to 20 mg p.o. twice daily and discontinued lisinopril.  Plan to resume lisinopril only if consistently elevated blood pressures are noted.    Condition gabapentin was reduced from 4 times a day to 2 times a day in the hospital.    Continue to follow BMP.    14-day Zio patch was placed in the hospital and we need to follow-up on the results.  At this point she still has it on.    Message sent to cardiology, Dr. Ricardo as she will need someone to follow up on the ziopatch result and I will be out of the country when it is expected to be resulted.         Stage 3b chronic kidney disease (H)  Chronic kidney disease is noted.  Renal dosing and avoiding neurotoxic medications recommended.         Hypertension, unspecified type  Hypertension-currently on Lasix 20 mg p.o. twice daily - message sent to cardiology to request they refill her lasix as she will run out given the new dose.  Will continue to monitor and if she has consistently elevated blood pressures we could resume lisinopril 10 mg p.o. daily or consider alternative like losartan.  Home bp average 120/60. Will hold off on adding the lisinopril back.        Urinary tract infection without hematuria, site unspecified  She has finished  antibiotic.        Trigeminal neuralgia  Trigeminal neuralgia-Managed by Dr. Moya and neurology follow up due end November 2023  Was taking gabapentin 300 mg po qid, but now on gabapentin 300 mg po tid.          Hypercalcemia    Orders:    Basic metabolic panel  (Ca, Cl, CO2, Creat, Gluc, K, Na, BUN); Future          Subjective   Janes is a 89 year old, presenting for the following health issues:  Follow Up ([facility] 12/03/24 after fall-hit head small brain bleed-head pain continues.)        12/11/2024    10:39 AM   Additional Questions   Roomed by sac   Accompanied by Son-Abdelrahman         12/11/2024    10:39 AM   Patient Reported Additional Medications   Patient reports taking the following new medications no     HPI       Hospital Follow-up Visit:    Hospital/Nursing Home/IP Rehab Facility: [facility]  Date of Admission: 12/03/24  Date of Discharge: 12/06/24  Reason(s) for Admission: Fall  Was the patient in the ICU or did the patient experience delirium during hospitalization?  No  Do you have any other stressors you would like to discuss with your provider? OTHER: Medications    Problems taking medications regularly:  None  Medication changes since discharge: None  Problems adhering to non-medication therapy:  None    Summary of hospitalization:  [facility] discharge summary reviewed  Diagnostic Tests/Treatments reviewed.  Follow up needed: head ct and neuro surgery visit 1/7/2025  Other Healthcare Providers Involved in Patient s Care:         Specialist appointment - neurosurgery 1/7/2025 . And I have messaged cardiology, Dr. Ricardo as she will need someone to follow up on the ziopatch result and I will be out of the country when it is expected to be resulted.  She will also need follow up with Dr Moya relating to the gabapentin as the dose was reduced and she is wondering how best to help break through trigeminal neuralgia pain.      Update since discharge:  "improved.     Plan of care communicated with patient and family             Objective    BP (!) 154/74 (BP Location: Left arm, Patient Position: Sitting, Cuff Size: Adult Large)   Pulse 68   Temp 97.5  F (36.4  C) (Oral)   Resp 16   Ht 1.615 m (5' 3.6\")   Wt 82.8 kg (182 lb 9 oz)   LMP  (LMP Unknown)   SpO2 96%   BMI 31.73 kg/m    Body mass index is 31.73 kg/m .  Physical Exam  Constitutional:       Appearance: Normal appearance.   HENT:      Head: Normocephalic and atraumatic.   Cardiovascular:      Rate and Rhythm: Normal rate and regular rhythm.   Pulmonary:      Effort: Pulmonary effort is normal.   Musculoskeletal:         General: Normal range of motion.      Cervical back: Normal range of motion and neck supple.   Neurological:      General: No focal deficit present.      Mental Status: She is alert and oriented to person, place, and time.            Signed Electronically by: Maggie Arriaga MD    "

## 2024-12-11 NOTE — ASSESSMENT & PLAN NOTE
Status post fall December 3 resulting in subarachnoid hemorrhage.  Was hospitalized from December 3 to December 6.  Suspect the cause of the fall was postural hypotension due to being over diuresed and mitral regurgitation.     Plan neurosurgery clinic follow-up in 1 month with CT of the head prior to that visit.  Order was placed for the CT of the head today.    Neurosurgery appointment is planned for: 1/7/2025

## 2024-12-11 NOTE — Clinical Note
Madi Ricardo.  Janes was in the hospital for fall related to being over diuresed and due to MR due to in patient cardiology consult.  Now her lasix dose was dropped from 40 in the morning and 20 at night to 20 mg po bid, she will also need a follow up with you but she reports trouble scheduling follow up.   She is going to run out of the 20 mg tabs. She asked if I could request refill from you before she runs out.   She is also awaiting an ziopatch which she will send in on dec 20th and I will be out of the country for 2 weeks at the time the result will be available and so I am wondering if you can keep an eye out for this.   Appreciate your help!!  Maggie

## 2024-12-11 NOTE — ASSESSMENT & PLAN NOTE
Hypertension-currently on Lasix 20 mg p.o. twice daily - message sent to cardiology to request they refill her lasix as she will run out given the new dose.  Will continue to monitor and if she has consistently elevated blood pressures we could resume lisinopril 10 mg p.o. daily or consider alternative like losartan.  Home bp average 120/60. Will hold off on adding the lisinopril back.

## 2024-12-12 LAB
ANION GAP SERPL CALCULATED.3IONS-SCNC: 14 MMOL/L (ref 7–15)
BUN SERPL-MCNC: 39.7 MG/DL (ref 8–23)
CALCIUM SERPL-MCNC: 11.3 MG/DL (ref 8.8–10.4)
CHLORIDE SERPL-SCNC: 100 MMOL/L (ref 98–107)
CREAT SERPL-MCNC: 1.04 MG/DL (ref 0.51–0.95)
EGFRCR SERPLBLD CKD-EPI 2021: 50 ML/MIN/1.73M2
GLUCOSE SERPL-MCNC: 241 MG/DL (ref 70–99)
HCO3 SERPL-SCNC: 23 MMOL/L (ref 22–29)
POTASSIUM SERPL-SCNC: 4.9 MMOL/L (ref 3.4–5.3)
SODIUM SERPL-SCNC: 137 MMOL/L (ref 135–145)

## 2024-12-17 ENCOUNTER — TELEPHONE (OUTPATIENT)
Dept: CARDIOLOGY | Facility: CLINIC | Age: 89
End: 2024-12-17
Payer: MEDICARE

## 2024-12-17 DIAGNOSIS — I50.20 HEART FAILURE WITH REDUCED EJECTION FRACTION (H): ICD-10-CM

## 2024-12-17 RX ORDER — FUROSEMIDE 20 MG/1
20 TABLET ORAL 2 TIMES DAILY
Qty: 180 TABLET | Refills: 1 | Status: SHIPPED | OUTPATIENT
Start: 2024-12-17

## 2024-12-17 NOTE — TELEPHONE ENCOUNTER
Left detailed message with my direct number to call with preferred pharmacy to send furosemide to.    ----- Message -----   From: Mathew Ricardo MD   Sent: 12/17/2024   8:41 AM CST   To: Elizabeth Casey RN     Hi Elizabeth,   Please see note from primary provider.  Per primary, Janes requires renewal of her furosemide.  It would appear that she is on 20 mg twice daily though can we confirm that this is indeed her current dose.  Regardless, lets go ahead and renew her current furosemide dosing/administration.  Thank so much.     ----------------------------------------------------------     ----- Message -----  From: Mathew Ricardo MD  Sent: 12/17/2024   8:41 AM CST  To: Elizabeth Casey RN    Hi Elizabeth,  Please see note from primary provider.  Per primary, Janes requires renewal of her furosemide.  It would appear that she is on 20 mg twice daily though can we confirm that this is indeed her current dose.  Regardless, lets go ahead and renew her current furosemide dosing/administration.  Thank so much.  ----- Message -----  From: Maggie Arriaga MD  Sent: 12/13/2024   5:18 PM CST  To: Mathew Ricardo MD    Since I believe you are titrating the dose, I did not refill.  ----- Message -----  From: Mathew Ricardo MD  Sent: 12/13/2024   7:36 AM CST  To: Maggie Arriaga MD    Just clarifying-did you renew her furosemide, or plan to do so, or do require that we do that?  ----- Message -----  From: Maggie Arriaga MD  Sent: 12/11/2024  11:24 AM CST  To: Mathew Ricardo MD    Hi Dr. Ricardo.  Janes was in the hospital for fall related to being over diuresed and due to MR due to in patient cardiology consult.  Now her lasix dose was dropped from 40 in the morning and 20 at night to 20 mg po bid, she will also need a follow up with you but she reports trouble scheduling follow up.    She is going to run out of the 20 mg tabs. She asked if I could request refill from you  before she runs out.    She is also awaiting an ziopatch which she will send in on dec 20th and I will be out of the country for 2 weeks at the time the result will be available and so I am wondering if you can keep an eye out for this.    Appreciate your help!!  Maggie

## 2024-12-17 NOTE — TELEPHONE ENCOUNTER
Kaylynn, patient's daughter, called back and discussed/confirmed furosemide dosing and mail order pharmacy. Follow up with echo 3/18 and with Dr. Ricardo shortly thereafter. Call transferred to scheduling to arrange.  Await Zio results.

## 2024-12-27 ENCOUNTER — TELEPHONE (OUTPATIENT)
Dept: FAMILY MEDICINE | Facility: CLINIC | Age: 89
End: 2024-12-27
Payer: MEDICARE

## 2024-12-27 NOTE — TELEPHONE ENCOUNTER
FYI only- Home care nurse calling to state they will be missing 1/2 nursing visits for this week per patient request.

## 2025-01-06 ENCOUNTER — HOSPITAL ENCOUNTER (OUTPATIENT)
Dept: CT IMAGING | Facility: HOSPITAL | Age: OVER 89
Discharge: HOME | End: 2025-01-06
Attending: NURSE PRACTITIONER | Admitting: NURSE PRACTITIONER
Payer: MEDICARE

## 2025-01-06 DIAGNOSIS — I60.9 SUBARACHNOID HEMORRHAGE (H): ICD-10-CM

## 2025-01-06 PROCEDURE — 70450 CT HEAD/BRAIN W/O DYE: CPT

## 2025-01-07 ENCOUNTER — TELEPHONE (OUTPATIENT)
Dept: NEUROSURGERY | Facility: CLINIC | Age: OVER 89
End: 2025-01-07

## 2025-01-07 ENCOUNTER — LAB (OUTPATIENT)
Dept: LAB | Facility: CLINIC | Age: OVER 89
End: 2025-01-07
Payer: MEDICARE

## 2025-01-07 DIAGNOSIS — M81.0 AGE-RELATED OSTEOPOROSIS WITHOUT CURRENT PATHOLOGICAL FRACTURE: ICD-10-CM

## 2025-01-07 DIAGNOSIS — N18.32 STAGE 3B CHRONIC KIDNEY DISEASE (H): Primary | ICD-10-CM

## 2025-01-07 PROCEDURE — 82040 ASSAY OF SERUM ALBUMIN: CPT

## 2025-01-07 PROCEDURE — 82310 ASSAY OF CALCIUM: CPT

## 2025-01-07 PROCEDURE — 82565 ASSAY OF CREATININE: CPT

## 2025-01-07 PROCEDURE — 36415 COLL VENOUS BLD VENIPUNCTURE: CPT

## 2025-01-07 PROCEDURE — 82570 ASSAY OF URINE CREATININE: CPT

## 2025-01-07 PROCEDURE — 82043 UR ALBUMIN QUANTITATIVE: CPT

## 2025-01-07 NOTE — TELEPHONE ENCOUNTER
Discussed with ELIGIO, no need for follow-up as CT appears stable. Notified patient daughter that no follow-up is needed but offered a video visit if they would like. Patient daughter declined. Provided clinic number and instructed to reach out for any red flag symptoms or concerning developments.

## 2025-01-07 NOTE — TELEPHONE ENCOUNTER
Pt's daughter returned call.  She is wondering if this appointment is necessary or not.  She got CT results on mychart and she doesn't want to bring her Mom in for follow up if it isn't necessary.  Please call her back to discuss.

## 2025-01-07 NOTE — TELEPHONE ENCOUNTER
Left Voicemail with Family Member (1st Attempt) for the patient to call back and schedule the following:    Location: [facility] Neurosurgery  Provider: ELIGIO (MU VAIL)  Appointment type: return adult  Appointment mode: any  Return date: next available    Specialty phone number: [phone]    Is Imaging Needed: done 1/6  Imaging Phone Number to provide to patient: n/a    Additional Notes: clinic follow up for [facility] on 1/7 cancelled, please reschedule with any ELIGIO (MU CRANE). CT completed 1/6.

## 2025-01-08 ENCOUNTER — MYC MEDICAL ADVICE (OUTPATIENT)
Dept: FAMILY MEDICINE | Facility: CLINIC | Age: OVER 89
End: 2025-01-08
Payer: MEDICARE

## 2025-01-08 DIAGNOSIS — Z53.9 DIAGNOSIS NOT YET DEFINED: Primary | ICD-10-CM

## 2025-01-08 DIAGNOSIS — R05.9 COUGH, UNSPECIFIED TYPE: Primary | ICD-10-CM

## 2025-01-08 LAB
ALBUMIN SERPL BCG-MCNC: 4.1 G/DL (ref 3.5–5.2)
CALCIUM SERPL-MCNC: 10.7 MG/DL (ref 8.8–10.4)
CREAT SERPL-MCNC: 1 MG/DL (ref 0.51–0.95)
CREAT UR-MCNC: 85 MG/DL
EGFRCR SERPLBLD CKD-EPI 2021: 53 ML/MIN/1.73M2
MICROALBUMIN UR-MCNC: 38.9 MG/L
MICROALBUMIN/CREAT UR: 45.76 MG/G CR (ref 0–25)

## 2025-01-08 PROCEDURE — G0179 MD RECERTIFICATION HHA PT: HCPCS | Performed by: FAMILY MEDICINE

## 2025-01-08 RX ORDER — BENZONATATE 100 MG/1
100 CAPSULE ORAL 3 TIMES DAILY PRN
Qty: 30 CAPSULE | Refills: 0 | Status: SHIPPED | OUTPATIENT
Start: 2025-01-08

## 2025-01-09 DIAGNOSIS — N18.32 STAGE 3B CHRONIC KIDNEY DISEASE (H): ICD-10-CM

## 2025-01-09 DIAGNOSIS — M81.0 AGE-RELATED OSTEOPOROSIS WITHOUT CURRENT PATHOLOGICAL FRACTURE: Primary | ICD-10-CM

## 2025-01-12 ENCOUNTER — TRANSFERRED RECORDS (OUTPATIENT)
Dept: HEALTH INFORMATION MANAGEMENT | Facility: CLINIC | Age: OVER 89
End: 2025-01-12

## 2025-01-17 PROBLEM — R10.11 RUQ ABDOMINAL PAIN: Status: ACTIVE | Noted: 2025-01-17

## 2025-01-17 NOTE — ASSESSMENT & PLAN NOTE
She was initially evaluated at [facility] on 1/7/2025.  On admission to [facility] labs demonstrated  Cr 1.35, lipase 21, WBC 13.6, Hgb 10.4, Plt 178, Alk phos rising to 318 (from 259),  (from 91),  (from 113), T bili 4.4 (from 2.4), and D bili 3.6 (from 1.5). US showed CBD of 9 mm, dilated gallbladder with mild wall thickening and sludge/stones. MRCP was notable for distended gallbladder with cholelithiasis but no significant wall edema or pericholecystic fluid. No evidence of choledocholithiasis was found.     She was scheduled to undergo surgery +/- ERCP but given her complex medical history, she was transferred to [facility] for further cares.     MRCP 1/12:  1.  Distended gallbladder with cholelithiasis. Wall edema described on prior CT and ultrasound is not well visualized by MRI. Could consider surgical consultation and/or HIDA if there is persistent clinical concern.  2.  No evidence of choledocholithiasis or adebayo biliary obstruction.     ERCP 1/15:  Impression:            - The patient has had a cholecystectomy.                         - A biliary sphincterotomy was performed.                         - The biliary tree was swept and sludge was found.                         - One temporary plastic biliary stent was placed                          into the common bile duct.    Acute cholecystitis s/p CCY 1/15   CBD narrowing ERCP s/p sphincterectomy and stent placement   Elevated LFTs  Admitted, placed on abx. GI and General surgery consulted, took pt for CCY 1/15 as well as IOC which showed tapering CBD and that was taken for ERCP, sludge removed and then had sphincterectomy and small stent placed. Abx stopped. Uncomplicated post-op/post-procedural course.  - Low fat diet   - No NSAIDs/ASA for at least a week  - GI will obtain XR in 6-8 weeks to determine if stent needs removal     Overall, she reports that she is doing well.  Her son is with her today.  Her appetite has been good.  Bowel  movements have been normal.  No abdominal pain.  No fevers, sweats or chills.  Her son has set up follow-up imaging at [facility] for sometime after February 26, 2025.  They are hopeful that the stent will Plast on its own.  Recommend she keep her upcoming appointment with Dr. Hager for February 19, 2025.

## 2025-01-17 NOTE — ASSESSMENT & PLAN NOTE
Lab Results   Component Value Date    A1C 8.2 11/08/2024    A1C 7.1 06/27/2024    A1C 6.2 03/26/2024    A1C 6.7 11/10/2023    A1C 6.5 08/09/2023     She was previously on Jardiance.  On 11/26/2024 she took a drug holiday as she has not been feeling well and it was felt this may be related to the Jardiance.  She is not currently taking Jardiance.  She will be due for a repeat A1c February 8, 2024.  She has an upcoming appointment with Dr. Hager 2/19/2024.  They will be able to discuss this further at her follow-up appointment.

## 2025-01-17 NOTE — ASSESSMENT & PLAN NOTE
She had a fall December 3 resulting in a subarachnoid hemorrhage.  Fall was likely related to postural hypotension due to being overdiuresed and mitral regurgitation.  Repeat imaging was stable appearing so no follow-up with neurosurgery was indicated.

## 2025-01-21 ENCOUNTER — TELEPHONE (OUTPATIENT)
Dept: FAMILY MEDICINE | Facility: CLINIC | Age: OVER 89
End: 2025-01-21
Payer: MEDICARE

## 2025-01-21 NOTE — TELEPHONE ENCOUNTER
Yamile with [facility] calling for orders to resume Home Care Skilled Nursing starting tomorrow 1/22/25.  Patient was hospitalized on 1/12/25-1/18/25 for cholecystitis.

## 2025-01-22 ENCOUNTER — ALLIED HEALTH/NURSE VISIT (OUTPATIENT)
Dept: ENDOCRINOLOGY | Facility: CLINIC | Age: OVER 89
End: 2025-01-22
Payer: MEDICARE

## 2025-01-22 ENCOUNTER — OFFICE VISIT (OUTPATIENT)
Dept: FAMILY MEDICINE | Facility: CLINIC | Age: OVER 89
End: 2025-01-22
Payer: MEDICARE

## 2025-01-22 VITALS
WEIGHT: 180.38 LBS | BODY MASS INDEX: 30.8 KG/M2 | HEART RATE: 60 BPM | OXYGEN SATURATION: 95 % | SYSTOLIC BLOOD PRESSURE: 142 MMHG | RESPIRATION RATE: 20 BRPM | HEIGHT: 64 IN | DIASTOLIC BLOOD PRESSURE: 60 MMHG | TEMPERATURE: 97.9 F

## 2025-01-22 DIAGNOSIS — I60.9 SUBARACHNOID HEMORRHAGE (H): ICD-10-CM

## 2025-01-22 DIAGNOSIS — I50.20 HEART FAILURE WITH REDUCED EJECTION FRACTION (H): ICD-10-CM

## 2025-01-22 DIAGNOSIS — R74.8 ELEVATED LIVER ENZYMES: ICD-10-CM

## 2025-01-22 DIAGNOSIS — N18.30 STAGE 3 CHRONIC KIDNEY DISEASE, UNSPECIFIED WHETHER STAGE 3A OR 3B CKD (H): ICD-10-CM

## 2025-01-22 DIAGNOSIS — E11.42 TYPE 2 DIABETES MELLITUS WITH DIABETIC POLYNEUROPATHY, WITHOUT LONG-TERM CURRENT USE OF INSULIN (H): ICD-10-CM

## 2025-01-22 DIAGNOSIS — I10 HYPERTENSION, UNSPECIFIED TYPE: ICD-10-CM

## 2025-01-22 DIAGNOSIS — M81.0 AGE-RELATED OSTEOPOROSIS WITHOUT CURRENT PATHOLOGICAL FRACTURE: Primary | ICD-10-CM

## 2025-01-22 DIAGNOSIS — N18.32 STAGE 3B CHRONIC KIDNEY DISEASE (H): ICD-10-CM

## 2025-01-22 DIAGNOSIS — G20.A1 PARKINSON'S DISEASE, UNSPECIFIED WHETHER DYSKINESIA PRESENT, UNSPECIFIED WHETHER MANIFESTATIONS FLUCTUATE (H): ICD-10-CM

## 2025-01-22 DIAGNOSIS — I73.9 PAD (PERIPHERAL ARTERY DISEASE): ICD-10-CM

## 2025-01-22 DIAGNOSIS — I50.43 ACUTE ON CHRONIC COMBINED SYSTOLIC (CONGESTIVE) AND DIASTOLIC (CONGESTIVE) HEART FAILURE (H): ICD-10-CM

## 2025-01-22 DIAGNOSIS — K81.0 ACUTE ACALCULOUS CHOLECYSTITIS: Primary | ICD-10-CM

## 2025-01-22 LAB
BASOPHILS # BLD AUTO: 0 10E3/UL (ref 0–0.2)
BASOPHILS NFR BLD AUTO: 1 %
EOSINOPHIL # BLD AUTO: 0.2 10E3/UL (ref 0–0.7)
EOSINOPHIL NFR BLD AUTO: 3 %
ERYTHROCYTE [DISTWIDTH] IN BLOOD BY AUTOMATED COUNT: 15.7 % (ref 10–15)
HCT VFR BLD AUTO: 34.7 % (ref 35–47)
HGB BLD-MCNC: 11 G/DL (ref 11.7–15.7)
IMM GRANULOCYTES # BLD: 0.1 10E3/UL
IMM GRANULOCYTES NFR BLD: 2 %
LYMPHOCYTES # BLD AUTO: 0.9 10E3/UL (ref 0.8–5.3)
LYMPHOCYTES NFR BLD AUTO: 16 %
MCH RBC QN AUTO: 30 PG (ref 26.5–33)
MCHC RBC AUTO-ENTMCNC: 31.7 G/DL (ref 31.5–36.5)
MCV RBC AUTO: 95 FL (ref 78–100)
MONOCYTES # BLD AUTO: 0.4 10E3/UL (ref 0–1.3)
MONOCYTES NFR BLD AUTO: 8 %
NEUTROPHILS # BLD AUTO: 3.9 10E3/UL (ref 1.6–8.3)
NEUTROPHILS NFR BLD AUTO: 71 %
PLATELET # BLD AUTO: 210 10E3/UL (ref 150–450)
RBC # BLD AUTO: 3.67 10E6/UL (ref 3.8–5.2)
WBC # BLD AUTO: 5.6 10E3/UL (ref 4–11)

## 2025-01-22 PROCEDURE — 36415 COLL VENOUS BLD VENIPUNCTURE: CPT | Performed by: PHYSICIAN ASSISTANT

## 2025-01-22 PROCEDURE — 99207 PR NO CHARGE NURSE ONLY: CPT

## 2025-01-22 PROCEDURE — 85025 COMPLETE CBC W/AUTO DIFF WBC: CPT | Performed by: PHYSICIAN ASSISTANT

## 2025-01-22 RX ORDER — ACETAMINOPHEN 325 MG/1
650 TABLET ORAL EVERY 4 HOURS PRN
COMMUNITY
Start: 2025-01-18

## 2025-01-22 ASSESSMENT — PATIENT HEALTH QUESTIONNAIRE - PHQ9
SUM OF ALL RESPONSES TO PHQ QUESTIONS 1-9: 0
10. IF YOU CHECKED OFF ANY PROBLEMS, HOW DIFFICULT HAVE THESE PROBLEMS MADE IT FOR YOU TO DO YOUR WORK, TAKE CARE OF THINGS AT HOME, OR GET ALONG WITH OTHER PEOPLE: NOT DIFFICULT AT ALL
SUM OF ALL RESPONSES TO PHQ QUESTIONS 1-9: 0

## 2025-01-22 NOTE — PROGRESS NOTES
Clinic Administered Medication Documentation      Clinic Administered Medication Documentation      Prolia Documentation    Indication: Prolia  (denosumab) is a prescription medicine used to treat osteoporosis in patients who:   Are at high risk for fracture, meaning patients who have had a fracture related to osteoporosis, or who have multiple risk factors for fracture.  Cannot use another osteoporosis medicine or other osteoporosis medicines did not work well.  The timeline for early/late injections would be 4 weeks early and any time after the 6 month leonard. If a patient receives their injection late, then the subsequent injection would be 6 months from the date that they actually received the injection.    When was the last injection?  7/10/24  Was the last injection at least 6 months ago? Yes  Has the prior authorization been completed?  Yes  Is there an active order (written within the past 365 days, with administrations remaining, not ) in the chart?  Yes   GFR Estimate   Date Value Ref Range Status   2025 53 (L) >60 mL/min/1.73m2 Final     Comment:     eGFR calculated using  CKD-EPI equation.   2018 41 (L) >60 mL/min/1.73m2 Final     Has patient had a GFR within the last 12 months? Yes   Is GFR under 30, or patient has a diagnosis of CKD4 or CKD5? No   Patient denies gastric bypass or parathyroid surgery in past 6 months? Yes - patient denies.   Patient denies undergoing any dental procedures involving drilling into the bone, such as implants, extractions, or oral surgery, within the past two months that have not yet healed?  Yes - patient denies  Patient denies plans for an emergency tooth extraction within the next week? Yes    The following steps were completed to comply with the REMS program for Prolia:  Reviewed information in the Medication Guide, including the serious risks of Prolia  and the symptoms of each risk.  Advised patient to seek prompt medical attention if they have  signs or symptoms of any of the serious risks.  Provided each patient a copy of the Medication Guide and Patient Guide.    Prior to injection, verified patient identity using patient's name and date of birth. Medication was administered. Please see MAR and medication order for additional information. Patient instructed to remain in clinic for 15 minutes and report any adverse reaction to staff immediately.    Vial/Syringe: Syringe  Was this medication supplied by the patient? No  Verified that the patient has administrations remaining in their prescription.    Name of provider who requested the medication administration: Dr Christensen  Name of provider on site (faculty or community preceptor) at the time of performing the medication administration: Kayla Alvarez PA-C    Date of next administration: 7/15/25  Date of next office visit with provider to renew medication plan (must be seen annually): 7/15/25      Janes Montero comes into clinic today at the request of Dr Christensen Ordering Provider for Med Injection only Prolia .      This service provided today was under the supervising provider of the day Kayla Alvarez PA-C, who was available if needed.    Nikia Kent RN

## 2025-01-22 NOTE — PROGRESS NOTES
Assessment & Plan   Problem List Items Addressed This Visit       Parkinson's disease, unspecified whether dyskinesia present, unspecified whether manifestations fluctuate (H)     She follows with neurology.  Symptoms are stable on Sinemet.         Stage 3b chronic kidney disease (H)     She has chronic kidney disease.  Creatinine 0.82 with GFR greater than 60 on January 18, 2025.  Will repeat BMP at this time.         Type 2 diabetes mellitus with diabetic polyneuropathy, without long-term current use of insulin (H)     Lab Results   Component Value Date    A1C 8.2 11/08/2024    A1C 7.1 06/27/2024    A1C 6.2 03/26/2024    A1C 6.7 11/10/2023    A1C 6.5 08/09/2023     She was previously on Jardiance.  On 11/26/2024 she took a drug holiday as she has not been feeling well and it was felt this may be related to the Jardiance.  She is not currently taking Jardiance.  She will be due for a repeat A1c February 8, 2024.  She has an upcoming appointment with Dr. Hager 2/19/2024.  They will be able to discuss this further at her follow-up appointment.         PAD (peripheral artery disease)     Atorvastatin 20 mg is currently on hold secondary to elevated liver enzymes.  Recommend repeat LFTs at hospital follow-up.  PCP to consider stopping medication altogether given she is 89 and may no longer benefit from this medication.          Heart failure with reduced ejection fraction (H)     Symptoms remain stable on Lasix 20 mg twice daily.  Euvolemic on exam today.         Subarachnoid hemorrhage (H)     She had a fall December 3 resulting in a subarachnoid hemorrhage.  Fall was likely related to postural hypotension due to being overdiuresed and mitral regurgitation.  Repeat imaging was stable appearing so no follow-up with neurosurgery was indicated.         Hypertension, unspecified type     Blood pressure is currently managed with Lasix 20 mg twice daily and carvedilol 12.5 mg twice daily with meals.  No change in  medications at this time.  She is no longer taking lisinopril.         Relevant Orders    Basic metabolic panel  (Ca, Cl, CO2, Creat, Gluc, K, Na, BUN)    Acute acalculous cholecystitis - Primary     She was initially evaluated at [facility] on 1/7/2025.  On admission to [facility] labs demonstrated  Cr 1.35, lipase 21, WBC 13.6, Hgb 10.4, Plt 178, Alk phos rising to 318 (from 259),  (from 91),  (from 113), T bili 4.4 (from 2.4), and D bili 3.6 (from 1.5). US showed CBD of 9 mm, dilated gallbladder with mild wall thickening and sludge/stones. MRCP was notable for distended gallbladder with cholelithiasis but no significant wall edema or pericholecystic fluid. No evidence of choledocholithiasis was found.     She was scheduled to undergo surgery +/- ERCP but given her complex medical history, she was transferred to [facility] for further cares.     MRCP 1/12:  1.  Distended gallbladder with cholelithiasis. Wall edema described on prior CT and ultrasound is not well visualized by MRI. Could consider surgical consultation and/or HIDA if there is persistent clinical concern.  2.  No evidence of choledocholithiasis or adebayo biliary obstruction.     ERCP 1/15:  Impression:            - The patient has had a cholecystectomy.                         - A biliary sphincterotomy was performed.                         - The biliary tree was swept and sludge was found.                         - One temporary plastic biliary stent was placed                          into the common bile duct.    Acute cholecystitis s/p CCY 1/15   CBD narrowing ERCP s/p sphincterectomy and stent placement   Elevated LFTs  Admitted, placed on abx. GI and General surgery consulted, took pt for CCY 1/15 as well as IOC which showed tapering CBD and that was taken for ERCP, sludge removed and then had sphincterectomy and small stent placed. Abx stopped. Uncomplicated post-op/post-procedural course.  - Low fat diet   - No NSAIDs/ASA for  "at least a week  - GI will obtain XR in 6-8 weeks to determine if stent needs removal     Overall, she reports that she is doing well.  Her son is with her today.  Her appetite has been good.  Bowel movements have been normal.  No abdominal pain.  No fevers, sweats or chills.  Her son has set up follow-up imaging at [facility] for sometime after February 26, 2025.  They are hopeful that the stent will Plast on its own.  Recommend she keep her upcoming appointment with Dr. Hager for February 19, 2025.         Relevant Orders    CBC with platelets and differential    Hepatic panel (Albumin, ALT, AST, Bili, Alk Phos, TP)    Elevated liver enzymes     She was recently admitted for right upper quadrant pain.  She was diagnosed with acute acalculous cholecystitis.  He underwent ERCP with stent placement.  Liver enzymes were initially elevated at [facility].  On admission to [facility] labs demonstrated  Cr 1.35, lipase 21, WBC 13.6, Hgb 10.4, Plt 178, Alk phos rising to 318 (from 259),  (from 91),  (from 113), T bili 4.4 (from 2.4), and D bili 3.6 (from 1.5)      On January 17, 2024 liver enzymes demonstrated AST 46, , ALT 37.  Albumin remain low at 2.9.  Bilirubin 0.7.    Will proceed with repeat liver enzymes at this time.  Her statin is currently on hold due to elevated liver enzymes.  Will consider restarting if liver enzymes continue to improve.         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  Continue carvedilol and furosemide.             BMI  Estimated body mass index is 31.45 kg/m  as calculated from the following:    Height as of this encounter: 1.613 m (5' 3.5\").    Weight as of this encounter: 81.8 kg (180 lb 6 oz).             Ally Marrero is a 89 year old, presenting for the following health issues:  Follow Up ([facility] 01/13/25 and [facility] 01/18/25-Gallbladder. Feeling pretty good now-getting " "strength back.)        1/22/2025    10:55 AM   Additional Questions   Roomed by sac   Accompanied by Son-Abdelrahman         1/22/2025    10:55 AM   Patient Reported Additional Medications   Patient reports taking the following new medications no     HPI         Hospital Follow-up Visit:    Hospital/Nursing Home/IP Rehab Facility:  [facility]  Date of Admission: 01/13/25  Date of Discharge: 01/18/25  Reason(s) for Admission: Gallbladder  Was the patient in the ICU or did the patient experience delirium during hospitalization?  No  Do you have any other stressors you would like to discuss with your provider? No    Problems taking medications regularly:  None  Medication changes since discharge: None  Problems adhering to non-medication therapy:  None    Summary of hospitalization:  See outside records, reviewed and scanned  Diagnostic Tests/Treatments reviewed.  Follow up needed: repeat labs today.  Needs imaging follow up with GI in 6-8 weeks.  Other Healthcare Providers Involved in Patient s Care:          GI, PCP  Update since discharge: improved.         Plan of care communicated with patient and family           Objective    BP (!) 142/60 (BP Location: Left arm, Patient Position: Sitting, Cuff Size: Adult Large)   Pulse 60   Temp 97.9  F (36.6  C) (Oral)   Resp 20   Ht 1.613 m (5' 3.5\")   Wt 81.8 kg (180 lb 6 oz)   LMP  (LMP Unknown)   SpO2 95%   BMI 31.45 kg/m    Body mass index is 31.45 kg/m .  Physical Exam  Vitals and nursing note reviewed.   Constitutional:       Appearance: Normal appearance.   HENT:      Head: Normocephalic.   Cardiovascular:      Rate and Rhythm: Normal rate and regular rhythm.      Heart sounds: Normal heart sounds.   Pulmonary:      Effort: Pulmonary effort is normal.      Breath sounds: Normal breath sounds.   Abdominal:      General: Abdomen is flat.      Palpations: Abdomen is soft.      Comments: 3 small incisions which are healing nicely.  No evidence of infection.   Neurological:    "   Mental Status: She is alert.                Signed Electronically by: Kayla Tracy PA-C    Answers submitted by the patient for this visit:  Patient Health Questionnaire (Submitted on 1/22/2025)  If you checked off any problems, how difficult have these problems made it for you to do your work, take care of things at home, or get along with other people?: Not difficult at all  PHQ9 TOTAL SCORE: 0

## 2025-01-22 NOTE — ASSESSMENT & PLAN NOTE
She has chronic kidney disease.  Creatinine 0.82 with GFR greater than 60 on January 18, 2025.  Will repeat BMP at this time.

## 2025-01-22 NOTE — ASSESSMENT & PLAN NOTE
Atorvastatin 20 mg is currently on hold secondary to elevated liver enzymes.  Recommend repeat LFTs at hospital follow-up.  PCP to consider stopping medication altogether given she is 89 and may no longer benefit from this medication.

## 2025-01-22 NOTE — ASSESSMENT & PLAN NOTE
She was recently admitted for right upper quadrant pain.  She was diagnosed with acute acalculous cholecystitis.  He underwent ERCP with stent placement.  Liver enzymes were initially elevated at [facility].  On admission to [facility] labs demonstrated  Cr 1.35, lipase 21, WBC 13.6, Hgb 10.4, Plt 178, Alk phos rising to 318 (from 259),  (from 91),  (from 113), T bili 4.4 (from 2.4), and D bili 3.6 (from 1.5)      On January 17, 2024 liver enzymes demonstrated AST 46, , ALT 37.  Albumin remain low at 2.9.  Bilirubin 0.7.    Will proceed with repeat liver enzymes at this time.  Her statin is currently on hold due to elevated liver enzymes.  Will consider restarting if liver enzymes continue to improve.

## 2025-01-23 ENCOUNTER — TELEPHONE (OUTPATIENT)
Dept: FAMILY MEDICINE | Facility: CLINIC | Age: OVER 89
End: 2025-01-23
Payer: MEDICARE

## 2025-01-23 LAB
ALBUMIN SERPL BCG-MCNC: 3.7 G/DL (ref 3.5–5.2)
ALP SERPL-CCNC: 173 U/L (ref 40–150)
ALT SERPL W P-5'-P-CCNC: 32 U/L (ref 0–50)
ANION GAP SERPL CALCULATED.3IONS-SCNC: 11 MMOL/L (ref 7–15)
AST SERPL W P-5'-P-CCNC: 42 U/L (ref 0–45)
BILIRUB DIRECT SERPL-MCNC: 0.42 MG/DL (ref 0–0.3)
BILIRUB SERPL-MCNC: 0.9 MG/DL
BUN SERPL-MCNC: 17.7 MG/DL (ref 8–23)
CALCIUM SERPL-MCNC: 10.1 MG/DL (ref 8.8–10.4)
CHLORIDE SERPL-SCNC: 99 MMOL/L (ref 98–107)
CREAT SERPL-MCNC: 0.8 MG/DL (ref 0.51–0.95)
EGFRCR SERPLBLD CKD-EPI 2021: 69 ML/MIN/1.73M2
GLUCOSE SERPL-MCNC: 201 MG/DL (ref 70–99)
HCO3 SERPL-SCNC: 27 MMOL/L (ref 22–29)
POTASSIUM SERPL-SCNC: 4.4 MMOL/L (ref 3.4–5.3)
PROT SERPL-MCNC: 6.6 G/DL (ref 6.4–8.3)
SODIUM SERPL-SCNC: 137 MMOL/L (ref 135–145)

## 2025-01-23 NOTE — TELEPHONE ENCOUNTER
Home Care is calling regarding an established patient with [facility].       Requesting orders from: Maggie Arriaga  Provider is following patient: Yes  Is this a 60-day recertification request?  No    Orders Requested    Skilled Nursing  Request for initial certification (first set of orders)   Frequency:  1x/week for 2 weeks     Also will plan on re certifying for home care in 2 weeks when patient will be due.     Information was gathered and will be sent to provider for review.  RN will contact Home Care with information after provider review.  Confirmed ok to leave a detailed message with call back.  Contact information confirmed and updated as needed.    Ivon Hylton RN     Call back to [place] [phone] (secured line- ok to leave detailed message)

## 2025-01-29 NOTE — TELEPHONE ENCOUNTER
Home Care is calling regarding an established patient with [facility].       Requesting orders from: Maggie Arriaga  Provider is following patient: Yes  Is this a 60-day recertification request?  No    Orders Requested    Skilled Nursing  Request for initial certification (first set of orders)   Frequency:  1x/wk for 1 wks  then 2x/wk for 2 wks  Then once a week for 2 weeks , then every other week for 4 weeks      Confirmed ok to leave a detailed message with call back.  Contact information confirmed and updated as needed.    Ying Cool RN  
Called and left detailed message for TRUDY Arizmendi with [facility], with okay for requested verbal orders, per Dr. Arriaga.      Itzel Bird RN  [facility]   
Ok.   
8

## 2025-01-31 ENCOUNTER — MEDICAL CORRESPONDENCE (OUTPATIENT)
Dept: HEALTH INFORMATION MANAGEMENT | Facility: CLINIC | Age: OVER 89
End: 2025-01-31
Payer: MEDICARE

## 2025-02-06 ENCOUNTER — TELEPHONE (OUTPATIENT)
Dept: FAMILY MEDICINE | Facility: CLINIC | Age: OVER 89
End: 2025-02-06
Payer: MEDICARE

## 2025-02-06 NOTE — TELEPHONE ENCOUNTER
Home Care is calling regarding an established patient with [facility].       Requesting orders from: Maggie Arriaga  Provider is following patient: Yes  Is this a 60-day recertification request?  Yes    Orders Requested    Skilled Nursing  Request for recertification   Frequency:  Every other week for 4 weeks      Information was gathered and will be sent to provider for review.  RN will contact Home Care with information after provider review.  Confirmed ok to leave a detailed message with call back.  Contact information confirmed and updated as needed.    Oral Bar RN

## 2025-02-06 NOTE — TELEPHONE ENCOUNTER
Home Care is calling regarding an established patient with [facility].       Requesting orders from: Maggie Arriaga  Provider is following patient: Yes  Is this a 60-day recertification request?  Yes    Orders Requested    Physical Therapy  Request for recertification   Frequency:  1x/wk for 2 wks, off for 1 week an then 1 X / week for 1 week.      Information was gathered and will be sent to provider for review.  RN will contact Home Care with information after provider review.  Confirmed ok to leave a detailed message with call back.  Contact information confirmed and updated as needed.    Suni Barba, RN

## 2025-02-08 ENCOUNTER — MEDICAL CORRESPONDENCE (OUTPATIENT)
Dept: HEALTH INFORMATION MANAGEMENT | Facility: CLINIC | Age: OVER 89
End: 2025-02-08
Payer: MEDICARE

## 2025-02-12 ENCOUNTER — MEDICAL CORRESPONDENCE (OUTPATIENT)
Dept: HEALTH INFORMATION MANAGEMENT | Facility: CLINIC | Age: OVER 89
End: 2025-02-12
Payer: MEDICARE

## 2025-02-14 SDOH — HEALTH STABILITY: PHYSICAL HEALTH: ON AVERAGE, HOW MANY MINUTES DO YOU ENGAGE IN EXERCISE AT THIS LEVEL?: 0 MIN

## 2025-02-14 SDOH — HEALTH STABILITY: PHYSICAL HEALTH: ON AVERAGE, HOW MANY DAYS PER WEEK DO YOU ENGAGE IN MODERATE TO STRENUOUS EXERCISE (LIKE A BRISK WALK)?: 0 DAYS

## 2025-02-14 ASSESSMENT — SOCIAL DETERMINANTS OF HEALTH (SDOH): HOW OFTEN DO YOU GET TOGETHER WITH FRIENDS OR RELATIVES?: TWICE A WEEK

## 2025-02-15 ENCOUNTER — HEALTH MAINTENANCE LETTER (OUTPATIENT)
Age: OVER 89
End: 2025-02-15

## 2025-02-19 ENCOUNTER — OFFICE VISIT (OUTPATIENT)
Dept: FAMILY MEDICINE | Facility: CLINIC | Age: OVER 89
End: 2025-02-19
Payer: MEDICARE

## 2025-02-19 VITALS
SYSTOLIC BLOOD PRESSURE: 152 MMHG | HEIGHT: 64 IN | DIASTOLIC BLOOD PRESSURE: 78 MMHG | HEART RATE: 74 BPM | TEMPERATURE: 97.6 F | OXYGEN SATURATION: 94 % | RESPIRATION RATE: 16 BRPM | WEIGHT: 179.8 LBS | BODY MASS INDEX: 30.7 KG/M2

## 2025-02-19 DIAGNOSIS — Z53.9 DIAGNOSIS NOT YET DEFINED: Primary | ICD-10-CM

## 2025-02-19 DIAGNOSIS — Z00.00 ENCOUNTER FOR MEDICARE ANNUAL WELLNESS EXAM: ICD-10-CM

## 2025-02-19 DIAGNOSIS — Z90.49 HX LAPAROSCOPIC CHOLECYSTECTOMY: ICD-10-CM

## 2025-02-19 DIAGNOSIS — F33.40 RECURRENT MAJOR DEPRESSIVE DISORDER, IN REMISSION: ICD-10-CM

## 2025-02-19 DIAGNOSIS — G20.A1 PARKINSON'S DISEASE, UNSPECIFIED WHETHER DYSKINESIA PRESENT, UNSPECIFIED WHETHER MANIFESTATIONS FLUCTUATE (H): ICD-10-CM

## 2025-02-19 DIAGNOSIS — E11.42 TYPE 2 DIABETES MELLITUS WITH DIABETIC POLYNEUROPATHY, WITHOUT LONG-TERM CURRENT USE OF INSULIN (H): Primary | ICD-10-CM

## 2025-02-19 DIAGNOSIS — F33.42 RECURRENT MAJOR DEPRESSIVE DISORDER, IN FULL REMISSION: ICD-10-CM

## 2025-02-19 DIAGNOSIS — K21.9 GASTROESOPHAGEAL REFLUX DISEASE WITHOUT ESOPHAGITIS: ICD-10-CM

## 2025-02-19 PROBLEM — R79.89 ELEVATED LFTS: Status: ACTIVE | Noted: 2025-01-13

## 2025-02-19 PROBLEM — N25.81 SECONDARY RENAL HYPERPARATHYROIDISM: Status: ACTIVE | Noted: 2021-06-15

## 2025-02-19 LAB
EST. AVERAGE GLUCOSE BLD GHB EST-MCNC: 143 MG/DL
HBA1C MFR BLD: 6.6 % (ref 0–5.6)
HOLD SPECIMEN: NORMAL

## 2025-02-19 PROCEDURE — G0179 MD RECERTIFICATION HHA PT: HCPCS | Performed by: FAMILY MEDICINE

## 2025-02-19 PROCEDURE — G2211 COMPLEX E/M VISIT ADD ON: HCPCS | Performed by: FAMILY MEDICINE

## 2025-02-19 PROCEDURE — 36415 COLL VENOUS BLD VENIPUNCTURE: CPT | Performed by: FAMILY MEDICINE

## 2025-02-19 PROCEDURE — 83036 HEMOGLOBIN GLYCOSYLATED A1C: CPT | Performed by: FAMILY MEDICINE

## 2025-02-19 PROCEDURE — 99214 OFFICE O/P EST MOD 30 MIN: CPT | Mod: 25 | Performed by: FAMILY MEDICINE

## 2025-02-19 PROCEDURE — G0439 PPPS, SUBSEQ VISIT: HCPCS | Performed by: FAMILY MEDICINE

## 2025-02-19 RX ORDER — OMEPRAZOLE 20 MG/1
20 CAPSULE, DELAYED RELEASE ORAL 2 TIMES DAILY
Qty: 180 CAPSULE | Refills: 0 | Status: SHIPPED | OUTPATIENT
Start: 2025-02-19 | End: 2025-05-20

## 2025-02-19 NOTE — ASSESSMENT & PLAN NOTE
Gerd: Continue omeprazole 20 mg p.o. B ID      Orders:    omeprazole (PRILOSEC) 20 MG DR capsule; Take 1 capsule (20 mg) by mouth 2 times daily.

## 2025-02-19 NOTE — PATIENT INSTRUCTIONS
Patient Education   Preventive Care Advice   This is general advice given by our system to help you stay healthy. However, your care team may have specific advice just for you. Please talk to your care team about your preventive care needs.  Nutrition  Eat 5 or more servings of fruits and vegetables each day.  Try wheat bread, brown rice and whole grain pasta (instead of white bread, rice, and pasta).  Get enough calcium and vitamin D. Check the label on foods and aim for 100% of the RDA (recommended daily allowance).  Lifestyle  Exercise at least 150 minutes each week  (30 minutes a day, 5 days a week).  Do muscle strengthening activities 2 days a week. These help control your weight and prevent disease.  No smoking.  Wear sunscreen to prevent skin cancer.  Have a dental exam and cleaning every 6 months.  Yearly exams  See your health care team every year to talk about:  Any changes in your health.  Any medicines your care team has prescribed.  Preventive care, family planning, and ways to prevent chronic diseases.  Shots (vaccines)   HPV shots (up to age 26), if you've never had them before.  Hepatitis B shots (up to age 59), if you've never had them before.  COVID-19 shot: Get this shot when it's due.  Flu shot: Get a flu shot every year.  Tetanus shot: Get a tetanus shot every 10 years.  Pneumococcal, hepatitis A, and RSV shots: Ask your care team if you need these based on your risk.  Shingles shot (for age 50 and up)  General health tests  Diabetes screening:  Starting at age 35, Get screened for diabetes at least every 3 years.  If you are younger than age 35, ask your care team if you should be screened for diabetes.  Cholesterol test: At age 39, start having a cholesterol test every 5 years, or more often if advised.  Bone density scan (DEXA): At age 50, ask your care team if you should have this scan for osteoporosis (brittle bones).  Hepatitis C: Get tested at least once in your life.  STIs (sexually  transmitted infections)  Before age 24: Ask your care team if you should be screened for STIs.  After age 24: Get screened for STIs if you're at risk. You are at risk for STIs (including HIV) if:  You are sexually active with more than one person.  You don't use condoms every time.  You or a partner was diagnosed with a sexually transmitted infection.  If you are at risk for HIV, ask about PrEP medicine to prevent HIV.  Get tested for HIV at least once in your life, whether you are at risk for HIV or not.  Cancer screening tests  Cervical cancer screening: If you have a cervix, begin getting regular cervical cancer screening tests starting at age 21.  Breast cancer scan (mammogram): If you've ever had breasts, begin having regular mammograms starting at age 40. This is a scan to check for breast cancer.  Colon cancer screening: It is important to start screening for colon cancer at age 45.  Have a colonoscopy test every 10 years (or more often if you're at risk) Or, ask your provider about stool tests like a FIT test every year or Cologuard test every 3 years.  To learn more about your testing options, visit:   .  For help making a decision, visit:   https://bit.ly/aj59901.  Prostate cancer screening test: If you have a prostate, ask your care team if a prostate cancer screening test (PSA) at age 55 is right for you.  Lung cancer screening: If you are a current or former smoker ages 50 to 80, ask your care team if ongoing lung cancer screenings are right for you.  For informational purposes only. Not to replace the advice of your health care provider. Copyright   2023 [facility]. All rights reserved. Clinically reviewed by the [facility]. [employer] 173326 - REV 01/24.  Preventing Falls: Care Instructions  Injuries and health problems such as trouble walking or poor eyesight can increase your risk of falling. So can some medicines. But there are things you can do to help  "prevent falls. You can exercise to get stronger. You can also arrange your home to make it safer.    Talk to your doctor about the medicines you take. Ask if any of them increase the risk of falls and whether they can be changed or stopped.   Try to exercise regularly. It can help improve your strength and balance. This can help lower your risk of falling.         Practice fall safety and prevention.   Wear low-heeled shoes that fit well and give your feet good support. Talk to your doctor if you have foot problems that make this hard.  Carry a cellphone or wear a medical alert device that you can use to call for help.  Use stepladders instead of chairs to reach high objects. Don't climb if you're at risk for falls. Ask for help, if needed.  Wear the correct eyeglasses, if you need them.        Make your home safer.   Remove rugs, cords, clutter, and furniture from walkways.  Keep your house well lit. Use night-lights in hallways and bathrooms.  Install and use sturdy handrails on stairways.  Wear nonskid footwear, even inside. Don't walk barefoot or in socks without shoes.        Be safe outside.   Use handrails, curb cuts, and ramps whenever possible.  Keep your hands free by using a shoulder bag or backpack.  Try to walk in well-lit areas. Watch out for uneven ground, changes in pavement, and debris.  Be careful in the winter. Walk on the grass or gravel when sidewalks are slippery. Use de-icer on steps and walkways. Add non-slip devices to shoes.    Put grab bars and nonskid mats in your shower or tub and near the toilet. Try to use a shower chair or bath bench when bathing.   Get into a tub or shower by putting in your weaker leg first. Get out with your strong side first. Have a phone or medical alert device in the bathroom with you.   Where can you learn more?  Go to https://www.Dot[employer].net/patiented  Enter G117 in the search box to learn more about \"Preventing Falls: Care Instructions.\"  Current as of: " July 31, 2024  Content Version: 14.3    2024 [employer].   Care instructions adapted under license by your healthcare professional. If you have questions about a medical condition or this instruction, always ask your healthcare professional. [employer] disclaims any warranty or liability for your use of this information.

## 2025-02-19 NOTE — PROGRESS NOTES
Preventive Care Visit  [facility]  Maggie Arriaga MD, Family Medicine  Feb 19, 2025      Assessment & Plan  Type 2 diabetes mellitus with diabetic polyneuropathy, without long-term current use of insulin (H)  Diabetes improved - A1c down from 8.2 to 6.6.    Orders:    HEMOGLOBIN A1C; Future    Hx laparoscopic cholecystectomy  Hx lap wellington 1/15/2025  Recovering.   Plan to get xr to make sure stent is cleared, and if not surgery will need to see her again, this has been ordered through [facility] and is pending in early March.     Orders:    Comprehensive metabolic panel (BMP + Alb, Alk Phos, ALT, AST, Total. Bili, TP); Future    Encounter for Medicare annual wellness exam         Recurrent major depressive disorder, in full remission    Orders:    FLUoxetine (PROZAC) 20 MG capsule; Take 1 capsule (20 mg) by mouth 2 times daily.    Gastroesophageal reflux disease without esophagitis  Gerd: Continue omeprazole 20 mg p.o. B ID      Orders:    omeprazole (PRILOSEC) 20 MG DR capsule; Take 1 capsule (20 mg) by mouth 2 times daily.    Recurrent major depressive disorder, in remission  Depression - continue prozac 20 mg poq day  Follow up in 6 months.          Parkinson's disease, unspecified whether dyskinesia present, unspecified whether manifestations fluctuate (H)  Managed by Dr. Moya, neuro               Subjective   Janes is a 89 year old, presenting for the following:  Annual Visit        2/19/2025     3:41 PM   Additional Questions   Roomed by Taqueria Henry MA   Accompanied by Son         2/19/2025     3:41 PM   Patient Reported Additional Medications   Patient reports taking the following new medications None       Health Care Directive  Patient has a Health Care Directive on file  Advance care planning document is on file and is current.      2/14/2025   General Health   How would you rate your overall physical health? Good   Feel stress (tense, anxious, or unable to sleep) Not at all          2/14/2025   Nutrition   Diet: Low salt         2/14/2025   Exercise   Days per week of moderate/strenous exercise 0 days   Average minutes spent exercising at this level 0 min   (!) EXERCISE CONCERN      2/14/2025   Social Factors   Frequency of gathering with friends or relatives Twice a week   Worry food won't last until get money to buy more No   Food not last or not have enough money for food? No   Do you have housing? (Housing is defined as stable permanent housing and does not include staying ouside in a car, in a tent, in an abandoned building, in an overnight shelter, or couch-surfing.) Yes   Are you worried about losing your housing? No   Lack of transportation? No   Unable to get utilities (heat,electricity)? No         2/19/2025   Fall Risk   Gait Speed Test Interpretation Less than or equal to 5.00 seconds - PASS         2/14/2025   Activities of Daily Living- Home Safety   Needs help with the following daily activites None of the above   Safety concerns in the home None of the above         2/14/2025   Dental   Dentist two times every year? Yes         2/14/2025   Hearing Screening   Hearing concerns? None of the above         2/14/2025   Driving Risk Screening   Patient/family members have concerns about driving No         2/14/2025   General Alertness/Fatigue Screening   Have you been more tired than usual lately? No         2/14/2025   Urinary Incontinence Screening   Bothered by leaking urine in past 6 months No          Today's PHQ-9 Score:       2/19/2025     3:35 PM   PHQ-9 SCORE   PHQ-9 Total Score MyChart Incomplete         2/14/2025   Substance Use   Alcohol more than 3/day or more than 7/wk No   Do you have a current opioid prescription? No   How severe/bad is pain from 1 to 10? 0/10 (No Pain)   Do you use any other substances recreationally? No     Social History     Tobacco Use    Smoking status: Never     Passive exposure: Never    Smokeless tobacco: Never   Vaping Use    Vaping  status: Never Used   Substance Use Topics    Alcohol use: No    Drug use: No          Mammogram Screening - After age 74- determine frequency with patient based on health status, life expectancy and patient goals      Reviewed and updated as needed this visit by Provider                      Current providers sharing in care for this patient include:  Patient Care Team:  Maggie Arriaga MD as PCP - General (Family Medicine)  Maggie Arriaga MD as Assigned PCP  Brennon Moya MD as MD (Neurology)  Mathew Ricardo MD as MD (Cardiovascular Disease)  Abner Curtis MD as MD (Endocrinology, Diabetes, and Metabolism)  Brennon Moya MD as Assigned Neuroscience Provider  Eric Christensen MD as MD (Endocrinology, Diabetes, and Metabolism)  Eric Christensen MD as Assigned Endocrinology Provider  Junaid Scales MD as Assigned Pulmonology Provider  Jhonatan Cast DO as Assigned Sleep Provider  Otilia Meneses PA-C as Assigned Heart and Vascular Provider    The following health maintenance items are reviewed in Epic and correct as of today:  Health Maintenance   Topic Date Due    HF ACTION PLAN  Never done    MEDICARE ANNUAL WELLNESS VISIT  11/10/2024    ANNUAL REVIEW OF HM ORDERS  04/04/2025    COVID-19 Vaccine (8 - 2024-25 season) 04/26/2025    A1C  05/19/2025    EYE EXAM  06/20/2025    MICROALBUMIN  07/07/2025    LIPID  07/16/2025    BMP  07/22/2025    PHQ-9  07/22/2025    DIABETIC FOOT EXAM  11/08/2025    ALT  01/22/2026    CBC  01/22/2026    HEMOGLOBIN  01/22/2026    FALL RISK ASSESSMENT  02/19/2026    DTAP/TDAP/TD IMMUNIZATION (2 - Td or Tdap) 07/06/2026    ADVANCE CARE PLANNING  11/10/2028    PARATHYROID  Completed    PHOSPHORUS  Completed    TSH W/FREE T4 REFLEX  Completed    DEPRESSION ACTION PLAN  Completed    INFLUENZA VACCINE  Completed    Pneumococcal Vaccine: 50+ Years  Completed    URINALYSIS  Completed    ALK PHOS  Completed    ZOSTER IMMUNIZATION   "Completed    RSV VACCINE  Completed    HPV IMMUNIZATION  Aged Out    MENINGITIS IMMUNIZATION  Aged Out            Objective    Exam  BP (!) 152/78 (BP Location: Left arm, Patient Position: Sitting, Cuff Size: Adult Large)   Pulse 74   Temp 97.6  F (36.4  C) (Oral)   Resp 16   Ht 1.613 m (5' 3.5\")   Wt 81.6 kg (179 lb 12.8 oz)   LMP  (LMP Unknown)   SpO2 94%   BMI 31.35 kg/m     Estimated body mass index is 31.35 kg/m  as calculated from the following:    Height as of this encounter: 1.613 m (5' 3.5\").    Weight as of this encounter: 81.6 kg (179 lb 12.8 oz).    Physical Exam  Constitutional:       Appearance: Normal appearance.   HENT:      Head: Normocephalic and atraumatic.   Cardiovascular:      Rate and Rhythm: Normal rate and regular rhythm.   Pulmonary:      Effort: Pulmonary effort is normal.   Abdominal:      General: Bowel sounds are normal.      Palpations: Abdomen is soft.      Comments: Healing incisions from lap wellington   Musculoskeletal:         General: Normal range of motion.      Cervical back: Normal range of motion and neck supple.   Neurological:      General: No focal deficit present.      Mental Status: She is alert and oriented to person, place, and time.               2/19/2025   Mini Cog   Clock Draw Score 2 Normal   3 Item Recall 3 objects recalled   Mini Cog Total Score 5            Signed Electronically by: Maggie Arriaga MD    Answers submitted by the patient for this visit:  Patient Health Questionnaire (Submitted on 2/19/2025)  PHQ9 TOTAL SCORE: Incomplete    "

## 2025-02-19 NOTE — ASSESSMENT & PLAN NOTE
Hx lap wellington 1/15/2025  Recovering.   Plan to get xr to make sure stent is cleared, and if not surgery will need to see her again, this has been ordered through [facility] and is pending in early March.     Orders:    Comprehensive metabolic panel (BMP + Alb, Alk Phos, ALT, AST, Total. Bili, TP); Future

## 2025-02-24 DIAGNOSIS — Z53.9 DIAGNOSIS NOT YET DEFINED: Primary | ICD-10-CM

## 2025-02-24 PROCEDURE — G0179 MD RECERTIFICATION HHA PT: HCPCS | Performed by: FAMILY MEDICINE

## 2025-03-13 DIAGNOSIS — G50.0 TRIGEMINAL NEURALGIA: ICD-10-CM

## 2025-03-13 RX ORDER — GABAPENTIN 300 MG/1
CAPSULE ORAL
Qty: 360 CAPSULE | Refills: 2 | Status: SHIPPED | OUTPATIENT
Start: 2025-03-13

## 2025-03-13 NOTE — TELEPHONE ENCOUNTER
Refill request for: gabapentin 300mg   Directions: TAKE 1 CAPSULE BY MOUTH 4 TIMES DAILY     LOV: 09/06/24  NOV: 09/11/25    90 day supply with 2 refills Medication T'd for review and signature    Jenelle Lewis LPN on 3/13/2025 at 4:01 PM

## 2025-03-18 ENCOUNTER — HOSPITAL ENCOUNTER (OUTPATIENT)
Dept: CARDIOLOGY | Facility: HOSPITAL | Age: OVER 89
Discharge: HOME | End: 2025-03-18
Payer: MEDICARE

## 2025-03-18 DIAGNOSIS — I50.20 HEART FAILURE WITH REDUCED EJECTION FRACTION (H): ICD-10-CM

## 2025-03-18 LAB — LVEF ECHO: NORMAL

## 2025-03-18 PROCEDURE — 255N000002 HC RX 255 OP 636

## 2025-03-18 PROCEDURE — C8929 TTE W OR WO FOL WCON,DOPPLER: HCPCS

## 2025-03-18 PROCEDURE — 93306 TTE W/DOPPLER COMPLETE: CPT | Mod: 26 | Performed by: INTERNAL MEDICINE

## 2025-03-18 RX ADMIN — PERFLUTREN 2 ML: 6.52 INJECTION, SUSPENSION INTRAVENOUS at 15:11

## 2025-03-26 ENCOUNTER — ANCILLARY ORDERS (OUTPATIENT)
Dept: RADIOLOGY | Facility: CLINIC | Age: OVER 89
End: 2025-03-26
Payer: MEDICARE

## 2025-03-27 ENCOUNTER — OFFICE VISIT (OUTPATIENT)
Dept: PULMONOLOGY | Facility: CLINIC | Age: OVER 89
End: 2025-03-27
Attending: INTERNAL MEDICINE
Payer: MEDICARE

## 2025-03-27 VITALS — DIASTOLIC BLOOD PRESSURE: 72 MMHG | SYSTOLIC BLOOD PRESSURE: 126 MMHG | OXYGEN SATURATION: 97 % | HEART RATE: 66 BPM

## 2025-03-27 DIAGNOSIS — J98.09 BRONCHOMALACIA: ICD-10-CM

## 2025-03-27 DIAGNOSIS — G47.30 SLEEP-RELATED BREATHING DISORDER: ICD-10-CM

## 2025-03-27 DIAGNOSIS — G47.34 NOCTURNAL HYPOXIA: Primary | ICD-10-CM

## 2025-03-27 DIAGNOSIS — Z72.821 POOR SLEEP HYGIENE: ICD-10-CM

## 2025-03-27 PROCEDURE — 3074F SYST BP LT 130 MM HG: CPT | Performed by: INTERNAL MEDICINE

## 2025-03-27 PROCEDURE — 3078F DIAST BP <80 MM HG: CPT | Performed by: INTERNAL MEDICINE

## 2025-03-27 PROCEDURE — G2211 COMPLEX E/M VISIT ADD ON: HCPCS | Performed by: INTERNAL MEDICINE

## 2025-03-27 PROCEDURE — 99214 OFFICE O/P EST MOD 30 MIN: CPT | Performed by: INTERNAL MEDICINE

## 2025-03-27 NOTE — PATIENT INSTRUCTIONS
Titrate BP meds and diuresis, currently 20 mg twice a day   Daily weight, current weight 177 lbs.   Avoid eating close to bed time   Raise the head of the bed  Omeprazole daily   No oxygen desaturation in last overnight O2 evaluation   Sleep hygiene was discussed.   Follow up as needed

## 2025-03-30 NOTE — PROGRESS NOTES
PULMONARY OUTPATIENT FOLLOW UP NOTE        Assessment:     Moderate tracheobronchomalacia   No tobacco use. No outdoor allergies, no history of asthma.   PFTs showed a normal spirometry , lung volumes and diffusion capacity.   Hx acid reflux. Currently on PPI. Diet modification was discussed.   Bronchodilators as needed  Resolved nocturnal hypoxia   Overnight pulse oximetry 8/14/2023 showed significant nocturnal desaturation. Time SpO2 < 90% 3 HR 3 MIN, time of SpO2 < 89% 1 HR 25 MIN. Total desaturations : 286. Desaturation index : 40.9  Follow up overnight pulse oximetry on 10/9/2024 showed mean SpO2 91.5%, time SpO2 < 89% was 1 MIN  No further evaluation.   HTN, HFrEF, CAD s/p CABG , moderate MR  GERD       Plan:      Titrate BP meds and diuresis, currently 20 mg twice a day   Daily weight, current weight 177 lbs.   Avoid eating close to bed time   Raise the head of the bed  Omeprazole daily   No oxygen desaturation in last overnight O2 evaluation   Sleep hygiene was discussed.   Follow up as needed    Junaid Lowe  Pulmonary / Critical Care  3/27/2025        CC:     Chief Complaint   Patient presents with    Follow Up     Bronchomalacia  Poor sleep hygiene  Sleep-related breathing disorder  Heart failure with preserved ejection fraction, NYHA class II (H)         HPI:         Janes Montero is a 89 year old female who presents for follow up.  Patient has history of HTN, HFrEF 45 to 50%, moderate MR, CAD s/p CABG 2010, abnormal stress test 5/2023 on medical treatment, CKD, DM, Parkinson's disease , tracheobronchomalacia, nocturnal hypoxia, GERD, obesity.  Patient was referred to sleep clinic due to significant desaturation in overnight pulse oximetry. Patient declined further evaluation.   Since last visit, patient was hospitalized on January 2025 for evaluation of abdominal pain, diagnosed with acute cholecystitis s/p cholecystostomy on 1/15.   Ongoing abdominal pain, elevated LFTs. CBD  narrowing ERCP s/p sphincterectomy and stent placement   Doing well post hospitalization.   Follow up overnight pulse oximetry done on 10/9/2024 showed mean SpO2 91.5%, time SpO2 < 89% was 1 MIN.   Denies cough or wheezes. No outdoor allergies, no history of asthma.   Denies postnasal drip, headaches, or sinus tenderness. Denies using inhalers.   Denies chest pain, orthopnea, PND, or swelling of LEs.  Denies acid reflux symptoms while taking PPI.   Denies tobacco use.      Past Medical History :     Past Medical History:   Diagnosis Date    Acute diastolic congestive heart failure (H)     Acute kidney injury superimposed on CKD 08/18/2022    Acute on chronic congestive heart failure (H) 06/11/2018    Acute pulmonary edema (H) 05/06/2023    Acute respiratory failure with hypoxia (H) 05/08/2023    Age-related osteoporosis with current pathological fracture with routine healing     Chest pain, unspecified type 08/15/2022    Chronic bilateral back pain 06/15/2021    Last Assessment & Plan:   Formatting of this note might be different from the original.  She says she has seen a pain specialist and had back surgery.  She needs to establish care with a pain specialist since she is new to [place] from [place].  Referral placed.    Coronary artery disease     Coronary artery disease involving native coronary artery without angina pectoris, unspecified whether native or transplanted heart 08/17/2022    Diabetes mellitus, type II (H)     Diastolic dysfunction 07/10/2018    Frozen shoulder     bilateral    Heart failure with reduced ejection fraction (H) 05/25/2023    Hyperlipidemia     Hypertension     Hypertensive emergency     Parkinson disease (H)     Primary osteoarthritis involving multiple joints     Primary osteoarthritis of left knee     Pulmonary nodules 04/04/2024    Recurrent UTI     hx septic shock    Thyroid disease 2021          Medications:     Current Outpatient Medications   Medication Sig Dispense Refill     acetaminophen (TYLENOL) 325 MG tablet Take 650 mg by mouth every 4 hours as needed.      atorvastatin (LIPITOR) 20 MG tablet TAKE 1 TABLET BY MOUTH AT  BEDTIME 90 tablet 3    carbidopa-levodopa (SINEMET)  MG tablet TAKE 1 TABLET BY MOUTH 3  TIMES DAILY TAKE AT LEAST  1/2 HOUR BEFORE MEALS OR 2  HOURS AFTER MEALS DO NOT  MIX WITH FOOD 270 tablet 3    carvedilol (COREG) 12.5 MG tablet Take 1 tablet (12.5 mg) by mouth 2 times daily (with meals). 180 tablet 3    cyanocobalamin (VITAMIN B-12) 1000 MCG tablet Take 1 tablet (1,000 mcg) by mouth every evening. 90 tablet 3    FLUoxetine (PROZAC) 20 MG capsule Take 1 capsule (20 mg) by mouth 2 times daily. 180 capsule 1    furosemide (LASIX) 20 MG tablet Take 1 tablet (20 mg) by mouth 2 times daily. 180 tablet 1    gabapentin (NEURONTIN) 300 MG capsule TAKE 1 CAPSULE BY MOUTH 4 TIMES DAILY 360 capsule 2    loratadine (CLARITIN) 10 mg tablet Take 10 mg by mouth daily as needed for allergies      Magnesium 400 MG TABS Take 1 tablet by mouth daily.      melatonin 1 mg Tab tablet Take 1 mg by mouth At Bedtime      multivitamin therapeutic tablet Take 1 tablet by mouth every morning      nitroGLYcerin (NITROSTAT) 0.4 MG sublingual tablet Place 1 tablet (0.4 mg) under the tongue every 5 minutes as needed for chest pain 100 tablet 1    nystatin (MYCOSTATIN) 037842 UNIT/GM external cream APPLY TO AFFECTED AREA(S)  TOPICALLY TWICE DAILY 120 g 0    omeprazole (PRILOSEC) 20 MG DR capsule Take 1 capsule (20 mg) by mouth 2 times daily. 180 capsule 0    polyethylene glycol (MIRALAX) 17 gram packet Take 8.5 g by mouth every evening.       Current Facility-Administered Medications   Medication Dose Route Frequency Provider Last Rate Last Admin    denosumab (PROLIA) injection 60 mg  60 mg Subcutaneous Q6 Months    60 mg at 01/22/25 1459    denosumab (PROLIA) injection 60 mg  60 mg Subcutaneous Q6 Months Eric Christensen MD   60 mg at 07/10/24 1449        Social History :     Social  History     Socioeconomic History    Marital status:      Spouse name: Not on file    Number of children: Not on file    Years of education: Not on file    Highest education level: Not on file   Occupational History    Not on file   Tobacco Use    Smoking status: Never     Passive exposure: Never    Smokeless tobacco: Never   Vaping Use    Vaping status: Never Used   Substance and Sexual Activity    Alcohol use: No    Drug use: No    Sexual activity: Not Currently     Partners: Male     Birth control/protection: None   Other Topics Concern    Parent/sibling w/ CABG, MI or angioplasty before 65F 55M? No   Social History Narrative    6/15/2021 new to [place] from [place]. She has 6 kids.    12/11/2024 living in senior living, in independent living situation. Accompanied by son Abdelrahman today     Social Drivers of Health     Financial Resource Strain: Low Risk  (2/14/2025)    Financial Resource Strain     Within the past 12 months, have you or your family members you live with been unable to get utilities (heat, electricity) when it was really needed?: No   Food Insecurity: Low Risk  (2/14/2025)    Food Insecurity     Within the past 12 months, did you worry that your food would run out before you got money to buy more?: No     Within the past 12 months, did the food you bought just not last and you didn t have money to get more?: No   Transportation Needs: Low Risk  (2/14/2025)    Transportation Needs     Within the past 12 months, has lack of transportation kept you from medical appointments, getting your medicines, non-medical meetings or appointments, work, or from getting things that you need?: No   Physical Activity: Inactive (2/14/2025)    Exercise Vital Sign     Days of Exercise per Week: 0 days     Minutes of Exercise per Session: 0 min   Stress: No Stress Concern Present (2/14/2025)    [place] [employer] - Occupational Stress Questionnaire     Feeling of Stress : Not at all   Social  Connections: Unknown (2/14/2025)    Social Connection and Isolation Panel [NHANES]     Frequency of Communication with Friends and Family: Not on file     Frequency of Social Gatherings with Friends and Family: Twice a week     Attends [employer] Services: Not on file     Active Member of Clubs or Organizations: Not on file     Attends Club or Organization Meetings: Not on file     Marital Status: Not on file   Interpersonal Safety: Not At Risk (1/13/2025)    Received from [facility]    Humiliation, Afraid, Rape, and Kick questionnaire     Fear of Current or Ex-Partner: No     Emotionally Abused: No     Physically Abused: No     Sexually Abused: No   Housing Stability: Low Risk  (2/14/2025)    Housing Stability     Do you have housing? : Yes     Are you worried about losing your housing?: No   Recent Concern: Housing Stability - High Risk (1/13/2025)    Received from [facility]    Housing Stability Vital Sign     Unable to Pay for Housing in the Last Year: No     Number of Times Moved in the Last Year: 2     Homeless in the Last Year: No          Family History :     Family History   Problem Relation Age of Onset    Heart Disease Mother     Heart Disease Father     Sleep Apnea Daughter        Review of Systems  A 12 point comprehensive review of systems was negative except as noted.        Objective:     /72   Pulse 66   LMP  (LMP Unknown)   SpO2 97%     Gen: obese, awake, alert, no distress  HEENT: pink conjunctiva, moist mucosa, Mallampati III/IV  Neck: no thyromegaly, masses or JVD  Lungs: clear  CV: irregular, systolic murmurs, no gallops appreciated  Abdomen: soft, NT, BS wnl  Ext: no edema  Neuro: CN II-XII intact, non focal      Diagnostic tests:        Overnight pulse oximetry      Overnight pulse oximetry 8/14/2023    Valid time : 7 HR 18 MIN    Lowest SpO2 83%  SpO2 < 90% 3 HR 3 MIN  SpO2 < 89% 1 HR 25 MIN    Total desaturations : 286  Desaturation index : 40.9    PFTs:          IMAGES:       CT CHEST W/O CONTRAST  LOCATION: [facility]  DATE/TIME: 5/10/2023 5:25 PM CDT  INDICATION: persistent hypoxia; eval further for possible interstitial lung disease suggested on CXR  COMPARISON: 05/06/2023  FINDINGS:   LUNGS AND PLEURA: Modest motion artifact. No focal pneumonia. No pleural effusion. No significant peripheral interstitial fibrosis suggested.Mosaic attenuation suggests small airways disease. There is moderate inflammatory bronchial wall thickening most pronounced in the perihilar regions. In addition, there is flattening of trachea and main bronchial structures consistent with bronchial tracheobronchomalacia.  MEDIASTINUM/AXILLAE: Postoperative changes from CABG. Cardiomegaly. No lymphadenopathy.  CORONARY ARTERY CALCIFICATION: Severe.  UPPER ABDOMEN: 2.4 cm hyperdense round nodule upper pole left kidney most suggestive of benign hyperdense cyst.  MUSCULOSKELETAL: Mild compression superior endplate of L1 age indeterminate. Right shoulder arthroplasty.        IMPRESSION:   1.  No significant interstitial fibrosis suggested. Modest motion artifact does compromise this exam.  2.  There is moderate inflammatory bronchial wall thickening, tracheobronchomalacia and mosaic attenuation of lung parenchyma consistent with small airways disease.  3.  2.4 cm hyperdense structure upper pole left kidney likely a benign hyperdense cyst. Ultrasound may be challenging to perform due to position of lesion in patient body habitus. Suggest a follow-up dedicated renal CT in 4-6 months for confirmation.    CT CHEST W/O CONTRAST  LOCATION: [facility]  DATE: 7/10/2024  INDICATION:  Pulmonary nodules  COMPARISON: CT chest without contrast 3/26/2024, 5/10/2023  FINDINGS:   LUNGS AND PLEURA: Assessment of the lung parenchyma is limited by motion-related blurring which was also similar to the comparison studies. Heterogeneous attenuation of the parenchyma due to variable  lung inflation. No acute airspace opacities. Minimal   subpleural scarring in the bases near the diaphragmatic pleura. The pattern of interstitial thickening and nodularity in the right upper and middle lobes in March 2024 has resolved. There are no actionable lung nodules. Central airways are patent. No   pleural space abnormality.  MEDIASTINUM: Cardiac chambers are normal in size. No pericardial effusion. Nonaneurysmal thoracic aorta. Moderate great vessel, aortic arch and descending thoracic aorta atheromatous calcifications. Moderate mitral annular calcifications. The main pulmonary artery is enlarged measuring 3.4 cm in diameter which is associated with pulmonary hypertension.. The esophagus is decompressed. Reactive lower paratracheal and subcarinal nodes present 3/26/2024 have resolved. No lymphadenopathy.  CORONARY ARTERY CALCIFICATION: Previous intervention (stents or CABG).  UPPER ABDOMEN: No actionable findings in the imaged upper abdomen.  MUSCULOSKELETAL: Chronic mild superior endplate deformity of L1. Multilevel thoracic spine disc space narrowing and small marginal osteophytes. Sternal wires are intact and aligned. Previous right shoulder arthroplasty.                                        IMPRESSION:    1.  Resolution of interlobular septal thickening and nodularity in the right upper and middle lobes present in March 2024 consistent with clearing of interstitial edema. No active lung, airway, or pleural inflammatory process.  2.  Previous coronary revascularization. Enlarged main pulmonary artery is associated with pulmonary hypertension.    CT ANGIO CHEST W IV CONT PE STUDY   LOCATION: [facility]   DATE: 1/12/2025     INDICATION: Right-sided chest pain and upper abdominal pain. Assess for pulmonary embolism.   COMPARISON: Chest x-ray 1/7/2025   FINDINGS:   ANGIOGRAM CHEST: Pulmonary arteries moderately well visualized, no pulmonary embolism. Thoracic aorta is negative for dissection.  Moderate diffuse calcified atherosclerotic disease normal caliber thoracic aorta. Aortic valvular calcifications. Mitral annular valve calcifications. Sternotomy with coronary artery bypass grafting.  LUNGS AND PLEURA: Bilateral mosaic groundglass attenuation nonspecific. No airspace consolidation. Small amount of bibasilar atelectasis or scarring. No effusion.  MEDIASTINUM/AXILLAE: No thoracic lymphadenopathy. There are few mildly prominent mediastinal and hilar lymph nodes upper limits of normal and likely reactive. Cardiac enlargement. No pericardial effusion. Normal caliber esophagus.  CORONARY ARTERY CALCIFICATION: Previous intervention (stents or CABG).  UPPER ABDOMEN: Mild gallbladder distention with cholelithiasis.   MUSCULOSKELETAL: Sternotomy. Right shoulder arthroplasty. Degenerative change thoracolumbar spine.   IMPRESSION:   1. No pulmonary embolism and no aortic dissection.   2.  Bilateral groundglass mosaic attenuation nonspecific but can be seen with small airway inflammation.   3.  Cardiac enlargement. Coronary artery bypass grafting.   4.  Partially visualized distention of the gallbladder with some gallbladder wall thickening and cholelithiasis, this can be seen with cholecystitis.     Echocardiogram Complete 5/9/2023  Interpretation Summary   Left ventricular function is decreased. The ejection fraction is 45-50% (mildly reduced).  There is borderline-mild global hypokinesia of the left ventricle.  Normal right ventricle size and systolic function.  There is moderate (2+) mitral regurgitation.    NM stress test 5/8/2023    A prior study was conducted on 8/16/2022.  Prior images were unavailable for comparison review.    Pharmacologic regadenoson stress ECG is nondiagnostic due to baseline ECG abnormality.    Pharmacological regadenoson nuclear stress test is abnormal.  There is partially reversible change in inferior and basal to mid inferolateral walls indicating inducible myocardial ischemia.     Normal left ventricular size, wall motion and systolic function.  Calculated left ventricular ejection fraction is 65%.    The patient is at an intermediate risk of future cardiac ischemic events.    Echocardiogram 3/26/2024  The visual ejection fraction is 55-60%.  The right ventricle is normal in size and function.  Biatrial enlargement.  There is mod-severe to severe (3-4+) mitral regurgitation.  There is mild to moderate mitral stenosis.  Mild valvular aortic stenosis.  There is moderate (2+) tricuspid regurgitation.  The right ventricular systolic pressure is approximated at 56 mmHg.  Compared to the prior study dated 5/9/2023, there are changes as noted. There  is now moderate-severe mitral regurgitation and moderate tricuspid  regurgitation with moderate pulmonary hypertension.

## 2025-04-02 ENCOUNTER — TRANSFERRED RECORDS (OUTPATIENT)
Dept: HEALTH INFORMATION MANAGEMENT | Facility: CLINIC | Age: OVER 89
End: 2025-04-02
Payer: MEDICARE

## 2025-05-12 DIAGNOSIS — I50.20 HEART FAILURE WITH REDUCED EJECTION FRACTION (H): ICD-10-CM

## 2025-05-13 RX ORDER — FUROSEMIDE 20 MG/1
20 TABLET ORAL 2 TIMES DAILY
Qty: 180 TABLET | Refills: 3 | Status: SHIPPED | OUTPATIENT
Start: 2025-05-13

## 2025-05-21 ENCOUNTER — OFFICE VISIT (OUTPATIENT)
Dept: FAMILY MEDICINE | Facility: CLINIC | Age: OVER 89
End: 2025-05-21
Attending: FAMILY MEDICINE
Payer: MEDICARE

## 2025-05-21 ENCOUNTER — RESULTS FOLLOW-UP (OUTPATIENT)
Dept: FAMILY MEDICINE | Facility: CLINIC | Age: OVER 89
End: 2025-05-21

## 2025-05-21 VITALS
DIASTOLIC BLOOD PRESSURE: 78 MMHG | OXYGEN SATURATION: 93 % | SYSTOLIC BLOOD PRESSURE: 175 MMHG | BODY MASS INDEX: 31.79 KG/M2 | HEART RATE: 71 BPM | TEMPERATURE: 97.7 F | HEIGHT: 63 IN | RESPIRATION RATE: 16 BRPM | WEIGHT: 179.4 LBS

## 2025-05-21 DIAGNOSIS — E11.42 TYPE 2 DIABETES MELLITUS WITH DIABETIC POLYNEUROPATHY, WITHOUT LONG-TERM CURRENT USE OF INSULIN (H): Primary | ICD-10-CM

## 2025-05-21 DIAGNOSIS — F33.40 RECURRENT MAJOR DEPRESSIVE DISORDER, IN REMISSION: ICD-10-CM

## 2025-05-21 DIAGNOSIS — S00.412A ABRASION OF LEFT EAR CANAL, INITIAL ENCOUNTER: ICD-10-CM

## 2025-05-21 LAB
ALBUMIN SERPL BCG-MCNC: 3.9 G/DL (ref 3.5–5.2)
ALP SERPL-CCNC: 90 U/L (ref 40–150)
ALT SERPL W P-5'-P-CCNC: 6 U/L (ref 0–50)
ANION GAP SERPL CALCULATED.3IONS-SCNC: 10 MMOL/L (ref 7–15)
AST SERPL W P-5'-P-CCNC: 22 U/L (ref 0–45)
BILIRUB SERPL-MCNC: 0.6 MG/DL
BUN SERPL-MCNC: 22 MG/DL (ref 8–23)
CALCIUM SERPL-MCNC: 9.9 MG/DL (ref 8.8–10.4)
CHLORIDE SERPL-SCNC: 99 MMOL/L (ref 98–107)
CREAT SERPL-MCNC: 0.85 MG/DL (ref 0.51–0.95)
EGFRCR SERPLBLD CKD-EPI 2021: 64 ML/MIN/1.73M2
EST. AVERAGE GLUCOSE BLD GHB EST-MCNC: 212 MG/DL
GLUCOSE SERPL-MCNC: 323 MG/DL (ref 70–99)
HBA1C MFR BLD: 9 % (ref 0–5.6)
HCO3 SERPL-SCNC: 27 MMOL/L (ref 22–29)
HOLD SPECIMEN: NORMAL
POTASSIUM SERPL-SCNC: 4.4 MMOL/L (ref 3.4–5.3)
PROT SERPL-MCNC: 7 G/DL (ref 6.4–8.3)
SODIUM SERPL-SCNC: 136 MMOL/L (ref 135–145)

## 2025-05-21 PROCEDURE — 3052F HG A1C>EQUAL 8.0%<EQUAL 9.0%: CPT | Performed by: FAMILY MEDICINE

## 2025-05-21 PROCEDURE — G2211 COMPLEX E/M VISIT ADD ON: HCPCS | Performed by: FAMILY MEDICINE

## 2025-05-21 PROCEDURE — 83036 HEMOGLOBIN GLYCOSYLATED A1C: CPT | Performed by: FAMILY MEDICINE

## 2025-05-21 PROCEDURE — 80053 COMPREHEN METABOLIC PANEL: CPT | Performed by: FAMILY MEDICINE

## 2025-05-21 PROCEDURE — 3077F SYST BP >= 140 MM HG: CPT | Performed by: FAMILY MEDICINE

## 2025-05-21 PROCEDURE — 3078F DIAST BP <80 MM HG: CPT | Performed by: FAMILY MEDICINE

## 2025-05-21 PROCEDURE — 99214 OFFICE O/P EST MOD 30 MIN: CPT | Performed by: FAMILY MEDICINE

## 2025-05-21 PROCEDURE — 36415 COLL VENOUS BLD VENIPUNCTURE: CPT | Performed by: FAMILY MEDICINE

## 2025-05-21 RX ORDER — METFORMIN HYDROCHLORIDE 500 MG/1
500 TABLET, EXTENDED RELEASE ORAL
Qty: 90 TABLET | Refills: 0 | Status: SHIPPED | OUTPATIENT
Start: 2025-05-21

## 2025-05-21 RX ORDER — OFLOXACIN 3 MG/ML
5 SOLUTION AURICULAR (OTIC) DAILY
Qty: 5 ML | Refills: 0 | Status: SHIPPED | OUTPATIENT
Start: 2025-05-21

## 2025-05-21 NOTE — PROGRESS NOTES
Assessment & Plan  Type 2 diabetes mellitus with diabetic polyneuropathy, without long-term current use of insulin (H)  Dm loss of control  Diabetes worsened from 6.6 to 9.0   Was on Jardiance but stopped due to 'feeling cloudy'  Diabetic eye exam done 6/20/2024  Foot exam done 11/8/2024   On asa and lipitor 20 mg     Start metformin 500 mg xr po q day   Follow-up in 3 months to repeat A1c then. Goal A1c is less than 8.0   Orders:    HEMOGLOBIN A1C; Future    metFORMIN (GLUCOPHAGE XR) 500 MG 24 hr tablet; Take 1 tablet (500 mg) by mouth daily (with dinner).    Abrasion of left ear canal, initial encounter  Left ear canal inferior wall distally has a 1-2 mm fissure/ abrasion - will tx with floxin otic drops.   Keep hearing aid out until feels better.   Orders:    ofloxacin (FLOXIN) 0.3 % otic solution; Place 5 drops Into the left ear daily.    Recurrent major depressive disorder, in remission  Stressed today, daughter not feeling well, going through chemo treatment.              Ally Marrero is a 89 year old, presenting for the following health issues:  Follow Up (DM and A1C)        5/21/2025    10:17 AM   Additional Questions   Roomed by Taqueria Henry MA   Accompanied by Son         5/21/2025    10:17 AM   Patient Reported Additional Medications   Patient reports taking the following new medications None     History of Present Illness       Diabetes:   She presents for follow up of diabetes.    She is not checking blood glucose.         She has no concerns regarding her diabetes at this time.   She is not experiencing numbness or burning in feet, excessive thirst, blurry vision, weight changes or redness, sores or blisters on feet.           She eats 2-3 servings of fruits and vegetables daily.She consumes 0 sweetened beverage(s) daily.She exercises with enough effort to increase her heart rate 9 or less minutes per day.  She exercises with enough effort to increase her heart rate 3 or less days per week.   She is  "taking medications regularly.            Objective    BP (!) 175/78 (BP Location: Left arm, Patient Position: Sitting, Cuff Size: Adult Regular)   Pulse 71   Temp 97.7  F (36.5  C) (Oral)   Resp 16   Ht 1.6 m (5' 3\")   Wt 81.4 kg (179 lb 6.4 oz)   LMP  (LMP Unknown)   SpO2 93%   BMI 31.78 kg/m    Body mass index is 31.78 kg/m .  Physical Exam  Constitutional:       Appearance: Normal appearance.   HENT:      Head: Normocephalic and atraumatic.      Right Ear: Tympanic membrane, ear canal and external ear normal. There is no impacted cerumen.      Left Ear: Tympanic membrane and external ear normal. There is no impacted cerumen.      Ears:      Comments: Small abrasion/ laceration 1-2 mm external ear canal on inferior surface. No sign of infection.     Nose: Nose normal.      Mouth/Throat:      Mouth: Mucous membranes are moist.      Pharynx: Oropharynx is clear.   Cardiovascular:      Rate and Rhythm: Normal rate and regular rhythm.   Pulmonary:      Effort: Pulmonary effort is normal.   Musculoskeletal:         General: Normal range of motion.      Cervical back: Normal range of motion and neck supple.   Neurological:      General: No focal deficit present.      Mental Status: She is alert and oriented to person, place, and time.                Signed Electronically by: Maggie Arriaga MD    "

## 2025-05-21 NOTE — ASSESSMENT & PLAN NOTE
Dm loss of control  Diabetes worsened from 6.6 to 9.0   Was on Jardiance but stopped due to 'feeling cloudy'  Diabetic eye exam done 6/20/2024  Foot exam done 11/8/2024   On asa and lipitor 20 mg     Start metformin 500 mg xr po q day   Follow-up in 3 months to repeat A1c then. Goal A1c is less than 8.0   Orders:    HEMOGLOBIN A1C; Future    metFORMIN (GLUCOPHAGE XR) 500 MG 24 hr tablet; Take 1 tablet (500 mg) by mouth daily (with dinner).

## 2025-05-27 DIAGNOSIS — K21.9 GASTROESOPHAGEAL REFLUX DISEASE WITHOUT ESOPHAGITIS: ICD-10-CM

## 2025-05-28 NOTE — TELEPHONE ENCOUNTER
Clinic RN: Please investigate patient's chart or contact patient if the information cannot be found because medication is not active on medication list.

## 2025-05-29 RX ORDER — OMEPRAZOLE 20 MG/1
20 CAPSULE, DELAYED RELEASE ORAL 2 TIMES DAILY
Qty: 180 CAPSULE | Refills: 3 | Status: SHIPPED | OUTPATIENT
Start: 2025-05-29

## 2025-06-06 ENCOUNTER — TRANSFERRED RECORDS (OUTPATIENT)
Dept: HEALTH INFORMATION MANAGEMENT | Facility: CLINIC | Age: OVER 89
End: 2025-06-06
Payer: MEDICARE

## 2025-06-06 LAB — RETINOPATHY: NEGATIVE

## 2025-07-08 ENCOUNTER — LAB (OUTPATIENT)
Dept: LAB | Facility: CLINIC | Age: OVER 89
End: 2025-07-08
Payer: MEDICARE

## 2025-07-08 DIAGNOSIS — I25.10 CAD (CORONARY ARTERY DISEASE), NATIVE CORONARY ARTERY: ICD-10-CM

## 2025-07-08 DIAGNOSIS — M81.0 AGE-RELATED OSTEOPOROSIS WITHOUT CURRENT PATHOLOGICAL FRACTURE: ICD-10-CM

## 2025-07-08 DIAGNOSIS — Z13.6 SCREENING FOR CARDIOVASCULAR CONDITION: Primary | ICD-10-CM

## 2025-07-08 DIAGNOSIS — R94.6 THYROID FUNCTION TEST ABNORMAL: ICD-10-CM

## 2025-07-08 PROCEDURE — 82040 ASSAY OF SERUM ALBUMIN: CPT

## 2025-07-08 PROCEDURE — 82565 ASSAY OF CREATININE: CPT

## 2025-07-08 PROCEDURE — 36415 COLL VENOUS BLD VENIPUNCTURE: CPT

## 2025-07-08 PROCEDURE — 84443 ASSAY THYROID STIM HORMONE: CPT

## 2025-07-08 PROCEDURE — 82310 ASSAY OF CALCIUM: CPT

## 2025-07-09 LAB
ALBUMIN SERPL BCG-MCNC: 4.3 G/DL (ref 3.5–5.2)
CALCIUM SERPL-MCNC: 11 MG/DL (ref 8.8–10.4)
CREAT SERPL-MCNC: 1.32 MG/DL (ref 0.51–0.95)
EGFRCR SERPLBLD CKD-EPI 2021: 38 ML/MIN/1.73M2
TSH SERPL DL<=0.005 MIU/L-ACNC: 3.22 UIU/ML (ref 0.3–4.2)

## 2025-07-14 LAB
DEPRECATED CALCIDIOL+CALCIFEROL SERPL-MC: <55 UG/L (ref 20–75)
VITAMIN D2 SERPL-MCNC: <5 UG/L
VITAMIN D3 SERPL-MCNC: 50 UG/L

## 2025-07-14 NOTE — PROGRESS NOTES
Subjective:    Established patient.    Janes Montero is a 89 year old female who presents to review bone health. Chart fully reviewed for bone health and partially empty sella and the following is a comprehensive summary of her bone health and partially empty sella care to date. DM is managed by her primary physician.     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    All time highest uncorrected serum calcium 11.3 (ULN 10.4 mg/dL) 12/11/2024.    12/27/2022: 24 hour urine (1.4 L) with calcium 140 mg    12/13/2022: PTH 65 (ULN 65 pg/mL), uncorrected serum calcium 10.6 mg/dL (ULN 10.2), albumin 4.2 g/dL, corrected serum calcium 10.4 mg/dL, serum phosphorous normal, 25-OH vitamin D mid normal, GFR 66, bone specific alkaline phosphatase in the mid premenopausal range         -2018 - 2021 she has had frequent mildly elevated uncorrected serum calcium (highest uncorrected serum calcium value 10.9 mg/deciliter) and on many of these draws serum albumin was also elevated.  She has had a few PTH draws from [id number] that were mildly elevated.  On 1 of these draws PTH was mildly elevated as was uncorrected serum calcium without concomitant albumin.    Serum phosphorus has been normal.    She has never had a kidney stone. CT A/P 1/2025: no stones    4/30/2024: no overt symptoms of hypercalcemia    She is unsure if she has previously been on thiazide diuretics but she isn't on these currently. No use of lithium.     Fractured right humerus in 2017 after trip and fall from standing height.     Right wrist fractured in 2015, trip and fall from standing height.     DEXA 7/1/2021:  -No spine images  -Lowest T score at the hips is -1.9 at the right femoral neck, significant -9.9% decline in BMD at the total hip compared to 2008  -Distal one third radius T score -6.1    Lumbar spine x-ray  7/2022: L1 superior endplate compression fracture with approximately 20-30% loss of vertebral body height. No trauma that would have explained this L1 fracture.     Spine XR T10 - L5 1/24/2023: stable mild L1 compression fracture (about 15% height loss per radiology)     As of 4/2023 Janes has completed a complete evaluation for secondary causes of increased fracture risk with the following notable findings:  -CKD-3  -PTH with minerals, urine calcium as above  -7/2025: 25-OH vitamin D mid normal     Nuclear medicine parathyroid scan 8/24/2021: No evidence of parathyroid adenoma in the neck or chest    Fosamax, Actonel: remotely used - these are listed as allergies in Epic, she doesn't recall why     Denosumab 60 mg given: 1/22/2025, 7/10/2024, 12/27/2023, 6/27/2023, 12/27/2022, 6/21/2022, 12/15/2021 (I verified these doses in Epic and these are the only injections to date); well tolerated    Outside of PO bisphosphonate and denosumab she has not received other pharmacologic therapy to reduce fracture risk.     7/5/2025: She takes a MVI, a calcium pill, and no vitamin D. She has 2 glasses of milk per day.     No recent/upcoming tooth extraction or dental implant.     # Subcentimeter thyroid nodules    OS thyroid US 8/21/2019 ([place], [place]): report per OSH radiology  -Right thyroid lobe: 1.5 x 3.7 x 1.8 cm in size. Upper pole cystic nodule measures 7 x 4 x 6 mm. Complex cystic nodule in the midpole measures 6 x 4 x 6 mm. Upper pole cystic nodule measures 5 x 3 x 5 mm.   -Left thyroid lobe: 1.4 x 4.5 x 1.4 cm in size. Colloid cyst along the posterior margin of the midpole measures 5 x 5 x 6 mm. Complex appearing upper pole hypoechoic cystic lesion measures 6 x 3 x 6 mm. Upper pole cystic nodule adjacent to this measures 3 x 3 x 5 mm.    She had been on LT4 for years, dose had been 25 mcg daily. We stopped it 4/2023. 7/2025: TSH 3.22    No prior thyroid surgery. No H/N radiation.  No prior MCCARTNEY therapy. No history of hyperthyroidism. No prior thyroid FNA.    No history of cancer. No radiation therapy. 7/15/2025: No compressive symptoms.     # Empty sella    OS MRI brain 4/24/2015 report described empty sella - no prior evaluation for this before our labs 12/2022.     12/2022: she denies recent oral or parenteral glucocorticoid exposure although Flonase is listed on her medication list as a nasal spray. She denies a history of known pituitary disease.    -12/2022 at 8:25 AM: serum cortisol 19.2 mcg/dL, DHEA-S 36 mcg/dL, TSH and free T4 normal, prolactin normal, IGF-1 normal      Plan 7/15/2025: no repeat hormonal evaluation is needed unless she has new symptoms that suggest pituitary gland dysfunction     Other comorbidities: Parkinson's disease, GERD, DM (5/2025: HbA1c 9.0%, over the years it has been generally 8.0% or less and was 6.6% 2/2025; MRI abdomen 1/2025: pancreas unremarkable; 5/2025: her primary physician started metformin and ordered a follow-up HbA1c), CAD s/p CABG.    Objective:    BMI 31.9 kg/m2, /82. I cannot appreciate thyroid nodularity. No cervical LAD.     4/2024: Pleasant and conversational.  BMI 31.6 kg/meters squared, blood pressure 143/65.    4/2023: Pleasant and conversational.  BMI 30.6 kg/meters squared, blood pressure 116/78.  No thyroid eye disease.  She has mild bilateral thyroid nodularity.  No cervical lymphadenopathy.  Dentition reasonable.  She is kyphotic.    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  Surgical indications include: very low bone mineral density with prior fragility fractures and GFR <60 at times. However Janes has a preference to avoid surgery unless absolute necessary.  I am in full agreement with this approach.  We will manage her primary hyperparathyroidism  medically.    For her low bone mineral density and multiple prior fragility fractures we will continue Prolia 60 mg every 6 months - next 3 doses ordered. We reviewed potential adverse effects including osteonecrosis of the jaw, atypical femoral fracture, etc.  No need to monitor with DEXA since this will not .    She will monitor for symptoms of hypercalcemia and if these develop we can use Cinacalcet as needed.    Maintain hydration and avoid thiazide diuretics.    She will continue her current multivitamin, calcium supplement, and dietary calcium intake.      Plan:   -denosumab ordered for 7/23/2025 (no additional labs needed before this injection) and 1/23/2026  -labs before denosumab 1/23/2026: calcium, albumin   -1 year: calcium, albumin, Cr, vitamin D and return visit before to denosumab     # Subcentimeter thyroid nodules    No need for FNA.  She will monitor for compressive symptoms such as dysphagia and hoarseness.  She will monitor for increasing thyroid nodularity and cervical lymphadenopathy.  I will perform a clinical neck exam when I see her in 1 year. TSH in 1 year.    30 minutes spent on the date of the encounter doing chart review, history and exam, documentation and further activities as noted above.     The longitudinal plan of care for the diagnosis(es)/condition(s) as documented were addressed during this visit. Due to the added complexity in care, I will continue to support Ivia in the subsequent management and with ongoing continuity of care.

## 2025-07-15 ENCOUNTER — OFFICE VISIT (OUTPATIENT)
Dept: ENDOCRINOLOGY | Facility: CLINIC | Age: OVER 89
End: 2025-07-15
Payer: MEDICARE

## 2025-07-15 VITALS
HEART RATE: 72 BPM | WEIGHT: 179.8 LBS | SYSTOLIC BLOOD PRESSURE: 144 MMHG | DIASTOLIC BLOOD PRESSURE: 82 MMHG | BODY MASS INDEX: 31.85 KG/M2

## 2025-07-15 DIAGNOSIS — E21.0 PRIMARY HYPERPARATHYROIDISM: ICD-10-CM

## 2025-07-15 DIAGNOSIS — S42.209S CLOSED FRACTURE OF PROXIMAL END OF HUMERUS, UNSPECIFIED FRACTURE MORPHOLOGY, UNSPECIFIED LATERALITY, SEQUELA: ICD-10-CM

## 2025-07-15 DIAGNOSIS — R94.6 ABNORMAL THYROID FUNCTION TEST: ICD-10-CM

## 2025-07-15 DIAGNOSIS — Z79.620 ENCOUNTER FOR MONITORING DENOSUMAB THERAPY: ICD-10-CM

## 2025-07-15 DIAGNOSIS — M81.0 AGE-RELATED OSTEOPOROSIS WITHOUT CURRENT PATHOLOGICAL FRACTURE: Primary | ICD-10-CM

## 2025-07-15 DIAGNOSIS — E83.52 HYPERCALCEMIA: ICD-10-CM

## 2025-07-15 DIAGNOSIS — E23.6 EMPTY SELLA: ICD-10-CM

## 2025-07-15 DIAGNOSIS — S32.010S COMPRESSION FRACTURE OF L1 VERTEBRA, SEQUELA: ICD-10-CM

## 2025-07-15 DIAGNOSIS — N18.30 STAGE 3 CHRONIC KIDNEY DISEASE, UNSPECIFIED WHETHER STAGE 3A OR 3B CKD (H): ICD-10-CM

## 2025-07-15 DIAGNOSIS — S62.109S CLOSED FRACTURE OF WRIST, UNSPECIFIED LATERALITY, SEQUELA: ICD-10-CM

## 2025-07-15 DIAGNOSIS — R94.6 THYROID FUNCTION TEST ABNORMAL: ICD-10-CM

## 2025-07-15 DIAGNOSIS — Z51.81 ENCOUNTER FOR MONITORING DENOSUMAB THERAPY: ICD-10-CM

## 2025-07-15 PROCEDURE — 99214 OFFICE O/P EST MOD 30 MIN: CPT | Performed by: INTERNAL MEDICINE

## 2025-07-15 PROCEDURE — G2211 COMPLEX E/M VISIT ADD ON: HCPCS | Performed by: INTERNAL MEDICINE

## 2025-07-15 PROCEDURE — 3077F SYST BP >= 140 MM HG: CPT | Performed by: INTERNAL MEDICINE

## 2025-07-15 PROCEDURE — 3079F DIAST BP 80-89 MM HG: CPT | Performed by: INTERNAL MEDICINE

## 2025-07-15 RX ORDER — LEVOTHYROXINE SODIUM 25 UG/1
25 TABLET ORAL
COMMUNITY

## 2025-07-15 NOTE — LETTER
7/15/2025      Janes Montero  [address]  [facility] [address]      Dear Colleague,    Thank you for referring your patient, Janes Montero, to the [facility]. Please see a copy of my visit note below.    Subjective:    Established patient.    Janes Montero is a 89 year old female who presents to review bone health. Chart fully reviewed for bone health and partially empty sella and the following is a comprehensive summary of her bone health and partially empty sella care to date. DM is managed by her primary physician.     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    All time highest uncorrected serum calcium 11.3 (ULN 10.4 mg/dL) 12/11/2024.    12/27/2022: 24 hour urine (1.4 L) with calcium 140 mg    12/13/2022: PTH 65 (ULN 65 pg/mL), uncorrected serum calcium 10.6 mg/dL (ULN 10.2), albumin 4.2 g/dL, corrected serum calcium 10.4 mg/dL, serum phosphorous normal, 25-OH vitamin D mid normal, GFR 66, bone specific alkaline phosphatase in the mid premenopausal range         -2018 - 2021 she has had frequent mildly elevated uncorrected serum calcium (highest uncorrected serum calcium value 10.9 mg/deciliter) and on many of these draws serum albumin was also elevated.  She has had a few PTH draws from [id number] that were mildly elevated.  On 1 of these draws PTH was mildly elevated as was uncorrected serum calcium without concomitant albumin.    Serum phosphorus has been normal.    She has never had a kidney stone. CT A/P 1/2025: no stones    4/30/2024: no overt symptoms of hypercalcemia    She is unsure if she has previously been on thiazide diuretics but she isn't on these currently. No use of lithium.     Fractured right humerus in 2017 after trip and fall from standing height.     Right wrist fractured in 2015, trip and fall from  standing height.     DEXA 7/1/2021:  -No spine images  -Lowest T score at the hips is -1.9 at the right femoral neck, significant -9.9% decline in BMD at the total hip compared to 2008  -Distal one third radius T score -6.1    Lumbar spine x-ray 7/2022: L1 superior endplate compression fracture with approximately 20-30% loss of vertebral body height. No trauma that would have explained this L1 fracture.     Spine XR T10 - L5 1/24/2023: stable mild L1 compression fracture (about 15% height loss per radiology)     As of 4/2023 Ivia has completed a complete evaluation for secondary causes of increased fracture risk with the following notable findings:  -CKD-3  -PTH with minerals, urine calcium as above  -7/2025: 25-OH vitamin D mid normal     Nuclear medicine parathyroid scan 8/24/2021: No evidence of parathyroid adenoma in the neck or chest    Fosamax, Actonel: remotely used - these are listed as allergies in Epic, she doesn't recall why     Denosumab 60 mg given: 1/22/2025, 7/10/2024, 12/27/2023, 6/27/2023, 12/27/2022, 6/21/2022, 12/15/2021 (I verified these doses in Epic and these are the only injections to date); well tolerated    Outside of PO bisphosphonate and denosumab she has not received other pharmacologic therapy to reduce fracture risk.     7/5/2025: She takes a MVI, a calcium pill, and no vitamin D. She has 2 glasses of milk per day.     No recent/upcoming tooth extraction or dental implant.     # Subcentimeter thyroid nodules    OS thyroid US 8/21/2019 ([place], [place]): report per OSH radiology  -Right thyroid lobe: 1.5 x 3.7 x 1.8 cm in size. Upper pole cystic nodule measures 7 x 4 x 6 mm. Complex cystic nodule in the midpole measures 6 x 4 x 6 mm. Upper pole cystic nodule measures 5 x 3 x 5 mm.   -Left thyroid lobe: 1.4 x 4.5 x 1.4 cm in size. Colloid cyst along the posterior margin of the midpole measures 5 x 5 x 6 mm. Complex appearing upper pole hypoechoic  cystic lesion measures 6 x 3 x 6 mm. Upper pole cystic nodule adjacent to this measures 3 x 3 x 5 mm.    She had been on LT4 for years, dose had been 25 mcg daily. We stopped it 4/2023. 7/2025: TSH 3.22    No prior thyroid surgery. No H/N radiation. No prior MCCARTNEY therapy. No history of hyperthyroidism. No prior thyroid FNA.    No history of cancer. No radiation therapy. 7/15/2025: No compressive symptoms.     # Empty sella    OS MRI brain 4/24/2015 report described empty sella - no prior evaluation for this before our labs 12/2022.     12/2022: she denies recent oral or parenteral glucocorticoid exposure although Flonase is listed on her medication list as a nasal spray. She denies a history of known pituitary disease.    -12/2022 at 8:25 AM: serum cortisol 19.2 mcg/dL, DHEA-S 36 mcg/dL, TSH and free T4 normal, prolactin normal, IGF-1 normal      Plan 7/15/2025: no repeat hormonal evaluation is needed unless she has new symptoms that suggest pituitary gland dysfunction     Other comorbidities: Parkinson's disease, GERD, DM (5/2025: HbA1c 9.0%, over the years it has been generally 8.0% or less and was 6.6% 2/2025; MRI abdomen 1/2025: pancreas unremarkable; 5/2025: her primary physician started metformin and ordered a follow-up HbA1c), CAD s/p CABG.    Objective:    BMI 31.9 kg/m2, /82. I cannot appreciate thyroid nodularity. No cervical LAD.     4/2024: Pleasant and conversational.  BMI 31.6 kg/meters squared, blood pressure 143/65.    4/2023: Pleasant and conversational.  BMI 30.6 kg/meters squared, blood pressure 116/78.  No thyroid eye disease.  She has mild bilateral thyroid nodularity.  No cervical lymphadenopathy.  Dentition reasonable.  She is kyphotic.    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  Surgical indications include: very low bone  mineral density with prior fragility fractures and GFR <60 at times. However Ivia has a preference to avoid surgery unless absolute necessary.  I am in full agreement with this approach.  We will manage her primary hyperparathyroidism medically.    For her low bone mineral density and multiple prior fragility fractures we will continue Prolia 60 mg every 6 months - next 3 doses ordered. We reviewed potential adverse effects including osteonecrosis of the jaw, atypical femoral fracture, etc.  No need to monitor with DEXA since this will not .    She will monitor for symptoms of hypercalcemia and if these develop we can use Cinacalcet as needed.    Maintain hydration and avoid thiazide diuretics.    She will continue her current multivitamin, calcium supplement, and dietary calcium intake.      Plan:   -denosumab ordered for 7/23/2025 (no additional labs needed before this injection) and 1/23/2026  -labs before denosumab 1/23/2026: calcium, albumin   -1 year: calcium, albumin, Cr, vitamin D and return visit before to denosumab     # Subcentimeter thyroid nodules    No need for FNA.  She will monitor for compressive symptoms such as dysphagia and hoarseness.  She will monitor for increasing thyroid nodularity and cervical lymphadenopathy.  I will perform a clinical neck exam when I see her in 1 year. TSH in 1 year.    30 minutes spent on the date of the encounter doing chart review, history and exam, documentation and further activities as noted above.     The longitudinal plan of care for the diagnosis(es)/condition(s) as documented were addressed during this visit. Due to the added complexity in care, I will continue to support Ivia in the subsequent management and with ongoing continuity of care.    Again, thank you for allowing me to participate in the care of your patient.        Sincerely,        Eric Christensen MD    Electronically signed

## 2025-07-23 ENCOUNTER — ALLIED HEALTH/NURSE VISIT (OUTPATIENT)
Dept: ENDOCRINOLOGY | Facility: CLINIC | Age: OVER 89
End: 2025-07-23
Payer: MEDICARE

## 2025-07-23 DIAGNOSIS — M81.0 AGE-RELATED OSTEOPOROSIS WITHOUT CURRENT PATHOLOGICAL FRACTURE: Primary | ICD-10-CM

## 2025-07-23 DIAGNOSIS — M80.00XD AGE-RELATED OSTEOPOROSIS WITH CURRENT PATHOLOGICAL FRACTURE WITH ROUTINE HEALING: ICD-10-CM

## 2025-07-23 PROCEDURE — 96372 THER/PROPH/DIAG INJ SC/IM: CPT | Performed by: INTERNAL MEDICINE

## 2025-07-23 NOTE — PROGRESS NOTES
Clinic Administered Medication Documentation      Prolia Documentation    Indication: Prolia  (denosumab) is a prescription medicine used to treat osteoporosis in patients who:   Are at high risk for fracture, meaning patients who have had a fracture related to osteoporosis, or who have multiple risk factors for fracture.  Cannot use another osteoporosis medicine or other osteoporosis medicines did not work well.  The timeline for early/late injections would be 4 weeks early and any time after the 6 month leonard. If a patient receives their injection late, then the subsequent injection would be 6 months from the date that they actually received the injection.    When was the last injection?  25  Was the last injection at least 6 months ago? Yes  Has the prior authorization been completed?  Yes  Is there an active order (written within the past 365 days, with administrations remaining, not ) in the chart?  Yes   Calcium   Date Value Ref Range Status   2025 11.0 (H) 8.8 - 10.4 mg/dL Final     Has patient had a Calcium test in the last 12 months? Yes   Is the calcium result 8.8 or above? Yes   GFR Estimate   Date Value Ref Range Status   2025 38 (L) >60 mL/min/1.73m2 Final     Comment:     eGFR calculated using  CKD-EPI equation.   2018 41 (L) >60 mL/min/1.73m2 Final     Has patient had a GFR within the last 12 months? Yes   Is GFR under 30, or patient has a diagnosis of CKD4 or CKD5? No   Patient denies gastric bypass or parathyroid surgery in past 6 months? Yes - patient denies.   Patient denies undergoing any dental procedures involving drilling into the bone, such as implants, extractions, or oral surgery, within the past two months that have not yet healed?  Yes - patient denies  Patient denies plans for an emergency tooth extraction within the next week? Yes    The following steps were completed to comply with the REMS program for Prolia:  Reviewed information in the Medication  Guide, including the serious risks of Prolia  and the symptoms of each risk.  Advised patient to seek prompt medical attention if they have signs or symptoms of any of the serious risks.  Provided each patient a copy of the Medication Guide and Patient Guide.    Prior to injection, verified patient identity using patient's name and date of birth. Medication was administered. Please see MAR and medication order for additional information. Patient instructed to remain in clinic for 15 minutes and report any adverse reaction to staff immediately.    Vial/Syringe: Syringe  Was this medication supplied by the patient? No  Verified that the patient has administrations remaining in their prescription.    Janes Montero comes into clinic today at the request of [facility] Ordering Provider for Med Injection only Prolia.    This service provided today was under the supervising provider of the cosmo Saenz, who was available if needed.    Opal Levy RN

## 2025-08-30 ENCOUNTER — HEALTH MAINTENANCE LETTER (OUTPATIENT)
Age: OVER 89
End: 2025-08-30

## 2025-08-31 DIAGNOSIS — F33.42 RECURRENT MAJOR DEPRESSIVE DISORDER, IN FULL REMISSION: ICD-10-CM

## (undated) DEVICE — KIT HAND CONTROL ACIST 016795

## (undated) DEVICE — SHEATH ULTIMUM 6FR 12CM 407845

## (undated) DEVICE — CATH ANGIO INFINITI IM 4FRX100CM 538460

## (undated) DEVICE — CUSTOM PACK CORONARY SAN5BCRHEA

## (undated) DEVICE — MANIFOLD KIT ANGIO AUTOMATED 014613

## (undated) DEVICE — SYR ANGIOGRAPHY MULTIUSE KIT ACIST 014612

## (undated) DEVICE — CATH DIAG 4FR AR 1 MOD 538441

## (undated) DEVICE — CATH ANGIO INFINITI JL4 4FRX100CM 538420

## (undated) DEVICE — ELECTRODE ADULT PACING MULTI P-211-M1

## (undated) RX ORDER — FENTANYL CITRATE 50 UG/ML
INJECTION, SOLUTION INTRAMUSCULAR; INTRAVENOUS
Status: DISPENSED
Start: 2022-08-17

## (undated) RX ORDER — ASPIRIN 325 MG
TABLET ORAL
Status: DISPENSED
Start: 2022-08-17